# Patient Record
Sex: MALE | Race: BLACK OR AFRICAN AMERICAN | NOT HISPANIC OR LATINO | Employment: STUDENT | URBAN - METROPOLITAN AREA
[De-identification: names, ages, dates, MRNs, and addresses within clinical notes are randomized per-mention and may not be internally consistent; named-entity substitution may affect disease eponyms.]

---
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
## 2022-02-07 DIAGNOSIS — J30.9 ALLERGIC RHINITIS, UNSPECIFIED SEASONALITY, UNSPECIFIED TRIGGER: ICD-10-CM

## 2022-02-07 RX ORDER — MONTELUKAST SODIUM 10 MG/1
10 TABLET ORAL NIGHTLY
Qty: 30 TABLET | Refills: 11 | Status: CANCELLED | OUTPATIENT
Start: 2022-02-07 | End: 2023-02-07

## 2022-02-07 RX ORDER — MONTELUKAST SODIUM 10 MG/1
10 TABLET ORAL NIGHTLY
Qty: 30 TABLET | Refills: 11 | Status: SHIPPED | OUTPATIENT
Start: 2022-02-07 | End: 2023-02-07

## 2022-04-28 ENCOUNTER — TELEPHONE (OUTPATIENT)
Dept: PEDIATRICS | Facility: CLINIC | Age: 22
End: 2022-04-28
Payer: COMMERCIAL

## 2022-04-28 NOTE — TELEPHONE ENCOUNTER
Called and sw Sujata at [facility]. She stated pt will need a new prescription for a new wheelchair. It does require an in person visit if the pt hasn't been seen in the last 6months. Pt is eligible for a new wheelchair every 5 years, pt received last chair back in 2016. Sujata stated the prescription just needs to state whether it is a custom manual or power wheelchair and the additional bathroom equipment can be added to the same rx as the wheelchair.    [facility]  Joyce Jones  Fax # [phone]          ----- Message from Nery Matos sent at 4/28/2022  8:26 AM CDT -----  Contact: Patriece, Mother  Patriece called regarding getting wheelchair evaluation and a rolling shower chair order for Ronny to be faxed to [facility]

## 2022-05-02 ENCOUNTER — TELEPHONE (OUTPATIENT)
Dept: PEDIATRICS | Facility: CLINIC | Age: 22
End: 2022-05-02
Payer: COMMERCIAL

## 2022-05-02 NOTE — TELEPHONE ENCOUNTER
Returned call to mom to schedule pts appt. Appt scheduled for May 13th at 3pm with Dr. Block. Mom VU.  ----- Message from Anny Rayo sent at 5/2/2022  3:18 PM CDT -----  Pt's mother would like return call, needing to schedule wellness visit for pt .  Please call back at .[phone].  Md Octavio

## 2022-05-13 ENCOUNTER — OFFICE VISIT (OUTPATIENT)
Dept: PEDIATRICS | Facility: CLINIC | Age: 22
End: 2022-05-13
Payer: COMMERCIAL

## 2022-05-13 VITALS — TEMPERATURE: 96 F | WEIGHT: 115.19 LBS | BODY MASS INDEX: 19.76 KG/M2

## 2022-05-13 DIAGNOSIS — G80.8 CONGENITAL QUADRIPLEGIA: ICD-10-CM

## 2022-05-13 DIAGNOSIS — G40.909 SEIZURE DISORDER: ICD-10-CM

## 2022-05-13 DIAGNOSIS — Z23 NEED FOR HEPATITIS A VACCINATION: ICD-10-CM

## 2022-05-13 DIAGNOSIS — R15.9 BOWEL AND BLADDER INCONTINENCE: ICD-10-CM

## 2022-05-13 DIAGNOSIS — G80.0 SPASTIC QUADRIPLEGIC CEREBRAL PALSY: ICD-10-CM

## 2022-05-13 DIAGNOSIS — R32 BOWEL AND BLADDER INCONTINENCE: ICD-10-CM

## 2022-05-13 DIAGNOSIS — Z23 NEED FOR MENINGITIS VACCINATION: ICD-10-CM

## 2022-05-13 DIAGNOSIS — Z93.1 GASTROSTOMY TUBE DEPENDENT: ICD-10-CM

## 2022-05-13 DIAGNOSIS — M41.9 KYPHOSCOLIOSIS: ICD-10-CM

## 2022-05-13 DIAGNOSIS — I69.991 DYSPHAGIA FOLLOWING CEREBROVASCULAR DISEASE: ICD-10-CM

## 2022-05-13 DIAGNOSIS — Z00.00 VISIT FOR PREVENTIVE HEALTH EXAMINATION: Primary | ICD-10-CM

## 2022-05-13 PROBLEM — G00.9: Status: RESOLVED | Noted: 2020-10-17 | Resolved: 2022-05-13

## 2022-05-13 PROCEDURE — 99999 PR PBB SHADOW E&M-EST. PATIENT-LVL III: CPT | Mod: PBBFAC,,, | Performed by: PEDIATRICS

## 2022-05-13 PROCEDURE — 90633 HEPATITIS A VACCINE PEDIATRIC / ADOLESCENT 2 DOSE IM: ICD-10-PCS | Mod: S$GLB,,, | Performed by: PEDIATRICS

## 2022-05-13 PROCEDURE — 99395 PREV VISIT EST AGE 18-39: CPT | Mod: 25,S$GLB,, | Performed by: PEDIATRICS

## 2022-05-13 PROCEDURE — 1160F PR REVIEW ALL MEDS BY PRESCRIBER/CLIN PHARMACIST DOCUMENTED: ICD-10-PCS | Mod: CPTII,S$GLB,, | Performed by: PEDIATRICS

## 2022-05-13 PROCEDURE — 90471 IMMUNIZATION ADMIN: CPT | Mod: S$GLB,,, | Performed by: PEDIATRICS

## 2022-05-13 PROCEDURE — 90472 IMMUNIZATION ADMIN EACH ADD: CPT | Mod: S$GLB,,, | Performed by: PEDIATRICS

## 2022-05-13 PROCEDURE — 99395 PR PREVENTIVE VISIT,EST,18-39: ICD-10-PCS | Mod: 25,S$GLB,, | Performed by: PEDIATRICS

## 2022-05-13 PROCEDURE — 90633 HEPA VACC PED/ADOL 2 DOSE IM: CPT | Mod: S$GLB,,, | Performed by: PEDIATRICS

## 2022-05-13 PROCEDURE — 1159F PR MEDICATION LIST DOCUMENTED IN MEDICAL RECORD: ICD-10-PCS | Mod: CPTII,S$GLB,, | Performed by: PEDIATRICS

## 2022-05-13 PROCEDURE — 3008F BODY MASS INDEX DOCD: CPT | Mod: CPTII,S$GLB,, | Performed by: PEDIATRICS

## 2022-05-13 PROCEDURE — 99999 PR PBB SHADOW E&M-EST. PATIENT-LVL III: ICD-10-PCS | Mod: PBBFAC,,, | Performed by: PEDIATRICS

## 2022-05-13 PROCEDURE — 3008F PR BODY MASS INDEX (BMI) DOCUMENTED: ICD-10-PCS | Mod: CPTII,S$GLB,, | Performed by: PEDIATRICS

## 2022-05-13 PROCEDURE — 90620 MENB-4C VACCINE IM: CPT | Mod: S$GLB,,, | Performed by: PEDIATRICS

## 2022-05-13 PROCEDURE — 90472 MENINGOCOCCAL B, OMV VACCINE: ICD-10-PCS | Mod: S$GLB,,, | Performed by: PEDIATRICS

## 2022-05-13 PROCEDURE — 1160F RVW MEDS BY RX/DR IN RCRD: CPT | Mod: CPTII,S$GLB,, | Performed by: PEDIATRICS

## 2022-05-13 PROCEDURE — 90471 HEPATITIS A VACCINE PEDIATRIC / ADOLESCENT 2 DOSE IM: ICD-10-PCS | Mod: S$GLB,,, | Performed by: PEDIATRICS

## 2022-05-13 PROCEDURE — 1159F MED LIST DOCD IN RCRD: CPT | Mod: CPTII,S$GLB,, | Performed by: PEDIATRICS

## 2022-05-13 PROCEDURE — 90620 MENINGOCOCCAL B, OMV VACCINE: ICD-10-PCS | Mod: S$GLB,,, | Performed by: PEDIATRICS

## 2022-05-13 NOTE — PROGRESS NOTES
SUBJECTIVE:  Subjective  Ronny Ramey is a 21 y.o. male who is here with father for Well Child    HPI  Current concerns include wheel chair evaluation and rolling shower hair.    Nutrition:  Current diet:drinks milk/other calcium sources    Elimination:  Stool pattern: daily, normal consistency    Sleep:no problems    Dental:  Brushes teeth twice a day with fluoride? yes  Dental visit within past year?  yes    Social Screening:  School: attends school; going well; no concerns and no school  Physical Activity: minimal physical activity  Behavior: no concerns  Anxiety/Depression? no          Review of Systems  A comprehensive review of symptoms was completed and negative except as noted above.     OBJECTIVE:  Vital signs  Vitals:    05/13/22 1504   Temp: 96.1 °F (35.6 °C)   TempSrc: Tympanic   Weight: 52.3 kg (115 lb 3.1 oz)       Physical Exam     ASSESSMENT/PLAN:  There are no diagnoses linked to this encounter.     Preventive Health Issues Addressed:  1. Anticipatory guidance discussed and a handout covering well-child issues for age was provided.     2. Age appropriate physical activity and nutritional counseling were completed during today's visit.    3. Immunizations and screening tests today: per orders.      Follow Up:  No follow-ups on file.

## 2022-05-13 NOTE — PROGRESS NOTES
Subjective:    History was provided by the father.    Ronny Ramey is a 21 y.o. male who is brought in for this follow-up visit.    Current Issues:  Ex-23 weeker with CP, spastic quadriplegia, kyphoscoliosis, G-tube dependent, seizure disorder. Needs prescription for a new wheelchair and bathroom equipment through [email or web address] (Joyce Jones, Fax # [phone]).  Needs statement of whether it is a custom manual or power wheelchair.  Last hospitalization 3/13/22 for small bowel obstruction with secondary aspiration pneumonitis vs PNA.  Attends [facility] [employer] [facility] [employer] on [address]. Receives PT, OT, and Homebound  there. No seizures in the last few months. Has been following with Dr. 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 Weight is improving. Patient follows with Dr. Martin (Peds Surgery), Dr. Burnette (Ped Ortho) at [facility] on [facility].  He is legally blind.    Past Medical History:   Diagnosis Date    Allergy     Cerebral palsy     Dysphagia, oropharyngeal phase     Encounter for blood transfusion     General anesthetics causing adverse effect in therapeutic use     Pneumonia     Premature baby     23 1/2 weeks     Seizure disorder     Seizures     Vision abnormalities        Review of Nutrition:  Current diet: G-tube dependent:  Jevtiy 1.2 two cans + 240 ml water at 8a, 12p, 4p, 8p  Balanced diet? yes    Social Screening:  Sibling relations: brothers: 1 and sisters: 1  Discipline concerns? no  Concerns regarding behavior with peers? no  School performance: receives [facility]bound  at [facility] [employer]  Secondhand smoke exposure? no    Review of Systems   Constitutional: Negative for fever and unexpected weight change.   HENT: Negative for congestion and rhinorrhea.    Eyes: Negative for discharge and redness.   Respiratory: Negative for cough and wheezing.    Gastrointestinal: Negative for constipation,  diarrhea and vomiting.   Genitourinary: Negative for decreased urine volume and difficulty urinating.   Skin: Negative for rash and wound.   Neurological: Positive for seizures (last one a few months ago ). Negative for syncope and headaches.   Psychiatric/Behavioral: Negative for behavioral problems and sleep disturbance.         Objective:     Vitals:    05/13/22 1504   Temp: 96.1 °F (35.6 °C)   TempSrc: Tympanic   Weight: 52.3 kg (115 lb 3.1 oz)     Weight stable.    Physical Exam  Constitutional:       General: He is sleeping. He is not in acute distress.  HENT:      Head: Atraumatic. Microcephalic.      Right Ear: Tympanic membrane and external ear normal.      Left Ear: Tympanic membrane and external ear normal.      Nose: No rhinorrhea.      Mouth/Throat:      Mouth: No oral lesions.   Eyes:      General: Lids are normal.      Conjunctiva/sclera: Conjunctivae normal.      Comments: Roving eye movements.   Cardiovascular:      Rate and Rhythm: Normal rate and regular rhythm.      Heart sounds: S1 normal and S2 normal. No murmur heard.    No friction rub.   Pulmonary:      Effort: Pulmonary effort is normal. No respiratory distress.   Abdominal:      General: Bowel sounds are normal.      Palpations: Abdomen is soft. There is no mass.      Tenderness: There is no abdominal tenderness.       Musculoskeletal:         General: Deformity (contractures of lower extremities > upper extremities) present.      Cervical back: Full passive range of motion without pain and neck supple.   Lymphadenopathy:      Cervical: No cervical adenopathy.   Skin:     General: Skin is warm.      Findings: Rash (atrophic changes over joints bilateral knees and wrists) present.   Neurological:      Motor: Abnormal muscle tone (hypertonic extremities) present. No seizure activity.      Coordination: Coordination abnormal.         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  GT-dependent.  Diaper dependent.   Reliant on others for all self-care.    Plan:   1. Refill meds as appropriate.  2. Continue follow up with Ortho, Neurology, GI.   3. Continue PT, OT.  4. Complete Homebound  papers q 12 weeks during the school year PRN.  5. Immunizations per orders.  6. Signed [facility] Plan of Care 4/6/22.  7. Rx written for Numotion as requested.

## 2022-05-16 RX ORDER — BACLOFEN 20 MG/1
20 TABLET ORAL 2 TIMES DAILY
Qty: 60 TABLET | Refills: 11
Start: 2022-05-16 | End: 2022-09-09 | Stop reason: SDUPTHER

## 2022-07-26 ENCOUNTER — TELEPHONE (OUTPATIENT)
Dept: PEDIATRICS | Facility: CLINIC | Age: 22
End: 2022-07-26
Payer: COMMERCIAL

## 2022-07-26 NOTE — TELEPHONE ENCOUNTER
Told the office that I will fax over the filled out form that they sent us on the 19th.       ----- Message from Harish Arriaga sent at 7/26/2022  9:01 AM CDT -----  Contact: [phone]  [facility] is calling in, they are wanting to see if a fax was received for feeding supplies and formulat it was sent 7/19, they would like a call back, thanks

## 2022-08-02 ENCOUNTER — TELEPHONE (OUTPATIENT)
Dept: PEDIATRICS | Facility: CLINIC | Age: 22
End: 2022-08-02
Payer: COMMERCIAL

## 2022-08-02 NOTE — TELEPHONE ENCOUNTER
I talked to Shantelle and she asked for us to resend the fax again that we sent on 8/1. This time I made sure to check the fax number and the number on the fax cover sheet was not the right fax number. The right fax number is [phone]          ----- Message from Bianka Ferrara sent at 8/2/2022  9:59 AM CDT -----  Shantelle with [facility] called re: Formula, tube feeding equipment and request that was sent over on 07/19 please call them back @ [phone]     [facility]

## 2022-08-09 ENCOUNTER — TELEPHONE (OUTPATIENT)
Dept: PEDIATRICS | Facility: CLINIC | Age: 22
End: 2022-08-09
Payer: COMMERCIAL

## 2022-08-09 NOTE — TELEPHONE ENCOUNTER
Returned call to Shantelle. She stated they have not received the signed script. Confirmed fax number and let her know I will be sending it now. Shantelle HENNING.  ----- Message from Alice Carrillo sent at 8/9/2022  8:19 AM CDT -----  Please Shantelle/[facility] @ [phone]  or fax [phone] regarding pt, states they received clinical notes, but did not get signed script, formula and tube feeding equipment.

## 2022-08-24 ENCOUNTER — TELEPHONE (OUTPATIENT)
Dept: PEDIATRIC GASTROENTEROLOGY | Facility: CLINIC | Age: 22
End: 2022-08-24
Payer: COMMERCIAL

## 2022-08-24 ENCOUNTER — NURSE TRIAGE (OUTPATIENT)
Dept: ADMINISTRATIVE | Facility: CLINIC | Age: 22
End: 2022-08-24
Payer: COMMERCIAL

## 2022-08-24 NOTE — TELEPHONE ENCOUNTER
Guilherme Almazan RN calling from [facility]. Reports patient typically gets Jevity 1.2 formula bolus q4h. His last feed was at 0930 as they have no more of this formula. She would like to know an alternative to his formula. She would like follow up in this regard and can be reached at [phone].      Additional Information   Nursing judgment    Protocols used: NO PROTOCOL [id number]

## 2022-08-24 NOTE — TELEPHONE ENCOUNTER
Hello.  Patient last by Dr. Block 08/09/2022.  Patient last seen by [facility] 11/25/2020. I am not sure if pt transition to Adult GI. Are you able to assist?    Archana Landers      ----- Message from Miriam Jovel sent at 8/24/2022 12:46 PM CDT -----  Contact: [facility]  .Type:  Needs Medical Advice    Who Called:  Min Roy -Charge nurse       Would the patient rather a call back or a response via [facility]?  Callback   Best Call Back Number:   [phone]    Additional Information:  questions about formula/ pt is out formula at facility         Requesting substitute for pt- he is at the center now/ pt needed to eat

## 2022-08-24 NOTE — TELEPHONE ENCOUNTER
----- Message from Leeann Jaime sent at 8/24/2022  1:50 PM CDT -----  Contact: PAT Vanegas- [facility]  The nurse from [facility] is requesting a callback in regards to knowing what is the patient formula.     The patient will be leaving this center today and has not eaten since 9:30 and he should eat every 4 hours. The facility do not have any of the Jevity 1.2 eli.       [phone]

## 2022-08-24 NOTE — TELEPHONE ENCOUNTER
Reached out to GI's staff about this patient. Although he is 20yo, he is still being seen by Dr. Block. Asked Rina HENSON RN, if she could call the facility that pt is at to figure out what could be done due to pt not eating like usual. Rina stated she will work on it.

## 2022-08-24 NOTE — TELEPHONE ENCOUNTER
Per Guilherme, RN with [facility], was just requesting an alternative to Jevity 1.2 but patient is gone for the day. Notified nurse if patient continues to not eat to send patient to ED. Nurse expressed understanding.

## 2022-08-25 ENCOUNTER — TELEPHONE (OUTPATIENT)
Dept: PEDIATRICS | Facility: CLINIC | Age: 22
End: 2022-08-25
Payer: COMMERCIAL

## 2022-08-25 DIAGNOSIS — G80.0 SPASTIC QUADRIPLEGIC CEREBRAL PALSY: ICD-10-CM

## 2022-08-25 RX ORDER — DIAZEPAM ORAL 5 MG/5ML
SOLUTION ORAL
Qty: 180 ML | Refills: 5 | Status: SHIPPED | OUTPATIENT
Start: 2022-08-25 | End: 2023-09-01 | Stop reason: SDUPTHER

## 2022-08-25 NOTE — TELEPHONE ENCOUNTER
This message was addressed yesterday per Rina in GI. Please see telephone call from 8/24/22.  ----- Message from Hue Block MD sent at 8/24/2022  5:34 PM CDT -----  Contact: [facility]  Please send to my staff pool.    ----- Message -----  From: Archana Ashby MA  Sent: 8/24/2022   1:38 PM CDT  To: Hue Block MD    Hello Dr. Block,     This patient have not been seen by Dr. Valentine since 2020. Patient is no longer Peds GI. Please advise.  ----- Message -----  From: Miriam Jovel  Sent: 8/24/2022  12:48 PM CDT  To: Meme VAZQUEZ Staff    .Type:  Needs Medical Advice    Who Called:  Min Roy -Charge nurse       Would the patient rather a call back or a response via [facility]?  Callback   Best Call Back Number:   [phone]    Additional Information:  questions about formula/ pt is out formula at facility         Requesting substitute for pt- he is at the center now/ pt needed to eat

## 2022-08-25 NOTE — TELEPHONE ENCOUNTER
----- Message from Karin Blue sent at 8/25/2022 11:17 AM CDT -----  Contact: Gold Cantu, [phone]  Requesting an RX refill or new RX.  Is this a refill or new RX: Refill  RX name and strength (copy/paste from chart):  diazePAM (VALIUM) 5 mg/5 mL (1 mg/mL) oral solution  Is this a 30 day or 90 day RX: 30  Pharmacy name and phone # (copy/paste from chart):    [id number] - [place], [place] - [address] AT [facility] [address]  [address]  [place] [address]  Phone: [phone] Fax: [phone]  The doctors have asked that we provide their patients with the following 2 reminders -- prescription refills can take up to 72 hours, and a friendly reminder that in the future you can use your [facility] account to request refills: Yes

## 2022-09-09 ENCOUNTER — TELEPHONE (OUTPATIENT)
Dept: PEDIATRICS | Facility: CLINIC | Age: 22
End: 2022-09-09
Payer: COMMERCIAL

## 2022-09-09 DIAGNOSIS — G80.8 CONGENITAL QUADRIPLEGIA: ICD-10-CM

## 2022-09-09 RX ORDER — BACLOFEN 20 MG/1
20 TABLET ORAL 2 TIMES DAILY
Qty: 60 TABLET | Refills: 11 | Status: SHIPPED | OUTPATIENT
Start: 2022-09-09 | End: 2023-09-01 | Stop reason: SDUPTHER

## 2022-09-09 NOTE — TELEPHONE ENCOUNTER
Returned call, no answer LVM informing guardian that RX was sent to preferred pharmacy.  ----- Message from Argelianaya Karen sent at 9/9/2022 10:09 AM CDT -----  Contact: [phone]  Type:  RX Refill Request    Who Called: nancy   Refill or New Rx:refill  RX Name and Strength:baclofen (LIORESAL) 20 MG tablet  How is the patient currently taking it? (ex. 1XDay):Take 1 tablet (20 mg total) by mouth 2 (two) times daily  Is this a 30 day or 90 day RX:90  Preferred Pharmacy with phone number:  [id number] - [place], [place] - [address] AT [facility] [address]  [address]  [place] [address]  Phone: [phone] Fax: [phone]  Local or Mail Order:local   Ordering Provider:Dr mock   Would the patient rather a call back or a response [facility]? Call back   Best Call Back Number:[phone]  Additional Information: n/a      Thanks KB

## 2022-10-07 ENCOUNTER — TELEPHONE (OUTPATIENT)
Dept: PEDIATRICS | Facility: CLINIC | Age: 22
End: 2022-10-07
Payer: COMMERCIAL

## 2022-10-07 NOTE — TELEPHONE ENCOUNTER
Called mom back and she states raza has nasal congestion and needs clearance to come back to school. I advised for her to go to urgent care as we do not have any appointments available today. Mom verbalized understanding.

## 2022-10-07 NOTE — TELEPHONE ENCOUNTER
----- Message from Faisal Waggoner sent at 10/7/2022  1:08 PM CDT -----  Contact: Marina/mom  Marina would like a call back at [phone] in regards to getting an appointment scheduled with doctor due to patient having congestion.  Thanks

## 2022-12-21 ENCOUNTER — TELEPHONE (OUTPATIENT)
Dept: PEDIATRICS | Facility: CLINIC | Age: 22
End: 2022-12-21
Payer: COMMERCIAL

## 2022-12-21 NOTE — TELEPHONE ENCOUNTER
Returned call to pediatric [facility] and pavel Avila and informed her that I don't have anything signed by Dr. Block for pt. She stated they were having issues with fax machine, confirmed fax number and they had our fax number. She stated she is going to resend it right now.  ----- Message from Suzi Sanders RN sent at 12/21/2022 10:07 AM CST -----    ----- Message -----  From: Neeru Workman  Sent: 12/21/2022  10:00 AM CST  To: Mckayla GARSIA Staff    Katlyn with [facility] is calling regarding the fax sent over on 12/12 she stated it is urgent medicare is waiting for the fax to be sent back. Fax number is [phone]. Thx olga lidia

## 2023-01-31 ENCOUNTER — TELEPHONE (OUTPATIENT)
Dept: PEDIATRICS | Facility: CLINIC | Age: 23
End: 2023-01-31
Payer: COMMERCIAL

## 2023-01-31 ENCOUNTER — NURSE TRIAGE (OUTPATIENT)
Dept: ADMINISTRATIVE | Facility: CLINIC | Age: 23
End: 2023-01-31
Payer: COMMERCIAL

## 2023-01-31 ENCOUNTER — PATIENT MESSAGE (OUTPATIENT)
Dept: ADMINISTRATIVE | Facility: CLINIC | Age: 23
End: 2023-01-31
Payer: COMMERCIAL

## 2023-01-31 DIAGNOSIS — J20.9 ACUTE BRONCHITIS, UNSPECIFIED ORGANISM: ICD-10-CM

## 2023-01-31 DIAGNOSIS — G80.8 CONGENITAL QUADRIPLEGIA: ICD-10-CM

## 2023-01-31 DIAGNOSIS — G80.0 SPASTIC QUADRIPLEGIC CEREBRAL PALSY: ICD-10-CM

## 2023-01-31 DIAGNOSIS — R06.89 CHRONIC RESPIRATORY INSUFFICIENCY: ICD-10-CM

## 2023-01-31 DIAGNOSIS — U07.1 COVID-19: Primary | ICD-10-CM

## 2023-01-31 RX ORDER — ALBUTEROL SULFATE 90 UG/1
2 AEROSOL, METERED RESPIRATORY (INHALATION) EVERY 6 HOURS PRN
Qty: 18 G | Refills: 0 | Status: SHIPPED | OUTPATIENT
Start: 2023-01-31 | End: 2024-01-31

## 2023-01-31 NOTE — TELEPHONE ENCOUNTER
I can't prescribe Paxlovid for him because it is contraindicated in those taking Phenobarbital.  Need to monitor for increased respiratory effort/tachypnea.  They can have some secretions in the lungs.  I believe he has been prescribed a vest in the past, that might help, and I can send in an Albuterol inhaler if she wants to use that with a spacer/mask (usually we avoid nebulizers with COVID so we don't aerosolize it all over the house.    I will enter an order for the COVID home surveillance program and an order for a pulse ox that they should be able to  from the pharmacy here.

## 2023-01-31 NOTE — TELEPHONE ENCOUNTER
NOSTROMO ICT message sent.    1st attempt made to contact Pt for enrollment into cc program.    No VM found message received at this time. Unable to leave VM.    Encounter routed to pcp/staff.  Reason for Disposition   Unable to complete triage due to phone connection issues    Protocols used: No Contact or Duplicate Contact Call-A-AH

## 2023-01-31 NOTE — TELEPHONE ENCOUNTER
Returned call to mom and she stated she has a pulse ox at home she will start using to monitor pts o2 levels. Encouraged mom to monitor and anything less that 95% they should go to the ER. Mom would like the inhaler called into the pharmacy but she stated pt no longer has the vest.

## 2023-01-31 NOTE — TELEPHONE ENCOUNTER
"Pt was diagnosed with COVID on 1/29/2023, mom states he doesn't have any symptoms other than the breathing but she said he isn't having "difficulty breathing" but he is "breathing different". Mom said she has been repositioning him to help with the breathing and it is helping. Pt doesn't have fever.  Informed mom that for COVID we recommend symptomatic care but I will forward the message to Dr. Block for further advice.    ----- Message from Adelina Agarwal sent at 1/31/2023  9:17 AM CST -----  Contact: Wil De La Torre called in to state the pt was diagnosed with Covid on 1/29/23. Symptoms started 1/26/23. Please call her back at [phone].       Thanks  TS    "

## 2023-02-01 ENCOUNTER — NURSE TRIAGE (OUTPATIENT)
Dept: ADMINISTRATIVE | Facility: CLINIC | Age: 23
End: 2023-02-01
Payer: COMMERCIAL

## 2023-02-01 NOTE — TELEPHONE ENCOUNTER
2nd enrollment call attempted with contact made. Spoke with mother of pt. Who states she has trouble with [employer]. Opted for HSM program. Number to mychart support given to mother. Advised to call back with further needs  Reason for Disposition   Information only question and nurse able to answer    Protocols used: Information Only Call - No Triage-A-OH

## 2023-02-06 ENCOUNTER — PATIENT MESSAGE (OUTPATIENT)
Dept: ADMINISTRATIVE | Facility: HOSPITAL | Age: 23
End: 2023-02-06
Payer: COMMERCIAL

## 2023-02-07 DIAGNOSIS — G80.8 CONGENITAL QUADRIPLEGIA: ICD-10-CM

## 2023-02-07 NOTE — PROGRESS NOTES
Saw Dr. Rausch 2/2/23 with baclofen increased to 20 mg TID, Mobic 7.5 mg qHS, Keppra 20 mL BID and Tizanidine 4 mg BID.  Referred to an adult Neurologist with [facility] and physical medicine rehab specialist Dr. Leo Roldan at [facility].  Progress note sent to be scanned.

## 2023-03-09 ENCOUNTER — TELEPHONE (OUTPATIENT)
Dept: PEDIATRICS | Facility: CLINIC | Age: 23
End: 2023-03-09
Payer: COMMERCIAL

## 2023-03-09 NOTE — TELEPHONE ENCOUNTER
Returned call to Allan and she stated that the MD signature wasn't legible. Allan gave me an alternate fax number to resend it to. Resent fax to new fax number.  ----- Message from Hugo Shen sent at 3/9/2023 11:18 AM CST -----  [facility]ediatric [facility] is calling in regards to getting the office to refax over paperwork and medical records    They stated the fax that was sent  over was not legible     Needed      Phys orders for [facility]       Pls fax to [phone]  Office number [phone]    Thank you

## 2023-03-13 ENCOUNTER — PATIENT MESSAGE (OUTPATIENT)
Dept: PEDIATRICS | Facility: CLINIC | Age: 23
End: 2023-03-13
Payer: COMMERCIAL

## 2023-03-13 ENCOUNTER — TELEPHONE (OUTPATIENT)
Dept: PEDIATRICS | Facility: CLINIC | Age: 23
End: 2023-03-13
Payer: COMMERCIAL

## 2023-03-13 NOTE — TELEPHONE ENCOUNTER
----- Message from Sarika Rose sent at 3/13/2023  2:22 PM CDT -----  Contact: Mother  Patients mother is calling to speak with the nurse regarding scheduling pt for wellness visit. Please call [phone]

## 2023-03-15 ENCOUNTER — OFFICE VISIT (OUTPATIENT)
Dept: INTERNAL MEDICINE | Facility: CLINIC | Age: 23
End: 2023-03-15
Payer: COMMERCIAL

## 2023-03-15 VITALS
HEART RATE: 86 BPM | OXYGEN SATURATION: 99 % | SYSTOLIC BLOOD PRESSURE: 130 MMHG | BODY MASS INDEX: 18.91 KG/M2 | DIASTOLIC BLOOD PRESSURE: 72 MMHG | TEMPERATURE: 98 F | WEIGHT: 110.25 LBS | RESPIRATION RATE: 18 BRPM

## 2023-03-15 DIAGNOSIS — Z00.00 ENCOUNTER FOR PREVENTATIVE ADULT HEALTH CARE EXAMINATION: Primary | ICD-10-CM

## 2023-03-15 DIAGNOSIS — G80.0 SPASTIC QUADRIPLEGIC CEREBRAL PALSY: ICD-10-CM

## 2023-03-15 DIAGNOSIS — R62.7 FTT (FAILURE TO THRIVE) IN ADULT: ICD-10-CM

## 2023-03-15 DIAGNOSIS — R62.50 DEVELOPMENTAL DELAY: ICD-10-CM

## 2023-03-15 DIAGNOSIS — L89.90 PRESSURE INJURY OF SKIN, UNSPECIFIED INJURY STAGE, UNSPECIFIED LOCATION: ICD-10-CM

## 2023-03-15 DIAGNOSIS — T78.40XA ALLERGY, INITIAL ENCOUNTER: ICD-10-CM

## 2023-03-15 PROCEDURE — 99999 PR PBB SHADOW E&M-EST. PATIENT-LVL IV: CPT | Mod: PBBFAC,,, | Performed by: NURSE PRACTITIONER

## 2023-03-15 PROCEDURE — 1159F PR MEDICATION LIST DOCUMENTED IN MEDICAL RECORD: ICD-10-PCS | Mod: CPTII,S$GLB,, | Performed by: NURSE PRACTITIONER

## 2023-03-15 PROCEDURE — 99214 PR OFFICE/OUTPT VISIT, EST, LEVL IV, 30-39 MIN: ICD-10-PCS | Mod: S$GLB,,, | Performed by: NURSE PRACTITIONER

## 2023-03-15 PROCEDURE — 1160F RVW MEDS BY RX/DR IN RCRD: CPT | Mod: CPTII,S$GLB,, | Performed by: NURSE PRACTITIONER

## 2023-03-15 PROCEDURE — 99214 OFFICE O/P EST MOD 30 MIN: CPT | Mod: S$GLB,,, | Performed by: NURSE PRACTITIONER

## 2023-03-15 PROCEDURE — 3008F PR BODY MASS INDEX (BMI) DOCUMENTED: ICD-10-PCS | Mod: CPTII,S$GLB,, | Performed by: NURSE PRACTITIONER

## 2023-03-15 PROCEDURE — 3078F DIAST BP <80 MM HG: CPT | Mod: CPTII,S$GLB,, | Performed by: NURSE PRACTITIONER

## 2023-03-15 PROCEDURE — 99999 PR PBB SHADOW E&M-EST. PATIENT-LVL IV: ICD-10-PCS | Mod: PBBFAC,,, | Performed by: NURSE PRACTITIONER

## 2023-03-15 PROCEDURE — 3075F SYST BP GE 130 - 139MM HG: CPT | Mod: CPTII,S$GLB,, | Performed by: NURSE PRACTITIONER

## 2023-03-15 PROCEDURE — 3078F PR MOST RECENT DIASTOLIC BLOOD PRESSURE < 80 MM HG: ICD-10-PCS | Mod: CPTII,S$GLB,, | Performed by: NURSE PRACTITIONER

## 2023-03-15 PROCEDURE — 1160F PR REVIEW ALL MEDS BY PRESCRIBER/CLIN PHARMACIST DOCUMENTED: ICD-10-PCS | Mod: CPTII,S$GLB,, | Performed by: NURSE PRACTITIONER

## 2023-03-15 PROCEDURE — 1159F MED LIST DOCD IN RCRD: CPT | Mod: CPTII,S$GLB,, | Performed by: NURSE PRACTITIONER

## 2023-03-15 PROCEDURE — 3075F PR MOST RECENT SYSTOLIC BLOOD PRESS GE 130-139MM HG: ICD-10-PCS | Mod: CPTII,S$GLB,, | Performed by: NURSE PRACTITIONER

## 2023-03-15 PROCEDURE — 3008F BODY MASS INDEX DOCD: CPT | Mod: CPTII,S$GLB,, | Performed by: NURSE PRACTITIONER

## 2023-03-15 RX ORDER — MUPIROCIN 20 MG/G
OINTMENT TOPICAL 3 TIMES DAILY
Qty: 30 G | Refills: 0 | Status: SHIPPED | OUTPATIENT
Start: 2023-03-15 | End: 2023-10-25 | Stop reason: SDUPTHER

## 2023-03-15 RX ORDER — MONTELUKAST SODIUM 5 MG/1
5 TABLET, CHEWABLE ORAL NIGHTLY
Qty: 30 TABLET | Refills: 0 | Status: SHIPPED | OUTPATIENT
Start: 2023-03-15 | End: 2023-04-14

## 2023-03-15 NOTE — PROGRESS NOTES
HPI     Chief Complaint  Chief Complaint   Patient presents with    Annual Exam       HPI  Ronny Ramey is a 22 y.o. male with multiple medical diagnoses as listed in the medical history and problem list that presents for Preventative Health Care. This patient is new to me.     Preventative Health Care: Mom reports a pressure ulcer located at the thoracic-lumbar region. Approx 1.5 cm 1 cm with slight serous drainage. Mom reports area is cleansed with mild unscented soap and water and dressed. NO home health.     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    Vision abnormalities        PAST SURGICAL HISTORY:  Past Surgical History:   Procedure Laterality Date    ADENOIDECTOMY      BACLOFEN PUMP IMPLANTATION N/A 2020    Procedure: INSERTION, INTRATHECAL BACLOFEN PUMP;  Surgeon: Robbi Cain MD;  Location: [address];  Service: Neurosurgery;  Laterality: N/A;  toronto I, asa 1, lateral left down, regular bed reversed, christine, [employer] rep, co surgeon DR Miller    BACLOFEN PUMP IMPLANTATION  2020    CIRCUMCISION      EYE SURGERY      as a     GASTROSTOMY TUBE PLACEMENT      HERNIA REPAIR      HIP SURGERY      LUMBAR PUNCTURE WITH INJECTION OF BACLOFEN N/A 3/11/2020    Procedure: LUMBAR PUNCTURE, WITH BACLOFEN INJECTION;  Surgeon: Cristiane Sky MD;  Location: [address];  Service: Neurosurgery;  Laterality: N/A;  toronto I, asa 1, lateral left down, regular bed reversed , christine , [employer] co surgeon DR Miller    NISSEN FUNDOPLICATION      REMOVAL OF BACLOFEN PUMP Right 10/15/2020    Procedure: REMOVAL, BACLOFEN PUMP;  Surgeon: Marcy Macedo MD;  Location: [address]  [facility];  Service: Neurosurgery;  Laterality: Right;    TONSILLECTOMY      TYMPANOSTOMY TUBE PLACEMENT         SOCIAL HISTORY:  Social History     Socioeconomic History    Marital status: Single   Tobacco Use    Smoking status: Never    Smokeless tobacco: Never   Substance and Sexual Activity    Alcohol use: No    Drug use: No    Sexual activity: Never   Social History Narrative    Lives with parents and 2 siblings.  Receives Homebound tutoring at [employer] where he attends during the week.       FAMILY HISTORY:  Family History   Problem Relation Age of Onset    Cancer Maternal Grandmother         cervial and breast    Clotting disorder Neg Hx     Anesthesia problems Neg Hx        ALLERGIES AND MEDICATIONS: updated and reviewed.  Review of patient's allergies indicates:   Allergen Reactions    Strawberries [strawberry] Hives     STRAWBERRIES ALLERGIC REACTIONS   (SEIZURES AND HIVES )     Current Outpatient Medications   Medication Sig Dispense Refill    albuterol (PROVENTIL/VENTOLIN HFA) 90 mcg/actuation inhaler Inhale 2 puffs into the lungs every 6 (six) hours as needed for Wheezing. Rescue 18 g 0    baclofen (LIORESAL) 20 MG tablet 1 tablet (20 mg total) by Per G Tube route 2 (two) times daily. 60 tablet 11    cloNIDine (CATAPRES) 0.1 MG tablet TAKE 1 TABLET(0.1 MG) BY MOUTH EVERY EVENING 30 tablet 11    diazePAM (VALIUM) 5 mg/5 mL (1 mg/mL) oral solution TAKE 3 ML(3 MG) BY MOUTH TWICE DAILY 180 mL 5    levETIRAcetam (KEPPRA) 100 mg/mL Soln TAKE 15 ML(1500 MG) VIA GTUBE TWICE DAILY 900 mL 11    meloxicam (MOBIC) 7.5 MG tablet TAKE 1/2 TABLET(3.75 MG) BY MOUTH EVERY DAY 15 tablet 11    PHENobarbitaL 64.8 MG tablet TAKE 1 TABLET BY MOUTH TWICE DAILY 60 tablet 5    pulse oximeter (PULSE OXIMETER) device by Apply Externally route 2 (two) times a day. Use twice daily at 8 AM and 3 PM and [facility] as directed. 1 each 0    tiZANidine (ZANAFLEX) 4 MG tablet TAKE 1/2 TABLET(2 MG) BY MOUTH TWICE  DAILY 30 tablet 11    montelukast (SINGULAIR) 5 MG chewable tablet Take 1 tablet (5 mg total) by mouth every evening. 30 tablet 0    mupirocin (BACTROBAN) 2 % ointment Apply topically 3 (three) times daily. 30 g 0     No current facility-administered medications for this visit.       Exam     ROS  Review of Systems   Constitutional:  Negative for appetite change, chills, fatigue and fever.   HENT:  Negative for congestion, ear pain, postnasal drip, rhinorrhea, sneezing, sore throat and tinnitus.    Respiratory:  Negative for cough and shortness of breath.    Cardiovascular:  Negative for chest pain and palpitations.   Gastrointestinal:  Negative for abdominal pain, constipation, diarrhea, nausea and vomiting.   Genitourinary:  Negative for difficulty urinating and dysuria.   Musculoskeletal:  Negative for arthralgias.   Skin:  Positive for wound.   Neurological:  Negative for headaches.         Physical Exam  Vitals:    03/15/23 1438   BP: 130/72   BP Location: Left arm   Patient Position: Sitting   BP Method: Medium (Manual)   Pulse: 86   Resp: 18   Temp: 97.5 °F (36.4 °C)   TempSrc: Tympanic   SpO2: 99%   Weight: 50 kg (110 lb 3.7 oz)    Body mass index is 18.91 kg/m².  Weight: 50 kg (110 lb 3.7 oz)       Physical Exam  Constitutional:       General: He is not in acute distress.     Appearance: He is well-developed.   HENT:      Head: Normocephalic and atraumatic.      Right Ear: External ear normal.      Left Ear: External ear normal.      Nose: Nose normal.      Mouth/Throat:      Pharynx: No oropharyngeal exudate.   Eyes:      Pupils: Pupils are equal, round, and reactive to light.   Cardiovascular:      Rate and Rhythm: Normal rate and regular rhythm.      Heart sounds: No murmur heard.    No friction rub. No gallop.   Pulmonary:      Effort: Pulmonary effort is normal. No respiratory distress.      Breath sounds: Normal breath sounds. No wheezing.   Abdominal:      General: Bowel sounds are normal.       Palpations: Abdomen is soft.   Musculoskeletal:      Right upper arm: Deformity present.      Left upper arm: Deformity present.      Cervical back: Neck supple.      Right lower leg: Deformity present.      Left lower leg: Deformity present.   Lymphadenopathy:      Cervical: No cervical adenopathy.   Skin:     General: Skin is warm and dry.      Findings: Abrasion present.   Neurological:      Mental Status: He is alert and oriented to person, place, and time.      Motor: Abnormal muscle tone present.         Health Maintenance         Date Due Completion Date    Hepatitis C Screening Never done ---    Lipid Panel Never done ---    HPV Vaccines (1 - Male 2-dose series) Never done ---    COVID-19 Vaccine (4 - Booster for Pfizer series) 01/03/2022 11/8/2021    Influenza Vaccine (1) 09/01/2022 9/29/2020    TETANUS VACCINE 03/05/2025 3/5/2015            Assessment & Plan     Assessment & Plan  Problem List Items Addressed This Visit          Neuro    Cerebral palsy    Overview     Born at 23 weeks with 5 month NICU stay.  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 Seen by Dr. Rausch (Peds Neuro at [facility]) last on 1/12/2021 with follow-up planned in 1 year.            Other    FTT (failure to thrive) in adult    Developmental delay     Other Visit Diagnoses       Encounter for preventative adult health care examination    -  Primary  1.Refill meds as appropriate.  2. Continue follow up with Neurology    Pressure injury of skin, unspecified injury stage, unspecified location      Mom explained she can manage the pressure injury as the area of concern is a  skin tear at this stage  Continue cleaning wound twice a day with mild soap  Apply Bactroban twice daily   Mom advised to monitor for non-healing  We discussed [facility] consult for assessment of wound/wound care orders in the event of concern becomes  worse    Relevant Medications    mupirocin (BACTROBAN) 2 % ointment    Allergy, initial encounter        Relevant Medications    montelukast (SINGULAIR) 5 MG chewable tablet             Health Maintenance reviewed: Deferred     Follow-up: RTC as needed     30+ minutes of total time spent on the encounter, which includes face to face time and non-face to face time preparing to see the patient (eg, review of tests), Obtaining and/or reviewing separately obtained history, documenting clinical information in the electronic or other health record, independently interpreting results (not separately reported) and communicating results to the patient/family/caregiver, or Care coordination (not separately reported).

## 2023-03-16 ENCOUNTER — TELEPHONE (OUTPATIENT)
Dept: PEDIATRICS | Facility: CLINIC | Age: 23
End: 2023-03-16
Payer: COMMERCIAL

## 2023-03-16 NOTE — TELEPHONE ENCOUNTER
[facility] is calling in regards to getting pts last visit that was yesterday with Sheng Gregorio NP. Informed Margarita that at his last appointment with Dr. Block he would be transitioning to an internal medicine provider. Provided Margarita with the name and number to contact their staff in order to get that information and that pt will no longer but under Dr. Block's care. Informed her that I would route a message to his staff and advised Margarita to call back tomorrow afternoon if she doesn't get a response. Margarita HENNING.

## 2023-03-20 ENCOUNTER — TELEPHONE (OUTPATIENT)
Dept: PRIMARY CARE CLINIC | Facility: CLINIC | Age: 23
End: 2023-03-20
Payer: COMMERCIAL

## 2023-03-20 NOTE — TELEPHONE ENCOUNTER
----- Message from Rayne Singletary sent at 3/20/2023 10:08 AM CDT -----  Regarding: Info needed  Name of Who is Calling: [facility] / Margarita      What is the request in detail: Margarita is calling to get the pts most recent visit notes faxed over. Please fax to [phone]. They have been waiting since last week. Please advise.        Can the clinic reply by [facility]: no       What Number to Call Back if not in [facility]: [phone] Margarita

## 2023-05-19 DIAGNOSIS — T78.40XA ALLERGY, INITIAL ENCOUNTER: ICD-10-CM

## 2023-05-19 DIAGNOSIS — R06.89 CHRONIC RESPIRATORY INSUFFICIENCY: Primary | ICD-10-CM

## 2023-05-19 RX ORDER — MONTELUKAST SODIUM 5 MG/1
5 TABLET, CHEWABLE ORAL NIGHTLY
Qty: 90 TABLET | Refills: 0 | Status: SHIPPED | OUTPATIENT
Start: 2023-05-19 | End: 2023-09-01 | Stop reason: SDUPTHER

## 2023-05-19 RX ORDER — MONTELUKAST SODIUM 5 MG/1
5 TABLET, CHEWABLE ORAL NIGHTLY
Qty: 30 TABLET | Refills: 0 | Status: CANCELLED | OUTPATIENT
Start: 2023-05-19 | End: 2023-06-18

## 2023-06-02 ENCOUNTER — OFFICE VISIT (OUTPATIENT)
Dept: PRIMARY CARE CLINIC | Facility: CLINIC | Age: 23
End: 2023-06-02
Payer: COMMERCIAL

## 2023-06-02 VITALS
BODY MASS INDEX: 18.78 KG/M2 | WEIGHT: 110 LBS | TEMPERATURE: 98 F | DIASTOLIC BLOOD PRESSURE: 78 MMHG | OXYGEN SATURATION: 98 % | SYSTOLIC BLOOD PRESSURE: 134 MMHG | HEIGHT: 64 IN | HEART RATE: 62 BPM

## 2023-06-02 DIAGNOSIS — Z11.4 SCREENING FOR HIV (HUMAN IMMUNODEFICIENCY VIRUS): ICD-10-CM

## 2023-06-02 DIAGNOSIS — H54.3 BLIND IN BOTH EYES: ICD-10-CM

## 2023-06-02 DIAGNOSIS — Z13.220 ENCOUNTER FOR LIPID SCREENING FOR CARDIOVASCULAR DISEASE: ICD-10-CM

## 2023-06-02 DIAGNOSIS — Z11.59 NEED FOR HEPATITIS C SCREENING TEST: ICD-10-CM

## 2023-06-02 DIAGNOSIS — Z11.59 ENCOUNTER FOR HEPATITIS C SCREENING TEST FOR LOW RISK PATIENT: ICD-10-CM

## 2023-06-02 DIAGNOSIS — Z13.6 ENCOUNTER FOR LIPID SCREENING FOR CARDIOVASCULAR DISEASE: ICD-10-CM

## 2023-06-02 DIAGNOSIS — F81.89 NON-VERBAL LEARNING DISORDER: ICD-10-CM

## 2023-06-02 DIAGNOSIS — G80.0 SPASTIC QUADRIPLEGIC CEREBRAL PALSY: ICD-10-CM

## 2023-06-02 DIAGNOSIS — R56.9 SEIZURE: Primary | ICD-10-CM

## 2023-06-02 DIAGNOSIS — Z86.39 HISTORY OF METABOLIC AND NUTRITIONAL DISORDER: ICD-10-CM

## 2023-06-02 DIAGNOSIS — Z00.00 GENERAL MEDICAL EXAM: ICD-10-CM

## 2023-06-02 PROCEDURE — 3008F BODY MASS INDEX DOCD: CPT | Mod: CPTII,S$GLB,, | Performed by: NURSE PRACTITIONER

## 2023-06-02 PROCEDURE — 1159F MED LIST DOCD IN RCRD: CPT | Mod: CPTII,S$GLB,, | Performed by: NURSE PRACTITIONER

## 2023-06-02 PROCEDURE — 1159F PR MEDICATION LIST DOCUMENTED IN MEDICAL RECORD: ICD-10-PCS | Mod: CPTII,S$GLB,, | Performed by: NURSE PRACTITIONER

## 2023-06-02 PROCEDURE — 99395 PREV VISIT EST AGE 18-39: CPT | Mod: S$GLB,,, | Performed by: NURSE PRACTITIONER

## 2023-06-02 PROCEDURE — 3078F PR MOST RECENT DIASTOLIC BLOOD PRESSURE < 80 MM HG: ICD-10-PCS | Mod: CPTII,S$GLB,, | Performed by: NURSE PRACTITIONER

## 2023-06-02 PROCEDURE — 3075F SYST BP GE 130 - 139MM HG: CPT | Mod: CPTII,S$GLB,, | Performed by: NURSE PRACTITIONER

## 2023-06-02 PROCEDURE — 3078F DIAST BP <80 MM HG: CPT | Mod: CPTII,S$GLB,, | Performed by: NURSE PRACTITIONER

## 2023-06-02 PROCEDURE — 99395 PR PREVENTIVE VISIT,EST,18-39: ICD-10-PCS | Mod: S$GLB,,, | Performed by: NURSE PRACTITIONER

## 2023-06-02 PROCEDURE — 3008F PR BODY MASS INDEX (BMI) DOCUMENTED: ICD-10-PCS | Mod: CPTII,S$GLB,, | Performed by: NURSE PRACTITIONER

## 2023-06-02 PROCEDURE — 3075F PR MOST RECENT SYSTOLIC BLOOD PRESS GE 130-139MM HG: ICD-10-PCS | Mod: CPTII,S$GLB,, | Performed by: NURSE PRACTITIONER

## 2023-06-02 PROCEDURE — 99999 PR PBB SHADOW E&M-EST. PATIENT-LVL IV: ICD-10-PCS | Mod: PBBFAC,,, | Performed by: NURSE PRACTITIONER

## 2023-06-02 PROCEDURE — 99999 PR PBB SHADOW E&M-EST. PATIENT-LVL IV: CPT | Mod: PBBFAC,,, | Performed by: NURSE PRACTITIONER

## 2023-06-07 ENCOUNTER — TELEPHONE (OUTPATIENT)
Dept: PRIMARY CARE CLINIC | Facility: CLINIC | Age: 23
End: 2023-06-07
Payer: COMMERCIAL

## 2023-06-07 NOTE — PROGRESS NOTES
Subjective:       Patient ID: Ronny Ramey is a 22 y.o. male.    Chief Complaint: Annual Exam and Establish Care      History of Present Illness:   Ronny Ramey 22 y.o. male presents today to establish care, address care gaps and complete 90-L. Treatment options and alternatives were discussed with the patients caregiver. He is non-verbal and blind. Patient is wheelchair bound and requires total patient care with ADLs, Eating and Transfers. He was recently admitted to hospital back in May for aspiration pneumonia. Patient is fed through [facility] button with [facility]ev[facility]. Informed mom to make sure she keeps him at a 45 degree angle for 1 hour after feeding. 90-L completed.   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  Receives Homebound tutoring at [employer] where he attends during the week.     Outpatient Encounter Medications as of 6/2/2023   Medication Sig Dispense Refill    albuterol (PROVENTIL/VENTOLIN HFA) 90 mcg/actuation inhaler Inhale 2 puffs into the lungs every 6 (six) hours as needed for Wheezing. Rescue 18 g 0    baclofen (LIORESAL) 20 MG tablet 1 tablet (20 mg total) by Per G Tube route 2 (two) times daily. 60  "tablet 11    cloNIDine (CATAPRES) 0.1 MG tablet TAKE 1 TABLET(0.1 MG) BY MOUTH EVERY EVENING 30 tablet 11    diazePAM (VALIUM) 5 mg/5 mL (1 mg/mL) oral solution TAKE 3 ML(3 MG) BY MOUTH TWICE DAILY 180 mL 5    levETIRAcetam (KEPPRA) 100 mg/mL Soln TAKE 15 ML(1500 MG) VIA GTUBE TWICE DAILY 900 mL 11    meloxicam (MOBIC) 7.5 MG tablet TAKE 1/2 TABLET(3.75 MG) BY MOUTH EVERY DAY 15 tablet 11    montelukast (SINGULAIR) 5 MG chewable tablet Take 1 tablet (5 mg total) by mouth every evening. 90 tablet 0    mupirocin (BACTROBAN) 2 % ointment Apply topically 3 (three) times daily. 30 g 0    PHENobarbitaL 64.8 MG tablet TAKE 1 TABLET BY MOUTH TWICE DAILY 60 tablet 5    pulse oximeter (PULSE OXIMETER) device by Apply Externally route 2 (two) times a day. Use twice daily at 8 AM and 3 PM and [facility] the [facility] as directed. 1 each 0    tiZANidine (ZANAFLEX) 4 MG tablet TAKE 1/2 TABLET(2 MG) BY MOUTH TWICE DAILY 30 tablet 11     No facility-administered encounter medications on file as of 6/2/2023.       Review of Systems   Constitutional:  Negative for activity change and fever.   HENT:  Negative for congestion.      Objective:      /78 (BP Location: Left arm, Patient Position: Sitting, BP Method: Medium (Manual))   Pulse 62   Temp 98.2 °F (36.8 °C) (Oral)   Ht 5' 4" (1.626 m)   Wt 49.9 kg (110 lb)   SpO2 98%   BMI 18.88 kg/m²   Physical Exam  Constitutional:       Appearance: He is well-groomed and underweight.   Abdominal:          Comments: [facility] button   Neurological:      Mental Status: He is alert.       Results for orders placed or performed in visit on 08/19/21   POCT COVID-19 Rapid Screening   Result Value Ref Range    POC Rapid COVID Negative Negative     Acceptable Yes      Assessment:       1. Seizure    2. Spastic quadriplegic cerebral palsy    3. Blind in both eyes    4. Non-verbal learning disorder    5. Encounter for lipid screening for cardiovascular disease    6. General " medical exam    7. Need for hepatitis C screening test    8. History of metabolic and nutritional disorder    9. Screening for HIV (human immunodeficiency virus)    10. Encounter for hepatitis C screening test for low risk patient        Plan:   Ronny was seen today for annual exam and establish care.    Diagnoses and all orders for this visit:    Seizure    Spastic quadriplegic cerebral palsy    Blind in both eyes    Non-verbal learning disorder    Encounter for lipid screening for cardiovascular disease  -     Lipid Panel; Future  -     Lipid Panel; Future    General medical exam  -     CBC Auto Differential; Future  -     Comprehensive Metabolic Panel; Future  -     Hemoglobin A1C; Future  -     TSH; Future  -     T3, Free; Future  -     T4, Free; Future  -     CBC Auto Differential; Future  -     Comprehensive Metabolic Panel; Future    Need for hepatitis C screening test  -     Hepatitis C Antibody; Future    History of metabolic and nutritional disorder  -     Hemoglobin A1C; Future  -     TSH; Future  -     T3, Free; Future  -     T4, Free; Future    Screening for HIV (human immunodeficiency virus)  -     HIV 1/2 Ag/Ab (4th Gen); Future    Encounter for hepatitis C screening test for low risk patient  -     Hepatitis C Antibody; Future              [facility]   [address] [place] [address] [address]  [place] [address]  Office [phone]  Fax [phone]

## 2023-06-07 NOTE — TELEPHONE ENCOUNTER
I called to inform the patient that the paperwork has been completed and ready for  there was no answer and I was unable to leave a VM.

## 2023-06-08 DIAGNOSIS — G47.9 SLEEP DISTURBANCE: ICD-10-CM

## 2023-06-08 DIAGNOSIS — G80.0 SPASTIC QUADRIPLEGIC CEREBRAL PALSY: ICD-10-CM

## 2023-06-13 RX ORDER — CLONIDINE HYDROCHLORIDE 0.1 MG/1
0.1 TABLET ORAL NIGHTLY
Qty: 90 TABLET | Refills: 0 | Status: SHIPPED | OUTPATIENT
Start: 2023-06-13 | End: 2023-09-01 | Stop reason: SDUPTHER

## 2023-06-13 RX ORDER — TIZANIDINE 4 MG/1
TABLET ORAL
Qty: 30 TABLET | Refills: 1 | Status: SHIPPED | OUTPATIENT
Start: 2023-06-13 | End: 2023-09-01 | Stop reason: SDUPTHER

## 2023-07-24 ENCOUNTER — TELEPHONE (OUTPATIENT)
Dept: PRIMARY CARE CLINIC | Facility: CLINIC | Age: 23
End: 2023-07-24
Payer: COMMERCIAL

## 2023-07-24 NOTE — TELEPHONE ENCOUNTER
Spoke with Raul regarding patient. Office is needing clinical notes and orders from provider for pt to receive feeding tub. [facility] informed that provider will be informed, voiced understanding.  ----- Message from Mirian Bailey sent at 7/24/2023 11:51 AM CDT -----  Contact: [phone]  [facility] calling in regards to needing appt notes from visit : 06/06 or most recent notes as well as a new Rx for feeding tube and formulas      Please call and advise @ # [phone]

## 2023-07-26 DIAGNOSIS — G40.909 SEIZURE DISORDER: ICD-10-CM

## 2023-07-26 NOTE — TELEPHONE ENCOUNTER
No care due was identified.  [facility] Embedded Care Due Messages. Reference number: [id number].   7/26/2023 3:05:32 PM CDT

## 2023-07-26 NOTE — TELEPHONE ENCOUNTER
Returned call to patient mom to inform med refil has been sent to provider to advise upon return. Please allow time for response. No answer.

## 2023-07-26 NOTE — TELEPHONE ENCOUNTER
----- Message from Elvira Singh sent at 7/26/2023  2:37 PM CDT -----  Contact: Pt's mom [phone]  Requesting an RX refill or new RX.  Is this a refill or new RX: refill   RX name and strength (copy/paste from chart):  PHENobarbitaL 64.8 MG tablet  Is this a 30 day or 90 day RX:   Pharmacy name and phone # (copy/paste from chart):  [id number] - [place], [place] - [address] AT [facility] OF [address]   Phone:  [phone]  Fax:  [phone]        The doctors have asked that we provide their patients with the following 2 reminders -- prescription refills can take up to 72 hours, and a friendly reminder that in the future you can use your [facility] account to request refills: yes

## 2023-07-27 RX ORDER — PHENOBARBITAL 64.8 MG/1
64.8 TABLET ORAL 2 TIMES DAILY
Qty: 60 TABLET | Refills: 0 | Status: SHIPPED | OUTPATIENT
Start: 2023-07-27 | End: 2023-09-01 | Stop reason: SDUPTHER

## 2023-07-31 ENCOUNTER — TELEPHONE (OUTPATIENT)
Dept: PRIMARY CARE CLINIC | Facility: CLINIC | Age: 23
End: 2023-07-31
Payer: COMMERCIAL

## 2023-07-31 DIAGNOSIS — G40.909 SEIZURE DISORDER: ICD-10-CM

## 2023-07-31 RX ORDER — PHENOBARBITAL 64.8 MG/1
64.8 TABLET ORAL 2 TIMES DAILY
Qty: 60 TABLET | Refills: 0 | Status: CANCELLED | OUTPATIENT
Start: 2023-07-31

## 2023-07-31 NOTE — TELEPHONE ENCOUNTER
Patient mother requesting medication refills, information provider is not in clinic however information sent to the provider.      ----- Message from Valerie Kuo sent at 7/31/2023  2:12 PM CDT -----  Contact: mother/ [phone]/Wil  Requesting an RX refill or refill  RX name and strength :  PHENobarbitaL 64.8 MG tablet  Is this a 30 day or 90 day RX:   Pharmacy name and phone # :  [id number] - [place] - [address] AT [facility] OF [address]   Phone:  [phone]  Fax:  [phone]      Please resend Rx to above pharmacy and call mother Wil @ [phone] to confirm because patient is completely out of medication.

## 2023-07-31 NOTE — TELEPHONE ENCOUNTER
No care due was identified.  [facility] Embedded Care Due Messages. Reference number: [id number].   7/31/2023 2:58:41 PM CDT

## 2023-07-31 NOTE — TELEPHONE ENCOUNTER
Returned the call there was no answer, phone just rings          ----- Message from Brii Villafana sent at 7/31/2023 10:03 AM CDT -----  Contact: Janiya ([facility]) [phone]  Janiya from [facility] requesting a call for f/u info in regards to  Tube feeding formula and supplies and also note from 6/6/23 appt.    Please call and advise     
independent

## 2023-08-30 ENCOUNTER — TELEPHONE (OUTPATIENT)
Dept: PRIMARY CARE CLINIC | Facility: CLINIC | Age: 23
End: 2023-08-30
Payer: COMMERCIAL

## 2023-08-30 NOTE — TELEPHONE ENCOUNTER
I called the patient to inform the patient mother of the availabilities she states that she will contact another clinic for a sooner appointment.      ----- Message from Eileen Jackson sent at 8/30/2023  4:34 PM CDT -----  Contact: [phone]  1MEDICALADVICE     Patient is calling for Medical Advice regarding:pt has a bed sore     How long has patient had these symptoms: 2 weeks     Pharmacy name and phone#:  [id number] - [place], [place] - [address] [facility] [address]  [address]  [place] [address]  Phone: [phone] Fax: [phone]       Would like response via [employer]:  no     Comments:pts mother is calling for an appt for the pts bedsore please give return call

## 2023-09-01 DIAGNOSIS — G47.9 SLEEP DISTURBANCE: ICD-10-CM

## 2023-09-01 DIAGNOSIS — T78.40XA ALLERGY, INITIAL ENCOUNTER: ICD-10-CM

## 2023-09-01 DIAGNOSIS — G80.0 SPASTIC QUADRIPLEGIC CEREBRAL PALSY: ICD-10-CM

## 2023-09-01 DIAGNOSIS — G80.8 CONGENITAL QUADRIPLEGIA: ICD-10-CM

## 2023-09-01 DIAGNOSIS — G40.909 SEIZURE DISORDER: ICD-10-CM

## 2023-09-05 ENCOUNTER — OFFICE VISIT (OUTPATIENT)
Dept: INTERNAL MEDICINE | Facility: CLINIC | Age: 23
End: 2023-09-05
Payer: COMMERCIAL

## 2023-09-05 VITALS
BODY MASS INDEX: 18.88 KG/M2 | TEMPERATURE: 98 F | HEART RATE: 123 BPM | HEIGHT: 64 IN | DIASTOLIC BLOOD PRESSURE: 80 MMHG | SYSTOLIC BLOOD PRESSURE: 110 MMHG | OXYGEN SATURATION: 95 %

## 2023-09-05 DIAGNOSIS — L89.319 PRESSURE INJURY OF SKIN OF RIGHT BUTTOCK, UNSPECIFIED INJURY STAGE: Primary | ICD-10-CM

## 2023-09-05 DIAGNOSIS — R62.50 DEVELOPMENTAL DELAY: ICD-10-CM

## 2023-09-05 DIAGNOSIS — G80.8 CONGENITAL QUADRIPLEGIA: ICD-10-CM

## 2023-09-05 PROCEDURE — 1159F MED LIST DOCD IN RCRD: CPT | Mod: CPTII,S$GLB,, | Performed by: PHYSICIAN ASSISTANT

## 2023-09-05 PROCEDURE — 3079F PR MOST RECENT DIASTOLIC BLOOD PRESSURE 80-89 MM HG: ICD-10-PCS | Mod: CPTII,S$GLB,, | Performed by: PHYSICIAN ASSISTANT

## 2023-09-05 PROCEDURE — 99213 PR OFFICE/OUTPT VISIT, EST, LEVL III, 20-29 MIN: ICD-10-PCS | Mod: S$GLB,,, | Performed by: PHYSICIAN ASSISTANT

## 2023-09-05 PROCEDURE — 3074F PR MOST RECENT SYSTOLIC BLOOD PRESSURE < 130 MM HG: ICD-10-PCS | Mod: CPTII,S$GLB,, | Performed by: PHYSICIAN ASSISTANT

## 2023-09-05 PROCEDURE — 99213 OFFICE O/P EST LOW 20 MIN: CPT | Mod: S$GLB,,, | Performed by: PHYSICIAN ASSISTANT

## 2023-09-05 PROCEDURE — 3008F BODY MASS INDEX DOCD: CPT | Mod: CPTII,S$GLB,, | Performed by: PHYSICIAN ASSISTANT

## 2023-09-05 PROCEDURE — 3074F SYST BP LT 130 MM HG: CPT | Mod: CPTII,S$GLB,, | Performed by: PHYSICIAN ASSISTANT

## 2023-09-05 PROCEDURE — 3008F PR BODY MASS INDEX (BMI) DOCUMENTED: ICD-10-PCS | Mod: CPTII,S$GLB,, | Performed by: PHYSICIAN ASSISTANT

## 2023-09-05 PROCEDURE — 1160F RVW MEDS BY RX/DR IN RCRD: CPT | Mod: CPTII,S$GLB,, | Performed by: PHYSICIAN ASSISTANT

## 2023-09-05 PROCEDURE — 1159F PR MEDICATION LIST DOCUMENTED IN MEDICAL RECORD: ICD-10-PCS | Mod: CPTII,S$GLB,, | Performed by: PHYSICIAN ASSISTANT

## 2023-09-05 PROCEDURE — 99999 PR PBB SHADOW E&M-EST. PATIENT-LVL V: ICD-10-PCS | Mod: PBBFAC,,, | Performed by: PHYSICIAN ASSISTANT

## 2023-09-05 PROCEDURE — 3079F DIAST BP 80-89 MM HG: CPT | Mod: CPTII,S$GLB,, | Performed by: PHYSICIAN ASSISTANT

## 2023-09-05 PROCEDURE — 99999 PR PBB SHADOW E&M-EST. PATIENT-LVL V: CPT | Mod: PBBFAC,,, | Performed by: PHYSICIAN ASSISTANT

## 2023-09-05 PROCEDURE — 1160F PR REVIEW ALL MEDS BY PRESCRIBER/CLIN PHARMACIST DOCUMENTED: ICD-10-PCS | Mod: CPTII,S$GLB,, | Performed by: PHYSICIAN ASSISTANT

## 2023-09-05 RX ORDER — TIZANIDINE 4 MG/1
TABLET ORAL
Qty: 30 TABLET | Refills: 1 | Status: SHIPPED | OUTPATIENT
Start: 2023-09-05 | End: 2023-10-25

## 2023-09-05 RX ORDER — LEVETIRACETAM 100 MG/ML
1500 SOLUTION ORAL 2 TIMES DAILY
Qty: 900 ML | Refills: 1 | Status: SHIPPED | OUTPATIENT
Start: 2023-09-05 | End: 2023-10-25 | Stop reason: SDUPTHER

## 2023-09-05 RX ORDER — MELOXICAM 7.5 MG/1
TABLET ORAL
Qty: 15 TABLET | Refills: 3 | Status: SHIPPED | OUTPATIENT
Start: 2023-09-05 | End: 2023-10-25 | Stop reason: SDUPTHER

## 2023-09-05 RX ORDER — BACLOFEN 20 MG/1
20 TABLET ORAL 2 TIMES DAILY
Qty: 60 TABLET | Refills: 1 | Status: SHIPPED | OUTPATIENT
Start: 2023-09-05 | End: 2023-10-25 | Stop reason: SDUPTHER

## 2023-09-05 RX ORDER — PHENOBARBITAL 64.8 MG/1
64.8 TABLET ORAL 2 TIMES DAILY
Qty: 60 TABLET | Refills: 0 | Status: SHIPPED | OUTPATIENT
Start: 2023-09-05 | End: 2023-10-16

## 2023-09-05 RX ORDER — CLONIDINE HYDROCHLORIDE 0.1 MG/1
0.1 TABLET ORAL NIGHTLY
Qty: 90 TABLET | Refills: 1 | Status: SHIPPED | OUTPATIENT
Start: 2023-09-05 | End: 2023-10-25 | Stop reason: SDUPTHER

## 2023-09-05 RX ORDER — DIAZEPAM ORAL 5 MG/5ML
SOLUTION ORAL
Qty: 180 ML | Refills: 1 | Status: SHIPPED | OUTPATIENT
Start: 2023-09-05 | End: 2023-10-25 | Stop reason: SDUPTHER

## 2023-09-05 RX ORDER — MONTELUKAST SODIUM 5 MG/1
5 TABLET, CHEWABLE ORAL NIGHTLY
Qty: 90 TABLET | Refills: 1 | Status: SHIPPED | OUTPATIENT
Start: 2023-09-05 | End: 2023-10-25 | Stop reason: SDUPTHER

## 2023-09-05 NOTE — PROGRESS NOTES
"Subjective:      Patient ID: Ronny Ramey is a 22 y.o. male.    Chief Complaint: Bed Sore (Right Hip)    Patient is new to me, being seen today for R hip bed sore x2-3wks, worsening recently.   Have been trying to keep area clean, alleviate pressure on that area, bactroban ointment   Area is getting worse   Typically in wheel chair or bedbound     Pulse is up slightly today, mom reports he has been out of medication until today when PCP sent refills    No HH currently     Present with mom at today's visit, would like to see new PCP    Last visit March 2023 with Sheng Gregorio NP.       Review of Systems   Constitutional:  Negative for chills, diaphoresis and fever.   HENT:  Negative for congestion, rhinorrhea and sore throat.    Respiratory:  Negative for cough, shortness of breath and wheezing.    Gastrointestinal:  Negative for abdominal pain, constipation, diarrhea, nausea and vomiting.   Skin:  Negative for rash.   Neurological:  Negative for dizziness, light-headedness and headaches.       Objective:   /80   Pulse (!) 123   Temp 97.7 °F (36.5 °C)   Ht 5' 4" (1.626 m)   SpO2 95%   BMI 18.88 kg/m²   Physical Exam  Constitutional:       Comments: Patient in wheel chair    Cardiovascular:      Rate and Rhythm: Regular rhythm. Tachycardia present.      Heart sounds: Normal heart sounds.   Skin:         Psychiatric:         Cognition and Memory: Cognition is impaired.       Assessment:      1. Pressure injury of skin of right buttock, unspecified injury stage    2. Congenital quadriplegia    3. Developmental delay       Plan:   Pressure injury of skin of right buttock, unspecified injury stage  -     Ambulatory referral/consult to Wound Clinic; Future; Expected date: 09/12/2023    Congenital quadriplegia    Developmental delay      Monitor pulse at home, restart medication, close f/u to establish care w PCP at this location     Discussed worsening signs/symptoms and when to return to clinic or go " to ED.   Patient expresses understanding and agrees with treatment plan.

## 2023-09-11 ENCOUNTER — OFFICE VISIT (OUTPATIENT)
Dept: NEUROLOGY | Facility: CLINIC | Age: 23
End: 2023-09-11
Payer: COMMERCIAL

## 2023-09-11 VITALS — SYSTOLIC BLOOD PRESSURE: 150 MMHG | HEART RATE: 101 BPM | DIASTOLIC BLOOD PRESSURE: 103 MMHG

## 2023-09-11 DIAGNOSIS — F79 INTELLECTUAL DISABILITY WITH EPILEPSY: Primary | ICD-10-CM

## 2023-09-11 DIAGNOSIS — M41.9 KYPHOSCOLIOSIS: ICD-10-CM

## 2023-09-11 DIAGNOSIS — H54.8 LEGALLY BLIND: ICD-10-CM

## 2023-09-11 DIAGNOSIS — F72 SEVERE INTELLECTUAL DISABILITIES: ICD-10-CM

## 2023-09-11 DIAGNOSIS — R47.01 NONVERBAL: ICD-10-CM

## 2023-09-11 DIAGNOSIS — G80.0 SPASTIC QUADRIPLEGIC CEREBRAL PALSY: ICD-10-CM

## 2023-09-11 DIAGNOSIS — G40.909 INTELLECTUAL DISABILITY WITH EPILEPSY: Primary | ICD-10-CM

## 2023-09-11 DIAGNOSIS — G40.919 INTRACTABLE EPILEPSY WITHOUT STATUS EPILEPTICUS, UNSPECIFIED EPILEPSY TYPE: ICD-10-CM

## 2023-09-11 DIAGNOSIS — R63.4 WEIGHT LOSS: ICD-10-CM

## 2023-09-11 DIAGNOSIS — Z93.1 PEG (PERCUTANEOUS ENDOSCOPIC GASTROSTOMY) STATUS: ICD-10-CM

## 2023-09-11 PROBLEM — R62.7 FTT (FAILURE TO THRIVE) IN ADULT: Status: RESOLVED | Noted: 2020-10-13 | Resolved: 2023-09-11

## 2023-09-11 PROBLEM — G82.50 SPASTIC QUADRIPLEGIA: Status: ACTIVE | Noted: 2021-09-05

## 2023-09-11 PROBLEM — G80.8 CONGENITAL QUADRIPLEGIA: Status: RESOLVED | Noted: 2017-01-23 | Resolved: 2023-09-11

## 2023-09-11 PROBLEM — R06.89 CHRONIC RESPIRATORY INSUFFICIENCY: Status: RESOLVED | Noted: 2019-11-08 | Resolved: 2023-09-11

## 2023-09-11 PROBLEM — G82.50 SPASTIC QUADRIPLEGIA: Status: RESOLVED | Noted: 2021-09-05 | Resolved: 2023-09-11

## 2023-09-11 PROCEDURE — 99999 PR PBB SHADOW E&M-EST. PATIENT-LVL III: ICD-10-PCS | Mod: PBBFAC,,, | Performed by: PSYCHIATRY & NEUROLOGY

## 2023-09-11 PROCEDURE — 1159F MED LIST DOCD IN RCRD: CPT | Mod: CPTII,S$GLB,, | Performed by: PSYCHIATRY & NEUROLOGY

## 2023-09-11 PROCEDURE — 3080F PR MOST RECENT DIASTOLIC BLOOD PRESSURE >= 90 MM HG: ICD-10-PCS | Mod: CPTII,S$GLB,, | Performed by: PSYCHIATRY & NEUROLOGY

## 2023-09-11 PROCEDURE — 99417 PROLNG OP E/M EACH 15 MIN: CPT | Mod: S$GLB,,, | Performed by: PSYCHIATRY & NEUROLOGY

## 2023-09-11 PROCEDURE — 99205 PR OFFICE/OUTPT VISIT, NEW, LEVL V, 60-74 MIN: ICD-10-PCS | Mod: S$GLB,,, | Performed by: PSYCHIATRY & NEUROLOGY

## 2023-09-11 PROCEDURE — 1159F PR MEDICATION LIST DOCUMENTED IN MEDICAL RECORD: ICD-10-PCS | Mod: CPTII,S$GLB,, | Performed by: PSYCHIATRY & NEUROLOGY

## 2023-09-11 PROCEDURE — 99205 OFFICE O/P NEW HI 60 MIN: CPT | Mod: S$GLB,,, | Performed by: PSYCHIATRY & NEUROLOGY

## 2023-09-11 PROCEDURE — 99999 PR PBB SHADOW E&M-EST. PATIENT-LVL III: CPT | Mod: PBBFAC,,, | Performed by: PSYCHIATRY & NEUROLOGY

## 2023-09-11 PROCEDURE — 3077F SYST BP >= 140 MM HG: CPT | Mod: CPTII,S$GLB,, | Performed by: PSYCHIATRY & NEUROLOGY

## 2023-09-11 PROCEDURE — 3080F DIAST BP >= 90 MM HG: CPT | Mod: CPTII,S$GLB,, | Performed by: PSYCHIATRY & NEUROLOGY

## 2023-09-11 PROCEDURE — 99417 PR PROLONGED SVC, OUTPT, W/WO DIRECT PT CONTACT,  EA ADDTL 15 MIN: ICD-10-PCS | Mod: S$GLB,,, | Performed by: PSYCHIATRY & NEUROLOGY

## 2023-09-11 PROCEDURE — 3077F PR MOST RECENT SYSTOLIC BLOOD PRESSURE >= 140 MM HG: ICD-10-PCS | Mod: CPTII,S$GLB,, | Performed by: PSYCHIATRY & NEUROLOGY

## 2023-09-11 NOTE — PROGRESS NOTES
Subjective:       Patient ID: Ronny Ramey is a 22 y.o. male.    Chief Complaint: Seizures          HPI    The patient presented on 09- accompanied by his mother for evaluation of epilepsy. It was difficult to obtain meaningful history.     The patient is WC/Bed-bound, blind, non-verbal, dependent on PEG, incontinent with spastic quadriparesis. Started having seizures since birth. Mother described the seizures and generalized shaking. Stated that the only AEDs he has zach on are PB and LEV. On PB 64.8 mg BID and LEV 2000 mg BID (20 ml BID). He is also on Valium 3 mg BID (3 ml  BID). Used to have a seizure every 6 months and since  the seizures became monthly.             Review of Systems   Unable to perform ROS: Patient nonverbal   Eyes:  Positive for visual disturbance.   Neurological:  Positive for seizures.               Current Outpatient Medications:     albuterol (PROVENTIL/VENTOLIN HFA) 90 mcg/actuation inhaler, Inhale 2 puffs into the lungs every 6 (six) hours as needed for Wheezing. Rescue, Disp: 18 g, Rfl: 0    baclofen (LIORESAL) 20 MG tablet, 1 tablet (20 mg total) by Per G Tube route 2 (two) times daily., Disp: 60 tablet, Rfl: 1    cloNIDine (CATAPRES) 0.1 MG tablet, 1 tablet (0.1 mg total) by Per G Tube route nightly., Disp: 90 tablet, Rfl: 1    diazePAM (VALIUM) 5 mg/5 mL (1 mg/mL) oral solution, TAKE 3 ML(3 MG) BY MOUTH TWICE DAILY, Disp: 180 mL, Rfl: 1    levETIRAcetam (KEPPRA) 100 mg/mL Soln, 15 mLs (1,500 mg total) by Per G Tube route 2 (two) times daily. (Patient taking differently: 10 mg/kg by Per G Tube route 2 (two) times daily. Pt does 20 mls  BID), Disp: 900 mL, Rfl: 1    meloxicam (MOBIC) 7.5 MG tablet, TAKE 1/2 TABLET(3.75 MG) BY MOUTH EVERY DAY, Disp: 15 tablet, Rfl: 3    montelukast (SINGULAIR) 5 MG chewable tablet, Take 1 tablet (5 mg total) by mouth every evening., Disp: 90 tablet, Rfl: 1    mupirocin (BACTROBAN) 2 % ointment, Apply topically 3 (three) times  daily., Disp: 30 g, Rfl: 0    PHENobarbitaL 64.8 MG tablet, Take 1 tablet (64.8 mg total) by mouth 2 (two) times daily., Disp: 60 tablet, Rfl: 0    pulse oximeter (PULSE OXIMETER) device, by Apply Externally route 2 (two) times a day. 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    Vision abnormalities        Past Surgical History:   Procedure Laterality Date    ADENOIDECTOMY      BACLOFEN PUMP IMPLANTATION N/A 2020    Procedure: INSERTION, INTRATHECAL BACLOFEN PUMP;  Surgeon: Robbi Cain MD;  Location: [facility] OR [address] [place] [address];  Service: Neurosurgery;  Laterality: N/A;  toronto I, asa 1, lateral left down, regular bed reversed, christine, [employer] rep, co surgeon DR Miller    BACLOFEN PUMP IMPLANTATION  2020    CIRCUMCISION      EYE SURGERY      as a     GASTROSTOMY TUBE PLACEMENT      HERNIA REPAIR      HIP SURGERY      LUMBAR PUNCTURE WITH INJECTION OF BACLOFEN N/A 3/11/2020    Procedure: LUMBAR PUNCTURE, WITH BACLOFEN INJECTION;  Surgeon: Cristiane Sky MD;  Location: [facility] OR [address] [place] [address];  Service: Neurosurgery;  Laterality: N/A;  toronto I, asa 1, lateral left down, regular bed reversed , christine , [employer] co surgeon DR Miller    NISSEN FUNDOPLICATION      REMOVAL OF BACLOFEN PUMP Right 10/15/2020    Procedure: REMOVAL, BACLOFEN PUMP;  Surgeon: Marcy Macedo MD;  Location: [facility] OR [address] [place] [address];  Service: Neurosurgery;  Laterality: Right;    TONSILLECTOMY      TYMPANOSTOMY TUBE PLACEMENT         Social History     Socioeconomic History     Marital status: Single   Tobacco Use    Smoking status: Never    Smokeless tobacco: Never   Substance and Sexual Activity    Alcohol use: No    Drug use: No    Sexual activity: Never   Social History Narrative    Lives with parents and 2 siblings.  Receives Homebound tutoring at [employer] [facility] where he attends during the week.             Past/Current Medical/Surgical History, Past/Current Social History, Past/Current Family History and Past/Current Medications were reviewed in detail.        Objective:           VITAL SIGNS WERE REVIEWED      GENERAL APPEARANCE:     The patient is awake    Cachectic     No signs of respiratory distress.    Normal breathing pattern.    No eye contact.       GENERAL MEDICAL EXAM:    HEENT:  Head is atraumatic.    FUNDOSCOPIC (OPHTHALMOSCOPIC) EXAMINATION cannot be done.    NECK: No JVD. No visible lesions or goiters.     CHEST-CARDIOPULMONARY: No cyanosis. No tachypnea. Normal respiratory effort.    STYYUEI-[id number]-TDXFJEJQAE: No jaundice. PEG No visible hernias. No catheters.     SKIN, HAIR, NAILS:  Decubitus changes. No neurofibromatosis. No visible lesions.No stigmata of autoimmune disease. No clubbing.    LIMBS, MUSCULOSKELETAL: Contracted with severe atrophy and flexion deformities.              Neurologic Exam     Mental Status   Speech: mute   Level of consciousness: alert    Cranial Nerves     CN III, IV, VI   Right pupil: Reactivity: non-reactive.   Left pupil: Reactivity: non-reactive.     CN VII   Right facial weakness: central  Left facial weakness: central    Motor Exam   Muscle bulk: decreased  Overall muscle tone: increased  Right arm tone: spastic  Left arm tone: spastic  Right leg tone: spastic  Left leg tone: spastic  Severe Spastic Quadriparesis with Global Atrophy      Sensory Exam     Cannot be done      Gait, Coordination, and Reflexes     Gait  Gait: (WC/Bed-bound)    Tremor   Resting tremor: absent    Reflexes   Right brachioradialis:  4+  Left brachioradialis: 4+  Right biceps: 4+  Left biceps: 4+  Right triceps: 4+  Left triceps: 4+  Right patellar: 4+  Left patellar: 4+  Right achilles: 4+  Left achilles: 4+  Right plantar: upgoing  Left plantar: upgoing  Right Yeboah: absent  Left [place]: absent  Right ankle clonus: present  Left ankle clonus: present  Right pendular knee jerk: present  Left pendular knee jerk: present      Lab Results   Component Value Date    WBC 10.92 11/02/2020    HGB 14.4 11/02/2020    HCT 45.0 11/02/2020    MCV 83 11/02/2020     11/02/2020       Sodium   Date Value Ref Range Status   05/17/2023 137 136 - 145 mmol/L Final   11/02/2020 138 136 - 145 mmol/L Final     Potassium   Date Value Ref Range Status   05/17/2023 4.3 3.5 - 5.1 mmol/L Final   11/02/2020 4.4 3.5 - 5.1 mmol/L Final     Chloride   Date Value Ref Range Status   05/17/2023 98 (L) 100 - 109 mmol/L Final   11/02/2020 98 95 - 110 mmol/L Final     CO2   Date Value Ref Range Status   11/02/2020 32 (H) 23 - 29 mmol/L Final     Carbon Dioxide   Date Value Ref Range Status   05/17/2023 28 22 - 33 mmol/L Final     Glucose   Date Value Ref Range Status   11/02/2020 87 70 - 110 mg/dL Final     BUN   Date Value Ref Range Status   11/02/2020 17 6 - 20 mg/dL Final     Blood Urea Nitrogen   Date Value Ref Range Status   05/17/2023 12 5 - 25 mg/dL Final     Creatinine   Date Value Ref Range Status   05/17/2023 0.85 0.57 - 1.25 mg/dL Final   11/02/2020 0.5 0.5 - 1.4 mg/dL Final     Calcium   Date Value Ref Range Status   05/17/2023 9.6 8.8 - 10.6 mg/dL Final   11/02/2020 9.4 8.7 - 10.5 mg/dL Final     Total Protein   Date Value Ref Range Status   11/02/2020 7.3 6.0 - 8.4 g/dL Final     Albumin   Date Value Ref Range Status   11/02/2020 3.4 3.2 - 4.7 g/dL Final     Albumin Level   Date Value Ref Range Status   05/17/2023 3.3 (L) 3.5 - 5.0 g/dl Final     Total Bilirubin   Date Value Ref Range Status   11/02/2020 0.3 0.1 - 1.0 mg/dL Final     Comment:     For infants  and newborns, interpretation of results should be based  on gestational age, weight and in agreement with clinical  observations.  Premature Infant recommended reference ranges:  Up to 24 hours.............<8.0 mg/dL  Up to 48 hours............<12.0 mg/dL  3-5 days..................<15.0 mg/dL  6-29 days.................<15.0 mg/dL       Alkaline Phosphatase   Date Value Ref Range Status   11/02/2020 145 59 - 164 U/L Final     AST   Date Value Ref Range Status   11/02/2020 16 10 - 40 U/L Final     ALT   Date Value Ref Range Status   11/02/2020 34 10 - 44 U/L Final     Anion Gap   Date Value Ref Range Status   05/17/2023 11 8 - 16 mmol/L Final   11/02/2020 8 8 - 16 mmol/L Final     eGFR if    Date Value Ref Range Status   11/02/2020 >60.0 >60 mL/min/1.73 m^2 Final     eGFR    Date Value Ref Range Status   05/17/2023 128 mL/min/1.73mSq Final     Comment:     In accordance with NKF-ASN Task Force recommendation, calculation based on the Chronic Kidney Disease Epidemiology Collaboration (CKD-EPI) equation without adjustment for race. eGFR adjusted for gender and age and calculated in ml/min/1.73mSquared. eGFR cannot be calculated if patient is under 18 years of age.     Reference Range:   >= 60 ml/min/1.73mSquared.     eGFR if non    Date Value Ref Range Status   11/02/2020 >60.0 >60 mL/min/1.73 m^2 Final     Comment:     Calculation used to obtain the estimated glomerular filtration  rate (eGFR) is the CKD-EPI equation.          Lab Results   Component Value Date    TSH 1.346 03/13/2022           LABORATORY EVALUATION      RADIOLOGY EVALUATION     10-    CTH, Brain MRI Severe atrophy with ex-vacuo hydrocephalus       09-     CTH chronic changes (severe brain atrophy and ventriculomegaly          NEUROPHYSIOLOGY EVALUATION       09-    EEG CS (Theta-Delta)       PATHOLOGY EVALUATION        NEUROCOGNITIVE AND NEUROPSYCHOLOGY EVALUATION            Reviewed the neuroimaging independently       Assessment:           1. Intellectual disability with epilepsy    2. Legally blind    3. Nonverbal    4. Kyphoscoliosis    5. Spastic quadriplegic cerebral palsy    6. Weight loss    7. PEG (percutaneous endoscopic gastrostomy) status    8. Intractable epilepsy without status epilepticus, unspecified epilepsy type    9. Severe intellectual disabilities            EPILEPSY CLASSIFICATION    SEMIOLOGY: GTC    EPILEPTOGENIC ZONE (S): UNKNOWN     ETIOLOGY: CP    PRIOR AEDS: NONE     CURRENT AEDS: PB, LEV, DZP    LAST SEIZURE DATE:        COMPREHENSIVE LIST OF AEDs:     Acetazolamide (AZM-Diamox)   Benzos: clonazepam (CZP Klonopin), lorazepam (LZP-Ativan), diazepam (DZP-Valium), clorazepate (CLZ- Tranxene)  Brivaracetam (BRV-Briviact)  Cannabidiol (CBD- Epidiolex)  Carbamazepine (CBZ-Tegretol)  Cenobamate (CNB-Xcopri)  Clobazam (CLB-Onfi)  Eslicarbazepine (ESL-Aptiom)  Ethosuximide (ESX-Zarontin)  Felbamate (FBM-Felbatol)  Fenfluramine (FFA-Fintepla)  Gabapentin (GBP-Neurontin)  Lacosamide (LCM-Vimpat)  Lamotrigine (LTG-Lamitcal)  Levetiracetam (LEV- Keppra)  Oxcarbazepine (OXC-Trileptal)  Perampanel (PML-Fycompa)  Phenobarbital (PB)  Phenytoin (PHT-Dilantin)  Pregabalin (PGB-Lyrica)  Primidone (PRM)   Retigabine (RTG- Potiga) Discontinued in 2017  Rufinamide (RFN-Benzil)  Stiripentol (STP-Diacomit)  Tiagabine (TGB-Gabitril)  Topiramate (TPM-Topamax)  Valproate (VPA-Depakote)  Vigabatrin (VGB-Sabril)  Zonisamide (ZNS-Zonegran)    Plan:             REFRACTORY EPILEPSY WITH VERY SEVERE MOTOR-INTELLECTUAL DISABILITIES.          Evaluate CTH WO.    Evaluate EEG.     Continue PB 64.8 mg BID. Trough level.    Continue LEV 2000 mg BID (20 ml BID). Trough level.      He is also on Valium 3 mg BID (3 ml  BID)    Start IN midazolam (Nayzilam) 5 mg 1 Spray per nostril for seizure >3 minutes. May repeat in the other nostril if the seizure continues beyond 10 minutes.            AVOID any substance that could lower seizure threshold including but not limited to:            ALCOHOL AND WITHDRAWAL      TRAMADOL.     MEPERIDINE (DEMEROL)     ALL STIMULANTS-ALL ADHD MEDICATIONS.      CLOZAPINE.      BUPROPION (WELLBUTRIN)     CIPROFLOXACIN.    CYCLOSPORINE.     METOCLOPRAMIDE (REGLAN).     TETRAHYDROCANNABINOL (THC)    KRATOM                     MEDICAL/SURGICAL COMORBIDITIES     All relevant medical comorbidities noted and managed by primary care physician and medical care team.          HEALTHY LIFESTYLE AND PREVENTATIVE CARE    The patient to adhere to the age-appropriate health maintenance guidelines including screening tests and vaccinations. The patient to adhere to  healthy lifestyle, optimal weight, exercise, healthy diet, good sleep hygiene and avoiding drugs including smoking, alcohol and recreational drugs.            RTC annually         Ramona Lofton MD, FAAN    Attending Neurologist/Epileptologist         Diplomate, American Board of Psychiatry and Neurology    Diplomate, American Board of Clinical Neurophysiology     Fellow, American Academy of Neurology           I spent a total of 94 minutes on the day of the visit.  This includes face to face time and non-face to face time preparing to see the patient (eg, review of tests), obtaining and/or reviewing separately obtained history, documenting clinical information in the electronic or other health record, independently interpreting results and communicating results to the patient/family/caregiver, or care coordinator.

## 2023-09-12 ENCOUNTER — TELEPHONE (OUTPATIENT)
Dept: INTERNAL MEDICINE | Facility: CLINIC | Age: 23
End: 2023-09-12
Payer: COMMERCIAL

## 2023-09-12 NOTE — TELEPHONE ENCOUNTER
Called patient's mom back regarding wound referral. Faxed it over to [facility].

## 2023-09-18 ENCOUNTER — TELEPHONE (OUTPATIENT)
Dept: PRIMARY CARE CLINIC | Facility: CLINIC | Age: 23
End: 2023-09-18
Payer: COMMERCIAL

## 2023-09-18 NOTE — TELEPHONE ENCOUNTER
----- Message from Shaista Bernal sent at 9/18/2023 12:09 PM CDT -----  Margarita is requesting a call back in regards to paperwork status. [phone].Thanks

## 2023-09-20 ENCOUNTER — HOSPITAL ENCOUNTER (OUTPATIENT)
Dept: RADIOLOGY | Facility: HOSPITAL | Age: 23
Discharge: HOME | End: 2023-09-20
Attending: PSYCHIATRY & NEUROLOGY
Payer: COMMERCIAL

## 2023-09-20 ENCOUNTER — HOSPITAL ENCOUNTER (OUTPATIENT)
Dept: PULMONOLOGY | Facility: HOSPITAL | Age: 23
Discharge: HOME | End: 2023-09-20
Attending: PSYCHIATRY & NEUROLOGY
Payer: COMMERCIAL

## 2023-09-20 DIAGNOSIS — F79 INTELLECTUAL DISABILITY WITH EPILEPSY: ICD-10-CM

## 2023-09-20 DIAGNOSIS — G40.919 INTRACTABLE EPILEPSY WITHOUT STATUS EPILEPTICUS, UNSPECIFIED EPILEPSY TYPE: ICD-10-CM

## 2023-09-20 DIAGNOSIS — G40.909 INTELLECTUAL DISABILITY WITH EPILEPSY: ICD-10-CM

## 2023-09-20 PROCEDURE — 70450 CT HEAD/BRAIN W/O DYE: CPT | Mod: TC

## 2023-09-20 PROCEDURE — 70450 CT HEAD WITHOUT CONTRAST: ICD-10-PCS | Mod: 26,,, | Performed by: STUDENT IN AN ORGANIZED HEALTH CARE EDUCATION/TRAINING PROGRAM

## 2023-09-20 PROCEDURE — 95816 PR EEG,W/AWAKE & DROWSY RECORD: ICD-10-PCS | Mod: 26,,, | Performed by: PSYCHIATRY & NEUROLOGY

## 2023-09-20 PROCEDURE — 70450 CT HEAD/BRAIN W/O DYE: CPT | Mod: 26,,, | Performed by: STUDENT IN AN ORGANIZED HEALTH CARE EDUCATION/TRAINING PROGRAM

## 2023-09-20 PROCEDURE — 95816 EEG AWAKE AND DROWSY: CPT | Mod: 26,,, | Performed by: PSYCHIATRY & NEUROLOGY

## 2023-09-20 PROCEDURE — 95819 EEG AWAKE AND ASLEEP: CPT

## 2023-09-21 ENCOUNTER — TELEPHONE (OUTPATIENT)
Dept: PRIMARY CARE CLINIC | Facility: CLINIC | Age: 23
End: 2023-09-21
Payer: COMMERCIAL

## 2023-09-21 NOTE — TELEPHONE ENCOUNTER
Returned the call spoke with  Deirdre she needs a signature on POT to continue treatment. Information has been sent to the provider for STAT signature.        ----- Message from Maylin Saldivar sent at 9/21/2023 11:59 AM CDT -----  Contact: Margarita/ Pediatric [facility]  Margarita is needing a call back in regards to a fax that was sent on 09/19. Please give her a call back at [phone]

## 2023-09-25 ENCOUNTER — TELEPHONE (OUTPATIENT)
Dept: PRIMARY CARE CLINIC | Facility: CLINIC | Age: 23
End: 2023-09-25
Payer: COMMERCIAL

## 2023-09-25 NOTE — TELEPHONE ENCOUNTER
Returned the call informed that the provider has the information once completed it will be faxed and office will be contacted.      ----- Message from Valerie Saldivar sent at 9/25/2023  3:06 PM CDT -----  Contact: Suzi/Kaylin Archer is calling in regards to the status of the paperwork that was faxed over for the pt re certification.please call back at [phone] and fax [phone]          Thanks  JOHNNY

## 2023-09-26 ENCOUNTER — TELEPHONE (OUTPATIENT)
Dept: NEUROLOGY | Facility: CLINIC | Age: 23
End: 2023-09-26

## 2023-09-26 ENCOUNTER — PATIENT MESSAGE (OUTPATIENT)
Dept: NEUROLOGY | Facility: CLINIC | Age: 23
End: 2023-09-26
Payer: COMMERCIAL

## 2023-09-26 NOTE — TELEPHONE ENCOUNTER
----- Message from Ramona Lofton MD sent at 9/26/2023  8:12 AM CDT -----    09-    EEG consistent with diffuse brain dysfunction.

## 2023-09-26 NOTE — PROCEDURES
DATE EEG PERFORMED:  2023.      DATE EEG INTERPRETED: 2023.                     DURATION OF EE MINUTES         LEVEL OF CONSCIOUSENESS    Awake.        EEG BACKGROUND    There is no posterior dominant alpha basic rhythm.      EEG CLASSIFICATION      Continuous Slowing, Generalized, Theta-Delta Slowing.         IMPRESSION      The EEG is suggestive of diffuse encephalopathy.       There are no epileptiform discharges or lateralizing signs. No typical events were recorded. There is no electrographic evidence of seizure.There is no electrographic evidence of status epilepticus.         PLEASE NOTE THAT A NON-EPILEPTIFORM EEG DOES NOT RULE OUT EPILEPSY.        VILMA NORTON MD, FAAN    Diplomate, American Board of Psychiatry and Neurology    Diplomate, American Board of Clinical Neurophysiology

## 2023-09-27 ENCOUNTER — TELEPHONE (OUTPATIENT)
Dept: PRIMARY CARE CLINIC | Facility: CLINIC | Age: 23
End: 2023-09-27
Payer: COMMERCIAL

## 2023-09-27 NOTE — TELEPHONE ENCOUNTER
Returned the call to inform her that the information has been faxed over.         ----- Message from Reno Jones sent at 9/27/2023 11:18 AM CDT -----  Contact: [facility]  Margarita would like to consult with a nurse in regards to getting clinical notes for patient last visit. Medicaid is needing it for certification. Please call her back at [phone] and fax number [phone]. Thanks KB

## 2023-10-12 DIAGNOSIS — G40.909 SEIZURE DISORDER: ICD-10-CM

## 2023-10-16 RX ORDER — PHENOBARBITAL 64.8 MG/1
TABLET ORAL
Qty: 60 TABLET | Refills: 3 | Status: SHIPPED | OUTPATIENT
Start: 2023-10-16 | End: 2023-10-25 | Stop reason: SDUPTHER

## 2023-10-25 ENCOUNTER — OFFICE VISIT (OUTPATIENT)
Dept: INTERNAL MEDICINE | Facility: CLINIC | Age: 23
End: 2023-10-25
Payer: COMMERCIAL

## 2023-10-25 VITALS
DIASTOLIC BLOOD PRESSURE: 62 MMHG | HEIGHT: 64 IN | SYSTOLIC BLOOD PRESSURE: 110 MMHG | BODY MASS INDEX: 18.88 KG/M2 | TEMPERATURE: 97 F

## 2023-10-25 DIAGNOSIS — G80.8 CONGENITAL QUADRIPLEGIA: Primary | ICD-10-CM

## 2023-10-25 DIAGNOSIS — R62.50 DEVELOPMENTAL DELAY: ICD-10-CM

## 2023-10-25 DIAGNOSIS — G40.909 SEIZURE DISORDER: ICD-10-CM

## 2023-10-25 DIAGNOSIS — L89.90 PRESSURE INJURY OF SKIN, UNSPECIFIED INJURY STAGE, UNSPECIFIED LOCATION: ICD-10-CM

## 2023-10-25 DIAGNOSIS — G80.0 SPASTIC QUADRIPLEGIC CEREBRAL PALSY: ICD-10-CM

## 2023-10-25 DIAGNOSIS — G40.919 INTRACTABLE EPILEPSY WITHOUT STATUS EPILEPTICUS, UNSPECIFIED EPILEPSY TYPE: ICD-10-CM

## 2023-10-25 DIAGNOSIS — Z93.1 PEG (PERCUTANEOUS ENDOSCOPIC GASTROSTOMY) STATUS: ICD-10-CM

## 2023-10-25 DIAGNOSIS — T78.40XA ALLERGY, INITIAL ENCOUNTER: ICD-10-CM

## 2023-10-25 DIAGNOSIS — G47.9 SLEEP DISTURBANCE: ICD-10-CM

## 2023-10-25 PROCEDURE — 99214 OFFICE O/P EST MOD 30 MIN: CPT | Mod: S$GLB,,, | Performed by: FAMILY MEDICINE

## 2023-10-25 PROCEDURE — 99214 PR OFFICE/OUTPT VISIT, EST, LEVL IV, 30-39 MIN: ICD-10-PCS | Mod: S$GLB,,, | Performed by: FAMILY MEDICINE

## 2023-10-25 PROCEDURE — 3008F BODY MASS INDEX DOCD: CPT | Mod: CPTII,S$GLB,, | Performed by: FAMILY MEDICINE

## 2023-10-25 PROCEDURE — 3078F DIAST BP <80 MM HG: CPT | Mod: CPTII,S$GLB,, | Performed by: FAMILY MEDICINE

## 2023-10-25 PROCEDURE — 99999 PR PBB SHADOW E&M-EST. PATIENT-LVL III: ICD-10-PCS | Mod: PBBFAC,,, | Performed by: FAMILY MEDICINE

## 2023-10-25 PROCEDURE — 3078F PR MOST RECENT DIASTOLIC BLOOD PRESSURE < 80 MM HG: ICD-10-PCS | Mod: CPTII,S$GLB,, | Performed by: FAMILY MEDICINE

## 2023-10-25 PROCEDURE — 3074F SYST BP LT 130 MM HG: CPT | Mod: CPTII,S$GLB,, | Performed by: FAMILY MEDICINE

## 2023-10-25 PROCEDURE — 3008F PR BODY MASS INDEX (BMI) DOCUMENTED: ICD-10-PCS | Mod: CPTII,S$GLB,, | Performed by: FAMILY MEDICINE

## 2023-10-25 PROCEDURE — 99999 PR PBB SHADOW E&M-EST. PATIENT-LVL III: CPT | Mod: PBBFAC,,, | Performed by: FAMILY MEDICINE

## 2023-10-25 PROCEDURE — 3074F PR MOST RECENT SYSTOLIC BLOOD PRESSURE < 130 MM HG: ICD-10-PCS | Mod: CPTII,S$GLB,, | Performed by: FAMILY MEDICINE

## 2023-10-25 RX ORDER — PHENOBARBITAL 64.8 MG/1
64.8 TABLET ORAL 2 TIMES DAILY
Qty: 60 TABLET | Refills: 3 | Status: SHIPPED | OUTPATIENT
Start: 2023-10-25 | End: 2024-02-22

## 2023-10-25 RX ORDER — BACLOFEN 20 MG/1
20 TABLET ORAL 2 TIMES DAILY
Qty: 60 TABLET | Refills: 1 | Status: SHIPPED | OUTPATIENT
Start: 2023-10-25

## 2023-10-25 RX ORDER — LEVETIRACETAM 100 MG/ML
2000 SOLUTION ORAL 2 TIMES DAILY
Qty: 1800 ML | Refills: 2 | Status: SHIPPED | OUTPATIENT
Start: 2023-10-25 | End: 2024-03-19

## 2023-10-25 RX ORDER — TIZANIDINE 4 MG/1
TABLET ORAL
Qty: 30 TABLET | Refills: 1 | Status: CANCELLED | OUTPATIENT
Start: 2023-10-25

## 2023-10-25 RX ORDER — LEVETIRACETAM 100 MG/ML
1500 SOLUTION ORAL 2 TIMES DAILY
Qty: 900 ML | Refills: 1 | Status: CANCELLED | OUTPATIENT
Start: 2023-10-25 | End: 2023-11-24

## 2023-10-25 RX ORDER — MONTELUKAST SODIUM 5 MG/1
5 TABLET, CHEWABLE ORAL NIGHTLY
Qty: 90 TABLET | Refills: 3 | Status: SHIPPED | OUTPATIENT
Start: 2023-10-25

## 2023-10-25 RX ORDER — MELOXICAM 7.5 MG/1
TABLET ORAL
Qty: 15 TABLET | Refills: 3 | Status: SHIPPED | OUTPATIENT
Start: 2023-10-25

## 2023-10-25 RX ORDER — CLONIDINE HYDROCHLORIDE 0.1 MG/1
0.1 TABLET ORAL NIGHTLY
Qty: 90 TABLET | Refills: 2 | Status: SHIPPED | OUTPATIENT
Start: 2023-10-25 | End: 2024-07-21

## 2023-10-25 RX ORDER — DIAZEPAM ORAL 5 MG/5ML
SOLUTION ORAL
Qty: 180 ML | Refills: 2 | Status: SHIPPED | OUTPATIENT
Start: 2023-10-25

## 2023-10-25 RX ORDER — MUPIROCIN 20 MG/G
OINTMENT TOPICAL 3 TIMES DAILY
Qty: 30 G | Refills: 2 | Status: SHIPPED | OUTPATIENT
Start: 2023-10-25

## 2023-10-25 NOTE — PROGRESS NOTES
Ronny Ramey  10/25/2023  [id number]    Herman Nathan DNP  Patient Care Team:  Herman Nathan DNP as PCP - General (Family Medicine)          Visit Type: New patient    Chief Complaint:  Chief Complaint   Patient presents with    Establish Care       History of Present Illness:    22 year old  His sister brought him to clinic visit today  Cerebral palsy, Spastic Quadriparesis. 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    Last Hosp was in May for Aspriration Pneumonia.  Feeds thru [facility] with [facility].    Recently Saw Neuro  Started having seizures since birth. Mother described the seizures and generalized shaking. Stated that the only AEDs he has zach on are PB and LEV. On PB 64.8 mg BID and LEV 2000 mg BID (20 ml BID). He is also on Valium 3 mg BID (3 ml  BID). Used to have a seizure every 6 months and since  the seizures became monthly.   EEG abnl  CT head brain atropthy  Due to increased seizures Start IN midazolam (Nayzilam) 5 mg 1 Spray per nostril for seizure >3 minutes. May repeat in the other nostril if the seizure continues beyond 10 minutes    He was last seen in primary care for a right hip bed sore.  Referred to wound care.  No current home health  He is seeing [facility] wound care weekly.  Reports ordering a gel cushion for his chair.  Sister reports the wound is healing well.   I will request the records and notes from the wound care.              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    Vision abnormalities      Past Surgical History:   Procedure Laterality Date    ADENOIDECTOMY      BACLOFEN PUMP IMPLANTATION N/A  2020    Procedure: INSERTION, INTRATHECAL BACLOFEN PUMP;  Surgeon: Robbi Cain MD;  Location: [facility] OR 2ND FLR;  Service: Neurosurgery;  Laterality: N/A;  toronto I, asa 1, lateral left down, regular bed reversed, christine, [employer] rep, co surgeon DR Miller    BACLOFEN PUMP IMPLANTATION  2020    CIRCUMCISION      EYE SURGERY      as a     GASTROSTOMY TUBE PLACEMENT      HERNIA REPAIR      HIP SURGERY      LUMBAR PUNCTURE WITH INJECTION OF BACLOFEN N/A 3/11/2020    Procedure: LUMBAR PUNCTURE, WITH BACLOFEN INJECTION;  Surgeon: Cristiane Sky MD;  Location: [facility] OR 2ND FLR;  Service: Neurosurgery;  Laterality: N/A;  toronto I, asa 1, lateral left down, regular bed reversed , christine , [employer] co surgeon DR Miller    NISSEN FUNDOPLICATION      REMOVAL OF BACLOFEN PUMP Right 10/15/2020    Procedure: REMOVAL, BACLOFEN PUMP;  Surgeon: Marcy Macedo MD;  [facility];  Service: Neurosurgery;  Laterality: Right;    TONSILLECTOMY      TYMPANOSTOMY TUBE PLACEMENT       Family History   Problem Relation Age of Onset    Cancer Maternal Grandmother         cervial and breast    Clotting disorder Neg Hx     Anesthesia problems Neg Hx      Social History     Socioeconomic History    Marital status: Single   Tobacco Use    Smoking status: Never    Smokeless tobacco: Never   Substance and Sexual Activity    Alcohol use: No    Drug use: No    Sexual activity: Never   Social History Narrative    Lives with parents and 2 siblings.  Receives Homebound tutoring at [employer] where he attends during the week.     Patient Active Problem List   Diagnosis    Spastic quadriplegic cerebral palsy    Kyphoscoliosis    Weight loss    Intellectual disability with epilepsy    Legally blind    Nonverbal    PEG (percutaneous endoscopic gastrostomy) status    Intractable epilepsy without status epilepticus    Severe intellectual disabilities    Cerebral palsy    Developmental delay     Review of patient's  allergies indicates:   Allergen Reactions    Strawberries [strawberry] Hives     STRAWBERRIES ALLERGIC REACTIONS   (SEIZURES AND HIVES )       The following were reviewed at this visit: active problem list, medication list, allergies, family history, social history, and health maintenance.    Medications:  Current Outpatient Medications on File Prior to Visit   Medication Sig Dispense Refill    levETIRAcetam (KEPPRA) 100 mg/mL Soln 15 mLs (1,500 mg total) by Per G Tube route 2 (two) times daily. (Patient taking differently: 10 mg/kg by Per G Tube route 2 (two) times daily. Pt does 20 mls  BID) 900 mL 1    [DISCONTINUED] baclofen (LIORESAL) 20 MG tablet 1 tablet (20 mg total) by Per G Tube route 2 (two) times daily. 60 tablet 1    [DISCONTINUED] cloNIDine (CATAPRES) 0.1 MG tablet 1 tablet (0.1 mg total) by Per G Tube route nightly. 90 tablet 1    [DISCONTINUED] diazePAM (VALIUM) 5 mg/5 mL (1 mg/mL) oral solution TAKE 3 ML(3 MG) BY MOUTH TWICE DAILY 180 mL 1    [DISCONTINUED] meloxicam (MOBIC) 7.5 MG tablet TAKE 1/2 TABLET(3.75 MG) BY MOUTH EVERY DAY 15 tablet 3    [DISCONTINUED] montelukast (SINGULAIR) 5 MG chewable tablet Take 1 tablet (5 mg total) by mouth every evening. 90 tablet 1    [DISCONTINUED] mupirocin (BACTROBAN) 2 % ointment Apply topically 3 (three) times daily. 30 g 0    [DISCONTINUED] PHENobarbitaL 64.8 MG tablet TAKE 1 TABLET(64.8 MG) BY MOUTH TWICE DAILY 60 tablet 3    [DISCONTINUED] tiZANidine (ZANAFLEX) 4 MG tablet TAKE 1/2 TABLET(2 MG) BY MOUTH TWICE DAILY 30 tablet 1    albuterol (PROVENTIL/VENTOLIN HFA) 90 mcg/actuation inhaler Inhale 2 puffs into the lungs every 6 (six) hours as needed for Wheezing. Rescue (Patient not taking: Reported on 10/25/2023) 18 g 0    midazolam 5 mg/spray (0.1 mL) Spry 1 Spray per nostril for seizure >3 minutes. May repeat in the other nostril if the seizure continues beyond 10 minutes. (Patient not taking: Reported on 10/25/2023) 2 each 5    pulse oximeter (PULSE  OXIMETER) device by Apply Externally route 2 (two) times a day. Use twice daily at 8 AM and 3 PM and record the value in [facility] as directed. (Patient not taking: Reported on 10/25/2023) 1 each 0     No current facility-administered medications on file prior to visit.       Medications have been reviewed and reconciled with patient at this visit.  Barriers to medications reviewed with patient.    Adverse reactions to current medications reviewed with patient..    Over the counter medications reviewed and reconciled with patient.    Exam:  Wt Readings from Last 3 Encounters:   06/02/23 49.9 kg (110 lb)   03/15/23 50 kg (110 lb 3.7 oz)   05/13/22 52.3 kg (115 lb 3.1 oz) (2 %, Z= -2.11)*     * Growth percentiles are based on CDC (Boys, 2-20 Years) data.     Temp Readings from Last 3 Encounters:   10/25/23 97 °F (36.1 °C) (Tympanic)   09/05/23 97.7 °F (36.5 °C)   06/02/23 98.2 °F (36.8 °C) (Oral)     BP Readings from Last 3 Encounters:   09/11/23 (!) 150/103   09/05/23 110/80   06/02/23 134/78     Pulse Readings from Last 3 Encounters:   09/11/23 101   09/05/23 (!) 123   06/02/23 62     Body mass index is 18.88 kg/m².      Review of Systems   Constitutional:  Negative for fever and weight loss.     Physical Exam  Cardiovascular:      Rate and Rhythm: Normal rate and regular rhythm.   Pulmonary:      Effort: Pulmonary effort is normal.      Breath sounds: Normal breath sounds.   Abdominal:          Comments: Salomon skin is healthy  No sign of any issues with this.     Neurological:      Mental Status: Mental status is at baseline.      Comments: Non verbal    Severe contractures ext  Thin             Laboratory Reviewed ({Yes)  Lab Results   Component Value Date    WBC 10.92 11/02/2020    HGB 14.4 11/02/2020    HCT 45.0 11/02/2020     11/02/2020    ALT 34 11/02/2020    AST 16 11/02/2020     05/17/2023    K 4.3 05/17/2023    CL 98 (L) 05/17/2023    CREATININE 0.85 05/17/2023    BUN 12 05/17/2023    CO2 28  05/17/2023    TSH 1.346 03/13/2022       Ronny was seen today for establish care.    Diagnoses and all orders for this visit:    Congenital quadriplegia  -     baclofen (LIORESAL) 20 MG tablet; 1 tablet (20 mg total) by Per G Tube route 2 (two) times daily.    Developmental delay    Spastic quadriplegic cerebral palsy  -     diazePAM (VALIUM) 5 mg/5 mL (1 mg/mL) oral solution; TAKE 3 ML(3 MG) BY MOUTH TWICE DAILY  -     meloxicam (MOBIC) 7.5 MG tablet; TAKE 1/2 TABLET(3.75 MG) BY MOUTH EVERY DAY    PEG (percutaneous endoscopic gastrostomy) status    Intractable epilepsy without status epilepticus, unspecified epilepsy type    Sleep disturbance  -     cloNIDine (CATAPRES) 0.1 MG tablet; 1 tablet (0.1 mg total) by Per G Tube route nightly.    Seizure disorder  -     PHENobarbitaL 64.8 MG tablet; Take 1 tablet (64.8 mg total) by mouth 2 (two) times daily.    Allergy, initial encounter  -     montelukast (SINGULAIR) 5 MG chewable tablet; Take 1 tablet (5 mg total) by mouth every evening.    Pressure injury of skin, unspecified injury stage, unspecified location  -     mupirocin (BACTROBAN) 2 % ointment; Apply topically 3 (three) times daily.    Other orders  The following orders have not been finalized:  -     Cancel: levETIRAcetam (KEPPRA) 100 mg/mL Soln  -     Cancel: tiZANidine (ZANAFLEX) 4 MG tablet      He had labs done when inpatient   BMP  Lab Results   Component Value Date     05/17/2023    K 4.3 05/17/2023    CL 98 (L) 05/17/2023    CO2 28 05/17/2023    BUN 12 05/17/2023    CREATININE 0.85 05/17/2023    CALCIUM 9.6 05/17/2023    ANIONGAP 11 05/17/2023     Lab Results   Component Value Date    WBC 10.92 11/02/2020    HGB 14.4 11/02/2020    HCT 45.0 11/02/2020    MCV 83 11/02/2020     11/02/2020       Neuro ordered labs  Not completed.  I will send message to Neuro.  Need to clarify Keppa dose, is to 1500 or 2000?  Does he need both Zanaflex and Baclofen?    I will order [facility] at home    [facility] notes on wound care.  Make sure healing.    Recheck 4 months.    [facility] feeds.            Care Plan/Goals: Reviewed    Goals    None         Follow up: No follow-ups on file.    After visit summary was printed and given to patient upon discharge today.  Patient goals and care plan are included in After Visit Summary.

## 2023-10-25 NOTE — Clinical Note
Can you clarify the dose of the Keppra? Notes say 2000 mg. Family asking for refill on seizure meds, and mm relaxers. I am trying to verify dose.  He also did not do labs ordered, did you want him to complete? Please advise, and when do you want to see him in follow up for the seizures?  He is almost out of Keppra.

## 2023-11-03 ENCOUNTER — TELEPHONE (OUTPATIENT)
Dept: INTERNAL MEDICINE | Facility: CLINIC | Age: 23
End: 2023-11-03
Payer: COMMERCIAL

## 2023-11-03 NOTE — TELEPHONE ENCOUNTER
[facility] for [facility] wound care faxed:    Your fax has been successfully sent to [phone] at [phone].  ------------------------------------------------------------  From: [id number]  ------------------------------------------------------------  10/25/2023 4:16:37 PM Transmission Record          Sent to [phone] with remote ID "          Result: (4/3;0/0) Line broken (no loop current)          Page record: NONE SENT          Elapsed time: 00:03 on channel 1    10/25/2023 4:21:40 PM Transmission Record          Sent to [phone] with remote ID "          Result: (0/339;0/0) Success          Page record: 1 - 3          Elapsed time: 01:11 on channel 15

## 2024-03-14 DIAGNOSIS — G40.909 SEIZURE DISORDER: ICD-10-CM

## 2024-03-18 ENCOUNTER — TELEPHONE (OUTPATIENT)
Dept: INTERNAL MEDICINE | Facility: CLINIC | Age: 24
End: 2024-03-18
Payer: COMMERCIAL

## 2024-03-18 NOTE — TELEPHONE ENCOUNTER
Called and spoke with patient's mother. Says she is unable to continue to care for patient due to her job demands and she will need to place him into a nursing facility. Says she has some paperwork that was given to her that needs to be filled out and would like to drop it off for Dr. Miguel' review.   Instructed her to drop off paper work at the  and we will contact her if Dr. Miguel will need to see patient in office or if she needs any further information. Mom verbalized understanding.

## 2024-03-18 NOTE — TELEPHONE ENCOUNTER
----- Message from Juan Antonio Massey sent at 3/18/2024 10:47 AM CDT -----  Contact: Mother  Type: Needs Medical Advice  Who Called:  Mother/Patriece    Best Call Back Number: [phone]   Additional Information: Would like to drop off paperwork for nursing facility to office. Please call.

## 2024-03-19 ENCOUNTER — TELEPHONE (OUTPATIENT)
Dept: PEDIATRICS | Facility: CLINIC | Age: 24
End: 2024-03-19
Payer: COMMERCIAL

## 2024-03-19 RX ORDER — LEVETIRACETAM 100 MG/ML
SOLUTION ORAL
Qty: 5400 ML | Refills: 3 | Status: SHIPPED | OUTPATIENT
Start: 2024-03-19 | End: 2024-04-18

## 2024-03-19 NOTE — TELEPHONE ENCOUNTER
----- Message from Inna Khan sent at 3/19/2024  3:04 PM CDT -----  Type: Patient Call Back    Who called:jes STEWART with [employer] [phone]    What is the request in detail:will be faxing over physician brinda. Would like to know if patient was seen at emergency visit on dos 3/9/24. Also needs to discuss hosp f/u.    Can the clinic reply by [facility]?    Would the patient rather a call back or a response via [facility]? call    Best call back number:    Additional Information:

## 2024-03-21 ENCOUNTER — TELEPHONE (OUTPATIENT)
Dept: INTERNAL MEDICINE | Facility: CLINIC | Age: 24
End: 2024-03-21
Payer: COMMERCIAL

## 2024-03-21 NOTE — TELEPHONE ENCOUNTER
Called and informed mom that paper work is complete and will be at  for .    Also faxed to the number provided.

## 2024-04-01 ENCOUNTER — TELEPHONE (OUTPATIENT)
Dept: INTERNAL MEDICINE | Facility: CLINIC | Age: 24
End: 2024-04-01
Payer: COMMERCIAL

## 2024-04-01 NOTE — TELEPHONE ENCOUNTER
Paperwork from [facility] has been received. Will contact mother once completed and faxed back.

## 2024-04-03 ENCOUNTER — TELEPHONE (OUTPATIENT)
Dept: INTERNAL MEDICINE | Facility: CLINIC | Age: 24
End: 2024-04-03
Payer: COMMERCIAL

## 2024-04-03 NOTE — TELEPHONE ENCOUNTER
Called and spoke with patient's mom. Notified her that paperwork has been completed and faxed over to [facility]. Instructed mom to contact us if there are any issues. Voiced understanding.

## 2024-04-03 NOTE — TELEPHONE ENCOUNTER
----- Message from Kevin Albert MA sent at 4/3/2024 10:22 AM CDT -----  Mom calling to speak with staff to find out if the office received the paperwork for the facility the patient is going to be housed at. Please give her a call back at [phone].

## 2024-04-19 DIAGNOSIS — G80.0 SPASTIC QUADRIPLEGIC CEREBRAL PALSY: ICD-10-CM

## 2024-04-20 RX ORDER — TIZANIDINE 4 MG/1
TABLET ORAL
Qty: 30 TABLET | Refills: 1 | Status: SHIPPED | OUTPATIENT
Start: 2024-04-20

## 2024-10-16 ENCOUNTER — HOSPITAL ENCOUNTER (INPATIENT)
Facility: HOSPITAL | Age: 24
LOS: 20 days | Discharge: NURSING, REHAB OR LONG-TERM CARE | DRG: 003 | End: 2024-11-05
Attending: EMERGENCY MEDICINE | Admitting: INTERNAL MEDICINE
Payer: COMMERCIAL

## 2024-10-16 DIAGNOSIS — J96.01 ACUTE RESPIRATORY FAILURE WITH HYPOXIA: ICD-10-CM

## 2024-10-16 DIAGNOSIS — K56.600 PARTIAL INTESTINAL OBSTRUCTION, UNSPECIFIED CAUSE: ICD-10-CM

## 2024-10-16 DIAGNOSIS — J96.02 ACUTE HYPERCAPNIC RESPIRATORY FAILURE: ICD-10-CM

## 2024-10-16 DIAGNOSIS — T17.800A ASPIRATION INTO LOWER RESPIRATORY TRACT, INITIAL ENCOUNTER: Primary | ICD-10-CM

## 2024-10-16 DIAGNOSIS — K56.50 INTESTINAL ADHESIONS WITH OBSTRUCTION, UNSPECIFIED WHETHER PARTIAL OR COMPLETE: ICD-10-CM

## 2024-10-16 DIAGNOSIS — Z01.818 ENCOUNTER FOR INTUBATION: ICD-10-CM

## 2024-10-16 DIAGNOSIS — J96.01 ACUTE HYPOXEMIC RESPIRATORY FAILURE: ICD-10-CM

## 2024-10-16 DIAGNOSIS — K56.609 INTESTINAL OBSTRUCTION, UNSPECIFIED CAUSE, UNSPECIFIED WHETHER PARTIAL OR COMPLETE: ICD-10-CM

## 2024-10-16 DIAGNOSIS — R06.02 SOB (SHORTNESS OF BREATH): ICD-10-CM

## 2024-10-16 DIAGNOSIS — G80.9 CEREBRAL PALSY, UNSPECIFIED TYPE: ICD-10-CM

## 2024-10-16 PROBLEM — N17.9 AKI (ACUTE KIDNEY INJURY): Status: ACTIVE | Noted: 2024-10-16

## 2024-10-16 PROBLEM — J69.0 ASPIRATION PNEUMONIA: Status: ACTIVE | Noted: 2024-10-16

## 2024-10-16 LAB
ALBUMIN SERPL BCP-MCNC: 4.2 G/DL (ref 3.5–5.2)
ALLENS TEST: ABNORMAL
ALLENS TEST: ABNORMAL
ALP SERPL-CCNC: 193 U/L (ref 55–135)
ALT SERPL W/O P-5'-P-CCNC: 68 U/L (ref 10–44)
ANION GAP SERPL CALC-SCNC: 12 MMOL/L (ref 8–16)
ANION GAP SERPL CALC-SCNC: 15 MMOL/L (ref 8–16)
AST SERPL-CCNC: 30 U/L (ref 10–40)
BACTERIA #/AREA URNS AUTO: ABNORMAL /HPF
BACTERIA #/AREA URNS HPF: ABNORMAL /HPF
BASOPHILS # BLD AUTO: 0.06 K/UL (ref 0–0.2)
BASOPHILS NFR BLD: 0.3 % (ref 0–1.9)
BILIRUB SERPL-MCNC: 0.1 MG/DL (ref 0.1–1)
BILIRUB UR QL STRIP: NEGATIVE
BILIRUB UR QL STRIP: NEGATIVE
BUN SERPL-MCNC: 19 MG/DL (ref 6–20)
BUN SERPL-MCNC: 29 MG/DL (ref 6–20)
CALCIUM SERPL-MCNC: 10 MG/DL (ref 8.7–10.5)
CALCIUM SERPL-MCNC: 9.5 MG/DL (ref 8.7–10.5)
CHLORIDE SERPL-SCNC: 95 MMOL/L (ref 95–110)
CHLORIDE SERPL-SCNC: 97 MMOL/L (ref 95–110)
CLARITY UR REFRACT.AUTO: CLEAR
CLARITY UR: ABNORMAL
CO2 SERPL-SCNC: 25 MMOL/L (ref 23–29)
CO2 SERPL-SCNC: 25 MMOL/L (ref 23–29)
COLOR UR AUTO: YELLOW
COLOR UR: YELLOW
CREAT SERPL-MCNC: 0.8 MG/DL (ref 0.5–1.4)
CREAT SERPL-MCNC: 1.6 MG/DL (ref 0.5–1.4)
DELSYS: ABNORMAL
DELSYS: ABNORMAL
DIFFERENTIAL METHOD BLD: ABNORMAL
EOSINOPHIL # BLD AUTO: 0 K/UL (ref 0–0.5)
EOSINOPHIL NFR BLD: 0 % (ref 0–8)
ERYTHROCYTE [DISTWIDTH] IN BLOOD BY AUTOMATED COUNT: 13.4 % (ref 11.5–14.5)
ERYTHROCYTE [SEDIMENTATION RATE] IN BLOOD BY WESTERGREN METHOD: 16 MM/H
ERYTHROCYTE [SEDIMENTATION RATE] IN BLOOD BY WESTERGREN METHOD: 18 MM/H
EST. GFR  (NO RACE VARIABLE): >60 ML/MIN/1.73 M^2
EST. GFR  (NO RACE VARIABLE): >60 ML/MIN/1.73 M^2
FIO2: 100
FIO2: 40
GLUCOSE SERPL-MCNC: 133 MG/DL (ref 70–110)
GLUCOSE SERPL-MCNC: 224 MG/DL (ref 70–110)
GLUCOSE UR QL STRIP: NEGATIVE
GLUCOSE UR QL STRIP: NEGATIVE
HCO3 UR-SCNC: 30.4 MMOL/L (ref 24–28)
HCO3 UR-SCNC: 32.1 MMOL/L (ref 24–28)
HCT VFR BLD AUTO: 53.8 % (ref 40–54)
HGB BLD-MCNC: 17.4 G/DL (ref 14–18)
HGB UR QL STRIP: ABNORMAL
HGB UR QL STRIP: NEGATIVE
HYALINE CASTS #/AREA URNS LPF: 0 /LPF
HYALINE CASTS UR QL AUTO: 1 /LPF
IMM GRANULOCYTES # BLD AUTO: 0.08 K/UL (ref 0–0.04)
IMM GRANULOCYTES NFR BLD AUTO: 0.4 % (ref 0–0.5)
KETONES UR QL STRIP: NEGATIVE
KETONES UR QL STRIP: NEGATIVE
LACTATE SERPL-SCNC: 1.8 MMOL/L (ref 0.5–2.2)
LEUKOCYTE ESTERASE UR QL STRIP: NEGATIVE
LEUKOCYTE ESTERASE UR QL STRIP: NEGATIVE
LYMPHOCYTES # BLD AUTO: 1.5 K/UL (ref 1–4.8)
LYMPHOCYTES NFR BLD: 6.7 % (ref 18–48)
MCH RBC QN AUTO: 25.9 PG (ref 27–31)
MCHC RBC AUTO-ENTMCNC: 32.3 G/DL (ref 32–36)
MCV RBC AUTO: 80 FL (ref 82–98)
MICROSCOPIC COMMENT: ABNORMAL
MICROSCOPIC COMMENT: ABNORMAL
MIN VOL: 6.45
MODE: ABNORMAL
MODE: ABNORMAL
MONOCYTES # BLD AUTO: 1 K/UL (ref 0.3–1)
MONOCYTES NFR BLD: 4.3 % (ref 4–15)
NEUTROPHILS # BLD AUTO: 19.8 K/UL (ref 1.8–7.7)
NEUTROPHILS NFR BLD: 88.3 % (ref 38–73)
NITRITE UR QL STRIP: NEGATIVE
NITRITE UR QL STRIP: NEGATIVE
NON-SQ EPI CELLS #/AREA URNS AUTO: 1 /HPF
NRBC BLD-RTO: 0 /100 WBC
PCO2 BLDA: 48.4 MMHG (ref 35–45)
PCO2 BLDA: 59.6 MMHG (ref 35–45)
PEEP: 5
PEEP: 5
PH SMN: 7.34 [PH] (ref 7.35–7.45)
PH SMN: 7.41 [PH] (ref 7.35–7.45)
PH UR STRIP: 8 [PH] (ref 5–8)
PH UR STRIP: 8 [PH] (ref 5–8)
PIP: 30
PLATELET # BLD AUTO: 422 K/UL (ref 150–450)
PMV BLD AUTO: 11.6 FL (ref 9.2–12.9)
PO2 BLDA: 160 MMHG (ref 80–100)
PO2 BLDA: 450 MMHG (ref 80–100)
POC BE: 6 MMOL/L
POC BE: 6 MMOL/L
POC SATURATED O2: 100 % (ref 95–100)
POC SATURATED O2: 99 % (ref 95–100)
POCT GLUCOSE: 127 MG/DL (ref 70–110)
POCT GLUCOSE: 139 MG/DL (ref 70–110)
POCT GLUCOSE: 142 MG/DL (ref 70–110)
POTASSIUM SERPL-SCNC: 4 MMOL/L (ref 3.5–5.1)
POTASSIUM SERPL-SCNC: 4.4 MMOL/L (ref 3.5–5.1)
PROT SERPL-MCNC: 9.2 G/DL (ref 6–8.4)
PROT UR QL STRIP: ABNORMAL
PROT UR QL STRIP: ABNORMAL
RBC # BLD AUTO: 6.73 M/UL (ref 4.6–6.2)
RBC #/AREA URNS AUTO: 1 /HPF (ref 0–4)
RBC #/AREA URNS HPF: 15 /HPF (ref 0–4)
SAMPLE: ABNORMAL
SAMPLE: ABNORMAL
SITE: ABNORMAL
SITE: ABNORMAL
SODIUM SERPL-SCNC: 132 MMOL/L (ref 136–145)
SODIUM SERPL-SCNC: 137 MMOL/L (ref 136–145)
SP GR UR STRIP: 1.02 (ref 1–1.03)
SP GR UR STRIP: 1.02 (ref 1–1.03)
SP02: 100
TSH SERPL DL<=0.005 MIU/L-ACNC: 1.49 UIU/ML (ref 0.4–4)
URN SPEC COLLECT METH UR: ABNORMAL
URN SPEC COLLECT METH UR: ABNORMAL
UROBILINOGEN UR STRIP-ACNC: NEGATIVE EU/DL
UROBILINOGEN UR STRIP-ACNC: NEGATIVE EU/DL
VT: 255
VT: 350
WBC # BLD AUTO: 22.36 K/UL (ref 3.9–12.7)
WBC #/AREA URNS AUTO: 1 /HPF (ref 0–5)
WBC #/AREA URNS HPF: 0 /HPF (ref 0–5)

## 2024-10-16 PROCEDURE — 63600175 PHARM REV CODE 636 W HCPCS: Performed by: NURSE PRACTITIONER

## 2024-10-16 PROCEDURE — 94799 UNLISTED PULMONARY SVC/PX: CPT | Mod: ER

## 2024-10-16 PROCEDURE — 85025 COMPLETE CBC W/AUTO DIFF WBC: CPT | Mod: ER | Performed by: EMERGENCY MEDICINE

## 2024-10-16 PROCEDURE — 93005 ELECTROCARDIOGRAM TRACING: CPT | Mod: ER

## 2024-10-16 PROCEDURE — 63600175 PHARM REV CODE 636 W HCPCS: Mod: ER | Performed by: EMERGENCY MEDICINE

## 2024-10-16 PROCEDURE — 99223 1ST HOSP IP/OBS HIGH 75: CPT | Mod: ,,, | Performed by: SURGERY

## 2024-10-16 PROCEDURE — 5A1955Z RESPIRATORY VENTILATION, GREATER THAN 96 CONSECUTIVE HOURS: ICD-10-PCS | Performed by: INTERNAL MEDICINE

## 2024-10-16 PROCEDURE — 96361 HYDRATE IV INFUSION ADD-ON: CPT | Mod: ER

## 2024-10-16 PROCEDURE — 25000003 PHARM REV CODE 250: Performed by: NURSE PRACTITIONER

## 2024-10-16 PROCEDURE — 96360 HYDRATION IV INFUSION INIT: CPT | Mod: ER

## 2024-10-16 PROCEDURE — 99900035 HC TECH TIME PER 15 MIN (STAT): Mod: ER

## 2024-10-16 PROCEDURE — 25000003 PHARM REV CODE 250: Performed by: INTERNAL MEDICINE

## 2024-10-16 PROCEDURE — 94761 N-INVAS EAR/PLS OXIMETRY MLT: CPT | Mod: ER

## 2024-10-16 PROCEDURE — 0BH17EZ INSERTION OF ENDOTRACHEAL AIRWAY INTO TRACHEA, VIA NATURAL OR ARTIFICIAL OPENING: ICD-10-PCS | Performed by: EMERGENCY MEDICINE

## 2024-10-16 PROCEDURE — 36415 COLL VENOUS BLD VENIPUNCTURE: CPT | Performed by: NURSE PRACTITIONER

## 2024-10-16 PROCEDURE — 63600175 PHARM REV CODE 636 W HCPCS: Performed by: EMERGENCY MEDICINE

## 2024-10-16 PROCEDURE — 83605 ASSAY OF LACTIC ACID: CPT | Mod: ER | Performed by: EMERGENCY MEDICINE

## 2024-10-16 PROCEDURE — 99291 CRITICAL CARE FIRST HOUR: CPT | Mod: ,,, | Performed by: NURSE PRACTITIONER

## 2024-10-16 PROCEDURE — 87040 BLOOD CULTURE FOR BACTERIA: CPT | Performed by: EMERGENCY MEDICINE

## 2024-10-16 PROCEDURE — 81000 URINALYSIS NONAUTO W/SCOPE: CPT | Mod: ER | Performed by: EMERGENCY MEDICINE

## 2024-10-16 PROCEDURE — 87081 CULTURE SCREEN ONLY: CPT | Performed by: INTERNAL MEDICINE

## 2024-10-16 PROCEDURE — 80053 COMPREHEN METABOLIC PANEL: CPT | Mod: ER | Performed by: EMERGENCY MEDICINE

## 2024-10-16 PROCEDURE — 36600 WITHDRAWAL OF ARTERIAL BLOOD: CPT

## 2024-10-16 PROCEDURE — 20000000 HC ICU ROOM

## 2024-10-16 PROCEDURE — 99291 CRITICAL CARE FIRST HOUR: CPT | Mod: ER

## 2024-10-16 PROCEDURE — 84443 ASSAY THYROID STIM HORMONE: CPT | Performed by: NURSE PRACTITIONER

## 2024-10-16 PROCEDURE — 93010 ELECTROCARDIOGRAM REPORT: CPT | Mod: ,,, | Performed by: INTERNAL MEDICINE

## 2024-10-16 PROCEDURE — 99900035 HC TECH TIME PER 15 MIN (STAT)

## 2024-10-16 PROCEDURE — 94002 VENT MGMT INPAT INIT DAY: CPT | Mod: ER

## 2024-10-16 PROCEDURE — 63600175 PHARM REV CODE 636 W HCPCS: Performed by: INTERNAL MEDICINE

## 2024-10-16 PROCEDURE — 80048 BASIC METABOLIC PNL TOTAL CA: CPT | Mod: XB | Performed by: NURSE PRACTITIONER

## 2024-10-16 PROCEDURE — 31500 INSERT EMERGENCY AIRWAY: CPT | Mod: ER

## 2024-10-16 PROCEDURE — 27200966 HC CLOSED SUCTION SYSTEM: Mod: ER

## 2024-10-16 PROCEDURE — 25500020 PHARM REV CODE 255: Performed by: INTERNAL MEDICINE

## 2024-10-16 PROCEDURE — 81000 URINALYSIS NONAUTO W/SCOPE: CPT | Mod: 91 | Performed by: NURSE PRACTITIONER

## 2024-10-16 PROCEDURE — 25000003 PHARM REV CODE 250: Mod: ER | Performed by: EMERGENCY MEDICINE

## 2024-10-16 PROCEDURE — 27100171 HC OXYGEN HIGH FLOW UP TO 24 HOURS: Mod: ER

## 2024-10-16 RX ORDER — PROPOFOL 10 MG/ML
20 VIAL (ML) INTRAVENOUS
Status: COMPLETED | OUTPATIENT
Start: 2024-10-16 | End: 2024-10-16

## 2024-10-16 RX ORDER — CLONIDINE 0.1 MG/24H
1 PATCH, EXTENDED RELEASE TRANSDERMAL
Status: DISCONTINUED | OUTPATIENT
Start: 2024-10-16 | End: 2024-11-04

## 2024-10-16 RX ORDER — SODIUM,POTASSIUM PHOSPHATES 280-250MG
2 POWDER IN PACKET (EA) ORAL
Status: DISCONTINUED | OUTPATIENT
Start: 2024-10-16 | End: 2024-10-16

## 2024-10-16 RX ORDER — LEVETIRACETAM 500 MG/5ML
2000 INJECTION, SOLUTION, CONCENTRATE INTRAVENOUS EVERY 12 HOURS
Status: DISCONTINUED | OUTPATIENT
Start: 2024-10-16 | End: 2024-10-28

## 2024-10-16 RX ORDER — CALCIUM GLUCONATE 20 MG/ML
3 INJECTION, SOLUTION INTRAVENOUS
Status: DISCONTINUED | OUTPATIENT
Start: 2024-10-16 | End: 2024-10-16

## 2024-10-16 RX ORDER — MUPIROCIN 20 MG/G
OINTMENT TOPICAL 2 TIMES DAILY
Status: COMPLETED | OUTPATIENT
Start: 2024-10-16 | End: 2024-10-20

## 2024-10-16 RX ORDER — GENTAMICIN SULFATE 1 MG/G
OINTMENT TOPICAL
COMMUNITY
Start: 2024-08-15

## 2024-10-16 RX ORDER — LANOLIN ALCOHOL/MO/W.PET/CERES
800 CREAM (GRAM) TOPICAL
Status: DISCONTINUED | OUTPATIENT
Start: 2024-10-16 | End: 2024-10-16

## 2024-10-16 RX ORDER — TIZANIDINE 2 MG/1
TABLET ORAL
COMMUNITY
Start: 2024-09-19

## 2024-10-16 RX ORDER — GLUCAGON 1 MG
1 KIT INJECTION
Status: DISCONTINUED | OUTPATIENT
Start: 2024-10-16 | End: 2024-11-05 | Stop reason: HOSPADM

## 2024-10-16 RX ORDER — HEPARIN SODIUM 5000 [USP'U]/ML
5000 INJECTION, SOLUTION INTRAVENOUS; SUBCUTANEOUS EVERY 8 HOURS
Status: DISCONTINUED | OUTPATIENT
Start: 2024-10-16 | End: 2024-10-18

## 2024-10-16 RX ORDER — CHLORHEXIDINE GLUCONATE ORAL RINSE 1.2 MG/ML
15 SOLUTION DENTAL 2 TIMES DAILY
Status: DISCONTINUED | OUTPATIENT
Start: 2024-10-16 | End: 2024-11-05 | Stop reason: HOSPADM

## 2024-10-16 RX ORDER — POTASSIUM CHLORIDE 7.45 MG/ML
40 INJECTION INTRAVENOUS
Status: DISCONTINUED | OUTPATIENT
Start: 2024-10-16 | End: 2024-10-24

## 2024-10-16 RX ORDER — FAMOTIDINE 10 MG/ML
20 INJECTION INTRAVENOUS 2 TIMES DAILY
Status: DISCONTINUED | OUTPATIENT
Start: 2024-10-16 | End: 2024-10-17

## 2024-10-16 RX ORDER — CALCIUM GLUCONATE 20 MG/ML
2 INJECTION, SOLUTION INTRAVENOUS
Status: DISCONTINUED | OUTPATIENT
Start: 2024-10-16 | End: 2024-10-16

## 2024-10-16 RX ORDER — FAMOTIDINE 40 MG/5ML
20 POWDER, FOR SUSPENSION ORAL 2 TIMES DAILY
Status: DISCONTINUED | OUTPATIENT
Start: 2024-10-16 | End: 2024-10-16

## 2024-10-16 RX ORDER — PHENOBARBITAL SODIUM 65 MG/ML
65 INJECTION, SOLUTION INTRAMUSCULAR; INTRAVENOUS 2 TIMES DAILY
Status: DISCONTINUED | OUTPATIENT
Start: 2024-10-16 | End: 2024-10-28

## 2024-10-16 RX ORDER — INSULIN ASPART 100 [IU]/ML
0-10 INJECTION, SOLUTION INTRAVENOUS; SUBCUTANEOUS EVERY 4 HOURS PRN
Status: DISCONTINUED | OUTPATIENT
Start: 2024-10-16 | End: 2024-10-30

## 2024-10-16 RX ORDER — POTASSIUM CHLORIDE 7.45 MG/ML
80 INJECTION INTRAVENOUS
Status: DISCONTINUED | OUTPATIENT
Start: 2024-10-16 | End: 2024-10-24

## 2024-10-16 RX ORDER — DIAZEPAM 10 MG/2ML
5 INJECTION INTRAMUSCULAR
Status: COMPLETED | OUTPATIENT
Start: 2024-10-16 | End: 2024-10-16

## 2024-10-16 RX ORDER — ETOMIDATE 2 MG/ML
INJECTION INTRAVENOUS
Status: DISPENSED
Start: 2024-10-16 | End: 2024-10-16

## 2024-10-16 RX ORDER — MAGNESIUM SULFATE HEPTAHYDRATE 40 MG/ML
4 INJECTION, SOLUTION INTRAVENOUS
Status: DISCONTINUED | OUTPATIENT
Start: 2024-10-16 | End: 2024-10-25

## 2024-10-16 RX ORDER — MUPIROCIN 20 MG/G
OINTMENT TOPICAL 2 TIMES DAILY
Status: DISCONTINUED | OUTPATIENT
Start: 2024-10-16 | End: 2024-10-16

## 2024-10-16 RX ORDER — BACLOFEN 10 MG/1
10 TABLET ORAL 2 TIMES DAILY
Status: DISCONTINUED | OUTPATIENT
Start: 2024-10-16 | End: 2024-10-19

## 2024-10-16 RX ORDER — PROPOFOL 10 MG/ML
0-50 INJECTION, EMULSION INTRAVENOUS CONTINUOUS
Status: DISCONTINUED | OUTPATIENT
Start: 2024-10-16 | End: 2024-10-17

## 2024-10-16 RX ORDER — MAGNESIUM SULFATE HEPTAHYDRATE 40 MG/ML
2 INJECTION, SOLUTION INTRAVENOUS
Status: DISCONTINUED | OUTPATIENT
Start: 2024-10-16 | End: 2024-10-25

## 2024-10-16 RX ORDER — ETOMIDATE 2 MG/ML
INJECTION INTRAVENOUS CODE/TRAUMA/SEDATION MEDICATION
Status: COMPLETED | OUTPATIENT
Start: 2024-10-16 | End: 2024-10-16

## 2024-10-16 RX ORDER — BISACODYL 10 MG/1
10 SUPPOSITORY RECTAL DAILY
Status: DISCONTINUED | OUTPATIENT
Start: 2024-10-16 | End: 2024-10-17

## 2024-10-16 RX ORDER — IPRATROPIUM BROMIDE AND ALBUTEROL SULFATE 2.5; .5 MG/3ML; MG/3ML
3 SOLUTION RESPIRATORY (INHALATION) EVERY 6 HOURS PRN
Status: DISCONTINUED | OUTPATIENT
Start: 2024-10-16 | End: 2024-11-05 | Stop reason: HOSPADM

## 2024-10-16 RX ORDER — CALCIUM GLUCONATE 20 MG/ML
1 INJECTION, SOLUTION INTRAVENOUS
Status: DISCONTINUED | OUTPATIENT
Start: 2024-10-16 | End: 2024-10-16

## 2024-10-16 RX ORDER — POTASSIUM CHLORIDE 7.45 MG/ML
60 INJECTION INTRAVENOUS
Status: DISCONTINUED | OUTPATIENT
Start: 2024-10-16 | End: 2024-10-24

## 2024-10-16 RX ADMIN — PIPERACILLIN SODIUM AND TAZOBACTAM SODIUM 4.5 G: 4; .5 INJECTION, POWDER, FOR SOLUTION INTRAVENOUS at 09:10

## 2024-10-16 RX ADMIN — PHENOBARBITAL SODIUM 65 MG: 65 INJECTION INTRAMUSCULAR at 11:10

## 2024-10-16 RX ADMIN — MUPIROCIN: 20 OINTMENT TOPICAL at 10:10

## 2024-10-16 RX ADMIN — ETOMIDATE 12 MG: 2 INJECTION, SOLUTION INTRAVENOUS at 05:10

## 2024-10-16 RX ADMIN — SODIUM CHLORIDE, POTASSIUM CHLORIDE, SODIUM LACTATE AND CALCIUM CHLORIDE 1000 ML: 600; 310; 30; 20 INJECTION, SOLUTION INTRAVENOUS at 01:10

## 2024-10-16 RX ADMIN — PROPOFOL 20 MCG/KG/MIN: 10 INJECTION, EMULSION INTRAVENOUS at 09:10

## 2024-10-16 RX ADMIN — HEPARIN SODIUM 5000 UNITS: 5000 INJECTION INTRAVENOUS; SUBCUTANEOUS at 01:10

## 2024-10-16 RX ADMIN — BACLOFEN 10 MG: 10 TABLET ORAL at 10:10

## 2024-10-16 RX ADMIN — CHLORHEXIDINE GLUCONATE 0.12% ORAL RINSE 15 ML: 1.2 LIQUID ORAL at 08:10

## 2024-10-16 RX ADMIN — DIAZEPAM 5 MG: 10 INJECTION, SOLUTION INTRAMUSCULAR; INTRAVENOUS at 06:10

## 2024-10-16 RX ADMIN — PHENOBARBITAL SODIUM 65 MG: 65 INJECTION INTRAMUSCULAR at 09:10

## 2024-10-16 RX ADMIN — CEFEPIME 1 G: 1 INJECTION, POWDER, FOR SOLUTION INTRAMUSCULAR; INTRAVENOUS at 06:10

## 2024-10-16 RX ADMIN — VANCOMYCIN HYDROCHLORIDE 1000 MG: 1 INJECTION, POWDER, LYOPHILIZED, FOR SOLUTION INTRAVENOUS at 06:10

## 2024-10-16 RX ADMIN — BISACODYL 10 MG: 10 SUPPOSITORY RECTAL at 11:10

## 2024-10-16 RX ADMIN — FAMOTIDINE 20 MG: 10 INJECTION, SOLUTION INTRAVENOUS at 10:10

## 2024-10-16 RX ADMIN — CLONIDINE 1 PATCH: 0.1 PATCH TRANSDERMAL at 08:10

## 2024-10-16 RX ADMIN — HEPARIN SODIUM 5000 UNITS: 5000 INJECTION INTRAVENOUS; SUBCUTANEOUS at 09:10

## 2024-10-16 RX ADMIN — PROPOFOL 10 MCG/KG/MIN: 10 INJECTION, EMULSION INTRAVENOUS at 06:10

## 2024-10-16 RX ADMIN — SODIUM CHLORIDE 1000 ML: 9 INJECTION, SOLUTION INTRAVENOUS at 01:10

## 2024-10-16 RX ADMIN — CHLORHEXIDINE GLUCONATE 0.12% ORAL RINSE 15 ML: 1.2 LIQUID ORAL at 10:10

## 2024-10-16 RX ADMIN — PROPOFOL 50 MCG/KG/MIN: 10 INJECTION, EMULSION INTRAVENOUS at 09:10

## 2024-10-16 RX ADMIN — IOHEXOL 100 ML: 350 INJECTION, SOLUTION INTRAVENOUS at 01:10

## 2024-10-16 RX ADMIN — PROPOFOL 40 MCG/KG/MIN: 10 INJECTION, EMULSION INTRAVENOUS at 01:10

## 2024-10-16 RX ADMIN — LEVETIRACETAM 2000 MG: 500 INJECTION, SOLUTION INTRAVENOUS at 11:10

## 2024-10-16 RX ADMIN — MUPIROCIN: 20 OINTMENT TOPICAL at 08:10

## 2024-10-16 RX ADMIN — LEVETIRACETAM 2000 MG: 500 INJECTION, SOLUTION INTRAVENOUS at 09:10

## 2024-10-16 RX ADMIN — PROPOFOL 20 MG: 10 INJECTION, EMULSION INTRAVENOUS at 06:10

## 2024-10-16 RX ADMIN — FAMOTIDINE 20 MG: 10 INJECTION, SOLUTION INTRAVENOUS at 08:10

## 2024-10-16 RX ADMIN — PIPERACILLIN SODIUM AND TAZOBACTAM SODIUM 4.5 G: 4; .5 INJECTION, POWDER, FOR SOLUTION INTRAVENOUS at 01:10

## 2024-10-16 NOTE — ASSESSMENT & PLAN NOTE
Patient noted to have a percutaneous endoscopic gastrostomy tube in place. I have personally inspected the tube.Tube was placed on this admission, with date of procedure- 10/16  There are no signs of drainage or infection around the site. Routine care to be done by wound care and nursing staff.     Tube feedings on hold due to Bowel obstruction

## 2024-10-16 NOTE — CONSULTS
Pharmacokinetic Initial Assessment: IV Vancomycin    Assessment/Plan:  Initiate intravenous vancomycin with a loading dose of 1000 mg once with subsequent doses when random concentrations are less than 20 mcg/mL  Desired empiric serum trough concentration is 15 to 20 mcg/mL  Draw vancomycin random level on 10/17 at 0030 (18-hr level).  Pharmacy will continue to follow and monitor vancomycin.      Please contact pharmacy at extension [phone] with any questions regarding this assessment.     Thank you for the consult,   Katherine McArdle, Pharm.D. 10/16/2024 3:06 PM         Patient brief summary:  Ronny Ramey is a 23 y.o. male initiated on antimicrobial therapy with IV Vancomycin for treatment of suspected lower respiratory infection    Drug Allergies:   Review of patient's allergies indicates:   Allergen Reactions    Strawberries [strawberry] Hives     STRAWBERRIES ALLERGIC REACTIONS   (SEIZURES AND HIVES )       Actual Body Weight:   46.2 kg    Renal Function:   Estimated Creatinine Clearance: 47.7 mL/min (A) (based on SCr of 1.6 mg/dL (H)). -- up from 0.8 earlier this Am     Dialysis Method (if applicable):  N/A    CBC (last 72 hours):  Recent Labs   Lab Result Units 10/16/24  0120   WBC K/uL 22.36*   Hemoglobin g/dL 17.4   Hematocrit % 53.8   Platelets K/uL 422   Gran % % 88.3*   Lymph % % 6.7*   Mono % % 4.3   Eosinophil % % 0.0   Basophil % % 0.3   Differential Method  Automated       Metabolic Panel (last 72 hours):  Recent Labs   Lab Result Units 10/16/24  0120 10/16/24  0159 10/16/24  1018 10/16/24  1352   Sodium mmol/L 137  --   --  132*   Potassium mmol/L 4.0  --   --  4.4   Chloride mmol/L 97  --   --  95   CO2 mmol/L 25  --   --  25   Glucose mg/dL 224*  --   --  133*   Glucose, UA   --  Negative Negative  --    BUN mg/dL 19  --   --  29*   Creatinine mg/dL 0.8  --   --  1.6*   Albumin g/dL 4.2  --   --   --    Total Bilirubin mg/dL 0.1  --   --   --    Alkaline Phosphatase U/L 193*  --    --   --    AST U/L 30  --   --   --    ALT U/L 68*  --   --   --        Microbiologic Results:  Microbiology Results (last 7 days)       Procedure Component Value Units Date/Time    Culture, MRSA [[id number]] Collected: 10/16/24 1017    Order Status: Sent Specimen: MRSA source from Nares, Left Updated: 10/16/24 1042    Culture, Respiratory with Gram Stain [[id number]]     Order Status: No result Specimen: Respiratory from Endotracheal Aspirate     Blood culture x two cultures. Draw prior to antibiotics. [[id number]] Collected: 10/16/24 0138    Order Status: Sent Specimen: Blood from Peripheral, Lower Arm, Right     Blood culture x two cultures. Draw prior to antibiotics. [[id number]] Collected: 10/16/24 0120    Order Status: Sent Specimen: Blood from Peripheral, Forearm, Right

## 2024-10-16 NOTE — ASSESSMENT & PLAN NOTE
Further management critical care medicine    Consideration should be given to a discussion with the family about their long-term goal for his care

## 2024-10-16 NOTE — ASSESSMENT & PLAN NOTE
KUB shows small bowel obstruction  NGT to suction  Keep NPO  Dulcolax suppository daily  CT abd/pelvis pending

## 2024-10-16 NOTE — HOSPITAL COURSE
The patient was intubated and abdominal films was obtained that showed bowel distention.  A CT scan was obtained that showed findings concerning for a bowel obstruction.  Surgery was consulted.  He does have a nasogastric tube and [place] in place.  There is a low profile non accessed gastrostomy tube in place    The patient was currently intubated and sedated but according to reports he is limited in his interactions.

## 2024-10-16 NOTE — ED PROVIDER NOTES
ED Provider Note - 10/16/2024    History     Chief Complaint   Patient presents with    Respiratory Distress     From [facility], brought in by [facility], respiratory distress, possible pulmonary edema; hx of cerebral palsy      Patient currently presents via EMS from [facility] with concern regarding acute shortness of breath.  Patient has a history significant for cerebral palsy and is chronically fed through a gastrostomy tube.  Per EMS, patient requiring CPAP EN route to maintain oxygen saturation.  Mother reports that the patient has had prior difficulty with aspiration.  History acquired from EMS and the mother as the patient is unable to provide history given his chronic mental state.      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    Vision abnormalities      Past Surgical History:   Procedure Laterality Date    ADENOIDECTOMY      BACLOFEN PUMP IMPLANTATION N/A 2020    Procedure: INSERTION, INTRATHECAL BACLOFEN PUMP;  Surgeon: Robbi Cain MD;  Location: [facility] OR [address] [place] [address];  Service: Neurosurgery;  Laterality: N/A;  toronto I, asa 1, lateral left down, regular bed reversed, christine, [employer] rep, co surgeon DR Miller    BACLOFEN PUMP IMPLANTATION  2020    CIRCUMCISION      EYE SURGERY      as a     GASTROSTOMY TUBE PLACEMENT      HERNIA REPAIR      HIP SURGERY      LUMBAR PUNCTURE WITH INJECTION OF BACLOFEN N/A 3/11/2020    Procedure: LUMBAR PUNCTURE, WITH BACLOFEN INJECTION;  Surgeon: Cristiane Sky MD;  Location: [facility] OR [address] [place] [address];  Service: Neurosurgery;   Laterality: N/A;  toronto I, asa 1, lateral left down, regular bed reversed , christine , [employer] co surgeon DR Karlin NISSEN FUNDOPLICATION      REMOVAL OF BACLOFEN PUMP Right 10/15/2020    Procedure: REMOVAL, BACLOFEN PUMP;  Surgeon: Marcy Macedo MD;  Location: [facility] OR [address] [place] [address];  Service: Neurosurgery;  Laterality: Right;    TONSILLECTOMY      TYMPANOSTOMY TUBE PLACEMENT       Family History   Problem Relation Name Age of Onset    Cancer Maternal Grandmother          cervial and breast    Clotting disorder Neg Hx      Anesthesia problems Neg Hx       Social History     Tobacco Use    Smoking status: Never    Smokeless tobacco: Never   Substance Use Topics    Alcohol use: No    Drug use: No     Review of Systems   Unable to perform ROS: Patient nonverbal       Physical Exam     Initial Vitals   BP Pulse Resp Temp SpO2   10/16/24 0120 10/16/24 0120 10/16/24 0120 10/16/24 0132 10/16/24 0120   (!) 165/102 (!) 130 (!) 26 96.8 °F (36 °C) 98 %      MAP       --                Vitals:    10/16/24 0120 10/16/24 0132 10/16/24 0142 10/16/24 0219   BP: (!) 165/102   (!) 226/120   Pulse: (!) 130   (!) 134   Resp: (!) 26   (!) 32   Temp:  96.8 °F (36 °C)     TempSrc:  Rectal     SpO2: 98%   (!) 93%   Weight:   46.2 kg (101 lb 13.6 oz)     10/16/24 0233 10/16/24 0241 10/16/24 0252 10/16/24 0258   BP: (!) 217/112 (!) 226/107 (!) 148/75 (!) 148/75   Pulse: (!) 133 (!) 128 (!) 142 (!) 127   Resp: (!) 36 (!) 43 (!) 48 (!) 24   Temp:       TempSrc:       SpO2: (!) 91% 96%  (!) 91%   Weight:        10/16/24 0317 10/16/24 0328 10/16/24 0332 10/16/24 0353   BP: (!) 180/110  (!) 167/107    Pulse: (!) 142 (!) 133 (!) 136 (!) 126   Resp: (!) 32 (!) 40  (!) 32   Temp:       TempSrc:       SpO2: (!) 92% 97% 100% 97%   Weight:        10/16/24 0417   BP: (!) 167/105   Pulse: (!) 140   Resp: (!) 34   Temp:    TempSrc:    SpO2: (!) 88%   Weight:      Physical Exam    Constitutional: He appears lethargic. He is diaphoretic. He appears  distressed.   HENT:   Head: Atraumatic.   Nose: Nose normal. Mouth/Throat: Oropharynx is clear and moist.   Eyes: No scleral icterus.   Neck: Neck supple. No JVD present.   Pulmonary/Chest: Accessory muscle usage present. Tachypnea noted. He is in respiratory distress. He has rhonchi.   Abdominal: Abdomen is soft. He exhibits distension. There is no abdominal tenderness.   Gastrostomy tube in place.  No signs of surrounding edema, erythema, or induration   Genitourinary:    Genitourinary Comments: Rectal exam unremarkable for tenderness, masses, or impaction.  There is a fair amount of soft stool present within vault.  No blood noted.     Musculoskeletal:         General: No edema.      Cervical back: Neck supple.      Comments: Chronic flexor contracture noted at the upper and lower extremities.     Neurological: He appears lethargic.   Skin: Skin is warm.       ED Course   Critical Care    Date/Time: 10/16/2024 6:43 PM    Performed by: Raul Sparrow MD  Authorized by: Kiel Perez MD  Total critical care time (exclusive of procedural time) : 90 minutes  Critical care time was exclusive of separately billable procedures and treating other patients.  Critical care was necessary to treat or prevent imminent or life-threatening deterioration of the following conditions: respiratory failure.  Critical care was time spent personally by me on the following activities: ordering and review of radiographic studies, ordering and review of laboratory studies, ordering and performing treatments and interventions, evaluation of patient's response to treatment, examination of patient, re-evaluation of patient's condition, pulse oximetry, discussions with consultants, development of treatment plan with patient or surrogate and obtaining history from patient or surrogate.      Intubation    Date/Time: 10/16/2024 6:44 PM  Location procedure was performed: [facility] EMERGENCY DEPARTMENT    Performed by: Raul Sparrow,  MD  Authorized by: Kiel Perez MD  Consent Done: Emergent Situation  Indications: respiratory failure  Intubation method: oral  Patient status: unconscious  Preoxygenation: BVM  Paralytic: none  Laryngoscope size: Mac 3  Tube size: 7.5 mm  Tube type: cuffed  Number of attempts: 1  Cricoid pressure: no  Cords visualized: yes  Post-procedure assessment: CO2 detector  Breath sounds: equal and absent over the epigastrium  Cuff inflated: yes  ETT to teeth: 22 cm  Tube secured with: ETT banda  Chest x-ray interpreted by me.  Chest x-ray findings: endotracheal tube in appropriate position  Patient tolerance: Patient tolerated the procedure well with no immediate complications  Complications: No                         MDM  Differential Diagnoses   Based on available history, the working differential diagnoses considered during this evaluation include but are not limited to aspiration, pneumonia, congestive heart failure.      LABS     Labs Reviewed   CBC W/ AUTO DIFFERENTIAL - Abnormal       Result Value    WBC 22.36 (*)     RBC 6.73 (*)     Hemoglobin 17.4      Hematocrit 53.8      MCV 80 (*)     MCH 25.9 (*)     MCHC 32.3      RDW 13.4      Platelets 422      MPV 11.6      Immature Granulocytes 0.4      Gran # (ANC) 19.8 (*)     Immature Grans (Abs) 0.08 (*)     Lymph # 1.5      Mono # 1.0      Eos # 0.0      Baso # 0.06      nRBC 0      Gran % 88.3 (*)     Lymph % 6.7 (*)     Mono % 4.3      Eosinophil % 0.0      Basophil % 0.3      Differential Method Automated     COMPREHENSIVE METABOLIC PANEL - Abnormal    Sodium 137      Potassium 4.0      Chloride 97      CO2 25      Glucose 224 (*)     BUN 19      Creatinine 0.8      Calcium 10.0      Total Protein 9.2 (*)     Albumin 4.2      Total Bilirubin 0.1      Alkaline Phosphatase 193 (*)     AST 30      ALT 68 (*)     eGFR >60.0      Anion Gap 15     URINALYSIS, REFLEX TO URINE CULTURE - Abnormal    Specimen UA Urine, Catheterized      Color, UA Yellow       Appearance, UA Clear      pH, UA 8.0      Specific Gravity, UA 1.020      Protein, UA 3+ (*)     Glucose, UA Negative      Ketones, UA Negative      Bilirubin (UA) Negative      Occult Blood UA Negative      Nitrite, UA Negative      Urobilinogen, UA Negative      Leukocytes, UA Negative      Narrative:     Specimen Source->Urine   URINALYSIS MICROSCOPIC - Abnormal    RBC, UA 1      WBC, UA 1      Bacteria Rare      Non-Squam Epith 1 (*)     Hyaline Casts, UA 1      Microscopic Comment SEE COMMENT      Narrative:     Specimen Source->Urine   CULTURE, BLOOD   CULTURE, BLOOD   LACTIC ACID, PLASMA    Lactate (Lactic Acid) 1.8             All available results from the labs ordered were independently reviewed. with findings as follows:  CBC notable for marked leukocytosis with white blood cell count of 22.4.  Chemistry notable for elevated glucose of 224.  Urinalysis notable for 3+ protein but no suggestion of infection.  Lactate within normal limits at 1.8.     Imaging     Imaging Results              X-Ray Chest AP Portable (Final result)  Result time 10/16/24 08:42:37      Final result by Mina Geiger MD (Timothy) (10/16/24 08:42:37)                   Impression:      As above.      Electronically signed by: Mina Geiger MD  Date:    10/16/2024  Time:    08:42               Narrative:    EXAMINATION:  XR CHEST AP PORTABLE    CLINICAL HISTORY:  Status post intubation,    FINDINGS:  One view.  Endotracheal tube and enteric tube in good position.  Heart is normal in size.  Lungs appear clear.                                       X-Ray Abdomen Flat And Erect (Final result)  Result time 10/16/24 08:41:26      Final result by Mina Geiger MD (Timothy) (10/16/24 08:41:26)                   Impression:      Findings concerning for small bowel obstruction.  Follow-up recommended.      Electronically signed by: Mina Geiger MD  Date:    10/16/2024  Time:    08:41               Narrative:    EXAMINATION:  XR  ABDOMEN FLAT AND ERECT    CLINICAL HISTORY:  Abdominal distension;    COMPARISON:  None.    FINDINGS:  Two views of the abdomen. No free air.  Peg tube in place.  Multiple dilated loops of small bowel concerning for small bowel obstruction.    Scoliosis.  No lung infiltrates.                                       X-Ray Chest AP Portable (Final result)  Result time 10/16/24 08:36:10      Final result by Garett Stallworth MD (10/16/24 08:36:10)                   Impression:      No acute finding in the chest.      Electronically signed by: Garett Stallworth  Date:    10/16/2024  Time:    08:36               Narrative:    EXAMINATION:  XR CHEST AP PORTABLE    CLINICAL HISTORY:  Sepsis;    FINDINGS:  Comparison: None available.    Mediastinal silhouette is within normal limits.  The lungs are clear.  No pneumothorax or pleural effusion.  No acute osseous finding.                                       X-Rays:   Independently Interpreted Readings:   Chest X-Ray: Normal heart size.  No infiltrates.  No acute abnormalities. NG tube and ET tube in satisfactory position. Rotational artifact noted.   Abdomen: Abdominal films notable for markedly dilated loops of primarily small but also large bowel.  Findings are concerning for obstruction versus more likely ileus        EKG   EKG Readings: (Independently Interpreted)   Initial Reading: No STEMI. Rhythm: Sinus Tachycardia. Heart Rate: 133. Ectopy: No Ectopy. Conduction: Normal. Axis: Normal.       ED Management/Discussion     Medications   lactated ringers bolus 1,000 mL (0 mLs Intravenous Stopped 10/16/24 0328)                 The patient's list of active medical problems, social history, medications, and allergies as documented per RN staff has been reviewed.           All available findings were reviewed with the patient/family in detail along with the indications for hospitalization in order to receive cardiac monitoring, pulmonary toilet, IV fluids, mechanical ventilation and  nasogastric decompression as well as surgical consultation.  I suspect the patient's abdominal distention may be the precipitating factor for his aspiration in his certainly a complicating factor for his respiratory situation.  Abdominal distention may be related to obstruction but patient is also high-risk for ileus.  Surgical history notable for Nissen fundoplication and gastrostomy placement.  We have elected to provide the patient with an initial dose of empiric antibiotics though I suspect this is the result of aspiration and not pneumonia given are present imaging of the chest in the acuity of his dyspnea along with copious tracheal output that appears to grossly be consistent with tube feeding.    All remaining questions and concerns were addressed at this time and the patient/family communicates understanding and agrees to proceed accordingly.  Similarly, all pertinent details of the encounter were discussed with the critical care NP Dank  who agrees to receive the patient at [facility] on behalf of Dr. Perez for further care as outlined above.  The patient will be transferred by EMS secondary to a need for ongoing cardiac monitoring, IVF, IV ABX, and mechanical ventilation en route.      CLINICAL IMPRESSION    ICD-10-CM ICD-9-CM   1. Aspiration into lower respiratory tract, initial encounter  T17.800A 934.9   2. SOB (shortness of breath)  R06.02 786.05   3. Acute respiratory failure with hypoxia  J96.01 518.81   4. Cerebral palsy, unspecified type  G80.9 343.9   5. Encounter for intubation  Z01.818 V72.83   6. Acute hypoxemic respiratory failure  J96.01 518.81   7. Intestinal obstruction, unspecified cause, unspecified whether partial or complete  K56.609 560.9          ED Disposition Condition    Admit Stable                 Raul Sparrow MD  10/16/24 4572

## 2024-10-16 NOTE — ASSESSMENT & PLAN NOTE
Patient with Hypercapnic Respiratory failure which is Acute on chronic. Unclear if on O2 at the NH. Supplemental oxygen was provided and noted-     Vent Mode: A/C  Oxygen Concentration (%):  [] 80  Resp Rate Total:  [18 br/min-31 br/min] 18 br/min  Vt Set:  [250 mL-355 mL] 340 mL  PEEP/CPAP:  [5 cmH20] 5 cmH20  Pressure Support:  [0 cmH20] 0 cmH20  Mean Airway Pressure:  [8.8 eoY93-75 cmH20] 9.9 cmH20    .   Signs/symptoms of respiratory failure include- tachypnea, increased work of breathing, respiratory distress, and use of accessory muscles. Contributing diagnoses includes - Aspiration Labs and images were reviewed. Patient Has recent ABG, which has been reviewed. Will treat underlying causes and adjust management of respiratory failure as follows-     Wean vent settings as tolerated  Broad spectrum antibiotics  CXR/ABG pending  Duonebs prn  Panculture

## 2024-10-16 NOTE — SUBJECTIVE & OBJECTIVE
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    Vision abnormalities        Past Surgical History:   Procedure Laterality Date    ADENOIDECTOMY      BACLOFEN PUMP IMPLANTATION N/A 2020    Procedure: INSERTION, INTRATHECAL BACLOFEN PUMP;  Surgeon: Robbi Cain MD;  [facility];  Service: Neurosurgery;  Laterality: N/A;  toronto I, asa 1, lateral left down, regular bed reversed, christine, [employer] rep, co surgeon DR Miller    BACLOFEN PUMP IMPLANTATION  2020    CIRCUMCISION      EYE SURGERY      as a     GASTROSTOMY TUBE PLACEMENT      HERNIA REPAIR      HIP SURGERY      LUMBAR PUNCTURE WITH INJECTION OF BACLOFEN N/A 3/11/2020    Procedure: LUMBAR PUNCTURE, WITH BACLOFEN INJECTION;  Surgeon: Cristiane Sky MD;  [facility];  Service: Neurosurgery;  Laterality: N/A;  toronto I, asa 1, lateral left down, regular bed reversed , christine , [employer] co surgeon DR Miller    NISSEN FUNDOPLICATION      REMOVAL OF BACLOFEN PUMP Right 10/15/2020    Procedure: REMOVAL, BACLOFEN PUMP;  Surgeon: Marcy Macedo MD;  [facility];  Service: Neurosurgery;  Laterality: Right;    TONSILLECTOMY      TYMPANOSTOMY TUBE PLACEMENT         Review of patient's allergies indicates:   Allergen Reactions    Strawberries [strawberry] Hives     STRAWBERRIES ALLERGIC REACTIONS   (SEIZURES AND HIVES )       Family History       Problem Relation (Age of Onset)    Cancer Maternal Grandmother          Tobacco Use    Smoking status: Never    Smokeless tobacco: Never   Substance and Sexual Activity    Alcohol use: No    Drug use: No    Sexual activity: Never         Review of Systems   Unable to perform ROS: Intubated      Objective:     Vital Signs (Most Recent):  Temp: 98.8 °F (37.1 °C) (10/16/24 1000)  Pulse: (!) 115 (10/16/24 1000)  Resp: 18 (10/16/24 1000)  BP: (!) 124/92 (10/16/24 1000)  SpO2: 100 % (10/16/24 1000) Vital Signs (24h Range):  Temp:  [96.8 °F (36 °C)-98.8 °F (37.1 °C)] 98.8 °F (37.1 °C)  Pulse:  [110-143] 115  Resp:  [18-54] 18  SpO2:  [79 %-100 %] 100 %  BP: (124-226)/() 124/92     Weight: 46.2 kg (101 lb 13.6 oz)  Body mass index is 17.48 kg/m².      Intake/Output Summary (Last 24 hours) at 10/16/2024 1025  Last data filed at 10/16/2024 0649  Gross per 24 hour   Intake 1199.15 ml   Output --   Net 1199.15 ml        Physical Exam  Vitals and nursing note reviewed.   Constitutional:       Appearance: He is ill-appearing.   HENT:      Nose: Nose normal.      Mouth/Throat:      Mouth: Mucous membranes are dry.   Cardiovascular:      Rate and Rhythm: Regular rhythm. Tachycardia present.      Pulses: Normal pulses.      Heart sounds: Normal heart sounds.   Pulmonary:      Effort: Pulmonary effort is normal.   Abdominal:      General: There is no distension.      Palpations: Abdomen is soft.      Comments: G tube site clean, no signs of infection   Skin:     General: Skin is warm and dry.   Neurological:      Mental Status: Mental status is at baseline.      Comments: Baseline nonverbal, legally blind  Now intubated  BUE/BLE contractures   Psychiatric:         Behavior: Behavior is agitated.          Vents:  Vent Mode: A/C (10/16/24 0945)  Set Rate: 18 BPM (10/16/24 0945)  Vt Set: 340 mL (10/16/24 0945)  Pressure Support: 0 cmH20 (10/16/24 0725)  PEEP/CPAP: 5 cmH20 (10/16/24 0945)  Oxygen Concentration (%): 80 (10/16/24 1000)  Peak Airway Pressure: 31 cmH20 (10/16/24 0945)  Plateau Pressure: 0 cmH20 (10/16/24 0945)  Total Ve: 7.19 L/m (10/16/24 0945)  Negative Inspiratory Force (cm H2O): 0 (10/16/24 0945)  F/VT Ratio<105 (RSBI): (!) 96.49 (10/16/24 0945)    Lines/Drains/Airways       Drain  Duration                   Gastrostomy/Enterostomy 10/18/20 1130 1458 days         NG/OG Tube 10/16/24 0515 16 Fr. Right nostril <1 day         Urethral Catheter 10/16/24 0156 Double-lumen 16 Fr. <1 day              Airway  Duration                  Airway - Non-Surgical 10/16/24 0530 Endotracheal Tube <1 day              Peripheral Intravenous Line  Duration                  Peripheral IV - Single Lumen 10/16/24 0120 22 G Posterior;Right Forearm <1 day         Peripheral IV - Single Lumen 10/16/24 0636 22 G Left;Posterior Hand <1 day                    Significant Labs:    CBC/Anemia Profile:  Recent Labs   Lab 10/16/24  0120   WBC 22.36*   HGB 17.4   HCT 53.8      MCV 80*   RDW 13.4        Chemistries:  Recent Labs   Lab 10/16/24  0120      K 4.0   CL 97   CO2 25   BUN 19   CREATININE 0.8   CALCIUM 10.0   ALBUMIN 4.2   PROT 9.2*   BILITOT 0.1   ALKPHOS 193*   ALT 68*   AST 30       All pertinent labs within the past 24 hours have been reviewed.    Significant Imaging:   I have reviewed all pertinent imaging results/findings within the past 24 hours.

## 2024-10-16 NOTE — ASSESSMENT & PLAN NOTE
This needs to be taken to account can care provided the patient with provider verbally communicating as best as possible

## 2024-10-16 NOTE — HPI
Ronny Ramey is a 23 yr old male [facility] resident with CP, spastic quadriplegia, s/p G tube, epilepsy who presented to [facility] ED on 10/16 with shortness of breath/respiratory distress. Patient was placed on CPAP with little improvement. KUB shows small bowel obstruction. Patient was intubated and transferred to [facility]. Critical care consulted for admission. Patient with history of admissions for bowel obstruction and aspiration pneumonia.

## 2024-10-16 NOTE — HPI
23-year-old male with a history of several palsy presented to the emergency room with hypoxia which was felt to be secondary to aspiration.  According to the emergency room notes the patient required CPAP in route.  In the emergency room he was noted to have some abdominal distention.  The patient was intubated and subsequently admitted to the intensive care unit.      The patient does have a history of bowel obstructions but they may have been related constipation.

## 2024-10-16 NOTE — PLAN OF CARE
Patient resting quietly in bed on vent with no signs of distress. VSS. Starting make make some urine after 1 liter iv bolus. No family visiting today. Amanda SONG spoke with patient's mom by phone.

## 2024-10-16 NOTE — CONSULTS
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       Precautions:     Allergies as of 10/16/2024 - Reviewed 10/16/2024   Allergen Reaction Noted    Strawberries [strawberry] Hives 08/14/2013       [facility] Assessment Details/Treatment     Consulted on this 24 y/o M patient due to present on admission wounds. Patient admitted with respiratory failure and has PMH significant for spastic quadriplegia, Cerebral Palsy, limb contractures, G tube, epilepsy, and is a resident at [facility]. He is seen in ICU, intubated, sedated.   Skin assessed with primary nurse.  Bilateral heels intact, LLE extended, heel offloading boot in place. RLE contracted, padded with pillow and preventive foam dressing applied to his heel.  Left medial ankle eschar noted, unknown etiology. Measures 1x0.5x0.1cm, edges fixed. Recommend paint with cavilon daily and leave MAURICIO.  Patient turned to left side.  Sacrum intact with no redness or breakdown. Preventive sacral foam dressing maintained.  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 Edges rolled, epibole treated with silver nitrate stick at this visit. Wound bed moist red granulation tissue, bone remains palpable. Cleansed with vashe, then filled with aquacel ag advantage and covered with foam dressing. Recommend wound care every MWF and prn excess drainage, extra supplies left at bedside.  Patient positioned with pillow support on left side.  He is currently on ICU Isolibrium ART bed.   NGT to right nare, secured with silk tape, recommend transition to securement device with tube floated in nare.    Recommendations made to primary team for wound care per orders, pressure injury prevention interventions. Orders placed.      10/16/24 1115   WOCN Assessment   WOCN Total Time (mins) 30   Visit Date 10/16/24   Visit Time 1115   Consult Type New   WOCN Speciality Wound   Wound pressure;At risk for pressure Injury   Number of Wounds 2   Intervention assessed;applied;chart review;coordination of care;orders;team conference   Teaching   (unable due to patient condition)        Wound 10/16/24 0945 Pressure Injury Right Ischial tuberosity   Date First Assessed/Time First Assessed: 10/16/24 0945   Present on Original Admission: Yes  Primary Wound Type: Pressure Injury  Side: Right  Location: Ischial tuberosity  Is this injury device related?: No   Wound Image    Pressure Injury Stage 4   Dressing Appearance Intact;Moist drainage   Drainage Amount Small   Drainage Characteristics/Odor Serosanguineous   Appearance Bone;Red;Granulating;Moist   Tissue loss description Full thickness   Red (%), Wound Tissue Color 100 %   Periwound Area Intact;Dry;Scar tissue   Wound Edges Open;Rolled/closed   Wound Length (cm) 1.5 cm   Wound Width (cm) 2 cm   Wound Depth (cm) 1 cm   Wound Volume (cm^3) 3 cm^3   Wound Surface Area (cm^2) 3 cm^2   Undermining (depth (cm)/location) 4cm circumferentially (deepest at 3-5 o'clock   Care Cleansed with:;Antimicrobial  agent;Applied:;Skin Barrier   Dressing Hydrofiber;Silver;Foam   Packing packing removed;packed with;hydrofiber/alginate  (aquacel ag advantage)   Packing Inserted  1   Packing Removed 1   Dressing Change Due 10/18/24        Wound 10/16/24 Ulceration Left medial Ankle   Date First Assessed: 10/16/24   Present on Original Admission: Yes  Primary Wound Type: Ulceration  Side: Left  Orientation: medial  Location: Ankle   Wound Image    Dressing Appearance Open to air   Drainage Amount None   Appearance Tan;Black;Eschar   Tissue loss description Full thickness   Periwound Area Intact   Wound Edges Defined   Wound Length (cm) 1 cm   Wound Width (cm) 0.5 cm   Wound Depth (cm) 0.1 cm   Wound Volume (cm^3) 0.05 cm^3   Wound Surface Area (cm^2) 0.5 cm^2       10/16/2024

## 2024-10-16 NOTE — CONSULTS
[facility] - [facility] ([facility])  General Surgery  Consult Note    Patient Name: Ronny Ramey  MRN: [id number]  Code Status: Prior  Admission Date: 10/16/2024  Hospital Length of Stay: 0 days  Attending Physician: Kiel Perez MD  Primary Care Provider: Herman Nathan DNP    Patient information was obtained from past medical records and ER records.     Inpatient consult to General Surgery  Consult performed by: Reynaldo Hart MD  Consult ordered by: Amanda Francis NP  Reason for consult: Bowel obstruction  Assessment/Recommendations: Question whether this is a true mechanical bowel obstruction or small-bowel dilatation due to air being forced into the stomach and small bowel during the preop cessation CPAP and subsequent bagging prior to intubation.      Would recommend nasogastric tube to low continuous wall suction or intermittent suction per critical care staff   Repeat two-view abdominal bloating  If there continues to be dilated this is small bowel that a Gastrografin small-bowel follow-through possibly done portably would be recommended.      Surgical intervention if the patient was found to have a mechanical bowel obstruction it was not resolve at nonmedical management    Prior to any surgical intervention consideration should be given to a discussion with the patient's mother/family with regard to her long-term goals and it was repaired        Subjective:     Principal Problem: Acute hypercapnic respiratory failure    History of Present Illness: 23-year-old male with a history of several palsy presented to the emergency room with hypoxia which was felt to be secondary to aspiration.  According to the emergency room notes the patient required CPAP in route.  In the emergency room he was noted to have some abdominal distention.  The patient was intubated and subsequently admitted to the intensive care unit.      The patient does have a history of bowel obstructions  but they may have been related constipation.    No current facility-administered medications on file prior to encounter.     Current Outpatient Medications on File Prior to Encounter   Medication Sig    baclofen (LIORESAL) 20 MG tablet 1 tablet (20 mg total) by Per G Tube route 2 (two) times daily.    cloNIDine (CATAPRES) 0.1 MG tablet 1 tablet (0.1 mg total) by Per G Tube route nightly.    diazePAM (VALIUM) 5 mg/5 mL (1 mg/mL) oral solution TAKE 3 ML(3 MG) BY MOUTH TWICE DAILY    gentamicin (GARAMYCIN) 0.1 % ointment     levETIRAcetam (KEPPRA) 100 mg/mL Soln TAKE 20 ML(2000 MG) VIA GTUBE TWICE DAILY    meloxicam (MOBIC) 7.5 MG tablet TAKE 1/2 TABLET(3.75 MG) BY MOUTH EVERY DAY    montelukast (SINGULAIR) 5 MG chewable tablet Take 1 tablet (5 mg total) by mouth every evening.    mupirocin (BACTROBAN) 2 % ointment Apply topically 3 (three) times daily.    PHENobarbitaL 64.8 MG tablet Take 1 tablet (64.8 mg total) by mouth 2 (two) times daily.    tiZANidine (ZANAFLEX) 2 MG tablet     midazolam 5 mg/spray (0.1 mL) Spry 1 Spray per nostril for seizure >3 minutes. May repeat in the other nostril if the seizure continues beyond 10 minutes. (Patient not taking: Reported on 10/25/2023)    pulse oximeter (PULSE OXIMETER) device by Apply Externally route 2 (two) times a day. Use twice daily at 8 AM and 3 PM and [facility]. (Patient not taking: Reported on 10/25/2023)    [DISCONTINUED] albuterol (PROVENTIL/VENTOLIN HFA) 90 mcg/actuation inhaler Inhale 2 puffs into the lungs every 6 (six) hours as needed for Wheezing. Rescue (Patient not taking: Reported on 10/25/2023)    [DISCONTINUED] tiZANidine (ZANAFLEX) 4 MG tablet TAKE 1/2 TABLET(2 MG) BY MOUTH TWICE DAILY       Review of patient's allergies indicates:   Allergen Reactions    Strawberries [strawberry] Hives     STRAWBERRIES ALLERGIC REACTIONS   (SEIZURES AND HIVES )       Past Medical History:   Diagnosis Date    Allergy     Cerebral palsy      Dysphagia, oropharyngeal phase     Encounter for blood transfusion     General anesthetics causing adverse effect in therapeutic use     Pneumonia     Premature baby     23 1/2 weeks     Seizure disorder     Seizures     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 OR [address] [place] [address];  Service: Neurosurgery;  Laterality: N/A;  toronto I, asa 1, lateral left down, regular bed reversed, christine, [employer] rep, co surgeon DR Miller    BACLOFEN PUMP IMPLANTATION  2020    CIRCUMCISION      EYE SURGERY      as a     GASTROSTOMY TUBE PLACEMENT      HERNIA REPAIR      HIP SURGERY      LUMBAR PUNCTURE WITH INJECTION OF BACLOFEN N/A 3/11/2020    Procedure: LUMBAR PUNCTURE, WITH BACLOFEN INJECTION;  Surgeon: Cristiane kSy MD;  Location: [facility] OR [address] [place] [address];  Service: Neurosurgery;  Laterality: N/A;  toronto I, asa 1, lateral left down, regular bed reversed , christine , [employer] co surgeon DR Miller    NISSEN FUNDOPLICATION      REMOVAL OF BACLOFEN PUMP Right 10/15/2020    Procedure: REMOVAL, BACLOFEN PUMP;  Surgeon: Marcy Macedo MD;  Location: [facility] OR [address] [place] [address];  Service: Neurosurgery;  Laterality: Right;    TONSILLECTOMY      TYMPANOSTOMY TUBE PLACEMENT       Family History       Problem Relation (Age of Onset)    Cancer Maternal Grandmother          Tobacco Use    Smoking status: Never    Smokeless tobacco: Never   Substance and Sexual Activity    Alcohol use: No    Drug use: No    Sexual activity: Never     Review of Systems   Unable to perform ROS: Intubated     Objective:     Vital Signs (Most Recent):  Temp: 98.8 °F (37.1 °C) (10/16/24 1000)  Pulse: (!) 120 (10/16/24 1330)  Resp: 18 (10/16/24 1330)  BP: 114/74 (10/16/24 1330)  SpO2: 100 % (10/16/24 1330) Vital Signs (24h Range):  Temp:  [96.8 °F (36  "°C)-98.8 °F (37.1 °C)] 98.8 °F (37.1 °C)  Pulse:  [110-143] 120  Resp:  [18-54] 18  SpO2:  [79 %-100 %] 100 %  BP: (108-226)/() 114/74     Weight: 46.2 kg (101 lb 13.6 oz)  Body mass index is 17.48 kg/m².     Physical Exam  Vitals reviewed.   Constitutional:       Appearance: He is ill-appearing.      Comments: Intubated and sedated   HENT:      Head: Normocephalic and atraumatic.      Nose:      Comments: Nasogastric tube     Mouth/Throat:      Comments: Endotracheal tube  Cardiovascular:      Rate and Rhythm: Tachycardia present.   Pulmonary:      Comments: Ventilated.  Coarse breath sounds  Abdominal:      General: There is distension.      Comments: Low profile gastrostomy tube left upper quadrant   Genitourinary:     Comments: Lancaster catheter  Musculoskeletal:         General: Deformity: contractures.   Neurological:      Comments: Sedated   Psychiatric:      Comments: Sedated            I have reviewed all pertinent lab results within the past 24 hours.  CBC:   Recent Labs   Lab 10/16/24  0120   WBC 22.36*   RBC 6.73*   HGB 17.4   HCT 53.8      MCV 80*   MCH 25.9*   MCHC 32.3     BMP:   Recent Labs   Lab 10/16/24  0120   *      K 4.0   CL 97   CO2 25   BUN 19   CREATININE 0.8   CALCIUM 10.0     CMP:   Recent Labs   Lab 10/16/24  0120   *   CALCIUM 10.0   ALBUMIN 4.2   PROT 9.2*      K 4.0   CO2 25   CL 97   BUN 19   CREATININE 0.8   ALKPHOS 193*   ALT 68*   AST 30   BILITOT 0.1     LFTs:   Recent Labs   Lab 10/16/24  0120   ALT 68*   AST 30   ALKPHOS 193*   BILITOT 0.1   PROT 9.2*   ALBUMIN 4.2     Coagulation: No results for input(s): "LABPROT", "INR", "APTT" in the last 168 hours.  Microbiology Results (last 7 days)       Procedure Component Value Units Date/Time    Culture, MRSA [[id number]] Collected: 10/16/24 1017    Order Status: Sent Specimen: MRSA source from Nares, Left Updated: 10/16/24 1042    Culture, Respiratory with Gram Stain [[id number]]     Order Status: " No result Specimen: Respiratory from Endotracheal Aspirate     Blood culture x two cultures. Draw prior to antibiotics. [[id number]] Collected: 10/16/24 0138    Order Status: Sent Specimen: Blood from Peripheral, Lower Arm, Right     Blood culture x two cultures. Draw prior to antibiotics. [[id number]] Collected: 10/16/24 0120    Order Status: Sent Specimen: Blood from Peripheral, Forearm, Right           Lactic acid 1.8      Blood gas    POC PH 7.35 - 7.45 7.339 Low     POC PCO2 35 - 45 mmHg 59.6 High Panic     POC PO2 80 - 100 mmHg 450 High     POC HCO3 24 - 28 mmol/L 32.1 High     POC BE -2 to 2 mmol/L 6 High     POC SATURATED O2 95 - 100 % 100    Rate  16        Repeat blood gas           Component Ref Range & Units 10:49 06:35 7 mo ago   POC PH 7.35 - 7.45 7.406 7.339 Low     POC PCO2 35 - 45 mmHg 48.4 High  59.6 High Panic     POC PO2 80 - 100 mmHg 160 High  450 High     POC HCO3 24 - 28 mmol/L 30.4 High  32.1 High     POC BE -2 to 2 mmol/L 6 High  6 High     POC SATURATED O2 95 - 100 % 99 100    Rate  18 16 17 High  R   Sample  ARTERIAL ARTERIAL    Site  LB LB    Allens Test  Pass            Significant Diagnostics:  I have reviewed all pertinent imaging results/findings within the past 24 hours.  Imaging study      EXAMINATION:  XR CHEST AP PORTABLE     CLINICAL HISTORY:  Sepsis;     FINDINGS:  Comparison: None available.     Mediastinal silhouette is within normal limits.  The lungs are clear.  No pneumothorax or pleural effusion.  No acute osseous finding.     Impression:     No acute finding in the chest.    X-Ray Abdomen Flat And Erect  Order: [id number]  Status: Final result       Visible to patient: Yes (not seen)       Next appt: None    0 Result Notes  Details    Reading Physician Reading Date Result Priority   Mina Geiger MD (Timothy)  [phone] 10/16/2024 STAT     Narrative & Impression  EXAMINATION:  XR ABDOMEN FLAT AND ERECT     CLINICAL HISTORY:  Abdominal distension;      COMPARISON:  None.     FINDINGS:  Two views of the abdomen. No free air.  Peg tube in place.  Multiple dilated loops of small bowel concerning for small bowel obstruction.     Scoliosis.  No lung infiltrates.     Impression:     Findings concerning for small bowel obstruction.  Follow-up recommended.        Electronically signed by:Mina Geiger MD  Date:                                            10/16/2024  Time:                                           08:41    [facility] Images for [facility] [place] Viewer     Show images for X-Ray Chest 1 View  X-Ray Chest 1 View  Order: [id number]  Status: Final result       Visible to patient: Yes (not seen)       Next appt: None    0 Result Notes  Details    Reading Physician Reading Date Result Priority   Garett Stallworth MD  [phone] 10/16/2024 Routine     Narrative & Impression  EXAMINATION:  XR CHEST 1 VIEW     CLINICAL HISTORY:  ETT placement;     FINDINGS:  Patient is rotated.  Endotracheal tube tip is approximately 3.7 cm above the alma.  Enteric catheter is present with tip overlying the expected location of the stomach.  Side port overlies the expected location of the GE junction.     Impression:     ETT is in similar to position to previous exam.     Consider advancing the nasogastric tube 5 cm.        Electronically signed by:Garett Stallworth  Date:                                            10/16/2024  Time:                                           13:08     CT scan    CT Abdomen Pelvis With IV Contrast NO Oral Contrast  Order: [id number]  Status: Final result       Visible to patient: Yes (not seen)       Next appt: None    0 Result Notes  Details    Reading Physician Reading Date Result Priority   Mina GeigerVince), MD  [phone] 10/16/2024 ASAP     Narrative & Impression  EXAMINATION:  CT ABDOMEN PELVIS WITH IV CONTRAST     CLINICAL HISTORY:  Bowel obstruction suspected;     COMPARISON:  None     FINDINGS:  Lung bases are clear     The liver, the  spleen, and the pancreas appear normal.     The gallbladder is unremarkable.  There is no bile duct dilatation.     The kidneys are normal.     The aorta and inferior vena cava are unremarkable.     .  There is a gastric tube in the stomach.  Multiple markedly distended loops of small bowel are seen predominantly in the left abdomen.  Small bowel loops measure up to 5 cm in diameter.  There is collapse of the distal small bowel.  The colon for the most part is collapsed as well.  There is moderate stool in the rectum..     [place]ancaster catheter is present within the bladder..     No free air.     There is mild ascites in the peritoneal cavity.  There may be focal area of loculated ascites in the pelvis posterior to the bladder and anterior to the rectum measuring 5.3 cm.     Scoliosis.     Chronic deformities of the hips bilaterally.  Decubitus ulcer overlying the right femoral head and neck extending over the greater trochanter.     Impression:     Findings are concerning for high-grade small bowel obstruction.  Mild ascites is present.  No free air.     All CT scans at this facility use dose modulation, iterative reconstructions, and/or weight base dosing when appropriate to reduce radiation dose to as low as reasonably achievable        Electronically signed by:Mina Geiger MD  Date:                                            10/16/2024  Time:                                           13:19        Assessment/Plan:     * Acute hypercapnic respiratory failure  Management per critical care.    Patient was currently intubated, ventilated and sedated.     Bowel obstruction  Question if this is truly a mechanical small-bowel obstruction versus small-bowel dilatation due to your during the CPAP process pre intubation with a bagging process prior to intubation.      Abdominal she will review films in the morning.  If there continues to be dilated bowel that a Gastrografin small-bowel follow-through would be required.      If  the patient does have a mechanical obstruction the goals of surgery would need to be discussed with his mother/family    Aspiration pneumonia  Management per critical care    PEG (percutaneous endoscopic gastrostomy) status  Would recommend obtaining the accessed portion of the low profile gastrostomy tube as this may be needed in the future during this hospital stay to provide enteral access for feeding    Nonverbal  Monitor    Legally blind  This needs to be taken to account can care provided the patient with provider verbally communicating as best as possible    Intellectual disability with epilepsy  Per critical care    Spastic quadriplegic cerebral palsy  Further management critical care medicine    Consideration should be given to a discussion with the family about their long-term goal for his care      VTE Risk Mitigation (From admission, onward)           Ordered     heparin (porcine) injection 5,000 Units  Every 8 hours         10/16/24 1001                    Thank you for your consult. I will follow-up with patient. Please contact us if you have any additional questions.    Reynaldo Hart MD  General Surgery  [facility] - Intensive Care ([facility])

## 2024-10-16 NOTE — H&P
[place] - Intensive Care ([facility])  Critical Care Medicine  History & Physical    Patient Name: Ronny Ramey  MRN: [id number]  Admission Date: 10/16/2024  Hospital Length of Stay: 0 days  Code Status: Prior  Attending Physician: Kiel Perez MD   Primary Care Provider: Herman Nathan DNP   Principal Problem: Acute hypercapnic respiratory failure    Subjective:     HPI:  Ronny Ramey is a 23 yr old male [facility] resident with CP, spastic quadriplegia, s/p G tube, epilepsy who presented to [facility] ED on 10/16 with shortness of breath/respiratory distress. Patient was placed on CPAP with little improvement. KUB shows small bowel obstruction. Patient was intubated and transferred to [facility]. Critical care consulted for admission. Patient with history of admissions for bowel obstruction and aspiration pneumonia.     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    Vision abnormalities        Past Surgical History:   Procedure Laterality Date    ADENOIDECTOMY      BACLOFEN PUMP IMPLANTATION N/A 2020    Procedure: INSERTION, INTRATHECAL BACLOFEN PUMP;  Surgeon: Robbi Cain MD;  Location: [facility] OR [address] [place] [address];  Service: Neurosurgery;  Laterality: N/A;  toronto I, asa 1, lateral left down, regular bed reversed, christine, [employer] rep, co surgeon DR Miller    BACLOFEN PUMP IMPLANTATION  2020    CIRCUMCISION      EYE SURGERY      as a     GASTROSTOMY TUBE PLACEMENT      HERNIA REPAIR      HIP SURGERY      LUMBAR PUNCTURE WITH INJECTION OF BACLOFEN N/A 3/11/2020    Procedure: LUMBAR PUNCTURE, WITH BACLOFEN INJECTION;  Surgeon: Cristiane Sky MD;   [facility];  Service: Neurosurgery;  Laterality: N/A;  toronto I, asa 1, lateral left down, regular bed reversed , christine , [employer] co surgeon DR Karlin NISSEN FUNDOPLICATION      REMOVAL OF BACLOFEN PUMP Right 10/15/2020    Procedure: REMOVAL, BACLOFEN PUMP;  Surgeon: Marcy Macedo MD;  Location: [facility] OR 2ND FLR;  Service: Neurosurgery;  Laterality: Right;    TONSILLECTOMY      TYMPANOSTOMY TUBE PLACEMENT         Review of patient's allergies indicates:   Allergen Reactions    Strawberries [strawberry] Hives     STRAWBERRIES ALLERGIC REACTIONS   (SEIZURES AND HIVES )       Family History       Problem Relation (Age of Onset)    Cancer Maternal Grandmother          Tobacco Use    Smoking status: Never    Smokeless tobacco: Never   Substance and Sexual Activity    Alcohol use: No    Drug use: No    Sexual activity: Never         Review of Systems   Unable to perform ROS: Intubated     Objective:     Vital Signs (Most Recent):  Temp: 98.8 °F (37.1 °C) (10/16/24 1000)  Pulse: (!) 115 (10/16/24 1000)  Resp: 18 (10/16/24 1000)  BP: (!) 124/92 (10/16/24 1000)  SpO2: 100 % (10/16/24 1000) Vital Signs (24h Range):  Temp:  [96.8 °F (36 °C)-98.8 °F (37.1 °C)] 98.8 °F (37.1 °C)  Pulse:  [110-143] 115  Resp:  [18-54] 18  SpO2:  [79 %-100 %] 100 %  BP: (124-226)/() 124/92     Weight: 46.2 kg (101 lb 13.6 oz)  Body mass index is 17.48 kg/m².      Intake/Output Summary (Last 24 hours) at 10/16/2024 1025  Last data filed at 10/16/2024 0649  Gross per 24 hour   Intake 1199.15 ml   Output --   Net 1199.15 ml        Physical Exam  Vitals and nursing note reviewed.   Constitutional:       Appearance: He is ill-appearing.   HENT:      Nose: Nose normal.      Mouth/Throat:      Mouth: Mucous membranes are dry.   Cardiovascular:      Rate and Rhythm: Regular rhythm. Tachycardia present.      Pulses: Normal pulses.      Heart sounds: Normal heart sounds.   Pulmonary:      Effort: Pulmonary effort is normal.    Abdominal:      General: There is no distension.      Palpations: Abdomen is soft.      Comments: G tube site clean, no signs of infection   Skin:     General: Skin is warm and dry.   Neurological:      Mental Status: Mental status is at baseline.      Comments: Baseline nonverbal, legally blind  Now intubated  BUE/BLE contractures   Psychiatric:         Behavior: Behavior is agitated.          Vents:  Vent Mode: A/C (10/16/24 0945)  Set Rate: 18 BPM (10/16/24 0945)  Vt Set: 340 mL (10/16/24 0945)  Pressure Support: 0 cmH20 (10/16/24 0725)  PEEP/CPAP: 5 cmH20 (10/16/24 0945)  Oxygen Concentration (%): 80 (10/16/24 1000)  Peak Airway Pressure: 31 cmH20 (10/16/24 0945)  Plateau Pressure: 0 cmH20 (10/16/24 0945)  Total Ve: 7.19 L/m (10/16/24 0945)  Negative Inspiratory Force (cm H2O): 0 (10/16/24 0945)  F/VT Ratio<105 (RSBI): (!) 96.49 (10/16/24 0945)    Lines/Drains/Airways       Drain  Duration                  Gastrostomy/Enterostomy 10/18/20 1130 1458 days         NG/OG Tube 10/16/24 0515 16 Fr. Right nostril <1 day         Urethral Catheter 10/16/24 0156 Double-lumen 16 Fr. <1 day              Airway  Duration                  Airway - Non-Surgical 10/16/24 0530 Endotracheal Tube <1 day              Peripheral Intravenous Line  Duration                  Peripheral IV - Single Lumen 10/16/24 0120 22 G Posterior;Right Forearm <1 day         Peripheral IV - Single Lumen 10/16/24 0636 22 G Left;Posterior Hand <1 day                    Significant Labs:    CBC/Anemia Profile:  Recent Labs   Lab 10/16/24  0120   WBC 22.36*   HGB 17.4   HCT 53.8      MCV 80*   RDW 13.4        Chemistries:  Recent Labs   Lab 10/16/24  0120      K 4.0   CL 97   CO2 25   BUN 19   CREATININE 0.8   CALCIUM 10.0   ALBUMIN 4.2   PROT 9.2*   BILITOT 0.1   ALKPHOS 193*   ALT 68*   AST 30       All pertinent labs within the past 24 hours have been reviewed.    Significant Imaging:   I have reviewed all pertinent imaging  results/findings within the past 24 hours.  Assessment/Plan:     Neuro  Intellectual disability with epilepsy  Home AEDs restarted: Keppra and phenobarb    Spastic quadriplegic cerebral palsy  Home baclofen restarted  Turn q 2hrs  Aspiration precautions    Pulmonary  * Acute hypercapnic respiratory failure  Patient with Hypercapnic Respiratory failure which is Acute on chronic. Unclear if on O2 at the NH. Supplemental oxygen was provided and noted-     Vent Mode: A/C  Oxygen Concentration (%):  [] 80  Resp Rate Total:  [18 br/min-31 br/min] 18 br/min  Vt Set:  [250 mL-355 mL] 340 mL  PEEP/CPAP:  [5 cmH20] 5 cmH20  Pressure Support:  [0 cmH20] 0 cmH20  Mean Airway Pressure:  [8.8 tyO37-51 cmH20] 9.9 cmH20    .   Signs/symptoms of respiratory failure include- tachypnea, increased work of breathing, respiratory distress, and use of accessory muscles. Contributing diagnoses includes - Aspiration Labs and images were reviewed. Patient Has recent ABG, which has been reviewed. Will treat underlying causes and adjust management of respiratory failure as follows-     Wean vent settings as tolerated  Broad spectrum antibiotics  CXR/ABG pending  Duonebs prn  Panculture    Aspiration pneumonia  See acute resp failure    Endocrine  Body mass index (BMI) less than 19  Nutrition consult for Tube feeding recs  NPO currently due to bowel obstruction    GI  Bowel obstruction  KUB shows small bowel obstruction  NGT to suction  Keep NPO  Dulcolax suppository daily  CT abd/pelvis pending    PEG (percutaneous endoscopic gastrostomy) status  Patient noted to have a percutaneous endoscopic gastrostomy tube in place. I have personally inspected the tube.Tube was placed on this admission, with date of procedure- 10/16  There are no signs of drainage or infection around the site. Routine care to be done by wound care and nursing staff.     Tube feedings on hold due to Bowel obstruction            Critical Care Daily Checklist:    A:  Awake: RASS Goal/Actual Goal: RASS Goal: -2-->light sedation  Actual:     B: Spontaneous Breathing Trial Performed? Spon. Breathing Trial Initiated?: Not initiated (10/16/24 3554)   C: SAT & SBT Coordinated?  No                      D: Delirium: CAM-ICU     E: Early Mobility Performed? No   F: Feeding Goal:    Status:     Current Diet Order   Procedures    Diet NPO      AS: Analgesia/Sedation Propofol   T: Thromboembolic Prophylaxis SQH   H: HOB > 300 Yes   U: Stress Ulcer Prophylaxis (if needed) H2B   G: Glucose Control SSI   B: Bowel Function  Dulcolax supp   I: Indwelling Catheter (Lines & Lancaster) Necessity Lancaster, OGT   D: De-escalation of Antimicrobials/Pharmacotherapies No    Plan for the day/ETD CT abd/pelvis    Code Status:  Family/Goals of Care: Prior  Cont Treatment     Critical Care Time: 45 minutes  Critical secondary to Patient has a condition that poses threat to life and bodily function: Acute respiratory failure with hypercapnia requiring mechanical ventilation    Critical care was time spent personally by me on the following activities: development of treatment plan with patient or surrogate and bedside caregivers, discussions with consultants, evaluation of patient's response to treatment, examination of patient, ordering and performing treatments and interventions, ordering and review of laboratory studies, ordering and review of radiographic studies, pulse oximetry, re-evaluation of patient's condition. This critical care time did not overlap with that of any other provider or involve time for any procedures.     Amanda Francis NP  Critical Care Medicine  [facility] - Intensive Care ([facility])

## 2024-10-16 NOTE — NURSING
Took patient to CT scan via bed on cardiac monitor and portable vent. Pt tolerated trip well. Informed Gwendolyn SONG and Dr Perez of the large amount of thick brown gastric contents from NGT, 650ml. Also informed them both that patient has made almost no urine since admit. No family has visited or attempted to make contact since pt admitted.

## 2024-10-16 NOTE — RESPIRATORY THERAPY
Pt care taken over from Wally RT at 0600. Pt vented. Pt tolerating vent. Pt placed on Zoll to monitor CO2 levels. ABG done CO2 level critical. Rate increased on vent. Pt has moderate amounts of clear secretions. Need for monitoring of secretions. Suction frequently.

## 2024-10-16 NOTE — SUBJECTIVE & OBJECTIVE
No current facility-administered medications on file prior to encounter.     Current Outpatient Medications on File Prior to Encounter   Medication Sig    baclofen (LIORESAL) 20 MG tablet 1 tablet (20 mg total) by Per G Tube route 2 (two) times daily.    cloNIDine (CATAPRES) 0.1 MG tablet 1 tablet (0.1 mg total) by Per G Tube route nightly.    diazePAM (VALIUM) 5 mg/5 mL (1 mg/mL) oral solution TAKE 3 ML(3 MG) BY MOUTH TWICE DAILY    gentamicin (GARAMYCIN) 0.1 % ointment     levETIRAcetam (KEPPRA) 100 mg/mL Soln TAKE 20 ML(2000 MG) VIA GTUBE TWICE DAILY    meloxicam (MOBIC) 7.5 MG tablet TAKE 1/2 TABLET(3.75 MG) BY MOUTH EVERY DAY    montelukast (SINGULAIR) 5 MG chewable tablet Take 1 tablet (5 mg total) by mouth every evening.    mupirocin (BACTROBAN) 2 % ointment Apply topically 3 (three) times daily.    PHENobarbitaL 64.8 MG tablet Take 1 tablet (64.8 mg total) by mouth 2 (two) times daily.    tiZANidine (ZANAFLEX) 2 MG tablet     midazolam 5 mg/spray (0.1 mL) Spry 1 Spray per nostril for seizure >3 minutes. May repeat in the other nostril if the seizure continues beyond 10 minutes. (Patient not taking: Reported on 10/25/2023)    pulse oximeter (PULSE OXIMETER) device by Apply Externally route 2 (two) times a day. Use twice daily at 8 AM and 3 PM and [facility]. (Patient not taking: Reported on 10/25/2023)    [DISCONTINUED] albuterol (PROVENTIL/VENTOLIN HFA) 90 mcg/actuation inhaler Inhale 2 puffs into the lungs every 6 (six) hours as needed for Wheezing. Rescue (Patient not taking: Reported on 10/25/2023)    [DISCONTINUED] tiZANidine (ZANAFLEX) 4 MG tablet TAKE 1/2 TABLET(2 MG) BY MOUTH TWICE DAILY       Review of patient's allergies indicates:   Allergen Reactions    Strawberries [strawberry] Hives     STRAWBERRIES ALLERGIC REACTIONS   (SEIZURES AND HIVES )       Past Medical History:   Diagnosis Date    Allergy     Cerebral palsy     Dysphagia, oropharyngeal phase     Encounter for  blood transfusion     General anesthetics causing adverse effect in therapeutic use     Pneumonia     Premature baby     23 1/2 weeks     Seizure disorder     Seizures     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    Vision abnormalities      Past Surgical History:   Procedure Laterality Date    ADENOIDECTOMY      BACLOFEN PUMP IMPLANTATION N/A 2020    Procedure: INSERTION, INTRATHECAL BACLOFEN PUMP;  Surgeon: Robbi Cain MD;  Location: [facility] OR [address] [place] [address];  Service: Neurosurgery;  Laterality: N/A;  toronto I, asa 1, lateral left down, regular bed reversed, christine, [employer] rep, co surgeon DR Miller    BACLOFEN PUMP IMPLANTATION  2020    CIRCUMCISION      EYE SURGERY      as a     GASTROSTOMY TUBE PLACEMENT      HERNIA REPAIR      HIP SURGERY      LUMBAR PUNCTURE WITH INJECTION OF BACLOFEN N/A 3/11/2020    Procedure: LUMBAR PUNCTURE, WITH BACLOFEN INJECTION;  Surgeon: Cristiane Sky MD;  Location: [facility] OR [address] [place] [address];  Service: Neurosurgery;  Laterality: N/A;  toronto I, asa 1, lateral left down, regular bed reversed , christine , [employer] co surgeon DR Miller    NISSEN FUNDOPLICATION      REMOVAL OF BACLOFEN PUMP Right 10/15/2020    Procedure: REMOVAL, BACLOFEN PUMP;  Surgeon: Marcy Macedo MD;  Location: [facility] OR [address] [place] [address];  Service: Neurosurgery;  Laterality: Right;    TONSILLECTOMY      TYMPANOSTOMY TUBE PLACEMENT       Family History       Problem Relation (Age of Onset)    Cancer Maternal Grandmother          Tobacco Use    Smoking status: Never    Smokeless tobacco: Never   Substance and Sexual Activity    Alcohol use: No    Drug use: No    Sexual activity: Never     Review of Systems   Unable to perform ROS: Intubated     Objective:     Vital Signs (Most Recent):  Temp: 98.8 °F (37.1 °C) (10/16/24 1000)  Pulse: (!) 120 (10/16/24 1330)  Resp: 18 (10/16/24 1330)  BP: 114/74 (10/16/24 1330)  SpO2: 100 % (10/16/24 1330) Vital Signs (24h Range):  Temp:  [96.8 °F (36 °C)-98.8 °F (37.1 °C)] 98.8 °F (37.1 °C)  Pulse:   "[110-143] 120  Resp:  [18-54] 18  SpO2:  [79 %-100 %] 100 %  BP: (108-226)/() 114/74     Weight: 46.2 kg (101 lb 13.6 oz)  Body mass index is 17.48 kg/m².     Physical Exam  Vitals reviewed.   Constitutional:       Appearance: He is ill-appearing.      Comments: Intubated and sedated   HENT:      Head: Normocephalic and atraumatic.      Nose:      Comments: Nasogastric tube     Mouth/Throat:      Comments: Endotracheal tube  Cardiovascular:      Rate and Rhythm: Tachycardia present.   Pulmonary:      Comments: Ventilated.  Coarse breath sounds  Abdominal:      General: There is distension.      Comments: Low profile gastrostomy tube left upper quadrant   Genitourinary:     Comments: Lancaster catheter  Musculoskeletal:         General: Deformity: contractures.   Neurological:      Comments: Sedated   Psychiatric:      Comments: Sedated            I have reviewed all pertinent lab results within the past 24 hours.  CBC:   Recent Labs   Lab 10/16/24  0120   WBC 22.36*   RBC 6.73*   HGB 17.4   HCT 53.8      MCV 80*   MCH 25.9*   MCHC 32.3     BMP:   Recent Labs   Lab 10/16/24  0120   *      K 4.0   CL 97   CO2 25   BUN 19   CREATININE 0.8   CALCIUM 10.0     CMP:   Recent Labs   Lab 10/16/24  0120   *   CALCIUM 10.0   ALBUMIN 4.2   PROT 9.2*      K 4.0   CO2 25   CL 97   BUN 19   CREATININE 0.8   ALKPHOS 193*   ALT 68*   AST 30   BILITOT 0.1     LFTs:   Recent Labs   Lab 10/16/24  0120   ALT 68*   AST 30   ALKPHOS 193*   BILITOT 0.1   PROT 9.2*   ALBUMIN 4.2     Coagulation: No results for input(s): "LABPROT", "INR", "APTT" in the last 168 hours.  Microbiology Results (last 7 days)       Procedure Component Value Units Date/Time    Culture, MRSA [[id number]] Collected: 10/16/24 1017    Order Status: Sent Specimen: MRSA source from Nares, Left Updated: 10/16/24 1042    Culture, Respiratory with Gram Stain [[id number]]     Order Status: No result Specimen: Respiratory from Endotracheal " Aspirate     Blood culture x two cultures. Draw prior to antibiotics. [[id number]] Collected: 10/16/24 0138    Order Status: Sent Specimen: Blood from Peripheral, Lower Arm, Right     Blood culture x two cultures. Draw prior to antibiotics. [[id number]] Collected: 10/16/24 0120    Order Status: Sent Specimen: Blood from Peripheral, Forearm, Right           Lactic acid 1.8      Blood gas    POC PH 7.35 - 7.45 7.339 Low     POC PCO2 35 - 45 mmHg 59.6 High Panic     POC PO2 80 - 100 mmHg 450 High     POC HCO3 24 - 28 mmol/L 32.1 High     POC BE -2 to 2 mmol/L 6 High     POC SATURATED O2 95 - 100 % 100    Rate  16        Repeat blood gas           Component Ref Range & Units 10:49 06:35 7 mo ago   POC PH 7.35 - 7.45 7.406 7.339 Low     POC PCO2 35 - 45 mmHg 48.4 High  59.6 High Panic     POC PO2 80 - 100 mmHg 160 High  450 High     POC HCO3 24 - 28 mmol/L 30.4 High  32.1 High     POC BE -2 to 2 mmol/L 6 High  6 High     POC SATURATED O2 95 - 100 % 99 100    Rate  18 16 17 High  R   Sample  ARTERIAL ARTERIAL    Site  LB LB    Allens Test  Pass            Significant Diagnostics:  I have reviewed all pertinent imaging results/findings within the past 24 hours.  Imaging study      EXAMINATION:  XR CHEST AP PORTABLE     CLINICAL HISTORY:  Sepsis;     FINDINGS:  Comparison: None available.     Mediastinal silhouette is within normal limits.  The lungs are clear.  No pneumothorax or pleural effusion.  No acute osseous finding.     Impression:     No acute finding in the chest.    X-Ray Abdomen Flat And Erect  Order: [id number]  Status: Final result       Visible to patient: Yes (not seen)       Next appt: None    0 Result Notes  Details    Reading Physician Reading Date Result Priority   Mina Geiger MD (Timothy)  [phone] 10/16/2024 STAT     Narrative & Impression  EXAMINATION:  XR ABDOMEN FLAT AND ERECT     CLINICAL HISTORY:  Abdominal distension;     COMPARISON:  None.     FINDINGS:  Two views of the abdomen. No  free air.  Peg tube in place.  Multiple dilated loops of small bowel concerning for small bowel obstruction.     Scoliosis.  No lung infiltrates.     Impression:     Findings concerning for small bowel obstruction.  Follow-up recommended.        Electronically signed by:Mina Geiger MD  Date:                                            10/16/2024  Time:                                           08:41    [facility] Images for [facility] [place] Viewer     Show images for X-Ray Chest 1 View  X-Ray Chest 1 View  Order: [id number]  Status: Final result       Visible to patient: Yes (not seen)       Next appt: None    0 Result Notes  Details    Reading Physician Reading Date Result Priority   Garett Stallworth MD  [phone] 10/16/2024 Routine     Narrative & Impression  EXAMINATION:  XR CHEST 1 VIEW     CLINICAL HISTORY:  ETT placement;     FINDINGS:  Patient is rotated.  Endotracheal tube tip is approximately 3.7 cm above the alma.  Enteric catheter is present with tip overlying the expected location of the stomach.  Side port overlies the expected location of the GE junction.     Impression:     ETT is in similar to position to previous exam.     Consider advancing the nasogastric tube 5 cm.        Electronically signed by:Garett Stallworth  Date:                                            10/16/2024  Time:                                           13:08     CT scan    CT Abdomen Pelvis With IV Contrast NO Oral Contrast  Order: [id number]  Status: Final result       Visible to patient: Yes (not seen)       Next appt: None    0 Result Notes  Details    Reading Physician Reading Date Result Priority   Mina GeigerVince), MD  [phone] 10/16/2024 ASAP     Narrative & Impression  EXAMINATION:  CT ABDOMEN PELVIS WITH IV CONTRAST     CLINICAL HISTORY:  Bowel obstruction suspected;     COMPARISON:  None     FINDINGS:  Lung bases are clear     The liver, the spleen, and the pancreas appear normal.     The gallbladder is  unremarkable.  There is no bile duct dilatation.     The kidneys are normal.     The aorta and inferior vena cava are unremarkable.     .  There is a gastric tube in the stomach.  Multiple markedly distended loops of small bowel are seen predominantly in the left abdomen.  Small bowel loops measure up to 5 cm in diameter.  There is collapse of the distal small bowel.  The colon for the most part is collapsed as well.  There is moderate stool in the rectum..     Lancaster catheter is present within the bladder..     No free air.     There is mild ascites in the peritoneal cavity.  There may be focal area of loculated ascites in the pelvis posterior to the bladder and anterior to the rectum measuring 5.3 cm.     Scoliosis.     Chronic deformities of the hips bilaterally.  Decubitus ulcer overlying the right femoral head and neck extending over the greater trochanter.     Impression:     Findings are concerning for high-grade small bowel obstruction.  Mild ascites is present.  No free air.     All CT scans at this facility use dose modulation, iterative reconstructions, and/or weight base dosing when appropriate to reduce radiation dose to as low as reasonably achievable        Electronically signed by:Mina Geiger MD  Date:                                            10/16/2024  Time:                                           13:19

## 2024-10-16 NOTE — ASSESSMENT & PLAN NOTE
Question if this is truly a mechanical small-bowel obstruction versus small-bowel dilatation due to your during the CPAP process pre intubation with a bagging process prior to intubation.      Abdominal she will review films in the morning.  If there continues to be dilated bowel that a Gastrografin small-bowel follow-through would be required.      If the patient does have a mechanical obstruction the goals of surgery would need to be discussed with his mother/family

## 2024-10-16 NOTE — PLAN OF CARE
[facility] - [facility]ensive Care (Hospital)  Initial Discharge Assessment       Primary Care Provider: Herman Nathan DNP    Admission Diagnosis: SOB (shortness of breath) [R06.02]  Encounter for intubation [Z01.818]  Acute respiratory failure with hypoxia [J96.01]  Aspiration into lower respiratory tract, initial encounter [T17.800A]  Acute hypoxemic respiratory failure [J96.01]  Cerebral palsy, unspecified type [G80.9]    Admission Date: 10/16/2024  Expected Discharge Date:     Transition of Care Barriers: None    Payor: BLUE CROSS BLUE SHIELD / Plan: BCBS OF [place] [employer] LOCAL PLUS / Product Type: Commercial /     Extended Emergency Contact Information  Primary Emergency Contact: Marciano Skinner   [place]  Mobile Phone: [phone]  Relation: Other    Discharge Plan A: Return to nursing home         [id number] - [place], [place] - [address] AT [facility] [address]  [address]  [place] [address]  Phone: [phone] Fax: [phone]      Initial Assessment (most recent)       Adult Discharge Assessment - 10/16/24 1558          Discharge Assessment    Assessment Type Discharge Planning Assessment     Confirmed/corrected address, phone number and insurance Yes     Confirmed Demographics Correct on Facesheet     Source of Information health record     Communicated ABDIEL with patient/caregiver Date not available/Unable to determine     Reason For Admission Acute hypercapnic respiratory failure     People in Home facility resident     Facility Arrived From: [facility]     Do you expect to return to your current living situation? Yes     Do you have help at home or someone to help you manage your care at home? Yes     Who are your caregiver(s) and their phone number(s)? facility staff     Prior to hospitilization cognitive status: Unable to Assess     Current cognitive status: Unable to Assess     Walking or Climbing Stairs Difficulty yes     Walking or Climbing Stairs  ambulation difficulty, dependent;stair climbing difficulty, dependent;transferring difficulty, dependent     Mobility Management bed bound, total assist     Dressing/Bathing Difficulty yes     Dressing/Bathing dressing difficulty, dependent     Home Accessibility wheelchair accessible     Home Layout Able to live on 1st floor     Equipment Currently Used at Home other (see comments)   facility DME    Readmission within 30 days? No     Patient currently being followed by outpatient case management? No     Do you currently have service(s) that help you manage your care at home? No     Do you take prescription medications? Yes     Do you have prescription coverage? Yes     Coverage [employer], Medicaid     Do you have any problems affording any of your prescribed medications? No     Is the patient taking medications as prescribed? yes     Who is going to help you get home at discharge? facility transport     How do you get to doctors appointments? health plan transportation     Are you on dialysis? No     Do you take coumadin? No     Discharge Plan A Return to nursing home     DME Needed Upon Discharge  none     Transition of Care Barriers None                   Anticipated DC dispo: return to [facility]   Prior Level of Function: dependent with ADLs  People in home:  facility resident

## 2024-10-17 LAB
ALBUMIN SERPL BCP-MCNC: 2.5 G/DL (ref 3.5–5.2)
ALBUMIN SERPL BCP-MCNC: 3 G/DL (ref 3.5–5.2)
ALLENS TEST: ABNORMAL
ALP SERPL-CCNC: 149 U/L (ref 55–135)
ALT SERPL W/O P-5'-P-CCNC: 54 U/L (ref 10–44)
ANION GAP SERPL CALC-SCNC: 12 MMOL/L (ref 8–16)
ANION GAP SERPL CALC-SCNC: 13 MMOL/L (ref 8–16)
AST SERPL-CCNC: 26 U/L (ref 10–40)
BASOPHILS # BLD AUTO: 0.04 K/UL (ref 0–0.2)
BASOPHILS NFR BLD: 0.2 % (ref 0–1.9)
BILIRUB SERPL-MCNC: 0.5 MG/DL (ref 0.1–1)
BUN SERPL-MCNC: 32 MG/DL (ref 6–20)
BUN SERPL-MCNC: 33 MG/DL (ref 6–20)
CALCIUM SERPL-MCNC: 8.2 MG/DL (ref 8.7–10.5)
CALCIUM SERPL-MCNC: 9.6 MG/DL (ref 8.7–10.5)
CHLORIDE SERPL-SCNC: 103 MMOL/L (ref 95–110)
CHLORIDE SERPL-SCNC: 99 MMOL/L (ref 95–110)
CO2 SERPL-SCNC: 22 MMOL/L (ref 23–29)
CO2 SERPL-SCNC: 24 MMOL/L (ref 23–29)
CREAT SERPL-MCNC: 1.5 MG/DL (ref 0.5–1.4)
CREAT SERPL-MCNC: 1.8 MG/DL (ref 0.5–1.4)
DELSYS: ABNORMAL
DIFFERENTIAL METHOD BLD: ABNORMAL
EOSINOPHIL # BLD AUTO: 0 K/UL (ref 0–0.5)
EOSINOPHIL NFR BLD: 0 % (ref 0–8)
ERYTHROCYTE [DISTWIDTH] IN BLOOD BY AUTOMATED COUNT: 13.3 % (ref 11.5–14.5)
ERYTHROCYTE [SEDIMENTATION RATE] IN BLOOD BY WESTERGREN METHOD: 18 MM/H
EST. GFR  (NO RACE VARIABLE): 54 ML/MIN/1.73 M^2
EST. GFR  (NO RACE VARIABLE): >60 ML/MIN/1.73 M^2
FIO2: 30
GLUCOSE SERPL-MCNC: 110 MG/DL (ref 70–110)
GLUCOSE SERPL-MCNC: 169 MG/DL (ref 70–110)
HCO3 UR-SCNC: 28.2 MMOL/L (ref 24–28)
HCT VFR BLD AUTO: 50.2 % (ref 40–54)
HGB BLD-MCNC: 16.9 G/DL (ref 14–18)
IMM GRANULOCYTES # BLD AUTO: 0.06 K/UL (ref 0–0.04)
IMM GRANULOCYTES NFR BLD AUTO: 0.3 % (ref 0–0.5)
LACTATE SERPL-SCNC: 1.7 MMOL/L (ref 0.5–2.2)
LYMPHOCYTES # BLD AUTO: 1.8 K/UL (ref 1–4.8)
LYMPHOCYTES NFR BLD: 10.2 % (ref 18–48)
MAGNESIUM SERPL-MCNC: 2.1 MG/DL (ref 1.6–2.6)
MCH RBC QN AUTO: 26 PG (ref 27–31)
MCHC RBC AUTO-ENTMCNC: 33.7 G/DL (ref 32–36)
MCV RBC AUTO: 77 FL (ref 82–98)
MODE: ABNORMAL
MONOCYTES # BLD AUTO: 1.7 K/UL (ref 0.3–1)
MONOCYTES NFR BLD: 9.9 % (ref 4–15)
NEUTROPHILS # BLD AUTO: 13.9 K/UL (ref 1.8–7.7)
NEUTROPHILS NFR BLD: 79.4 % (ref 38–73)
NRBC BLD-RTO: 0 /100 WBC
OHS QRS DURATION: 80 MS
OHS QTC CALCULATION: 440 MS
PCO2 BLDA: 34 MMHG (ref 35–45)
PEEP: 5
PH SMN: 7.53 [PH] (ref 7.35–7.45)
PHOSPHATE SERPL-MCNC: 4.3 MG/DL (ref 2.7–4.5)
PHOSPHATE SERPL-MCNC: 5.5 MG/DL (ref 2.7–4.5)
PLATELET # BLD AUTO: 438 K/UL (ref 150–450)
PMV BLD AUTO: 11.6 FL (ref 9.2–12.9)
PO2 BLDA: 176 MMHG (ref 80–100)
POC BE: 5 MMOL/L
POC SATURATED O2: 100 % (ref 95–100)
POCT GLUCOSE: 113 MG/DL (ref 70–110)
POCT GLUCOSE: 117 MG/DL (ref 70–110)
POCT GLUCOSE: 127 MG/DL (ref 70–110)
POCT GLUCOSE: 129 MG/DL (ref 70–110)
POCT GLUCOSE: 130 MG/DL (ref 70–110)
POCT GLUCOSE: 150 MG/DL (ref 70–110)
POTASSIUM SERPL-SCNC: 4.4 MMOL/L (ref 3.5–5.1)
POTASSIUM SERPL-SCNC: 4.6 MMOL/L (ref 3.5–5.1)
PROT SERPL-MCNC: 7.3 G/DL (ref 6–8.4)
RBC # BLD AUTO: 6.51 M/UL (ref 4.6–6.2)
SAMPLE: ABNORMAL
SITE: ABNORMAL
SODIUM SERPL-SCNC: 134 MMOL/L (ref 136–145)
SODIUM SERPL-SCNC: 139 MMOL/L (ref 136–145)
SP02: 100
VANCOMYCIN SERPL-MCNC: 13.3 UG/ML
VANCOMYCIN SERPL-MCNC: 19.3 UG/ML
VT: 350
WBC # BLD AUTO: 17.55 K/UL (ref 3.9–12.7)

## 2024-10-17 PROCEDURE — 63600175 PHARM REV CODE 636 W HCPCS: Performed by: NURSE PRACTITIONER

## 2024-10-17 PROCEDURE — 99900035 HC TECH TIME PER 15 MIN (STAT)

## 2024-10-17 PROCEDURE — 85025 COMPLETE CBC W/AUTO DIFF WBC: CPT | Performed by: NURSE PRACTITIONER

## 2024-10-17 PROCEDURE — 25000003 PHARM REV CODE 250: Performed by: EMERGENCY MEDICINE

## 2024-10-17 PROCEDURE — 94003 VENT MGMT INPAT SUBQ DAY: CPT

## 2024-10-17 PROCEDURE — 84100 ASSAY OF PHOSPHORUS: CPT | Performed by: NURSE PRACTITIONER

## 2024-10-17 PROCEDURE — 25000003 PHARM REV CODE 250: Performed by: NURSE PRACTITIONER

## 2024-10-17 PROCEDURE — 25500020 PHARM REV CODE 255: Performed by: INTERNAL MEDICINE

## 2024-10-17 PROCEDURE — 99900026 HC AIRWAY MAINTENANCE (STAT)

## 2024-10-17 PROCEDURE — 20000000 HC ICU ROOM

## 2024-10-17 PROCEDURE — 80069 RENAL FUNCTION PANEL: CPT | Performed by: NURSE PRACTITIONER

## 2024-10-17 PROCEDURE — 63600175 PHARM REV CODE 636 W HCPCS: Performed by: INTERNAL MEDICINE

## 2024-10-17 PROCEDURE — 36415 COLL VENOUS BLD VENIPUNCTURE: CPT | Performed by: NURSE PRACTITIONER

## 2024-10-17 PROCEDURE — 80053 COMPREHEN METABOLIC PANEL: CPT | Performed by: NURSE PRACTITIONER

## 2024-10-17 PROCEDURE — 83605 ASSAY OF LACTIC ACID: CPT | Performed by: NURSE PRACTITIONER

## 2024-10-17 PROCEDURE — 83735 ASSAY OF MAGNESIUM: CPT | Performed by: NURSE PRACTITIONER

## 2024-10-17 PROCEDURE — 99232 SBSQ HOSP IP/OBS MODERATE 35: CPT | Mod: ,,, | Performed by: SURGERY

## 2024-10-17 PROCEDURE — 80202 ASSAY OF VANCOMYCIN: CPT | Mod: 91 | Performed by: INTERNAL MEDICINE

## 2024-10-17 PROCEDURE — 87070 CULTURE OTHR SPECIMN AEROBIC: CPT | Performed by: INTERNAL MEDICINE

## 2024-10-17 PROCEDURE — 63600175 PHARM REV CODE 636 W HCPCS: Performed by: EMERGENCY MEDICINE

## 2024-10-17 PROCEDURE — C9399 UNCLASSIFIED DRUGS OR BIOLOG: HCPCS | Performed by: NURSE PRACTITIONER

## 2024-10-17 PROCEDURE — 87205 SMEAR GRAM STAIN: CPT | Performed by: INTERNAL MEDICINE

## 2024-10-17 PROCEDURE — 94761 N-INVAS EAR/PLS OXIMETRY MLT: CPT

## 2024-10-17 PROCEDURE — 36415 COLL VENOUS BLD VENIPUNCTURE: CPT | Mod: XB | Performed by: INTERNAL MEDICINE

## 2024-10-17 PROCEDURE — 82803 BLOOD GASES ANY COMBINATION: CPT

## 2024-10-17 PROCEDURE — 80202 ASSAY OF VANCOMYCIN: CPT | Performed by: INTERNAL MEDICINE

## 2024-10-17 PROCEDURE — 87186 SC STD MICRODIL/AGAR DIL: CPT | Mod: 59 | Performed by: INTERNAL MEDICINE

## 2024-10-17 PROCEDURE — 25000003 PHARM REV CODE 250: Performed by: INTERNAL MEDICINE

## 2024-10-17 PROCEDURE — 36600 WITHDRAWAL OF ARTERIAL BLOOD: CPT

## 2024-10-17 PROCEDURE — 27100171 HC OXYGEN HIGH FLOW UP TO 24 HOURS

## 2024-10-17 PROCEDURE — 36415 COLL VENOUS BLD VENIPUNCTURE: CPT | Mod: XB | Performed by: NURSE PRACTITIONER

## 2024-10-17 RX ORDER — DEXMEDETOMIDINE HYDROCHLORIDE 4 UG/ML
0-1.4 INJECTION INTRAVENOUS CONTINUOUS
Status: DISCONTINUED | OUTPATIENT
Start: 2024-10-17 | End: 2024-11-04

## 2024-10-17 RX ORDER — DIATRIZOATE MEGLUMINE AND DIATRIZOATE SODIUM 660; 100 MG/ML; MG/ML
240 SOLUTION ORAL; RECTAL
Status: COMPLETED | OUTPATIENT
Start: 2024-10-17 | End: 2024-10-17

## 2024-10-17 RX ORDER — FAMOTIDINE 10 MG/ML
20 INJECTION INTRAVENOUS DAILY
Status: DISCONTINUED | OUTPATIENT
Start: 2024-10-17 | End: 2024-10-18

## 2024-10-17 RX ADMIN — LEVETIRACETAM 2000 MG: 500 INJECTION, SOLUTION INTRAVENOUS at 09:10

## 2024-10-17 RX ADMIN — PIPERACILLIN SODIUM AND TAZOBACTAM SODIUM 4.5 G: 4; .5 INJECTION, POWDER, FOR SOLUTION INTRAVENOUS at 02:10

## 2024-10-17 RX ADMIN — SODIUM CHLORIDE 1000 ML: 9 INJECTION, SOLUTION INTRAVENOUS at 08:10

## 2024-10-17 RX ADMIN — PHENOBARBITAL SODIUM 65 MG: 65 INJECTION INTRAMUSCULAR at 09:10

## 2024-10-17 RX ADMIN — VANCOMYCIN HYDROCHLORIDE 750 MG: 750 INJECTION, POWDER, LYOPHILIZED, FOR SOLUTION INTRAVENOUS at 02:10

## 2024-10-17 RX ADMIN — PIPERACILLIN SODIUM AND TAZOBACTAM SODIUM 4.5 G: 4; .5 INJECTION, POWDER, FOR SOLUTION INTRAVENOUS at 05:10

## 2024-10-17 RX ADMIN — DEXMEDETOMIDINE HYDROCHLORIDE 1.4 MCG/KG/HR: 4 INJECTION INTRAVENOUS at 10:10

## 2024-10-17 RX ADMIN — CHLORHEXIDINE GLUCONATE 0.12% ORAL RINSE 15 ML: 1.2 LIQUID ORAL at 09:10

## 2024-10-17 RX ADMIN — PROPOFOL 50 MCG/KG/MIN: 10 INJECTION, EMULSION INTRAVENOUS at 03:10

## 2024-10-17 RX ADMIN — DEXMEDETOMIDINE HYDROCHLORIDE 1 MCG/KG/HR: 4 INJECTION INTRAVENOUS at 03:10

## 2024-10-17 RX ADMIN — DIATRIZOATE MEGLUMINE AND DIATRIZOATE SODIUM 240 ML: 660; 100 LIQUID ORAL; RECTAL at 02:10

## 2024-10-17 RX ADMIN — CHLORHEXIDINE GLUCONATE 0.12% ORAL RINSE 15 ML: 1.2 LIQUID ORAL at 08:10

## 2024-10-17 RX ADMIN — SODIUM CHLORIDE 500 ML: 9 INJECTION, SOLUTION INTRAVENOUS at 12:10

## 2024-10-17 RX ADMIN — LEVETIRACETAM 2000 MG: 500 INJECTION, SOLUTION INTRAVENOUS at 08:10

## 2024-10-17 RX ADMIN — MUPIROCIN: 20 OINTMENT TOPICAL at 08:10

## 2024-10-17 RX ADMIN — PIPERACILLIN SODIUM AND TAZOBACTAM SODIUM 4.5 G: 4; .5 INJECTION, POWDER, FOR SOLUTION INTRAVENOUS at 10:10

## 2024-10-17 RX ADMIN — HEPARIN SODIUM 5000 UNITS: 5000 INJECTION INTRAVENOUS; SUBCUTANEOUS at 05:10

## 2024-10-17 RX ADMIN — DEXMEDETOMIDINE HYDROCHLORIDE 1.4 MCG/KG/HR: 4 INJECTION INTRAVENOUS at 08:10

## 2024-10-17 RX ADMIN — BISACODYL 10 MG: 10 SUPPOSITORY RECTAL at 08:10

## 2024-10-17 RX ADMIN — HEPARIN SODIUM 5000 UNITS: 5000 INJECTION INTRAVENOUS; SUBCUTANEOUS at 09:10

## 2024-10-17 RX ADMIN — MUPIROCIN: 20 OINTMENT TOPICAL at 09:10

## 2024-10-17 RX ADMIN — SODIUM CHLORIDE 1000 ML: 9 INJECTION, SOLUTION INTRAVENOUS at 10:10

## 2024-10-17 RX ADMIN — HEPARIN SODIUM 5000 UNITS: 5000 INJECTION INTRAVENOUS; SUBCUTANEOUS at 02:10

## 2024-10-17 RX ADMIN — FAMOTIDINE 20 MG: 10 INJECTION, SOLUTION INTRAVENOUS at 08:10

## 2024-10-17 RX ADMIN — PHENOBARBITAL SODIUM 65 MG: 65 INJECTION INTRAMUSCULAR at 08:10

## 2024-10-17 RX ADMIN — BACLOFEN 10 MG: 10 TABLET ORAL at 08:10

## 2024-10-17 NOTE — NURSING
Attempted to do disimpaction on patient per verbal order Amanda NP. Stool felt in the rectal vault but too soft to extract. No hard stool noted. Gave a mineral oil enema to patient. Also advanced NGT 7cm per verbal order Amanda NP.

## 2024-10-17 NOTE — PROGRESS NOTES
[facility] - [facility]ensive Care ([facility])  Critical Care Medicine  Progress Note    Patient Name: Ronny Ramey  MRN: [id number]  Admission Date: 10/16/2024  Hospital Length of Stay: 1 days  Code Status: Prior  Attending Provider: Kiel Perez MD  Primary Care Provider: Herman Nathan DNP   Principal Problem: Acute hypercapnic respiratory failure    Subjective:     HPI:  Ronny Ramey is a 23 yr old male [facility] resident with CP, spastic quadriplegia, s/p G tube, epilepsy who presented to [facility] ED on 10/16 with shortness of breath/respiratory distress. Patient was placed on CPAP with little improvement. KUB shows small bowel obstruction. Patient was intubated and transferred to [facility]. Critical care consulted for admission. Patient with history of admissions for bowel obstruction and aspiration pneumonia.     Hospital/ICU Course:  10/17/2024: Patient with decreasing UOP overnight. Renal function worsening. Small bolus given with little response. Additional boluses today. HTN overnight, clonidine patch started. Continue bowel rest. NGT advanced per Abd XR, continued bowel obstruction on XR as well as fecal impaction. Will attempt to disimpact. Gen Surg following. Propofol switched to precedex for vent weaning.    Interval History: Patient remains intubated, sedated.      Objective:     Vital Signs (Most Recent):  Temp: 98.9 °F (37.2 °C) (10/17/24 0700)  Pulse: 98 (10/17/24 0924)  Resp: 16 (10/17/24 0924)  BP: (!) 148/108 (10/17/24 0800)  SpO2: 100 % (10/17/24 0924) Vital Signs (24h Range):  Temp:  [98 °F (36.7 °C)-99.1 °F (37.3 °C)] 98.9 °F (37.2 °C)  Pulse:  [] 98  Resp:  [16-28] 16  SpO2:  [100 %] 100 %  BP: (108-158)/() 148/108     Weight: 45.1 kg (99 lb 6.8 oz)  Body mass index is 17.07 kg/m².      Intake/Output Summary (Last 24 hours) at 10/17/2024 1023  Last data filed at 10/17/2024 0800  Gross per 24 hour   Intake 2313.45 ml   Output 1622 ml   Net 691.45  ml        Physical Exam  Constitutional:       Appearance: He is ill-appearing.   HENT:      Head: Atraumatic.      Nose: Nose normal.      Comments: NGT in place     Mouth/Throat:      Mouth: Mucous membranes are moist.   Cardiovascular:      Rate and Rhythm: Regular rhythm. Tachycardia present.      Pulses: Normal pulses.      Heart sounds: Normal heart sounds.   Pulmonary:      Effort: Pulmonary effort is normal. No respiratory distress.      Breath sounds: Normal breath sounds.   Abdominal:      General: Bowel sounds are decreased. There is distension.      Comments: G tube site clean, no signs of infection   Musculoskeletal:      Comments: BUE contractures  BLE contractures   Skin:     General: Skin is warm and dry.   Neurological:      Mental Status: Mental status is at baseline.      Comments: Baseline nonverbal, legally blind           Review of Systems   Unable to perform ROS: Intubated       Vents:  Vent Mode: A/C (10/17/24 0924)  Ventilator Initiated: Yes (10/17/24 0102)  Set Rate: 16 BPM (10/17/24 0924)  Vt Set: 350 mL (10/17/24 0924)  Pressure Support: 0 cmH20 (10/16/24 0725)  PEEP/CPAP: 5 cmH20 (10/17/24 0924)  Oxygen Concentration (%): 30 (10/17/24 0924)  Peak Airway Pressure: 27 cmH20 (10/17/24 0924)  Plateau Pressure: 16 cmH20 (10/17/24 0924)  Total Ve: 5.76 L/m (10/17/24 0924)  Negative Inspiratory Force (cm H2O): 0 (10/17/24 0924)  F/VT Ratio<105 (RSBI): (!) 44.32 (10/17/24 0924)    Lines/Drains/Airways       Drain  Duration                  Gastrostomy/Enterostomy 10/18/20 1130 1459 days         NG/OG Tube 10/16/24 0515 16 Fr. Right nostril 1 day         Urethral Catheter 10/16/24 0156 Double-lumen 16 Fr. 1 day              Airway  Duration                  Airway - Non-Surgical 10/16/24 0530 Endotracheal Tube 1 day              Peripheral Intravenous Line  Duration                  Peripheral IV - Single Lumen 10/16/24 0120 22 G Posterior;Right Forearm 1 day         Peripheral IV - Single Lumen  10/16/24 0636 22 G Left;Posterior Hand 1 day         Peripheral IV - Single Lumen 10/16/24 1245 20 G Left;Medial Forearm <1 day                    Significant Labs:    CBC/Anemia Profile:  Recent Labs   Lab 10/16/24  0120 10/17/24  0327   WBC 22.36* 17.55*   HGB 17.4 16.9   HCT 53.8 50.2    438   MCV 80* 77*   RDW 13.4 13.3        Chemistries:  Recent Labs   Lab 10/16/24  0120 10/16/24  1352 10/17/24  0327    132* 134*   K 4.0 4.4 4.6   CL 97 95 99   CO2 25 25 22*   BUN 19 29* 33*   CREATININE 0.8 1.6* 1.8*   CALCIUM 10.0 9.5 9.6   ALBUMIN 4.2  --  3.0*   PROT 9.2*  --  7.3   BILITOT 0.1  --  0.5   ALKPHOS 193*  --  149*   ALT 68*  --  54*   AST 30  --  26   MG  --   --  2.1   PHOS  --   --  4.3       All pertinent labs within the past 24 hours have been reviewed.    Significant Imaging:  I have reviewed all pertinent imaging results/findings within the past 24 hours.    ABG  Recent Labs   Lab 10/17/24  0538   PH 7.527*   PO2 176*   PCO2 34.0*   HCO3 28.2*   BE 5*     Assessment/Plan:     Neuro  Intellectual disability with epilepsy  Home AEDs restarted: Keppra and phenobarb  Monitor Keppra dosing with renal function    Spastic quadriplegic cerebral palsy  Home baclofen restarted  Turn q 2hrs  Aspiration precautions    Pulmonary  * Acute hypercapnic respiratory failure  Patient with Hypercapnic Respiratory failure which is Acute on chronic. Unclear if on O2 at the NH. Supplemental oxygen was provided and noted-     Vent Mode: A/C  Oxygen Concentration (%):  [30-40] 30  Resp Rate Total:  [16 br/min-22 br/min] 16 br/min  Vt Set:  [350 mL] 350 mL  PEEP/CPAP:  [5 cmH20] 5 cmH20  Mean Airway Pressure:  [7.5 cmH20-10 cmH20] 8.5 cmH20    .   Signs/symptoms of respiratory failure include- tachypnea, increased work of breathing, respiratory distress, and use of accessory muscles. Contributing diagnoses includes - Aspiration Labs and images were reviewed. Patient Has recent ABG, which has been reviewed. Will  treat underlying causes and adjust management of respiratory failure as follows-     Wean vent settings as tolerated  Broad spectrum antibiotics  CXR/ABG pending  Duonebs prn  Pancultures pending  Transition from Propofol to Precedex- coordinate SAT/SBT    Aspiration pneumonia  See acute resp failure    Renal/  MAYELIN (acute kidney injury)  MAYELIN is likely due to pre-renal azotemia due to intravascular volume depletion. Baseline creatinine is  0.8 . Most recent creatinine and eGFR are listed below.  Recent Labs     10/16/24  0120 10/16/24  1352 10/17/24  0327   CREATININE 0.8 1.6* 1.8*   EGFRNORACEVR >60.0 >60 54*      Plan  - MAYELIN is worsening. Will give additional fluid boluses, check RFP at 12:00  - Avoid nephrotoxins and renally dose meds for GFR listed above  - Monitor urine output, serial BMP, and adjust therapy as needed  - Maintain [place] catheter    Endocrine  Body mass index (BMI) less than 19  Nutrition consult for Tube feeding recs  NPO currently due to bowel obstruction    GI  Bowel obstruction  KUB shows small bowel obstruction  NGT to suction  Keep NPO  Dulcolax suppository daily  CT abd/pelvis shows high grade bowel obstruction  Gen Surg consulted  Fecal impaction on Abd XR- disimpact    PEG (percutaneous endoscopic gastrostomy) status  Patient noted to have a percutaneous endoscopic gastrostomy tube in place. I have personally inspected the tube.Tube was placed on this admission, with date of procedure- 10/16  There are no signs of drainage or infection around the site. Routine care to be done by wound care and nursing staff.     Tube feedings on hold due to Bowel obstruction        DVT PPX: SQH  PUP: H2B    Critical Care Time: 45 minutes  Critical secondary to Patient has a condition that poses threat to life and bodily function: Acute respiratory failure requiring mechanical ventilation      Critical care was time spent personally by me on the following activities: development of treatment plan with  patient or surrogate and bedside caregivers, discussions with consultants, evaluation of patient's response to treatment, examination of patient, ordering and performing treatments and interventions, ordering and review of laboratory studies, ordering and review of radiographic studies, pulse oximetry, re-evaluation of patient's condition. This critical care time did not overlap with that of any other provider or involve time for any procedures.     Amanda Francis NP  Critical Care Medicine  '[facility] - [facility]ensive [facility])

## 2024-10-17 NOTE — PROGRESS NOTES
Pharmacokinetic Assessment Follow Up: IV Vancomycin    Vancomycin serum concentration assessment(s):    The random level was drawn correctly and can be used to guide therapy at this time. The measurement is within the desired definitive target range of 15 to 20 mcg/mL.    Vancomycin Regimen Plan:    Administer vancomycin 750 mg IV once and obtain a random vancomycin level at 2030 on 10/17/24.      Drug levels (last 3 results):  Recent Labs   Lab Result Units 10/17/24  0041   Vancomycin, Random ug/mL 19.3       Pharmacy will continue to follow and monitor vancomycin.    Please contact pharmacy at extension [phone] for questions regarding this assessment.    Thank you for the consult,   Calvin Diaz       Patient brief summary:  Ronny Ramey is a 23 y.o. male initiated on antimicrobial therapy with IV Vancomycin for treatment of lower respiratory infection    The patient's current regimen is pulse dosing.    Drug Allergies:   Review of patient's allergies indicates:   Allergen Reactions    Strawberries [strawberry] Hives     STRAWBERRIES ALLERGIC REACTIONS   (SEIZURES AND HIVES )       Actual Body Weight:   46.2 kg    Renal Function:   Estimated Creatinine Clearance: 42.4 mL/min (A) (based on SCr of 1.8 mg/dL (H)).,     Dialysis Method (if applicable):  N/A    CBC (last 72 hours):  Recent Labs   Lab Result Units 10/16/24  0120 10/17/24  0327   WBC K/uL 22.36* 17.55*   Hemoglobin g/dL 17.4 16.9   Hematocrit % 53.8 50.2   Platelets K/uL 422 438   Gran % % 88.3* 79.4*   Lymph % % 6.7* 10.2*   Mono % % 4.3 9.9   Eosinophil % % 0.0 0.0   Basophil % % 0.3 0.2   Differential Method  Automated Automated       Metabolic Panel (last 72 hours):  Recent Labs   Lab Result Units 10/16/24  0120 10/16/24  0159 10/16/24  1018 10/16/24  1352 10/17/24  0327   Sodium mmol/L 137  --   --  132* 134*   Potassium mmol/L 4.0  --   --  4.4 4.6   Chloride mmol/L 97  --   --  95 99   CO2 mmol/L 25  --   --  25 22*   Glucose mg/dL  224*  --   --  133* 169*   Glucose, UA   --  Negative Negative  --   --    BUN mg/dL 19  --   --  29* 33*   Creatinine mg/dL 0.8  --   --  1.6* 1.8*   Albumin g/dL 4.2  --   --   --  3.0*   Total Bilirubin mg/dL 0.1  --   --   --  0.5   Alkaline Phosphatase U/L 193*  --   --   --  149*   AST U/L 30  --   --   --  26   ALT U/L 68*  --   --   --  54*   Magnesium mg/dL  --   --   --   --  2.1   Phosphorus mg/dL  --   --   --   --  4.3       Vancomycin Administrations:  vancomycin given in the last 96 hours                     vancomycin 750 mg in D5W 250 mL IVPB (admixture device) (mg) 750 mg New Bag 10/17/24 0229    vancomycin (VANCOCIN) 1,000 mg in D5W 250 mL IVPB (admixture device) (mg) 1,000 mg New Bag 10/16/24 0648                    Microbiologic Results:  Microbiology Results (last 7 days)       Procedure Component Value Units Date/Time    Blood culture x two cultures. Draw prior to antibiotics. [[id number]] Collected: 10/16/24 0120    Order Status: Completed Specimen: Blood from Peripheral, Forearm, Right Updated: 10/17/24 0515     Blood Culture, Routine No Growth to date    Narrative:      Aerobic and anaerobic    Blood culture x two cultures. Draw prior to antibiotics. [[id number]] Collected: 10/16/24 0138    Order Status: Completed Specimen: Blood from Peripheral, Lower Arm, Right Updated: 10/17/24 0515     Blood Culture, Routine No Growth to date    Narrative:      Aerobic and anaerobic    Culture, MRSA [[id number]] Collected: 10/16/24 1017    Order Status: Sent Specimen: MRSA source from Nares, Left Updated: 10/16/24 1700    Culture, Respiratory with Gram Stain [[id number]]     Order Status: No result Specimen: Respiratory from Endotracheal Aspirate

## 2024-10-17 NOTE — PROGRESS NOTES
[facility] - Intensive Care [facility])  General Surgery  Progress Note    Subjective:     History of Present Illness:  23-year-old male with a history of several palsy presented to the emergency room with hypoxia which was felt to be secondary to aspiration.  According to the emergency room notes the patient required CPAP in route.  In the emergency room he was noted to have some abdominal distention.  The patient was intubated and subsequently admitted to the intensive care unit.      The patient does have a history of bowel obstructions but they may have been related constipation.    Post-Op Info:  * No surgery found *         Interval History:  Patient currently intubated on vent and sedated.  Abdominal x-ray showing persistently dilated loops of small bowel as well as large volume of rectal stool, NG tube output bilious    Medications:  Continuous Infusions:   dexmedeTOMIDine (Precedex) infusion (titrating)  0-1.4 mcg/kg/hr Intravenous Continuous 15.79 mL/hr at 10/17/24 1100 1.4 mcg/kg/hr at 10/17/24 1100     Scheduled Meds:   baclofen  10 mg Per G Tube BID    chlorhexidine  15 mL Mouth/Throat BID    cloNIDine 0.1 mg/24 hr td ptwk  1 patch Transdermal Q7 Days    famotidine (PF)  20 mg Intravenous Daily    heparin (porcine)  5,000 Units Subcutaneous Q8H    levETIRAcetam (Keppra) IV (PEDS and ADULTS)  2,000 mg Intravenous Q12H    mupirocin   Nasal BID    PHENObarbital  65 mg Intravenous BID    piperacillin-tazobactam (Zosyn) IV (PEDS and ADULTS) (extended infusion is not appropriate)  4.5 g Intravenous Q8H    sodium chloride 0.9%  1,000 mL Intravenous Once     PRN Meds:  Current Facility-Administered Medications:     albuterol-ipratropium, 3 mL, Nebulization, Q6H PRN    dextrose 10%, 12.5 g, Intravenous, PRN    dextrose 10%, 25 g, Intravenous, PRN    glucagon (human recombinant), 1 mg, Intramuscular, PRN    insulin aspart U-100, 0-10 Units, Subcutaneous, Q4H PRN    magnesium sulfate IVPB, 2 g, Intravenous, PRN     magnesium sulfate IVPB, 4 g, Intravenous, PRN    potassium chloride, 40 mEq, Intravenous, PRN **AND** potassium chloride, 60 mEq, Intravenous, PRN **AND** potassium chloride, 80 mEq, Intravenous, PRN    sodium phosphate 15 mmol in D5W 250 mL IVPB, 15 mmol, Intravenous, PRN    sodium phosphate 20.01 mmol in D5W 250 mL IVPB, 20.01 mmol, Intravenous, PRN    sodium phosphate 30 mmol in D5W 250 mL IVPB, 30 mmol, Intravenous, PRN    Pharmacy to dose Vancomycin consult, , , Once **AND** vancomycin - pharmacy to dose, , Intravenous, pharmacy to manage frequency     Review of patient's allergies indicates:   Allergen Reactions    Strawberries [strawberry] Hives     STRAWBERRIES ALLERGIC REACTIONS   (SEIZURES AND HIVES )     Objective:     Vital Signs (Most Recent):  Temp: 97.7 °F (36.5 °C) (10/17/24 1100)  Pulse: 80 (10/17/24 1100)  Resp: 16 (10/17/24 1100)  BP: (!) 101/55 (10/17/24 1100)  SpO2: 100 % (10/17/24 1100) Vital Signs (24h Range):  Temp:  [97.7 °F (36.5 °C)-99.1 °F (37.3 °C)] 97.7 °F (36.5 °C)  Pulse:  [] 80  Resp:  [16-28] 16  SpO2:  [100 %] 100 %  BP: (101-161)/() 101/55     Weight: 45.1 kg (99 lb 6.8 oz)  Body mass index is 17.07 kg/m².    Intake/Output - Last 3 Shifts         10/15 0700  10/16 0659 10/16 0700  10/17 0659 10/17 0700  10/18 0659    I.V. (mL/kg) 0.4 (0) 251.6 (5.6) 34.6 (0.8)    IV Piggyback 1198.7 2061.8 100    Total Intake(mL/kg) 1199.2 (26) 2313.5 (51.3) 134.6 (3)    Urine (mL/kg/hr)  432 (0.4) 110 (0.5)    Drains  1200     Total Output  1632 110    Net +1199.2 +681.5 +24.6                    Physical Exam  Vitals reviewed.   Constitutional:       Appearance: He is well-developed.   HENT:      Head: Normocephalic and atraumatic.   Cardiovascular:      Rate and Rhythm: Normal rate.   Pulmonary:      Effort: Pulmonary effort is normal.   Abdominal:      General: There is distension.      Tenderness: There is abdominal tenderness (withdrawal to palpation).   Musculoskeletal:       Cervical back: Normal range of motion and neck supple.      Comments: Severely contracted   Skin:     General: Skin is warm and dry.   Neurological:      Mental Status: He is alert.          Significant Labs:  I have reviewed all pertinent lab results within the past 24 hours.  CBC:   Recent Labs   Lab 10/17/24  0327   WBC 17.55*   RBC 6.51*   HGB 16.9   HCT 50.2      MCV 77*   MCH 26.0*   MCHC 33.7     CMP:   Recent Labs   Lab 10/17/24  0327   *   CALCIUM 9.6   ALBUMIN 3.0*   PROT 7.3   *   K 4.6   CO2 22*   CL 99   BUN 33*   CREATININE 1.8*   ALKPHOS 149*   ALT 54*   AST 26   BILITOT 0.5       Significant Diagnostics:  Abdominal x-ray:  FINDINGS:  Side port of the NG tube projects over the distal 3rd of the esophagus.  Consider advancing the tube 8 cm.     Persistent severe gaseous distension of a loop of small bowel in the upper central abdomen up to 7 cm diameter concerning for small bowel obstruction.  No free air.  7 cm transverse diameter developing rectal fecal impaction.  Recommend continued radiographic follow-up.  Consider Gastrografin small bowel follow-through exam.  Assessment/Plan:     * Acute hypercapnic respiratory failure  Management per critical care.    Patient was currently intubated, ventilated and sedated.     Bowel obstruction  Question if this is truly a mechanical small-bowel obstruction versus small-bowel dilatation due to your during the CPAP process pre intubation with a bagging process prior to intubation.      If the patient does have a mechanical obstruction the goals of surgery would need to be discussed with his mother/family      Persistently dilated loops of small bowel on abdominal x-ray  Small-bowel follow-through with Gastrografin    Aspiration pneumonia  Management per critical care    PEG (percutaneous endoscopic gastrostomy) status  Would recommend obtaining the accessed portion of the low profile gastrostomy tube as this may be needed in the future  during this hospital stay to provide enteral access for feeding    Nonverbal  Monitor    Legally blind  This needs to be taken to account can care provided the patient with provider verbally communicating as best as possible    Intellectual disability with epilepsy  Per critical care    Spastic quadriplegic cerebral palsy  Further management critical care medicine    Consideration should be given to a discussion with the family about their long-term goal for his care        Oz Jessica MD  General Surgery  [facility] - Intensive Care ([facility])

## 2024-10-17 NOTE — PLAN OF CARE
Problem: Adult Inpatient Plan of Care  Goal: Plan of Care Review  Outcome: Not Progressing  Pt remains intubated and sedated to RASS -1. Afebrile. Sinus tach on monitor. BP stable. PIV infusing Propofol. NGT to LIWS with 300ml output this shift. Pt turned q2hrs, green heel boots in place. Bath given and linens changed this AM.  POC reviewed with mother via phone call, verbalized understanding. Will continue with current POC and update as needed.

## 2024-10-17 NOTE — ASSESSMENT & PLAN NOTE
Question if this is truly a mechanical small-bowel obstruction versus small-bowel dilatation due to your during the CPAP process pre intubation with a bagging process prior to intubation.      If the patient does have a mechanical obstruction the goals of surgery would need to be discussed with his mother/family      Persistently dilated loops of small bowel on abdominal x-ray  Small-bowel follow-through with Gastrografin

## 2024-10-17 NOTE — ASSESSMENT & PLAN NOTE
Patient with Hypercapnic Respiratory failure which is Acute on chronic. Unclear if on O2 at the NH. Supplemental oxygen was provided and noted-     Vent Mode: A/C  Oxygen Concentration (%):  [30-40] 30  Resp Rate Total:  [16 br/min-22 br/min] 16 br/min  Vt Set:  [350 mL] 350 mL  PEEP/CPAP:  [5 cmH20] 5 cmH20  Mean Airway Pressure:  [7.5 cmH20-10 cmH20] 8.5 cmH20    .   Signs/symptoms of respiratory failure include- tachypnea, increased work of breathing, respiratory distress, and use of accessory muscles. Contributing diagnoses includes - Aspiration Labs and images were reviewed. Patient Has recent ABG, which has been reviewed. Will treat underlying causes and adjust management of respiratory failure as follows-     Wean vent settings as tolerated  Broad spectrum antibiotics  CXR/ABG pending  Duonebs prn  Pancultures pending  Transition from Propofol to Precedex- coordinate SAT/SBT

## 2024-10-17 NOTE — SUBJECTIVE & OBJECTIVE
Interval History: Patient remains intubated, sedated.      Objective:     Vital Signs (Most Recent):  Temp: 98.9 °F (37.2 °C) (10/17/24 0700)  Pulse: 98 (10/17/24 0924)  Resp: 16 (10/17/24 0924)  BP: (!) 148/108 (10/17/24 0800)  SpO2: 100 % (10/17/24 0924) Vital Signs (24h Range):  Temp:  [98 °F (36.7 °C)-99.1 °F (37.3 °C)] 98.9 °F (37.2 °C)  Pulse:  [] 98  Resp:  [16-28] 16  SpO2:  [100 %] 100 %  BP: (108-158)/() 148/108     Weight: 45.1 kg (99 lb 6.8 oz)  Body mass index is 17.07 kg/m².      Intake/Output Summary (Last 24 hours) at 10/17/2024 1023  Last data filed at 10/17/2024 0800  Gross per 24 hour   Intake 2313.45 ml   Output 1622 ml   Net 691.45 ml        Physical Exam  Constitutional:       Appearance: He is ill-appearing.   HENT:      Head: Atraumatic.      Nose: Nose normal.      Comments: NGT in place     Mouth/Throat:      Mouth: Mucous membranes are moist.   Cardiovascular:      Rate and Rhythm: Regular rhythm. Tachycardia present.      Pulses: Normal pulses.      Heart sounds: Normal heart sounds.   Pulmonary:      Effort: Pulmonary effort is normal. No respiratory distress.      Breath sounds: Normal breath sounds.   Abdominal:      General: Bowel sounds are decreased. There is distension.      Comments: G tube site clean, no signs of infection   Musculoskeletal:      Comments: BUE contractures  BLE contractures   Skin:     General: Skin is warm and dry.   Neurological:      Mental Status: Mental status is at baseline.      Comments: Baseline nonverbal, legally blind           Review of Systems   Unable to perform ROS: Intubated       Vents:  Vent Mode: A/C (10/17/24 0924)  Ventilator Initiated: Yes (10/17/24 0102)  Set Rate: 16 BPM (10/17/24 0924)  Vt Set: 350 mL (10/17/24 0924)  Pressure Support: 0 cmH20 (10/16/24 0725)  PEEP/CPAP: 5 cmH20 (10/17/24 0924)  Oxygen Concentration (%): 30 (10/17/24 0924)  Peak Airway Pressure: 27 cmH20 (10/17/24 0924)  Plateau Pressure: 16 cmH20 (10/17/24  0924)  Total Ve: 5.76 L/m (10/17/24 0924)  Negative Inspiratory Force (cm H2O): 0 (10/17/24 0924)  F/VT Ratio<105 (RSBI): (!) 44.32 (10/17/24 0924)    Lines/Drains/Airways       Drain  Duration                  Gastrostomy/Enterostomy 10/18/20 1130 1459 days         NG/OG Tube 10/16/24 0515 16 Fr. Right nostril 1 day         Urethral Catheter 10/16/24 0156 Double-lumen 16 Fr. 1 day              Airway  Duration                  Airway - Non-Surgical 10/16/24 0530 Endotracheal Tube 1 day              Peripheral Intravenous Line  Duration                  Peripheral IV - Single Lumen 10/16/24 0120 22 G Posterior;Right Forearm 1 day         Peripheral IV - Single Lumen 10/16/24 0636 22 G Left;Posterior Hand 1 day         Peripheral IV - Single Lumen 10/16/24 1245 20 G Left;Medial Forearm <1 day                    Significant Labs:    CBC/Anemia Profile:  Recent Labs   Lab 10/16/24  0120 10/17/24  0327   WBC 22.36* 17.55*   HGB 17.4 16.9   HCT 53.8 50.2    438   MCV 80* 77*   RDW 13.4 13.3        Chemistries:  Recent Labs   Lab 10/16/24  0120 10/16/24  1352 10/17/24  0327    132* 134*   K 4.0 4.4 4.6   CL 97 95 99   CO2 25 25 22*   BUN 19 29* 33*   CREATININE 0.8 1.6* 1.8*   CALCIUM 10.0 9.5 9.6   ALBUMIN 4.2  --  3.0*   PROT 9.2*  --  7.3   BILITOT 0.1  --  0.5   ALKPHOS 193*  --  149*   ALT 68*  --  54*   AST 30  --  26   MG  --   --  2.1   PHOS  --   --  4.3       All pertinent labs within the past 24 hours have been reviewed.    Significant Imaging:  I have reviewed all pertinent imaging results/findings within the past 24 hours.

## 2024-10-17 NOTE — PLAN OF CARE
Patient resting in bed on vent. VSS, wakes some past sedation, unable to follow commands. Small bowel follow through complete. No family visiting today. Patient had small soft brown bowel movement.

## 2024-10-17 NOTE — PROGRESS NOTES
Pharmacist Renal Dose Adjustment Note    Ronny Ramey is a 23 y.o. male being treated with the medication famotidine.     Patient Data:    Vital Signs (Most Recent):  Temp: 98.9 °F (37.2 °C) (10/17/24 0700)  Pulse: (!) 115 (10/17/24 0800)  Resp: 16 (10/17/24 0800)  BP: (!) 148/108 (10/17/24 0800)  SpO2: 100 % (10/17/24 0800) Vital Signs (72h Range):  Temp:  [96.8 °F (36 °C)-99.1 °F (37.3 °C)]   Pulse:  [110-143]   Resp:  [16-54]   BP: (108-226)/()   SpO2:  [79 %-100 %]      Recent Labs   Lab 10/16/24  0120 10/16/24  1352 10/17/24  0327   CREATININE 0.8 1.6* 1.8*     Serum creatinine: 1.8 mg/dL (H) 10/17/24 0327  Estimated creatinine clearance: 41.4 mL/min (A)    Per protocol for CrCl < 50 ml/min, dose will be reduced from 20 mg IV twice daily to 20 mg IV once daily.     Pharmacist's Name: Katherine E Mcardle  Pharmacist's Extension: [phone]

## 2024-10-17 NOTE — ASSESSMENT & PLAN NOTE
KUB shows small bowel obstruction  NGT to suction  Keep NPO  Dulcolax suppository daily  CT abd/pelvis shows high grade bowel obstruction  Gen Surg consulted  Fecal impaction on Abd XR- disimpact

## 2024-10-17 NOTE — SUBJECTIVE & OBJECTIVE
Interval History:  Patient currently intubated on vent and sedated.  Abdominal x-ray showing persistently dilated loops of small bowel as well as large volume of rectal stool, NG tube output bilious    Medications:  Continuous Infusions:   dexmedeTOMIDine (Precedex) infusion (titrating)  0-1.4 mcg/kg/hr Intravenous Continuous 15.79 mL/hr at 10/17/24 1100 1.4 mcg/kg/hr at 10/17/24 1100     Scheduled Meds:   baclofen  10 mg Per G Tube BID    chlorhexidine  15 mL Mouth/Throat BID    cloNIDine 0.1 mg/24 hr td ptwk  1 patch Transdermal Q7 Days    famotidine (PF)  20 mg Intravenous Daily    heparin (porcine)  5,000 Units Subcutaneous Q8H    levETIRAcetam (Keppra) IV (PEDS and ADULTS)  2,000 mg Intravenous Q12H    mupirocin   Nasal BID    PHENObarbital  65 mg Intravenous BID    piperacillin-tazobactam (Zosyn) IV (PEDS and ADULTS) (extended infusion is not appropriate)  4.5 g Intravenous Q8H    sodium chloride 0.9%  1,000 mL Intravenous Once     PRN Meds:  Current Facility-Administered Medications:     albuterol-ipratropium, 3 mL, Nebulization, Q6H PRN    dextrose 10%, 12.5 g, Intravenous, PRN    dextrose 10%, 25 g, Intravenous, PRN    glucagon (human recombinant), 1 mg, Intramuscular, PRN    insulin aspart U-100, 0-10 Units, Subcutaneous, Q4H PRN    magnesium sulfate IVPB, 2 g, Intravenous, PRN    magnesium sulfate IVPB, 4 g, Intravenous, PRN    potassium chloride, 40 mEq, Intravenous, PRN **AND** potassium chloride, 60 mEq, Intravenous, PRN **AND** potassium chloride, 80 mEq, Intravenous, PRN    sodium phosphate 15 mmol in D5W 250 mL IVPB, 15 mmol, Intravenous, PRN    sodium phosphate 20.01 mmol in D5W 250 mL IVPB, 20.01 mmol, Intravenous, PRN    sodium phosphate 30 mmol in D5W 250 mL IVPB, 30 mmol, Intravenous, PRN    Pharmacy to dose Vancomycin consult, , , Once **AND** vancomycin - pharmacy to dose, , Intravenous, pharmacy to manage frequency     Review of patient's allergies indicates:   Allergen Reactions     Strawberries [strawberry] Hives     STRAWBERRIES ALLERGIC REACTIONS   (SEIZURES AND HIVES )     Objective:     Vital Signs (Most Recent):  Temp: 97.7 °F (36.5 °C) (10/17/24 1100)  Pulse: 80 (10/17/24 1100)  Resp: 16 (10/17/24 1100)  BP: (!) 101/55 (10/17/24 1100)  SpO2: 100 % (10/17/24 1100) Vital Signs (24h Range):  Temp:  [97.7 °F (36.5 °C)-99.1 °F (37.3 °C)] 97.7 °F (36.5 °C)  Pulse:  [] 80  Resp:  [16-28] 16  SpO2:  [100 %] 100 %  BP: (101-161)/() 101/55     Weight: 45.1 kg (99 lb 6.8 oz)  Body mass index is 17.07 kg/m².    Intake/Output - Last 3 Shifts         10/15 0700  10/16 0659 10/16 0700  10/17 0659 10/17 0700  10/18 0659    I.V. (mL/kg) 0.4 (0) 251.6 (5.6) 34.6 (0.8)    IV Piggyback 1198.7 2061.8 100    Total Intake(mL/kg) 1199.2 (26) 2313.5 (51.3) 134.6 (3)    Urine (mL/kg/hr)  432 (0.4) 110 (0.5)    Drains  1200     Total Output  1632 110    Net +1199.2 +681.5 +24.6                    Physical Exam  Vitals reviewed.   Constitutional:       Appearance: He is well-developed.   HENT:      Head: Normocephalic and atraumatic.   Cardiovascular:      Rate and Rhythm: Normal rate.   Pulmonary:      Effort: Pulmonary effort is normal.   Abdominal:      General: There is distension.      Tenderness: There is abdominal tenderness (withdrawal to palpation).   Musculoskeletal:      Cervical back: Normal range of motion and neck supple.      Comments: Severely contracted   Skin:     General: Skin is warm and dry.   Neurological:      Mental Status: He is alert.          Significant Labs:  I have reviewed all pertinent lab results within the past 24 hours.  CBC:   Recent Labs   Lab 10/17/24  0327   WBC 17.55*   RBC 6.51*   HGB 16.9   HCT 50.2      MCV 77*   MCH 26.0*   MCHC 33.7     CMP:   Recent Labs   Lab 10/17/24  0327   *   CALCIUM 9.6   ALBUMIN 3.0*   PROT 7.3   *   K 4.6   CO2 22*   CL 99   BUN 33*   CREATININE 1.8*   ALKPHOS 149*   ALT 54*   AST 26   BILITOT 0.5        Significant Diagnostics:  Abdominal x-ray:  FINDINGS:  Side port of the NG tube projects over the distal 3rd of the esophagus.  Consider advancing the tube 8 cm.     Persistent severe gaseous distension of a loop of small bowel in the upper central abdomen up to 7 cm diameter concerning for small bowel obstruction.  No free air.  7 cm transverse diameter developing rectal fecal impaction.  Recommend continued radiographic follow-up.  Consider Gastrografin small bowel follow-through exam.

## 2024-10-17 NOTE — ASSESSMENT & PLAN NOTE
MAYELIN is likely due to pre-renal azotemia due to intravascular volume depletion. Baseline creatinine is  0.8 . Most recent creatinine and eGFR are listed below.  Recent Labs     10/16/24  0120 10/16/24  1352 10/17/24  0327   CREATININE 0.8 1.6* 1.8*   EGFRNORACEVR >60.0 >60 54*      Plan  - MAYELIN is worsening. Will give additional fluid boluses, check RFP at 12:00  - Avoid nephrotoxins and renally dose meds for GFR listed above  - Monitor urine output, serial BMP, and adjust therapy as needed  - Maintain [place] catheter

## 2024-10-17 NOTE — HOSPITAL COURSE
10/17/2024: Patient with decreasing UOP overnight. Renal function worsening. Small bolus given with little response. Additional boluses today. HTN overnight, clonidine patch started. Continue bowel rest. NGT advanced per Abd XR, continued bowel obstruction on XR as well as fecal impaction. Will attempt to disimpact. Gen Surg following. Propofol switched to precedex for vent weaning.    10/18/2024: XR small bowel FT shows high-grade small bowel obstruction. Gen surg planning for ex lap today. Will keep intubated for procedure. Cultures NGTD, antibiotics stopped.    10/19/2024: Patient s/p ex lap with extensive lysis of adhesions, small bowel resection, takedown of gastrostomy tube with partial gastrectomy, abdomen left open in discontinuity with abthera in place. Gen Surg plans to take for exploratory laparotomy Monday 10/21 for small-bowel anastomosis and gastrostomy tube placement. Keeping intubated for surgery.     10/20/2024: Remains intubated. Pending surgery tomorrow. Drop in H&H overnight, but no signs of bleeding, vital signs stable.     10/21/2024: To the OR this morning with small bowel anastamosis and G-tube placement.  Remains on the vent.  Hemodynamics stable.    10/22/2024: NAEON.  Vent and hemodynamics stable.  Extubated, but quickly failed due to anxiety and secretions with severe tachypnea and desats    10/23/2024: GALO following reintubation.  Vent weaned back down.  Hemodynamics stable.      10/24/2024: Vent settings minimal.  1 unit of blood today.  Hemodynamics stable.    10/25/2024: NAEON.  21% FiO2 on the vent.  Failed SBT with sister at bedside.  Hemodynamics stable.    10/26/2024: Failed SBT with daughter and then mother at bedside yesterday due to rapid/shallow breathing.  Vent and hemodynamics stable.      10/27/2024: NAEON.  Vent and hemodynamics stable.    10/28/2024: Failed SBT. On full support. No hemodynamic issues    10/29/2024: Stable overnight. On vent. Extensive discussion with  mother yesterday. All questions answered to her satisfaction. She consented to the trach    10/30/2024: stable. On vent. S/p trach yesterday    10/31/2024: Stable overnight. On full vent support. Requiring precedex and fentanyl. Periods of agitation.     11/01/2024: no significant events last 24 hours. Remains ventilator dependent. Fentanyl patch placed, weaning fentanyl infusion     11/02/2024: No events overnight. Abd less distended.    11/03/2024: Tube feedings restarted yesterday. Bowel regimen started. Seroquel and Fentanyl patch increased. Weaning fentanyl drip. Having bowel movements. Abdomen still firm. Will reassess after holding Tube feedings and obtain KUB.     11/04/2024: Multiple bowel movements today. Fentanyl/precedex have been turned off. Abdomen feels soft.     11/05/2024:HR/BP improved this am, patient did not have clonidine patch on. Replaced overnight. Having bowel movements, abdomen soft. Patient is stable for d/c. Will transfer to LTAC facility ([facility]) for continued care. Continue bowel regimen. Will continue 3 more days IV abx to complete course.

## 2024-10-18 ENCOUNTER — ANESTHESIA (OUTPATIENT)
Dept: SURGERY | Facility: HOSPITAL | Age: 24
End: 2024-10-18
Payer: COMMERCIAL

## 2024-10-18 ENCOUNTER — ANESTHESIA EVENT (OUTPATIENT)
Dept: SURGERY | Facility: HOSPITAL | Age: 24
End: 2024-10-18
Payer: COMMERCIAL

## 2024-10-18 LAB
ALBUMIN SERPL BCP-MCNC: 2 G/DL (ref 3.5–5.2)
ALBUMIN SERPL BCP-MCNC: 2.6 G/DL (ref 3.5–5.2)
ALLENS TEST: ABNORMAL
ALP SERPL-CCNC: 106 U/L (ref 40–150)
ALP SERPL-CCNC: 76 U/L (ref 40–150)
ALT SERPL W/O P-5'-P-CCNC: 26 U/L (ref 10–44)
ALT SERPL W/O P-5'-P-CCNC: 35 U/L (ref 10–44)
ANION GAP SERPL CALC-SCNC: 11 MMOL/L (ref 8–16)
ANION GAP SERPL CALC-SCNC: 13 MMOL/L (ref 8–16)
AST SERPL-CCNC: 17 U/L (ref 10–40)
AST SERPL-CCNC: 21 U/L (ref 10–40)
BASOPHILS # BLD AUTO: 0.03 K/UL (ref 0–0.2)
BASOPHILS # BLD AUTO: 0.04 K/UL (ref 0–0.2)
BASOPHILS NFR BLD: 0.2 % (ref 0–1.9)
BASOPHILS NFR BLD: 0.3 % (ref 0–1.9)
BILIRUB SERPL-MCNC: 0.4 MG/DL (ref 0.1–1)
BILIRUB SERPL-MCNC: 1.3 MG/DL (ref 0.1–1)
BUN SERPL-MCNC: 27 MG/DL (ref 6–20)
BUN SERPL-MCNC: 28 MG/DL (ref 6–20)
CALCIUM SERPL-MCNC: 7.6 MG/DL (ref 8.7–10.5)
CALCIUM SERPL-MCNC: 9 MG/DL (ref 8.7–10.5)
CHLORIDE SERPL-SCNC: 102 MMOL/L (ref 95–110)
CHLORIDE SERPL-SCNC: 105 MMOL/L (ref 95–110)
CO2 SERPL-SCNC: 22 MMOL/L (ref 23–29)
CO2 SERPL-SCNC: 24 MMOL/L (ref 23–29)
CREAT SERPL-MCNC: 1.1 MG/DL (ref 0.5–1.4)
CREAT SERPL-MCNC: 1.1 MG/DL (ref 0.5–1.4)
DELSYS: ABNORMAL
DIFFERENTIAL METHOD BLD: ABNORMAL
DIFFERENTIAL METHOD BLD: ABNORMAL
EOSINOPHIL # BLD AUTO: 0 K/UL (ref 0–0.5)
EOSINOPHIL # BLD AUTO: 0.2 K/UL (ref 0–0.5)
EOSINOPHIL NFR BLD: 0.1 % (ref 0–8)
EOSINOPHIL NFR BLD: 1 % (ref 0–8)
ERYTHROCYTE [DISTWIDTH] IN BLOOD BY AUTOMATED COUNT: 13.6 % (ref 11.5–14.5)
ERYTHROCYTE [DISTWIDTH] IN BLOOD BY AUTOMATED COUNT: 13.7 % (ref 11.5–14.5)
ERYTHROCYTE [SEDIMENTATION RATE] IN BLOOD BY WESTERGREN METHOD: 16 MM/H
EST. GFR  (NO RACE VARIABLE): >60 ML/MIN/1.73 M^2
EST. GFR  (NO RACE VARIABLE): >60 ML/MIN/1.73 M^2
FIO2: 30
GLUCOSE SERPL-MCNC: 167 MG/DL (ref 70–110)
GLUCOSE SERPL-MCNC: 168 MG/DL (ref 70–110)
HCO3 UR-SCNC: 25.3 MMOL/L (ref 24–28)
HCT VFR BLD AUTO: 42.3 % (ref 40–54)
HCT VFR BLD AUTO: 45.2 % (ref 40–54)
HGB BLD-MCNC: 13.7 G/DL (ref 14–18)
HGB BLD-MCNC: 14.5 G/DL (ref 14–18)
IMM GRANULOCYTES # BLD AUTO: 0.05 K/UL (ref 0–0.04)
IMM GRANULOCYTES # BLD AUTO: 0.08 K/UL (ref 0–0.04)
IMM GRANULOCYTES NFR BLD AUTO: 0.4 % (ref 0–0.5)
IMM GRANULOCYTES NFR BLD AUTO: 0.5 % (ref 0–0.5)
LYMPHOCYTES # BLD AUTO: 2.1 K/UL (ref 1–4.8)
LYMPHOCYTES # BLD AUTO: 2.2 K/UL (ref 1–4.8)
LYMPHOCYTES NFR BLD: 12.1 % (ref 18–48)
LYMPHOCYTES NFR BLD: 17.3 % (ref 18–48)
MAGNESIUM SERPL-MCNC: 2.1 MG/DL (ref 1.6–2.6)
MCH RBC QN AUTO: 25.6 PG (ref 27–31)
MCH RBC QN AUTO: 26 PG (ref 27–31)
MCHC RBC AUTO-ENTMCNC: 32.1 G/DL (ref 32–36)
MCHC RBC AUTO-ENTMCNC: 32.4 G/DL (ref 32–36)
MCV RBC AUTO: 80 FL (ref 82–98)
MCV RBC AUTO: 80 FL (ref 82–98)
MIN VOL: 5.67
MODE: ABNORMAL
MONOCYTES # BLD AUTO: 1.6 K/UL (ref 0.3–1)
MONOCYTES # BLD AUTO: 1.8 K/UL (ref 0.3–1)
MONOCYTES NFR BLD: 10.2 % (ref 4–15)
MONOCYTES NFR BLD: 12.6 % (ref 4–15)
MRSA SPEC QL CULT: NORMAL
NEUTROPHILS # BLD AUTO: 13.3 K/UL (ref 1.8–7.7)
NEUTROPHILS # BLD AUTO: 8.6 K/UL (ref 1.8–7.7)
NEUTROPHILS NFR BLD: 69.3 % (ref 38–73)
NEUTROPHILS NFR BLD: 76 % (ref 38–73)
NRBC BLD-RTO: 0 /100 WBC
NRBC BLD-RTO: 0 /100 WBC
PCO2 BLDA: 40.6 MMHG (ref 35–45)
PEEP: 5
PH SMN: 7.4 [PH] (ref 7.35–7.45)
PHOSPHATE SERPL-MCNC: 3.8 MG/DL (ref 2.7–4.5)
PLATELET # BLD AUTO: 334 K/UL (ref 150–450)
PLATELET # BLD AUTO: 356 K/UL (ref 150–450)
PMV BLD AUTO: 11.9 FL (ref 9.2–12.9)
PMV BLD AUTO: 12.7 FL (ref 9.2–12.9)
PO2 BLDA: 125 MMHG (ref 80–100)
POC BE: 1 MMOL/L
POC SATURATED O2: 99 % (ref 95–100)
POCT GLUCOSE: 103 MG/DL (ref 70–110)
POCT GLUCOSE: 112 MG/DL (ref 70–110)
POCT GLUCOSE: 95 MG/DL (ref 70–110)
POTASSIUM SERPL-SCNC: 3.8 MMOL/L (ref 3.5–5.1)
POTASSIUM SERPL-SCNC: 4.3 MMOL/L (ref 3.5–5.1)
PROT SERPL-MCNC: 4.6 G/DL (ref 6–8.4)
PROT SERPL-MCNC: 6.6 G/DL (ref 6–8.4)
RBC # BLD AUTO: 5.27 M/UL (ref 4.6–6.2)
RBC # BLD AUTO: 5.67 M/UL (ref 4.6–6.2)
SAMPLE: ABNORMAL
SITE: ABNORMAL
SODIUM SERPL-SCNC: 137 MMOL/L (ref 136–145)
SODIUM SERPL-SCNC: 140 MMOL/L (ref 136–145)
SP02: 100
VT: 350
WBC # BLD AUTO: 12.43 K/UL (ref 3.9–12.7)
WBC # BLD AUTO: 17.57 K/UL (ref 3.9–12.7)

## 2024-10-18 PROCEDURE — 88307 TISSUE EXAM BY PATHOLOGIST: CPT | Mod: 59 | Performed by: PATHOLOGY

## 2024-10-18 PROCEDURE — 44120 REMOVAL OF SMALL INTESTINE: CPT | Mod: 52,AS,,

## 2024-10-18 PROCEDURE — 99233 SBSQ HOSP IP/OBS HIGH 50: CPT | Mod: 57,,, | Performed by: STUDENT IN AN ORGANIZED HEALTH CARE EDUCATION/TRAINING PROGRAM

## 2024-10-18 PROCEDURE — 20000000 HC ICU ROOM

## 2024-10-18 PROCEDURE — 88342 IMHCHEM/IMCYTCHM 1ST ANTB: CPT | Performed by: PATHOLOGY

## 2024-10-18 PROCEDURE — 88307 TISSUE EXAM BY PATHOLOGIST: CPT | Mod: 26,,, | Performed by: PATHOLOGY

## 2024-10-18 PROCEDURE — 80053 COMPREHEN METABOLIC PANEL: CPT | Performed by: NURSE PRACTITIONER

## 2024-10-18 PROCEDURE — 36000709 HC OR TIME LEV III EA ADD 15 MIN: Performed by: STUDENT IN AN ORGANIZED HEALTH CARE EDUCATION/TRAINING PROGRAM

## 2024-10-18 PROCEDURE — 83735 ASSAY OF MAGNESIUM: CPT | Performed by: NURSE PRACTITIONER

## 2024-10-18 PROCEDURE — 94003 VENT MGMT INPAT SUBQ DAY: CPT

## 2024-10-18 PROCEDURE — 94761 N-INVAS EAR/PLS OXIMETRY MLT: CPT

## 2024-10-18 PROCEDURE — 25000003 PHARM REV CODE 250: Performed by: EMERGENCY MEDICINE

## 2024-10-18 PROCEDURE — 36000708 HC OR TIME LEV III 1ST 15 MIN: Performed by: STUDENT IN AN ORGANIZED HEALTH CARE EDUCATION/TRAINING PROGRAM

## 2024-10-18 PROCEDURE — 63600175 PHARM REV CODE 636 W HCPCS: Performed by: NURSE ANESTHETIST, CERTIFIED REGISTERED

## 2024-10-18 PROCEDURE — 43870 CLOSURE GASTROSTOMY SURGICAL: CPT | Mod: 22,AS,,

## 2024-10-18 PROCEDURE — 84100 ASSAY OF PHOSPHORUS: CPT | Performed by: NURSE PRACTITIONER

## 2024-10-18 PROCEDURE — 37000009 HC ANESTHESIA EA ADD 15 MINS: Performed by: STUDENT IN AN ORGANIZED HEALTH CARE EDUCATION/TRAINING PROGRAM

## 2024-10-18 PROCEDURE — 63600175 PHARM REV CODE 636 W HCPCS: Performed by: EMERGENCY MEDICINE

## 2024-10-18 PROCEDURE — 37000008 HC ANESTHESIA 1ST 15 MINUTES: Performed by: STUDENT IN AN ORGANIZED HEALTH CARE EDUCATION/TRAINING PROGRAM

## 2024-10-18 PROCEDURE — C9290 INJ, BUPIVACAINE LIPOSOME: HCPCS | Performed by: STUDENT IN AN ORGANIZED HEALTH CARE EDUCATION/TRAINING PROGRAM

## 2024-10-18 PROCEDURE — 63600175 PHARM REV CODE 636 W HCPCS: Performed by: STUDENT IN AN ORGANIZED HEALTH CARE EDUCATION/TRAINING PROGRAM

## 2024-10-18 PROCEDURE — 99233 SBSQ HOSP IP/OBS HIGH 50: CPT | Mod: 57,,,

## 2024-10-18 PROCEDURE — 97605 NEG PRS WND THER DME<=50SQCM: CPT | Mod: ,,, | Performed by: STUDENT IN AN ORGANIZED HEALTH CARE EDUCATION/TRAINING PROGRAM

## 2024-10-18 PROCEDURE — 27100171 HC OXYGEN HIGH FLOW UP TO 24 HOURS

## 2024-10-18 PROCEDURE — 99900026 HC AIRWAY MAINTENANCE (STAT)

## 2024-10-18 PROCEDURE — 63600175 PHARM REV CODE 636 W HCPCS: Performed by: NURSE PRACTITIONER

## 2024-10-18 PROCEDURE — C9399 UNCLASSIFIED DRUGS OR BIOLOG: HCPCS | Performed by: NURSE PRACTITIONER

## 2024-10-18 PROCEDURE — 99900035 HC TECH TIME PER 15 MIN (STAT)

## 2024-10-18 PROCEDURE — 25000003 PHARM REV CODE 250: Performed by: NURSE ANESTHETIST, CERTIFIED REGISTERED

## 2024-10-18 PROCEDURE — 43999 UNLISTED PROCEDURE STOMACH: CPT | Mod: ,,, | Performed by: STUDENT IN AN ORGANIZED HEALTH CARE EDUCATION/TRAINING PROGRAM

## 2024-10-18 PROCEDURE — 25000003 PHARM REV CODE 250

## 2024-10-18 PROCEDURE — 44120 REMOVAL OF SMALL INTESTINE: CPT | Mod: 52,,, | Performed by: STUDENT IN AN ORGANIZED HEALTH CARE EDUCATION/TRAINING PROGRAM

## 2024-10-18 PROCEDURE — 0DB60ZZ EXCISION OF STOMACH, OPEN APPROACH: ICD-10-PCS | Performed by: STUDENT IN AN ORGANIZED HEALTH CARE EDUCATION/TRAINING PROGRAM

## 2024-10-18 PROCEDURE — 0DBA0ZZ EXCISION OF JEJUNUM, OPEN APPROACH: ICD-10-PCS | Performed by: STUDENT IN AN ORGANIZED HEALTH CARE EDUCATION/TRAINING PROGRAM

## 2024-10-18 PROCEDURE — 25000003 PHARM REV CODE 250: Performed by: INTERNAL MEDICINE

## 2024-10-18 PROCEDURE — 25000003 PHARM REV CODE 250: Performed by: NURSE PRACTITIONER

## 2024-10-18 PROCEDURE — 36415 COLL VENOUS BLD VENIPUNCTURE: CPT | Performed by: NURSE PRACTITIONER

## 2024-10-18 PROCEDURE — 0DN80ZZ RELEASE SMALL INTESTINE, OPEN APPROACH: ICD-10-PCS | Performed by: STUDENT IN AN ORGANIZED HEALTH CARE EDUCATION/TRAINING PROGRAM

## 2024-10-18 PROCEDURE — 88342 IMHCHEM/IMCYTCHM 1ST ANTB: CPT | Mod: 26,,, | Performed by: PATHOLOGY

## 2024-10-18 PROCEDURE — 63600175 PHARM REV CODE 636 W HCPCS: Performed by: INTERNAL MEDICINE

## 2024-10-18 PROCEDURE — 85025 COMPLETE CBC W/AUTO DIFF WBC: CPT | Performed by: NURSE PRACTITIONER

## 2024-10-18 PROCEDURE — 43870 CLOSURE GASTROSTOMY SURGICAL: CPT | Mod: 22,,, | Performed by: STUDENT IN AN ORGANIZED HEALTH CARE EDUCATION/TRAINING PROGRAM

## 2024-10-18 PROCEDURE — 27201423 OPTIME MED/SURG SUP & DEVICES STERILE SUPPLY: Performed by: STUDENT IN AN ORGANIZED HEALTH CARE EDUCATION/TRAINING PROGRAM

## 2024-10-18 RX ORDER — BUPIVACAINE HYDROCHLORIDE 2.5 MG/ML
INJECTION, SOLUTION EPIDURAL; INFILTRATION; INTRACAUDAL
Status: DISCONTINUED | OUTPATIENT
Start: 2024-10-18 | End: 2024-10-18 | Stop reason: HOSPADM

## 2024-10-18 RX ORDER — FENTANYL CITRATE-0.9 % NACL/PF 10 MCG/ML
0-250 PLASTIC BAG, INJECTION (ML) INTRAVENOUS CONTINUOUS
Status: DISCONTINUED | OUTPATIENT
Start: 2024-10-18 | End: 2024-11-04

## 2024-10-18 RX ORDER — FAMOTIDINE 10 MG/ML
20 INJECTION INTRAVENOUS 2 TIMES DAILY
Status: DISCONTINUED | OUTPATIENT
Start: 2024-10-18 | End: 2024-10-24

## 2024-10-18 RX ORDER — NOREPINEPHRINE BITARTRATE/D5W 4MG/250ML
PLASTIC BAG, INJECTION (ML) INTRAVENOUS
Status: COMPLETED
Start: 2024-10-18 | End: 2024-10-18

## 2024-10-18 RX ORDER — HYDROMORPHONE HYDROCHLORIDE 2 MG/ML
INJECTION, SOLUTION INTRAMUSCULAR; INTRAVENOUS; SUBCUTANEOUS
Status: DISCONTINUED | OUTPATIENT
Start: 2024-10-18 | End: 2024-10-18

## 2024-10-18 RX ORDER — CEFAZOLIN SODIUM 1 G/3ML
INJECTION, POWDER, FOR SOLUTION INTRAMUSCULAR; INTRAVENOUS
Status: DISCONTINUED | OUTPATIENT
Start: 2024-10-18 | End: 2024-10-18

## 2024-10-18 RX ORDER — BUPIVACAINE 13.3 MG/ML
INJECTION, SUSPENSION, LIPOSOMAL INFILTRATION
Status: DISCONTINUED | OUTPATIENT
Start: 2024-10-18 | End: 2024-10-18 | Stop reason: HOSPADM

## 2024-10-18 RX ORDER — FENTANYL CITRATE 50 UG/ML
INJECTION, SOLUTION INTRAMUSCULAR; INTRAVENOUS
Status: DISCONTINUED | OUTPATIENT
Start: 2024-10-18 | End: 2024-10-18

## 2024-10-18 RX ORDER — NOREPINEPHRINE BITARTRATE/D5W 4MG/250ML
0-3 PLASTIC BAG, INJECTION (ML) INTRAVENOUS CONTINUOUS
Status: DISCONTINUED | OUTPATIENT
Start: 2024-10-18 | End: 2024-10-19

## 2024-10-18 RX ORDER — ROCURONIUM BROMIDE 10 MG/ML
INJECTION, SOLUTION INTRAVENOUS
Status: DISCONTINUED | OUTPATIENT
Start: 2024-10-18 | End: 2024-10-18

## 2024-10-18 RX ORDER — PHENYLEPHRINE HYDROCHLORIDE 10 MG/ML
INJECTION INTRAVENOUS
Status: DISCONTINUED | OUTPATIENT
Start: 2024-10-18 | End: 2024-10-18

## 2024-10-18 RX ADMIN — SODIUM CHLORIDE, POTASSIUM CHLORIDE, SODIUM LACTATE AND CALCIUM CHLORIDE 1000 ML: 600; 310; 30; 20 INJECTION, SOLUTION INTRAVENOUS at 03:10

## 2024-10-18 RX ADMIN — VANCOMYCIN HYDROCHLORIDE 750 MG: 750 INJECTION, POWDER, LYOPHILIZED, FOR SOLUTION INTRAVENOUS at 02:10

## 2024-10-18 RX ADMIN — FAMOTIDINE 20 MG: 10 INJECTION, SOLUTION INTRAVENOUS at 08:10

## 2024-10-18 RX ADMIN — PHENOBARBITAL SODIUM 65 MG: 65 INJECTION INTRAMUSCULAR at 08:10

## 2024-10-18 RX ADMIN — HEPARIN SODIUM 5000 UNITS: 5000 INJECTION INTRAVENOUS; SUBCUTANEOUS at 06:10

## 2024-10-18 RX ADMIN — DEXMEDETOMIDINE HYDROCHLORIDE 1.4 MCG/KG/HR: 4 INJECTION INTRAVENOUS at 03:10

## 2024-10-18 RX ADMIN — DEXMEDETOMIDINE HYDROCHLORIDE 1.4 MCG/KG/HR: 4 INJECTION INTRAVENOUS at 10:10

## 2024-10-18 RX ADMIN — CHLORHEXIDINE GLUCONATE 0.12% ORAL RINSE 15 ML: 1.2 LIQUID ORAL at 08:10

## 2024-10-18 RX ADMIN — HYDROMORPHONE HYDROCHLORIDE 0.4 MG: 2 INJECTION INTRAMUSCULAR; INTRAVENOUS; SUBCUTANEOUS at 11:10

## 2024-10-18 RX ADMIN — MUPIROCIN: 20 OINTMENT TOPICAL at 08:10

## 2024-10-18 RX ADMIN — FENTANYL CITRATE 50 MCG: 50 INJECTION, SOLUTION INTRAMUSCULAR; INTRAVENOUS at 11:10

## 2024-10-18 RX ADMIN — LEVETIRACETAM 2000 MG: 500 INJECTION, SOLUTION INTRAVENOUS at 09:10

## 2024-10-18 RX ADMIN — CHLORHEXIDINE GLUCONATE 0.12% ORAL RINSE 15 ML: 1.2 LIQUID ORAL at 09:10

## 2024-10-18 RX ADMIN — SODIUM CHLORIDE, POTASSIUM CHLORIDE, SODIUM LACTATE AND CALCIUM CHLORIDE 500 ML: 600; 310; 30; 20 INJECTION, SOLUTION INTRAVENOUS at 11:10

## 2024-10-18 RX ADMIN — ROCURONIUM BROMIDE 30 MG: 10 SOLUTION INTRAVENOUS at 01:10

## 2024-10-18 RX ADMIN — FENTANYL CITRATE 25 MCG/HR: 50 INJECTION INTRAVENOUS at 02:10

## 2024-10-18 RX ADMIN — PHENYLEPHRINE HYDROCHLORIDE 100 MCG: 10 INJECTION INTRAVENOUS at 11:10

## 2024-10-18 RX ADMIN — MUPIROCIN: 20 OINTMENT TOPICAL at 09:10

## 2024-10-18 RX ADMIN — ROCURONIUM BROMIDE 30 MG: 10 SOLUTION INTRAVENOUS at 11:10

## 2024-10-18 RX ADMIN — PHENYLEPHRINE HYDROCHLORIDE 100 MCG: 10 INJECTION INTRAVENOUS at 12:10

## 2024-10-18 RX ADMIN — POTASSIUM CHLORIDE 40 MEQ: 7.46 INJECTION, SOLUTION INTRAVENOUS at 08:10

## 2024-10-18 RX ADMIN — PHENYLEPHRINE HYDROCHLORIDE 200 MCG: 10 INJECTION INTRAVENOUS at 01:10

## 2024-10-18 RX ADMIN — Medication 0.04 MCG/KG/MIN: at 02:10

## 2024-10-18 RX ADMIN — ROCURONIUM BROMIDE 20 MG: 10 SOLUTION INTRAVENOUS at 12:10

## 2024-10-18 RX ADMIN — ROCURONIUM BROMIDE 50 MG: 10 SOLUTION INTRAVENOUS at 11:10

## 2024-10-18 RX ADMIN — NOREPINEPHRINE BITARTRATE 0.04 MCG/KG/MIN: 4 INJECTION, SOLUTION INTRAVENOUS at 02:10

## 2024-10-18 RX ADMIN — SODIUM CHLORIDE, SODIUM LACTATE, POTASSIUM CHLORIDE, AND CALCIUM CHLORIDE: .6; .31; .03; .02 INJECTION, SOLUTION INTRAVENOUS at 11:10

## 2024-10-18 RX ADMIN — PIPERACILLIN SODIUM AND TAZOBACTAM SODIUM 4.5 G: 4; .5 INJECTION, POWDER, FOR SOLUTION INTRAVENOUS at 06:10

## 2024-10-18 RX ADMIN — CEFAZOLIN 2 G: 330 INJECTION, POWDER, FOR SOLUTION INTRAMUSCULAR; INTRAVENOUS at 11:10

## 2024-10-18 RX ADMIN — ROCURONIUM BROMIDE 20 MG: 10 SOLUTION INTRAVENOUS at 01:10

## 2024-10-18 RX ADMIN — LEVETIRACETAM 2000 MG: 500 INJECTION, SOLUTION INTRAVENOUS at 08:10

## 2024-10-18 NOTE — SUBJECTIVE & OBJECTIVE
Interval History: Patient remains intubated and with significant abdominal distention. SBFT yesterday consistent with high grade SBO. Morning films essentially unchanged. Will need exploratory laparotomy planned for today.     Medications:  Continuous Infusions:   dexmedeTOMIDine (Precedex) infusion (titrating)  0-1.4 mcg/kg/hr Intravenous Continuous 15.79 mL/hr at 10/18/24 0600 1.4 mcg/kg/hr at 10/18/24 0600     Scheduled Meds:   baclofen  10 mg Per G Tube BID    chlorhexidine  15 mL Mouth/Throat BID    cloNIDine 0.1 mg/24 hr td ptwk  1 patch Transdermal Q7 Days    famotidine (PF)  20 mg Intravenous Daily    levETIRAcetam (Keppra) IV (PEDS and ADULTS)  2,000 mg Intravenous Q12H    mupirocin   Nasal BID    PHENObarbital  65 mg Intravenous BID    piperacillin-tazobactam (Zosyn) IV (PEDS and ADULTS) (extended infusion is not appropriate)  4.5 g Intravenous Q8H     PRN Meds:  Current Facility-Administered Medications:     albuterol-ipratropium, 3 mL, Nebulization, Q6H PRN    dextrose 10%, 12.5 g, Intravenous, PRN    dextrose 10%, 25 g, Intravenous, PRN    glucagon (human recombinant), 1 mg, Intramuscular, PRN    insulin aspart U-100, 0-10 Units, Subcutaneous, Q4H PRN    magnesium sulfate IVPB, 2 g, Intravenous, PRN    magnesium sulfate IVPB, 4 g, Intravenous, PRN    potassium chloride, 40 mEq, Intravenous, PRN **AND** potassium chloride, 60 mEq, Intravenous, PRN **AND** potassium chloride, 80 mEq, Intravenous, PRN    sodium phosphate 15 mmol in D5W 250 mL IVPB, 15 mmol, Intravenous, PRN    sodium phosphate 20.01 mmol in D5W 250 mL IVPB, 20.01 mmol, Intravenous, PRN    sodium phosphate 30 mmol in D5W 250 mL IVPB, 30 mmol, Intravenous, PRN    Pharmacy to dose Vancomycin consult, , , Once **AND** vancomycin - pharmacy to dose, , Intravenous, pharmacy to manage frequency     Review of patient's allergies indicates:   Allergen Reactions    Strawberries [strawberry] Hives     STRAWBERRIES ALLERGIC REACTIONS   (SEIZURES AND  HIVES )     Objective:     Vital Signs (Most Recent):  Temp: 98.4 °F (36.9 °C) (10/18/24 0330)  Pulse: 95 (10/18/24 0906)  Resp: 16 (10/18/24 0906)  BP: 99/70 (10/18/24 0630)  SpO2: 100 % (10/18/24 0906) Vital Signs (24h Range):  Temp:  [97.7 °F (36.5 °C)-98.4 °F (36.9 °C)] 98.4 °F (36.9 °C)  Pulse:  [65-98] 95  Resp:  [16-19] 16  SpO2:  [100 %] 100 %  BP: ()/() 99/70     Weight: 46.5 kg (102 lb 8.2 oz)  Body mass index is 17.6 kg/m².    Intake/Output - Last 3 Shifts         10/16 0700  10/17 0659 10/17 0700  10/18 0659 10/18 0700  10/19 0659    I.V. (mL/kg) 251.6 (5.6) 345.8 (7.4)     IV Piggyback 2061.8 2522.6     Total Intake(mL/kg) 2313.5 (51.3) 2868.4 (61.7)     Urine (mL/kg/hr) 432 (0.4) 740 (0.7)     Drains 1200 1700     Stool  0     Total Output 1632 2440     Net +681.5 +428.4            Stool Occurrence  1 x              Physical Exam  Vitals and nursing note reviewed.   Constitutional:       Appearance: He is well-developed. He is ill-appearing.   HENT:      Right Ear: External ear normal.      Left Ear: External ear normal.      Nose:      Comments: NG tube in place, bilious output  Cardiovascular:      Rate and Rhythm: Normal rate.   Pulmonary:      Comments: Intubated   Abdominal:      Comments: Abdomen significantly distended and tympanic. Gastrostomy in LUQ. Some arousal with abdominal palpation   Musculoskeletal:      Comments: Significantly contracted in upper and lower extremities    Skin:     General: Skin is warm and dry.          Significant Labs:  I have reviewed all pertinent lab results within the past 24 hours.  CBC:   Recent Labs   Lab 10/18/24  0346   WBC 12.43   RBC 5.67   HGB 14.5   HCT 45.2      MCV 80*   MCH 25.6*   MCHC 32.1     CMP:   Recent Labs   Lab 10/18/24  0346   *   CALCIUM 9.0   ALBUMIN 2.6*   PROT 6.6      K 3.8   CO2 24      BUN 28*   CREATININE 1.1   ALKPHOS 106   ALT 35   AST 17   BILITOT 0.4       Significant Diagnostics:  I have  reviewed all pertinent imaging results/findings within the past 24 hours.  SBFT and morning films consistent with high grade SBO

## 2024-10-18 NOTE — ASSESSMENT & PLAN NOTE
KUB shows small bowel obstruction  NGT to suction  Keep NPO  Dulcolax suppository daily  CT abd/pelvis shows high grade bowel obstruction  Gen Surg consulted  Fecal impaction on Abd XR- disimpact  SBFT XR shows high-grade small bowel obstruction  Gen Surg planning for Ex Lap

## 2024-10-18 NOTE — SUBJECTIVE & OBJECTIVE
Interval History: Patient with movement when sedation off, does not follow commands.      Objective:     Vital Signs (Most Recent):  Temp: 98.4 °F (36.9 °C) (10/18/24 0330)  Pulse: 95 (10/18/24 0906)  Resp: 16 (10/18/24 0906)  BP: 99/70 (10/18/24 0630)  SpO2: 100 % (10/18/24 0906) Vital Signs (24h Range):  Temp:  [97.8 °F (36.6 °C)-98.4 °F (36.9 °C)] 98.4 °F (36.9 °C)  Pulse:  [65-97] 95  Resp:  [16-19] 16  SpO2:  [100 %] 100 %  BP: ()/() 99/70     Weight: 46.5 kg (102 lb 8.2 oz)  Body mass index is 17.6 kg/m².      Intake/Output Summary (Last 24 hours) at 10/18/2024 1343  Last data filed at 10/18/2024 1000  Gross per 24 hour   Intake 1957.64 ml   Output 2065 ml   Net -107.36 ml        Physical Exam  Constitutional:       Appearance: He is ill-appearing.   HENT:      Head: Atraumatic.      Nose: Nose normal.      Comments: NGT in place     Mouth/Throat:      Mouth: Mucous membranes are moist.   Cardiovascular:      Rate and Rhythm: Regular rhythm. Tachycardia present.      Pulses: Normal pulses.      Heart sounds: Normal heart sounds.   Pulmonary:      Effort: Pulmonary effort is normal. No respiratory distress.      Breath sounds: Normal breath sounds.   Abdominal:      General: Bowel sounds are decreased. There is distension.      Comments: G tube site clean, no signs of infection   Musculoskeletal:      Comments: BUE contractures  BLE contractures   Skin:     General: Skin is warm and dry.   Neurological:      Mental Status: Mental status is at baseline.      Comments: Baseline nonverbal, legally blind           Review of Systems   Unable to perform ROS: Intubated       Vents:  Vent Mode: A/C (10/18/24 0906)  Ventilator Initiated: Yes (10/17/24 0102)  Set Rate: 16 BPM (10/18/24 0906)  Vt Set: 350 mL (10/18/24 0906)  Pressure Support: 0 cmH20 (10/18/24 0753)  PEEP/CPAP: 5 cmH20 (10/18/24 0906)  Oxygen Concentration (%): 30 (10/18/24 0906)  Peak Airway Pressure: 27 cmH20 (10/18/24 0906)  Plateau  Pressure: 17 cmH20 (10/18/24 0906)  Total Ve: 6.27 L/m (10/18/24 0906)  Negative Inspiratory Force (cm H2O): 0 (10/18/24 0906)  F/VT Ratio<105 (RSBI): (!) 43.84 (10/18/24 0906)    Lines/Drains/Airways       Drain  Duration                  Gastrostomy/Enterostomy 10/18/20 1130 LUQ 1461 days         NG/OG Tube 10/16/24 0515 16 Fr. Right nostril 2 days         Urethral Catheter 10/16/24 0156 Double-lumen 16 Fr. 2 days              Airway  Duration                  Airway - Non-Surgical 10/16/24 0530 Endotracheal Tube 2 days              Peripheral Intravenous Line  Duration                  Peripheral IV - Single Lumen 10/16/24 0120 22 G Posterior;Right Forearm 2 days         Peripheral IV - Single Lumen 10/16/24 0636 22 G Left;Posterior Hand 2 days         Peripheral IV - Single Lumen 10/16/24 1245 20 G Left;Medial Forearm 2 days                    Significant Labs:    CBC/Anemia Profile:  Recent Labs   Lab 10/17/24  0327 10/18/24  0346   WBC 17.55* 12.43   HGB 16.9 14.5   HCT 50.2 45.2    356   MCV 77* 80*   RDW 13.3 13.6        Chemistries:  Recent Labs   Lab 10/17/24  0327 10/17/24  1200 10/18/24  0346   * 139 140   K 4.6 4.4 3.8   CL 99 103 105   CO2 22* 24 24   BUN 33* 32* 28*   CREATININE 1.8* 1.5* 1.1   CALCIUM 9.6 8.2* 9.0   ALBUMIN 3.0* 2.5* 2.6*   PROT 7.3  --  6.6   BILITOT 0.5  --  0.4   ALKPHOS 149*  --  106   ALT 54*  --  35   AST 26  --  17   MG 2.1  --  2.1   PHOS 4.3 5.5* 3.8       All pertinent labs within the past 24 hours have been reviewed.    Significant Imaging:  I have reviewed all pertinent imaging results/findings within the past 24 hours.

## 2024-10-18 NOTE — CONSULTS
[facility] - Intensive Care ([facility])  Adult Nutrition  Consult Note    SUMMARY     Recommendations    Recommendation/Intervention:   1. When medically appropriate, initiate pt onto continuous Enteral nutrition, recommend Peptamen 1.5 w/ Prebio via PEG tube, goal rate 40 mL/hr, starting at 10 mL/hr then progress to goal rate within 24 hrs if pt is tolerating or per MD/NP   -Formula at goal rate provides: 1440 kcals/day (97% EEN), 65 g protein/day (100% EPN), 184 g CHO/day (99% CHO needs), 741 mL free formula water/day (50% fluid needs)   -125 mL q4h free water flushes (750 mL/day) = total from formula + FWF = 1491 mL water/day (101% fluid needs), per MD/NP   -Check Mg, K+, Na, Phos and Glu before and during initiation, correct as indicated   2. Recommend Edwin BID via PEG tube for wound healing   3. Weigh twice weekly    Goals:   1. Pt will be initiated onto continuous EN within 24-48 hrs   2. Pt will tolerate and intake >75% EEN and EPN prior to RD follow up   3. Pt will tolerate and intake Edwin BID prior to RD follow up  Nutrition Goal Status: new  Communication of RD Recs: other (comment) (POC, sticky note, secure chat)    Assessment and Plan    Nutrition Problem  Inadequate protein-energy intake intake   Increased protein needs   Altered GI function     Related to (etiology):   Decreased ability to consume sufficient protein/energy   Increased demand for nutrition   Alteration in GI tract structure and/or function     Signs and Symptoms (as evidenced by):   Intubated and sedated, NPO, PEG tube, Oropharyngeal dysphagia, Bowel rest   Decubitus ulcer, Loss of skin integrity, Wound healing needs  High-grade SBO, Fecal impaction    Interventions/Recommendations (treatment strategy):  1. Management of enteral nutrition   2. Amino acids medical food supplement therapy   3. Collaboration by nutrition professional with other providers     Nutrition Diagnosis Status:   New       Malnutrition Assessment     Skin  (Micronutrient): wounds unhealed (Neri score = 12 (high risk)       Muscle Mass (Malnutrition): mild depletion       Clavicle and Acromion Bone Region (Muscle Loss): mild depletion (Acromion process may slightly protrude)                 Reason for Assessment    Reason For Assessment: consult, new tube feeding, pressure injury/ wound (TF recs, pt has PEG, large wound)  Diagnosis:  (Acute hypercapnic respiratory failure)  General Information Comments:   10/18/24: 23 y.o. Male admitted for Acute hypercapnic respiratory failure. PMH: Spastic quadriplegic cerebral palsy, Intellectual disability w/ epilepsy, Legally blind, Non verbal, PEG, Aspiration pneumonia, BMI < 19, Bowel obstruction, MAYELIN; Hx: Oropharyngeal dysphagia. Pt currently intubated and sedated on Fentanyl and Precedex, NPO x 2 days, in the ICU. RD consulted for TF and wounds recommendations. H&P noted that the pt presented to [facility] ED on 10/16 w/ SOB/respiratory distress, pt was placed on CPAP w/ little improvemen, KUB showed SBO, pt was intubated and transferred to [facility]. EMR noted that the pt on bowel rest, Abd XR 10/17/24 revealed continued SBO and a fecal impaction, 10/18/24 SBFT sowed  high-grade SBO,  ex lap planned today, NG tube to suction, Propofol was switched to Precedex for vent weaining. RD following pt during rounds. Visual NFPE performed, mild malnutrition noted, full NFPE to be performed at follow up. RD informed RN via secure chat of TF and wound recs in RD consult and POC note when warranted. Reviewed chart: propofol: 0; Total VE: 5.74; LBM 10/17; Skin: incision abd WDL; Altered skin: satge 4 PI R ischial tuberosity/ L ankle ulceration both full thickness tissue losses, details per Wound care note 10/16/24; Neri score: 12 (high risk); Edema: none. Labs, meds, weight reviewed. Note labs 10/18/24 Cr and eGFR WNL. Note lacated ringers bolus 1000 mL. Weight charted 6/2/23 110 lbs, 10/18/24 102 lbs (BMI 17.60, protein-energy  "malnutrition grade I), -8 lb wt loss (7% wt change) x 1+ yr, insignificant wt loss, note Adj IBW for quadreplegia is 110.5-117 lbs. RD will continue to follow and monitor pt's nutritional status during admit.    Nutrition Discharge Planning: Enteral nutrition via PEG tube + Edwin BID    Nutrition Risk Screen    Nutrition Risk Screen: tube feeding or parenteral nutrition, large or nonhealing wound, burn or pressure injury    Nutrition Related Social Determinants of Health: SDOH: Unable to assess at this time.     Nutrition/Diet History    Spiritual, Cultural Beliefs, Zoroastrianism Practices, Values that Affect Care: no  Food Allergies: other (see comments) (Strawberries)  Factors Affecting Nutritional Intake: altered gastrointestinal function, impaired cognitive status/motor control, difficulty/impaired swallowing, NPO, on mechanical ventilation    Anthropometrics    Temp: 98.7 °F (37.1 °C)  Height: 5' 4" (162.6 cm)  Height (inches): 64 in  Weight Method: Bed Scale  Weight: 46.5 kg (102 lb 8.2 oz)  Weight (lb): 102.51 lb  Ideal Body Weight (IBW), Male: 130 lb  % Ideal Body Weight, Male (lb): 78.85 %  % Ideal Body Weight Malnutrition: 70-79%: moderate deficit  BMI (Calculated): 17.6  BMI Grade: 17 - 18.4 protein-energy malnutrition grade I  Additional Documentation: Tetraplegia (Quadriplegia) Ideal Body Weight (IBW) Adjustment  Tetraplegia (Quadriplegia) Ideal Body Weight (IBW) Adjustment: 117 lb 1 oz (-10% IBW)    Wt Readings from Last 15 Encounters:   10/18/24 46.5 kg (102 lb 8.2 oz)   06/02/23 49.9 kg (110 lb)   03/15/23 50 kg (110 lb 3.7 oz)   05/13/22 52.3 kg (115 lb 3.1 oz) (2%, Z= -2.11)*   09/20/21 50.5 kg (111 lb 5.3 oz) (1%, Z= -2.29)*   06/16/21 47.3 kg (104 lb 4.4 oz) (<1%, Z= -2.81)*   11/25/20 47.9 kg (105 lb 9.6 oz) (<1%, Z= -2.54)*   11/02/20 46 kg (101 lb 6.6 oz) (<1%, Z= -2.88)*   10/27/20 45.2 kg (99 lb 10.4 oz) (<1%, Z= -3.04)*   10/18/20 44.8 kg (98 lb 14 oz) (<1%, Z= -3.11)*   10/13/20 43.2 kg (95 lb " "3.8 oz) (<1%, Z= -3.46)*   09/29/20 44.5 kg (98 lb) (<1%, Z= -3.17)*   09/29/20 43.3 kg (95 lb 7.4 oz) (<1%, Z= -3.42)*   07/09/20 51.3 kg (113 lb) (3%, Z= -1.83)*   03/11/20 52.7 kg (116 lb 2.9 oz) (7%, Z= -1.50)*     * Growth percentiles are based on [facility] (Boys, 2-20 Years) data.       Lab/Procedures/Meds    Pertinent Labs Reviewed: reviewed  Pertinent Labs Comments: Albumin (L), BUN (H)  Pertinent Medications Reviewed: reviewed  Pertinent Medications Comments: lactated ringer bolus 1000 mL, phenoborbital, levetiracetam, clonidine patch, chlorhexidine, baclofen    BMP  Lab Results   Component Value Date     10/18/2024    K 3.8 10/18/2024     10/18/2024    CO2 24 10/18/2024    BUN 28 (H) 10/18/2024    CREATININE 1.1 10/18/2024    CALCIUM 9.0 10/18/2024    ANIONGAP 11 10/18/2024    EGFRNORACEVR >60 10/18/2024     Lab Results   Component Value Date    CALCIUM 9.0 10/18/2024    PHOS 3.8 10/18/2024     Lab Results   Component Value Date    ALBUMIN 2.6 (L) 10/18/2024     Lab Results   Component Value Date    ALT 35 10/18/2024    AST 17 10/18/2024     (H) 10/27/2020    ALKPHOS 106 10/18/2024    BILITOT 0.4 10/18/2024     Recent Labs   Lab 10/18/24  0850   POCTGLUCOSE 95     No results found for: "LABA1C", "HGBA1C"    Lab Results   Component Value Date    WBC 12.43 10/18/2024    HGB 14.5 10/18/2024    HCT 45.2 10/18/2024    MCV 80 (L) 10/18/2024     10/18/2024       Scheduled Meds:   baclofen  10 mg Per G Tube BID    chlorhexidine  15 mL Mouth/Throat BID    cloNIDine 0.1 mg/24 hr td ptwk  1 patch Transdermal Q7 Days    famotidine (PF)  20 mg Intravenous BID    lactated ringers  1,000 mL Intravenous Once    levETIRAcetam (Keppra) IV (PEDS and ADULTS)  2,000 mg Intravenous Q12H    mupirocin   Nasal BID    PHENObarbital  65 mg Intravenous BID     Continuous Infusions:   dexmedeTOMIDine (Precedex) infusion (titrating)  0-1.4 mcg/kg/hr Intravenous Continuous 15.79 mL/hr at 10/18/24 1500 1.4 mcg/kg/hr " at 10/18/24 1500    fentanyl  0-250 mcg/hr Intravenous Continuous 2.5 mL/hr at 10/18/24 1500 25 mcg/hr at 10/18/24 1500    NORepinephrine bitartrate-D5W  0-3 mcg/kg/min Intravenous Continuous 6.8 mL/hr at 10/18/24 1500 0.04 mcg/kg/min at 10/18/24 1500     PRN Meds:.  Current Facility-Administered Medications:     albuterol-ipratropium, 3 mL, Nebulization, Q6H PRN    dextrose 10%, 12.5 g, Intravenous, PRN    dextrose 10%, 25 g, Intravenous, PRN    glucagon (human recombinant), 1 mg, Intramuscular, PRN    insulin aspart U-100, 0-10 Units, Subcutaneous, Q4H PRN    magnesium sulfate IVPB, 2 g, Intravenous, PRN    magnesium sulfate IVPB, 4 g, Intravenous, PRN    potassium chloride, 40 mEq, Intravenous, PRN **AND** potassium chloride, 60 mEq, Intravenous, PRN **AND** potassium chloride, 80 mEq, Intravenous, PRN    sodium phosphate 15 mmol in D5W 250 mL IVPB, 15 mmol, Intravenous, PRN    sodium phosphate 20.01 mmol in D5W 250 mL IVPB, 20.01 mmol, Intravenous, PRN    sodium phosphate 30 mmol in D5W 250 mL IVPB, 30 mmol, Intravenous, PRN      Physical Findings/Assessment         Estimated/Assessed Needs    Weight Used For Calorie Calculations: 46.5 kg (102 lb 8.2 oz)  Energy Calorie Requirements (kcal): 1480 kcals ([facility] (Critical care-vent, Total VE: 5.74 vs Underweight vs GI Disorder)  Energy Need Method: [facility]  Protein Requirements: 55-93 g (1.2-2.0 g/kg ABW (Critical care-vent, BMI < 30 vs Pressure injury vs Underweight vs GI Disorder, adult)  Weight Used For Protein Calculations: 46.5 kg (102 lb 8.2 oz)  Fluid Requirements (mL): 1480 mL (1 mL/kcal)  Estimated Fluid Requirement Method: RDA Method  RDA Method (mL): 1480  CHO Requirement: 185 g (1480 kcals/8)      Nutrition Prescription Ordered    Current Diet Order: NPO    Evaluation of Received Nutrient/Fluid Intake  I/O: (Net since admit):  10/18/24: +3468.7 mL    Energy Calories Required: not meeting needs  Protein Required: not meeting needs  Fluid Required:  exceeds needs  Total Fluid Intake (mL): 2900.7  Tolerance: other (see comments) (Currently no intake)  % Intake of Estimated Energy Needs: 0 - 25 %  % Meal Intake: 0 - 25 %    Nutrition Risk    Level of Risk/Frequency of Follow-up: high (F/u x 2 weekly)       Monitor and Evaluation    Food and Nutrient Intake: energy intake, enteral nutrition intake  Food and Nutrient Adminstration: enteral and parenteral nutrition administration  Knowledge/Beliefs/Attitudes: food and nutrition knowledge/skill, beliefs and attitudes  Physical Activity and Function: nutrition-related ADLs and IADLs, factors affecting access to physical activity  Anthropometric Measurements: weight, weight change, body mass index  Biochemical Data, Medical Tests and Procedures: electrolyte and renal panel, gastrointestinal profile  Nutrition-Focused Physical Findings: overall appearance       Nutrition Follow-Up    RD Follow-up?: Yes  Deanna Bowman, BS, RDN, LDN

## 2024-10-18 NOTE — PROGRESS NOTES
Pharmacokinetic Assessment Follow Up: IV Vancomycin    Vancomycin serum concentration assessment(s):    The random level was drawn correctly and can be used to guide therapy at this time. The measurement is within the desired definitive target range of 10 to 20 mcg/mL.    Vancomycin Regimen Plan:    Administer vancomycin 750 mg IV once and obtain a random vancomycin level at 2030 on 10/18/24.    Drug levels (last 3 results):  Recent Labs   Lab Result Units 10/17/24  0041 10/17/24  2122   Vancomycin, Random ug/mL 19.3 13.3       Pharmacy will continue to follow and monitor vancomycin.    Please contact pharmacy at extension [phone] for questions regarding this assessment.    Thank you for the consult,   Calvin Diaz       Patient brief summary:  Ronny Ramey is a 23 y.o. male initiated on antimicrobial therapy with IV Vancomycin for treatment of lower respiratory infection    The patient's current regimen is pulse dosing.    Drug Allergies:   Review of patient's allergies indicates:   Allergen Reactions    Strawberries [strawberry] Hives     STRAWBERRIES ALLERGIC REACTIONS   (SEIZURES AND HIVES )       Actual Body Weight:   45.1 kg    Renal Function:   Estimated Creatinine Clearance: 49.7 mL/min (A) (based on SCr of 1.5 mg/dL (H)).,     Dialysis Method (if applicable):  N/A    CBC (last 72 hours):  Recent Labs   Lab Result Units 10/16/24  0120 10/17/24  0327   WBC K/uL 22.36* 17.55*   Hemoglobin g/dL 17.4 16.9   Hematocrit % 53.8 50.2   Platelets K/uL 422 438   Gran % % 88.3* 79.4*   Lymph % % 6.7* 10.2*   Mono % % 4.3 9.9   Eosinophil % % 0.0 0.0   Basophil % % 0.3 0.2   Differential Method  Automated Automated       Metabolic Panel (last 72 hours):  Recent Labs   Lab Result Units 10/16/24  0120 10/16/24  0159 10/16/24  1018 10/16/24  1352 10/17/24  0327 10/17/24  1200   Sodium mmol/L 137  --   --  132* 134* 139   Potassium mmol/L 4.0  --   --  4.4 4.6 4.4   Chloride mmol/L 97  --   --  95 99 103   CO2  mmol/L 25  --   --  25 22* 24   Glucose mg/dL 224*  --   --  133* 169* 110   Glucose, UA   --  Negative Negative  --   --   --    BUN mg/dL 19  --   --  29* 33* 32*   Creatinine mg/dL 0.8  --   --  1.6* 1.8* 1.5*   Albumin g/dL 4.2  --   --   --  3.0* 2.5*   Total Bilirubin mg/dL 0.1  --   --   --  0.5  --    Alkaline Phosphatase U/L 193*  --   --   --  149*  --    AST U/L 30  --   --   --  26  --    ALT U/L 68*  --   --   --  54*  --    Magnesium mg/dL  --   --   --   --  2.1  --    Phosphorus mg/dL  --   --   --   --  4.3 5.5*       Vancomycin Administrations:  vancomycin given in the last 96 hours                     vancomycin 750 mg in D5W 250 mL IVPB (admixture device) (mg) 750 mg New Bag 10/18/24 0218    vancomycin 750 mg in D5W 250 mL IVPB (admixture device) (mg) 750 mg New Bag 10/17/24 0229    vancomycin (VANCOCIN) 1,000 mg in D5W 250 mL IVPB (admixture device) (mg) 1,000 mg New Bag 10/16/24 0648                    Microbiologic Results:  Microbiology Results (last 7 days)       Procedure Component Value Units Date/Time    Blood culture x two cultures. Draw prior to antibiotics. [[id number]] Collected: 10/16/24 0138    Order Status: Completed Specimen: Blood from Peripheral, Lower Arm, Right Updated: 10/17/24 2312     Blood Culture, Routine No Growth to date      No Growth to date    Narrative:      Aerobic and anaerobic    Blood culture x two cultures. Draw prior to antibiotics. [[id number]] Collected: 10/16/24 0120    Order Status: Completed Specimen: Blood from Peripheral, Forearm, Right Updated: 10/17/24 2312     Blood Culture, Routine No Growth to date      No Growth to date    Narrative:      Aerobic and anaerobic    Culture, Respiratory with Gram Stain [[id number]] Collected: 10/17/24 1527    Order Status: Sent Specimen: Respiratory from Endotracheal Aspirate Updated: 10/17/24 2241    Culture, MRSA [[id number]] Collected: 10/16/24 1017    Order Status: Sent Specimen: MRSA source from Nares, Left  Updated: 10/16/24 1700    Culture, Respiratory with Gram Stain [[id number]]     Order Status: Canceled Specimen: Respiratory from Endotracheal Aspirate

## 2024-10-18 NOTE — OP NOTE
[facility] - Intensive Care ([facility])  Colon and Rectal Surgery  Operative Note    SUMMARY     Date of Procedure: 10/18/2024     Procedure: Procedure(s) (LRB):  LAPAROTOMY, EXPLORATORY (N/A)  BLOCK, TRANSVERSUS ABDOMINIS PLANE (N/A)  GASTRECTOMY, PARTIAL (N/A)  EXCISION, SMALL INTESTINE (N/A)  APPLICATION, WOUND VAC (N/A)     Surgeons and Role:     * Anna Marie Mo MD - Primary    Assistant: Gabriel Carney PA-C    Pre-Operative Diagnosis: Partial intestinal obstruction, unspecified cause [K56.600]    Post-Operative Diagnosis: Post-Op Diagnosis Codes:     * Partial intestinal obstruction, unspecified cause [K56.600]    Anesthesia: General    Operative Findings (including complications, if any):  Closed loop bowel obstruction secondary to internal hernia.  Resection of nonviable small intestine.  Takedown gastrostomy tube with partial gastrectomy.  Repair of sigmoid serosal injury    Description of Technical Procedures:  Patient was taken from the ICU and brought to the operating room and placed on the operating table in the supine position.  General anesthesia was administered.  He was then prepped and draped in the usual fashion.  A time-out was performed indicating the correct patient procedure and operative site and all the room were in agreement.  Preoperative antibiotics were given.    A midline laparotomy incision was made and dissected through the skin and subcutaneous tissue.  The midline fascia was incised.  It was grasped on either side within [facility] clamp and the peritoneum was sharply opened using Metzenbaum scissors.  And the abdomen was entered there was copious simple fluid that was evacuated.  The fascia was opened for the length of the incision.  There was noted to be very dilated edematous and ischemic small bowel.  The bowel was clearly volvulized and involve the stomach around the gastrostomy tube, however due to the dense adhesions I was not able to readily the volvulized the small bowel.   There were extensive adhesions in the left upper quadrant as well as near the mesentery in the mid abdomen.  To facilitate the small bowel lysis of adhesions I were quite was required to take down the gastrostomy tube.  The patient's Salomon button was removed and the gastric cutaneous fistula was sharply taken down using Metzenbaum scissors.  The gastrotomy was then oversewn using a 2-0 Vicryl runner to prevent spillage.  And extensive lysis of adhesions, modifier 22, lasting greater than 1 hour was performed.  Using sharp dissection and combination of electrocautery dissection the adhesions to the mesentery in the left upper quadrant were taken down and the bowel was able to be the volvulized.  There were some serosal injuries to the small bowel.  This volvulus encompassed the proximal small bowel approximately 10 cm from the ligament of Treitz to the mid jejunum.  There was a segment of jejunum the appeared ischemic and required resection.  Using 75 mm blue load ANNITA stapler the small bowel was transected and the mesentery was ligated with the LigaSure.  There was good hemostasis.  A continued lysis of adhesions of the small bowel and ran the small bowel from the ligament of Treitz to the terminal ileum.  There were some serosal injuries of the proximal small bowel specifically towards the ligament of Treitz that were repaired using 3-0 Vicryl and a Lembert fashion.      I then turned my attention to the stomach.  The previous gastrostomy tube was located on the greater curvature of the stomach.  The stomach was grasped with Babcocks and I confirmed with the anesthesia that there was nothing in the mouth.  Using a white load 60 mm stapler the stomach was transected at the gastrostomy.  This was sent off the field for pathology.  I then turned my attention to the pelvis.  Previously in the operation to facilitate exposure I had to dissect the sigmoid colon off the anterior abdominal wall as it was adhesed densely to  the anterior abdominal wall.  There was a serosal injury to the proximal rectum/distal sigmoid colon.  The serosal injury was a proximally 5 cm in length.  This was oversewn using 3-0 Vicryl and a Lembert fashion as well.      Then performed bilateral transversus abdominis plane blocks.  The fascia was retracted anteriorly with [facility] clamps x2.  The transversus abdominis plane was directly accessed with a large-bore needle and infiltrated with a combination of Marcaine and Exparel.  The abdomen was copiously irrigated and suctioned until irrigation returned clear.  Given the patient's densely dilated bowel I felt as if decompression prior to anastomosis and abdominal closure was prudent.  I was also unable to approximate the fascia given the extensive small bowel dilation.  At this time I elected to place a temporary abdominal closure device with an ABThera.  The inner lining was trimmed and placed within the abdomen.  The device was sealed.  It was placed to negative suction.  Patient was taken to the ICU in critical but stable condition    Significant Surgical Tasks Conducted by the Assistant(s), if Applicable: Assisted with all portions of the operation as it was required to help with the positioning, exposure and closure to complete the operation       Estimated Blood Loss (EBL): * No values recorded between 10/18/2024 11:44 AM and 10/18/2024  2:00 PM *           Implants: * No implants in log *    Specimens:   Specimen (24h ago, onward)       Start     Ordered    10/18/24 1331  Specimen to Pathology, Surgery General Surgery  Once        Comments: Pre-op Diagnosis: Partial intestinal obstruction, unspecified cause [K56.600]Procedure(s):LAPAROTOMY, EXPLORATORY Number of specimens: 2Name of specimens: 1. Small Bowel - PERM2. Stomach - PERM     References:    Click here for ordering Quick Tip   Question Answer Comment   Procedure Type: General Surgery    Specimen Class: Routine/Screening    Release to patient  Immediate        10/18/24 1331                     Condition: Stable    Malignancy: Procedure performed for malignancy no    Disposition: ICU - intubated and hemodynamically stable.    Attestation: I performed the procedure.

## 2024-10-18 NOTE — PROGRESS NOTES
Pharmacist Renal Dose Adjustment Note    Ronny Ramey is a 23 y.o. male being treated with the medication famotidine.     Patient Data:    Vital Signs (Most Recent):  Temp: 98.4 °F (36.9 °C) (10/18/24 0330)  Pulse: 95 (10/18/24 0906)  Resp: 16 (10/18/24 0906)  BP: 99/70 (10/18/24 0630)  SpO2: 100 % (10/18/24 0906) Vital Signs (72h Range):  Temp:  [96.8 °F (36 °C)-99.1 °F (37.3 °C)]   Pulse:  []   Resp:  [16-54]   BP: ()/()   SpO2:  [79 %-100 %]      Recent Labs   Lab 10/17/24  0327 10/17/24  1200 10/18/24  0346   CREATININE 1.8* 1.5* 1.1     Serum creatinine: 1.1 mg/dL 10/18/24 0346  Estimated creatinine clearance: 69.9 mL/min    Per protocol for CrCl > 50 ml/min, dose will be increased from 20 mg IV once daily to 20 mg IV twice daily.     Pharmacist's Name: Katherine E Mcardle  Pharmacist's Extension: [phone]

## 2024-10-18 NOTE — ASSESSMENT & PLAN NOTE
Would recommend obtaining the accessed portion of the low profile gastrostomy tube as this may be needed in the future during this hospital stay to provide enteral access for feeding

## 2024-10-18 NOTE — PHYSICIAN QUERY
Please provide the integumentary diagnosis related to the documentation of Right Ischium:   Pressure Injury/Decubitus Ulcer, Stage 4

## 2024-10-18 NOTE — ASSESSMENT & PLAN NOTE
MAYELIN is likely due to pre-renal azotemia due to intravascular volume depletion. Baseline creatinine is  0.8 . Most recent creatinine and eGFR are listed below.  Recent Labs     10/17/24  0327 10/17/24  1200 10/18/24  0346   CREATININE 1.8* 1.5* 1.1   EGFRNORACEVR 54* >60 >60        Plan  - Avoid nephrotoxins and renally dose meds for GFR listed above  - Monitor urine output, serial BMP, and adjust therapy as needed  - Maintain [place] catheter- IVF boluses prn

## 2024-10-18 NOTE — SIGNIFICANT EVENT
Patient returned from the OR, hypotensive with MAP in the 50s. 1L LR bolus running, levophed started, additional 1L LR fluid bolus ordered.

## 2024-10-18 NOTE — ASSESSMENT & PLAN NOTE
SBFT consistent with high grade SBO.     - Plan for exploratory laparotomy today. Spoke with mother over the phone at [phone] who is amenable to surgical intervention. She states that she will come to the hospital for further discussion with the surgical team.   - Continue NGT to suction.

## 2024-10-18 NOTE — ANESTHESIA PREPROCEDURE EVALUATION
10/18/2024  Ronny Ramey is a 23 y.o., male    Patient Active Problem List   Diagnosis    Spastic quadriplegic cerebral palsy    Kyphoscoliosis    Weight loss    Intellectual disability with epilepsy    Legally blind    Nonverbal    PEG (percutaneous endoscopic gastrostomy) status    Intractable epilepsy without status epilepticus    Severe intellectual disabilities    Cerebral palsy    Developmental delay    Acute hypercapnic respiratory failure    Aspiration pneumonia    Body mass index (BMI) less than 19    Bowel obstruction    MAYELIN (acute kidney injury)     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    Vision abnormalities      Past Surgical History:   Procedure Laterality Date    ADENOIDECTOMY      BACLOFEN PUMP IMPLANTATION N/A 2020    Procedure: INSERTION, INTRATHECAL BACLOFEN PUMP;  Surgeon: Robbi Cain MD;  Location: [facility] OR [address] [place] [address];  Service: Neurosurgery;  Laterality: N/A;  toronto I, asa 1, lateral left down, regular bed reversed, christine, [employer] rep, co surgeon DR Miller    BACLOFEN PUMP IMPLANTATION  2020    CIRCUMCISION      EYE SURGERY      as a     GASTROSTOMY TUBE PLACEMENT      HERNIA REPAIR      HIP SURGERY      LUMBAR PUNCTURE WITH INJECTION OF BACLOFEN N/A 3/11/2020    Procedure: LUMBAR PUNCTURE, WITH BACLOFEN INJECTION;  Surgeon: Cristiane Sky MD;  Location: [facility] OR [address] [place] [address];  Service: Neurosurgery;  Laterality: N/A;  toronto I, asa 1, lateral left down, regular bed reversed , christine , [employer] co surgeon DR Miller    NISSEN FUNDOPLICATION      REMOVAL OF BACLOFEN PUMP Right  10/15/2020    Procedure: REMOVAL, BACLOFEN PUMP;  Surgeon: Marcy Macedo MD;  Location: [facility] OR [address] [place] [address];  Service: Neurosurgery;  Laterality: Right;    TONSILLECTOMY      TYMPANOSTOMY TUBE PLACEMENT           Pre-op Assessment    I have reviewed the Patient Summary Reports.    I have reviewed the NPO Status.   I have reviewed the Medications.     Review of Systems  Anesthesia Hx:  No problems with previous Anesthesia   History of prior surgery of interest to airway management or planning:  Previous anesthesia: General          Denies Personal Hx of Anesthesia complications.                    Social:  Non-Smoker       Hematology/Oncology:  Hematology Normal   Oncology Normal                                   Cardiovascular:  Cardiovascular Normal                  ECG has been reviewed.                            Pulmonary:  Pneumonia       Pt intubated - Acute hypercapnic respiratory failure    Hx of chronic aspiration                Renal/:  Chronic Renal Disease   MAYELIN             Hepatic/GI:        S/p G-Tube placement     SBFT consistent with high grade SBO.              Musculoskeletal:  Musculoskeletal Normal                Neurological:       Seizures    Epilepsy (intractable) - takes Keppra, phenobarb PO, ativan regularly     Hx of Cerebral palsy     quadriplegic                             Endocrine:  Endocrine Normal    BMI 18            Physical Exam  General: Cachexia and Alert  On precedex drip for sedation; responsive   Airway:  Mallampati: unable to assess   TM Distance: Normal  Pre-Existing Airway: Oral Endotracheal tube    Dental:  Unable to assess (intubated)   Musculoskeletal:  B/L UE, LE chronic conractures       Anesthesia Plan  Type of Anesthesia, risks & benefits discussed:    Anesthesia Type: Gen ETT  Intra-op Monitoring Plan: Standard ASA Monitors  Post Op Pain Control Plan: multimodal analgesia and IV/PO Opioids PRN  Induction:  IV  Informed Consent: Informed consent signed with the  Patient representative and all parties understand the risks and agree with anesthesia plan.  All questions answered.   ASA Score: 3 Emergent  Day of Surgery Review of History & Physical: H&P Update referred to the surgeon/provider.  Anesthesia Plan Notes: *Paper (phone consent) signed via Mother after anesthesia discussion      Ready For Surgery From Anesthesia Perspective.     .      Chemistry        Component Value Date/Time     10/18/2024 0346    K 3.8 10/18/2024 0346     10/18/2024 0346    CO2 24 10/18/2024 0346    BUN 28 (H) 10/18/2024 0346    CREATININE 1.1 10/18/2024 0346     (H) 10/18/2024 0346        Component Value Date/Time    CALCIUM 9.0 10/18/2024 0346    ALKPHOS 106 10/18/2024 0346    AST 17 10/18/2024 0346    ALT 35 10/18/2024 0346    BILITOT 0.4 10/18/2024 0346    ESTGFRAFRICA 128 05/17/2023 0400    ESTGFRAFRICA >60.0 11/02/2020 1045    EGFRNONAA >60.0 11/02/2020 1045        Lab Results   Component Value Date    WBC 12.43 10/18/2024    HGB 14.5 10/18/2024    HCT 45.2 10/18/2024    MCV 80 (L) 10/18/2024     10/18/2024

## 2024-10-18 NOTE — PROGRESS NOTES
Therapy with vancomycin complete and/or consult discontinued by provider.  Pharmacy will sign off, please re-consult as needed.    Thank you for allowing us to participate in this patient's care.   Katherine McArdle, Pharm.D. 10/18/2024 10:23 AM

## 2024-10-18 NOTE — PROGRESS NOTES
[facility] ([facility])  Critical Care Medicine  Progress Note    Patient Name: Ronny Ramey  MRN: [id number]  Admission Date: 10/16/2024  Hospital Length of Stay: 2 days  Code Status: Full Code  Attending Provider: Kiel Perez MD  Primary Care Provider: Herman Nathan DNP   Principal Problem: Acute hypercapnic respiratory failure    Subjective:     HPI:  Ronny Ramey is a 23 yr old male [facility] resident with CP, spastic quadriplegia, s/p G tube, epilepsy who presented to [facility] ED on 10/16 with shortness of breath/respiratory distress. Patient was placed on CPAP with little improvement. KUB shows small bowel obstruction. Patient was intubated and transferred to [facility]. Critical care consulted for admission. Patient with history of admissions for bowel obstruction and aspiration pneumonia.     Hospital/ICU Course:  10/17/2024: Patient with decreasing UOP overnight. Renal function worsening. Small bolus given with little response. Additional boluses today. HTN overnight, clonidine patch started. Continue bowel rest. NGT advanced per Abd XR, continued bowel obstruction on XR as well as fecal impaction. Will attempt to disimpact. Gen Surg following. Propofol switched to precedex for vent weaning.  10/18/2024: XR small bowel FT shows high-grade small bowel obstruction. Gen surg planning for ex lap today. Will keep intubated for procedure. Cultures NGTD, antibiotics stopped.    Interval History: Patient with movement when sedation off, does not follow commands.      Objective:     Vital Signs (Most Recent):  Temp: 98.4 °F (36.9 °C) (10/18/24 0330)  Pulse: 95 (10/18/24 0906)  Resp: 16 (10/18/24 0906)  BP: 99/70 (10/18/24 0630)  SpO2: 100 % (10/18/24 0906) Vital Signs (24h Range):  Temp:  [97.8 °F (36.6 °C)-98.4 °F (36.9 °C)] 98.4 °F (36.9 °C)  Pulse:  [65-97] 95  Resp:  [16-19] 16  SpO2:  [100 %] 100 %  BP: ()/() 99/70     Weight: 46.5 kg (102 lb  8.2 oz)  Body mass index is 17.6 kg/m².      Intake/Output Summary (Last 24 hours) at 10/18/2024 1343  Last data filed at 10/18/2024 1000  Gross per 24 hour   Intake 1957.64 ml   Output 2065 ml   Net -107.36 ml        Physical Exam  Constitutional:       Appearance: He is ill-appearing.   HENT:      Head: Atraumatic.      Nose: Nose normal.      Comments: NGT in place     Mouth/Throat:      Mouth: Mucous membranes are moist.   Cardiovascular:      Rate and Rhythm: Regular rhythm. Tachycardia present.      Pulses: Normal pulses.      Heart sounds: Normal heart sounds.   Pulmonary:      Effort: Pulmonary effort is normal. No respiratory distress.      Breath sounds: Normal breath sounds.   Abdominal:      General: Bowel sounds are decreased. There is distension.      Comments: G tube site clean, no signs of infection   Musculoskeletal:      Comments: BUE contractures  BLE contractures   Skin:     General: Skin is warm and dry.   Neurological:      Mental Status: Mental status is at baseline.      Comments: Baseline nonverbal, legally blind           Review of Systems   Unable to perform ROS: Intubated       Vents:  Vent Mode: A/C (10/18/24 0906)  Ventilator Initiated: Yes (10/17/24 0102)  Set Rate: 16 BPM (10/18/24 0906)  Vt Set: 350 mL (10/18/24 0906)  Pressure Support: 0 cmH20 (10/18/24 0753)  PEEP/CPAP: 5 cmH20 (10/18/24 0906)  Oxygen Concentration (%): 30 (10/18/24 0906)  Peak Airway Pressure: 27 cmH20 (10/18/24 0906)  Plateau Pressure: 17 cmH20 (10/18/24 0906)  Total Ve: 6.27 L/m (10/18/24 0906)  Negative Inspiratory Force (cm H2O): 0 (10/18/24 0906)  F/VT Ratio<105 (RSBI): (!) 43.84 (10/18/24 0906)    Lines/Drains/Airways       Drain  Duration                  Gastrostomy/Enterostomy 10/18/20 1130 LUQ 1461 days         NG/OG Tube 10/16/24 0515 16 Fr. Right nostril 2 days         Urethral Catheter 10/16/24 0156 Double-lumen 16 Fr. 2 days              Airway  Duration                  Airway - Non-Surgical  10/16/24 0530 Endotracheal Tube 2 days              Peripheral Intravenous Line  Duration                  Peripheral IV - Single Lumen 10/16/24 0120 22 G Posterior;Right Forearm 2 days         Peripheral IV - Single Lumen 10/16/24 0636 22 G Left;Posterior Hand 2 days         Peripheral IV - Single Lumen 10/16/24 1245 20 G Left;Medial Forearm 2 days                    Significant Labs:    CBC/Anemia Profile:  Recent Labs   Lab 10/17/24  0327 10/18/24  0346   WBC 17.55* 12.43   HGB 16.9 14.5   HCT 50.2 45.2    356   MCV 77* 80*   RDW 13.3 13.6        Chemistries:  Recent Labs   Lab 10/17/24  0327 10/17/24  1200 10/18/24  0346   * 139 140   K 4.6 4.4 3.8   CL 99 103 105   CO2 22* 24 24   BUN 33* 32* 28*   CREATININE 1.8* 1.5* 1.1   CALCIUM 9.6 8.2* 9.0   ALBUMIN 3.0* 2.5* 2.6*   PROT 7.3  --  6.6   BILITOT 0.5  --  0.4   ALKPHOS 149*  --  106   ALT 54*  --  35   AST 26  --  17   MG 2.1  --  2.1   PHOS 4.3 5.5* 3.8       All pertinent labs within the past 24 hours have been reviewed.    Significant Imaging:  I have reviewed all pertinent imaging results/findings within the past 24 hours.    ABG  Recent Labs   Lab 10/17/24  0538   PH 7.527*   PO2 176*   PCO2 34.0*   HCO3 28.2*   BE 5*     Assessment/Plan:     Neuro  Intellectual disability with epilepsy  Home AEDs restarted: Keppra and phenobarb  Monitor Keppra dosing with renal function    Spastic quadriplegic cerebral palsy  Home baclofen restarted  Turn q 2hrs  Aspiration precautions    Pulmonary  * Acute hypercapnic respiratory failure  Patient with Hypercapnic Respiratory failure which is Acute on chronic. Unclear if on O2 at the NH. Supplemental oxygen was provided and noted-     Vent Mode: A/C  Oxygen Concentration (%):  [30] 30  Resp Rate Total:  [16 br/min-18 br/min] 16 br/min  Vt Set:  [350 mL] 350 mL  PEEP/CPAP:  [5 cmH20] 5 cmH20  Pressure Support:  [0 cmH20] 0 cmH20  Mean Airway Pressure:  [8.1 cmH20-8.5 cmH20] 8.3 cmH20    .   Signs/symptoms  of respiratory failure include- tachypnea, increased work of breathing, respiratory distress, and use of accessory muscles. Contributing diagnoses includes - Aspiration Labs and images were reviewed. Patient Has recent ABG, which has been reviewed. Will treat underlying causes and adjust management of respiratory failure as follows-     Wean vent settings as tolerated  Broad spectrum antibiotics- stopped, cultures NGTD  Duonebs prn  Pancultures pending  Transition from Propofol to Precedex- coordinate SAT/SBT  Will keep intubated for surgery today    Aspiration pneumonia  See acute resp failure    Renal/  MAYELIN (acute kidney injury)  MAYELIN is likely due to pre-renal azotemia due to intravascular volume depletion. Baseline creatinine is  0.8 . Most recent creatinine and eGFR are listed below.  Recent Labs     10/17/24  0327 10/17/24  1200 10/18/24  0346   CREATININE 1.8* 1.5* 1.1   EGFRNORACEVR 54* >60 >60        Plan  - Avoid nephrotoxins and renally dose meds for GFR listed above  - Monitor urine output, serial BMP, and adjust therapy as needed  - Maintain Lancaster catheter- IVF boluses prn    Endocrine  Body mass index (BMI) less than 19  Nutrition consult for Tube feeding recs  NPO currently due to bowel obstruction    GI  Bowel obstruction  KUB shows small bowel obstruction  NGT to suction  Keep NPO  Dulcolax suppository daily  CT abd/pelvis shows high grade bowel obstruction  Gen Surg consulted  Fecal impaction on Abd XR- disimpact  SBFT XR shows high-grade small bowel obstruction  Gen Surg planning for Ex Lap     PEG (percutaneous endoscopic gastrostomy) status  Patient noted to have a percutaneous endoscopic gastrostomy tube in place. I have personally inspected the tube.Tube was placed on this admission, with date of procedure- 10/16  There are no signs of drainage or infection around the site. Routine care to be done by wound care and nursing staff.     Tube feedings on hold due to Bowel  obstruction        Critical Care Time: 35 minutes  Critical secondary to Patient has a condition that poses threat to life and bodily function: Acute respiratory failure requiring mechanical ventilation, small bowel obstruction      Critical care was time spent personally by me on the following activities: development of treatment plan with patient or surrogate and bedside caregivers, discussions with consultants, evaluation of patient's response to treatment, examination of patient, ordering and performing treatments and interventions, ordering and review of laboratory studies, ordering and review of radiographic studies, pulse oximetry, re-evaluation of patient's condition. This critical care time did not overlap with that of any other provider or involve time for any procedures.     Amanda Francis NP  Critical Care Medicine  [facility] ([facility])

## 2024-10-18 NOTE — PROGRESS NOTES
[facility])  General Surgery  Progress Note    Subjective:     History of Present Illness:  23-year-old male with a history of several palsy presented to the emergency room with hypoxia which was felt to be secondary to aspiration.  According to the emergency room notes the patient required CPAP in route.  In the emergency room he was noted to have some abdominal distention.  The patient was intubated and subsequently admitted to the intensive care unit.      The patient does have a history of bowel obstructions but they may have been related constipation.    Post-Op Info:  Procedure(s) (LRB):  LAPAROTOMY, EXPLORATORY (N/A)         Interval History: Patient remains intubated and with significant abdominal distention. SBFT yesterday consistent with high grade SBO. Morning films essentially unchanged. Will need exploratory laparotomy planned for today.     Medications:  Continuous Infusions:   dexmedeTOMIDine (Precedex) infusion (titrating)  0-1.4 mcg/kg/hr Intravenous Continuous 15.79 mL/hr at 10/18/24 0600 1.4 mcg/kg/hr at 10/18/24 0600     Scheduled Meds:   baclofen  10 mg Per G Tube BID    chlorhexidine  15 mL Mouth/Throat BID    cloNIDine 0.1 mg/24 hr td ptwk  1 patch Transdermal Q7 Days    famotidine (PF)  20 mg Intravenous Daily    levETIRAcetam (Keppra) IV (PEDS and ADULTS)  2,000 mg Intravenous Q12H    mupirocin   Nasal BID    PHENObarbital  65 mg Intravenous BID    piperacillin-tazobactam (Zosyn) IV (PEDS and ADULTS) (extended infusion is not appropriate)  4.5 g Intravenous Q8H     PRN Meds:  Current Facility-Administered Medications:     albuterol-ipratropium, 3 mL, Nebulization, Q6H PRN    dextrose 10%, 12.5 g, Intravenous, PRN    dextrose 10%, 25 g, Intravenous, PRN    glucagon (human recombinant), 1 mg, Intramuscular, PRN    insulin aspart U-100, 0-10 Units, Subcutaneous, Q4H PRN    magnesium sulfate IVPB, 2 g, Intravenous, PRN    magnesium sulfate IVPB, 4 g, Intravenous, PRN     potassium chloride, 40 mEq, Intravenous, PRN **AND** potassium chloride, 60 mEq, Intravenous, PRN **AND** potassium chloride, 80 mEq, Intravenous, PRN    sodium phosphate 15 mmol in D5W 250 mL IVPB, 15 mmol, Intravenous, PRN    sodium phosphate 20.01 mmol in D5W 250 mL IVPB, 20.01 mmol, Intravenous, PRN    sodium phosphate 30 mmol in D5W 250 mL IVPB, 30 mmol, Intravenous, PRN    Pharmacy to dose Vancomycin consult, , , Once **AND** vancomycin - pharmacy to dose, , Intravenous, pharmacy to manage frequency     Review of patient's allergies indicates:   Allergen Reactions    Strawberries [strawberry] Hives     STRAWBERRIES ALLERGIC REACTIONS   (SEIZURES AND HIVES )     Objective:     Vital Signs (Most Recent):  Temp: 98.4 °F (36.9 °C) (10/18/24 0330)  Pulse: 95 (10/18/24 0906)  Resp: 16 (10/18/24 0906)  BP: 99/70 (10/18/24 0630)  SpO2: 100 % (10/18/24 0906) Vital Signs (24h Range):  Temp:  [97.7 °F (36.5 °C)-98.4 °F (36.9 °C)] 98.4 °F (36.9 °C)  Pulse:  [65-98] 95  Resp:  [16-19] 16  SpO2:  [100 %] 100 %  BP: ()/() 99/70     Weight: 46.5 kg (102 lb 8.2 oz)  Body mass index is 17.6 kg/m².    Intake/Output - Last 3 Shifts         10/16 0700  10/17 0659 10/17 0700  10/18 0659 10/18 0700  10/19 0659    I.V. (mL/kg) 251.6 (5.6) 345.8 (7.4)     IV Piggyback 2061.8 2522.6     Total Intake(mL/kg) 2313.5 (51.3) 2868.4 (61.7)     Urine (mL/kg/hr) 432 (0.4) 740 (0.7)     Drains 1200 1700     Stool  0     Total Output 1632 2440     Net +681.5 +428.4            Stool Occurrence  1 x              Physical Exam  Vitals and nursing note reviewed.   Constitutional:       Appearance: He is well-developed. He is ill-appearing.   HENT:      Right Ear: External ear normal.      Left Ear: External ear normal.      Nose:      Comments: NG tube in place, bilious output  Cardiovascular:      Rate and Rhythm: Normal rate.   Pulmonary:      Comments: Intubated   Abdominal:      Comments: Abdomen significantly distended and tympanic.  Gastrostomy in LUQ. Some arousal with abdominal palpation   Musculoskeletal:      Comments: Significantly contracted in upper and lower extremities    Skin:     General: Skin is warm and dry.          Significant Labs:  I have reviewed all pertinent lab results within the past 24 hours.  CBC:   Recent Labs   Lab 10/18/24  0346   WBC 12.43   RBC 5.67   HGB 14.5   HCT 45.2      MCV 80*   MCH 25.6*   MCHC 32.1     CMP:   Recent Labs   Lab 10/18/24  0346   *   CALCIUM 9.0   ALBUMIN 2.6*   PROT 6.6      K 3.8   CO2 24      BUN 28*   CREATININE 1.1   ALKPHOS 106   ALT 35   AST 17   BILITOT 0.4       Significant Diagnostics:  I have reviewed all pertinent imaging results/findings within the past 24 hours.  SBFT and morning films consistent with high grade SBO  Assessment/Plan:     * Acute hypercapnic respiratory failure  Management per critical care.    Patient is currently intubated     Bowel obstruction  SBFT consistent with high grade SBO.     - Plan for exploratory laparotomy today. Spoke with mother over the phone at [phone] who is amenable to surgical intervention. She states that she will come to the hospital for further discussion with the surgical team.   - Continue NGT to suction.     Aspiration pneumonia  Management per critical care    PEG (percutaneous endoscopic gastrostomy) status  Would recommend obtaining the accessed portion of the low profile gastrostomy tube as this may be needed in the future during this hospital stay to provide enteral access for feeding    Nonverbal  Monitor    Legally blind  Per critical care    Intellectual disability with epilepsy  Per critical care    Spastic quadriplegic cerebral palsy  Further management critical care medicine        April Mancilla PA-C  General Surgery  [facility] - Intensive Care ([facility])

## 2024-10-18 NOTE — PLAN OF CARE
Titration of critical gtts per order, see flow sheet. VSS. KUB follow through complete. POC reviewed with patient. Safety and fall precautions maintained.

## 2024-10-18 NOTE — PLAN OF CARE
Pt to OR today- see provider notes. Post-op, pt requiring levophed gtt, titrated to MAP goal. Precedex gtt stopped and fentanyl gtt initiated. Lancaster catheter remains in place. Wound vac now in place. Dressing change to R hip wound. No changes to ventilator settings. Electrolytes replaced. Mother updated by surgery team. Safety precautions in place.

## 2024-10-18 NOTE — PLAN OF CARE
Nutrition Recommendations/Interventions 10/18/24:   1. When medically appropriate, initiate pt onto continuous Enteral nutrition, recommend Peptamen 1.5 w/ Prebio via PEG tube, goal rate 40 mL/hr, starting at 10 mL/hr then progress to goal rate within 24 hrs if pt is tolerating or per MD/NP   -125 mL q4h free water flushes (750 mL/day), per MD/NP   -Check Mg, K+, Na, Phos and Glu before and during initiation, correct as indicated   2. Recommend Edwin BID via PEG tube for wound healing   3. Weigh twice weekly  4. Collaboration by nutrition professional with other providers     Goals:   1. Pt will be initiated onto continuous EN within 24-48 hrs   2. Pt will tolerate and intake >75% EEN and EPN prior to RD follow up   3. Pt will tolerate and intake Edwin BID prior to RD follow up    YAN Kitchen, RDN, LDN

## 2024-10-18 NOTE — TRANSFER OF CARE
"Anesthesia Transfer of Care Note    Patient: Ronny Ramey    Procedure(s) Performed: Procedure(s) (LRB):  LAPAROTOMY, EXPLORATORY (N/A)  BLOCK, TRANSVERSUS ABDOMINIS PLANE (N/A)  GASTRECTOMY, PARTIAL (N/A)  EXCISION, SMALL INTESTINE (N/A)  APPLICATION, WOUND VAC (N/A)    Patient location: ICU    Anesthesia Type: general    Transport from OR: Transported from OR intubated on 100% O2  with adequate ventilation controlled by transport ventilator. Continuous ECG monitoring in transport. Continuous SpO2 monitoring in transport. Upon arrival to PACU/ICU, patient attached to ventilator and auscultated to confirm bilateral breath sounds and adequate TV    Post pain: adequate analgesia    Post assessment: no apparent anesthetic complications and tolerated procedure well    Post vital signs: stable    Level of consciousness: sedated    Nausea/Vomiting: no nausea/vomiting    Complications: none    Transfer of care protocol was followedComments: Report given to ICU RN at bedside. Hand off tool used. RN given opportunity to ask questions or clarify concerns. No Concerns verbalized. RN was asked if ready to assume care of patient. RN verbally confirmed. Pt. left in stable condition. Vital Signs WNL when compared to patient baseline. No s/s of distress noted.         Last vitals: Visit Vitals  BP (!) 102/59   Pulse (!) 111   Temp 36.9 °C (98.4 °F) (Oral)   Resp 15   Ht 5' 4" (1.626 m)   Wt 46.5 kg (102 lb 8.2 oz)   SpO2 100%   BMI 17.60 kg/m²     "

## 2024-10-18 NOTE — ASSESSMENT & PLAN NOTE
Patient with Hypercapnic Respiratory failure which is Acute on chronic. Unclear if on O2 at the NH. Supplemental oxygen was provided and noted-     Vent Mode: A/C  Oxygen Concentration (%):  [30] 30  Resp Rate Total:  [16 br/min-18 br/min] 16 br/min  Vt Set:  [350 mL] 350 mL  PEEP/CPAP:  [5 cmH20] 5 cmH20  Pressure Support:  [0 cmH20] 0 cmH20  Mean Airway Pressure:  [8.1 cmH20-8.5 cmH20] 8.3 cmH20    .   Signs/symptoms of respiratory failure include- tachypnea, increased work of breathing, respiratory distress, and use of accessory muscles. Contributing diagnoses includes - Aspiration Labs and images were reviewed. Patient Has recent ABG, which has been reviewed. Will treat underlying causes and adjust management of respiratory failure as follows-     Wean vent settings as tolerated  Broad spectrum antibiotics- stopped, cultures NGTD  Duonebs prn  Pancultures pending  Transition from Propofol to Precedex- coordinate SAT/SBT  Will keep intubated for surgery today

## 2024-10-19 LAB
ALBUMIN SERPL BCP-MCNC: 2.1 G/DL (ref 3.5–5.2)
ALBUMIN SERPL BCP-MCNC: 2.2 G/DL (ref 3.5–5.2)
ALP SERPL-CCNC: 74 U/L (ref 40–150)
ALT SERPL W/O P-5'-P-CCNC: 23 U/L (ref 10–44)
ANION GAP SERPL CALC-SCNC: 10 MMOL/L (ref 8–16)
ANION GAP SERPL CALC-SCNC: 15 MMOL/L (ref 8–16)
ANION GAP SERPL CALC-SCNC: 7 MMOL/L (ref 8–16)
AST SERPL-CCNC: 29 U/L (ref 10–40)
BASOPHILS # BLD AUTO: 0.02 K/UL (ref 0–0.2)
BASOPHILS NFR BLD: 0.1 % (ref 0–1.9)
BILIRUB SERPL-MCNC: 1.3 MG/DL (ref 0.1–1)
BUN SERPL-MCNC: 27 MG/DL (ref 6–20)
BUN SERPL-MCNC: 32 MG/DL (ref 6–20)
BUN SERPL-MCNC: 33 MG/DL (ref 6–20)
CALCIUM SERPL-MCNC: 8.3 MG/DL (ref 8.7–10.5)
CALCIUM SERPL-MCNC: 8.4 MG/DL (ref 8.7–10.5)
CALCIUM SERPL-MCNC: 8.5 MG/DL (ref 8.7–10.5)
CHLORIDE SERPL-SCNC: 106 MMOL/L (ref 95–110)
CHLORIDE SERPL-SCNC: 107 MMOL/L (ref 95–110)
CHLORIDE SERPL-SCNC: 108 MMOL/L (ref 95–110)
CO2 SERPL-SCNC: 19 MMOL/L (ref 23–29)
CO2 SERPL-SCNC: 23 MMOL/L (ref 23–29)
CO2 SERPL-SCNC: 23 MMOL/L (ref 23–29)
CREAT SERPL-MCNC: 0.8 MG/DL (ref 0.5–1.4)
CREAT SERPL-MCNC: 1 MG/DL (ref 0.5–1.4)
CREAT SERPL-MCNC: 1 MG/DL (ref 0.5–1.4)
DIFFERENTIAL METHOD BLD: ABNORMAL
EOSINOPHIL # BLD AUTO: 0 K/UL (ref 0–0.5)
EOSINOPHIL NFR BLD: 0 % (ref 0–8)
ERYTHROCYTE [DISTWIDTH] IN BLOOD BY AUTOMATED COUNT: 13.3 % (ref 11.5–14.5)
EST. GFR  (NO RACE VARIABLE): >60 ML/MIN/1.73 M^2
GLUCOSE SERPL-MCNC: 110 MG/DL (ref 70–110)
GLUCOSE SERPL-MCNC: 124 MG/DL (ref 70–110)
GLUCOSE SERPL-MCNC: 96 MG/DL (ref 70–110)
HCT VFR BLD AUTO: 37.9 % (ref 40–54)
HGB BLD-MCNC: 12.5 G/DL (ref 14–18)
IMM GRANULOCYTES # BLD AUTO: 0.08 K/UL (ref 0–0.04)
IMM GRANULOCYTES NFR BLD AUTO: 0.5 % (ref 0–0.5)
LYMPHOCYTES # BLD AUTO: 2.4 K/UL (ref 1–4.8)
LYMPHOCYTES NFR BLD: 14.9 % (ref 18–48)
MAGNESIUM SERPL-MCNC: 2 MG/DL (ref 1.6–2.6)
MCH RBC QN AUTO: 26.4 PG (ref 27–31)
MCHC RBC AUTO-ENTMCNC: 33 G/DL (ref 32–36)
MCV RBC AUTO: 80 FL (ref 82–98)
MONOCYTES # BLD AUTO: 1.9 K/UL (ref 0.3–1)
MONOCYTES NFR BLD: 11.7 % (ref 4–15)
NEUTROPHILS # BLD AUTO: 11.9 K/UL (ref 1.8–7.7)
NEUTROPHILS NFR BLD: 72.8 % (ref 38–73)
NRBC BLD-RTO: 0 /100 WBC
PHOSPHATE SERPL-MCNC: 3.8 MG/DL (ref 2.7–4.5)
PHOSPHATE SERPL-MCNC: 3.8 MG/DL (ref 2.7–4.5)
PLATELET # BLD AUTO: 419 K/UL (ref 150–450)
PMV BLD AUTO: 12 FL (ref 9.2–12.9)
POCT GLUCOSE: 105 MG/DL (ref 70–110)
POCT GLUCOSE: 112 MG/DL (ref 70–110)
POCT GLUCOSE: 116 MG/DL (ref 70–110)
POCT GLUCOSE: 126 MG/DL (ref 70–110)
POCT GLUCOSE: 88 MG/DL (ref 70–110)
POCT GLUCOSE: 90 MG/DL (ref 70–110)
POCT GLUCOSE: 93 MG/DL (ref 70–110)
POTASSIUM SERPL-SCNC: 4.5 MMOL/L (ref 3.5–5.1)
POTASSIUM SERPL-SCNC: 5.2 MMOL/L (ref 3.5–5.1)
POTASSIUM SERPL-SCNC: 5.2 MMOL/L (ref 3.5–5.1)
POTASSIUM SERPL-SCNC: 5.3 MMOL/L (ref 3.5–5.1)
PROT SERPL-MCNC: 5.5 G/DL (ref 6–8.4)
RBC # BLD AUTO: 4.73 M/UL (ref 4.6–6.2)
SODIUM SERPL-SCNC: 138 MMOL/L (ref 136–145)
SODIUM SERPL-SCNC: 140 MMOL/L (ref 136–145)
SODIUM SERPL-SCNC: 140 MMOL/L (ref 136–145)
WBC # BLD AUTO: 16.4 K/UL (ref 3.9–12.7)

## 2024-10-19 PROCEDURE — 85025 COMPLETE CBC W/AUTO DIFF WBC: CPT | Performed by: NURSE PRACTITIONER

## 2024-10-19 PROCEDURE — 94761 N-INVAS EAR/PLS OXIMETRY MLT: CPT

## 2024-10-19 PROCEDURE — 99900035 HC TECH TIME PER 15 MIN (STAT)

## 2024-10-19 PROCEDURE — 84100 ASSAY OF PHOSPHORUS: CPT | Performed by: NURSE PRACTITIONER

## 2024-10-19 PROCEDURE — 36415 COLL VENOUS BLD VENIPUNCTURE: CPT | Mod: XB | Performed by: NURSE PRACTITIONER

## 2024-10-19 PROCEDURE — 99900026 HC AIRWAY MAINTENANCE (STAT)

## 2024-10-19 PROCEDURE — 25000003 PHARM REV CODE 250: Performed by: NURSE PRACTITIONER

## 2024-10-19 PROCEDURE — 63600175 PHARM REV CODE 636 W HCPCS: Performed by: NURSE PRACTITIONER

## 2024-10-19 PROCEDURE — 25000003 PHARM REV CODE 250: Performed by: INTERNAL MEDICINE

## 2024-10-19 PROCEDURE — 84132 ASSAY OF SERUM POTASSIUM: CPT | Performed by: NURSE PRACTITIONER

## 2024-10-19 PROCEDURE — 36415 COLL VENOUS BLD VENIPUNCTURE: CPT | Performed by: NURSE PRACTITIONER

## 2024-10-19 PROCEDURE — 80053 COMPREHEN METABOLIC PANEL: CPT | Performed by: NURSE PRACTITIONER

## 2024-10-19 PROCEDURE — C9399 UNCLASSIFIED DRUGS OR BIOLOG: HCPCS | Performed by: NURSE PRACTITIONER

## 2024-10-19 PROCEDURE — 20000000 HC ICU ROOM

## 2024-10-19 PROCEDURE — 80048 BASIC METABOLIC PNL TOTAL CA: CPT | Mod: XB | Performed by: NURSE PRACTITIONER

## 2024-10-19 PROCEDURE — 80069 RENAL FUNCTION PANEL: CPT | Performed by: NURSE PRACTITIONER

## 2024-10-19 PROCEDURE — 27100171 HC OXYGEN HIGH FLOW UP TO 24 HOURS

## 2024-10-19 PROCEDURE — 83735 ASSAY OF MAGNESIUM: CPT | Performed by: NURSE PRACTITIONER

## 2024-10-19 PROCEDURE — 94003 VENT MGMT INPAT SUBQ DAY: CPT

## 2024-10-19 RX ORDER — ENOXAPARIN SODIUM 100 MG/ML
30 INJECTION SUBCUTANEOUS EVERY 24 HOURS
Status: DISCONTINUED | OUTPATIENT
Start: 2024-10-19 | End: 2024-11-05 | Stop reason: HOSPADM

## 2024-10-19 RX ADMIN — FAMOTIDINE 20 MG: 10 INJECTION, SOLUTION INTRAVENOUS at 09:10

## 2024-10-19 RX ADMIN — CHLORHEXIDINE GLUCONATE 0.12% ORAL RINSE 15 ML: 1.2 LIQUID ORAL at 09:10

## 2024-10-19 RX ADMIN — DEXMEDETOMIDINE HYDROCHLORIDE 0.6 MCG/KG/HR: 4 INJECTION INTRAVENOUS at 04:10

## 2024-10-19 RX ADMIN — MUPIROCIN: 20 OINTMENT TOPICAL at 08:10

## 2024-10-19 RX ADMIN — FAMOTIDINE 20 MG: 10 INJECTION, SOLUTION INTRAVENOUS at 08:10

## 2024-10-19 RX ADMIN — FENTANYL CITRATE 150 MCG/HR: 50 INJECTION INTRAVENOUS at 12:10

## 2024-10-19 RX ADMIN — CHLORHEXIDINE GLUCONATE 0.12% ORAL RINSE 15 ML: 1.2 LIQUID ORAL at 08:10

## 2024-10-19 RX ADMIN — LEVETIRACETAM 2000 MG: 500 INJECTION, SOLUTION INTRAVENOUS at 08:10

## 2024-10-19 RX ADMIN — PHENOBARBITAL SODIUM 65 MG: 65 INJECTION INTRAMUSCULAR at 08:10

## 2024-10-19 RX ADMIN — LEVETIRACETAM 2000 MG: 500 INJECTION, SOLUTION INTRAVENOUS at 09:10

## 2024-10-19 RX ADMIN — MUPIROCIN: 20 OINTMENT TOPICAL at 09:10

## 2024-10-19 RX ADMIN — PHENOBARBITAL SODIUM 65 MG: 65 INJECTION INTRAMUSCULAR at 09:10

## 2024-10-19 RX ADMIN — ENOXAPARIN SODIUM 30 MG: 30 INJECTION SUBCUTANEOUS at 04:10

## 2024-10-19 NOTE — PLAN OF CARE
VSS this shift. Fentanyl and precedex gtts continued and titrated to RASS goal. Lancaster catheter in place. Wound vac in tact. NGT to LIS. POC reviewed with mother per provider. Safety precautions in place.

## 2024-10-19 NOTE — PROGRESS NOTES
[facility])  General Surgery  Progress Note    Subjective:     History of Present Illness:  23-year-old male with a history of several palsy presented to the emergency room with hypoxia which was felt to be secondary to aspiration.  According to the emergency room notes the patient required CPAP in route.  In the emergency room he was noted to have some abdominal distention.  The patient was intubated and subsequently admitted to the intensive care unit.      The patient does have a history of bowel obstructions but they may have been related constipation.    Post-Op Info:  Procedure(s) (LRB):  LAPAROTOMY, EXPLORATORY (N/A)  BLOCK, TRANSVERSUS ABDOMINIS PLANE (N/A)  GASTRECTOMY, PARTIAL (N/A)  EXCISION, SMALL INTESTINE (N/A)  APPLICATION, WOUND VAC (N/A)   1 Day Post-Op     Interval History:  Off Levophed overnight.  Persistently tachycardic.  500 cc serosanguineous output from ABThera.  NG tube with 525 bilious output    Medications:  Continuous Infusions:   dexmedeTOMIDine (Precedex) infusion (titrating)  0-1.4 mcg/kg/hr Intravenous Continuous 2.26 mL/hr at 10/19/24 0600 0.2 mcg/kg/hr at 10/19/24 0600    fentanyl  0-250 mcg/hr Intravenous Continuous 12.5 mL/hr at 10/19/24 0600 125 mcg/hr at 10/19/24 0600    NORepinephrine bitartrate-D5W  0-3 mcg/kg/min Intravenous Continuous   Stopped at 10/18/24 1933     Scheduled Meds:   chlorhexidine  15 mL Mouth/Throat BID    cloNIDine 0.1 mg/24 hr td ptwk  1 patch Transdermal Q7 Days    famotidine (PF)  20 mg Intravenous BID    levETIRAcetam (Keppra) IV (PEDS and ADULTS)  2,000 mg Intravenous Q12H    mupirocin   Nasal BID    PHENObarbital  65 mg Intravenous BID     PRN Meds:  Current Facility-Administered Medications:     albuterol-ipratropium, 3 mL, Nebulization, Q6H PRN    dextrose 10%, 12.5 g, Intravenous, PRN    dextrose 10%, 25 g, Intravenous, PRN    glucagon (human recombinant), 1 mg, Intramuscular, PRN    insulin aspart U-100, 0-10 Units,  Subcutaneous, Q4H PRN    magnesium sulfate IVPB, 2 g, Intravenous, PRN    magnesium sulfate IVPB, 4 g, Intravenous, PRN    potassium chloride, 40 mEq, Intravenous, PRN **AND** potassium chloride, 60 mEq, Intravenous, PRN **AND** potassium chloride, 80 mEq, Intravenous, PRN    sodium phosphate 15 mmol in D5W 250 mL IVPB, 15 mmol, Intravenous, PRN    sodium phosphate 20.01 mmol in D5W 250 mL IVPB, 20.01 mmol, Intravenous, PRN    sodium phosphate 30 mmol in D5W 250 mL IVPB, 30 mmol, Intravenous, PRN     Review of patient's allergies indicates:   Allergen Reactions    Strawberries [strawberry] Hives     STRAWBERRIES ALLERGIC REACTIONS   (SEIZURES AND HIVES )     Objective:     Vital Signs (Most Recent):  Temp: 98.2 °F (36.8 °C) (10/19/24 0301)  Pulse: 110 (10/19/24 0802)  Resp: 17 (10/19/24 0802)  BP: 121/71 (10/19/24 0630)  SpO2: 100 % (10/19/24 0802) Vital Signs (24h Range):  Temp:  [97.7 °F (36.5 °C)-98.4 °F (36.9 °C)] 98.2 °F (36.8 °C)  Pulse:  [] 110  Resp:  [15-31] 17  SpO2:  [97 %-100 %] 100 %  BP: ()/(49-98) 121/71     Weight: 46.6 kg (102 lb 11.8 oz)  Body mass index is 17.63 kg/m².    Intake/Output - Last 3 Shifts         10/17 0700  10/18 0659 10/18 0700  10/19 0659 10/19 0700  10/20 0659    I.V. (mL/kg) 345.8 (7.4) 312.1 (6.7)     IV Piggyback 2522.6 2750.6     Total Intake(mL/kg) 2868.4 (61.7) 3062.7 (65.7)     Urine (mL/kg/hr) 740 (0.7) 466 (0.4)     Drains 1700 525     Other  775     Stool 0      Total Output 2440 1766     Net +428.4 +1296.7            Stool Occurrence 1 x               Physical Exam  Constitutional:       Appearance: He is ill-appearing.   Eyes:      Pupils: Pupils are equal, round, and reactive to light.   Cardiovascular:      Rate and Rhythm: Tachycardia present.   Pulmonary:      Comments: Intubated, mechanically ventilated.  No distress  Abdominal:      Comments: ABThera in place.  Serosanguineous output in canister.  Abdomen tender to palpation.  No peritonitis.  Good  seal on device.   Musculoskeletal:      Comments: Upper and lower extremity contractures          Significant Labs:  I have reviewed all pertinent lab results within the past 24 hours.  CBC:   Recent Labs   Lab 10/19/24  0428   WBC 16.40*   RBC 4.73   HGB 12.5*   HCT 37.9*      MCV 80*   MCH 26.4*   MCHC 33.0     CMP:   Recent Labs   Lab 10/19/24  0428   *   CALCIUM 8.4*   ALBUMIN 2.1*   PROT 5.5*      K 5.2*   CO2 23      BUN 33*   CREATININE 1.0   ALKPHOS 74   ALT 23   AST 29   BILITOT 1.3*       Significant Diagnostics:  I have reviewed all pertinent imaging results/findings within the past 24 hours.  Assessment/Plan:     * Acute hypercapnic respiratory failure  Management per critical care.    Patient is currently intubated     MAYELIN (acute kidney injury)  Per critical Care    Bowel obstruction  POD#1 s/p ex lap, extensive lysis of adhesions, small bowel resection, takedown of gastrostomy tube with partial gastrectomy, abdomen left open in discontinuity with abthera in place    - strict NPO.  No meds per NG tube  - will plan for exploratory laparotomy Monday 10/21.  Tentative plan is to proceed with small-bowel anastomosis and gastrostomy tube placement  - have discussed with mom descending colostomy giving rectosigmoid injury during surgery.  She is considering.    Aspiration pneumonia  Management per critical care    PEG (percutaneous endoscopic gastrostomy) status  Removed intraoperatively.  We will plan for redo gastrostomy tube at next surgery    Nonverbal  Monitor    Legally blind  Per critical care    Intellectual disability with epilepsy  Per critical care    Spastic quadriplegic cerebral palsy  Further management critical care medicine        Anna Marie Mo MD  General Surgery  [facility] ([facility])

## 2024-10-19 NOTE — SUBJECTIVE & OBJECTIVE
Interval History:  Off Levophed overnight.  Persistently tachycardic.  500 cc serosanguineous output from ABThera.  NG tube with 525 bilious output    Medications:  Continuous Infusions:   dexmedeTOMIDine (Precedex) infusion (titrating)  0-1.4 mcg/kg/hr Intravenous Continuous 2.26 mL/hr at 10/19/24 0600 0.2 mcg/kg/hr at 10/19/24 0600    fentanyl  0-250 mcg/hr Intravenous Continuous 12.5 mL/hr at 10/19/24 0600 125 mcg/hr at 10/19/24 0600    NORepinephrine bitartrate-D5W  0-3 mcg/kg/min Intravenous Continuous   Stopped at 10/18/24 1933     Scheduled Meds:   chlorhexidine  15 mL Mouth/Throat BID    cloNIDine 0.1 mg/24 hr td ptwk  1 patch Transdermal Q7 Days    famotidine (PF)  20 mg Intravenous BID    levETIRAcetam (Keppra) IV (PEDS and ADULTS)  2,000 mg Intravenous Q12H    mupirocin   Nasal BID    PHENObarbital  65 mg Intravenous BID     PRN Meds:  Current Facility-Administered Medications:     albuterol-ipratropium, 3 mL, Nebulization, Q6H PRN    dextrose 10%, 12.5 g, Intravenous, PRN    dextrose 10%, 25 g, Intravenous, PRN    glucagon (human recombinant), 1 mg, Intramuscular, PRN    insulin aspart U-100, 0-10 Units, Subcutaneous, Q4H PRN    magnesium sulfate IVPB, 2 g, Intravenous, PRN    magnesium sulfate IVPB, 4 g, Intravenous, PRN    potassium chloride, 40 mEq, Intravenous, PRN **AND** potassium chloride, 60 mEq, Intravenous, PRN **AND** potassium chloride, 80 mEq, Intravenous, PRN    sodium phosphate 15 mmol in D5W 250 mL IVPB, 15 mmol, Intravenous, PRN    sodium phosphate 20.01 mmol in D5W 250 mL IVPB, 20.01 mmol, Intravenous, PRN    sodium phosphate 30 mmol in D5W 250 mL IVPB, 30 mmol, Intravenous, PRN     Review of patient's allergies indicates:   Allergen Reactions    Strawberries [strawberry] Hives     STRAWBERRIES ALLERGIC REACTIONS   (SEIZURES AND HIVES )     Objective:     Vital Signs (Most Recent):  Temp: 98.2 °F (36.8 °C) (10/19/24 0301)  Pulse: 110 (10/19/24 0802)  Resp: 17 (10/19/24 0802)  BP: 121/71  (10/19/24 0630)  SpO2: 100 % (10/19/24 0802) Vital Signs (24h Range):  Temp:  [97.7 °F (36.5 °C)-98.4 °F (36.9 °C)] 98.2 °F (36.8 °C)  Pulse:  [] 110  Resp:  [15-31] 17  SpO2:  [97 %-100 %] 100 %  BP: ()/(49-98) 121/71     Weight: 46.6 kg (102 lb 11.8 oz)  Body mass index is 17.63 kg/m².    Intake/Output - Last 3 Shifts         10/17 0700  10/18 0659 10/18 0700  10/19 0659 10/19 0700  10/20 0659    I.V. (mL/kg) 345.8 (7.4) 312.1 (6.7)     IV Piggyback 2522.6 2750.6     Total Intake(mL/kg) 2868.4 (61.7) 3062.7 (65.7)     Urine (mL/kg/hr) 740 (0.7) 466 (0.4)     Drains 1700 525     Other  775     Stool 0      Total Output 2440 1766     Net +428.4 +1296.7            Stool Occurrence 1 x               Physical Exam  Constitutional:       Appearance: He is ill-appearing.   Eyes:      Pupils: Pupils are equal, round, and reactive to light.   Cardiovascular:      Rate and Rhythm: Tachycardia present.   Pulmonary:      Comments: Intubated, mechanically ventilated.  No distress  Abdominal:      Comments: ABThera in place.  Serosanguineous output in canister.  Abdomen tender to palpation.  No peritonitis.  Good seal on device.   Musculoskeletal:      Comments: Upper and lower extremity contractures          Significant Labs:  I have reviewed all pertinent lab results within the past 24 hours.  CBC:   Recent Labs   Lab 10/19/24  0428   WBC 16.40*   RBC 4.73   HGB 12.5*   HCT 37.9*      MCV 80*   MCH 26.4*   MCHC 33.0     CMP:   Recent Labs   Lab 10/19/24  0428   *   CALCIUM 8.4*   ALBUMIN 2.1*   PROT 5.5*      K 5.2*   CO2 23      BUN 33*   CREATININE 1.0   ALKPHOS 74   ALT 23   AST 29   BILITOT 1.3*       Significant Diagnostics:  I have reviewed all pertinent imaging results/findings within the past 24 hours.

## 2024-10-19 NOTE — ASSESSMENT & PLAN NOTE
POD#1 s/p ex lap, extensive lysis of adhesions, small bowel resection, takedown of gastrostomy tube with partial gastrectomy, abdomen left open in discontinuity with abthera in place    - strict NPO.  No meds per NG tube  - will plan for exploratory laparotomy Monday 10/21.  Tentative plan is to proceed with small-bowel anastomosis and gastrostomy tube placement  - have discussed with mom descending colostomy giving rectosigmoid injury during surgery.  She is considering.

## 2024-10-19 NOTE — PROGRESS NOTES
[facility] ([facility])  Critical Care Medicine  Progress Note    Patient Name: Ronny Ramey  MRN: [id number]  Admission Date: 10/16/2024  Hospital Length of Stay: 3 days  Code Status: Full Code  Attending Provider: Sandee Hardin MD  Primary Care Provider: Herman Nathan DNP   Principal Problem: Acute hypercapnic respiratory failure    Subjective:     HPI:  Ronny Ramey is a 23 yr old male [facility] resident with CP, spastic quadriplegia, s/p G tube, epilepsy who presented to [facility] ED on 10/16 with shortness of breath/respiratory distress. Patient was placed on CPAP with little improvement. KUB shows small bowel obstruction. Patient was intubated and transferred to [facility]. Critical care consulted for admission. Patient with history of admissions for bowel obstruction and aspiration pneumonia.     Hospital/ICU Course:  10/17/2024: Patient with decreasing UOP overnight. Renal function worsening. Small bolus given with little response. Additional boluses today. HTN overnight, clonidine patch started. Continue bowel rest. NGT advanced per Abd XR, continued bowel obstruction on XR as well as fecal impaction. Will attempt to disimpact. Gen Surg following. Propofol switched to precedex for vent weaning.  10/18/2024: XR small bowel FT shows high-grade small bowel obstruction. Gen surg planning for ex lap today. Will keep intubated for procedure. Cultures NGTD, antibiotics stopped.  10/19/2024: Patient s/p ex lap with extensive lysis of adhesions, small bowel resection, takedown of gastrostomy tube with partial gastrectomy, abdomen left open in discontinuity with abthera in place. Gen Surg plans to take for exploratory laparotomy Monday 10/21 for small-bowel anastomosis and gastrostomy tube placement. Keeping intubated for surgery.     Interval History: Patient sedated, intubated.      Objective:     Vital Signs (Most Recent):  Temp: 98.2 °F (36.8 °C) (10/19/24  0301)  Pulse: 104 (10/19/24 1023)  Resp: 17 (10/19/24 1023)  BP: 124/81 (10/19/24 0934)  SpO2: 100 % (10/19/24 1023) Vital Signs (24h Range):  Temp:  [97.7 °F (36.5 °C)-98.4 °F (36.9 °C)] 98.2 °F (36.8 °C)  Pulse:  [] 104  Resp:  [15-33] 17  SpO2:  [97 %-100 %] 100 %  BP: ()/(49-98) 124/81     Weight: 46.6 kg (102 lb 11.8 oz)  Body mass index is 17.63 kg/m².      Intake/Output Summary (Last 24 hours) at 10/19/2024 1051  Last data filed at 10/19/2024 0900  Gross per 24 hour   Intake 2887.61 ml   Output 1736 ml   Net 1151.61 ml        Physical Exam  Constitutional:       Appearance: He is ill-appearing.   HENT:      Head: Atraumatic.      Nose: Nose normal.      Comments: NGT in place     Mouth/Throat:      Mouth: Mucous membranes are moist.   Cardiovascular:      Rate and Rhythm: Regular rhythm. Tachycardia present.      Pulses: Normal pulses.      Heart sounds: Normal heart sounds.   Pulmonary:      Effort: Pulmonary effort is normal. No respiratory distress.      Breath sounds: Normal breath sounds.   Abdominal:      General: Bowel sounds are decreased. There is distension.      Comments: Abdominal wound vac in place, CDI   Musculoskeletal:      Comments: BUE contractures  BLE contractures   Skin:     General: Skin is warm and dry.   Neurological:      Mental Status: Mental status is at baseline.      Comments: Baseline nonverbal, legally blind           Review of Systems   Unable to perform ROS: Intubated       Vents:  Vent Mode: A/C (10/19/24 1023)  Ventilator Initiated: Yes (10/17/24 0102)  Set Rate: 16 BPM (10/19/24 1023)  Vt Set: 350 mL (10/19/24 1023)  Pressure Support: 0 cmH20 (10/18/24 0753)  PEEP/CPAP: 5 cmH20 (10/19/24 1023)  Oxygen Concentration (%): 30 (10/19/24 1023)  Peak Airway Pressure: 26 cmH20 (10/19/24 1023)  Plateau Pressure: 15 cmH20 (10/19/24 1023)  Total Ve: 5.68 L/m (10/19/24 1023)  Negative Inspiratory Force (cm H2O): 0 (10/19/24 1023)  F/VT Ratio<105 (RSBI): (!) 47.89 (10/19/24  1023)    Lines/Drains/Airways       Drain  Duration                  Gastrostomy/Enterostomy 10/18/20 1130 LUQ 1461 days         NG/OG Tube 10/16/24 0515 16 Fr. Right nostril 3 days         Urethral Catheter 10/16/24 0156 Double-lumen 16 Fr. 3 days              Airway  Duration                  Airway - Non-Surgical 10/16/24 0530 Endotracheal Tube 3 days              Peripheral Intravenous Line  Duration                  Peripheral IV - Single Lumen 10/16/24 0120 22 G Posterior;Right Forearm 3 days         Peripheral IV - Single Lumen 10/16/24 0636 22 G Left;Posterior Hand 3 days                    Significant Labs:    CBC/Anemia Profile:  Recent Labs   Lab 10/18/24  0346 10/18/24  1722 10/19/24  0428   WBC 12.43 17.57* 16.40*   HGB 14.5 13.7* 12.5*   HCT 45.2 42.3 37.9*    334 419   MCV 80* 80* 80*   RDW 13.6 13.7 13.3        Chemistries:  Recent Labs   Lab 10/17/24  1200 10/18/24  0346 10/18/24  1722 10/19/24  0428 10/19/24  0820    140 137 138  --    K 4.4 3.8 4.3 5.2* 5.2*    105 102 108  --    CO2 24 24 22* 23  --    BUN 32* 28* 27* 33*  --    CREATININE 1.5* 1.1 1.1 1.0  --    CALCIUM 8.2* 9.0 7.6* 8.4*  --    ALBUMIN 2.5* 2.6* 2.0* 2.1*  --    PROT  --  6.6 4.6* 5.5*  --    BILITOT  --  0.4 1.3* 1.3*  --    ALKPHOS  --  106 76 74  --    ALT  --  35 26 23  --    AST  --  17 21 29  --    MG  --  2.1  --  2.0  --    PHOS 5.5* 3.8  --  3.8  --        All pertinent labs within the past 24 hours have been reviewed.    Significant Imaging:  I have reviewed all pertinent imaging results/findings within the past 24 hours.    ABG  Recent Labs   Lab 10/18/24  1638   PH 7.403   PO2 125*   PCO2 40.6   HCO3 25.3   BE 1     Assessment/Plan:     Neuro  Intellectual disability with epilepsy  Home AEDs restarted: Keppra and phenobarb  Monitor Keppra dosing with renal function    Spastic quadriplegic cerebral palsy  Turn q 2hrs  Aspiration precautions  Baclofen on hold due to bowel rest    Pulmonary  * Acute  hypercapnic respiratory failure  Patient with Hypercapnic Respiratory failure which is Acute on chronic. Unclear if on O2 at the NH. Supplemental oxygen was provided and noted-     Vent Mode: A/C  Oxygen Concentration (%):  [30] 30  Resp Rate Total:  [16 br/min-28 br/min] 16 br/min  Vt Set:  [350 mL] 350 mL  PEEP/CPAP:  [5 cmH20] 5 cmH20  Mean Airway Pressure:  [8.2 cmH20-8.9 cmH20] 8.4 cmH20    .   Signs/symptoms of respiratory failure include- tachypnea, increased work of breathing, respiratory distress, and use of accessory muscles. Contributing diagnoses includes - Aspiration Labs and images were reviewed. Patient Has recent ABG, which has been reviewed. Will treat underlying causes and adjust management of respiratory failure as follows-     Wean vent settings as tolerated  Broad spectrum antibiotics- stopped, cultures NGTD  Duonebs prn  Sedation: Precedex, Fentanyl  Will keep intubated for surgeries planned  Bowel rest-NPO    Aspiration pneumonia  Ruled out    Renal/  MAYELIN (acute kidney injury)  MAYELIN is likely due to pre-renal azotemia due to intravascular volume depletion. Baseline creatinine is  0.8 . Most recent creatinine and eGFR are listed below.  Recent Labs     10/18/24  0346 10/18/24  1722 10/19/24  0428   CREATININE 1.1 1.1 1.0   EGFRNORACEVR >60 >60 >60        Plan  - Avoid nephrotoxins and renally dose meds for GFR listed above  - Monitor urine output, serial BMP, and adjust therapy as needed  - Maintain [place] catheter- IVF boluses prn  - Resolved after fluid resuscitation  - Goal UOP 0.5ml/kg/hr    Endocrine  Body mass index (BMI) less than 19  Nutrition consult for Tube feeding recs  NPO currently due to bowel obstruction    GI  Bowel obstruction  KUB shows small bowel obstruction  NGT to suction  Keep NPO  CT abd/pelvis shows high grade bowel obstruction  Gen Surg consulted  Fecal impaction on Abd XR- disimpacted  SBFT XR shows high-grade small bowel obstruction    Under went Ex lap 10/18 with  extensive lysis of adhesions, small bowel resection, takedown of gastrostomy tube with partial gastrectomy, abdomen left open in discontinuity with abthera in place  Gen Surg planning for ex lap on 10/21 for small-bowel anastomosis and G tube placement    PEG (percutaneous endoscopic gastrostomy) status  Patient noted to have a percutaneous endoscopic gastrostomy tube in place. I have personally inspected the tube.Tube was placed on this admission, with date of procedure- 10/16  There are no signs of drainage or infection around the site. Routine care to be done by wound care and nursing staff.     Tube feedings on hold due to Bowel obstruction  Does not currently have a G tube post ex lap  Ex lap planned for Monday 10/21 with possible G tube        DVT PPX: LVX  PUP: H2B    Critical Care Time: 35 minutes  Critical secondary to Patient has a condition that poses threat to life and bodily function: Small bowel obstruction, respiratory failure on mechanical ventilation      Critical care was time spent personally by me on the following activities: development of treatment plan with patient or surrogate and bedside caregivers, discussions with consultants, evaluation of patient's response to treatment, examination of patient, ordering and performing treatments and interventions, ordering and review of laboratory studies, ordering and review of radiographic studies, pulse oximetry, re-evaluation of patient's condition. This critical care time did not overlap with that of any other provider or involve time for any procedures.     Amanda Francis NP  Critical Care Medicine  [facility] ([facility])

## 2024-10-19 NOTE — PLAN OF CARE
Problem: Infection  Goal: Absence of Infection Signs and Symptoms  Outcome: Progressing     Problem: Skin Injury Risk Increased  Goal: Skin Health and Integrity  Outcome: Progressing     Problem: Adult Inpatient Plan of Care  Goal: Plan of Care Review  Outcome: Progressing  Goal: Patient-Specific Goal (Individualized)  Outcome: Progressing  Goal: Absence of Hospital-Acquired Illness or Injury  Outcome: Progressing  Goal: Optimal Comfort and Wellbeing  Outcome: Progressing  Goal: Readiness for Transition of Care  Outcome: Progressing     Problem: Fall Injury Risk  Goal: Absence of Fall and Fall-Related Injury  Outcome: Progressing     Problem: Wound  Goal: Optimal Coping  Outcome: Progressing  Goal: Optimal Functional Ability  Outcome: Progressing  Goal: Absence of Infection Signs and Symptoms  Outcome: Progressing  Goal: Improved Oral Intake  Outcome: Progressing  Goal: Optimal Pain Control and Function  Outcome: Progressing  Goal: Skin Health and Integrity  Outcome: Progressing  Goal: Optimal Wound Healing  Outcome: Progressing     Problem: Acute Kidney Injury/Impairment  Goal: Fluid and Electrolyte Balance  Outcome: Progressing  Goal: Improved Oral Intake  Outcome: Progressing  Goal: Effective Renal Function  Outcome: Progressing     Problem: Mechanical Ventilation Invasive  Goal: Effective Communication  Outcome: Progressing  Goal: Optimal Device Function  Outcome: Progressing  Goal: Mechanical Ventilation Liberation  Outcome: Progressing  Goal: Optimal Nutrition Delivery  Outcome: Progressing  Goal: Absence of Device-Related Skin and Tissue Injury  Outcome: Progressing  Goal: Absence of Ventilator-Induced Lung Injury  Outcome: Progressing     Problem: Artificial Airway  Goal: Effective Communication  Outcome: Progressing  Goal: Optimal Device Function  Outcome: Progressing  Goal: Absence of Device-Related Skin or Tissue Injury  Outcome: Progressing

## 2024-10-19 NOTE — ASSESSMENT & PLAN NOTE
Patient with Hypercapnic Respiratory failure which is Acute on chronic. Unclear if on O2 at the NH. Supplemental oxygen was provided and noted-     Vent Mode: A/C  Oxygen Concentration (%):  [30] 30  Resp Rate Total:  [16 br/min-28 br/min] 16 br/min  Vt Set:  [350 mL] 350 mL  PEEP/CPAP:  [5 cmH20] 5 cmH20  Mean Airway Pressure:  [8.2 cmH20-8.9 cmH20] 8.4 cmH20    .   Signs/symptoms of respiratory failure include- tachypnea, increased work of breathing, respiratory distress, and use of accessory muscles. Contributing diagnoses includes - Aspiration Labs and images were reviewed. Patient Has recent ABG, which has been reviewed. Will treat underlying causes and adjust management of respiratory failure as follows-     Wean vent settings as tolerated  Broad spectrum antibiotics- stopped, cultures NGTD  Duonebs prn  Sedation: Precedex, Fentanyl  Will keep intubated for surgeries planned  Bowel rest-NPO

## 2024-10-19 NOTE — ASSESSMENT & PLAN NOTE
MAYELIN is likely due to pre-renal azotemia due to intravascular volume depletion. Baseline creatinine is  0.8 . Most recent creatinine and eGFR are listed below.  Recent Labs     10/18/24  0346 10/18/24  1722 10/19/24  0428   CREATININE 1.1 1.1 1.0   EGFRNORACEVR >60 >60 >60        Plan  - Avoid nephrotoxins and renally dose meds for GFR listed above  - Monitor urine output, serial BMP, and adjust therapy as needed  - Maintain [place] catheter- IVF boluses prn  - Resolved after fluid resuscitation  - Goal UOP 0.5ml/kg/hr

## 2024-10-19 NOTE — ASSESSMENT & PLAN NOTE
KUB shows small bowel obstruction  NGT to suction  Keep NPO  CT abd/pelvis shows high grade bowel obstruction  Gen Surg consulted  Fecal impaction on Abd XR- disimpacted  SBFT XR shows high-grade small bowel obstruction    Under went Ex lap 10/18 with extensive lysis of adhesions, small bowel resection, takedown of gastrostomy tube with partial gastrectomy, abdomen left open in discontinuity with abthera in place  Gen Surg planning for ex lap on 10/21 for small-bowel anastomosis and G tube placement

## 2024-10-19 NOTE — SUBJECTIVE & OBJECTIVE
Interval History: Patient sedated, intubated.      Objective:     Vital Signs (Most Recent):  Temp: 98.2 °F (36.8 °C) (10/19/24 0301)  Pulse: 104 (10/19/24 1023)  Resp: 17 (10/19/24 1023)  BP: 124/81 (10/19/24 0934)  SpO2: 100 % (10/19/24 1023) Vital Signs (24h Range):  Temp:  [97.7 °F (36.5 °C)-98.4 °F (36.9 °C)] 98.2 °F (36.8 °C)  Pulse:  [] 104  Resp:  [15-33] 17  SpO2:  [97 %-100 %] 100 %  BP: ()/(49-98) 124/81     Weight: 46.6 kg (102 lb 11.8 oz)  Body mass index is 17.63 kg/m².      Intake/Output Summary (Last 24 hours) at 10/19/2024 1051  Last data filed at 10/19/2024 0900  Gross per 24 hour   Intake 2887.61 ml   Output 1736 ml   Net 1151.61 ml        Physical Exam  Constitutional:       Appearance: He is ill-appearing.   HENT:      Head: Atraumatic.      Nose: Nose normal.      Comments: NGT in place     Mouth/Throat:      Mouth: Mucous membranes are moist.   Cardiovascular:      Rate and Rhythm: Regular rhythm. Tachycardia present.      Pulses: Normal pulses.      Heart sounds: Normal heart sounds.   Pulmonary:      Effort: Pulmonary effort is normal. No respiratory distress.      Breath sounds: Normal breath sounds.   Abdominal:      General: Bowel sounds are decreased. There is distension.      Comments: Abdominal wound vac in place, CDI   Musculoskeletal:      Comments: BUE contractures  BLE contractures   Skin:     General: Skin is warm and dry.   Neurological:      Mental Status: Mental status is at baseline.      Comments: Baseline nonverbal, legally blind           Review of Systems   Unable to perform ROS: Intubated       Vents:  Vent Mode: A/C (10/19/24 1023)  Ventilator Initiated: Yes (10/17/24 0102)  Set Rate: 16 BPM (10/19/24 1023)  Vt Set: 350 mL (10/19/24 1023)  Pressure Support: 0 cmH20 (10/18/24 0753)  PEEP/CPAP: 5 cmH20 (10/19/24 1023)  Oxygen Concentration (%): 30 (10/19/24 1023)  Peak Airway Pressure: 26 cmH20 (10/19/24 1023)  Plateau Pressure: 15 cmH20 (10/19/24 1023)  Total  Ve: 5.68 L/m (10/19/24 1023)  Negative Inspiratory Force (cm H2O): 0 (10/19/24 1023)  F/VT Ratio<105 (RSBI): (!) 47.89 (10/19/24 1023)    Lines/Drains/Airways       Drain  Duration                  Gastrostomy/Enterostomy 10/18/20 1130 LUQ 1461 days         NG/OG Tube 10/16/24 0515 16 Fr. Right nostril 3 days         Urethral Catheter 10/16/24 0156 Double-lumen 16 Fr. 3 days              Airway  Duration                  Airway - Non-Surgical 10/16/24 0530 Endotracheal Tube 3 days              Peripheral Intravenous Line  Duration                  Peripheral IV - Single Lumen 10/16/24 0120 22 G Posterior;Right Forearm 3 days         Peripheral IV - Single Lumen 10/16/24 0636 22 G Left;Posterior Hand 3 days                    Significant Labs:    CBC/Anemia Profile:  Recent Labs   Lab 10/18/24  0346 10/18/24  1722 10/19/24  0428   WBC 12.43 17.57* 16.40*   HGB 14.5 13.7* 12.5*   HCT 45.2 42.3 37.9*    334 419   MCV 80* 80* 80*   RDW 13.6 13.7 13.3        Chemistries:  Recent Labs   Lab 10/17/24  1200 10/18/24  0346 10/18/24  1722 10/19/24  0428 10/19/24  0820    140 137 138  --    K 4.4 3.8 4.3 5.2* 5.2*    105 102 108  --    CO2 24 24 22* 23  --    BUN 32* 28* 27* 33*  --    CREATININE 1.5* 1.1 1.1 1.0  --    CALCIUM 8.2* 9.0 7.6* 8.4*  --    ALBUMIN 2.5* 2.6* 2.0* 2.1*  --    PROT  --  6.6 4.6* 5.5*  --    BILITOT  --  0.4 1.3* 1.3*  --    ALKPHOS  --  106 76 74  --    ALT  --  35 26 23  --    AST  --  17 21 29  --    MG  --  2.1  --  2.0  --    PHOS 5.5* 3.8  --  3.8  --        All pertinent labs within the past 24 hours have been reviewed.    Significant Imaging:  I have reviewed all pertinent imaging results/findings within the past 24 hours.

## 2024-10-19 NOTE — ASSESSMENT & PLAN NOTE
Assessment:     78year old male who presented with status epilepticus, resolved. Hx of epilepsy, schizophrenia and PD. cEEG with diffuse slowing and poorly organized, negative for epileptiform activity or seizures. No seizure activity overnight per nursing. Rest tremor in BUE secondary to PD. Extubated yesterday. Code S yesterday for slurred speech and right sided facial droop. Evaluated by teleneuro. CT of head negative for acute intracranial abnormality. 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 CTP negative. Depacon and fosphenytoin were held. Rest tremor improved. No seizure overnight. Continue Keppra. Will resume Fosphenytoin. Discontinue Depacon. Patient is currently NPO, will start  mg rectally daily. Continue high intensity statin therapy. Recommend medical management of LICA stenosis. MRI of brain and labs pending. Plan:     NPO. Start  mg rectally. Continue high intensity statin. Continue Keppra to 1500 mg IV every 12 hours     Resume Fosphenytoin 100 mg IV every 8 hours    Discontinue Depacon. Total and free phenytoin level     MRI of brain pending. Recommend medical management of LICA stenosis. Subjective: Interval history:     Alert and oriented x3, able to follow commands and moves all extremities  spontaneously. Rest tremor in RUE. No seizure activity. Code S yesterday for slurred speech and right sided facial droop. Evaluated by teleneuro. CT of head negative for acute intracranial abnormality. CTA head/neck showed 45-31% stenosis of LICA, moderate stenosis of right PCA. CTP negative. No seizure overnight. Depacon and fosphenytoin were held Continue Keppra. Will resume Fosphenytoin. Currently, NPO. Start  mg rectally daily. MRI of brain and labs pending. History:    Dwain Garcia is a 78 y.o. male who is being seen for status epilepticus.     Review of systems negative with exception of pertinent Patient noted to have a percutaneous endoscopic gastrostomy tube in place. I have personally inspected the tube.Tube was placed on this admission, with date of procedure- 10/16  There are no signs of drainage or infection around the site. Routine care to be done by wound care and nursing staff.     Tube feedings on hold due to Bowel obstruction  Does not currently have a G tube post ex lap  Ex lap planned for Monday 10/21 with possible G tube       positives and negatives noted above.        Objective:     Vitals:    07/12/20 0100 07/12/20 0147 07/12/20 0614 07/12/20 0800   BP: 157/70 132/70 126/75 116/65   Pulse: 76 65 75 71   Resp: 24 18 17 18   Temp:  97.6 °F (36.4 °C) 98.1 °F (36.7 °C) 97.5 °F (36.4 °C)   SpO2: 99% 98% 98% 98%   Weight:              Current Facility-Administered Medications:     ampicillin-sulbactam (UNASYN) 3 g in 0.9% sodium chloride (MBP/ADV) 100 mL, 3 g, IntraVENous, Q6H, LydiaBryson MD, Last Rate: 200 mL/hr at 07/12/20 0914, 3 g at 07/12/20 0914    lactated Ringers infusion, 75 mL/hr, IntraVENous, CONTINUOUS, Shameka Richardson MD, Last Rate: 75 mL/hr at 07/12/20 0836, 75 mL/hr at 07/12/20 0836    insulin lispro (HUMALOG) injection 0-10 Units, 0-10 Units, SubCUTAneous, AC&HS, Shameka Richardson MD    dextrose 40% (GLUTOSE) oral gel 1 Tube, 15 g, Oral, PRN, Shameka Richardson MD    glucagon [facility]) injection 1 mg, 1 mg, IntraMUSCular, PRN, Shameka Richardson MD    dextrose (D50W) injection syrg 12.5-25 g, 25-50 mL, IntraVENous, PRN, Shameka Richardson MD    amantadine HCL (SYMMETREL) capsule 100 mg, 100 mg, Per NG tube, DAILY, Petra Najjar, MD, Stopped at 07/12/20 0900    atorvastatin (LIPITOR) tablet 40 mg, 40 mg, Per NG tube, QHS, Charity Crespo MD, Stopped at 07/11/20 2200    lisinopriL (PRINIVIL, ZESTRIL) tablet 10 mg, 10 mg, Per NG tube, DAILY, Petra Najjar, MD, Stopped at 07/12/20 0900    polyethylene glycol (MIRALAX) packet 17 g, 17 g, Per NG tube, DAILY, Charity Crespo MD, Stopped at 07/12/20 0900    propranoloL (INDERAL) tablet 10 mg, 10 mg, Per NG tube, DAILY, Petra Najjar, MD, Stopped at 07/12/20 0900    tamsulosin (FLOMAX) capsule 0.4 mg, 0.4 mg, Oral, DAILY, Bryson Freeman MD, Stopped at 07/10/20 0900    sodium chloride (NS) flush 5-40 mL, 5-40 mL, IntraVENous, Q8H, Bryson Freeman MD, 5 mL at 07/12/20 0531    sodium chloride (NS) flush 5-40 mL, 5-40 mL, IntraVENous, PRN, Bryson Freeman, MD    heparin (porcine) injection 5,000 Units, 5,000 Units, SubCUTAneous, Q8H, Sherrie Freeman MD, 5,000 Units at 07/12/20 0531    hydrALAZINE (APRESOLINE) 20 mg/mL injection 10 mg, 10 mg, IntraVENous, Q6H PRN, Sherrie Freeman MD, 10 mg at 07/10/20 0320    NUTRITIONAL SUPPORT ELECTROLYTE PRN ORDERS, , Does Not Apply, PRN, Sherrie Freeman MD    levETIRAcetam (KEPPRA) 1,500 mg in 0.9% sodium chloride 100 mL IVPB, 1,500 mg, IntraVENous, Q12H, McBurney, Polo Sea, MD, 1,500 mg at 07/12/20 0839    fosphenytoin (CEREBYX) 100 mg PE in 0.9% sodium chloride 100 mL IVPB, 100 mg PE, IntraVENous, Q8H, McBurney, Polo Sea, MD, Last Rate: 408 mL/hr at 07/11/20 1432, 100 mg PE at 07/11/20 1432    [Held by provider] valproate (DEPACON) 500 mg in 0.9% sodium chloride 50 mL IVPB, 500 mg, IntraVENous, Q8H, McBurney, Polo Sea, MD, Last Rate: 50 mL/hr at 07/11/20 1432, 500 mg at 07/11/20 1432    Recent Results (from the past 12 hour(s))   METABOLIC PANEL, BASIC    Collection Time: 07/11/20 11:46 PM   Result Value Ref Range    Sodium 136 136 - 145 mmol/L    Potassium 3.7 3.5 - 5.1 mmol/L    Chloride 104 98 - 107 mmol/L    CO2 23 21 - 32 mmol/L    Anion gap 9 7 - 16 mmol/L    Glucose 339 (H) 65 - 100 mg/dL    BUN 12 8 - 23 MG/DL    Creatinine 1.11 0.8 - 1.5 MG/DL    GFR est AA >60 >60 ml/min/1.73m2    GFR est non-AA >60 >60 ml/min/1.73m2    Calcium 7.4 (L) 8.3 - 10.4 MG/DL   CK    Collection Time: 07/11/20 11:46 PM   Result Value Ref Range     21 - 215 U/L   CK WITH MB    Collection Time: 07/11/20 11:46 PM   Result Value Ref Range    CK - MB 2.9 0.5 - 3.6 ng/ml    CK-MB Index 1.4 <2.5 %     21 - 215 U/L   TROPONIN-HIGH SENSITIVITY    Collection Time: 07/11/20 11:46 PM   Result Value Ref Range    Troponin-High Sensitivity 55.6 (HH) 0 - 14 pg/mL   PROCALCITONIN    Collection Time: 07/11/20 11:46 PM   Result Value Ref Range    Procalcitonin 0.19 ng/mL   GLUCOSE, POC    Collection Time: 07/12/20 12:40 AM   Result Value Ref Range Glucose (POC) 108 (H) 65 - 100 mg/dL   GLUCOSE, POC    Collection Time: 07/12/20  5:29 AM   Result Value Ref Range    Glucose (POC) 116 (H) 65 - 100 mg/dL   MAGNESIUM    Collection Time: 07/12/20  6:47 AM   Result Value Ref Range    Magnesium 2.0 1.8 - 2.4 mg/dL   PHOSPHORUS    Collection Time: 07/12/20  6:47 AM   Result Value Ref Range    Phosphorus 2.7 2.3 - 3.7 MG/DL   METABOLIC PANEL, COMPREHENSIVE    Collection Time: 07/12/20  6:47 AM   Result Value Ref Range    Sodium 137 136 - 145 mmol/L    Potassium 3.5 3.5 - 5.1 mmol/L    Chloride 106 98 - 107 mmol/L    CO2 23 21 - 32 mmol/L    Anion gap 8 7 - 16 mmol/L    Glucose 345 (H) 65 - 100 mg/dL    BUN 11 8 - 23 MG/DL    Creatinine 1.10 0.8 - 1.5 MG/DL    GFR est AA >60 >60 ml/min/1.73m2    GFR est non-AA >60 >60 ml/min/1.73m2    Calcium 7.6 (L) 8.3 - 10.4 MG/DL    Bilirubin, total 0.3 0.2 - 1.1 MG/DL    ALT (SGPT) 18 12 - 65 U/L    AST (SGOT) 24 15 - 37 U/L    Alk.  phosphatase 46 (L) 50 - 136 U/L    Protein, total 5.3 (L) 6.3 - 8.2 g/dL    Albumin 2.1 (L) 3.2 - 4.6 g/dL    Globulin 3.2 2.3 - 3.5 g/dL    A-G Ratio 0.7 (L) 1.2 - 3.5     CBC W/O DIFF    Collection Time: 07/12/20  6:47 AM   Result Value Ref Range    WBC 13.6 (H) 4.3 - 11.1 K/uL    RBC 3.01 (L) 4.23 - 5.6 M/uL    HGB 9.8 (L) 13.6 - 17.2 g/dL    HCT 29.4 (L) 41.1 - 50.3 %    MCV 97.7 79.6 - 97.8 FL    MCH 32.6 26.1 - 32.9 PG    MCHC 33.3 31.4 - 35.0 g/dL    RDW 13.6 11.9 - 14.6 %    PLATELET 830 398 - 252 K/uL    MPV 10.4 9.4 - 12.3 FL    ABSOLUTE NRBC 0.00 0.0 - 0.2 K/uL     CT Results (most recent):  Results from Hospital Encounter encounter on 07/10/20   CT PERF W CBF    Narrative EXAMINATION: CT PERFUSION    DATE: 7/11/2020 3:42 PM    INDICATION:  The patient is a 78years year old Male presenting with symptoms of  right sided facial droop    COMPARISON: Head CT 7/11/2020    TECHNIQUE: CT perfusion of the brain was obtained after the administration of  intravenous contrast. Perfusion maps and perfusion analysis output were  generated using the RAPID perfusion processing software algorithm. Radiation  dose reduction techniques were used for this study: All CT scans performed at  this facility use one or all of the following: Automated exposure control,  adjustment of the mA and/or kVp according to patient's size, iterative  reconstruction. Total radiation dose: 6774 mGy-cm    FINDINGS: Study is technically adequate. Adequate vascular enhancement is  demonstrated. RAPID Output Values:     CBF < 30% volume (best correlation with core infarct volume without overcalls):  0 ml (core infarction volume greater than 50 cc associated with poor outcomes)    Tmax > 6 seconds: 0 ml    Tmax/CBF Mismatch Volume: 0 ml    Tmax/CBF Mismatch Ratio: None    Hypoperfusion Intensity Ratio (Tmax > 10 seconds / Tmax > 6 seconds): 0 (values  greater than 0.5 associated with poor outcome)    Tmax > 10 seconds Volume: 0 ml (volume greater than 100 mL is associated with  poor outcome)      Impression IMPRESSION:    No evidence of CT cerebral perfusion defect. Please note that the determination of patient treatment is not based solely upon  imaging factors or calculation values. Management of ischemia is at the  discretion of the primary physician and is based upon a combination of clinical  and imaging data, along other factors. Physical Exam:  General - Well developed, well nourished, in no apparent distress. HEENT - Normocephalic, atraumatic. edentulous. Conjunctiva are clear. Neck - Supple without masses  Cardiovascular - Regular rate and rhythm. Normal S1, S2 without murmurs, rubs, or gallops. Lungs - Clear to auscultation. Abdomen - Soft, nontender with normal bowel sounds. Extremities - Peripheral pulses intact. No edema and no rashes. Neurological examination    Neurological examination - Comprehension, attention, memory and reasoning are intact. Language and speech are mild dysarthria.    On cranial nerve examination, (II, III, IV, VI) 3 mm pupils are equal, round, and reactive to light. Visual acuity is adequate. Visual fields are full to finger confrontation. Extraocular motility is normal. (V, VII) Face is symmetric and sensation is intact to light touch. (VIII) Hearing is intact. (IX, X) Palate and uvula elevate symmetrically. Voice is normal. (XI) Shoulder shrug is strong and equal bilaterally. (XII)Tongue is midline. Motor examination - There is normal muscle tone and bulk. Power is full throughout. Resting tremor BUE. Muscle stretch reflexes are 2+ BUE, 2+ patellar, 1+ ankle jerks. Plantar response is flexor. Sensation is intact to light touch, pinprick, vibration and proprioception in all extremities. Cerebellar examination is normal. Gait and stance deferred.       Signed By: Cheryle Grego, NP     July 12, 2020

## 2024-10-20 ENCOUNTER — ANESTHESIA EVENT (OUTPATIENT)
Dept: SURGERY | Facility: HOSPITAL | Age: 24
End: 2024-10-20
Payer: COMMERCIAL

## 2024-10-20 PROBLEM — J69.0 ASPIRATION PNEUMONIA: Status: RESOLVED | Noted: 2024-10-16 | Resolved: 2024-10-20

## 2024-10-20 LAB
ALBUMIN SERPL BCP-MCNC: 1.9 G/DL (ref 3.5–5.2)
ALP SERPL-CCNC: 67 U/L (ref 40–150)
ALT SERPL W/O P-5'-P-CCNC: 17 U/L (ref 10–44)
ANION GAP SERPL CALC-SCNC: 9 MMOL/L (ref 8–16)
AST SERPL-CCNC: 19 U/L (ref 10–40)
BASOPHILS # BLD AUTO: 0.02 K/UL (ref 0–0.2)
BASOPHILS NFR BLD: 0.2 % (ref 0–1.9)
BILIRUB SERPL-MCNC: 1.1 MG/DL (ref 0.1–1)
BUN SERPL-MCNC: 25 MG/DL (ref 6–20)
CALCIUM SERPL-MCNC: 8.1 MG/DL (ref 8.7–10.5)
CHLORIDE SERPL-SCNC: 108 MMOL/L (ref 95–110)
CO2 SERPL-SCNC: 25 MMOL/L (ref 23–29)
CREAT SERPL-MCNC: 0.8 MG/DL (ref 0.5–1.4)
DIFFERENTIAL METHOD BLD: ABNORMAL
EOSINOPHIL # BLD AUTO: 0.2 K/UL (ref 0–0.5)
EOSINOPHIL NFR BLD: 2.1 % (ref 0–8)
ERYTHROCYTE [DISTWIDTH] IN BLOOD BY AUTOMATED COUNT: 13.2 % (ref 11.5–14.5)
EST. GFR  (NO RACE VARIABLE): >60 ML/MIN/1.73 M^2
GLUCOSE SERPL-MCNC: 82 MG/DL (ref 70–110)
HCT VFR BLD AUTO: 27.1 % (ref 40–54)
HGB BLD-MCNC: 9 G/DL (ref 14–18)
IMM GRANULOCYTES # BLD AUTO: 0.09 K/UL (ref 0–0.04)
IMM GRANULOCYTES NFR BLD AUTO: 0.8 % (ref 0–0.5)
LYMPHOCYTES # BLD AUTO: 2.1 K/UL (ref 1–4.8)
LYMPHOCYTES NFR BLD: 17.6 % (ref 18–48)
MAGNESIUM SERPL-MCNC: 2.3 MG/DL (ref 1.6–2.6)
MCH RBC QN AUTO: 26.2 PG (ref 27–31)
MCHC RBC AUTO-ENTMCNC: 33.2 G/DL (ref 32–36)
MCV RBC AUTO: 79 FL (ref 82–98)
MONOCYTES # BLD AUTO: 1.6 K/UL (ref 0.3–1)
MONOCYTES NFR BLD: 13.3 % (ref 4–15)
NEUTROPHILS # BLD AUTO: 7.7 K/UL (ref 1.8–7.7)
NEUTROPHILS NFR BLD: 66 % (ref 38–73)
NRBC BLD-RTO: 0 /100 WBC
PHOSPHATE SERPL-MCNC: 2 MG/DL (ref 2.7–4.5)
PLATELET # BLD AUTO: 283 K/UL (ref 150–450)
PMV BLD AUTO: 12.2 FL (ref 9.2–12.9)
POCT GLUCOSE: 73 MG/DL (ref 70–110)
POCT GLUCOSE: 83 MG/DL (ref 70–110)
POCT GLUCOSE: 99 MG/DL (ref 70–110)
POTASSIUM SERPL-SCNC: 4.1 MMOL/L (ref 3.5–5.1)
PROT SERPL-MCNC: 5.4 G/DL (ref 6–8.4)
RBC # BLD AUTO: 3.44 M/UL (ref 4.6–6.2)
SODIUM SERPL-SCNC: 142 MMOL/L (ref 136–145)
WBC # BLD AUTO: 11.7 K/UL (ref 3.9–12.7)

## 2024-10-20 PROCEDURE — 99900035 HC TECH TIME PER 15 MIN (STAT)

## 2024-10-20 PROCEDURE — 27100171 HC OXYGEN HIGH FLOW UP TO 24 HOURS

## 2024-10-20 PROCEDURE — 80053 COMPREHEN METABOLIC PANEL: CPT | Performed by: NURSE PRACTITIONER

## 2024-10-20 PROCEDURE — 25000003 PHARM REV CODE 250: Performed by: NURSE PRACTITIONER

## 2024-10-20 PROCEDURE — 94003 VENT MGMT INPAT SUBQ DAY: CPT

## 2024-10-20 PROCEDURE — 20000000 HC ICU ROOM

## 2024-10-20 PROCEDURE — 36415 COLL VENOUS BLD VENIPUNCTURE: CPT | Performed by: NURSE PRACTITIONER

## 2024-10-20 PROCEDURE — 63600175 PHARM REV CODE 636 W HCPCS: Performed by: NURSE PRACTITIONER

## 2024-10-20 PROCEDURE — 83735 ASSAY OF MAGNESIUM: CPT | Performed by: NURSE PRACTITIONER

## 2024-10-20 PROCEDURE — C9399 UNCLASSIFIED DRUGS OR BIOLOG: HCPCS | Performed by: NURSE PRACTITIONER

## 2024-10-20 PROCEDURE — 85025 COMPLETE CBC W/AUTO DIFF WBC: CPT | Performed by: SPECIALIST

## 2024-10-20 PROCEDURE — 36415 COLL VENOUS BLD VENIPUNCTURE: CPT | Performed by: SPECIALIST

## 2024-10-20 PROCEDURE — 99900026 HC AIRWAY MAINTENANCE (STAT)

## 2024-10-20 PROCEDURE — 25000003 PHARM REV CODE 250: Performed by: INTERNAL MEDICINE

## 2024-10-20 PROCEDURE — 84100 ASSAY OF PHOSPHORUS: CPT | Performed by: NURSE PRACTITIONER

## 2024-10-20 PROCEDURE — 94761 N-INVAS EAR/PLS OXIMETRY MLT: CPT

## 2024-10-20 RX ADMIN — FAMOTIDINE 20 MG: 10 INJECTION, SOLUTION INTRAVENOUS at 09:10

## 2024-10-20 RX ADMIN — FENTANYL CITRATE 200 MCG/HR: 50 INJECTION INTRAVENOUS at 01:10

## 2024-10-20 RX ADMIN — LEVETIRACETAM 2000 MG: 500 INJECTION, SOLUTION INTRAVENOUS at 10:10

## 2024-10-20 RX ADMIN — PHENOBARBITAL SODIUM 65 MG: 65 INJECTION INTRAMUSCULAR at 09:10

## 2024-10-20 RX ADMIN — PHENOBARBITAL SODIUM 65 MG: 65 INJECTION INTRAMUSCULAR at 08:10

## 2024-10-20 RX ADMIN — CHLORHEXIDINE GLUCONATE 0.12% ORAL RINSE 15 ML: 1.2 LIQUID ORAL at 08:10

## 2024-10-20 RX ADMIN — SODIUM PHOSPHATE, MONOBASIC, MONOHYDRATE AND SODIUM PHOSPHATE, DIBASIC, ANHYDROUS 20.01 MMOL: 142; 276 INJECTION, SOLUTION INTRAVENOUS at 06:10

## 2024-10-20 RX ADMIN — DEXMEDETOMIDINE HYDROCHLORIDE 0.6 MCG/KG/HR: 4 INJECTION INTRAVENOUS at 07:10

## 2024-10-20 RX ADMIN — FAMOTIDINE 20 MG: 10 INJECTION, SOLUTION INTRAVENOUS at 08:10

## 2024-10-20 RX ADMIN — FENTANYL CITRATE 200 MCG/HR: 50 INJECTION INTRAVENOUS at 02:10

## 2024-10-20 RX ADMIN — ENOXAPARIN SODIUM 30 MG: 30 INJECTION SUBCUTANEOUS at 06:10

## 2024-10-20 RX ADMIN — LEVETIRACETAM 2000 MG: 500 INJECTION, SOLUTION INTRAVENOUS at 08:10

## 2024-10-20 RX ADMIN — CHLORHEXIDINE GLUCONATE 0.12% ORAL RINSE 15 ML: 1.2 LIQUID ORAL at 09:10

## 2024-10-20 RX ADMIN — MUPIROCIN: 20 OINTMENT TOPICAL at 08:10

## 2024-10-20 RX ADMIN — MUPIROCIN: 20 OINTMENT TOPICAL at 10:10

## 2024-10-20 NOTE — ANESTHESIA PREPROCEDURE EVALUATION
10/20/2024  Ronny Ramey is a 23 y.o., male    10/19/2024: Patient s/p ex lap with extensive lysis of adhesions, small bowel resection, takedown of gastrostomy tube with partial gastrectomy, abdomen left open in discontinuity with abthera in place. Remains intubated.    Patient Active Problem List   Diagnosis    Spastic quadriplegic cerebral palsy    Kyphoscoliosis    Weight loss    Intellectual disability with epilepsy    Legally blind    Nonverbal    PEG (percutaneous endoscopic gastrostomy) status    Intractable epilepsy without status epilepticus    Severe intellectual disabilities    Cerebral palsy    Developmental delay    Acute hypercapnic respiratory failure    Body mass index (BMI) less than 19    Bowel obstruction    MAYELIN (acute kidney injury)     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    Vision abnormalities      Past Surgical History:   Procedure Laterality Date    ADENOIDECTOMY      BACLOFEN PUMP IMPLANTATION N/A 2020    Procedure: INSERTION, INTRATHECAL BACLOFEN PUMP;  Surgeon: Robbi Cain MD;  Location: [facility] OR [address] [place] [address];  Service: Neurosurgery;  Laterality: N/A;  toronto I, asa 1, lateral left down, regular bed reversed, christine, [employer] rep, co surgeon DR Miller    BACLOFEN PUMP IMPLANTATION  2020    CIRCUMCISION      EYE SURGERY      as a     GASTROSTOMY TUBE PLACEMENT      HERNIA REPAIR      HIP SURGERY      LUMBAR PUNCTURE WITH INJECTION OF BACLOFEN N/A 3/11/2020    Procedure: LUMBAR PUNCTURE, WITH BACLOFEN INJECTION;  Surgeon: Cristiane Sky MD;  Location: [facility] OR [address] [place] [address];  Service:  Neurosurgery;  Laterality: N/A;  toronto I, asa 1, lateral left down, regular bed reversed , christine , [employer] co surgeon DR Miller    NISSEN FUNDOPLICATION      REMOVAL OF BACLOFEN PUMP Right 10/15/2020    Procedure: REMOVAL, BACLOFEN PUMP;  Surgeon: Marcy Macedo MD;  Location: [facility] OR [address] [place] [address];  Service: Neurosurgery;  Laterality: Right;    TONSILLECTOMY      TYMPANOSTOMY TUBE PLACEMENT           Pre-op Assessment    I have reviewed the Patient Summary Reports.    I have reviewed the NPO Status.   I have reviewed the Medications.     Review of Systems  Anesthesia Hx:  No problems with previous Anesthesia  Required Dilaudid and Esmolol during prior ex-lap  History of prior surgery of interest to airway management or planning:  Previous anesthesia: General          Denies Personal Hx of Anesthesia complications.                    Social:  Non-Smoker       Hematology/Oncology:  Hematology Normal   Oncology Normal                                   Cardiovascular:  Cardiovascular Normal                  ECG has been reviewed.                            Pulmonary:  Pneumonia       Pt intubated - Acute hypercapnic respiratory failure    Hx of chronic aspiration                Renal/:  Chronic Renal Disease   MAYELIN             Hepatic/GI:        S/p G-Tube placement     SBFT consistent with high grade SBO.              Musculoskeletal:  Musculoskeletal Normal                Neurological:       Seizures    Epilepsy (intractable) - takes Keppra, phenobarb PO, ativan regularly     Hx of Cerebral palsy     quadriplegic                             Endocrine:  Endocrine Normal    BMI 18            Physical Exam  General: Cachexia and Alert  On precedex drip for sedation; responsive   Airway:  Mallampati: unable to assess   TM Distance: Normal  Pre-Existing Airway: Oral Endotracheal tube    Dental:  Unable to assess (intubated)   Musculoskeletal:  B/L UE, LE chronic conractures       Anesthesia Plan  Type of Anesthesia,  risks & benefits discussed:    Anesthesia Type: Gen ETT  Intra-op Monitoring Plan: Standard ASA Monitors  Post Op Pain Control Plan: multimodal analgesia and IV/PO Opioids PRN  Induction:  IV  Informed Consent: Informed consent signed with the Patient representative and all parties understand the risks and agree with anesthesia plan.  All questions answered.   ASA Score: 3 Emergent  Day of Surgery Review of History & Physical: H&P Update referred to the surgeon/provider.  Anesthesia Plan Notes: *Paper (phone consent) signed via Mother after anesthesia discussion      Ready For Surgery From Anesthesia Perspective.     .      Chemistry        Component Value Date/Time     10/20/2024 0256    K 4.1 10/20/2024 0256     10/20/2024 0256    CO2 25 10/20/2024 0256    BUN 25 (H) 10/20/2024 0256    CREATININE 0.8 10/20/2024 0256    GLU 82 10/20/2024 0256        Component Value Date/Time    CALCIUM 8.1 (L) 10/20/2024 0256    ALKPHOS 67 10/20/2024 0256    AST 19 10/20/2024 0256    ALT 17 10/20/2024 0256    BILITOT 1.1 (H) 10/20/2024 0256    ESTGFRAFRICA 128 05/17/2023 0400    ESTGFRAFRICA >60.0 11/02/2020 1045    EGFRNONAA >60.0 11/02/2020 1045        Lab Results   Component Value Date    WBC 11.70 10/20/2024    HGB 9.0 (L) 10/20/2024    HCT 27.1 (L) 10/20/2024    MCV 79 (L) 10/20/2024     10/20/2024

## 2024-10-20 NOTE — ASSESSMENT & PLAN NOTE
POD#2 s/p ex lap, extensive lysis of adhesions, small bowel resection, takedown of gastrostomy tube with partial gastrectomy, abdomen left open in discontinuity with abthera in place    - strict NPO.  No meds per NG tube  - will plan for exploratory laparotomy Monday 10/21.  Tentative plan is to proceed with small-bowel anastomosis and gastrostomy tube placement  - have discussed with mom descending colostomy giving rectosigmoid injury during surgery.  She is considering.  - we will obtain consent today from mom

## 2024-10-20 NOTE — PROGRESS NOTES
[facility] [place])  General Surgery  Progress Note    Subjective:     History of Present Illness:  23-year-old male with a history of several palsy presented to the emergency room with hypoxia which was felt to be secondary to aspiration.  According to the emergency room notes the patient required CPAP in route.  In the emergency room he was noted to have some abdominal distention.  The patient was intubated and subsequently admitted to the intensive care unit.      The patient does have a history of bowel obstructions but they may have been related constipation.    Post-Op Info:  Procedure(s) (LRB):  LAPAROTOMY, EXPLORATORY (N/A)  BLOCK, TRANSVERSUS ABDOMINIS PLANE (N/A)  GASTRECTOMY, PARTIAL (N/A)  EXCISION, SMALL INTESTINE (N/A)  APPLICATION, WOUND VAC (N/A)   2 Days Post-Op     Interval History:  Tachycardia slightly improved.  Remains off pressors.  No adverse events overnight    Medications:  Continuous Infusions:   dexmedeTOMIDine (Precedex) infusion (titrating)  0-1.4 mcg/kg/hr Intravenous Continuous 6.77 mL/hr at 10/20/24 0801 0.6 mcg/kg/hr at 10/20/24 0801    fentanyl  0-250 mcg/hr Intravenous Continuous 20 mL/hr at 10/20/24 0801 200 mcg/hr at 10/20/24 0801     Scheduled Meds:   chlorhexidine  15 mL Mouth/Throat BID    cloNIDine 0.1 mg/24 hr td ptwk  1 patch Transdermal Q7 Days    enoxparin  30 mg Subcutaneous Q24H (prophylaxis, 1700)    famotidine (PF)  20 mg Intravenous BID    levETIRAcetam (Keppra) IV (PEDS and ADULTS)  2,000 mg Intravenous Q12H    mupirocin   Nasal BID    PHENObarbital  65 mg Intravenous BID     PRN Meds:  Current Facility-Administered Medications:     albuterol-ipratropium, 3 mL, Nebulization, Q6H PRN    dextrose 10%, 12.5 g, Intravenous, PRN    dextrose 10%, 25 g, Intravenous, PRN    glucagon (human recombinant), 1 mg, Intramuscular, PRN    insulin aspart U-100, 0-10 Units, Subcutaneous, Q4H PRN    magnesium sulfate IVPB, 2 g, Intravenous, PRN    magnesium sulfate  IVPB, 4 g, Intravenous, PRN    potassium chloride, 40 mEq, Intravenous, PRN **AND** potassium chloride, 60 mEq, Intravenous, PRN **AND** potassium chloride, 80 mEq, Intravenous, PRN    sodium phosphate 15 mmol in D5W 250 mL IVPB, 15 mmol, Intravenous, PRN    sodium phosphate 20.01 mmol in D5W 250 mL IVPB, 20.01 mmol, Intravenous, PRN    sodium phosphate 30 mmol in D5W 250 mL IVPB, 30 mmol, Intravenous, PRN     Review of patient's allergies indicates:   Allergen Reactions    Strawberries [strawberry] Hives     STRAWBERRIES ALLERGIC REACTIONS   (SEIZURES AND HIVES )     Objective:     Vital Signs (Most Recent):  Temp: 98.2 °F (36.8 °C) (10/20/24 0700)  Pulse: 76 (10/20/24 0759)  Resp: 16 (10/20/24 0759)  BP: (!) 107/55 (10/20/24 0759)  SpO2: 98 % (10/20/24 0759) Vital Signs (24h Range):  Temp:  [98.2 °F (36.8 °C)-99 °F (37.2 °C)] 98.2 °F (36.8 °C)  Pulse:  [] 76  Resp:  [10-39] 16  SpO2:  [96 %-100 %] 98 %  BP: ()/(50-81) 107/55     Weight: 46.2 kg (101 lb 13.6 oz)  Body mass index is 17.48 kg/m².    Intake/Output - Last 3 Shifts         10/18 0700  10/19 0659 10/19 0700  10/20 0659 10/20 0700  10/21 0659    I.V. (mL/kg) 312.1 (6.7) 555.5 (12) 53.9 (1.2)    IV Piggyback 2750.6  83    Total Intake(mL/kg) 3062.7 (65.7) 555.5 (12) 136.9 (3)    Urine (mL/kg/hr) 466 (0.4) 858 (0.8)     Drains 525 75     Other 775 250     Stool  0     Total Output 1766 1183     Net +1296.7 -627.6 +136.9           Stool Occurrence  0 x              Physical Exam  Constitutional:       Appearance: He is ill-appearing.   Cardiovascular:      Rate and Rhythm: Regular rhythm. Tachycardia present.   Pulmonary:      Comments: Intubated and mechanically ventilated, no distress  Abdominal:      Comments: ABThera in place with good seal.  Minimal serosanguineous output.  Abdomen soft and nontender   Musculoskeletal:      Comments: Contractures to bilateral upper and lower extremities          Significant Labs:  I have reviewed all  pertinent lab results within the past 24 hours.  CBC:   Recent Labs   Lab 10/20/24  0424   WBC 11.70   RBC 3.44*   HGB 9.0*   HCT 27.1*      MCV 79*   MCH 26.2*   MCHC 33.2     CMP:   Recent Labs   Lab 10/20/24  0256   GLU 82   CALCIUM 8.1*   ALBUMIN 1.9*   PROT 5.4*      K 4.1   CO2 25      BUN 25*   CREATININE 0.8   ALKPHOS 67   ALT 17   AST 19   BILITOT 1.1*       Significant Diagnostics:  I have reviewed all pertinent imaging results/findings within the past 24 hours.  Assessment/Plan:     * Acute hypercapnic respiratory failure  Management per critical care.    Patient is currently intubated     MAYELIN (acute kidney injury)  Per critical Care    Bowel obstruction  POD#2 s/p ex lap, extensive lysis of adhesions, small bowel resection, takedown of gastrostomy tube with partial gastrectomy, abdomen left open in discontinuity with abthera in place    - strict NPO.  No meds per NG tube  - will plan for exploratory laparotomy Monday 10/21.  Tentative plan is to proceed with small-bowel anastomosis and gastrostomy tube placement  - have discussed with mom descending colostomy giving rectosigmoid injury during surgery.  She is considering.  - we will obtain consent today from mom    Aspiration pneumonia  Management per critical care    PEG (percutaneous endoscopic gastrostomy) status  Removed intraoperatively.  We will plan for redo gastrostomy tube at next surgery    Nonverbal  Monitor    Legally blind  Per critical care    Intellectual disability with epilepsy  Per critical care    Spastic quadriplegic cerebral palsy  Further management critical care medicine        Anna Marie Mo MD  General Surgery  [facility] ([facility])

## 2024-10-20 NOTE — ASSESSMENT & PLAN NOTE
Patient noted to have a percutaneous endoscopic gastrostomy tube in place. I have personally inspected the tube.Tube was placed on this admission, with date of procedure- 10/16  There are no signs of drainage or infection around the site. Routine care to be done by wound care and nursing staff.     Tube feedings on hold due to Bowel obstruction  Does not currently have a G tube post ex lap  Ex lap planned for Monday 10/21 with possible G tube

## 2024-10-20 NOTE — ASSESSMENT & PLAN NOTE
Patient with Hypercapnic Respiratory failure which is Acute on chronic. Unclear if on O2 at the NH. Supplemental oxygen was provided and noted-     Vent Mode: A/C  Oxygen Concentration (%):  [30] 30  Resp Rate Total:  [16 br/min-23 br/min] 16 br/min  Vt Set:  [350 mL] 350 mL  PEEP/CPAP:  [5 cmH20] 5 cmH20  Mean Airway Pressure:  [7.7 cmH20-8.3 cmH20] 8.1 cmH20    .   Signs/symptoms of respiratory failure include- tachypnea, increased work of breathing, respiratory distress, and use of accessory muscles. Contributing diagnoses includes - Aspiration Labs and images were reviewed. Patient Has recent ABG, which has been reviewed. Will treat underlying causes and adjust management of respiratory failure as follows-     Wean vent settings as tolerated  Broad spectrum antibiotics- stopped, cultures NGTD  Duonebs prn  Sedation: Precedex, Fentanyl  Will keep intubated for surgeries planned  Bowel rest-NPO

## 2024-10-20 NOTE — SUBJECTIVE & OBJECTIVE
Interval History:  Tachycardia slightly improved.  Remains off pressors.  No adverse events overnight    Medications:  Continuous Infusions:   dexmedeTOMIDine (Precedex) infusion (titrating)  0-1.4 mcg/kg/hr Intravenous Continuous 6.77 mL/hr at 10/20/24 0801 0.6 mcg/kg/hr at 10/20/24 0801    fentanyl  0-250 mcg/hr Intravenous Continuous 20 mL/hr at 10/20/24 0801 200 mcg/hr at 10/20/24 0801     Scheduled Meds:   chlorhexidine  15 mL Mouth/Throat BID    cloNIDine 0.1 mg/24 hr td ptwk  1 patch Transdermal Q7 Days    enoxparin  30 mg Subcutaneous Q24H (prophylaxis, 1700)    famotidine (PF)  20 mg Intravenous BID    levETIRAcetam (Keppra) IV (PEDS and ADULTS)  2,000 mg Intravenous Q12H    mupirocin   Nasal BID    PHENObarbital  65 mg Intravenous BID     PRN Meds:  Current Facility-Administered Medications:     albuterol-ipratropium, 3 mL, Nebulization, Q6H PRN    dextrose 10%, 12.5 g, Intravenous, PRN    dextrose 10%, 25 g, Intravenous, PRN    glucagon (human recombinant), 1 mg, Intramuscular, PRN    insulin aspart U-100, 0-10 Units, Subcutaneous, Q4H PRN    magnesium sulfate IVPB, 2 g, Intravenous, PRN    magnesium sulfate IVPB, 4 g, Intravenous, PRN    potassium chloride, 40 mEq, Intravenous, PRN **AND** potassium chloride, 60 mEq, Intravenous, PRN **AND** potassium chloride, 80 mEq, Intravenous, PRN    sodium phosphate 15 mmol in D5W 250 mL IVPB, 15 mmol, Intravenous, PRN    sodium phosphate 20.01 mmol in D5W 250 mL IVPB, 20.01 mmol, Intravenous, PRN    sodium phosphate 30 mmol in D5W 250 mL IVPB, 30 mmol, Intravenous, PRN     Review of patient's allergies indicates:   Allergen Reactions    Strawberries [strawberry] Hives     STRAWBERRIES ALLERGIC REACTIONS   (SEIZURES AND HIVES )     Objective:     Vital Signs (Most Recent):  Temp: 98.2 °F (36.8 °C) (10/20/24 0700)  Pulse: 76 (10/20/24 0759)  Resp: 16 (10/20/24 0759)  BP: (!) 107/55 (10/20/24 0759)  SpO2: 98 % (10/20/24 0759) Vital Signs (24h Range):  Temp:  [98.2 °F  (36.8 °C)-99 °F (37.2 °C)] 98.2 °F (36.8 °C)  Pulse:  [] 76  Resp:  [10-39] 16  SpO2:  [96 %-100 %] 98 %  BP: ()/(50-81) 107/55     Weight: 46.2 kg (101 lb 13.6 oz)  Body mass index is 17.48 kg/m².    Intake/Output - Last 3 Shifts         10/18 0700  10/19 0659 10/19 0700  10/20 0659 10/20 0700  10/21 0659    I.V. (mL/kg) 312.1 (6.7) 555.5 (12) 53.9 (1.2)    IV Piggyback 2750.6  83    Total Intake(mL/kg) 3062.7 (65.7) 555.5 (12) 136.9 (3)    Urine (mL/kg/hr) 466 (0.4) 858 (0.8)     Drains 525 75     Other 775 250     Stool  0     Total Output 1766 1183     Net +1296.7 -627.6 +136.9           Stool Occurrence  0 x              Physical Exam  Constitutional:       Appearance: He is ill-appearing.   Cardiovascular:      Rate and Rhythm: Regular rhythm. Tachycardia present.   Pulmonary:      Comments: Intubated and mechanically ventilated, no distress  Abdominal:      Comments: ABThera in place with good seal.  Minimal serosanguineous output.  Abdomen soft and nontender   Musculoskeletal:      Comments: Contractures to bilateral upper and lower extremities          Significant Labs:  I have reviewed all pertinent lab results within the past 24 hours.  CBC:   Recent Labs   Lab 10/20/24  0424   WBC 11.70   RBC 3.44*   HGB 9.0*   HCT 27.1*      MCV 79*   MCH 26.2*   MCHC 33.2     CMP:   Recent Labs   Lab 10/20/24  0256   GLU 82   CALCIUM 8.1*   ALBUMIN 1.9*   PROT 5.4*      K 4.1   CO2 25      BUN 25*   CREATININE 0.8   ALKPHOS 67   ALT 17   AST 19   BILITOT 1.1*       Significant Diagnostics:  I have reviewed all pertinent imaging results/findings within the past 24 hours.

## 2024-10-20 NOTE — ASSESSMENT & PLAN NOTE
MAYELIN is likely due to pre-renal azotemia due to intravascular volume depletion. Baseline creatinine is  0.8 . Most recent creatinine and eGFR are listed below.  Recent Labs     10/19/24  0820 10/19/24  1925 10/20/24  0256   CREATININE 1.0 0.8 0.8   EGFRNORACEVR >60 >60 >60        Plan  - Avoid nephrotoxins and renally dose meds for GFR listed above  - Monitor urine output, serial BMP, and adjust therapy as needed  - Maintain [place] catheter- IVF boluses prn  - Resolved after fluid resuscitation  - Goal UOP 0.5ml/kg/hr

## 2024-10-20 NOTE — PROGRESS NOTES
[facility] ([facility])  Critical Care Medicine  Progress Note    Patient Name: Ronny Ramey  MRN: [id number]  Admission Date: 10/16/2024  Hospital Length of Stay: 4 days  Code Status: Full Code  Attending Provider: Sandee Hardin MD  Primary Care Provider: Herman Nathan DNP   Principal Problem: Acute hypercapnic respiratory failure    Subjective:     HPI:  Ronny Ramey is a 23 [facility] with CP, spastic quadriplegia, s/p G tube, epilepsy who presented to [facility] ED on 10/16 with shortness of breath/respiratory distress. Patient was placed on CPAP with little improvement. KUB shows small bowel obstruction. Patient was intubated and transferred to [facility]. Critical care consulted for admission. Patient with history of admissions for bowel obstruction and aspiration pneumonia.     Hospital/ICU Course:  10/17/2024: Patient with decreasing UOP overnight. Renal function worsening. Small bolus given with little response. Additional boluses today. HTN overnight, clonidine patch started. Continue bowel rest. NGT advanced per Abd XR, continued bowel obstruction on XR as well as fecal impaction. Will attempt to disimpact. Gen Surg following. Propofol switched to precedex for vent weaning.  10/18/2024: XR small bowel FT shows high-grade small bowel obstruction. Gen surg planning for ex lap today. Will keep intubated for procedure. Cultures NGTD, antibiotics stopped.  10/19/2024: Patient s/p ex lap with extensive lysis of adhesions, small bowel resection, takedown of gastrostomy tube with partial gastrectomy, abdomen left open in discontinuity with abthera in place. Gen Surg plans to take for exploratory laparotomy Monday 10/21 for small-bowel anastomosis and gastrostomy tube placement. Keeping intubated for surgery.   10/20/2024: Remains intubated. Pending surgery tomorrow. Drop in H&H overnight, but no signs of bleeding, vital signs stable.      Interval History: Patient sedated, intubated.      Objective:     Vital Signs (Most Recent):  Temp: 98.2 °F (36.8 °C) (10/20/24 0700)  Pulse: 65 (10/20/24 0945)  Resp: 16 (10/20/24 0945)  BP: (!) 96/53 (10/20/24 0945)  SpO2: 99 % (10/20/24 0945) Vital Signs (24h Range):  Temp:  [98.2 °F (36.8 °C)-99 °F (37.2 °C)] 98.2 °F (36.8 °C)  Pulse:  [] 65  Resp:  [10-39] 16  SpO2:  [96 %-100 %] 99 %  BP: ()/(50-74) 96/53     Weight: 46.2 kg (101 lb 13.6 oz)  Body mass index is 17.48 kg/m².      Intake/Output Summary (Last 24 hours) at 10/20/2024 1056  Last data filed at 10/20/2024 1001  Gross per 24 hour   Intake 807.08 ml   Output 1053 ml   Net -245.92 ml        Physical Exam  Constitutional:       Appearance: He is ill-appearing.   HENT:      Head: Atraumatic.      Nose: Nose normal.      Comments: NGT in place     Mouth/Throat:      Mouth: Mucous membranes are moist.   Cardiovascular:      Rate and Rhythm: Regular rhythm. Tachycardia present.      Pulses: Normal pulses.      Heart sounds: Normal heart sounds.   Pulmonary:      Effort: Pulmonary effort is normal. No respiratory distress.      Breath sounds: Normal breath sounds.   Abdominal:      General: Bowel sounds are decreased. There is distension.      Comments: Abdominal wound vac in place, CDI   Musculoskeletal:      Comments: BUE contractures  BLE contractures   Skin:     General: Skin is warm and dry.   Neurological:      Mental Status: Mental status is at baseline.      Comments: Baseline nonverbal, legally blind           Review of Systems   Unable to perform ROS: Intubated       Vents:  Vent Mode: A/C (10/20/24 0945)  Ventilator Initiated: Yes (10/17/24 0102)  Set Rate: 16 BPM (10/20/24 0945)  Vt Set: 350 mL (10/20/24 0945)  Pressure Support: 0 cmH20 (10/18/24 0753)  PEEP/CPAP: 5 cmH20 (10/20/24 0945)  Oxygen Concentration (%): 30 (10/20/24 0945)  Peak Airway Pressure: 24 cmH20 (10/20/24 0945)  Plateau Pressure: 16 cmH20 (10/20/24 0945)  Total  Ve: 5.72 L/m (10/20/24 0945)  Negative Inspiratory Force (cm H2O): 0 (10/20/24 0945)  F/VT Ratio<105 (RSBI): (!) 44.94 (10/20/24 0945)    Lines/Drains/Airways       Drain  Duration                  NG/OG Tube 10/16/24 0515 16 Fr. Right nostril 4 days         Urethral Catheter 10/16/24 0156 Double-lumen 16 Fr. 4 days              Airway  Duration                  Airway - Non-Surgical 10/16/24 0530 Endotracheal Tube 4 days              Peripheral Intravenous Line  Duration                  Peripheral IV - Single Lumen 10/16/24 0120 22 G Posterior;Right Forearm 4 days         Peripheral IV - Single Lumen 10/16/24 0636 22 G Left;Posterior Hand 4 days         Peripheral IV - Single Lumen 10/16/24 1245 20 G Left;Medial Forearm 3 days                    Significant Labs:    CBC/Anemia Profile:  Recent Labs   Lab 10/18/24  1722 10/19/24  0428 10/20/24  0424   WBC 17.57* 16.40* 11.70   HGB 13.7* 12.5* 9.0*   HCT 42.3 37.9* 27.1*    419 283   MCV 80* 80* 79*   RDW 13.7 13.3 13.2        Chemistries:  Recent Labs   Lab 10/18/24  1722 10/19/24  0428 10/19/24  0820 10/19/24  1925 10/20/24  0256    138 140 140 142   K 4.3 5.2* 5.3*  5.2* 4.5 4.1    108 106 107 108   CO2 22* 23 19* 23 25   BUN 27* 33* 32* 27* 25*   CREATININE 1.1 1.0 1.0 0.8 0.8   CALCIUM 7.6* 8.4* 8.5* 8.3* 8.1*   ALBUMIN 2.0* 2.1* 2.2*  --  1.9*   PROT 4.6* 5.5*  --   --  5.4*   BILITOT 1.3* 1.3*  --   --  1.1*   ALKPHOS 76 74  --   --  67   ALT 26 23  --   --  17   AST 21 29  --   --  19   MG  --  2.0  --   --  2.3   PHOS  --  3.8 3.8  --  2.0*       All pertinent labs within the past 24 hours have been reviewed.    Significant Imaging:  I have reviewed all pertinent imaging results/findings within the past 24 hours.    ABG  Recent Labs   Lab 10/18/24  1638   PH 7.403   PO2 125*   PCO2 40.6   HCO3 25.3   BE 1     Assessment/Plan:     Neuro  Intellectual disability with epilepsy  Home AEDs restarted: Keppra and phenobarb    Spastic  quadriplegic cerebral palsy  Turn q 2hrs  Aspiration precautions  Baclofen on hold due to bowel rest    Pulmonary  * Acute hypercapnic respiratory failure  Patient with Hypercapnic Respiratory failure which is Acute on chronic. Unclear if on O2 at the NH. Supplemental oxygen was provided and noted-     Vent Mode: A/C  Oxygen Concentration (%):  [30] 30  Resp Rate Total:  [16 br/min-23 br/min] 16 br/min  Vt Set:  [350 mL] 350 mL  PEEP/CPAP:  [5 cmH20] 5 cmH20  Mean Airway Pressure:  [7.7 cmH20-8.3 cmH20] 8.1 cmH20    .   Signs/symptoms of respiratory failure include- tachypnea, increased work of breathing, respiratory distress, and use of accessory muscles. Contributing diagnoses includes - Aspiration Labs and images were reviewed. Patient Has recent ABG, which has been reviewed. Will treat underlying causes and adjust management of respiratory failure as follows-     Wean vent settings as tolerated  Broad spectrum antibiotics- stopped, cultures NGTD  Duonebs prn  Sedation: Precedex, Fentanyl  Will keep intubated for surgeries planned  Bowel rest-NPO    Renal/  MAYELIN (acute kidney injury)  MAYELIN is likely due to pre-renal azotemia due to intravascular volume depletion. Baseline creatinine is  0.8 . Most recent creatinine and eGFR are listed below.  Recent Labs     10/19/24  0820 10/19/24  1925 10/20/24  0256   CREATININE 1.0 0.8 0.8   EGFRNORACEVR >60 >60 >60        Plan  - Avoid nephrotoxins and renally dose meds for GFR listed above  - Monitor urine output, serial BMP, and adjust therapy as needed  - Maintain [place] catheter- IVF boluses prn  - Resolved after fluid resuscitation  - Goal UOP 0.5ml/kg/hr    Endocrine  Body mass index (BMI) less than 19  Nutrition consult for Tube feeding recs  NPO currently due to bowel obstruction    GI  Bowel obstruction  KUB shows small bowel obstruction  NGT to suction  Keep NPO  CT abd/pelvis shows high grade bowel obstruction  Gen Surg consulted  Fecal impaction on Abd XR-  disimpacted  SBFT XR shows high-grade small bowel obstruction    Under went Ex lap 10/18 with extensive lysis of adhesions, small bowel resection, takedown of gastrostomy tube with partial gastrectomy, abdomen left open in discontinuity with abthera in place  Gen Surg planning for ex lap on 10/21 for small-bowel anastomosis and G tube placement    PEG (percutaneous endoscopic gastrostomy) status  Patient noted to have a percutaneous endoscopic gastrostomy tube in place. I have personally inspected the tube.Tube was placed on this admission, with date of procedure- 10/16  There are no signs of drainage or infection around the site. Routine care to be done by wound care and nursing staff.     Tube feedings on hold due to Bowel obstruction  Does not currently have a G tube post ex lap  Ex lap planned for Monday 10/21 with possible G tube        Level III     Critical care was time spent personally by me on the following activities: development of treatment plan with patient or surrogate and bedside caregivers, discussions with consultants, evaluation of patient's response to treatment, examination of patient, ordering and performing treatments and interventions, ordering and review of laboratory studies, ordering and review of radiographic studies, pulse oximetry, re-evaluation of patient's condition. This critical care time did not overlap with that of any other provider or involve time for any procedures.     Amanda Francis NP  Critical Care Medicine  [facility] ([facility])

## 2024-10-21 ENCOUNTER — ANESTHESIA (OUTPATIENT)
Dept: SURGERY | Facility: HOSPITAL | Age: 24
End: 2024-10-21
Payer: COMMERCIAL

## 2024-10-21 LAB
ABO + RH BLD: NORMAL
ALBUMIN SERPL BCP-MCNC: 2 G/DL (ref 3.5–5.2)
ALLENS TEST: ABNORMAL
ALP SERPL-CCNC: 80 U/L (ref 40–150)
ALT SERPL W/O P-5'-P-CCNC: 13 U/L (ref 10–44)
ANION GAP SERPL CALC-SCNC: 13 MMOL/L (ref 8–16)
AST SERPL-CCNC: 21 U/L (ref 10–40)
BACTERIA BLD CULT: NORMAL
BACTERIA BLD CULT: NORMAL
BACTERIA SPEC AEROBE CULT: ABNORMAL
BACTERIA SPEC AEROBE CULT: ABNORMAL
BASOPHILS # BLD AUTO: 0.03 K/UL (ref 0–0.2)
BASOPHILS NFR BLD: 0.3 % (ref 0–1.9)
BILIRUB SERPL-MCNC: 1.2 MG/DL (ref 0.1–1)
BLD GP AB SCN CELLS X3 SERPL QL: NORMAL
BUN SERPL-MCNC: 17 MG/DL (ref 6–20)
CALCIUM SERPL-MCNC: 8.6 MG/DL (ref 8.7–10.5)
CHLORIDE SERPL-SCNC: 106 MMOL/L (ref 95–110)
CO2 SERPL-SCNC: 25 MMOL/L (ref 23–29)
CREAT SERPL-MCNC: 0.6 MG/DL (ref 0.5–1.4)
DELSYS: ABNORMAL
DIFFERENTIAL METHOD BLD: ABNORMAL
EOSINOPHIL # BLD AUTO: 0.5 K/UL (ref 0–0.5)
EOSINOPHIL NFR BLD: 4.7 % (ref 0–8)
ERYTHROCYTE [DISTWIDTH] IN BLOOD BY AUTOMATED COUNT: 13.3 % (ref 11.5–14.5)
ERYTHROCYTE [SEDIMENTATION RATE] IN BLOOD BY WESTERGREN METHOD: 16 MM/H
EST. GFR  (NO RACE VARIABLE): >60 ML/MIN/1.73 M^2
FIO2: 30
GLUCOSE SERPL-MCNC: 71 MG/DL (ref 70–110)
GRAM STN SPEC: ABNORMAL
HCO3 UR-SCNC: 27.2 MMOL/L (ref 24–28)
HCT VFR BLD AUTO: 25.9 % (ref 40–54)
HGB BLD-MCNC: 8.3 G/DL (ref 14–18)
IMM GRANULOCYTES # BLD AUTO: 0.06 K/UL (ref 0–0.04)
IMM GRANULOCYTES NFR BLD AUTO: 0.6 % (ref 0–0.5)
LYMPHOCYTES # BLD AUTO: 2.3 K/UL (ref 1–4.8)
LYMPHOCYTES NFR BLD: 21.1 % (ref 18–48)
MAGNESIUM SERPL-MCNC: 2.2 MG/DL (ref 1.6–2.6)
MCH RBC QN AUTO: 25.9 PG (ref 27–31)
MCHC RBC AUTO-ENTMCNC: 32 G/DL (ref 32–36)
MCV RBC AUTO: 81 FL (ref 82–98)
MODE: ABNORMAL
MONOCYTES # BLD AUTO: 1.1 K/UL (ref 0.3–1)
MONOCYTES NFR BLD: 9.9 % (ref 4–15)
NEUTROPHILS # BLD AUTO: 6.8 K/UL (ref 1.8–7.7)
NEUTROPHILS NFR BLD: 63.4 % (ref 38–73)
NRBC BLD-RTO: 0 /100 WBC
PCO2 BLDA: 38.6 MMHG (ref 35–45)
PEEP: 5
PH SMN: 7.46 [PH] (ref 7.35–7.45)
PHOSPHATE SERPL-MCNC: 2.7 MG/DL (ref 2.7–4.5)
PLATELET # BLD AUTO: 261 K/UL (ref 150–450)
PMV BLD AUTO: 11.2 FL (ref 9.2–12.9)
PO2 BLDA: 64 MMHG (ref 80–100)
POC BE: 3 MMOL/L
POC SATURATED O2: 93 % (ref 95–100)
POCT GLUCOSE: 139 MG/DL (ref 70–110)
POCT GLUCOSE: 69 MG/DL (ref 70–110)
POCT GLUCOSE: 70 MG/DL (ref 70–110)
POCT GLUCOSE: 74 MG/DL (ref 70–110)
POCT GLUCOSE: 94 MG/DL (ref 70–110)
POCT GLUCOSE: 95 MG/DL (ref 70–110)
POCT GLUCOSE: 98 MG/DL (ref 70–110)
POTASSIUM SERPL-SCNC: 3.5 MMOL/L (ref 3.5–5.1)
PROT SERPL-MCNC: 5.9 G/DL (ref 6–8.4)
RBC # BLD AUTO: 3.2 M/UL (ref 4.6–6.2)
SAMPLE: ABNORMAL
SITE: ABNORMAL
SODIUM SERPL-SCNC: 144 MMOL/L (ref 136–145)
SPECIMEN OUTDATE: NORMAL
VT: 350
WBC # BLD AUTO: 10.64 K/UL (ref 3.9–12.7)

## 2024-10-21 PROCEDURE — 85025 COMPLETE CBC W/AUTO DIFF WBC: CPT | Performed by: NURSE PRACTITIONER

## 2024-10-21 PROCEDURE — 25000003 PHARM REV CODE 250: Performed by: NURSE PRACTITIONER

## 2024-10-21 PROCEDURE — 80053 COMPREHEN METABOLIC PANEL: CPT | Performed by: NURSE PRACTITIONER

## 2024-10-21 PROCEDURE — 82800 BLOOD PH: CPT

## 2024-10-21 PROCEDURE — 36000707: Performed by: STUDENT IN AN ORGANIZED HEALTH CARE EDUCATION/TRAINING PROGRAM

## 2024-10-21 PROCEDURE — 43830 GSTRST OPEN WO CONSTJ TUBE: CPT | Mod: 58,51,, | Performed by: STUDENT IN AN ORGANIZED HEALTH CARE EDUCATION/TRAINING PROGRAM

## 2024-10-21 PROCEDURE — 25000003 PHARM REV CODE 250: Performed by: NURSE ANESTHETIST, CERTIFIED REGISTERED

## 2024-10-21 PROCEDURE — 37000008 HC ANESTHESIA 1ST 15 MINUTES: Performed by: STUDENT IN AN ORGANIZED HEALTH CARE EDUCATION/TRAINING PROGRAM

## 2024-10-21 PROCEDURE — 25000003 PHARM REV CODE 250: Performed by: INTERNAL MEDICINE

## 2024-10-21 PROCEDURE — 94761 N-INVAS EAR/PLS OXIMETRY MLT: CPT

## 2024-10-21 PROCEDURE — 86900 BLOOD TYPING SEROLOGIC ABO: CPT | Performed by: NURSE PRACTITIONER

## 2024-10-21 PROCEDURE — C9399 UNCLASSIFIED DRUGS OR BIOLOG: HCPCS | Performed by: NURSE PRACTITIONER

## 2024-10-21 PROCEDURE — 82803 BLOOD GASES ANY COMBINATION: CPT

## 2024-10-21 PROCEDURE — 99900035 HC TECH TIME PER 15 MIN (STAT)

## 2024-10-21 PROCEDURE — 63600175 PHARM REV CODE 636 W HCPCS: Performed by: NURSE ANESTHETIST, CERTIFIED REGISTERED

## 2024-10-21 PROCEDURE — 36600 WITHDRAWAL OF ARTERIAL BLOOD: CPT

## 2024-10-21 PROCEDURE — 20000000 HC ICU ROOM

## 2024-10-21 PROCEDURE — 0DH60UZ INSERTION OF FEEDING DEVICE INTO STOMACH, OPEN APPROACH: ICD-10-PCS | Performed by: STUDENT IN AN ORGANIZED HEALTH CARE EDUCATION/TRAINING PROGRAM

## 2024-10-21 PROCEDURE — 94003 VENT MGMT INPAT SUBQ DAY: CPT

## 2024-10-21 PROCEDURE — 99900026 HC AIRWAY MAINTENANCE (STAT)

## 2024-10-21 PROCEDURE — 27201423 OPTIME MED/SURG SUP & DEVICES STERILE SUPPLY: Performed by: STUDENT IN AN ORGANIZED HEALTH CARE EDUCATION/TRAINING PROGRAM

## 2024-10-21 PROCEDURE — 63600175 PHARM REV CODE 636 W HCPCS

## 2024-10-21 PROCEDURE — 36000706: Performed by: STUDENT IN AN ORGANIZED HEALTH CARE EDUCATION/TRAINING PROGRAM

## 2024-10-21 PROCEDURE — 36415 COLL VENOUS BLD VENIPUNCTURE: CPT | Performed by: NURSE PRACTITIONER

## 2024-10-21 PROCEDURE — 84100 ASSAY OF PHOSPHORUS: CPT | Performed by: NURSE PRACTITIONER

## 2024-10-21 PROCEDURE — 63600175 PHARM REV CODE 636 W HCPCS: Performed by: NURSE PRACTITIONER

## 2024-10-21 PROCEDURE — 25000003 PHARM REV CODE 250

## 2024-10-21 PROCEDURE — 37000009 HC ANESTHESIA EA ADD 15 MINS: Performed by: STUDENT IN AN ORGANIZED HEALTH CARE EDUCATION/TRAINING PROGRAM

## 2024-10-21 PROCEDURE — 27100171 HC OXYGEN HIGH FLOW UP TO 24 HOURS

## 2024-10-21 PROCEDURE — 44130 BOWEL TO BOWEL FUSION: CPT | Mod: 58,,, | Performed by: STUDENT IN AN ORGANIZED HEALTH CARE EDUCATION/TRAINING PROGRAM

## 2024-10-21 PROCEDURE — 83735 ASSAY OF MAGNESIUM: CPT | Performed by: NURSE PRACTITIONER

## 2024-10-21 RX ORDER — PROPOFOL 10 MG/ML
VIAL (ML) INTRAVENOUS
Status: DISCONTINUED | OUTPATIENT
Start: 2024-10-21 | End: 2024-10-21

## 2024-10-21 RX ORDER — CEFAZOLIN SODIUM 1 G/3ML
INJECTION, POWDER, FOR SOLUTION INTRAMUSCULAR; INTRAVENOUS
Status: DISCONTINUED | OUTPATIENT
Start: 2024-10-21 | End: 2024-10-21

## 2024-10-21 RX ORDER — INDOCYANINE GREEN AND WATER 25 MG
KIT INJECTION
Status: DISCONTINUED | OUTPATIENT
Start: 2024-10-21 | End: 2024-10-21

## 2024-10-21 RX ORDER — LABETALOL HYDROCHLORIDE 5 MG/ML
20 INJECTION, SOLUTION INTRAVENOUS ONCE
Status: DISCONTINUED | OUTPATIENT
Start: 2024-10-21 | End: 2024-10-21

## 2024-10-21 RX ORDER — CEFEPIME HYDROCHLORIDE 2 G/1
2 INJECTION, POWDER, FOR SOLUTION INTRAVENOUS
Status: COMPLETED | OUTPATIENT
Start: 2024-10-21 | End: 2024-10-28

## 2024-10-21 RX ORDER — ROCURONIUM BROMIDE 10 MG/ML
INJECTION, SOLUTION INTRAVENOUS
Status: DISCONTINUED | OUTPATIENT
Start: 2024-10-21 | End: 2024-10-21

## 2024-10-21 RX ADMIN — INDOCYANINE GREEN AND WATER 2.5 MG: KIT at 08:10

## 2024-10-21 RX ADMIN — DEXTROSE MONOHYDRATE 125 ML: 100 INJECTION, SOLUTION INTRAVENOUS at 07:10

## 2024-10-21 RX ADMIN — DEXMEDETOMIDINE HYDROCHLORIDE 0.8 MCG/KG/HR: 4 INJECTION INTRAVENOUS at 10:10

## 2024-10-21 RX ADMIN — DEXMEDETOMIDINE HYDROCHLORIDE 0.6 MCG/KG/HR: 4 INJECTION INTRAVENOUS at 01:10

## 2024-10-21 RX ADMIN — ROCURONIUM BROMIDE 30 MG: 10 INJECTION, SOLUTION INTRAVENOUS at 07:10

## 2024-10-21 RX ADMIN — LEVETIRACETAM 2000 MG: 500 INJECTION, SOLUTION INTRAVENOUS at 09:10

## 2024-10-21 RX ADMIN — CHLORHEXIDINE GLUCONATE 0.12% ORAL RINSE 15 ML: 1.2 LIQUID ORAL at 08:10

## 2024-10-21 RX ADMIN — DEXMEDETOMIDINE HYDROCHLORIDE 0.8 MCG/KG/HR: 4 INJECTION INTRAVENOUS at 09:10

## 2024-10-21 RX ADMIN — POTASSIUM CHLORIDE 40 MEQ: 7.46 INJECTION, SOLUTION INTRAVENOUS at 06:10

## 2024-10-21 RX ADMIN — FAMOTIDINE 20 MG: 10 INJECTION, SOLUTION INTRAVENOUS at 09:10

## 2024-10-21 RX ADMIN — LEVETIRACETAM 2000 MG: 500 INJECTION, SOLUTION INTRAVENOUS at 08:10

## 2024-10-21 RX ADMIN — ENOXAPARIN SODIUM 30 MG: 30 INJECTION SUBCUTANEOUS at 05:10

## 2024-10-21 RX ADMIN — FENTANYL CITRATE 250 MCG/HR: 50 INJECTION INTRAVENOUS at 10:10

## 2024-10-21 RX ADMIN — PHENOBARBITAL SODIUM 65 MG: 65 INJECTION INTRAMUSCULAR at 08:10

## 2024-10-21 RX ADMIN — ROCURONIUM BROMIDE 10 MG: 10 INJECTION, SOLUTION INTRAVENOUS at 08:10

## 2024-10-21 RX ADMIN — CEFEPIME 2 G: 2 INJECTION, POWDER, FOR SOLUTION INTRAVENOUS at 06:10

## 2024-10-21 RX ADMIN — PROPOFOL 100 MG: 10 INJECTION, EMULSION INTRAVENOUS at 07:10

## 2024-10-21 RX ADMIN — FAMOTIDINE 20 MG: 10 INJECTION, SOLUTION INTRAVENOUS at 08:10

## 2024-10-21 RX ADMIN — CHLORHEXIDINE GLUCONATE 0.12% ORAL RINSE 15 ML: 1.2 LIQUID ORAL at 10:10

## 2024-10-21 RX ADMIN — FENTANYL CITRATE 250 MCG/HR: 50 INJECTION INTRAVENOUS at 01:10

## 2024-10-21 RX ADMIN — FENTANYL CITRATE 200 MCG/HR: 50 INJECTION INTRAVENOUS at 03:10

## 2024-10-21 RX ADMIN — PIPERACILLIN SODIUM AND TAZOBACTAM SODIUM 4.5 G: 4; .5 INJECTION, POWDER, FOR SOLUTION INTRAVENOUS at 10:10

## 2024-10-21 RX ADMIN — DEXTROSE MONOHYDRATE 125 ML: 100 INJECTION, SOLUTION INTRAVENOUS at 12:10

## 2024-10-21 RX ADMIN — PHENOBARBITAL SODIUM 65 MG: 65 INJECTION INTRAMUSCULAR at 09:10

## 2024-10-21 RX ADMIN — CEFAZOLIN 2 G: 330 INJECTION, POWDER, FOR SOLUTION INTRAMUSCULAR; INTRAVENOUS at 08:10

## 2024-10-21 NOTE — ASSESSMENT & PLAN NOTE
Patient noted to have a percutaneous endoscopic gastrostomy tube in place. I have personally inspected the tube.Tube was placed on this admission, with date of procedure- 10/16  There are no signs of drainage or infection around the site. Routine care to be done by wound care and nursing staff.     G tube removed during ex-lap on 10/18  Tube feedings on hold due to Bowel obstruction  Ex lap 10/21 with G tube replacement

## 2024-10-21 NOTE — TRANSFER OF CARE
"Anesthesia Transfer of Care Note    Patient: Ronny Ramey    Procedure(s) Performed: Procedure(s) (LRB):  LAPAROTOMY, EXPLORATORY, OPEN GASTROSTOMY TUBE (N/A)    Patient location: ICU    Anesthesia Type: general    Transport from OR: Transported from OR intubated on 100% O2  with adequate ventilation controlled by transport ventilator. Upon arrival to PACU/ICU, patient attached to ventilator and auscultated to confirm bilateral breath sounds and adequate TV. Continuous ECG monitoring in transport. Continuous SpO2 monitoring in transport    Post pain: adequate analgesia    Post assessment: no apparent anesthetic complications and tolerated procedure well    Post vital signs: stable    Level of consciousness: sedated    Nausea/Vomiting: no nausea/vomiting    Complications: none    Transfer of care protocol was followed      Last vitals: Visit Vitals  BP (!) 136/91   Pulse 64   Temp 36.5 °C (97.7 °F) (Temporal)   Resp 16   Ht 5' 4" (1.626 m)   Wt 46 kg (101 lb 6.6 oz)   SpO2 100%   BMI 17.41 kg/m²     "

## 2024-10-21 NOTE — ASSESSMENT & PLAN NOTE
KUB shows small bowel obstruction  NGT to suction  Keep NPO  CT abd/pelvis shows high grade bowel obstruction  Gen Surg consulted  Fecal impaction on Abd XR- disimpacted  SBFT XR shows high-grade small bowel obstruction    Under went Ex lap 10/18 with extensive lysis of adhesions, small bowel resection, takedown of gastrostomy tube with partial gastrectomy, abdomen left open in discontinuity with abthera in place  Ex lap on 10/21 with small-bowel anastomosis and G tube placement

## 2024-10-21 NOTE — PLAN OF CARE
10/21/24 1611   Rounds   Attendance Provider;Nurse ;Charge nurse;Nurse;Pharmacist   Discharge Plan A Return to nursing home   Why the patient remains in the hospital Requires continued medical care   Transition of Care Barriers None     PEG tube placed. Plan to return to NH.

## 2024-10-21 NOTE — ASSESSMENT & PLAN NOTE
POD#3 s/p ex lap, extensive lysis of adhesions, small bowel resection, takedown of gastrostomy tube with partial gastrectomy, abdomen left open in discontinuity with abthera in place    - strict NPO.  No meds per NG tube  - to OR today for exploratory laparotomy Tentative plan is to proceed with small-bowel anastomosis and gastrostomy tube placement, possible sigmoid colostomy  - consent obtained, r/b/a discussed including bleeding, infection, damage to surrounding structures, anastomotic leak, prolonged hospitalization, delayed return of bowel function, hernia formation. She agreed to proceed

## 2024-10-21 NOTE — PROGRESS NOTES
[facility] - Intensive Care ([facility])  Critical Care Medicine  Progress Note    Patient Name: Ronny Ramey  MRN: [id number]  Admission Date: 10/16/2024  Hospital Length of Stay: 5 days  Code Status: Full Code  Attending Provider: Sandee Hardin MD  Primary Care Provider: Herman Nathan DNP   Principal Problem: Acute hypercapnic respiratory failure    Subjective:     HPI:  Ronny Ramey is a 23 yr old male [facility] resident with CP, spastic quadriplegia, s/p G tube, epilepsy who presented to [facility] ED on 10/16 with shortness of breath/respiratory distress. Patient was placed on CPAP with little improvement. KUB shows small bowel obstruction. Patient was intubated and transferred to [facility]. Critical care consulted for admission. Patient with history of admissions for bowel obstruction and aspiration pneumonia.     Hospital/ICU Course:  10/17/2024: Patient with decreasing UOP overnight. Renal function worsening. Small bolus given with little response. Additional boluses today. HTN overnight, clonidine patch started. Continue bowel rest. NGT advanced per Abd XR, continued bowel obstruction on XR as well as fecal impaction. Will attempt to disimpact. Gen Surg following. Propofol switched to precedex for vent weaning.  10/18/2024: XR small bowel FT shows high-grade small bowel obstruction. Gen surg planning for ex lap today. Will keep intubated for procedure. Cultures NGTD, antibiotics stopped.  10/19/2024: Patient s/p ex lap with extensive lysis of adhesions, small bowel resection, takedown of gastrostomy tube with partial gastrectomy, abdomen left open in discontinuity with abthera in place. Gen Surg plans to take for exploratory laparotomy Monday 10/21 for small-bowel anastomosis and gastrostomy tube placement. Keeping intubated for surgery.   10/20/2024: Remains intubated. Pending surgery tomorrow. Drop in H&H overnight, but no signs of bleeding, vital signs stable.    10/21 - To the OR this morning with small bowel anastamosis and G-tube placement.  Remains on the vent.  Hemodynamics stable.    Interval History: No acute events overnight.  To the OR this morning for anastamosis and G tube placement.  Examined post-op.  Stable hemodynamics and vent stable.        Objective:     Vital Signs (Most Recent):  Temp: 97.7 °F (36.5 °C) (10/21/24 0705)  Pulse: 63 (10/21/24 0950)  Resp: 16 (10/21/24 0950)  BP: 99/63 (10/21/24 0950)  SpO2: 100 % (10/21/24 0950) Vital Signs (24h Range):  Temp:  [97.4 °F (36.3 °C)-97.9 °F (36.6 °C)] 97.7 °F (36.5 °C)  Pulse:  [53-90] 63  Resp:  [15-43] 16  SpO2:  [98 %-100 %] 100 %  BP: ()/(53-97) 99/63     Weight: 46 kg (101 lb 6.6 oz)  Body mass index is 17.41 kg/m².      Intake/Output Summary (Last 24 hours) at 10/21/2024 0956  Last data filed at 10/21/2024 0700  Gross per 24 hour   Intake 887.3 ml   Output 1135 ml   Net -247.7 ml        Physical Exam  Vitals and nursing note reviewed.   Constitutional:       General: He is not in acute distress.     Comments: Intubated and sedated   Cardiovascular:      Rate and Rhythm: Normal rate and regular rhythm.      Pulses: Normal pulses.      Heart sounds: No murmur heard.  Pulmonary:      Effort: Pulmonary effort is normal. No respiratory distress.      Breath sounds: No wheezing, rhonchi or rales.   Abdominal:      General: Abdomen is flat. There is no distension.      Tenderness: There is no abdominal tenderness.      Comments: Firm, G tube in place, surgical dressing in place   Musculoskeletal:      Right lower leg: No edema.      Left lower leg: No edema.   Neurological:      Comments: Sedated, no response to voice           Review of Systems    Vents:  Vent Mode: A/C (10/21/24 0950)  Ventilator Initiated: Yes (10/17/24 0102)  Set Rate: 16 BPM (10/21/24 0950)  Vt Set: 350 mL (10/21/24 0950)  Pressure Support: 0 cmH20 (10/18/24 0753)  PEEP/CPAP: 5 cmH20 (10/21/24 0950)  Oxygen Concentration (%): 30  (10/21/24 0950)  Peak Airway Pressure: 25 cmH20 (10/21/24 0950)  Plateau Pressure: 0 cmH20 (10/21/24 0950)  Total Ve: 5.84 L/m (10/21/24 0950)  Negative Inspiratory Force (cm H2O): 0 (10/21/24 0950)  F/VT Ratio<105 (RSBI): (!) 44.08 (10/21/24 0950)    Lines/Drains/Airways       Central Venous Catheter Line  Duration             Percutaneous Central Line - Triple Lumen  10/21/24 0814 Internal Jugular Left <1 day              Drain  Duration                  NG/OG Tube 10/16/24 0515 16 Fr. Right nostril 5 days         Urethral Catheter 10/16/24 0156 Double-lumen 16 Fr. 5 days         Gastrostomy/Enterostomy 10/21/24 0844 Gastrostomy tube w/ balloon LUQ feeding <1 day              Airway  Duration                  Airway - Non-Surgical 10/16/24 0530 Endotracheal Tube 5 days              Peripheral Intravenous Line  Duration                  Peripheral IV - Single Lumen 10/16/24 0120 22 G Posterior;Right Forearm 5 days         Peripheral IV - Single Lumen 10/16/24 0636 22 G Left;Posterior Hand 5 days         Peripheral IV - Single Lumen 10/16/24 1245 20 G Left;Medial Forearm 4 days                    Significant Labs:    CBC/Anemia Profile:  Recent Labs   Lab 10/20/24  0424 10/21/24  0539   WBC 11.70 10.64   HGB 9.0* 8.3*   HCT 27.1* 25.9*    261   MCV 79* 81*   RDW 13.2 13.3        Chemistries:  Recent Labs   Lab 10/19/24  1925 10/20/24  0256 10/21/24  0539    142 144   K 4.5 4.1 3.5    108 106   CO2 23 25 25   BUN 27* 25* 17   CREATININE 0.8 0.8 0.6   CALCIUM 8.3* 8.1* 8.6*   ALBUMIN  --  1.9* 2.0*   PROT  --  5.4* 5.9*   BILITOT  --  1.1* 1.2*   ALKPHOS  --  67 80   ALT  --  17 13   AST  --  19 21   MG  --  2.3 2.2   PHOS  --  2.0* 2.7       All pertinent labs within the past 24 hours have been reviewed.    Significant Imaging:  I have reviewed all pertinent imaging results/findings within the past 24 hours.    ABG  Recent Labs   Lab 10/21/24  0419   PH 7.455*   PO2 64*   PCO2 38.6   HCO3 27.2   BE  3*     Assessment/Plan:     Neuro  Intellectual disability with epilepsy  Home AEDs restarted: Keppra and phenobarb    Spastic quadriplegic cerebral palsy  Turn q 2hrs  Aspiration precautions  Baclofen on hold due to bowel rest    Ophtho  Legally blind  Supportive Care    Pulmonary  * Acute hypercapnic respiratory failure  Patient with Hypercapnic Respiratory failure which is Acute on chronic. Unclear if on O2 at the NH. Supplemental oxygen was provided and noted-     Vent Mode: A/C  Oxygen Concentration (%):  [30] 30  Resp Rate Total:  [16 br/min-23 br/min] 16 br/min  Vt Set:  [350 mL] 350 mL  PEEP/CPAP:  [5 cmH20] 5 cmH20  Mean Airway Pressure:  [7.9 cmH20-10 cmH20] 8.2 cmH20    .   Signs/symptoms of respiratory failure include- tachypnea, increased work of breathing, respiratory distress, and use of accessory muscles. Contributing diagnoses includes - Aspiration Labs and images were reviewed. Patient Has recent ABG, which has been reviewed. Will treat underlying causes and adjust management of respiratory failure as follows-     Wean vent settings as tolerated  Broad spectrum antibiotics- stopped, cultures NGTD  Duonebs prn  Sedation: Precedex, Fentanyl  Will keep intubated for surgeries planned  Bowel rest-NPO    10/21 - SAT/SBT starting this afternoon.  Nonverbal and blind baseline status will likely complicate extubation course.    Renal/  MAYELIN (acute kidney injury)  MAYELIN is likely due to pre-renal azotemia due to intravascular volume depletion. Baseline creatinine is  0.8 . Most recent creatinine and eGFR are listed below.  Recent Labs     10/19/24  1925 10/20/24  0256 10/21/24  0539   CREATININE 0.8 0.8 0.6   EGFRNORACEVR >60 >60 >60        Plan  - Avoid nephrotoxins and renally dose meds for GFR listed above  - Monitor urine output, serial BMP, and adjust therapy as needed  - Maintain Lancaster catheter- IVF boluses prn  - Resolved after fluid resuscitation      Endocrine  Body mass index (BMI) less than  19  Nutrition consult for Tube feeding recs when able to resume tube feeds  NPO currently due to bowel obstruction    GI  Bowel obstruction  KUB shows small bowel obstruction  NGT to suction  Keep NPO  CT abd/pelvis shows high grade bowel obstruction  Gen Surg consulted  Fecal impaction on Abd XR- disimpacted  SBFT XR shows high-grade small bowel obstruction    Under went Ex lap 10/18 with extensive lysis of adhesions, small bowel resection, takedown of gastrostomy tube with partial gastrectomy, abdomen left open in discontinuity with abthera in place  Ex lap on 10/21 with small-bowel anastomosis and G tube placement    PEG (percutaneous endoscopic gastrostomy) status  Patient noted to have a percutaneous endoscopic gastrostomy tube in place. I have personally inspected the tube.Tube was placed on this admission, with date of procedure- 10/16  There are no signs of drainage or infection around the site. Routine care to be done by wound care and nursing staff.     G tube removed during ex-lap on 10/18  Tube feedings on hold due to Bowel obstruction  Ex lap 10/21 with G tube replacement          Critical Care Time: 35 minutes  Critical secondary to respiratory failure, infusion of high risk medications      Critical care was time spent personally by me on the following activities: development of treatment plan with patient or surrogate and bedside caregivers, discussions with consultants, evaluation of patient's response to treatment, examination of patient, ordering and performing treatments and interventions, ordering and review of laboratory studies, ordering and review of radiographic studies, pulse oximetry, re-evaluation of patient's condition. This critical care time did not overlap with that of any other provider or involve time for any procedures.     Kiel Perez MD  Critical Care Medicine  '[place] - Intensive Care ([facility])

## 2024-10-21 NOTE — SUBJECTIVE & OBJECTIVE
Interval History: No acute events overnight.  To the OR this morning for anastamosis and G tube placement.  Examined post-op.  Stable hemodynamics and vent stable.        Objective:     Vital Signs (Most Recent):  Temp: 97.7 °F (36.5 °C) (10/21/24 0705)  Pulse: 63 (10/21/24 0950)  Resp: 16 (10/21/24 0950)  BP: 99/63 (10/21/24 0950)  SpO2: 100 % (10/21/24 0950) Vital Signs (24h Range):  Temp:  [97.4 °F (36.3 °C)-97.9 °F (36.6 °C)] 97.7 °F (36.5 °C)  Pulse:  [53-90] 63  Resp:  [15-43] 16  SpO2:  [98 %-100 %] 100 %  BP: ()/(53-97) 99/63     Weight: 46 kg (101 lb 6.6 oz)  Body mass index is 17.41 kg/m².      Intake/Output Summary (Last 24 hours) at 10/21/2024 0956  Last data filed at 10/21/2024 0700  Gross per 24 hour   Intake 887.3 ml   Output 1135 ml   Net -247.7 ml        Physical Exam  Vitals and nursing note reviewed.   Constitutional:       General: He is not in acute distress.     Comments: Intubated and sedated   Cardiovascular:      Rate and Rhythm: Normal rate and regular rhythm.      Pulses: Normal pulses.      Heart sounds: No murmur heard.  Pulmonary:      Effort: Pulmonary effort is normal. No respiratory distress.      Breath sounds: No wheezing, rhonchi or rales.   Abdominal:      General: Abdomen is flat. There is no distension.      Tenderness: There is no abdominal tenderness.      Comments: Firm, G tube in place, surgical dressing in place   Musculoskeletal:      Right lower leg: No edema.      Left lower leg: No edema.   Neurological:      Comments: Sedated, no response to voice           Review of Systems    Vents:  Vent Mode: A/C (10/21/24 0950)  Ventilator Initiated: Yes (10/17/24 0102)  Set Rate: 16 BPM (10/21/24 0950)  Vt Set: 350 mL (10/21/24 0950)  Pressure Support: 0 cmH20 (10/18/24 0753)  PEEP/CPAP: 5 cmH20 (10/21/24 0950)  Oxygen Concentration (%): 30 (10/21/24 0950)  Peak Airway Pressure: 25 cmH20 (10/21/24 0950)  Plateau Pressure: 0 cmH20 (10/21/24 0950)  Total Ve: 5.84 L/m  (10/21/24 0950)  Negative Inspiratory Force (cm H2O): 0 (10/21/24 0950)  F/VT Ratio<105 (RSBI): (!) 44.08 (10/21/24 0950)    Lines/Drains/Airways       Central Venous Catheter Line  Duration             Percutaneous Central Line - Triple Lumen  10/21/24 0814 Internal Jugular Left <1 day              Drain  Duration                  NG/OG Tube 10/16/24 0515 16 Fr. Right nostril 5 days         Urethral Catheter 10/16/24 0156 Double-lumen 16 Fr. 5 days         Gastrostomy/Enterostomy 10/21/24 0844 Gastrostomy tube w/ balloon LUQ feeding <1 day              Airway  Duration                  Airway - Non-Surgical 10/16/24 0530 Endotracheal Tube 5 days              Peripheral Intravenous Line  Duration                  Peripheral IV - Single Lumen 10/16/24 0120 22 G Posterior;Right Forearm 5 days         Peripheral IV - Single Lumen 10/16/24 0636 22 G Left;Posterior Hand 5 days         Peripheral IV - Single Lumen 10/16/24 1245 20 G Left;Medial Forearm 4 days                    Significant Labs:    CBC/Anemia Profile:  Recent Labs   Lab 10/20/24  0424 10/21/24  0539   WBC 11.70 10.64   HGB 9.0* 8.3*   HCT 27.1* 25.9*    261   MCV 79* 81*   RDW 13.2 13.3        Chemistries:  Recent Labs   Lab 10/19/24  1925 10/20/24  0256 10/21/24  0539    142 144   K 4.5 4.1 3.5    108 106   CO2 23 25 25   BUN 27* 25* 17   CREATININE 0.8 0.8 0.6   CALCIUM 8.3* 8.1* 8.6*   ALBUMIN  --  1.9* 2.0*   PROT  --  5.4* 5.9*   BILITOT  --  1.1* 1.2*   ALKPHOS  --  67 80   ALT  --  17 13   AST  --  19 21   MG  --  2.3 2.2   PHOS  --  2.0* 2.7       All pertinent labs within the past 24 hours have been reviewed.    Significant Imaging:  I have reviewed all pertinent imaging results/findings within the past 24 hours.

## 2024-10-21 NOTE — OP NOTE
[facility] - Intensive Care ([facility])  Colon and Rectal Surgery  Operative Note    SUMMARY     Date of Procedure: 10/21/2024     Procedure: Procedure(s) (LRB):  LAPAROTOMY, EXPLORATORY, OPEN GASTROSTOMY TUBE (N/A)     Surgeons and Role:     * Anna Marie Mo MD - Primary    Assisting Surgeon: None    Pre-Operative Diagnosis: Intestinal obstruction, unspecified cause, unspecified whether partial or complete [K56.609]    Post-Operative Diagnosis: Post-Op Diagnosis Codes:     * Intestinal obstruction, unspecified cause, unspecified whether partial or complete [K56.609]    Anesthesia: General    Operative Findings (including complications, if any):  Well-perfused small-bowel amenable to primary anastomosis.  Open gastrostomy with 20 Russian G-tube    Description of Technical Procedures:  The patient was identified in the ICU.  After informed consent was obtained from his power of  he was taken to the operating room placed in the operating table in the supine position.  General anesthesia was administered.  The outer adherent dressing of the ABThera was removed.  He was prepped and draped in the usual fashion.  A time-out was performed indicating the correct patient procedure and operative site and all the room were in agreement.  Preoperative antibiotics had been given.    The inner sheath of the ABThera dressing was removed.  The bowel appeared to be persistently dilated but improved from prior.  The small bowel was extracorporealized .  The ligament of Treitz was identified.  There was an area of serosal injury near the ligament of Treitz that was repaired with 3-0 Vicryl and a Lembert fashion.  The remainder of the small bowel was run to the terminal ileum.  The 2 discontinuous segments were identified.  There was an additional serosal injury on the proximal small bowel that was oversewn with 3-0 Vicryl in a Lembert fashion.  Indocyanine green was given and staple ends of the small intestine appeared to be  well-perfused.  I elected to perform a primary anastomosis.  An enterotomy was performed at the corner of the staple line at each end of the bowel.  A 75 mm blue load ANNITA stapler was advanced into the bowel in an isoperistaltic anti mesenteric stapled anastomosis was performed.  Then staple line was inspected and found to be widely patent and hemostatic.  The common channel was then closed with an additional firing of the staple load.  The staple line was oversewn with 3-0 Vicryl and a Lembert fashion.      I then turned my attention to the pelvis.  The serosal injury on the sigmoid colon and anterior rectum was inspected and was found to be well healed without any evidence of a leak.  At this time I elected to not perform a sigmoid colostomy which was his mother's request.    We then turned our attention to perform the gastrostomy placement.  The greater curvature of the body of the stomach was identified and it was grasped with [place] and was found to easily reach the anterior abdominal wall.  A double pursestring was fashioned around the proposed gastrostomy site.  Using 2-0 silk suture.  A gastrotomy was then made and gently dilated with a hemostat.  An incision was made in the anterior abdominal wall at the site of the G-tube.  A 20 Syriac gastrostomy tube was advanced into the abdomen.  The balloon was tested with 7 cc of sterile water.  The balloon was found to be competent.  The gastrostomy was gently dilated and the G-tube was placed within the stomach.  The balloon was filled with 7 cc of sterile water.  There was bilious contents that returned confirming placement within the stomach.  The posterior wall of the stomach was inspected and there was no evidence of back wall injury.  2 pursestring sutures were secured to themselves.  Then using a 2-0 silk for anchoring sutures to the anterior abdominal wall in the body of the stomach were performed at 4 points.  The stomach easily reach the anterior  abdominal wall.  The G-tube was secured at 2 cm at the skin.    The abdomen was then copiously irrigated and confirmed for hemostasis.  The fascia was closed using a 0 looped PDS in a running fashion.  The skin was closed with staples.  A sterile dressing was applied.  The G-tube was placed to gravity drainage.  The patient tolerated the procedure well.    Significant Surgical Tasks Conducted by the Assistant(s), if Applicable:  None    Estimated Blood Loss (EBL): * No values recorded between 10/21/2024  8:09 AM and 10/21/2024  9:34 AM *           Implants: * No implants in log *    Specimens:   Specimen (24h ago, onward)      None             Condition: Stable    Malignancy: Procedure performed for malignancy no    Disposition: ICU - intubated and hemodynamically stable.    Attestation: I performed the procedure.

## 2024-10-21 NOTE — ASSESSMENT & PLAN NOTE
MAYELIN is likely due to pre-renal azotemia due to intravascular volume depletion. Baseline creatinine is  0.8 . Most recent creatinine and eGFR are listed below.  Recent Labs     10/19/24  1925 10/20/24  0256 10/21/24  0539   CREATININE 0.8 0.8 0.6   EGFRNORACEVR >60 >60 >60        Plan  - Avoid nephrotoxins and renally dose meds for GFR listed above  - Monitor urine output, serial BMP, and adjust therapy as needed  - Maintain Lancaster catheter- IVF boluses prn  - Resolved after fluid resuscitation

## 2024-10-21 NOTE — PLAN OF CARE
Pt to OR today, returned with closed abdomen, dressing in place with dried drainage, and G-tube to gravity with no output. NGT to LIS, minimal output (provider notified). Lancaster catheter in place. Fentanyl and precedex gtts continued. SBT done, pt failed d/t apnea. POC reviewed with mother at bedside by provider. Safety precautions in place.

## 2024-10-21 NOTE — PROGRESS NOTES
[facility])  General Surgery  Progress Note    Subjective:     History of Present Illness:  23-year-old male with a history of several palsy presented to the emergency room with hypoxia which was felt to be secondary to aspiration.  According to the emergency room notes the patient required CPAP in route.  In the emergency room he was noted to have some abdominal distention.  The patient was intubated and subsequently admitted to the intensive care unit.      The patient does have a history of bowel obstructions but they may have been related constipation.    Post-Op Info:  Procedure(s) (LRB):  LAPAROTOMY, EXPLORATORY, OPEN GASTROSTOMY TUBE (N/A)   Day of Surgery     Interval History: NAEO, mother consented for surgery today    Medications:  Continuous Infusions:   dexmedeTOMIDine (Precedex) infusion (titrating)  0-1.4 mcg/kg/hr Intravenous Continuous 9.02 mL/hr at 10/21/24 0600 0.8 mcg/kg/hr at 10/21/24 0600    fentanyl  0-250 mcg/hr Intravenous Continuous 25 mL/hr at 10/21/24 0600 250 mcg/hr at 10/21/24 0600     Scheduled Meds:   chlorhexidine  15 mL Mouth/Throat BID    cloNIDine 0.1 mg/24 hr td ptwk  1 patch Transdermal Q7 Days    enoxparin  30 mg Subcutaneous Q24H (prophylaxis, 1700)    famotidine (PF)  20 mg Intravenous BID    levETIRAcetam (Keppra) IV (PEDS and ADULTS)  2,000 mg Intravenous Q12H    PHENObarbital  65 mg Intravenous BID     PRN Meds:  Current Facility-Administered Medications:     albuterol-ipratropium, 3 mL, Nebulization, Q6H PRN    dextrose 10%, 12.5 g, Intravenous, PRN    dextrose 10%, 25 g, Intravenous, PRN    glucagon (human recombinant), 1 mg, Intramuscular, PRN    insulin aspart U-100, 0-10 Units, Subcutaneous, Q4H PRN    magnesium sulfate IVPB, 2 g, Intravenous, PRN    magnesium sulfate IVPB, 4 g, Intravenous, PRN    potassium chloride, 40 mEq, Intravenous, PRN **AND** potassium chloride, 60 mEq, Intravenous, PRN **AND** potassium chloride, 80 mEq, Intravenous,  PRN    sodium phosphate 15 mmol in D5W 250 mL IVPB, 15 mmol, Intravenous, PRN    sodium phosphate 20.01 mmol in D5W 250 mL IVPB, 20.01 mmol, Intravenous, PRN    sodium phosphate 30 mmol in D5W 250 mL IVPB, 30 mmol, Intravenous, PRN     Review of patient's allergies indicates:   Allergen Reactions    Strawberries [strawberry] Hives     STRAWBERRIES ALLERGIC REACTIONS   (SEIZURES AND HIVES )     Objective:     Vital Signs (Most Recent):  Temp: 97.9 °F (36.6 °C) (10/21/24 0501)  Pulse: 62 (10/21/24 0630)  Resp: 16 (10/21/24 0630)  BP: (!) 146/87 (10/21/24 0630)  SpO2: 100 % (10/21/24 0630) Vital Signs (24h Range):  Temp:  [97.4 °F (36.3 °C)-97.9 °F (36.6 °C)] 97.9 °F (36.6 °C)  Pulse:  [53-90] 62  Resp:  [15-43] 16  SpO2:  [98 %-100 %] 100 %  BP: ()/(53-97) 146/87     Weight: 46 kg (101 lb 6.6 oz)  Body mass index is 17.41 kg/m².    Intake/Output - Last 3 Shifts         10/19 0700  10/20 0659 10/20 0700  10/21 0659 10/21 0700  10/22 0659    P.O.  0     I.V. (mL/kg) 555.5 (12) 715.4 (15.6)     IV Piggyback  358.1     Total Intake(mL/kg) 555.5 (12) 1073.5 (23.3)     Urine (mL/kg/hr) 858 (0.8) 500 (0.5)     Emesis/NG output  0     Drains 75 400     Other 250 350     Stool 0 0     Total Output 1183 1250     Net -627.6 -176.5            Urine Occurrence  0 x     Stool Occurrence 0 x 0 x     Emesis Occurrence  0 x              Physical Exam  Constitutional:       Appearance: He is ill-appearing.   Cardiovascular:      Rate and Rhythm: Normal rate and regular rhythm.   Pulmonary:      Comments: Intubated, mechanically ventilated  Abdominal:      Comments: Abthera in place, minimal serosanguinous output   Musculoskeletal:      Comments: Contractures BLE and BUE          Significant Labs:  I have reviewed all pertinent lab results within the past 24 hours.  CBC:   Recent Labs   Lab 10/21/24  0539   WBC 10.64   RBC 3.20*   HGB 8.3*   HCT 25.9*      MCV 81*   MCH 25.9*   MCHC 32.0     CMP:   Recent Labs   Lab  10/20/24  0256 10/21/24  0539   GLU 82 71   CALCIUM 8.1* 8.6*   ALBUMIN 1.9* 2.0*   PROT 5.4* 5.9*    144   K 4.1 3.5   CO2 25 25    106   BUN 25* 17   CREATININE 0.8 0.6   ALKPHOS 67 80   ALT 17 13   AST 19 21   BILITOT 1.1*  --        Significant Diagnostics:  I have reviewed all pertinent imaging results/findings within the past 24 hours.  Assessment/Plan:     * Acute hypercapnic respiratory failure  Management per critical care.    Patient is currently intubated     MAYELIN (acute kidney injury)  Per critical Care    Bowel obstruction  POD#3 s/p ex lap, extensive lysis of adhesions, small bowel resection, takedown of gastrostomy tube with partial gastrectomy, abdomen left open in discontinuity with abthera in place    - strict NPO.  No meds per NG tube  - to OR today for exploratory laparotomy Tentative plan is to proceed with small-bowel anastomosis and gastrostomy tube placement, possible sigmoid colostomy  - consent obtained, r/b/a discussed including bleeding, infection, damage to surrounding structures, anastomotic leak, prolonged hospitalization, delayed return of bowel function, hernia formation. She agreed to proceed    PEG (percutaneous endoscopic gastrostomy) status  Removed intraoperatively.  We will plan for redo gastrostomy tube at next surgery    Nonverbal  Monitor    Legally blind  Per critical care    Intellectual disability with epilepsy  Per critical care    Spastic quadriplegic cerebral palsy  Further management critical care medicine        Anna Marie Mo MD  General Surgery  [facility] [facility] ([facility])

## 2024-10-21 NOTE — ANESTHESIA PROCEDURE NOTES
Central Line    Diagnosis: inadequate PIV access  Patient location during procedure: done in OR  Procedure Urgency: Routine  Procedure start time: 10/21/2024 7:38 AM  Timeout: 10/21/2024 7:37 AM  Procedure end time: 10/21/2024 7:50 AM      Staffing  Authorizing Provider: Pedro Cummings MD  Performing Provider: Pedro Cummings MD    Staffing  Performed: anesthesiologist   Anesthesiologist: Pedro Cummings MD  Performed by: Pedro Cummings MD  Authorized by: Pedro Cummings MD    Anesthesiologist was present at the time of the procedure.  Preanesthetic Checklist  Completed: patient identified, IV checked, site marked, risks and benefits discussed, surgical consent, monitors and equipment checked, pre-op evaluation, timeout performed and anesthesia consent given  Indication   Indication: hemodynamic monitoring, vascular access, med administration     Anesthesia   general anesthesia    Central Line   Skin Prep: skin prepped with ChloraPrep, skin prep agent completely dried prior to procedure  Sterile Barriers Followed: Yes    All five maximal barriers used- gloves, gown, cap, mask, and large sterile sheet    hand hygiene performed prior to central venous catheter insertion  Location: left internal jugular.   Catheter type: triple lumen  Catheter Size: 7 Fr  Inserted Catheter Length: 14 cm  Ultrasound: vascular probe with ultrasound   Vessel Caliber: small, patent  Vascular Doppler:  not done, compressibility poor  Needle advanced into vessel with real time Ultrasound guidance.  Guidewire confirmed in vessel.  sterile gel and probe cover used in ultrasound-guided central venous catheter insertion  Manometry: none  Insertion Attempts: 2   Securement:line sutured, chlorhexidine patch, sterile dressing applied and blood return through all ports    Post-Procedure    Adverse Events:none      Guidewire Guidewire removed intact.   Additional Notes  *difficult placement 2/2 small IJ and close  proximity to ICA; steep Trendelenburg, vas-cath utilized to facilitate venous cannulation  *decision to place Left IJ CVC versus right made secondary to patient's chronic neck contracture and inability to tolerate positioning for Rt IJ CVC placement

## 2024-10-21 NOTE — SUBJECTIVE & OBJECTIVE
Interval History: NAEO, mother consented for surgery today    Medications:  Continuous Infusions:   dexmedeTOMIDine (Precedex) infusion (titrating)  0-1.4 mcg/kg/hr Intravenous Continuous 9.02 mL/hr at 10/21/24 0600 0.8 mcg/kg/hr at 10/21/24 0600    fentanyl  0-250 mcg/hr Intravenous Continuous 25 mL/hr at 10/21/24 0600 250 mcg/hr at 10/21/24 0600     Scheduled Meds:   chlorhexidine  15 mL Mouth/Throat BID    cloNIDine 0.1 mg/24 hr td ptwk  1 patch Transdermal Q7 Days    enoxparin  30 mg Subcutaneous Q24H (prophylaxis, 1700)    famotidine (PF)  20 mg Intravenous BID    levETIRAcetam (Keppra) IV (PEDS and ADULTS)  2,000 mg Intravenous Q12H    PHENObarbital  65 mg Intravenous BID     PRN Meds:  Current Facility-Administered Medications:     albuterol-ipratropium, 3 mL, Nebulization, Q6H PRN    dextrose 10%, 12.5 g, Intravenous, PRN    dextrose 10%, 25 g, Intravenous, PRN    glucagon (human recombinant), 1 mg, Intramuscular, PRN    insulin aspart U-100, 0-10 Units, Subcutaneous, Q4H PRN    magnesium sulfate IVPB, 2 g, Intravenous, PRN    magnesium sulfate IVPB, 4 g, Intravenous, PRN    potassium chloride, 40 mEq, Intravenous, PRN **AND** potassium chloride, 60 mEq, Intravenous, PRN **AND** potassium chloride, 80 mEq, Intravenous, PRN    sodium phosphate 15 mmol in D5W 250 mL IVPB, 15 mmol, Intravenous, PRN    sodium phosphate 20.01 mmol in D5W 250 mL IVPB, 20.01 mmol, Intravenous, PRN    sodium phosphate 30 mmol in D5W 250 mL IVPB, 30 mmol, Intravenous, PRN     Review of patient's allergies indicates:   Allergen Reactions    Strawberries [strawberry] Hives     STRAWBERRIES ALLERGIC REACTIONS   (SEIZURES AND HIVES )     Objective:     Vital Signs (Most Recent):  Temp: 97.9 °F (36.6 °C) (10/21/24 0501)  Pulse: 62 (10/21/24 0630)  Resp: 16 (10/21/24 0630)  BP: (!) 146/87 (10/21/24 0630)  SpO2: 100 % (10/21/24 0630) Vital Signs (24h Range):  Temp:  [97.4 °F (36.3 °C)-97.9 °F (36.6 °C)] 97.9 °F (36.6 °C)  Pulse:  [53-90]  62  Resp:  [15-43] 16  SpO2:  [98 %-100 %] 100 %  BP: ()/(53-97) 146/87     Weight: 46 kg (101 lb 6.6 oz)  Body mass index is 17.41 kg/m².    Intake/Output - Last 3 Shifts         10/19 0700  10/20 0659 10/20 0700  10/21 0659 10/21 0700  10/22 0659    P.O.  0     I.V. (mL/kg) 555.5 (12) 715.4 (15.6)     IV Piggyback  358.1     Total Intake(mL/kg) 555.5 (12) 1073.5 (23.3)     Urine (mL/kg/hr) 858 (0.8) 500 (0.5)     Emesis/NG output  0     Drains 75 400     Other 250 350     Stool 0 0     Total Output 1183 1250     Net -627.6 -176.5            Urine Occurrence  0 x     Stool Occurrence 0 x 0 x     Emesis Occurrence  0 x              Physical Exam  Constitutional:       Appearance: He is ill-appearing.   Cardiovascular:      Rate and Rhythm: Normal rate and regular rhythm.   Pulmonary:      Comments: Intubated, mechanically ventilated  Abdominal:      Comments: Abthera in place, minimal serosanguinous output   Musculoskeletal:      Comments: Contractures BLE and BUE          Significant Labs:  I have reviewed all pertinent lab results within the past 24 hours.  CBC:   Recent Labs   Lab 10/21/24  0539   WBC 10.64   RBC 3.20*   HGB 8.3*   HCT 25.9*      MCV 81*   MCH 25.9*   MCHC 32.0     CMP:   Recent Labs   Lab 10/20/24  0256 10/21/24  0539   GLU 82 71   CALCIUM 8.1* 8.6*   ALBUMIN 1.9* 2.0*   PROT 5.4* 5.9*    144   K 4.1 3.5   CO2 25 25    106   BUN 25* 17   CREATININE 0.8 0.6   ALKPHOS 67 80   ALT 17 13   AST 19 21   BILITOT 1.1*  --        Significant Diagnostics:  I have reviewed all pertinent imaging results/findings within the past 24 hours.

## 2024-10-21 NOTE — ASSESSMENT & PLAN NOTE
Patient with Hypercapnic Respiratory failure which is Acute on chronic. Unclear if on O2 at the NH. Supplemental oxygen was provided and noted-     Vent Mode: A/C  Oxygen Concentration (%):  [30] 30  Resp Rate Total:  [16 br/min-23 br/min] 16 br/min  Vt Set:  [350 mL] 350 mL  PEEP/CPAP:  [5 cmH20] 5 cmH20  Mean Airway Pressure:  [7.9 cmH20-10 cmH20] 8.2 cmH20    .   Signs/symptoms of respiratory failure include- tachypnea, increased work of breathing, respiratory distress, and use of accessory muscles. Contributing diagnoses includes - Aspiration Labs and images were reviewed. Patient Has recent ABG, which has been reviewed. Will treat underlying causes and adjust management of respiratory failure as follows-     Wean vent settings as tolerated  Broad spectrum antibiotics- stopped, cultures NGTD  Duonebs prn  Sedation: Precedex, Fentanyl  Will keep intubated for surgeries planned  Bowel rest-NPO    10/21 - SAT/SBT starting this afternoon.  Nonverbal and blind baseline status will likely complicate extubation course.

## 2024-10-21 NOTE — ASSESSMENT & PLAN NOTE
Nutrition consult for Tube feeding recs when able to resume tube feeds  NPO currently due to bowel obstruction

## 2024-10-22 LAB
ALBUMIN SERPL BCP-MCNC: 1.8 G/DL (ref 3.5–5.2)
ALLENS TEST: ABNORMAL
ALP SERPL-CCNC: 75 U/L (ref 40–150)
ALT SERPL W/O P-5'-P-CCNC: 14 U/L (ref 10–44)
ANION GAP SERPL CALC-SCNC: 10 MMOL/L (ref 8–16)
AST SERPL-CCNC: 27 U/L (ref 10–40)
BASOPHILS # BLD AUTO: 0.04 K/UL (ref 0–0.2)
BASOPHILS NFR BLD: 0.2 % (ref 0–1.9)
BILIRUB SERPL-MCNC: 1.9 MG/DL (ref 0.1–1)
BUN SERPL-MCNC: 13 MG/DL (ref 6–20)
CALCIUM SERPL-MCNC: 8.3 MG/DL (ref 8.7–10.5)
CHLORIDE SERPL-SCNC: 107 MMOL/L (ref 95–110)
CO2 SERPL-SCNC: 25 MMOL/L (ref 23–29)
CREAT SERPL-MCNC: 0.7 MG/DL (ref 0.5–1.4)
DELSYS: ABNORMAL
DIFFERENTIAL METHOD BLD: ABNORMAL
EOSINOPHIL # BLD AUTO: 0.1 K/UL (ref 0–0.5)
EOSINOPHIL NFR BLD: 0.3 % (ref 0–8)
ERYTHROCYTE [DISTWIDTH] IN BLOOD BY AUTOMATED COUNT: 13.4 % (ref 11.5–14.5)
ERYTHROCYTE [SEDIMENTATION RATE] IN BLOOD BY WESTERGREN METHOD: 16 MM/H
EST. GFR  (NO RACE VARIABLE): >60 ML/MIN/1.73 M^2
FIO2: 30
GLUCOSE SERPL-MCNC: 99 MG/DL (ref 70–110)
HCO3 UR-SCNC: 25.2 MMOL/L (ref 24–28)
HCT VFR BLD AUTO: 29.6 % (ref 40–54)
HGB BLD-MCNC: 9.7 G/DL (ref 14–18)
IMM GRANULOCYTES # BLD AUTO: 0.21 K/UL (ref 0–0.04)
IMM GRANULOCYTES NFR BLD AUTO: 0.9 % (ref 0–0.5)
LYMPHOCYTES # BLD AUTO: 1.5 K/UL (ref 1–4.8)
LYMPHOCYTES NFR BLD: 6.5 % (ref 18–48)
MAGNESIUM SERPL-MCNC: 1.9 MG/DL (ref 1.6–2.6)
MCH RBC QN AUTO: 26 PG (ref 27–31)
MCHC RBC AUTO-ENTMCNC: 32.8 G/DL (ref 32–36)
MCV RBC AUTO: 79 FL (ref 82–98)
MODE: ABNORMAL
MONOCYTES # BLD AUTO: 2 K/UL (ref 0.3–1)
MONOCYTES NFR BLD: 8.9 % (ref 4–15)
NEUTROPHILS # BLD AUTO: 19 K/UL (ref 1.8–7.7)
NEUTROPHILS NFR BLD: 83.2 % (ref 38–73)
NRBC BLD-RTO: 0 /100 WBC
PCO2 BLDA: 40.6 MMHG (ref 35–45)
PEEP: 5
PH SMN: 7.4 [PH] (ref 7.35–7.45)
PHOSPHATE SERPL-MCNC: 3.9 MG/DL (ref 2.7–4.5)
PLATELET # BLD AUTO: 392 K/UL (ref 150–450)
PMV BLD AUTO: 11.2 FL (ref 9.2–12.9)
PO2 BLDA: 131 MMHG (ref 80–100)
POC BE: 0 MMOL/L
POC SATURATED O2: 99 % (ref 95–100)
POCT GLUCOSE: 100 MG/DL (ref 70–110)
POCT GLUCOSE: 103 MG/DL (ref 70–110)
POCT GLUCOSE: 92 MG/DL (ref 70–110)
POTASSIUM SERPL-SCNC: 4.3 MMOL/L (ref 3.5–5.1)
PROT SERPL-MCNC: 5.6 G/DL (ref 6–8.4)
RBC # BLD AUTO: 3.73 M/UL (ref 4.6–6.2)
SAMPLE: ABNORMAL
SITE: ABNORMAL
SODIUM SERPL-SCNC: 142 MMOL/L (ref 136–145)
VT: 350
WBC # BLD AUTO: 22.87 K/UL (ref 3.9–12.7)

## 2024-10-22 PROCEDURE — 25000003 PHARM REV CODE 250: Performed by: NURSE PRACTITIONER

## 2024-10-22 PROCEDURE — 63600175 PHARM REV CODE 636 W HCPCS: Performed by: NURSE PRACTITIONER

## 2024-10-22 PROCEDURE — 84100 ASSAY OF PHOSPHORUS: CPT | Performed by: NURSE PRACTITIONER

## 2024-10-22 PROCEDURE — 63600175 PHARM REV CODE 636 W HCPCS

## 2024-10-22 PROCEDURE — 20000000 HC ICU ROOM

## 2024-10-22 PROCEDURE — 63600175 PHARM REV CODE 636 W HCPCS: Performed by: INTERNAL MEDICINE

## 2024-10-22 PROCEDURE — 80053 COMPREHEN METABOLIC PANEL: CPT | Performed by: NURSE PRACTITIONER

## 2024-10-22 PROCEDURE — 31720 CLEARANCE OF AIRWAYS: CPT

## 2024-10-22 PROCEDURE — 0BH17EZ INSERTION OF ENDOTRACHEAL AIRWAY INTO TRACHEA, VIA NATURAL OR ARTIFICIAL OPENING: ICD-10-PCS | Performed by: INTERNAL MEDICINE

## 2024-10-22 PROCEDURE — 94003 VENT MGMT INPAT SUBQ DAY: CPT

## 2024-10-22 PROCEDURE — 25000003 PHARM REV CODE 250: Performed by: INTERNAL MEDICINE

## 2024-10-22 PROCEDURE — C9399 UNCLASSIFIED DRUGS OR BIOLOG: HCPCS | Performed by: INTERNAL MEDICINE

## 2024-10-22 PROCEDURE — 94761 N-INVAS EAR/PLS OXIMETRY MLT: CPT

## 2024-10-22 PROCEDURE — 99900026 HC AIRWAY MAINTENANCE (STAT)

## 2024-10-22 PROCEDURE — 25000003 PHARM REV CODE 250

## 2024-10-22 PROCEDURE — 27100171 HC OXYGEN HIGH FLOW UP TO 24 HOURS

## 2024-10-22 PROCEDURE — 85025 COMPLETE CBC W/AUTO DIFF WBC: CPT | Performed by: NURSE PRACTITIONER

## 2024-10-22 PROCEDURE — C9399 UNCLASSIFIED DRUGS OR BIOLOG: HCPCS | Performed by: NURSE PRACTITIONER

## 2024-10-22 PROCEDURE — 99900035 HC TECH TIME PER 15 MIN (STAT)

## 2024-10-22 PROCEDURE — 83735 ASSAY OF MAGNESIUM: CPT | Performed by: NURSE PRACTITIONER

## 2024-10-22 RX ORDER — ROCURONIUM BROMIDE 10 MG/ML
50 INJECTION, SOLUTION INTRAVENOUS ONCE
Status: COMPLETED | OUTPATIENT
Start: 2024-10-22 | End: 2024-10-22

## 2024-10-22 RX ORDER — LORAZEPAM 2 MG/ML
INJECTION INTRAMUSCULAR
Status: COMPLETED
Start: 2024-10-22 | End: 2024-10-22

## 2024-10-22 RX ORDER — ROCURONIUM BROMIDE 10 MG/ML
INJECTION, SOLUTION INTRAVENOUS
Status: COMPLETED
Start: 2024-10-22 | End: 2024-10-22

## 2024-10-22 RX ORDER — ETOMIDATE 2 MG/ML
0.3 INJECTION INTRAVENOUS ONCE
Status: COMPLETED | OUTPATIENT
Start: 2024-10-22 | End: 2024-10-22

## 2024-10-22 RX ORDER — LORAZEPAM 2 MG/ML
2 INJECTION INTRAMUSCULAR ONCE
Status: COMPLETED | OUTPATIENT
Start: 2024-10-22 | End: 2024-10-22

## 2024-10-22 RX ORDER — MORPHINE SULFATE 2 MG/ML
2 INJECTION, SOLUTION INTRAMUSCULAR; INTRAVENOUS ONCE
Status: COMPLETED | OUTPATIENT
Start: 2024-10-22 | End: 2024-10-22

## 2024-10-22 RX ORDER — ETOMIDATE 2 MG/ML
INJECTION INTRAVENOUS
Status: COMPLETED
Start: 2024-10-22 | End: 2024-10-22

## 2024-10-22 RX ADMIN — FAMOTIDINE 20 MG: 10 INJECTION, SOLUTION INTRAVENOUS at 08:10

## 2024-10-22 RX ADMIN — ETOMIDATE 13.8 MG: 2 INJECTION INTRAVENOUS at 12:10

## 2024-10-22 RX ADMIN — LEVETIRACETAM 2000 MG: 500 INJECTION, SOLUTION INTRAVENOUS at 09:10

## 2024-10-22 RX ADMIN — DEXMEDETOMIDINE HYDROCHLORIDE 0.8 MCG/KG/HR: 4 INJECTION INTRAVENOUS at 07:10

## 2024-10-22 RX ADMIN — CHLORHEXIDINE GLUCONATE 0.12% ORAL RINSE 15 ML: 1.2 LIQUID ORAL at 07:10

## 2024-10-22 RX ADMIN — LORAZEPAM 2 MG: 2 INJECTION INTRAMUSCULAR; INTRAVENOUS at 12:10

## 2024-10-22 RX ADMIN — ROCURONIUM BROMIDE 50 MG: 10 INJECTION, SOLUTION INTRAVENOUS at 12:10

## 2024-10-22 RX ADMIN — PHENOBARBITAL SODIUM 65 MG: 65 INJECTION INTRAMUSCULAR at 09:10

## 2024-10-22 RX ADMIN — DEXMEDETOMIDINE HYDROCHLORIDE 1.4 MCG/KG/HR: 4 INJECTION INTRAVENOUS at 04:10

## 2024-10-22 RX ADMIN — CEFEPIME 2 G: 2 INJECTION, POWDER, FOR SOLUTION INTRAVENOUS at 02:10

## 2024-10-22 RX ADMIN — LEVETIRACETAM 2000 MG: 500 INJECTION, SOLUTION INTRAVENOUS at 08:10

## 2024-10-22 RX ADMIN — CHLORHEXIDINE GLUCONATE 0.12% ORAL RINSE 15 ML: 1.2 LIQUID ORAL at 09:10

## 2024-10-22 RX ADMIN — FENTANYL CITRATE 200 MCG/HR: 50 INJECTION INTRAVENOUS at 12:10

## 2024-10-22 RX ADMIN — LORAZEPAM 2 MG: 2 INJECTION INTRAMUSCULAR at 12:10

## 2024-10-22 RX ADMIN — DEXMEDETOMIDINE HYDROCHLORIDE 1.4 MCG/KG/HR: 4 INJECTION INTRAVENOUS at 11:10

## 2024-10-22 RX ADMIN — ENOXAPARIN SODIUM 30 MG: 30 INJECTION SUBCUTANEOUS at 04:10

## 2024-10-22 RX ADMIN — FAMOTIDINE 20 MG: 10 INJECTION, SOLUTION INTRAVENOUS at 09:10

## 2024-10-22 RX ADMIN — MORPHINE SULFATE 2 MG: 2 INJECTION, SOLUTION INTRAMUSCULAR; INTRAVENOUS at 11:10

## 2024-10-22 RX ADMIN — CEFEPIME 2 G: 2 INJECTION, POWDER, FOR SOLUTION INTRAVENOUS at 07:10

## 2024-10-22 RX ADMIN — PHENOBARBITAL SODIUM 65 MG: 65 INJECTION INTRAMUSCULAR at 08:10

## 2024-10-22 RX ADMIN — CEFEPIME 2 G: 2 INJECTION, POWDER, FOR SOLUTION INTRAVENOUS at 11:10

## 2024-10-22 NOTE — ASSESSMENT & PLAN NOTE
Patient with Hypercapnic Respiratory failure which is Acute on chronic. Unclear if on O2 at the NH. Supplemental oxygen was provided and noted-     Vent Mode: A/C  Oxygen Concentration (%):  [30] 30  Resp Rate Total:  [16 br/min-26 br/min] 16 br/min  Vt Set:  [350 mL] 350 mL  PEEP/CPAP:  [5 cmH20] 5 cmH20  Pressure Support:  [10 cmH20] 10 cmH20  Mean Airway Pressure:  [8 cmH20-9.3 cmH20] 8.5 cmH20    .   Signs/symptoms of respiratory failure include- tachypnea, increased work of breathing, respiratory distress, and use of accessory muscles. Contributing diagnoses includes - Aspiration Labs and images were reviewed. Patient Has recent ABG, which has been reviewed. Will treat underlying causes and adjust management of respiratory failure as follows-     Wean vent settings as tolerated  Broad spectrum antibiotics- stopped, cultures NGTD  Duonebs prn  Sedation: Precedex, Fentanyl  Will keep intubated for surgeries planned  Bowel rest-NPO    10/21 - SAT/SBT starting this afternoon.  Nonverbal and blind baseline status will likely complicate extubation course.  10/22- failed SBT yesterday due to apnea.   Sedation off this morning and plan SBT again today.

## 2024-10-22 NOTE — SUBJECTIVE & OBJECTIVE
Interval History:  Extubated overnight.  Agitated on exam.  No reported flatus.  No output from gastrostomy.  Bolster 2 cm at the skin, 4 cm at top of bolster    Medications:  Continuous Infusions:   dexmedeTOMIDine (Precedex) infusion (titrating)  0-1.4 mcg/kg/hr Intravenous Continuous 15.79 mL/hr at 10/22/24 1300 1.4 mcg/kg/hr at 10/22/24 1300    fentanyl  0-250 mcg/hr Intravenous Continuous 20 mL/hr at 10/22/24 1300 200 mcg/hr at 10/22/24 1300     Scheduled Meds:   ceFEPime IV (PEDS and ADULTS)  2 g Intravenous Q8H    chlorhexidine  15 mL Mouth/Throat BID    cloNIDine 0.1 mg/24 hr td ptwk  1 patch Transdermal Q7 Days    enoxparin  30 mg Subcutaneous Q24H (prophylaxis, 1700)    famotidine (PF)  20 mg Intravenous BID    levETIRAcetam (Keppra) IV (PEDS and ADULTS)  2,000 mg Intravenous Q12H    PHENObarbital  65 mg Intravenous BID     PRN Meds:  Current Facility-Administered Medications:     albuterol-ipratropium, 3 mL, Nebulization, Q6H PRN    dextrose 10%, 12.5 g, Intravenous, PRN    dextrose 10%, 25 g, Intravenous, PRN    glucagon (human recombinant), 1 mg, Intramuscular, PRN    insulin aspart U-100, 0-10 Units, Subcutaneous, Q4H PRN    magnesium sulfate IVPB, 2 g, Intravenous, PRN    magnesium sulfate IVPB, 4 g, Intravenous, PRN    potassium chloride, 40 mEq, Intravenous, PRN **AND** potassium chloride, 60 mEq, Intravenous, PRN **AND** potassium chloride, 80 mEq, Intravenous, PRN    sodium phosphate 15 mmol in D5W 250 mL IVPB, 15 mmol, Intravenous, PRN    sodium phosphate 20.01 mmol in D5W 250 mL IVPB, 20.01 mmol, Intravenous, PRN    sodium phosphate 30 mmol in D5W 250 mL IVPB, 30 mmol, Intravenous, PRN     Review of patient's allergies indicates:   Allergen Reactions    Strawberries [strawberry] Hives     STRAWBERRIES ALLERGIC REACTIONS   (SEIZURES AND HIVES )     Objective:     Vital Signs (Most Recent):  Temp: 98.9 °F (37.2 °C) (10/22/24 1100)  Pulse: (!) 122 (10/22/24 1330)  Resp: 18 (10/22/24 1330)  BP:  116/63 (10/22/24 1330)  SpO2: (!) 94 % (10/22/24 1330) Vital Signs (24h Range):  Temp:  [97.5 °F (36.4 °C)-98.9 °F (37.2 °C)] 98.9 °F (37.2 °C)  Pulse:  [] 122  Resp:  [12-54] 18  SpO2:  [81 %-100 %] 94 %  BP: (106-154)/() 116/63     Weight: 46 kg (101 lb 6.6 oz)  Body mass index is 17.41 kg/m².    Intake/Output - Last 3 Shifts         10/20 0700  10/21 0659 10/21 0700  10/22 0659 10/22 0700  10/23 0659    P.O. 0      I.V. (mL/kg) 715.4 (15.6) 813.2 (17.7)     IV Piggyback 358.1 415.1     Total Intake(mL/kg) 1073.5 (23.3) 1228.3 (26.7)     Urine (mL/kg/hr) 500 (0.5) 625 (0.6) 180 (0.5)    Emesis/NG output 0      Drains 400 350     Other 350      Stool 0      Total Output 1250 975 180    Net -176.5 +253.3 -180           Urine Occurrence 0 x      Stool Occurrence 0 x      Emesis Occurrence 0 x               Physical Exam  Constitutional:       Appearance: He is ill-appearing.      Comments: Agitated   Cardiovascular:      Rate and Rhythm: Tachycardia present.   Pulmonary:      Comments: Extubated today.  On supplemental O2.  Tachypneic  Abdominal:      Comments: Gastrostomy in place 2 cm of the skin 4 cm of the top of bolster.  Difficult to obtain a nominal exam as patient agitated.  No obvious rebound or guarding.  Midline incision dressing with some old blood staining          Significant Labs:  I have reviewed all pertinent lab results within the past 24 hours.  CBC:   Recent Labs   Lab 10/22/24  0408   WBC 22.87*   RBC 3.73*   HGB 9.7*   HCT 29.6*      MCV 79*   MCH 26.0*   MCHC 32.8     CMP:   Recent Labs   Lab 10/22/24  0408   GLU 99   CALCIUM 8.3*   ALBUMIN 1.8*   PROT 5.6*      K 4.3   CO2 25      BUN 13   CREATININE 0.7   ALKPHOS 75   ALT 14   AST 27   BILITOT 1.9*       Significant Diagnostics:  I have reviewed all pertinent imaging results/findings within the past 24 hours.

## 2024-10-22 NOTE — PROCEDURES
"Ronny Raemy is a 23 y.o. male patient.    Temp: 98.9 °F (37.2 °C) (10/22/24 1100)  Pulse: (!) 144 (10/22/24 1214)  Resp: (!) 46 (10/22/24 1214)  BP: (!) 147/75 (10/22/24 1133)  SpO2: (!) 92 % (10/22/24 1214)  Weight: 46 kg (101 lb 6.6 oz) (10/22/24 0627)  Height: 5' 4" (162.6 cm) (10/18/24 1553)     Intubation    Date/Time: 10/22/2024 12:54 PM  Location procedure was performed: [facility]    Performed by: Eyad Vaughn PA-C  Authorized by: Eyad Vaughn PA-C  Assisting provider: Kiel Perez MD  Pre-operative diagnosis: Acute respiratory failure  Post-operative diagnosis: Acute respiratory failure  Consent Done: Emergent Situation  Indications: respiratory distress  Intubation method: video-assisted  Sedatives: etomidate  Paralytic: rocuronium  Laryngoscope size: Mac 3  Tube size: 7.0 mm  Number of attempts: 1  Cords visualized: yes  Post-procedure assessment: ETCO2 monitor and chest rise  Breath sounds: equal  ETT to teeth: 23 cm  Tube secured with: ETT banda  Chest x-ray findings: endotracheal tube in appropriate position  Complications: No      10/22/2024    "

## 2024-10-22 NOTE — ASSESSMENT & PLAN NOTE
Nutrition consult for Tube feeding recs when able to resume tube feeds  NPO currently due to bowel surgery

## 2024-10-22 NOTE — PROGRESS NOTES
[facility]ensive [facility])  General Surgery  Progress Note    Subjective:     History of Present Illness:  23-year-old male with a history of several palsy presented to the emergency room with hypoxia which was felt to be secondary to aspiration.  According to the emergency room notes the patient required CPAP in route.  In the emergency room he was noted to have some abdominal distention.  The patient was intubated and subsequently admitted to the intensive care unit.      The patient does have a history of bowel obstructions but they may have been related constipation.    Post-Op Info:  Procedure(s) (LRB):  LAPAROTOMY, EXPLORATORY, OPEN GASTROSTOMY TUBE (N/A)   1 Day Post-Op     Interval History:  Extubated overnight.  Agitated on exam.  No reported flatus.  No output from gastrostomy.  Bolster 2 cm at the skin, 4 cm at top of bolster    Medications:  Continuous Infusions:   dexmedeTOMIDine (Precedex) infusion (titrating)  0-1.4 mcg/kg/hr Intravenous Continuous 15.79 mL/hr at 10/22/24 1300 1.4 mcg/kg/hr at 10/22/24 1300    fentanyl  0-250 mcg/hr Intravenous Continuous 20 mL/hr at 10/22/24 1300 200 mcg/hr at 10/22/24 1300     Scheduled Meds:   ceFEPime IV (PEDS and ADULTS)  2 g Intravenous Q8H    chlorhexidine  15 mL Mouth/Throat BID    cloNIDine 0.1 mg/24 hr td ptwk  1 patch Transdermal Q7 Days    enoxparin  30 mg Subcutaneous Q24H (prophylaxis, 1700)    famotidine (PF)  20 mg Intravenous BID    levETIRAcetam (Keppra) IV (PEDS and ADULTS)  2,000 mg Intravenous Q12H    PHENObarbital  65 mg Intravenous BID     PRN Meds:  Current Facility-Administered Medications:     albuterol-ipratropium, 3 mL, Nebulization, Q6H PRN    dextrose 10%, 12.5 g, Intravenous, PRN    dextrose 10%, 25 g, Intravenous, PRN    glucagon (human recombinant), 1 mg, Intramuscular, PRN    insulin aspart U-100, 0-10 Units, Subcutaneous, Q4H PRN    magnesium sulfate IVPB, 2 g, Intravenous, PRN    magnesium sulfate IVPB, 4 g, Intravenous,  PRN    potassium chloride, 40 mEq, Intravenous, PRN **AND** potassium chloride, 60 mEq, Intravenous, PRN **AND** potassium chloride, 80 mEq, Intravenous, PRN    sodium phosphate 15 mmol in D5W 250 mL IVPB, 15 mmol, Intravenous, PRN    sodium phosphate 20.01 mmol in D5W 250 mL IVPB, 20.01 mmol, Intravenous, PRN    sodium phosphate 30 mmol in D5W 250 mL IVPB, 30 mmol, Intravenous, PRN     Review of patient's allergies indicates:   Allergen Reactions    Strawberries [strawberry] Hives     STRAWBERRIES ALLERGIC REACTIONS   (SEIZURES AND HIVES )     Objective:     Vital Signs (Most Recent):  Temp: 98.9 °F (37.2 °C) (10/22/24 1100)  Pulse: (!) 122 (10/22/24 1330)  Resp: 18 (10/22/24 1330)  BP: 116/63 (10/22/24 1330)  SpO2: (!) 94 % (10/22/24 1330) Vital Signs (24h Range):  Temp:  [97.5 °F (36.4 °C)-98.9 °F (37.2 °C)] 98.9 °F (37.2 °C)  Pulse:  [] 122  Resp:  [12-54] 18  SpO2:  [81 %-100 %] 94 %  BP: (106-154)/() 116/63     Weight: 46 kg (101 lb 6.6 oz)  Body mass index is 17.41 kg/m².    Intake/Output - Last 3 Shifts         10/20 0700  10/21 0659 10/21 0700  10/22 0659 10/22 0700  10/23 0659    P.O. 0      I.V. (mL/kg) 715.4 (15.6) 813.2 (17.7)     IV Piggyback 358.1 415.1     Total Intake(mL/kg) 1073.5 (23.3) 1228.3 (26.7)     Urine (mL/kg/hr) 500 (0.5) 625 (0.6) 180 (0.5)    Emesis/NG output 0      Drains 400 350     Other 350      Stool 0      Total Output 1250 975 180    Net -176.5 +253.3 -180           Urine Occurrence 0 x      Stool Occurrence 0 x      Emesis Occurrence 0 x               Physical Exam  Constitutional:       Appearance: He is ill-appearing.      Comments: Agitated   Cardiovascular:      Rate and Rhythm: Tachycardia present.   Pulmonary:      Comments: Extubated today.  On supplemental O2.  Tachypneic  Abdominal:      Comments: Gastrostomy in place 2 cm of the skin 4 cm of the top of bolster.  Difficult to obtain a nominal exam as patient agitated.  No obvious rebound or guarding.   Midline incision dressing with some old blood staining          Significant Labs:  I have reviewed all pertinent lab results within the past 24 hours.  CBC:   Recent Labs   Lab 10/22/24  0408   WBC 22.87*   RBC 3.73*   HGB 9.7*   HCT 29.6*      MCV 79*   MCH 26.0*   MCHC 32.8     CMP:   Recent Labs   Lab 10/22/24  0408   GLU 99   CALCIUM 8.3*   ALBUMIN 1.8*   PROT 5.6*      K 4.3   CO2 25      BUN 13   CREATININE 0.7   ALKPHOS 75   ALT 14   AST 27   BILITOT 1.9*       Significant Diagnostics:  I have reviewed all pertinent imaging results/findings within the past 24 hours.  Assessment/Plan:     * Acute hypercapnic respiratory failure  Management per critical care.    Patient is currently intubated     MAYELIN (acute kidney injury)  Per critical Care    Bowel obstruction  POD#4/1 s/p ex lap, extensive lysis of adhesions, small bowel resection, takedown of gastrostomy tube with partial gastrectomy, abdomen left open in discontinuity with abthera in place.  Now status post small-bowel anastomosis, open gastrostomy placement and abdominal closure    - would continue NPO, NG tube to low intermittent suction as well as gastrostomy to gravity drainage  - when patient has return of bowel function can DC NG tube and advance diet as tolerated  - recommend pain control with IV meds  - remainder of care per primary team    PEG (percutaneous endoscopic gastrostomy) status  Removed intraoperatively.  We will plan for redo gastrostomy tube at next surgery    Nonverbal  Monitor    Legally blind  Per critical care    Intellectual disability with epilepsy  Per critical care    Spastic quadriplegic cerebral palsy  Further management critical care medicine        Anna Marie Mo MD  General Surgery  [facility] ([facility])

## 2024-10-22 NOTE — PLAN OF CARE
Patient resting quietly in bed on vent with no signs of distress since reintubation. Pt is oliguric. Dr Mo saw patient earlier and given update. Dr Perez spoke with patient's mom at bedside and gave status update and POC.

## 2024-10-22 NOTE — ASSESSMENT & PLAN NOTE
POD#4/1 s/p ex lap, extensive lysis of adhesions, small bowel resection, takedown of gastrostomy tube with partial gastrectomy, abdomen left open in discontinuity with abthera in place.  Now status post small-bowel anastomosis, open gastrostomy placement and abdominal closure    - would continue NPO, NG tube to low intermittent suction as well as gastrostomy to gravity drainage  - when patient has return of bowel function can DC NG tube and advance diet as tolerated  - recommend pain control with IV meds  - remainder of care per primary team

## 2024-10-22 NOTE — SUBJECTIVE & OBJECTIVE
Interval History: No acute events overnight.  Failed SBT yesterday afternoon due apnea.  Vent and hemodynamics are stable.        Objective:     Vital Signs (Most Recent):  Temp: 98.6 °F (37 °C) (10/22/24 0700)  Pulse: 106 (10/22/24 0731)  Resp: 16 (10/22/24 0731)  BP: 114/70 (10/22/24 0730)  SpO2: 100 % (10/22/24 0731) Vital Signs (24h Range):  Temp:  [97.5 °F (36.4 °C)-98.6 °F (37 °C)] 98.6 °F (37 °C)  Pulse:  [] 106  Resp:  [16-32] 16  SpO2:  [98 %-100 %] 100 %  BP: ()/(52-92) 114/70     Weight: 46 kg (101 lb 6.6 oz)  Body mass index is 17.41 kg/m².      Intake/Output Summary (Last 24 hours) at 10/22/2024 0824  Last data filed at 10/22/2024 0700  Gross per 24 hour   Intake 1168.3 ml   Output 970 ml   Net 198.3 ml        Physical Exam  Vitals and nursing note reviewed.   Constitutional:       General: He is not in acute distress.     Appearance: He is ill-appearing (chronically).      Comments: Arousable to voice on sedation, not following commands   Cardiovascular:      Rate and Rhythm: Regular rhythm. Tachycardia present.      Pulses: Normal pulses.      Heart sounds: No murmur heard.  Pulmonary:      Effort: Pulmonary effort is normal. No respiratory distress.      Breath sounds: No wheezing, rhonchi or rales.   Abdominal:      General: There is no distension.      Comments: Firm, surgical dressing in place, grimace with palpation   Musculoskeletal:      Right lower leg: No edema.      Left lower leg: No edema.   Neurological:      Comments: Arousable to voice and roving eye movements.  Not following commands           Review of Systems    Vents:  Vent Mode: A/C (10/22/24 0731)  Ventilator Initiated: Yes (10/17/24 0102)  Set Rate: 16 BPM (10/22/24 0731)  Vt Set: 350 mL (10/22/24 0731)  Pressure Support: 10 cmH20 (10/21/24 1522)  PEEP/CPAP: 5 cmH20 (10/22/24 0731)  Oxygen Concentration (%): 30 (10/22/24 0731)  Peak Airway Pressure: 27 cmH20 (10/22/24 0731)  Plateau Pressure: 0 cmH20 (10/22/24  0731)  Total Ve: 5.75 L/m (10/22/24 0731)  Negative Inspiratory Force (cm H2O): 0 (10/22/24 0731)  F/VT Ratio<105 (RSBI): (!) 45.58 (10/22/24 0731)    Lines/Drains/Airways       Central Venous Catheter Line  Duration             Percutaneous Central Line - Triple Lumen  10/21/24 0814 Internal Jugular Left 1 day              Drain  Duration                  NG/OG Tube 10/16/24 0515 16 Fr. Right nostril 6 days         Urethral Catheter 10/16/24 0156 Double-lumen 16 Fr. 6 days         Gastrostomy/Enterostomy 10/21/24 0844 Gastrostomy tube w/ balloon LUQ feeding <1 day              Airway  Duration                  Airway - Non-Surgical 10/16/24 0530 Endotracheal Tube 6 days                    Significant Labs:    CBC/Anemia Profile:  Recent Labs   Lab 10/21/24  0539 10/22/24  0408   WBC 10.64 22.87*   HGB 8.3* 9.7*   HCT 25.9* 29.6*    392   MCV 81* 79*   RDW 13.3 13.4        Chemistries:  Recent Labs   Lab 10/21/24  0539 10/22/24  0408    142   K 3.5 4.3    107   CO2 25 25   BUN 17 13   CREATININE 0.6 0.7   CALCIUM 8.6* 8.3*   ALBUMIN 2.0* 1.8*   PROT 5.9* 5.6*   BILITOT 1.2* 1.9*   ALKPHOS 80 75   ALT 13 14   AST 21 27   MG 2.2 1.9   PHOS 2.7 3.9       All pertinent labs within the past 24 hours have been reviewed.    Significant Imaging:  I have reviewed all pertinent imaging results/findings within the past 24 hours.

## 2024-10-22 NOTE — NURSING
Patient extubated at around 11AM and placed on nasal O2. Patient's O2 sats remained in 80's most of time since extubation. Patient extremely restless,agitated, tachypnic. Resp Rate 40-50/min, patient became diaphoretic as time elapsed. After increasing O2 a couple times and giving patient morphine IVP x1 with no results followed by ativan 2mg ivp and again with no results and with resp rate elevated and heart rate as high as 160 Dr Perez decided to re intubate patient. Patient placed back on vent and fentanyl gtt restarted. After about 30 minutes patient's heart rate and resp rate came within acceptable limits. See VS documentation.

## 2024-10-22 NOTE — PROGRESS NOTES
[facility] ([facility])  Critical Care Medicine  Progress Note    Patient Name: Ronny Ramey  MRN: [id number]  Admission Date: 10/16/2024  Hospital Length of Stay: 6 days  Code Status: Full Code  Attending Provider: Kiel Perez MD  Primary Care Provider: Herman Nathan DNP   Principal Problem: Acute hypercapnic respiratory failure    Subjective:     HPI:  Ronny Ramey is a 23 yr old male [facility] resident with CP, spastic quadriplegia, s/p G tube, epilepsy who presented to [facility] ED on 10/16 with shortness of breath/respiratory distress. Patient was placed on CPAP with little improvement. KUB shows small bowel obstruction. Patient was intubated and transferred to [facility]. Critical care consulted for admission. Patient with history of admissions for bowel obstruction and aspiration pneumonia.     Hospital/ICU Course:  10/17/2024: Patient with decreasing UOP overnight. Renal function worsening. Small bolus given with little response. Additional boluses today. HTN overnight, clonidine patch started. Continue bowel rest. NGT advanced per Abd XR, continued bowel obstruction on XR as well as fecal impaction. Will attempt to disimpact. Gen Surg following. Propofol switched to precedex for vent weaning.  10/18/2024: XR small bowel FT shows high-grade small bowel obstruction. Gen surg planning for ex lap today. Will keep intubated for procedure. Cultures NGTD, antibiotics stopped.  10/19/2024: Patient s/p ex lap with extensive lysis of adhesions, small bowel resection, takedown of gastrostomy tube with partial gastrectomy, abdomen left open in discontinuity with abthera in place. Gen Surg plans to take for exploratory laparotomy Monday 10/21 for small-bowel anastomosis and gastrostomy tube placement. Keeping intubated for surgery.   10/20/2024: Remains intubated. Pending surgery tomorrow. Drop in H&H overnight, but no signs of bleeding, vital signs stable.   10/21  - To the OR this morning with small bowel anastamosis and G-tube placement.  Remains on the vent.  Hemodynamics stable.  10/22 - NAEON.  Vent and hemodynamics stable.  SAT/SBT today.    Interval History: No acute events overnight.  Failed SBT yesterday afternoon due apnea.  Vent and hemodynamics are stable.        Objective:     Vital Signs (Most Recent):  Temp: 98.6 °F (37 °C) (10/22/24 0700)  Pulse: 106 (10/22/24 0731)  Resp: 16 (10/22/24 0731)  BP: 114/70 (10/22/24 0730)  SpO2: 100 % (10/22/24 0731) Vital Signs (24h Range):  Temp:  [97.5 °F (36.4 °C)-98.6 °F (37 °C)] 98.6 °F (37 °C)  Pulse:  [] 106  Resp:  [16-32] 16  SpO2:  [98 %-100 %] 100 %  BP: ()/(52-92) 114/70     Weight: 46 kg (101 lb 6.6 oz)  Body mass index is 17.41 kg/m².      Intake/Output Summary (Last 24 hours) at 10/22/2024 0824  Last data filed at 10/22/2024 0700  Gross per 24 hour   Intake 1168.3 ml   Output 970 ml   Net 198.3 ml        Physical Exam  Vitals and nursing note reviewed.   Constitutional:       General: He is not in acute distress.     Appearance: He is ill-appearing (chronically).      Comments: Arousable to voice on sedation, not following commands   Cardiovascular:      Rate and Rhythm: Regular rhythm. Tachycardia present.      Pulses: Normal pulses.      Heart sounds: No murmur heard.  Pulmonary:      Effort: Pulmonary effort is normal. No respiratory distress.      Breath sounds: No wheezing, rhonchi or rales.   Abdominal:      General: There is no distension.      Comments: Firm, surgical dressing in place, grimace with palpation   Musculoskeletal:      Right lower leg: No edema.      Left lower leg: No edema.   Neurological:      Comments: Arousable to voice and roving eye movements.  Not following commands           Review of Systems    Vents:  Vent Mode: A/C (10/22/24 0731)  Ventilator Initiated: Yes (10/17/24 0102)  Set Rate: 16 BPM (10/22/24 0731)  Vt Set: 350 mL (10/22/24 0731)  Pressure Support: 10 cmH20  (10/21/24 1522)  PEEP/CPAP: 5 cmH20 (10/22/24 0731)  Oxygen Concentration (%): 30 (10/22/24 0731)  Peak Airway Pressure: 27 cmH20 (10/22/24 0731)  Plateau Pressure: 0 cmH20 (10/22/24 0731)  Total Ve: 5.75 L/m (10/22/24 0731)  Negative Inspiratory Force (cm H2O): 0 (10/22/24 0731)  F/VT Ratio<105 (RSBI): (!) 45.58 (10/22/24 0731)    Lines/Drains/Airways       Central Venous Catheter Line  Duration             Percutaneous Central Line - Triple Lumen  10/21/24 0814 Internal Jugular Left 1 day              Drain  Duration                  NG/OG Tube 10/16/24 0515 16 Fr. Right nostril 6 days         Urethral Catheter 10/16/24 0156 Double-lumen 16 Fr. 6 days         Gastrostomy/Enterostomy 10/21/24 0844 Gastrostomy tube w/ balloon LUQ feeding <1 day              Airway  Duration                  Airway - Non-Surgical 10/16/24 0530 Endotracheal Tube 6 days                    Significant Labs:    CBC/Anemia Profile:  Recent Labs   Lab 10/21/24  0539 10/22/24  0408   WBC 10.64 22.87*   HGB 8.3* 9.7*   HCT 25.9* 29.6*    392   MCV 81* 79*   RDW 13.3 13.4        Chemistries:  Recent Labs   Lab 10/21/24  0539 10/22/24  0408    142   K 3.5 4.3    107   CO2 25 25   BUN 17 13   CREATININE 0.6 0.7   CALCIUM 8.6* 8.3*   ALBUMIN 2.0* 1.8*   PROT 5.9* 5.6*   BILITOT 1.2* 1.9*   ALKPHOS 80 75   ALT 13 14   AST 21 27   MG 2.2 1.9   PHOS 2.7 3.9       All pertinent labs within the past 24 hours have been reviewed.    Significant Imaging:  I have reviewed all pertinent imaging results/findings within the past 24 hours.    ABG  Recent Labs   Lab 10/22/24  0455   PH 7.400   PO2 131*   PCO2 40.6   HCO3 25.2   BE 0     Assessment/Plan:     Neuro  Intellectual disability with epilepsy  Home AEDs restarted: Keppra and phenobarb    Spastic quadriplegic cerebral palsy  Turn q 2hrs  Aspiration precautions  Baclofen on hold due to bowel rest    Ophtho  Legally blind  Supportive Care    Pulmonary  * Acute hypercapnic respiratory  failure  Patient with Hypercapnic Respiratory failure which is Acute on chronic. Unclear if on O2 at the NH. Supplemental oxygen was provided and noted-     Vent Mode: A/C  Oxygen Concentration (%):  [30] 30  Resp Rate Total:  [16 br/min-26 br/min] 16 br/min  Vt Set:  [350 mL] 350 mL  PEEP/CPAP:  [5 cmH20] 5 cmH20  Pressure Support:  [10 cmH20] 10 cmH20  Mean Airway Pressure:  [8 cmH20-9.3 cmH20] 8.5 cmH20    .   Signs/symptoms of respiratory failure include- tachypnea, increased work of breathing, respiratory distress, and use of accessory muscles. Contributing diagnoses includes - Aspiration Labs and images were reviewed. Patient Has recent ABG, which has been reviewed. Will treat underlying causes and adjust management of respiratory failure as follows-     Wean vent settings as tolerated  Broad spectrum antibiotics- stopped, cultures NGTD  Duonebs prn  Sedation: Precedex, Fentanyl  Will keep intubated for surgeries planned  Bowel rest-NPO    10/21 - SAT/SBT starting this afternoon.  Nonverbal and blind baseline status will likely complicate extubation course.  10/22- failed SBT yesterday due to apnea.   Sedation off this morning and plan SBT again today.    Renal/  MAYELIN (acute kidney injury)  MAYELIN is likely due to pre-renal azotemia due to intravascular volume depletion. Baseline creatinine is  0.8 . Most recent creatinine and eGFR are listed below.  Recent Labs     10/20/24  0256 10/21/24  0539 10/22/24  0408   CREATININE 0.8 0.6 0.7   EGFRNORACEVR >60 >60 >60        Plan  - Avoid nephrotoxins and renally dose meds for GFR listed above  - Monitor urine output, serial BMP, and adjust therapy as needed  - Maintain [place] catheter- IVF boluses prn  - Resolved after fluid resuscitation      Endocrine  Body mass index (BMI) less than 19  Nutrition consult for Tube feeding recs when able to resume tube feeds  NPO currently due to bowel surgery    GI  Bowel obstruction  KUB shows small bowel obstruction  NGT to  suction  Keep NPO  CT abd/pelvis shows high grade bowel obstruction  Gen Surg consulted  Fecal impaction on Abd XR- disimpacted  SBFT XR shows high-grade small bowel obstruction    Under went Ex lap 10/18 with extensive lysis of adhesions, small bowel resection, takedown of gastrostomy tube with partial gastrectomy, abdomen left open in discontinuity with abthera in place  Ex lap on 10/21 with small-bowel anastomosis and G tube placement    PEG (percutaneous endoscopic gastrostomy) status  Patient noted to have a percutaneous endoscopic gastrostomy tube in place. I have personally inspected the tube.Tube was placed on this admission, with date of procedure- 10/16  There are no signs of drainage or infection around the site. Routine care to be done by wound care and nursing staff.     G tube removed during ex-lap on 10/18  Tube feedings on hold due to Bowel obstruction  Ex lap 10/21 with G tube replacement          Critical Care Time: 33 minutes  Critical secondary to respiratory failure, infusion of high risk medications      Critical care was time spent personally by me on the following activities: development of treatment plan with patient or surrogate and bedside caregivers, discussions with consultants, evaluation of patient's response to treatment, examination of patient, ordering and performing treatments and interventions, ordering and review of laboratory studies, ordering and review of radiographic studies, pulse oximetry, re-evaluation of patient's condition. This critical care time did not overlap with that of any other provider or involve time for any procedures.     Kiel Perez MD  Critical Care Medicine  [facility] - Intensive Care ([facility])

## 2024-10-22 NOTE — NURSING
Patient extubated per Dr Perez verbal order at bedside. Patient tachycardic prior to extubation and tachypnic.

## 2024-10-22 NOTE — ASSESSMENT & PLAN NOTE
MAYELIN is likely due to pre-renal azotemia due to intravascular volume depletion. Baseline creatinine is  0.8 . Most recent creatinine and eGFR are listed below.  Recent Labs     10/20/24  0256 10/21/24  0539 10/22/24  0408   CREATININE 0.8 0.6 0.7   EGFRNORACEVR >60 >60 >60        Plan  - Avoid nephrotoxins and renally dose meds for GFR listed above  - Monitor urine output, serial BMP, and adjust therapy as needed  - Maintain [place] catheter- IVF boluses prn  - Resolved after fluid resuscitation

## 2024-10-23 LAB
ALBUMIN SERPL BCP-MCNC: 1.7 G/DL (ref 3.5–5.2)
ALLENS TEST: ABNORMAL
ALP SERPL-CCNC: 86 U/L (ref 40–150)
ALT SERPL W/O P-5'-P-CCNC: 13 U/L (ref 10–44)
ANION GAP SERPL CALC-SCNC: 5 MMOL/L (ref 8–16)
AST SERPL-CCNC: 25 U/L (ref 10–40)
BASOPHILS # BLD AUTO: 0.03 K/UL (ref 0–0.2)
BASOPHILS NFR BLD: 0.2 % (ref 0–1.9)
BILIRUB SERPL-MCNC: 2.6 MG/DL (ref 0.1–1)
BUN SERPL-MCNC: 17 MG/DL (ref 6–20)
CALCIUM SERPL-MCNC: 7.8 MG/DL (ref 8.7–10.5)
CHLORIDE SERPL-SCNC: 104 MMOL/L (ref 95–110)
CO2 SERPL-SCNC: 27 MMOL/L (ref 23–29)
CREAT SERPL-MCNC: 0.7 MG/DL (ref 0.5–1.4)
DELSYS: ABNORMAL
DIFFERENTIAL METHOD BLD: ABNORMAL
EOSINOPHIL # BLD AUTO: 0.1 K/UL (ref 0–0.5)
EOSINOPHIL NFR BLD: 0.5 % (ref 0–8)
ERYTHROCYTE [DISTWIDTH] IN BLOOD BY AUTOMATED COUNT: 13.2 % (ref 11.5–14.5)
ERYTHROCYTE [SEDIMENTATION RATE] IN BLOOD BY WESTERGREN METHOD: 18 MM/H
EST. GFR  (NO RACE VARIABLE): >60 ML/MIN/1.73 M^2
FINAL PATHOLOGIC DIAGNOSIS: NORMAL
FIO2: 60
GLUCOSE SERPL-MCNC: 96 MG/DL (ref 70–110)
GROSS: NORMAL
HCO3 UR-SCNC: 26.8 MMOL/L (ref 24–28)
HCT VFR BLD AUTO: 25.3 % (ref 40–54)
HGB BLD-MCNC: 8.3 G/DL (ref 14–18)
IMM GRANULOCYTES # BLD AUTO: 0.14 K/UL (ref 0–0.04)
IMM GRANULOCYTES NFR BLD AUTO: 0.8 % (ref 0–0.5)
LYMPHOCYTES # BLD AUTO: 2.4 K/UL (ref 1–4.8)
LYMPHOCYTES NFR BLD: 14.1 % (ref 18–48)
Lab: NORMAL
MAGNESIUM SERPL-MCNC: 2.4 MG/DL (ref 1.6–2.6)
MCH RBC QN AUTO: 26 PG (ref 27–31)
MCHC RBC AUTO-ENTMCNC: 32.8 G/DL (ref 32–36)
MCV RBC AUTO: 79 FL (ref 82–98)
MODE: ABNORMAL
MONOCYTES # BLD AUTO: 1.6 K/UL (ref 0.3–1)
MONOCYTES NFR BLD: 9.5 % (ref 4–15)
NEUTROPHILS # BLD AUTO: 12.9 K/UL (ref 1.8–7.7)
NEUTROPHILS NFR BLD: 74.9 % (ref 38–73)
NRBC BLD-RTO: 0 /100 WBC
PCO2 BLDA: 39.5 MMHG (ref 35–45)
PEEP: 10
PH SMN: 7.44 [PH] (ref 7.35–7.45)
PHOSPHATE SERPL-MCNC: 2.6 MG/DL (ref 2.7–4.5)
PLATELET # BLD AUTO: 361 K/UL (ref 150–450)
PMV BLD AUTO: 11.1 FL (ref 9.2–12.9)
PO2 BLDA: 208 MMHG (ref 80–100)
POC BE: 3 MMOL/L
POC SATURATED O2: 100 % (ref 95–100)
POCT GLUCOSE: 100 MG/DL (ref 70–110)
POCT GLUCOSE: 103 MG/DL (ref 70–110)
POTASSIUM SERPL-SCNC: 4 MMOL/L (ref 3.5–5.1)
PROT SERPL-MCNC: 4.9 G/DL (ref 6–8.4)
RBC # BLD AUTO: 3.19 M/UL (ref 4.6–6.2)
SAMPLE: ABNORMAL
SITE: ABNORMAL
SODIUM SERPL-SCNC: 136 MMOL/L (ref 136–145)
VT: 350
WBC # BLD AUTO: 17.21 K/UL (ref 3.9–12.7)

## 2024-10-23 PROCEDURE — 85025 COMPLETE CBC W/AUTO DIFF WBC: CPT | Performed by: NURSE PRACTITIONER

## 2024-10-23 PROCEDURE — 99900035 HC TECH TIME PER 15 MIN (STAT)

## 2024-10-23 PROCEDURE — 25000003 PHARM REV CODE 250: Performed by: NURSE PRACTITIONER

## 2024-10-23 PROCEDURE — 36600 WITHDRAWAL OF ARTERIAL BLOOD: CPT

## 2024-10-23 PROCEDURE — 94003 VENT MGMT INPAT SUBQ DAY: CPT

## 2024-10-23 PROCEDURE — 25000003 PHARM REV CODE 250: Performed by: INTERNAL MEDICINE

## 2024-10-23 PROCEDURE — 63600175 PHARM REV CODE 636 W HCPCS: Performed by: INTERNAL MEDICINE

## 2024-10-23 PROCEDURE — 99024 POSTOP FOLLOW-UP VISIT: CPT | Mod: ,,,

## 2024-10-23 PROCEDURE — 83735 ASSAY OF MAGNESIUM: CPT | Performed by: NURSE PRACTITIONER

## 2024-10-23 PROCEDURE — 20000000 HC ICU ROOM

## 2024-10-23 PROCEDURE — 82803 BLOOD GASES ANY COMBINATION: CPT

## 2024-10-23 PROCEDURE — 80053 COMPREHEN METABOLIC PANEL: CPT | Performed by: NURSE PRACTITIONER

## 2024-10-23 PROCEDURE — 94761 N-INVAS EAR/PLS OXIMETRY MLT: CPT | Mod: XB

## 2024-10-23 PROCEDURE — 84100 ASSAY OF PHOSPHORUS: CPT | Performed by: NURSE PRACTITIONER

## 2024-10-23 PROCEDURE — 27100171 HC OXYGEN HIGH FLOW UP TO 24 HOURS

## 2024-10-23 PROCEDURE — 99900026 HC AIRWAY MAINTENANCE (STAT)

## 2024-10-23 PROCEDURE — 63600175 PHARM REV CODE 636 W HCPCS: Performed by: NURSE PRACTITIONER

## 2024-10-23 PROCEDURE — C9399 UNCLASSIFIED DRUGS OR BIOLOG: HCPCS | Performed by: INTERNAL MEDICINE

## 2024-10-23 RX ORDER — DIAZEPAM 10 MG/2ML
2.5 INJECTION INTRAMUSCULAR EVERY 12 HOURS
Status: DISCONTINUED | OUTPATIENT
Start: 2024-10-23 | End: 2024-10-24

## 2024-10-23 RX ORDER — BACLOFEN 10 MG/1
10 TABLET ORAL 2 TIMES DAILY
Status: DISCONTINUED | OUTPATIENT
Start: 2024-10-23 | End: 2024-10-28

## 2024-10-23 RX ADMIN — DEXMEDETOMIDINE HYDROCHLORIDE 1.4 MCG/KG/HR: 4 INJECTION INTRAVENOUS at 11:10

## 2024-10-23 RX ADMIN — CHLORHEXIDINE GLUCONATE 0.12% ORAL RINSE 15 ML: 1.2 LIQUID ORAL at 08:10

## 2024-10-23 RX ADMIN — FENTANYL CITRATE 200 MCG/HR: 50 INJECTION INTRAVENOUS at 12:10

## 2024-10-23 RX ADMIN — SODIUM CHLORIDE, POTASSIUM CHLORIDE, SODIUM LACTATE AND CALCIUM CHLORIDE 500 ML: 600; 310; 30; 20 INJECTION, SOLUTION INTRAVENOUS at 09:10

## 2024-10-23 RX ADMIN — FAMOTIDINE 20 MG: 10 INJECTION, SOLUTION INTRAVENOUS at 09:10

## 2024-10-23 RX ADMIN — LEVETIRACETAM 2000 MG: 500 INJECTION, SOLUTION INTRAVENOUS at 08:10

## 2024-10-23 RX ADMIN — DEXMEDETOMIDINE HYDROCHLORIDE 1.4 MCG/KG/HR: 4 INJECTION INTRAVENOUS at 04:10

## 2024-10-23 RX ADMIN — ENOXAPARIN SODIUM 30 MG: 30 INJECTION SUBCUTANEOUS at 04:10

## 2024-10-23 RX ADMIN — FAMOTIDINE 20 MG: 10 INJECTION, SOLUTION INTRAVENOUS at 08:10

## 2024-10-23 RX ADMIN — PHENOBARBITAL SODIUM 65 MG: 65 INJECTION INTRAMUSCULAR at 09:10

## 2024-10-23 RX ADMIN — CEFEPIME 2 G: 2 INJECTION, POWDER, FOR SOLUTION INTRAVENOUS at 10:10

## 2024-10-23 RX ADMIN — CHLORHEXIDINE GLUCONATE 0.12% ORAL RINSE 15 ML: 1.2 LIQUID ORAL at 09:10

## 2024-10-23 RX ADMIN — CLONIDINE 1 PATCH: 0.1 PATCH TRANSDERMAL at 09:10

## 2024-10-23 RX ADMIN — SODIUM CHLORIDE 1000 ML: 9 INJECTION, SOLUTION INTRAVENOUS at 10:10

## 2024-10-23 RX ADMIN — CEFEPIME 2 G: 2 INJECTION, POWDER, FOR SOLUTION INTRAVENOUS at 06:10

## 2024-10-23 RX ADMIN — PHENOBARBITAL SODIUM 65 MG: 65 INJECTION INTRAMUSCULAR at 08:10

## 2024-10-23 RX ADMIN — LEVETIRACETAM 2000 MG: 500 INJECTION, SOLUTION INTRAVENOUS at 09:10

## 2024-10-23 RX ADMIN — FENTANYL CITRATE 200 MCG/HR: 50 INJECTION INTRAVENOUS at 01:10

## 2024-10-23 RX ADMIN — DIAZEPAM 2.5 MG: 10 INJECTION, SOLUTION INTRAMUSCULAR; INTRAVENOUS at 09:10

## 2024-10-23 RX ADMIN — DEXMEDETOMIDINE HYDROCHLORIDE 1.4 MCG/KG/HR: 4 INJECTION INTRAVENOUS at 05:10

## 2024-10-23 RX ADMIN — CEFEPIME 2 G: 2 INJECTION, POWDER, FOR SOLUTION INTRAVENOUS at 03:10

## 2024-10-23 RX ADMIN — BACLOFEN 10 MG: 10 TABLET ORAL at 10:10

## 2024-10-23 RX ADMIN — BACLOFEN 10 MG: 10 TABLET ORAL at 08:10

## 2024-10-23 RX ADMIN — SODIUM PHOSPHATE, MONOBASIC, MONOHYDRATE AND SODIUM PHOSPHATE, DIBASIC, ANHYDROUS 15 MMOL: 142; 276 INJECTION, SOLUTION INTRAVENOUS at 05:10

## 2024-10-23 NOTE — ANESTHESIA POSTPROCEDURE EVALUATION
Anesthesia Post Evaluation    Patient: Ronny Ramey    Procedure(s) Performed: Procedure(s) (LRB):  LAPAROTOMY, EXPLORATORY, OPEN GASTROSTOMY TUBE (N/A)    Final Anesthesia Type: general      Patient location during evaluation: PACU  Patient participation: Yes- Able to Participate  Level of consciousness: awake and alert and oriented  Post-procedure vital signs: reviewed and stable  Pain management: adequate  Airway patency: patent  HETAL mitigation strategies: Verification of full reversal of neuromuscular block  PONV status at discharge: No PONV  Anesthetic complications: no      Cardiovascular status: blood pressure returned to baseline and hemodynamically stable  Respiratory status: unassisted  Hydration status: euvolemic  Follow-up not needed.              Vitals Value Taken Time   /60 10/21/24 0948   Temp 36.9 °C (98.4 °F) 10/21/24 0948   Pulse 112 10/21/24 0948   Resp 18 10/21/24 0948   SpO2 100 % 10/21/24 0948   Vitals shown include unfiled device data.      No case tracking events are documented in the log.      Pain/Chary Score: Pain Rating Prior to Med Admin: 0 (10/22/2024 12:53 PM)  Pain Rating Post Med Admin: 1 (10/22/2024  1:25 PM)

## 2024-10-23 NOTE — ASSESSMENT & PLAN NOTE
Patient with Hypercapnic Respiratory failure which is Acute on chronic. Unclear if on O2 at the NH. Supplemental oxygen was provided and noted-     Vent Mode: A/C  Oxygen Concentration (%):  [] 21  Resp Rate Total:  [18 br/min-25 br/min] 18 br/min  Vt Set:  [350 mL] 350 mL  PEEP/CPAP:  [5 cmH20-10 cmH20] 5 cmH20  Mean Airway Pressure:  [9 cmH20-15 cmH20] 9.4 cmH20    .   Signs/symptoms of respiratory failure include- tachypnea, increased work of breathing, respiratory distress, and use of accessory muscles. Contributing diagnoses includes - Aspiration Labs and images were reviewed. Patient Has recent ABG, which has been reviewed. Will treat underlying causes and adjust management of respiratory failure as follows-     Wean vent settings as tolerated  Broad spectrum antibiotics- stopped, cultures NGTD  Duonebs prn  Sedation: Precedex, Fentanyl  Will keep intubated for surgeries planned  Bowel rest-NPO    10/21 - SAT/SBT starting this afternoon.  Nonverbal and blind baseline status will likely complicate extubation course.  10/22- failed SBT yesterday due to apnea.   Sedation off this morning and plan SBT again today.  10/23 - extubated yesterday, but quickly failed due to anxiety and difficulty with secretions.  Dropped his sat and got extremely tachypneic.  Reintubated and initially had high requirements but now weaned back to 40% and 5 of PEEP.  Some concern for aspiration of secretions.  Will coordinate with mom for her to be present next time we attempt.

## 2024-10-23 NOTE — SUBJECTIVE & OBJECTIVE
Interval History: No acute events following reintubation yesterday.  Vent weaned back down.  Hemodynamics stable.      Objective:     Vital Signs (Most Recent):  Temp: 98 °F (36.7 °C) (10/23/24 1118)  Pulse: 95 (10/23/24 1539)  Resp: 18 (10/23/24 1539)  BP: 130/69 (10/23/24 1501)  SpO2: 100 % (10/23/24 1539) Vital Signs (24h Range):  Temp:  [98 °F (36.7 °C)-98.7 °F (37.1 °C)] 98 °F (36.7 °C)  Pulse:  [] 95  Resp:  [18-25] 18  SpO2:  [93 %-100 %] 100 %  BP: (100-133)/(55-77) 130/69     Weight: 46 kg (101 lb 6.6 oz)  Body mass index is 17.41 kg/m².      Intake/Output Summary (Last 24 hours) at 10/23/2024 1545  Last data filed at 10/23/2024 1500  Gross per 24 hour   Intake 2866.01 ml   Output 885 ml   Net 1981.01 ml        Physical Exam  Vitals and nursing note reviewed.   Constitutional:       Comments: Intubated and sedated, moving spotaneously   Cardiovascular:      Rate and Rhythm: Normal rate and regular rhythm.      Pulses: Normal pulses.      Heart sounds: No murmur heard.  Pulmonary:      Effort: Pulmonary effort is normal. No respiratory distress.      Breath sounds: Rhonchi (R>L) present. No wheezing or rales.   Abdominal:      General: There is distension.      Tenderness: There is no abdominal tenderness.      Comments: Hypoactive bowel sounds   Musculoskeletal:      Right lower leg: No edema.      Left lower leg: No edema.   Neurological:      Comments: Chronic contractures, moves spontaneously but not following commands.  Roving eye movements with occ nystagmus           Review of Systems    Vents:  Vent Mode: A/C (10/23/24 1539)  Ventilator Initiated: Yes (10/17/24 0102)  Set Rate: 18 BPM (10/23/24 1539)  Vt Set: 350 mL (10/23/24 1539)  Pressure Support: 7 cmH20 (10/22/24 0944)  PEEP/CPAP: 5 cmH20 (10/23/24 1539)  Oxygen Concentration (%): 21 (10/23/24 1539)  Peak Airway Pressure: 29 cmH20 (10/23/24 1539)  Plateau Pressure: 30 cmH20 (10/23/24 1539)  Total Ve: 6.46 L/m (10/23/24 1539)  Negative  Inspiratory Force (cm H2O): 0 (10/23/24 1539)  F/VT Ratio<105 (RSBI): (!) 50.14 (10/23/24 1539)    Lines/Drains/Airways       Central Venous Catheter Line  Duration             Percutaneous Central Line - Triple Lumen  10/21/24 0814 Internal Jugular Left 2 days              Drain  Duration                  NG/OG Tube 10/16/24 0515 16 Fr. Right nostril 7 days         Urethral Catheter 10/16/24 0156 Double-lumen 16 Fr. 7 days         Gastrostomy/Enterostomy 10/21/24 0844 Gastrostomy tube w/ balloon LUQ feeding 2 days              Airway  Duration                  Airway - Non-Surgical 10/22/24 1305 Endotracheal Tube 1 day                    Significant Labs:    CBC/Anemia Profile:  Recent Labs   Lab 10/22/24  0408 10/23/24  0403   WBC 22.87* 17.21*   HGB 9.7* 8.3*   HCT 29.6* 25.3*    361   MCV 79* 79*   RDW 13.4 13.2        Chemistries:  Recent Labs   Lab 10/22/24  0408 10/23/24  0403    136   K 4.3 4.0    104   CO2 25 27   BUN 13 17   CREATININE 0.7 0.7   CALCIUM 8.3* 7.8*   ALBUMIN 1.8* 1.7*   PROT 5.6* 4.9*   BILITOT 1.9* 2.6*   ALKPHOS 75 86   ALT 14 13   AST 27 25   MG 1.9 2.4   PHOS 3.9 2.6*       All pertinent labs within the past 24 hours have been reviewed.    Significant Imaging:  I have reviewed all pertinent imaging results/findings within the past 24 hours.

## 2024-10-23 NOTE — PROGRESS NOTES
[facility] ([facility])  Critical Care Medicine  Progress Note    Patient Name: Ronny Ramey  MRN: [id number]  Admission Date: 10/16/2024  Hospital Length of Stay: 7 days  Code Status: Full Code  Attending Provider: Kiel Perez MD  Primary Care Provider: Herman Nathan DNP   Principal Problem: Acute hypercapnic respiratory failure    Subjective:     HPI:  Ronny Ramey is a 23 yr old male [facility] resident with CP, spastic quadriplegia, s/p G tube, epilepsy who presented to [facility] ED on 10/16 with shortness of breath/respiratory distress. Patient was placed on CPAP with little improvement. KUB shows small bowel obstruction. Patient was intubated and transferred to [facility]. Critical care consulted for admission. Patient with history of admissions for bowel obstruction and aspiration pneumonia.     Hospital/ICU Course:  10/17/2024: Patient with decreasing UOP overnight. Renal function worsening. Small bolus given with little response. Additional boluses today. HTN overnight, clonidine patch started. Continue bowel rest. NGT advanced per Abd XR, continued bowel obstruction on XR as well as fecal impaction. Will attempt to disimpact. Gen Surg following. Propofol switched to precedex for vent weaning.  10/18/2024: XR small bowel FT shows high-grade small bowel obstruction. Gen surg planning for ex lap today. Will keep intubated for procedure. Cultures NGTD, antibiotics stopped.  10/19/2024: Patient s/p ex lap with extensive lysis of adhesions, small bowel resection, takedown of gastrostomy tube with partial gastrectomy, abdomen left open in discontinuity with abthera in place. Gen Surg plans to take for exploratory laparotomy Monday 10/21 for small-bowel anastomosis and gastrostomy tube placement. Keeping intubated for surgery.   10/20/2024: Remains intubated. Pending surgery tomorrow. Drop in H&H overnight, but no signs of bleeding, vital signs stable.   10/21  - To the OR this morning with small bowel anastamosis and G-tube placement.  Remains on the vent.  Hemodynamics stable.  10/22 - NAEON.  Vent and hemodynamics stable.  Extubated, but quickly failed due to anxiety and secretions with severe tachypnea and desats  10/23 - GALO following reintubation.  Vent weaned back down.  Hemodynamics stable.        Interval History: No acute events following reintubation yesterday.  Vent weaned back down.  Hemodynamics stable.      Objective:     Vital Signs (Most Recent):  Temp: 98 °F (36.7 °C) (10/23/24 1118)  Pulse: 95 (10/23/24 1539)  Resp: 18 (10/23/24 1539)  BP: 130/69 (10/23/24 1501)  SpO2: 100 % (10/23/24 1539) Vital Signs (24h Range):  Temp:  [98 °F (36.7 °C)-98.7 °F (37.1 °C)] 98 °F (36.7 °C)  Pulse:  [] 95  Resp:  [18-25] 18  SpO2:  [93 %-100 %] 100 %  BP: (100-133)/(55-77) 130/69     Weight: 46 kg (101 lb 6.6 oz)  Body mass index is 17.41 kg/m².      Intake/Output Summary (Last 24 hours) at 10/23/2024 1545  Last data filed at 10/23/2024 1500  Gross per 24 hour   Intake 2866.01 ml   Output 885 ml   Net 1981.01 ml        Physical Exam  Vitals and nursing note reviewed.   Constitutional:       Comments: Intubated and sedated, moving spotaneously   Cardiovascular:      Rate and Rhythm: Normal rate and regular rhythm.      Pulses: Normal pulses.      Heart sounds: No murmur heard.  Pulmonary:      Effort: Pulmonary effort is normal. No respiratory distress.      Breath sounds: Rhonchi (R>L) present. No wheezing or rales.   Abdominal:      General: There is distension.      Tenderness: There is no abdominal tenderness.      Comments: Hypoactive bowel sounds   Musculoskeletal:      Right lower leg: No edema.      Left lower leg: No edema.   Neurological:      Comments: Chronic contractures, moves spontaneously but not following commands.  Roving eye movements with occ nystagmus           Review of Systems    Vents:  Vent Mode: A/C (10/23/24 1539)  Ventilator Initiated:  Yes (10/17/24 0102)  Set Rate: 18 BPM (10/23/24 1539)  Vt Set: 350 mL (10/23/24 1539)  Pressure Support: 7 cmH20 (10/22/24 0944)  PEEP/CPAP: 5 cmH20 (10/23/24 1539)  Oxygen Concentration (%): 21 (10/23/24 1539)  Peak Airway Pressure: 29 cmH20 (10/23/24 1539)  Plateau Pressure: 30 cmH20 (10/23/24 1539)  Total Ve: 6.46 L/m (10/23/24 1539)  Negative Inspiratory Force (cm H2O): 0 (10/23/24 1539)  F/VT Ratio<105 (RSBI): (!) 50.14 (10/23/24 1539)    Lines/Drains/Airways       Central Venous Catheter Line  Duration             Percutaneous Central Line - Triple Lumen  10/21/24 0814 Internal Jugular Left 2 days              Drain  Duration                  NG/OG Tube 10/16/24 0515 16 Fr. Right nostril 7 days         Urethral Catheter 10/16/24 0156 Double-lumen 16 Fr. 7 days         Gastrostomy/Enterostomy 10/21/24 0844 Gastrostomy tube w/ balloon LUQ feeding 2 days              Airway  Duration                  Airway - Non-Surgical 10/22/24 1305 Endotracheal Tube 1 day                    Significant Labs:    CBC/Anemia Profile:  Recent Labs   Lab 10/22/24  0408 10/23/24  0403   WBC 22.87* 17.21*   HGB 9.7* 8.3*   HCT 29.6* 25.3*    361   MCV 79* 79*   RDW 13.4 13.2        Chemistries:  Recent Labs   Lab 10/22/24  0408 10/23/24  0403    136   K 4.3 4.0    104   CO2 25 27   BUN 13 17   CREATININE 0.7 0.7   CALCIUM 8.3* 7.8*   ALBUMIN 1.8* 1.7*   PROT 5.6* 4.9*   BILITOT 1.9* 2.6*   ALKPHOS 75 86   ALT 14 13   AST 27 25   MG 1.9 2.4   PHOS 3.9 2.6*       All pertinent labs within the past 24 hours have been reviewed.    Significant Imaging:  I have reviewed all pertinent imaging results/findings within the past 24 hours.    ABG  Recent Labs   Lab 10/23/24  0402   PH 7.439   PO2 208*   PCO2 39.5   HCO3 26.8   BE 3*     Assessment/Plan:     Neuro  Intellectual disability with epilepsy  Home AEDs restarted: Keppra and phenobarb    Spastic quadriplegic cerebral palsy  Turn q 2hrs  Aspiration  precautions  Baclofen restarted at half home dose    Ophtho  Legally blind  Supportive Care    Pulmonary  * Acute hypercapnic respiratory failure  Patient with Hypercapnic Respiratory failure which is Acute on chronic. Unclear if on O2 at the NH. Supplemental oxygen was provided and noted-     Vent Mode: A/C  Oxygen Concentration (%):  [] 21  Resp Rate Total:  [18 br/min-25 br/min] 18 br/min  Vt Set:  [350 mL] 350 mL  PEEP/CPAP:  [5 cmH20-10 cmH20] 5 cmH20  Mean Airway Pressure:  [9 cmH20-15 cmH20] 9.4 cmH20    .   Signs/symptoms of respiratory failure include- tachypnea, increased work of breathing, respiratory distress, and use of accessory muscles. Contributing diagnoses includes - Aspiration Labs and images were reviewed. Patient Has recent ABG, which has been reviewed. Will treat underlying causes and adjust management of respiratory failure as follows-     Wean vent settings as tolerated  Broad spectrum antibiotics- stopped, cultures NGTD  Duonebs prn  Sedation: Precedex, Fentanyl  Will keep intubated for surgeries planned  Bowel rest-NPO    10/21 - SAT/SBT starting this afternoon.  Nonverbal and blind baseline status will likely complicate extubation course.  10/22- failed SBT yesterday due to apnea.   Sedation off this morning and plan SBT again today.  10/23 - extubated yesterday, but quickly failed due to anxiety and difficulty with secretions.  Dropped his sat and got extremely tachypneic.  Reintubated and initially had high requirements but now weaned back to 40% and 5 of PEEP.  Some concern for aspiration of secretions.  Will coordinate with mom for her to be present next time we attempt.    Renal/  MAYELIN (acute kidney injury)  MAYELIN is likely due to pre-renal azotemia due to intravascular volume depletion. Baseline creatinine is  0.8 . Most recent creatinine and eGFR are listed below.  Recent Labs     10/21/24  0539 10/22/24  0408 10/23/24  0403   CREATININE 0.6 0.7 0.7   EGFRNORACEVR >60 >60 >60         Plan  - Avoid nephrotoxins and renally dose meds for GFR listed above  - Monitor urine output, serial BMP, and adjust therapy as needed  - Maintain Lancaster catheter- IVF boluses prn  - Resolved after fluid resuscitation      Endocrine  Body mass index (BMI) less than 19  Nutrition consult for Tube feeding recs when able to resume tube feeds  NPO currently due to bowel surgery    GI  Bowel obstruction  KUB shows small bowel obstruction  NGT to suction  Keep NPO  CT abd/pelvis shows high grade bowel obstruction  Gen Surg consulted  Fecal impaction on Abd XR- disimpacted  SBFT XR shows high-grade small bowel obstruction    Under went Ex lap 10/18 with extensive lysis of adhesions, small bowel resection, takedown of gastrostomy tube with partial gastrectomy, abdomen left open in discontinuity with abthera in place  Ex lap on 10/21 with small-bowel anastomosis and G tube placement    PEG (percutaneous endoscopic gastrostomy) status  Patient noted to have a percutaneous endoscopic gastrostomy tube in place. I have personally inspected the tube.Tube was placed on this admission, with date of procedure- 10/16  There are no signs of drainage or infection around the site. Routine care to be done by wound care and nursing staff.     G tube removed during ex-lap on 10/18  Tube feedings on hold due to Bowel obstruction  Ex lap 10/21 with G tube replacement  Starting to use for meds 10/23          Critical Care Time: 39 minutes  Critical secondary to respiratory failure, infusion of high risk medications      Critical care was time spent personally by me on the following activities: development of treatment plan with patient or surrogate and bedside caregivers, discussions with consultants, evaluation of patient's response to treatment, examination of patient, ordering and performing treatments and interventions, ordering and review of laboratory studies, ordering and review of radiographic studies, pulse oximetry,  re-evaluation of patient's condition. This critical care time did not overlap with that of any other provider or involve time for any procedures.     Kiel Perez MD  Critical Care Medicine  [facility] [place])

## 2024-10-23 NOTE — ANESTHESIA POSTPROCEDURE EVALUATION
Anesthesia Post Evaluation    Patient: Ronny Ramey    Procedure(s) Performed: Procedure(s) (LRB):  LAPAROTOMY, EXPLORATORY (N/A)  BLOCK, TRANSVERSUS ABDOMINIS PLANE (N/A)  GASTRECTOMY, PARTIAL (N/A)  EXCISION, SMALL INTESTINE (N/A)  APPLICATION, WOUND VAC (N/A)    Final Anesthesia Type: general      Patient location during evaluation: ICU  Patient participation: No - Unable to Participate, Intubation  Level of consciousness: sedated and responds to stimulation  Post-procedure vital signs: reviewed and stable  Pain management: adequate  Airway patency: patent  HETAL mitigation strategies: Multimodal analgesia  PONV status at discharge: No PONV  Anesthetic complications: no      Cardiovascular status: blood pressure returned to baseline and hemodynamically stable  Respiratory status: ETT, intubated and ventilator  Hydration status: euvolemic  Follow-up not needed.              Vitals Value Taken Time   /60 10/18/24 1400   Temp 36.9 °C (98.4 °F) 10/18/24 1400   Pulse 114 10/18/24 1400   Resp 18 10/18/24 1400   SpO2 100 % 10/18/24 1400   Vitals shown include unfiled device data.      No case tracking events are documented in the log.      Pain/Chary Score: Pain Rating Prior to Med Admin: 0 (10/22/2024 12:53 PM)  Pain Rating Post Med Admin: 1 (10/22/2024  1:25 PM)

## 2024-10-23 NOTE — PLAN OF CARE
Remains sedated on fent gtt and precedex gtt. Remains intubated with FiO2 21%, PEEP 5. Ok to give medications through G tube per MD. NG to intermittent suction with 100ml green/brown output. Lancaster cath remains in place with 375ml UOP. Wound care completed. Mother updated via phone call.

## 2024-10-23 NOTE — ASSESSMENT & PLAN NOTE
Patient noted to have a percutaneous endoscopic gastrostomy tube in place. I have personally inspected the tube.Tube was placed on this admission, with date of procedure- 10/16  There are no signs of drainage or infection around the site. Routine care to be done by wound care and nursing staff.     G tube removed during ex-lap on 10/18  Tube feedings on hold due to Bowel obstruction  Ex lap 10/21 with G tube replacement  Starting to use for meds 10/23

## 2024-10-23 NOTE — ASSESSMENT & PLAN NOTE
MAYELIN is likely due to pre-renal azotemia due to intravascular volume depletion. Baseline creatinine is  0.8 . Most recent creatinine and eGFR are listed below.  Recent Labs     10/21/24  0539 10/22/24  0408 10/23/24  0403   CREATININE 0.6 0.7 0.7   EGFRNORACEVR >60 >60 >60        Plan  - Avoid nephrotoxins and renally dose meds for GFR listed above  - Monitor urine output, serial BMP, and adjust therapy as needed  - Maintain Lancaster catheter- IVF boluses prn  - Resolved after fluid resuscitation

## 2024-10-23 NOTE — PLAN OF CARE
Pt remains intubated/sedated on Precedex/fent gtts for RASS 0--1. Tolerating vent settings rate 18, tidal volume 350, FiO2 50%, and PEEP 10. ST on the monitor, BP stable. NG tube in L nare to LIWS with 100ml output. G tube to gravity with no output. Accuchecks BID; no coverage needed this shift. Lancaster intact with kamryn urine ~20-30ml/hr. LIJ intact. Midline island border dressing WNL; scant old drainage noted. Bed locked and in lowest position, bed alarm in use, heels elevated, turned q2. POC reviewed with pt.

## 2024-10-23 NOTE — PROGRESS NOTES
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  Receives Homebound tutoring at [employer] where he attends during the week.     Social Drivers of Health     Financial Resource Strain: Patient Unable To Answer (10/17/2024)    Overall Financial Resource Strain (CARDIA)     Difficulty of Paying Living Expenses: Patient unable to answer   Food Insecurity: Patient Unable To Answer (10/17/2024)    Hunger Vital Sign     Worried About Running Out of Food in the Last Year: Patient unable to answer     Ran Out of Food in the Last Year: Patient unable to answer   Transportation Needs: Patient Unable To Answer (10/17/2024)    TRANSPORTATION NEEDS     Transportation : Patient unable to answer   Stress: Patient Unable To Answer (10/17/2024)    [employer] [place] [employer] - Occupational Stress Questionnaire     Feeling of Stress : Patient unable to answer   Housing Stability: Patient Unable To Answer (10/17/2024)    Housing Stability Vital Sign     Unable to Pay for Housing in the Last Year: Patient unable to answer     Homeless in the Last Year: Patient unable to answer       Precautions:     Allergies as of 10/16/2024 -  Reviewed 10/16/2024   Allergen Reaction Noted    Strawberries [strawberry] Hives 08/14/2013       [facility] Assessment Details/Treatment     F/U visit with patient for ongoing wound assessment. Patient is now POD 2 exploratory laparotomy and open gastrostomy tube.  He remains in ICU, intubated and sedated. He remains on ICU ART Isolibrium bed. Skin assessed.  NGT to left nare, skin surrounding appears intact, secured to nose with silk tape. Recommend transition to securement device and keep tube floated in nare.  Lips intact around ETT.  Surgical dressing in place to midline abdominal incision and left undisturbed.   LUQ g-tube site intact.  Bilateral heels remain intact, LLE extended, heel offloading boot in place. RLE contracted, padded with pillow and preventive foam dressing applied to his heel.  Left medial ankle eschar again noted, unknown etiology. Measures 1x0.5x0.1cm, edges fixed. Recommend paint with cavilon daily and leave MAURICIO.  Patient turned to left side.  Sacrum intact with no redness or breakdown. Preventive sacral foam dressing maintained.  Right ischium chronic healing stage 4 pressure injury again noted that measures 2x1.8x1cm with undermining noted circumferentially extending up to 3cm. Wound bed moist red granulation tissue, bone remains palpable. Cleansed with vashe, then filled with aquacel ag advantage and covered with foam dressing. Recommend wound care every MWF and prn excess drainage, extra supplies left at bedside.  Patient positioned with pillow support on left side.       10/23/24 0950   WOCN Assessment   WOCN Total Time (mins) 45   Visit Date 10/23/24   Visit Time 0950   Consult Type Follow Up   WOCN Speciality Wound   Wound pressure;At risk for pressure Injury   Intervention assessed;changed;chart review;coordination of care;team conference;orders   Teaching   (unable due to patient condition)        Wound 10/16/24 0945 Pressure Injury Right Ischial tuberosity   Date First Assessed/Time  First Assessed: 10/16/24 0945   Present on Original Admission: Yes  Primary Wound Type: Pressure Injury  Side: Right  Location: Ischial tuberosity  Is this injury device related?: No   Wound Image    Pressure Injury Stage 4   Dressing Appearance Intact;Moist drainage   Drainage Amount Small   Drainage Characteristics/Odor Serosanguineous   Appearance Red;Granulating;Moist;Bone   Tissue loss description Full thickness   Red (%), Wound Tissue Color 100 %   Periwound Area Intact   Wound Edges Open;Defined   Wound Length (cm) 2 cm   Wound Width (cm) 1.8 cm   Wound Depth (cm) 1 cm   Wound Volume (cm^3) 3.6 cm^3   Wound Surface Area (cm^2) 3.6 cm^2   Undermining (depth (cm)/location) 3cm circumferentially   Care Cleansed with:;Antimicrobial agent;Applied:;Skin Barrier   Dressing Changed;Hydrofiber;Silver;Foam   Packing packing removed;packed with;hydrofiber/alginate   Packing Inserted  1   Packing Removed 1   Dressing Change Due 10/25/24        Wound 10/16/24 Ulceration Left medial Ankle   Date First Assessed: 10/16/24   Present on Original Admission: Yes  Primary Wound Type: Ulceration  Side: Left  Orientation: medial  Location: Ankle   Wound Image    Dressing Appearance Open to air   Drainage Amount None   Appearance Black   Tissue loss description Not applicable   Periwound Area Intact   Wound Edges Defined   Wound Length (cm) 1 cm   Wound Width (cm) 0.5 cm   Wound Depth (cm) 0.1 cm   Wound Volume (cm^3) 0.05 cm^3   Wound Surface Area (cm^2) 0.5 cm^2        Wound 10/18/24 1144 Incision Abdomen midline   Date First Assessed/Time First Assessed: 10/18/24 1144   Present on Original Admission: No  Primary Wound Type: Incision  Location: Abdomen  Incision Type: midline  Closure Method: Staples   Appearance Dressing in place, unable to visualize        Gastrostomy/Enterostomy 10/21/24 0844 Gastrostomy tube w/ balloon LUQ feeding   Placement Date/Time: 10/21/24 0844   Present Prior to Hospital Arrival?: Yes  Inserted by: MD   Type: Gastrostomy tube w/ balloon  Tube Size (Fr.): 20 Fr.  Location: LUQ  Indication: feeding   Dressing dry and intact   Insertion Site no redness;no warmth;no tenderness;no swelling       Recommendations made to primary team for ongoing wound care and pressure injury prevention interventions per orders.     10/23/2024

## 2024-10-23 NOTE — PROGRESS NOTES
[facility] ([facility])  Adult Nutrition  Progress Note    SUMMARY       Recommendations    Recommendation/Intervention:   1. When medically appropriate, initiate pt onto continuous Enteral nutrition, recommend Peptamen 1.5 w/ Prebio via PEG tube, goal rate 40 mL/hr, starting at 10 mL/hr then progress to goal rate within 24 hrs if pt is tolerating or per MD/NP   -Formula at goal rate provides: 1440 kcals/day (96% EEN), 65 g protein/day (100% EPN), 184 g CHO/day (98% CHO needs), 741 mL free formula water/day (49% fluid needs)   -125 mL q4h free water flushes (750 mL/day) = total from formula + FWF = 1491 mL water/day (100% fluid needs), per MD/NP   -Check Mg, K+, Na, Phos and Glu before and during initiation, correct as indicated   *Pt is at risk for refeeding syndrome  2. Recommend Edwin BID via PEG tube for wound healing   3. Weigh twice weekly    Goals:   1. Pt will be initiated onto continuous EN within 24-48 hrs (Not meeting)  2. Pt will tolerate and intake >75% EEN and EPN prior to RD follow up (Not meeting)  3. Pt will tolerate and intake Edwin BID prior to RD follow up (Not meeting)  Nutrition Goal Status: not meeting, continues   Communication of RD Recs: other (comment) (POC, sticky note)    Assessment and Plan    Nutrition Problem  Inadequate protein-energy intake intake   Increased protein needs   Altered GI function      Related to (etiology):   Decreased ability to consume sufficient protein/energy   Increased demand for nutrition   Alteration in GI tract structure and/or function      Signs and Symptoms (as evidenced by):   Intubated and sedated, NPO, PEG tube, Oropharyngeal dysphagia, Bowel rest   Decubitus ulcer, Loss of skin integrity, Wound healing needs  High-grade SBO, Fecal impaction     Interventions/Recommendations (treatment strategy):  1. Enteral nutrition management   2. Amino acids medical food supplement therapy   3. Collaboration by nutrition professional with other providers       Nutrition Diagnosis Status:   Continues     Malnutrition Assessment (10/18/24):     Skin (Micronutrient): pallor, wounds unhealed (Neri score = 11 (high risk)       Muscle Mass (Malnutrition): mild depletion       Clavicle and Acromion Bone Region (Muscle Loss): mild depletion (Acromion process may slightly protrude)                 Reason for Assessment    Reason For Assessment: consult, new tube feeding, pressure injury/ wound (TF recs, pt has PEG, large wound)  Diagnosis:  (Acute hypercapnic respiratory failure)  General Information Comments:   10/18/24: 23 y.o. Male admitted for Acute hypercapnic respiratory failure. PMH: Spastic quadriplegic cerebral palsy, Intellectual disability w/ epilepsy, Legally blind, Non verbal, PEG, Aspiration pneumonia, BMI < 19, Bowel obstruction, MAYELIN; Hx: Oropharyngeal dysphagia. Pt currently intubated and sedated on Fentanyl and Precedex, NPO x 2 days, in the ICU. RD consulted for TF and wounds recommendations. H&P noted that the pt presented to [facility] ED on 10/16 w/ SOB/respiratory distress, pt was placed on CPAP w/ little improvemen, KUB showed SBO, pt was intubated and transferred to [facility]. EMR noted that the pt on bowel rest, Abd XR 10/17/24 revealed continued SBO and a fecal impaction, 10/18/24 SBFT sowed  high-grade SBO,  ex lap planned today, NG tube to suction, Propofol was switched to Precedex for vent weaining. RD following pt during rounds. Visual NFPE performed, mild malnutrition noted. RD informed RN via secure chat of TF and wound recs in RD consult and POC note when warranted. Reviewed chart: propofol: 0; Total VE: 5.74; LBM 10/17; Skin: incision abd WDL; Altered skin: satge 4 PI R ischial tuberosity/ L ankle ulceration both full thickness tissue losses, details per Wound care note 10/16/24; Neri score: 12 (high risk); Edema: none. Labs, meds, weight reviewed. Note labs 10/18/24 Cr and eGFR WNL. Note lacated ringers bolus 1000 mL. Weight charted  6/2/23 110 lbs, 10/18/24 102 lbs (BMI 17.60, protein-energy malnutrition grade I), -8 lb wt loss (7% wt change) x 1+ yr, insignificant wt loss, note Adj IBW for quadreplegia is 110.5-117 lbs. RD will continue to follow and monitor pt's nutritional status during admit.    Follow up:   10/23/24: RD follow up. Pt currently intubated and sedated on Precedex and Fentanyl, NPO x 1 week, in the ICU. EMR noted 10/18/24 G tube removed during ex lap, 10/21/24 G tube replaced, TF on hold d/t bowel obstruction, note pt has a hx of bowel obstructions, pt extubated and the re intubated yesterday (10/22/24)d/t elevated respiratory and heart rate, no signs of distress since re intubation, NG tube to LWIS w/ 100 mL output today and G tube to gravity w/ no output (10/23). RD following pt during rounds, discussed pt during rounds this AM, confirmed no bowel function and TF's continue on hold, RN noted pt's Phos low and being replaced. Reviewed chart: Total VE: 6.45; LBM 10/17 (x 6 days no BM); Skin: pale, incision abd WDL; Altered skin: stage 4 PI R ischial tuberosity full thickness tissue loss, ulceration L ankle black/intact, details per Wound care note 10/23/24; Neri score decreased to 11 (high risk); Edema: none. Labs, meds, weight reviewed. Weight charted 10/18/24 102 lbs, 10/22/24 101 lbs (BMI 17.41, protein-energy malnutrition grade I), -1 lb wt loss x 4 days. RD will continue to follow and monitor pt's nutritional status during admit.     Nutrition Discharge Planning: Enteral nutrition via PEG tube + Edwin BID    Nutrition Risk Screen    Nutrition Risk Screen: large or nonhealing wound, burn or pressure injury    Nutrition Related Social Determinants of Health: SDOH: Unable to assess at this time.     Nutrition/Diet History    Spiritual, Cultural Beliefs, Taoist Practices, Values that Affect Care: no  Food Allergies: other (see comments) (Strawberries)  Factors Affecting Nutritional Intake: altered gastrointestinal  "function, impaired cognitive status/motor control, difficulty/impaired swallowing, NPO, on mechanical ventilation    Anthropometrics    Temp: 98 °F (36.7 °C)  Height: 5' 4" (162.6 cm)  Height (inches): 64 in  Weight Method: Bed Scale  Weight: 46 kg (101 lb 6.6 oz)  Weight (lb): 101.41 lb  Ideal Body Weight (IBW), Male: 130 lb  % Ideal Body Weight, Male (lb): 78.85 %  % Ideal Body Weight Malnutrition: 70-79%: moderate deficit  BMI (Calculated): 17.4  BMI Grade: 17 - 18.4 protein-energy malnutrition grade I  Additional Documentation: Tetraplegia (Quadriplegia) Ideal Body Weight (IBW) Adjustment  Tetraplegia (Quadriplegia) Ideal Body Weight (IBW) Adjustment: 117 lb 1 oz (-10% IBW)    Wt Readings from Last 15 Encounters:   10/22/24 46 kg (101 lb 6.6 oz)   06/02/23 49.9 kg (110 lb)   03/15/23 50 kg (110 lb 3.7 oz)   05/13/22 52.3 kg (115 lb 3.1 oz) (2%, Z= -2.11)*   09/20/21 50.5 kg (111 lb 5.3 oz) (1%, Z= -2.29)*   06/16/21 47.3 kg (104 lb 4.4 oz) (<1%, Z= -2.81)*   11/25/20 47.9 kg (105 lb 9.6 oz) (<1%, Z= -2.54)*   11/02/20 46 kg (101 lb 6.6 oz) (<1%, Z= -2.88)*   10/27/20 45.2 kg (99 lb 10.4 oz) (<1%, Z= -3.04)*   10/18/20 44.8 kg (98 lb 14 oz) (<1%, Z= -3.11)*   10/13/20 43.2 kg (95 lb 3.8 oz) (<1%, Z= -3.46)*   09/29/20 44.5 kg (98 lb) (<1%, Z= -3.17)*   09/29/20 43.3 kg (95 lb 7.4 oz) (<1%, Z= -3.42)*   07/09/20 51.3 kg (113 lb) (3%, Z= -1.83)*   03/11/20 52.7 kg (116 lb 2.9 oz) (7%, Z= -1.50)*     * Growth percentiles are based on [facility] (Boys, 2-20 Years) data.     Lab/Procedures/Meds    Pertinent Labs Reviewed: reviewed  Pertinent Labs Comments: Albumin (L), BUN (H)  Pertinent Medications Reviewed: reviewed  Pertinent Medications Comments: lactated ringer bolus 1000 mL, phenoborbital, levetiracetam, clonidine patch, chlorhexidine, baclofen  BMP  Lab Results   Component Value Date     10/23/2024    K 4.0 10/23/2024     10/23/2024    CO2 27 10/23/2024    BUN 17 10/23/2024    CREATININE 0.7 10/23/2024    " "CALCIUM 7.8 (L) 10/23/2024    ANIONGAP 5 (L) 10/23/2024    EGFRNORACEVR >60 10/23/2024     Lab Results   Component Value Date    CALCIUM 7.8 (L) 10/23/2024    PHOS 2.6 (L) 10/23/2024     Lab Results   Component Value Date    ALBUMIN 1.7 (L) 10/23/2024     Lab Results   Component Value Date    ALT 13 10/23/2024    AST 25 10/23/2024     (H) 10/27/2020    ALKPHOS 86 10/23/2024    BILITOT 2.6 (H) 10/23/2024     Recent Labs   Lab 10/23/24  0037   POCTGLUCOSE 103     No results found for: "LABA1C", "HGBA1C"    Lab Results   Component Value Date    WBC 17.21 (H) 10/23/2024    HGB 8.3 (L) 10/23/2024    HCT 25.3 (L) 10/23/2024    MCV 79 (L) 10/23/2024     10/23/2024       Scheduled Meds:   baclofen  10 mg Per G Tube BID    ceFEPime IV (PEDS and ADULTS)  2 g Intravenous Q8H    chlorhexidine  15 mL Mouth/Throat BID    cloNIDine 0.1 mg/24 hr td ptwk  1 patch Transdermal Q7 Days    diazePAM  2.5 mg Intravenous Q12H    enoxparin  30 mg Subcutaneous Q24H (prophylaxis, 1700)    famotidine (PF)  20 mg Intravenous BID    levETIRAcetam (Keppra) IV (PEDS and ADULTS)  2,000 mg Intravenous Q12H    PHENObarbital  65 mg Intravenous BID    sodium chloride 0.9%  1,000 mL Intravenous Once     Continuous Infusions:   dexmedeTOMIDine (Precedex) infusion (titrating)  0-1.4 mcg/kg/hr Intravenous Continuous 15.79 mL/hr at 10/23/24 1115 1.4 mcg/kg/hr at 10/23/24 1115    fentanyl  0-250 mcg/hr Intravenous Continuous 20 mL/hr at 10/23/24 1100 200 mcg/hr at 10/23/24 1100     PRN Meds:.  Current Facility-Administered Medications:     albuterol-ipratropium, 3 mL, Nebulization, Q6H PRN    dextrose 10%, 12.5 g, Intravenous, PRN    dextrose 10%, 25 g, Intravenous, PRN    glucagon (human recombinant), 1 mg, Intramuscular, PRN    insulin aspart U-100, 0-10 Units, Subcutaneous, Q4H PRN    magnesium sulfate IVPB, 2 g, Intravenous, PRN    magnesium sulfate IVPB, 4 g, Intravenous, PRN    potassium chloride, 40 mEq, Intravenous, PRN **AND** " potassium chloride, 60 mEq, Intravenous, PRN **AND** potassium chloride, 80 mEq, Intravenous, PRN    sodium phosphate 15 mmol in D5W 250 mL IVPB, 15 mmol, Intravenous, PRN    sodium phosphate 20.01 mmol in D5W 250 mL IVPB, 20.01 mmol, Intravenous, PRN    sodium phosphate 30 mmol in D5W 250 mL IVPB, 30 mmol, Intravenous, PRN      Physical Findings/Assessment         Estimated/Assessed Needs    Weight Used For Calorie Calculations: 46 kg (101 lb 6.6 oz)  Energy Calorie Requirements (kcal): 1497 kcals (Stitzer state (Critical care-vent, Total VE: 6.45 vs Underweight vs GI disorder)  Energy Need Method: [facility]  Protein Requirements: 55-92 g (1.2-2.0 g/kg ABW (Critical care-vent, BMI <30 vs Underweight vs Pressure injury)  Weight Used For Protein Calculations: 46 kg (101 lb 6.6 oz)  Fluid Requirements (mL): 1497 mL (1 mL/kcal)  Estimated Fluid Requirement Method: RDA Method  RDA Method (mL): 1497  CHO Requirement: 187 g (1497 kcals/8)      Nutrition Prescription Ordered    Current Diet Order: NPO    Evaluation of Received Nutrient/Fluid Intake  I/O: (Net since admit):  10/18/24: +3468.7 mL  10/23/24: +3963.7 mL    Energy Calories Required: not meeting needs  Protein Required: not meeting needs  Fluid Required: not meeting needs  Total Fluid Intake (mL): 866.6  Tolerance: other (see comments) (Currently no intake)  % Intake of Estimated Energy Needs: 0 - 25 %  % Meal Intake: NPO    Nutrition Risk    Level of Risk/Frequency of Follow-up: high (F/u x 2 weekly)     Monitor and Evaluation    Food and Nutrient Intake: energy intake, enteral nutrition intake  Food and Nutrient Adminstration: enteral and parenteral nutrition administration  Knowledge/Beliefs/Attitudes: food and nutrition knowledge/skill, beliefs and attitudes  Physical Activity and Function: nutrition-related ADLs and IADLs, factors affecting access to physical activity  Anthropometric Measurements: weight, weight change, body mass index  Biochemical Data,  Medical Tests and Procedures: electrolyte and renal panel, gastrointestinal profile  Nutrition-Focused Physical Findings: overall appearance     Nutrition Follow-Up    RD Follow-up?: Yes  YAN Kitchen, RDN, LDN

## 2024-10-23 NOTE — SUBJECTIVE & OBJECTIVE
Interval History: Patient required reintubation about 2 hours after extubation from agitation, hypoxia, tachypnea. No gastrostomy output. No bowel function.    Medications:  Continuous Infusions:   dexmedeTOMIDine (Precedex) infusion (titrating)  0-1.4 mcg/kg/hr Intravenous Continuous 15.79 mL/hr at 10/23/24 1115 1.4 mcg/kg/hr at 10/23/24 1115    fentanyl  0-250 mcg/hr Intravenous Continuous 20 mL/hr at 10/23/24 1100 200 mcg/hr at 10/23/24 1100     Scheduled Meds:   baclofen  10 mg Per G Tube BID    ceFEPime IV (PEDS and ADULTS)  2 g Intravenous Q8H    chlorhexidine  15 mL Mouth/Throat BID    cloNIDine 0.1 mg/24 hr td ptwk  1 patch Transdermal Q7 Days    diazePAM  2.5 mg Intravenous Q12H    enoxparin  30 mg Subcutaneous Q24H (prophylaxis, 1700)    famotidine (PF)  20 mg Intravenous BID    levETIRAcetam (Keppra) IV (PEDS and ADULTS)  2,000 mg Intravenous Q12H    PHENObarbital  65 mg Intravenous BID    sodium chloride 0.9%  1,000 mL Intravenous Once     PRN Meds:  Current Facility-Administered Medications:     albuterol-ipratropium, 3 mL, Nebulization, Q6H PRN    dextrose 10%, 12.5 g, Intravenous, PRN    dextrose 10%, 25 g, Intravenous, PRN    glucagon (human recombinant), 1 mg, Intramuscular, PRN    insulin aspart U-100, 0-10 Units, Subcutaneous, Q4H PRN    magnesium sulfate IVPB, 2 g, Intravenous, PRN    magnesium sulfate IVPB, 4 g, Intravenous, PRN    potassium chloride, 40 mEq, Intravenous, PRN **AND** potassium chloride, 60 mEq, Intravenous, PRN **AND** potassium chloride, 80 mEq, Intravenous, PRN    sodium phosphate 15 mmol in D5W 250 mL IVPB, 15 mmol, Intravenous, PRN    sodium phosphate 20.01 mmol in D5W 250 mL IVPB, 20.01 mmol, Intravenous, PRN    sodium phosphate 30 mmol in D5W 250 mL IVPB, 30 mmol, Intravenous, PRN     Review of patient's allergies indicates:   Allergen Reactions    Strawberries [strawberry] Hives     STRAWBERRIES ALLERGIC REACTIONS   (SEIZURES AND HIVES )     Objective:     Vital Signs  (Most Recent):  Temp: 98 °F (36.7 °C) (10/23/24 1118)  Pulse: 96 (10/23/24 1130)  Resp: 18 (10/23/24 1130)  BP: 112/64 (10/23/24 1101)  SpO2: 100 % (10/23/24 1130) Vital Signs (24h Range):  Temp:  [98 °F (36.7 °C)-98.7 °F (37.1 °C)] 98 °F (36.7 °C)  Pulse:  [] 96  Resp:  [16-25] 18  SpO2:  [84 %-100 %] 100 %  BP: (100-131)/(55-77) 112/64     Weight: 46 kg (101 lb 6.6 oz)  Body mass index is 17.41 kg/m².    Intake/Output - Last 3 Shifts         10/21 0700  10/22 0659 10/22 0700  10/23 0659 10/23 0700  10/24 0659    P.O.       I.V. (mL/kg) 813.2 (17.7) 734 (16) 179.2 (3.9)    NG/GT   60    IV Piggyback 415.1 13.5 822.2    Total Intake(mL/kg) 1228.3 (26.7) 747.4 (16.2) 1061.4 (23.1)    Urine (mL/kg/hr) 625 (0.6) 465 (0.4) 110 (0.4)    Emesis/NG output       Drains 350 325     Other       Stool   0    Total Output 975 790 110    Net +253.3 -42.6 +951.4           Stool Occurrence   0 x             Physical Exam  Constitutional:       Appearance: He is ill-appearing.      Comments: Intubated and sedated   Cardiovascular:      Rate and Rhythm: Tachycardia present.   Pulmonary:      Comments: Intubated  Abdominal:      Comments: Gastrostomy in place 2 cm of the skin 4 cm of the top of bolster.  No rebound or guarding.  Midline incision dressing with some old blood staining          Significant Labs:  I have reviewed all pertinent lab results within the past 24 hours.  CBC:   Recent Labs   Lab 10/23/24  0403   WBC 17.21*   RBC 3.19*   HGB 8.3*   HCT 25.3*      MCV 79*   MCH 26.0*   MCHC 32.8     BMP:   Recent Labs   Lab 10/23/24  0403   GLU 96      K 4.0      CO2 27   BUN 17   CREATININE 0.7   CALCIUM 7.8*   MG 2.4       Significant Diagnostics:  I have reviewed all pertinent imaging results/findings within the past 24 hours.

## 2024-10-23 NOTE — ASSESSMENT & PLAN NOTE
POD#5/2 s/p ex lap, extensive lysis of adhesions, small bowel resection, takedown of gastrostomy tube with partial gastrectomy, abdomen left open in discontinuity with abthera in place.  Now status post small-bowel anastomosis, open gastrostomy placement and abdominal closure    - ok for po medication, would hold off tube feeds until further bowel function  - would continue NPO, NG tube to low intermittent suction as well as gastrostomy to gravity drainage  - when patient has return of bowel function can DC NG tube and advance diet as tolerated  - recommend pain control with IV meds  - remainder of care per primary team

## 2024-10-23 NOTE — PLAN OF CARE
Nutrition Recommendations/Interventions 10/23/24:   1. When medically appropriate, initiate pt onto continuous Enteral nutrition, recommend Peptamen 1.5 w/ Prebio via PEG tube, goal rate 40 mL/hr, starting at 10 mL/hr then progress to goal rate within 24 hrs if pt is tolerating or per MD/NP   -125 mL q4h free water flushes (750 mL/day), per MD/NP   -Check Mg, K+, Na, Phos and Glu before and during initiation, correct as indicated   *Pt at risk for refeeding syndrome  2. Recommend Edwin BID via PEG tube for wound healing   3. Weigh twice weekly  4. Collaboration by nutrition professional with other providers     Goals:   1. Pt will be initiated onto continuous EN within 24-48 hrs (Not meeting)  2. Pt will tolerate and intake >75% EEN and EPN prior to RD follow up (Not meeting)  3. Pt will tolerate and intake Edwin BID prior to RD follow up (Not meeting)    YAN Kitchen, RDN, LDN

## 2024-10-23 NOTE — PROGRESS NOTES
[facility] ([facility])  General Surgery  Progress Note    Subjective:     Interval History: Patient required reintubation about 2 hours after extubation from agitation, hypoxia, tachypnea. No gastrostomy output. No bowel function.    Medications:  Continuous Infusions:   dexmedeTOMIDine (Precedex) infusion (titrating)  0-1.4 mcg/kg/hr Intravenous Continuous 15.79 mL/hr at 10/23/24 1115 1.4 mcg/kg/hr at 10/23/24 1115    fentanyl  0-250 mcg/hr Intravenous Continuous 20 mL/hr at 10/23/24 1100 200 mcg/hr at 10/23/24 1100     Scheduled Meds:   baclofen  10 mg Per G Tube BID    ceFEPime IV (PEDS and ADULTS)  2 g Intravenous Q8H    chlorhexidine  15 mL Mouth/Throat BID    cloNIDine 0.1 mg/24 hr td ptwk  1 patch Transdermal Q7 Days    diazePAM  2.5 mg Intravenous Q12H    enoxparin  30 mg Subcutaneous Q24H (prophylaxis, 1700)    famotidine (PF)  20 mg Intravenous BID    levETIRAcetam (Keppra) IV (PEDS and ADULTS)  2,000 mg Intravenous Q12H    PHENObarbital  65 mg Intravenous BID    sodium chloride 0.9%  1,000 mL Intravenous Once     PRN Meds:  Current Facility-Administered Medications:     albuterol-ipratropium, 3 mL, Nebulization, Q6H PRN    dextrose 10%, 12.5 g, Intravenous, PRN    dextrose 10%, 25 g, Intravenous, PRN    glucagon (human recombinant), 1 mg, Intramuscular, PRN    insulin aspart U-100, 0-10 Units, Subcutaneous, Q4H PRN    magnesium sulfate IVPB, 2 g, Intravenous, PRN    magnesium sulfate IVPB, 4 g, Intravenous, PRN    potassium chloride, 40 mEq, Intravenous, PRN **AND** potassium chloride, 60 mEq, Intravenous, PRN **AND** potassium chloride, 80 mEq, Intravenous, PRN    sodium phosphate 15 mmol in D5W 250 mL IVPB, 15 mmol, Intravenous, PRN    sodium phosphate 20.01 mmol in D5W 250 mL IVPB, 20.01 mmol, Intravenous, PRN    sodium phosphate 30 mmol in D5W 250 mL IVPB, 30 mmol, Intravenous, PRN     Review of patient's allergies indicates:   Allergen Reactions    Strawberries [strawberry] Hives      STRAWBERRIES ALLERGIC REACTIONS   (SEIZURES AND HIVES )     Objective:     Vital Signs (Most Recent):  Temp: 98 °F (36.7 °C) (10/23/24 1118)  Pulse: 96 (10/23/24 1130)  Resp: 18 (10/23/24 1130)  BP: 112/64 (10/23/24 1101)  SpO2: 100 % (10/23/24 1130) Vital Signs (24h Range):  Temp:  [98 °F (36.7 °C)-98.7 °F (37.1 °C)] 98 °F (36.7 °C)  Pulse:  [] 96  Resp:  [16-25] 18  SpO2:  [84 %-100 %] 100 %  BP: (100-131)/(55-77) 112/64     Weight: 46 kg (101 lb 6.6 oz)  Body mass index is 17.41 kg/m².    Intake/Output - Last 3 Shifts         10/21 0700  10/22 0659 10/22 0700  10/23 0659 10/23 0700  10/24 0659    P.O.       I.V. (mL/kg) 813.2 (17.7) 734 (16) 179.2 (3.9)    NG/GT   60    IV Piggyback 415.1 13.5 822.2    Total Intake(mL/kg) 1228.3 (26.7) 747.4 (16.2) 1061.4 (23.1)    Urine (mL/kg/hr) 625 (0.6) 465 (0.4) 110 (0.4)    Emesis/NG output       Drains 350 325     Other       Stool   0    Total Output 975 790 110    Net +253.3 -42.6 +951.4           Stool Occurrence   0 x             Physical Exam  Constitutional:       Appearance: He is ill-appearing.      Comments: Intubated and sedated   Cardiovascular:      Rate and Rhythm: Tachycardia present.   Pulmonary:      Comments: Intubated  Abdominal:      Comments: Gastrostomy in place 2 cm of the skin 4 cm of the top of bolster.  No rebound or guarding.  Midline incision dressing with some old blood staining          Significant Labs:  I have reviewed all pertinent lab results within the past 24 hours.  CBC:   Recent Labs   Lab 10/23/24  0403   WBC 17.21*   RBC 3.19*   HGB 8.3*   HCT 25.3*      MCV 79*   MCH 26.0*   MCHC 32.8     BMP:   Recent Labs   Lab 10/23/24  0403   GLU 96      K 4.0      CO2 27   BUN 17   CREATININE 0.7   CALCIUM 7.8*   MG 2.4       Significant Diagnostics:  I have reviewed all pertinent imaging results/findings within the past 24 hours.  Assessment/Plan:     * Acute hypercapnic respiratory failure  Management per critical  care.    Patient is currently intubated     MAYELIN (acute kidney injury)  Per critical Care    Bowel obstruction  POD#5/2 s/p ex lap, extensive lysis of adhesions, small bowel resection, takedown of gastrostomy tube with partial gastrectomy, abdomen left open in discontinuity with abthera in place.  Now status post small-bowel anastomosis, open gastrostomy placement and abdominal closure    - ok for po medication, would hold off tube feeds until further bowel function  - would continue NPO, NG tube to low intermittent suction as well as gastrostomy to gravity drainage  - when patient has return of bowel function can DC NG tube and advance diet as tolerated  - recommend pain control with IV meds  - remainder of care per primary team    PEG (percutaneous endoscopic gastrostomy) status  Redo Gastrostomy tube in place    Nonverbal  Monitor    Legally blind  Per critical care    Intellectual disability with epilepsy  Per critical care    Spastic quadriplegic cerebral palsy  Further management critical care medicine        Gabriel Carney PA-C  General Surgery  [facility] ([facility])

## 2024-10-24 LAB
ALBUMIN SERPL BCP-MCNC: 1.5 G/DL (ref 3.5–5.2)
ALLENS TEST: ABNORMAL
ALP SERPL-CCNC: 92 U/L (ref 40–150)
ALT SERPL W/O P-5'-P-CCNC: 12 U/L (ref 10–44)
ANION GAP SERPL CALC-SCNC: 10 MMOL/L (ref 8–16)
AST SERPL-CCNC: 21 U/L (ref 10–40)
BASOPHILS # BLD AUTO: 0.03 K/UL (ref 0–0.2)
BASOPHILS NFR BLD: 0.2 % (ref 0–1.9)
BILIRUB SERPL-MCNC: 2 MG/DL (ref 0.1–1)
BLD PROD TYP BPU: NORMAL
BLOOD UNIT EXPIRATION DATE: NORMAL
BLOOD UNIT TYPE CODE: 5100
BLOOD UNIT TYPE: NORMAL
BUN SERPL-MCNC: 13 MG/DL (ref 6–20)
CALCIUM SERPL-MCNC: 7.5 MG/DL (ref 8.7–10.5)
CHLORIDE SERPL-SCNC: 111 MMOL/L (ref 95–110)
CO2 SERPL-SCNC: 24 MMOL/L (ref 23–29)
CODING SYSTEM: NORMAL
CREAT SERPL-MCNC: 0.6 MG/DL (ref 0.5–1.4)
CROSSMATCH INTERPRETATION: NORMAL
DELSYS: ABNORMAL
DIFFERENTIAL METHOD BLD: ABNORMAL
DISPENSE STATUS: NORMAL
EOSINOPHIL # BLD AUTO: 0.6 K/UL (ref 0–0.5)
EOSINOPHIL NFR BLD: 4.1 % (ref 0–8)
ERYTHROCYTE [DISTWIDTH] IN BLOOD BY AUTOMATED COUNT: 13.3 % (ref 11.5–14.5)
ERYTHROCYTE [SEDIMENTATION RATE] IN BLOOD BY WESTERGREN METHOD: 18 MM/H
EST. GFR  (NO RACE VARIABLE): >60 ML/MIN/1.73 M^2
FIO2: 21
GLUCOSE SERPL-MCNC: 75 MG/DL (ref 70–110)
HCO3 UR-SCNC: 22.7 MMOL/L (ref 24–28)
HCT VFR BLD AUTO: 20.5 % (ref 40–54)
HGB BLD-MCNC: 6.9 G/DL (ref 14–18)
IMM GRANULOCYTES # BLD AUTO: 0.23 K/UL (ref 0–0.04)
IMM GRANULOCYTES NFR BLD AUTO: 1.6 % (ref 0–0.5)
LYMPHOCYTES # BLD AUTO: 2.2 K/UL (ref 1–4.8)
LYMPHOCYTES NFR BLD: 15 % (ref 18–48)
MAGNESIUM SERPL-MCNC: 1.9 MG/DL (ref 1.6–2.6)
MCH RBC QN AUTO: 26.1 PG (ref 27–31)
MCHC RBC AUTO-ENTMCNC: 33.7 G/DL (ref 32–36)
MCV RBC AUTO: 78 FL (ref 82–98)
MODE: ABNORMAL
MONOCYTES # BLD AUTO: 1.5 K/UL (ref 0.3–1)
MONOCYTES NFR BLD: 10.2 % (ref 4–15)
NEUTROPHILS # BLD AUTO: 10.1 K/UL (ref 1.8–7.7)
NEUTROPHILS NFR BLD: 68.9 % (ref 38–73)
NRBC BLD-RTO: 0 /100 WBC
NUM UNITS TRANS PACKED RBC: NORMAL
PCO2 BLDA: 32.5 MMHG (ref 35–45)
PEEP: 5
PH SMN: 7.45 [PH] (ref 7.35–7.45)
PHOSPHATE SERPL-MCNC: 2.3 MG/DL (ref 2.7–4.5)
PLATELET # BLD AUTO: 337 K/UL (ref 150–450)
PMV BLD AUTO: 10.8 FL (ref 9.2–12.9)
PO2 BLDA: 74 MMHG (ref 80–100)
POC BE: -1 MMOL/L
POC SATURATED O2: 95 % (ref 95–100)
POCT GLUCOSE: 80 MG/DL (ref 70–110)
POCT GLUCOSE: 83 MG/DL (ref 70–110)
POCT GLUCOSE: 88 MG/DL (ref 70–110)
POTASSIUM SERPL-SCNC: 2.9 MMOL/L (ref 3.5–5.1)
PROT SERPL-MCNC: 4.7 G/DL (ref 6–8.4)
RBC # BLD AUTO: 2.64 M/UL (ref 4.6–6.2)
SAMPLE: ABNORMAL
SITE: ABNORMAL
SODIUM SERPL-SCNC: 145 MMOL/L (ref 136–145)
VT: 350
WBC # BLD AUTO: 14.59 K/UL (ref 3.9–12.7)

## 2024-10-24 PROCEDURE — 86920 COMPATIBILITY TEST SPIN: CPT

## 2024-10-24 PROCEDURE — 63600175 PHARM REV CODE 636 W HCPCS

## 2024-10-24 PROCEDURE — 63600175 PHARM REV CODE 636 W HCPCS: Performed by: INTERNAL MEDICINE

## 2024-10-24 PROCEDURE — 20000000 HC ICU ROOM

## 2024-10-24 PROCEDURE — 36430 TRANSFUSION BLD/BLD COMPNT: CPT

## 2024-10-24 PROCEDURE — 25000003 PHARM REV CODE 250: Performed by: INTERNAL MEDICINE

## 2024-10-24 PROCEDURE — 99900035 HC TECH TIME PER 15 MIN (STAT)

## 2024-10-24 PROCEDURE — 36600 WITHDRAWAL OF ARTERIAL BLOOD: CPT

## 2024-10-24 PROCEDURE — 83735 ASSAY OF MAGNESIUM: CPT | Performed by: NURSE PRACTITIONER

## 2024-10-24 PROCEDURE — 99900026 HC AIRWAY MAINTENANCE (STAT)

## 2024-10-24 PROCEDURE — 99024 POSTOP FOLLOW-UP VISIT: CPT | Mod: ,,,

## 2024-10-24 PROCEDURE — 84100 ASSAY OF PHOSPHORUS: CPT | Performed by: NURSE PRACTITIONER

## 2024-10-24 PROCEDURE — C9399 UNCLASSIFIED DRUGS OR BIOLOG: HCPCS | Performed by: INTERNAL MEDICINE

## 2024-10-24 PROCEDURE — P9016 RBC LEUKOCYTES REDUCED: HCPCS

## 2024-10-24 PROCEDURE — 63600175 PHARM REV CODE 636 W HCPCS: Performed by: NURSE PRACTITIONER

## 2024-10-24 PROCEDURE — 94761 N-INVAS EAR/PLS OXIMETRY MLT: CPT | Mod: XB

## 2024-10-24 PROCEDURE — 25000003 PHARM REV CODE 250: Performed by: NURSE PRACTITIONER

## 2024-10-24 PROCEDURE — 27100171 HC OXYGEN HIGH FLOW UP TO 24 HOURS

## 2024-10-24 PROCEDURE — 85025 COMPLETE CBC W/AUTO DIFF WBC: CPT | Performed by: NURSE PRACTITIONER

## 2024-10-24 PROCEDURE — 82803 BLOOD GASES ANY COMBINATION: CPT

## 2024-10-24 PROCEDURE — 30243N1 TRANSFUSION OF NONAUTOLOGOUS RED BLOOD CELLS INTO CENTRAL VEIN, PERCUTANEOUS APPROACH: ICD-10-PCS

## 2024-10-24 PROCEDURE — 94003 VENT MGMT INPAT SUBQ DAY: CPT

## 2024-10-24 PROCEDURE — 80053 COMPREHEN METABOLIC PANEL: CPT | Performed by: NURSE PRACTITIONER

## 2024-10-24 RX ORDER — HYDROCODONE BITARTRATE AND ACETAMINOPHEN 500; 5 MG/1; MG/1
TABLET ORAL
Status: DISCONTINUED | OUTPATIENT
Start: 2024-10-24 | End: 2024-10-29 | Stop reason: ALTCHOICE

## 2024-10-24 RX ORDER — POTASSIUM CHLORIDE 29.8 MG/ML
80 INJECTION INTRAVENOUS
Status: DISCONTINUED | OUTPATIENT
Start: 2024-10-24 | End: 2024-10-25

## 2024-10-24 RX ORDER — FAMOTIDINE 40 MG/5ML
20 POWDER, FOR SUSPENSION ORAL 2 TIMES DAILY
Status: DISCONTINUED | OUTPATIENT
Start: 2024-10-24 | End: 2024-11-05 | Stop reason: HOSPADM

## 2024-10-24 RX ORDER — POTASSIUM CHLORIDE 29.8 MG/ML
40 INJECTION INTRAVENOUS
Status: DISCONTINUED | OUTPATIENT
Start: 2024-10-24 | End: 2024-10-25

## 2024-10-24 RX ORDER — DIAZEPAM 2 MG/1
2 TABLET ORAL 2 TIMES DAILY
Status: DISCONTINUED | OUTPATIENT
Start: 2024-10-24 | End: 2024-11-05 | Stop reason: HOSPADM

## 2024-10-24 RX ORDER — POTASSIUM CHLORIDE 14.9 MG/ML
60 INJECTION INTRAVENOUS
Status: DISCONTINUED | OUTPATIENT
Start: 2024-10-24 | End: 2024-10-25

## 2024-10-24 RX ADMIN — CHLORHEXIDINE GLUCONATE 0.12% ORAL RINSE 15 ML: 1.2 LIQUID ORAL at 08:10

## 2024-10-24 RX ADMIN — DEXMEDETOMIDINE HYDROCHLORIDE 1.4 MCG/KG/HR: 4 INJECTION INTRAVENOUS at 05:10

## 2024-10-24 RX ADMIN — LEVETIRACETAM 2000 MG: 500 INJECTION, SOLUTION INTRAVENOUS at 08:10

## 2024-10-24 RX ADMIN — FENTANYL CITRATE 100 MCG/HR: 50 INJECTION INTRAVENOUS at 05:10

## 2024-10-24 RX ADMIN — PHENOBARBITAL SODIUM 65 MG: 65 INJECTION INTRAMUSCULAR at 08:10

## 2024-10-24 RX ADMIN — CEFEPIME 2 G: 2 INJECTION, POWDER, FOR SOLUTION INTRAVENOUS at 05:10

## 2024-10-24 RX ADMIN — CEFEPIME 2 G: 2 INJECTION, POWDER, FOR SOLUTION INTRAVENOUS at 11:10

## 2024-10-24 RX ADMIN — CEFEPIME 2 G: 2 INJECTION, POWDER, FOR SOLUTION INTRAVENOUS at 02:10

## 2024-10-24 RX ADMIN — BACLOFEN 10 MG: 10 TABLET ORAL at 08:10

## 2024-10-24 RX ADMIN — FENTANYL CITRATE 200 MCG/HR: 50 INJECTION INTRAVENOUS at 01:10

## 2024-10-24 RX ADMIN — DEXMEDETOMIDINE HYDROCHLORIDE 1.4 MCG/KG/HR: 4 INJECTION INTRAVENOUS at 11:10

## 2024-10-24 RX ADMIN — SODIUM PHOSPHATE, MONOBASIC, MONOHYDRATE AND SODIUM PHOSPHATE, DIBASIC, ANHYDROUS 15 MMOL: 142; 276 INJECTION, SOLUTION INTRAVENOUS at 09:10

## 2024-10-24 RX ADMIN — ENOXAPARIN SODIUM 30 MG: 30 INJECTION SUBCUTANEOUS at 05:10

## 2024-10-24 RX ADMIN — DIAZEPAM 2.5 MG: 10 INJECTION, SOLUTION INTRAMUSCULAR; INTRAVENOUS at 08:10

## 2024-10-24 RX ADMIN — DEXMEDETOMIDINE HYDROCHLORIDE 1.4 MCG/KG/HR: 4 INJECTION INTRAVENOUS at 06:10

## 2024-10-24 RX ADMIN — FAMOTIDINE 20 MG: 10 INJECTION, SOLUTION INTRAVENOUS at 08:10

## 2024-10-24 RX ADMIN — POTASSIUM CHLORIDE 80 MEQ: 29.8 INJECTION, SOLUTION INTRAVENOUS at 06:10

## 2024-10-24 RX ADMIN — FAMOTIDINE 20 MG: 40 POWDER, FOR SUSPENSION ORAL at 08:10

## 2024-10-24 RX ADMIN — DIAZEPAM 2 MG: 2 TABLET ORAL at 08:10

## 2024-10-24 NOTE — SUBJECTIVE & OBJECTIVE
Interval History: No acute events.  FiO2 down to 21% today.  Getting 1 unit of blood today.  No evidence of overt bleeding.  Bowel sounds improving.      Objective:     Vital Signs (Most Recent):  Temp: 98 °F (36.7 °C) (10/24/24 0930)  Pulse: 82 (10/24/24 1000)  Resp: 18 (10/24/24 1000)  BP: 106/63 (10/24/24 1000)  SpO2: 100 % (10/24/24 1000) Vital Signs (24h Range):  Temp:  [97.7 °F (36.5 °C)-99 °F (37.2 °C)] 98 °F (36.7 °C)  Pulse:  [] 82  Resp:  [17-28] 18  SpO2:  [94 %-100 %] 100 %  BP: ()/(52-84) 106/63     Weight: 46 kg (101 lb 6.6 oz)  Body mass index is 17.41 kg/m².      Intake/Output Summary (Last 24 hours) at 10/24/2024 1100  Last data filed at 10/24/2024 1000  Gross per 24 hour   Intake 2212.4 ml   Output 1290 ml   Net 922.4 ml        Physical Exam  Vitals and nursing note reviewed.   Constitutional:       Comments: Intubated and sedated    Cardiovascular:      Rate and Rhythm: Normal rate and regular rhythm.      Pulses: Normal pulses.      Heart sounds: No murmur heard.  Pulmonary:      Effort: Pulmonary effort is normal. No respiratory distress.      Breath sounds: Rhonchi (Right) present. No wheezing or rales.   Abdominal:      General: There is distension.      Tenderness: There is abdominal tenderness.      Comments: Bowel sounds improving   Musculoskeletal:      Right lower leg: No edema.      Left lower leg: No edema.   Neurological:      Comments: Moving spontaneously, but not following commands           Review of Systems    Vents:  Vent Mode: A/C (10/24/24 0501)  Ventilator Initiated: Yes (10/17/24 0102)  Set Rate: 18 BPM (10/24/24 0501)  Vt Set: 350 mL (10/24/24 0501)  Pressure Support: 7 cmH20 (10/22/24 0944)  PEEP/CPAP: 5 cmH20 (10/24/24 0501)  Oxygen Concentration (%): 21 (10/24/24 1000)  Peak Airway Pressure: 26 cmH20 (10/24/24 0501)  Plateau Pressure: 18 cmH20 (10/24/24 0501)  Total Ve: 6.4 L/m (10/24/24 0501)  Negative Inspiratory Force (cm H2O): 0 (10/24/24 0501)  F/VT  Ratio<105 (RSBI): (!) 50.7 (10/24/24 0501)    Lines/Drains/Airways       Central Venous Catheter Line  Duration             Percutaneous Central Line - Triple Lumen  10/21/24 0814 Internal Jugular Left 3 days              Drain  Duration                  NG/OG Tube 10/16/24 0515 16 Fr. Right nostril 8 days         Urethral Catheter 10/16/24 0156 Double-lumen 16 Fr. 8 days         Gastrostomy/Enterostomy 10/21/24 0844 Gastrostomy tube w/ balloon LUQ feeding 3 days              Airway  Duration                  Airway - Non-Surgical 10/22/24 1305 Endotracheal Tube 1 day                    Significant Labs:    CBC/Anemia Profile:  Recent Labs   Lab 10/23/24  0403 10/24/24  0425   WBC 17.21* 14.59*   HGB 8.3* 6.9*   HCT 25.3* 20.5*    337   MCV 79* 78*   RDW 13.2 13.3        Chemistries:  Recent Labs   Lab 10/23/24  0403 10/24/24  0425    145   K 4.0 2.9*    111*   CO2 27 24   BUN 17 13   CREATININE 0.7 0.6   CALCIUM 7.8* 7.5*   ALBUMIN 1.7* 1.5*   PROT 4.9* 4.7*   BILITOT 2.6* 2.0*   ALKPHOS 86 92   ALT 13 12   AST 25 21   MG 2.4 1.9   PHOS 2.6* 2.3*       All pertinent labs within the past 24 hours have been reviewed.    Significant Imaging:  I have reviewed all pertinent imaging results/findings within the past 24 hours.

## 2024-10-24 NOTE — PLAN OF CARE
SBT aborted after 5 minutes due to respirations in the 50's, -200, & tachycardic. Remains intubated with FiO2 21% PEEP5. Large amount of tan, thick secretions. Sedated with precedex gtt and fent gtt. Lancaster cath in place with 450ml UOP. NG to low int suction with 450ml green bile output (MD notified). Hypoactive bowel sounds with no gas or BM yet. Utilizing G tube for medications only. Mother and sister visited at bedside, POC reviewed.

## 2024-10-24 NOTE — PROGRESS NOTES
[facility] - [facility]ensive [facility] ([facility])  Critical Care Medicine  Progress Note    Patient Name: Ronny Ramey  MRN: [id number]  Admission Date: 10/16/2024  Hospital Length of Stay: 8 days  Code Status: Full Code  Attending Provider: Kiel Perez MD  Primary Care Provider: Herman Nathan DNP   Principal Problem: Acute hypercapnic respiratory failure    Subjective:     HPI:  Ronny Ramey is a 23 yr old male [facility] resident with CP, spastic quadriplegia, s/p G tube, epilepsy who presented to [facility] ED on 10/16 with shortness of breath/respiratory distress. Patient was placed on CPAP with little improvement. KUB shows small bowel obstruction. Patient was intubated and transferred to [facility]. Critical care consulted for admission. Patient with history of admissions for bowel obstruction and aspiration pneumonia.     Hospital/ICU Course:  10/17/2024: Patient with decreasing UOP overnight. Renal function worsening. Small bolus given with little response. Additional boluses today. HTN overnight, clonidine patch started. Continue bowel rest. NGT advanced per Abd XR, continued bowel obstruction on XR as well as fecal impaction. Will attempt to disimpact. Gen Surg following. Propofol switched to precedex for vent weaning.  10/18/2024: XR small bowel FT shows high-grade small bowel obstruction. Gen surg planning for ex lap today. Will keep intubated for procedure. Cultures NGTD, antibiotics stopped.  10/19/2024: Patient s/p ex lap with extensive lysis of adhesions, small bowel resection, takedown of gastrostomy tube with partial gastrectomy, abdomen left open in discontinuity with abthera in place. Gen Surg plans to take for exploratory laparotomy Monday 10/21 for small-bowel anastomosis and gastrostomy tube placement. Keeping intubated for surgery.   10/20/2024: Remains intubated. Pending surgery tomorrow. Drop in H&H overnight, but no signs of bleeding, vital signs stable.   10/21  - To the OR this morning with small bowel anastamosis and G-tube placement.  Remains on the vent.  Hemodynamics stable.  10/22 - NAEON.  Vent and hemodynamics stable.  Extubated, but quickly failed due to anxiety and secretions with severe tachypnea and desats  10/23 - GALO following reintubation.  Vent weaned back down.  Hemodynamics stable.    10/24 - vent settings minimal.  1 unit of blood today.  Hemodynamics stable.    Interval History: No acute events.  FiO2 down to 21% today.  Getting 1 unit of blood today.  No evidence of overt bleeding.  Bowel sounds improving.      Objective:     Vital Signs (Most Recent):  Temp: 98 °F (36.7 °C) (10/24/24 0930)  Pulse: 82 (10/24/24 1000)  Resp: 18 (10/24/24 1000)  BP: 106/63 (10/24/24 1000)  SpO2: 100 % (10/24/24 1000) Vital Signs (24h Range):  Temp:  [97.7 °F (36.5 °C)-99 °F (37.2 °C)] 98 °F (36.7 °C)  Pulse:  [] 82  Resp:  [17-28] 18  SpO2:  [94 %-100 %] 100 %  BP: ()/(52-84) 106/63     Weight: 46 kg (101 lb 6.6 oz)  Body mass index is 17.41 kg/m².      Intake/Output Summary (Last 24 hours) at 10/24/2024 1100  Last data filed at 10/24/2024 1000  Gross per 24 hour   Intake 2212.4 ml   Output 1290 ml   Net 922.4 ml        Physical Exam  Vitals and nursing note reviewed.   Constitutional:       Comments: Intubated and sedated    Cardiovascular:      Rate and Rhythm: Normal rate and regular rhythm.      Pulses: Normal pulses.      Heart sounds: No murmur heard.  Pulmonary:      Effort: Pulmonary effort is normal. No respiratory distress.      Breath sounds: Rhonchi (Right) present. No wheezing or rales.   Abdominal:      General: There is distension.      Tenderness: There is abdominal tenderness.      Comments: Bowel sounds improving   Musculoskeletal:      Right lower leg: No edema.      Left lower leg: No edema.   Neurological:      Comments: Moving spontaneously, but not following commands           Review of Systems    Vents:  Vent Mode: A/C (10/24/24  0501)  Ventilator Initiated: Yes (10/17/24 0102)  Set Rate: 18 BPM (10/24/24 0501)  Vt Set: 350 mL (10/24/24 0501)  Pressure Support: 7 cmH20 (10/22/24 0944)  PEEP/CPAP: 5 cmH20 (10/24/24 0501)  Oxygen Concentration (%): 21 (10/24/24 1000)  Peak Airway Pressure: 26 cmH20 (10/24/24 0501)  Plateau Pressure: 18 cmH20 (10/24/24 0501)  Total Ve: 6.4 L/m (10/24/24 0501)  Negative Inspiratory Force (cm H2O): 0 (10/24/24 0501)  F/VT Ratio<105 (RSBI): (!) 50.7 (10/24/24 0501)    Lines/Drains/Airways       Central Venous Catheter Line  Duration             Percutaneous Central Line - Triple Lumen  10/21/24 0814 Internal Jugular Left 3 days              Drain  Duration                  NG/OG Tube 10/16/24 0515 16 Fr. Right nostril 8 days         Urethral Catheter 10/16/24 0156 Double-lumen 16 Fr. 8 days         Gastrostomy/Enterostomy 10/21/24 0844 Gastrostomy tube w/ balloon LUQ feeding 3 days              Airway  Duration                  Airway - Non-Surgical 10/22/24 1305 Endotracheal Tube 1 day                    Significant Labs:    CBC/Anemia Profile:  Recent Labs   Lab 10/23/24  0403 10/24/24  0425   WBC 17.21* 14.59*   HGB 8.3* 6.9*   HCT 25.3* 20.5*    337   MCV 79* 78*   RDW 13.2 13.3        Chemistries:  Recent Labs   Lab 10/23/24  0403 10/24/24  0425    145   K 4.0 2.9*    111*   CO2 27 24   BUN 17 13   CREATININE 0.7 0.6   CALCIUM 7.8* 7.5*   ALBUMIN 1.7* 1.5*   PROT 4.9* 4.7*   BILITOT 2.6* 2.0*   ALKPHOS 86 92   ALT 13 12   AST 25 21   MG 2.4 1.9   PHOS 2.6* 2.3*       All pertinent labs within the past 24 hours have been reviewed.    Significant Imaging:  I have reviewed all pertinent imaging results/findings within the past 24 hours.    ABG  Recent Labs   Lab 10/24/24  0406   PH 7.451*   PO2 74*   PCO2 32.5*   HCO3 22.7*   BE -1     Assessment/Plan:     Neuro  Intellectual disability with epilepsy  Home AEDs restarted: Keppra and phenobarb    Spastic quadriplegic cerebral palsy  Turn q  2hrs  Aspiration precautions  Baclofen restarted at half home dose 10/23    Ophtho  Legally blind  Supportive Care    Pulmonary  * Acute hypercapnic respiratory failure  Patient with Hypercapnic Respiratory failure which is Acute on chronic. Unclear if on O2 at the NH. Supplemental oxygen was provided and noted-     Vent Mode: A/C  Oxygen Concentration (%):  [21-35] 21  Resp Rate Total:  [18 br/min-21 br/min] 19 br/min  Vt Set:  [350 mL] 350 mL  PEEP/CPAP:  [5 cmH20] 5 cmH20  Mean Airway Pressure:  [8.9 tkA81-27 cmH20] 8.9 cmH20    .   Signs/symptoms of respiratory failure include- tachypnea, increased work of breathing, respiratory distress, and use of accessory muscles. Contributing diagnoses includes - Aspiration Labs and images were reviewed. Patient Has recent ABG, which has been reviewed. Will treat underlying causes and adjust management of respiratory failure as follows-     Wean vent settings as tolerated  Broad spectrum antibiotics- stopped, cultures NGTD  Duonebs prn  Sedation: Precedex, Fentanyl  Will keep intubated for surgeries planned  Bowel rest-NPO    10/21 - SAT/SBT starting this afternoon.  Nonverbal and blind baseline status will likely complicate extubation course.  10/22- failed SBT yesterday due to apnea.   Sedation off this morning and plan SBT again today.  10/23 - extubated yesterday, but quickly failed due to anxiety and difficulty with secretions.  Dropped his sat and got extremely tachypneic.  Reintubated and initially had high requirements but now weaned back to 40% and 5 of PEEP.  Some concern for aspiration of secretions.  Will coordinate with mom for her to be present next time we attempt.  10/24 - vent weaned to 21%.  Starting moving back to SAT/SBT today.    Renal/  MAYELIN (acute kidney injury)  MAYELIN is likely due to pre-renal azotemia due to intravascular volume depletion. Baseline creatinine is  0.8 . Most recent creatinine and eGFR are listed below.  Recent Labs     10/22/24  8164  10/23/24  0403 10/24/24  0425   CREATININE 0.7 0.7 0.6   EGFRNORACEVR >60 >60 >60        Plan  - Avoid nephrotoxins and renally dose meds for GFR listed above  - Monitor urine output, serial BMP, and adjust therapy as needed  - Maintain Lancaster catheter - IVF boluses prn  - Resolved after fluid resuscitation      Endocrine  Body mass index (BMI) less than 19  Nutrition consult for Tube feeding recs when able to resume tube feeds  NPO currently due to bowel surgery    GI  Bowel obstruction  KUB shows small bowel obstruction  NGT to suction  Keep NPO  CT abd/pelvis shows high grade bowel obstruction  Gen Surg consulted  Fecal impaction on Abd XR- disimpacted  SBFT XR shows high-grade small bowel obstruction    Under went Ex lap 10/18 with extensive lysis of adhesions, small bowel resection, takedown of gastrostomy tube with partial gastrectomy, abdomen left open in discontinuity with abthera in place  Ex lap on 10/21 with small-bowel anastomosis and G tube placement    10/24 - Bowel sounds improving, but no BM yet    PEG (percutaneous endoscopic gastrostomy) status  Patient noted to have a percutaneous endoscopic gastrostomy tube in place. I have personally inspected the tube.Tube was placed on this admission, with date of procedure- 10/16  There are no signs of drainage or infection around the site. Routine care to be done by wound care and nursing staff.     G tube removed during ex-lap on 10/18  Tube feedings on hold due to Bowel obstruction  Ex lap 10/21 with G tube replacement  Starting to use for meds 10/23          Critical Care Time: 33 minutes  Critical secondary to respiratory failure, infusion of high risk medications      Critical care was time spent personally by me on the following activities: development of treatment plan with patient or surrogate and bedside caregivers, discussions with consultants, evaluation of patient's response to treatment, examination of patient, ordering and performing treatments  and interventions, ordering and review of laboratory studies, ordering and review of radiographic studies, pulse oximetry, re-evaluation of patient's condition. This critical care time did not overlap with that of any other provider or involve time for any procedures.     Kiel Perez MD  Critical Care Medicine  [facility] - Intensive [facility])

## 2024-10-24 NOTE — ASSESSMENT & PLAN NOTE
Patient with Hypercapnic Respiratory failure which is Acute on chronic. Unclear if on O2 at the NH. Supplemental oxygen was provided and noted-     Vent Mode: A/C  Oxygen Concentration (%):  [21-35] 21  Resp Rate Total:  [18 br/min-21 br/min] 19 br/min  Vt Set:  [350 mL] 350 mL  PEEP/CPAP:  [5 cmH20] 5 cmH20  Mean Airway Pressure:  [8.9 yrL54-73 cmH20] 8.9 cmH20    .   Signs/symptoms of respiratory failure include- tachypnea, increased work of breathing, respiratory distress, and use of accessory muscles. Contributing diagnoses includes - Aspiration Labs and images were reviewed. Patient Has recent ABG, which has been reviewed. Will treat underlying causes and adjust management of respiratory failure as follows-     Wean vent settings as tolerated  Broad spectrum antibiotics- stopped, cultures NGTD  Duonebs prn  Sedation: Precedex, Fentanyl  Will keep intubated for surgeries planned  Bowel rest-NPO    10/21 - SAT/SBT starting this afternoon.  Nonverbal and blind baseline status will likely complicate extubation course.  10/22- failed SBT yesterday due to apnea.   Sedation off this morning and plan SBT again today.  10/23 - extubated yesterday, but quickly failed due to anxiety and difficulty with secretions.  Dropped his sat and got extremely tachypneic.  Reintubated and initially had high requirements but now weaned back to 40% and 5 of PEEP.  Some concern for aspiration of secretions.  Will coordinate with mom for her to be present next time we attempt.  10/24 - vent weaned to 21%.  Starting moving back to SAT/SBT today.

## 2024-10-24 NOTE — ASSESSMENT & PLAN NOTE
KUB shows small bowel obstruction  NGT to suction  Keep NPO  CT abd/pelvis shows high grade bowel obstruction  Gen Surg consulted  Fecal impaction on Abd XR- disimpacted  SBFT XR shows high-grade small bowel obstruction    Under went Ex lap 10/18 with extensive lysis of adhesions, small bowel resection, takedown of gastrostomy tube with partial gastrectomy, abdomen left open in discontinuity with abthera in place  Ex lap on 10/21 with small-bowel anastomosis and G tube placement    10/24 - Bowel sounds improving, but no BM yet

## 2024-10-24 NOTE — PLAN OF CARE
Pt remains intubated/sedated on Precedex/fent gtts for RASS 0--1. Tolerating vent settings rate 18, tidal volume 350, FiO2 21%, and PEEP 5. NSR/ST on the monitor, BP stable. NG tube in L nare to LIWS with 200ml output. PEG WNL. Accuchecks BID; no coverage needed this shift. Lancaster intact with 400ml UOP. LIJ intact. Midline island border dressing WNL; scant old drainage noted. I unit PRBC infusing. K+ replacing. Bed locked and in lowest position, bed alarm in use, heels elevated, turned q2. POC reviewed with pt. Plan for possible extubation this AM.

## 2024-10-24 NOTE — ASSESSMENT & PLAN NOTE
POD#6/3 s/p ex lap, extensive lysis of adhesions, small bowel resection, takedown of gastrostomy tube with partial gastrectomy, abdomen left open in discontinuity with abthera in place.  Now status post small-bowel anastomosis, open gastrostomy placement and abdominal closure    - ok for po medication, would hold off tube feeds until further bowel function  - would continue NPO, NG tube to low intermittent suction as well as gastrostomy to gravity drainage  - when patient has return of bowel function can DC NG tube and start feeds as tolerated  - recommend pain control with IV meds  - remainder of care per primary team

## 2024-10-24 NOTE — SUBJECTIVE & OBJECTIVE
Interval History: received 1u PRBC today. Nurse reports small amount of bowel sounds. No bowel function yet. Possible extubation later today    Medications:  Continuous Infusions:   dexmedeTOMIDine (Precedex) infusion (titrating)  0-1.4 mcg/kg/hr Intravenous Continuous 15.79 mL/hr at 10/24/24 1000 1.4 mcg/kg/hr at 10/24/24 1000    fentanyl  0-250 mcg/hr Intravenous Continuous 5 mL/hr at 10/24/24 1000 50 mcg/hr at 10/24/24 1000     Scheduled Meds:   baclofen  10 mg Per G Tube BID    ceFEPime IV (PEDS and ADULTS)  2 g Intravenous Q8H    chlorhexidine  15 mL Mouth/Throat BID    cloNIDine 0.1 mg/24 hr td ptwk  1 patch Transdermal Q7 Days    diazePAM  2.5 mg Intravenous Q12H    enoxparin  30 mg Subcutaneous Q24H (prophylaxis, 1700)    famotidine (PF)  20 mg Intravenous BID    levETIRAcetam (Keppra) IV (PEDS and ADULTS)  2,000 mg Intravenous Q12H    PHENObarbital  65 mg Intravenous BID     PRN Meds:  Current Facility-Administered Medications:     0.9%  NaCl infusion (for blood administration), , Intravenous, Q24H PRN    albuterol-ipratropium, 3 mL, Nebulization, Q6H PRN    dextrose 10%, 12.5 g, Intravenous, PRN    dextrose 10%, 25 g, Intravenous, PRN    glucagon (human recombinant), 1 mg, Intramuscular, PRN    insulin aspart U-100, 0-10 Units, Subcutaneous, Q4H PRN    magnesium sulfate IVPB, 2 g, Intravenous, PRN    magnesium sulfate IVPB, 4 g, Intravenous, PRN    potassium chloride in water, 40 mEq, Intravenous, PRN **AND** potassium chloride in water, 60 mEq, Intravenous, PRN **AND** potassium chloride in water, 80 mEq, Intravenous, PRN    sodium phosphate 15 mmol in D5W 250 mL IVPB, 15 mmol, Intravenous, PRN    sodium phosphate 20.01 mmol in D5W 250 mL IVPB, 20.01 mmol, Intravenous, PRN    sodium phosphate 30 mmol in D5W 250 mL IVPB, 30 mmol, Intravenous, PRN     Review of patient's allergies indicates:   Allergen Reactions    Strawberries [strawberry] Hives     STRAWBERRIES ALLERGIC REACTIONS   (SEIZURES AND HIVES )      Objective:     Vital Signs (Most Recent):  Temp: 98 °F (36.7 °C) (10/24/24 0930)  Pulse: 82 (10/24/24 1000)  Resp: 18 (10/24/24 1000)  BP: 106/63 (10/24/24 1000)  SpO2: 100 % (10/24/24 1000) Vital Signs (24h Range):  Temp:  [97.7 °F (36.5 °C)-99 °F (37.2 °C)] 98 °F (36.7 °C)  Pulse:  [] 82  Resp:  [17-28] 18  SpO2:  [94 %-100 %] 100 %  BP: ()/(52-84) 106/63     Weight: 46 kg (101 lb 6.6 oz)  Body mass index is 17.41 kg/m².    Intake/Output - Last 3 Shifts         10/22 0700  10/23 0659 10/23 0700  10/24 0659 10/24 0700  10/25 0659    I.V. (mL/kg) 734 (16) 858.1 (18.7) 135.7 (3)    Blood   262.5    NG/GT  90 60    IV Piggyback 13.5 1736.2 131.2    Total Intake(mL/kg) 747.4 (16.2) 2684.3 (58.4) 589.4 (12.8)    Urine (mL/kg/hr) 465 (0.4) 870 (0.8) 130 (0.8)    Drains 325 400     Stool  0 0    Total Output 790 1270 130    Net -42.6 +1414.3 +459.4           Stool Occurrence  0 x 0 x             Physical Exam  Constitutional:       Appearance: He is ill-appearing.      Comments: Intubated and sedated   Cardiovascular:      Rate and Rhythm: Normal rate.   Pulmonary:      Comments: Intubated  Abdominal:      Comments: Gastrostomy in place 2 cm of the skin 4 cm of the top of bolster.  No rebound or guarding.  Midline incision dressing with some old blood staining          Significant Labs:  I have reviewed all pertinent lab results within the past 24 hours.  CBC:   Recent Labs   Lab 10/24/24  0425   WBC 14.59*   RBC 2.64*   HGB 6.9*   HCT 20.5*      MCV 78*   MCH 26.1*   MCHC 33.7     CMP:   Recent Labs   Lab 10/24/24  0425   GLU 75   CALCIUM 7.5*   ALBUMIN 1.5*   PROT 4.7*      K 2.9*   CO2 24   *   BUN 13   CREATININE 0.6   ALKPHOS 92   ALT 12   AST 21   BILITOT 2.0*       Significant Diagnostics:  I have reviewed all pertinent imaging results/findings within the past 24 hours.

## 2024-10-24 NOTE — ASSESSMENT & PLAN NOTE
MAYELIN is likely due to pre-renal azotemia due to intravascular volume depletion. Baseline creatinine is  0.8 . Most recent creatinine and eGFR are listed below.  Recent Labs     10/22/24  0408 10/23/24  0403 10/24/24  0425   CREATININE 0.7 0.7 0.6   EGFRNORACEVR >60 >60 >60        Plan  - Avoid nephrotoxins and renally dose meds for GFR listed above  - Monitor urine output, serial BMP, and adjust therapy as needed  - Maintain Lancaster catheter - IVF boluses prn  - Resolved after fluid resuscitation

## 2024-10-24 NOTE — PROGRESS NOTES
[facility] ([facility])  General Surgery  Progress Note    Subjective:     Interval History: received 1u PRBC today. Nurse reports small amount of bowel sounds. No bowel function yet. Possible extubation later today    Medications:  Continuous Infusions:   dexmedeTOMIDine (Precedex) infusion (titrating)  0-1.4 mcg/kg/hr Intravenous Continuous 15.79 mL/hr at 10/24/24 1000 1.4 mcg/kg/hr at 10/24/24 1000    fentanyl  0-250 mcg/hr Intravenous Continuous 5 mL/hr at 10/24/24 1000 50 mcg/hr at 10/24/24 1000     Scheduled Meds:   baclofen  10 mg Per G Tube BID    ceFEPime IV (PEDS and ADULTS)  2 g Intravenous Q8H    chlorhexidine  15 mL Mouth/Throat BID    cloNIDine 0.1 mg/24 hr td ptwk  1 patch Transdermal Q7 Days    diazePAM  2.5 mg Intravenous Q12H    enoxparin  30 mg Subcutaneous Q24H (prophylaxis, 1700)    famotidine (PF)  20 mg Intravenous BID    levETIRAcetam (Keppra) IV (PEDS and ADULTS)  2,000 mg Intravenous Q12H    PHENObarbital  65 mg Intravenous BID     PRN Meds:  Current Facility-Administered Medications:     0.9%  NaCl infusion (for blood administration), , Intravenous, Q24H PRN    albuterol-ipratropium, 3 mL, Nebulization, Q6H PRN    dextrose 10%, 12.5 g, Intravenous, PRN    dextrose 10%, 25 g, Intravenous, PRN    glucagon (human recombinant), 1 mg, Intramuscular, PRN    insulin aspart U-100, 0-10 Units, Subcutaneous, Q4H PRN    magnesium sulfate IVPB, 2 g, Intravenous, PRN    magnesium sulfate IVPB, 4 g, Intravenous, PRN    potassium chloride in water, 40 mEq, Intravenous, PRN **AND** potassium chloride in water, 60 mEq, Intravenous, PRN **AND** potassium chloride in water, 80 mEq, Intravenous, PRN    sodium phosphate 15 mmol in D5W 250 mL IVPB, 15 mmol, Intravenous, PRN    sodium phosphate 20.01 mmol in D5W 250 mL IVPB, 20.01 mmol, Intravenous, PRN    sodium phosphate 30 mmol in D5W 250 mL IVPB, 30 mmol, Intravenous, PRN     Review of patient's allergies indicates:   Allergen Reactions     Strawberries [strawberry] Hives     STRAWBERRIES ALLERGIC REACTIONS   (SEIZURES AND HIVES )     Objective:     Vital Signs (Most Recent):  Temp: 98 °F (36.7 °C) (10/24/24 0930)  Pulse: 82 (10/24/24 1000)  Resp: 18 (10/24/24 1000)  BP: 106/63 (10/24/24 1000)  SpO2: 100 % (10/24/24 1000) Vital Signs (24h Range):  Temp:  [97.7 °F (36.5 °C)-99 °F (37.2 °C)] 98 °F (36.7 °C)  Pulse:  [] 82  Resp:  [17-28] 18  SpO2:  [94 %-100 %] 100 %  BP: ()/(52-84) 106/63     Weight: 46 kg (101 lb 6.6 oz)  Body mass index is 17.41 kg/m².    Intake/Output - Last 3 Shifts         10/22 0700  10/23 0659 10/23 0700  10/24 0659 10/24 0700  10/25 0659    I.V. (mL/kg) 734 (16) 858.1 (18.7) 135.7 (3)    Blood   262.5    NG/GT  90 60    IV Piggyback 13.5 1736.2 131.2    Total Intake(mL/kg) 747.4 (16.2) 2684.3 (58.4) 589.4 (12.8)    Urine (mL/kg/hr) 465 (0.4) 870 (0.8) 130 (0.8)    Drains 325 400     Stool  0 0    Total Output 790 1270 130    Net -42.6 +1414.3 +459.4           Stool Occurrence  0 x 0 x             Physical Exam  Constitutional:       Appearance: He is ill-appearing.      Comments: Intubated and sedated   Cardiovascular:      Rate and Rhythm: Normal rate.   Pulmonary:      Comments: Intubated  Abdominal:      Comments: Gastrostomy in place 2 cm of the skin 4 cm of the top of bolster.  No rebound or guarding.  Midline incision dressing with some old blood staining          Significant Labs:  I have reviewed all pertinent lab results within the past 24 hours.  CBC:   Recent Labs   Lab 10/24/24  0425   WBC 14.59*   RBC 2.64*   HGB 6.9*   HCT 20.5*      MCV 78*   MCH 26.1*   MCHC 33.7     CMP:   Recent Labs   Lab 10/24/24  0425   GLU 75   CALCIUM 7.5*   ALBUMIN 1.5*   PROT 4.7*      K 2.9*   CO2 24   *   BUN 13   CREATININE 0.6   ALKPHOS 92   ALT 12   AST 21   BILITOT 2.0*       Significant Diagnostics:  I have reviewed all pertinent imaging results/findings within the past 24 hours.  Assessment/Plan:      * Acute hypercapnic respiratory failure  Management per critical care.    Patient is currently intubated     MAYELIN (acute kidney injury)  Per critical Care    Bowel obstruction  POD#6/3 s/p ex lap, extensive lysis of adhesions, small bowel resection, takedown of gastrostomy tube with partial gastrectomy, abdomen left open in discontinuity with abthera in place.  Now status post small-bowel anastomosis, open gastrostomy placement and abdominal closure    - ok for po medication, would hold off tube feeds until further bowel function  - would continue NPO, NG tube to low intermittent suction as well as gastrostomy to gravity drainage  - when patient has return of bowel function can DC NG tube and start feeds as tolerated  - recommend pain control with IV meds  - remainder of care per primary team    PEG (percutaneous endoscopic gastrostomy) status  Redo Gastrostomy tube in place    Nonverbal  Monitor    Legally blind  Per critical care    Intellectual disability with epilepsy  Per critical care    Spastic quadriplegic cerebral palsy  Further management critical care medicine        Gabriel Carney PA-C  General Surgery  [facility] - [facility] ([facility])

## 2024-10-25 PROBLEM — E44.0 MODERATE PROTEIN-CALORIE MALNUTRITION: Status: ACTIVE | Noted: 2024-10-25

## 2024-10-25 LAB
ALBUMIN SERPL BCP-MCNC: 1.6 G/DL (ref 3.5–5.2)
ALP SERPL-CCNC: 121 U/L (ref 40–150)
ALT SERPL W/O P-5'-P-CCNC: 13 U/L (ref 10–44)
ANION GAP SERPL CALC-SCNC: 11 MMOL/L (ref 8–16)
AST SERPL-CCNC: 19 U/L (ref 10–40)
BASOPHILS # BLD AUTO: 0.05 K/UL (ref 0–0.2)
BASOPHILS NFR BLD: 0.3 % (ref 0–1.9)
BILIRUB SERPL-MCNC: 2.2 MG/DL (ref 0.1–1)
BUN SERPL-MCNC: 11 MG/DL (ref 6–20)
CALCIUM SERPL-MCNC: 8.1 MG/DL (ref 8.7–10.5)
CHLORIDE SERPL-SCNC: 111 MMOL/L (ref 95–110)
CO2 SERPL-SCNC: 23 MMOL/L (ref 23–29)
CREAT SERPL-MCNC: 0.6 MG/DL (ref 0.5–1.4)
DIFFERENTIAL METHOD BLD: ABNORMAL
EOSINOPHIL # BLD AUTO: 0.6 K/UL (ref 0–0.5)
EOSINOPHIL NFR BLD: 3.5 % (ref 0–8)
ERYTHROCYTE [DISTWIDTH] IN BLOOD BY AUTOMATED COUNT: 13.7 % (ref 11.5–14.5)
EST. GFR  (NO RACE VARIABLE): >60 ML/MIN/1.73 M^2
GLUCOSE SERPL-MCNC: 84 MG/DL (ref 70–110)
HCT VFR BLD AUTO: 26.2 % (ref 40–54)
HGB BLD-MCNC: 9.1 G/DL (ref 14–18)
IMM GRANULOCYTES # BLD AUTO: 0.28 K/UL (ref 0–0.04)
IMM GRANULOCYTES NFR BLD AUTO: 1.6 % (ref 0–0.5)
LYMPHOCYTES # BLD AUTO: 2.2 K/UL (ref 1–4.8)
LYMPHOCYTES NFR BLD: 12.9 % (ref 18–48)
MAGNESIUM SERPL-MCNC: 1.8 MG/DL (ref 1.6–2.6)
MCH RBC QN AUTO: 26.8 PG (ref 27–31)
MCHC RBC AUTO-ENTMCNC: 34.7 G/DL (ref 32–36)
MCV RBC AUTO: 77 FL (ref 82–98)
MONOCYTES # BLD AUTO: 1.6 K/UL (ref 0.3–1)
MONOCYTES NFR BLD: 9.2 % (ref 4–15)
NEUTROPHILS # BLD AUTO: 12.3 K/UL (ref 1.8–7.7)
NEUTROPHILS NFR BLD: 72.5 % (ref 38–73)
NRBC BLD-RTO: 0 /100 WBC
PHOSPHATE SERPL-MCNC: 2.4 MG/DL (ref 2.7–4.5)
PLATELET # BLD AUTO: 425 K/UL (ref 150–450)
PMV BLD AUTO: 10.5 FL (ref 9.2–12.9)
POCT GLUCOSE: 113 MG/DL (ref 70–110)
POCT GLUCOSE: 91 MG/DL (ref 70–110)
POTASSIUM SERPL-SCNC: 3.2 MMOL/L (ref 3.5–5.1)
PROT SERPL-MCNC: 5.9 G/DL (ref 6–8.4)
RBC # BLD AUTO: 3.39 M/UL (ref 4.6–6.2)
SODIUM SERPL-SCNC: 145 MMOL/L (ref 136–145)
WBC # BLD AUTO: 17.03 K/UL (ref 3.9–12.7)

## 2024-10-25 PROCEDURE — C9399 UNCLASSIFIED DRUGS OR BIOLOG: HCPCS | Performed by: INTERNAL MEDICINE

## 2024-10-25 PROCEDURE — 63600175 PHARM REV CODE 636 W HCPCS: Performed by: INTERNAL MEDICINE

## 2024-10-25 PROCEDURE — A4217 STERILE WATER/SALINE, 500 ML: HCPCS | Performed by: INTERNAL MEDICINE

## 2024-10-25 PROCEDURE — 94761 N-INVAS EAR/PLS OXIMETRY MLT: CPT

## 2024-10-25 PROCEDURE — 94003 VENT MGMT INPAT SUBQ DAY: CPT

## 2024-10-25 PROCEDURE — 25000003 PHARM REV CODE 250: Performed by: NURSE PRACTITIONER

## 2024-10-25 PROCEDURE — 25000003 PHARM REV CODE 250: Performed by: INTERNAL MEDICINE

## 2024-10-25 PROCEDURE — 80053 COMPREHEN METABOLIC PANEL: CPT | Performed by: NURSE PRACTITIONER

## 2024-10-25 PROCEDURE — 85025 COMPLETE CBC W/AUTO DIFF WBC: CPT | Performed by: NURSE PRACTITIONER

## 2024-10-25 PROCEDURE — 84100 ASSAY OF PHOSPHORUS: CPT | Performed by: NURSE PRACTITIONER

## 2024-10-25 PROCEDURE — 99024 POSTOP FOLLOW-UP VISIT: CPT | Mod: ,,,

## 2024-10-25 PROCEDURE — 63600175 PHARM REV CODE 636 W HCPCS

## 2024-10-25 PROCEDURE — 20000000 HC ICU ROOM

## 2024-10-25 PROCEDURE — 99900035 HC TECH TIME PER 15 MIN (STAT)

## 2024-10-25 PROCEDURE — 63600175 PHARM REV CODE 636 W HCPCS: Performed by: NURSE PRACTITIONER

## 2024-10-25 PROCEDURE — 27100171 HC OXYGEN HIGH FLOW UP TO 24 HOURS

## 2024-10-25 PROCEDURE — 99900026 HC AIRWAY MAINTENANCE (STAT)

## 2024-10-25 PROCEDURE — 83735 ASSAY OF MAGNESIUM: CPT | Performed by: NURSE PRACTITIONER

## 2024-10-25 RX ORDER — DEXTROSE MONOHYDRATE 100 MG/ML
INJECTION, SOLUTION INTRAVENOUS CONTINUOUS PRN
Status: DISCONTINUED | OUTPATIENT
Start: 2024-10-25 | End: 2024-10-28

## 2024-10-25 RX ADMIN — PHENOBARBITAL SODIUM 65 MG: 65 INJECTION INTRAMUSCULAR at 09:10

## 2024-10-25 RX ADMIN — CHLORHEXIDINE GLUCONATE 0.12% ORAL RINSE 15 ML: 1.2 LIQUID ORAL at 08:10

## 2024-10-25 RX ADMIN — CHLORHEXIDINE GLUCONATE 0.12% ORAL RINSE 15 ML: 1.2 LIQUID ORAL at 09:10

## 2024-10-25 RX ADMIN — DIAZEPAM 2 MG: 2 TABLET ORAL at 08:10

## 2024-10-25 RX ADMIN — BACLOFEN 10 MG: 10 TABLET ORAL at 08:10

## 2024-10-25 RX ADMIN — MAGNESIUM SULFATE HEPTAHYDRATE: 500 INJECTION, SOLUTION INTRAMUSCULAR; INTRAVENOUS at 10:10

## 2024-10-25 RX ADMIN — CEFEPIME 2 G: 2 INJECTION, POWDER, FOR SOLUTION INTRAVENOUS at 10:10

## 2024-10-25 RX ADMIN — PHENOBARBITAL SODIUM 65 MG: 65 INJECTION INTRAMUSCULAR at 08:10

## 2024-10-25 RX ADMIN — DEXMEDETOMIDINE HYDROCHLORIDE 1.4 MCG/KG/HR: 4 INJECTION INTRAVENOUS at 10:10

## 2024-10-25 RX ADMIN — FENTANYL CITRATE 100 MCG/HR: 50 INJECTION INTRAVENOUS at 10:10

## 2024-10-25 RX ADMIN — DEXMEDETOMIDINE HYDROCHLORIDE 1.4 MCG/KG/HR: 4 INJECTION INTRAVENOUS at 01:10

## 2024-10-25 RX ADMIN — DEXMEDETOMIDINE HYDROCHLORIDE 1.4 MCG/KG/HR: 4 INJECTION INTRAVENOUS at 08:10

## 2024-10-25 RX ADMIN — POTASSIUM CHLORIDE 60 MEQ: 200 INJECTION, SOLUTION INTRAVENOUS at 07:10

## 2024-10-25 RX ADMIN — CEFEPIME 2 G: 2 INJECTION, POWDER, FOR SOLUTION INTRAVENOUS at 08:10

## 2024-10-25 RX ADMIN — FAMOTIDINE 20 MG: 40 POWDER, FOR SUSPENSION ORAL at 08:10

## 2024-10-25 RX ADMIN — MAGNESIUM SULFATE HEPTAHYDRATE 2 G: 40 INJECTION, SOLUTION INTRAVENOUS at 05:10

## 2024-10-25 RX ADMIN — DEXMEDETOMIDINE HYDROCHLORIDE 1.4 MCG/KG/HR: 4 INJECTION INTRAVENOUS at 05:10

## 2024-10-25 RX ADMIN — SODIUM PHOSPHATE, MONOBASIC, MONOHYDRATE AND SODIUM PHOSPHATE, DIBASIC, ANHYDROUS 15 MMOL: 142; 276 INJECTION, SOLUTION INTRAVENOUS at 05:10

## 2024-10-25 RX ADMIN — LEVETIRACETAM 2000 MG: 500 INJECTION, SOLUTION INTRAVENOUS at 09:10

## 2024-10-25 RX ADMIN — LEVETIRACETAM 2000 MG: 500 INJECTION, SOLUTION INTRAVENOUS at 08:10

## 2024-10-25 RX ADMIN — BACLOFEN 10 MG: 10 TABLET ORAL at 09:10

## 2024-10-25 RX ADMIN — CEFEPIME 2 G: 2 INJECTION, POWDER, FOR SOLUTION INTRAVENOUS at 03:10

## 2024-10-25 RX ADMIN — ENOXAPARIN SODIUM 30 MG: 30 INJECTION SUBCUTANEOUS at 05:10

## 2024-10-25 RX ADMIN — DIAZEPAM 2 MG: 2 TABLET ORAL at 09:10

## 2024-10-25 RX ADMIN — FAMOTIDINE 20 MG: 40 POWDER, FOR SUSPENSION ORAL at 09:10

## 2024-10-25 NOTE — ASSESSMENT & PLAN NOTE
Nutrition consult for Tube feeding recs when able to resume tube feeds  NPO currently due to bowel surgery  Planning to start TPN

## 2024-10-25 NOTE — ASSESSMENT & PLAN NOTE
MAYELIN is likely due to pre-renal azotemia due to intravascular volume depletion. Baseline creatinine is  0.8 . Most recent creatinine and eGFR are listed below.  Recent Labs     10/23/24  0403 10/24/24  0425 10/25/24  0408   CREATININE 0.7 0.6 0.6   EGFRNORACEVR >60 >60 >60        Plan  - Avoid nephrotoxins and renally dose meds for GFR listed above  - Monitor urine output, serial BMP, and adjust therapy as needed  - Maintain [place] catheter - IVF boluses prn  - Resolved after fluid resuscitation

## 2024-10-25 NOTE — ASSESSMENT & PLAN NOTE
Malnutrition Type:  Context: acute on chronic illness  Level: severe    Related to (etiology):   Physiological causes increasing nutrient needs d/t illness  Alteration in GI tract structure/function (Improved)    Signs and Symptoms (as evidenced by):   BMI < 18.9  Underweight with loss of fat and muscle  Inc localized fluid accumulation  Unable to eat sufficient energy/protein to maintain a healthy weight  Wounds    Malnutrition Characteristic Summary:  Energy Intake (Malnutrition): less than 75% for greater than 7 days  Subcutaneous Fat (Malnutrition): mild depletion  Muscle Mass (Malnutrition): severe depletion  Fluid Accumulation (Malnutrition):  (1+ trace)    Interventions/Recommendations (treatment strategy):  1. Enteral nutrition management  2. Amino acids medical food supplement therapy  3. Collaboration by nutrition professional with other providers    Nutrition Diagnosis Status:   Continues

## 2024-10-25 NOTE — ASSESSMENT & PLAN NOTE
KUB shows small bowel obstruction  NGT to suction  Keep NPO  CT abd/pelvis shows high grade bowel obstruction  Gen Surg consulted  Fecal impaction on Abd XR- disimpacted  SBFT XR shows high-grade small bowel obstruction    Under went Ex lap 10/18 with extensive lysis of adhesions, small bowel resection, takedown of gastrostomy tube with partial gastrectomy, abdomen left open in discontinuity with abthera in place  Ex lap on 10/21 with small-bowel anastomosis and G tube placement    10/24 - Bowel sounds improving, but no BM yet  10/25 - Bowel sounds improving, but no BM yet.  Planning to start TPN.

## 2024-10-25 NOTE — SUBJECTIVE & OBJECTIVE
Interval History: No acute events overnight.  Fentanyl off this morning and tried SBT with sister at bedside to help calm him down.  We let him go about 35 mins on PSV, but RR consistently in the 40's -50's with tidal volume below 200 @ 7cm H2O.  Placed back on AC/VC.      Objective:     Vital Signs (Most Recent):  Temp: 98.2 °F (36.8 °C) (10/25/24 0745)  Pulse: (!) 117 (10/25/24 1034)  Resp: (!) 52 (10/25/24 1034)  BP: 130/76 (10/25/24 1000)  SpO2: 98 % (10/25/24 1034) Vital Signs (24h Range):  Temp:  [97.7 °F (36.5 °C)-99.1 °F (37.3 °C)] 98.2 °F (36.8 °C)  Pulse:  [] 117  Resp:  [16-60] 52  SpO2:  [96 %-100 %] 98 %  BP: (103-130)/(57-83) 130/76     Weight: 46 kg (101 lb 6.6 oz)  Body mass index is 17.41 kg/m².      Intake/Output Summary (Last 24 hours) at 10/25/2024 1043  Last data filed at 10/25/2024 1000  Gross per 24 hour   Intake 1385.06 ml   Output 1730 ml   Net -344.94 ml        Physical Exam  Vitals and nursing note reviewed.   Constitutional:       General: He is not in acute distress.     Appearance: He is ill-appearing (chronically).   Cardiovascular:      Rate and Rhythm: Regular rhythm. Tachycardia present.      Pulses: Normal pulses.      Heart sounds: No murmur heard.  Pulmonary:      Effort: Pulmonary effort is normal. No respiratory distress.      Breath sounds: No wheezing, rhonchi or rales.   Abdominal:      Palpations: Abdomen is soft.      Tenderness: There is abdominal tenderness. There is no guarding.      Comments: Bowel sounds increasing   Musculoskeletal:      Right lower leg: No edema.      Left lower leg: No edema.   Neurological:      Mental Status: He is alert.      Comments: Moving spontaneously, eyes open with roving eye movements and nystagmus, not following commands           Review of Systems    Vents:  Vent Mode: (S) A/C (10/25/24 1031)  Ventilator Initiated: Yes (10/17/24 0102)  Set Rate: 18 BPM (10/25/24 1031)  Vt Set: 350 mL (10/25/24 1031)  Pressure Support: 7 cmH20  (10/25/24 0956)  PEEP/CPAP: 5 cmH20 (10/25/24 1031)  Oxygen Concentration (%): 21 (10/25/24 1034)  Peak Airway Pressure: 27 cmH20 (10/25/24 1031)  Plateau Pressure: 18 cmH20 (10/25/24 1031)  Total Ve: 7.71 L/m (10/25/24 1031)  Negative Inspiratory Force (cm H2O): 0 (10/25/24 0939)  F/VT Ratio<105 (RSBI): (!) 93.57 (10/25/24 0956)    Lines/Drains/Airways       Central Venous Catheter Line  Duration             Percutaneous Central Line - Triple Lumen  10/21/24 0814 Internal Jugular Left 4 days              Drain  Duration                  NG/OG Tube 10/16/24 0515 16 Fr. Right nostril 9 days         Urethral Catheter 10/16/24 0156 Double-lumen 16 Fr. 9 days         Gastrostomy/Enterostomy 10/21/24 0844 Gastrostomy tube w/ balloon LUQ feeding 4 days              Airway  Duration                  Airway - Non-Surgical 10/22/24 1305 Endotracheal Tube 2 days                    Significant Labs:    CBC/Anemia Profile:  Recent Labs   Lab 10/24/24  0425 10/25/24  0408   WBC 14.59* 17.03*   HGB 6.9* 9.1*   HCT 20.5* 26.2*    425   MCV 78* 77*   RDW 13.3 13.7        Chemistries:  Recent Labs   Lab 10/24/24  0425 10/25/24  0408    145   K 2.9* 3.2*   * 111*   CO2 24 23   BUN 13 11   CREATININE 0.6 0.6   CALCIUM 7.5* 8.1*   ALBUMIN 1.5* 1.6*   PROT 4.7* 5.9*   BILITOT 2.0* 2.2*   ALKPHOS 92 121   ALT 12 13   AST 21 19   MG 1.9 1.8   PHOS 2.3* 2.4*       All pertinent labs within the past 24 hours have been reviewed.    Significant Imaging:  I have reviewed all pertinent imaging results/findings within the past 24 hours.

## 2024-10-25 NOTE — ASSESSMENT & PLAN NOTE
POD#7/4 s/p ex lap, extensive lysis of adhesions, small bowel resection, takedown of gastrostomy tube with partial gastrectomy, abdomen left open in discontinuity with abthera in place.  Now status post small-bowel anastomosis, open gastrostomy placement and abdominal closure    - KUB with less distention, abdomen softer. Ok to start trickle tube feeds through g tube or NG tube   - ok for po medication  - would continue NG tube to low intermittent suction as well as gastrostomy to gravity drainage  - recommend pain control with IV meds  - remainder of care per primary team

## 2024-10-25 NOTE — ASSESSMENT & PLAN NOTE
Patient with Hypercapnic Respiratory failure which is Acute on chronic. Unclear if on O2 at the NH. Supplemental oxygen was provided and noted-     Vent Mode: A/C  Oxygen Concentration (%):  [21] 21  Resp Rate Total:  [16 br/min-115 br/min] 52 br/min  Vt Set:  [350 mL] 350 mL  PEEP/CPAP:  [5 cmH20] 5 cmH20  Pressure Support:  [7 cmH20-10 cmH20] 7 cmH20  Mean Airway Pressure:  [6.9 mlR30-12 cmH20] 10 cmH20    .   Signs/symptoms of respiratory failure include- tachypnea, increased work of breathing, respiratory distress, and use of accessory muscles. Contributing diagnoses includes - Aspiration Labs and images were reviewed. Patient Has recent ABG, which has been reviewed. Will treat underlying causes and adjust management of respiratory failure as follows-     Wean vent settings as tolerated  Broad spectrum antibiotics- stopped, cultures NGTD  Duonebs prn  Sedation: Precedex, Fentanyl  Will keep intubated for surgeries planned  Bowel rest-NPO    10/21 - SAT/SBT starting this afternoon.  Nonverbal and blind baseline status will likely complicate extubation course.  10/22- failed SBT yesterday due to apnea.   Sedation off this morning and plan SBT again today.  10/23 - extubated yesterday, but quickly failed due to anxiety and difficulty with secretions.  Dropped his sat and got extremely tachypneic.  Reintubated and initially had high requirements but now weaned back to 40% and 5 of PEEP.  Some concern for aspiration of secretions.  Will coordinate with mom for her to be present next time we attempt.  10/24 - vent weaned to 21%.  Starting moving back to SAT/SBT today.  10/25 - Fentanyl off this morning and tried SBT with sister at bedside to help calm him down. We let him go about 35 mins on PSV, but RR consistently in the 40's -50's with tidal volume below 200 @ 7cm H2O. Placed back on AC/VC.  Will try again this afternoon when his mom arrives.

## 2024-10-25 NOTE — SUBJECTIVE & OBJECTIVE
Interval History: remains intubated. No bowel function but abdomen softer, less distended.     Medications:  Continuous Infusions:   dexmedeTOMIDine (Precedex) infusion (titrating)  0-1.4 mcg/kg/hr Intravenous Continuous 15.79 mL/hr at 10/25/24 1000 1.4 mcg/kg/hr at 10/25/24 1000    D10W   Intravenous Continuous PRN        fentanyl  0-250 mcg/hr Intravenous Continuous   Stopped at 10/25/24 0922    TPN ADULT CENTRAL LINE CUSTOM   Intravenous Continuous         Scheduled Meds:   baclofen  10 mg Per G Tube BID    ceFEPime IV (PEDS and ADULTS)  2 g Intravenous Q8H    chlorhexidine  15 mL Mouth/Throat BID    cloNIDine 0.1 mg/24 hr td ptwk  1 patch Transdermal Q7 Days    diazePAM  2 mg Per G Tube BID    enoxparin  30 mg Subcutaneous Q24H (prophylaxis, 1700)    famotidine  20 mg Per G Tube BID    levETIRAcetam (Keppra) IV (PEDS and ADULTS)  2,000 mg Intravenous Q12H    PHENObarbital  65 mg Intravenous BID     PRN Meds:  Current Facility-Administered Medications:     0.9%  NaCl infusion (for blood administration), , Intravenous, Q24H PRN    albuterol-ipratropium, 3 mL, Nebulization, Q6H PRN    D10W, , Intravenous, Continuous PRN    dextrose 10%, 12.5 g, Intravenous, PRN    dextrose 10%, 25 g, Intravenous, PRN    glucagon (human recombinant), 1 mg, Intramuscular, PRN    insulin aspart U-100, 0-10 Units, Subcutaneous, Q4H PRN     Review of patient's allergies indicates:   Allergen Reactions    Strawberries [strawberry] Hives     STRAWBERRIES ALLERGIC REACTIONS   (SEIZURES AND HIVES )     Objective:     Vital Signs (Most Recent):  Temp: 98.2 °F (36.8 °C) (10/25/24 0745)  Pulse: (!) 117 (10/25/24 1034)  Resp: (!) 52 (10/25/24 1034)  BP: 130/76 (10/25/24 1000)  SpO2: 98 % (10/25/24 1034) Vital Signs (24h Range):  Temp:  [97.7 °F (36.5 °C)-99.1 °F (37.3 °C)] 98.2 °F (36.8 °C)  Pulse:  [] 117  Resp:  [16-60] 52  SpO2:  [96 %-100 %] 98 %  BP: (103-130)/(57-83) 130/76     Weight: 46 kg (101 lb 6.6 oz)  Body mass index is 17.41  kg/m².    Intake/Output - Last 3 Shifts         10/23 0700  10/24 0659 10/24 0700  10/25 0659 10/25 0700  10/26 0659    I.V. (mL/kg) 858.1 (18.7) 634.5 (13.8) 128.1 (2.8)    Blood  262.5     NG/GT 90 90 60    IV Piggyback 1736.2 431.2 368.2    Total Intake(mL/kg) 2684.3 (58.4) 1418.3 (30.8) 556.2 (12.1)    Urine (mL/kg/hr) 870 (0.8) 810 (0.7) 100 (0.4)    Drains 400 950     Stool 0 0     Total Output 1270 1760 100    Net +1414.3 -341.8 +456.2           Stool Occurrence 0 x 0 x              Physical Exam  Constitutional:       Appearance: He is ill-appearing.      Comments: Intubated    Cardiovascular:      Rate and Rhythm: Normal rate.   Pulmonary:      Comments: Intubated  Abdominal:      Comments: Gastrostomy in place.  No rebound or guarding.  Midline incision c/d/I with staples in place. Abdomen soft and non distended          Significant Labs:  I have reviewed all pertinent lab results within the past 24 hours.  CBC:   Recent Labs   Lab 10/25/24  0408   WBC 17.03*   RBC 3.39*   HGB 9.1*   HCT 26.2*      MCV 77*   MCH 26.8*   MCHC 34.7     BMP:   Recent Labs   Lab 10/25/24  0408   GLU 84      K 3.2*   *   CO2 23   BUN 11   CREATININE 0.6   CALCIUM 8.1*   MG 1.8       Significant Diagnostics:  I have reviewed all pertinent imaging results/findings within the past 24 hours.    XR ABDOMEN FLAT AND ERECT     CLINICAL HISTORY:  Eval ileus; Intestinal adhesions (bands), unspecified as to partial versus complete obstruction     TECHNIQUE:  Single frontal view of the chest was performed.     COMPARISON:  10/18/2024     FINDINGS:  Endotracheal and nasogastric tubes appear appropriate.  Midline skin staples are noted.  Suspect enteric tube within the left upper quadrant.  Nonobstructive bowel gas pattern.  Overall bowel gas pattern is improved from prior.  In comparison to the prior study, there is no adverse interval changes     Impression:     Bowel gas pattern appears improved from prior.

## 2024-10-25 NOTE — PLAN OF CARE
Pt remains intubated/sedated on vent Fent and precedex gtt titrated for RASS 0  Sinus tach on monitor, normotensive  Failed SBT x2  Dr. Perez at bedside POC discussed with mother  [place] in place with minimal urine output  Small BM x1  TF started @ 10 mL/hr  Turned q2 with pillows, heels elevated

## 2024-10-25 NOTE — PROGRESS NOTES
[facility] - Intensive Care ([facility])  General Surgery  Progress Note    Subjective:     Interval History: remains intubated. No bowel function but abdomen softer, less distended.     Medications:  Continuous Infusions:   dexmedeTOMIDine (Precedex) infusion (titrating)  0-1.4 mcg/kg/hr Intravenous Continuous 15.79 mL/hr at 10/25/24 1000 1.4 mcg/kg/hr at 10/25/24 1000    D10W   Intravenous Continuous PRN        fentanyl  0-250 mcg/hr Intravenous Continuous   Stopped at 10/25/24 0922    TPN ADULT CENTRAL LINE CUSTOM   Intravenous Continuous         Scheduled Meds:   baclofen  10 mg Per G Tube BID    ceFEPime IV (PEDS and ADULTS)  2 g Intravenous Q8H    chlorhexidine  15 mL Mouth/Throat BID    cloNIDine 0.1 mg/24 hr td ptwk  1 patch Transdermal Q7 Days    diazePAM  2 mg Per G Tube BID    enoxparin  30 mg Subcutaneous Q24H (prophylaxis, 1700)    famotidine  20 mg Per G Tube BID    levETIRAcetam (Keppra) IV (PEDS and ADULTS)  2,000 mg Intravenous Q12H    PHENObarbital  65 mg Intravenous BID     PRN Meds:  Current Facility-Administered Medications:     0.9%  NaCl infusion (for blood administration), , Intravenous, Q24H PRN    albuterol-ipratropium, 3 mL, Nebulization, Q6H PRN    D10W, , Intravenous, Continuous PRN    dextrose 10%, 12.5 g, Intravenous, PRN    dextrose 10%, 25 g, Intravenous, PRN    glucagon (human recombinant), 1 mg, Intramuscular, PRN    insulin aspart U-100, 0-10 Units, Subcutaneous, Q4H PRN     Review of patient's allergies indicates:   Allergen Reactions    Strawberries [strawberry] Hives     STRAWBERRIES ALLERGIC REACTIONS   (SEIZURES AND HIVES )     Objective:     Vital Signs (Most Recent):  Temp: 98.2 °F (36.8 °C) (10/25/24 0745)  Pulse: (!) 117 (10/25/24 1034)  Resp: (!) 52 (10/25/24 1034)  BP: 130/76 (10/25/24 1000)  SpO2: 98 % (10/25/24 1034) Vital Signs (24h Range):  Temp:  [97.7 °F (36.5 °C)-99.1 °F (37.3 °C)] 98.2 °F (36.8 °C)  Pulse:  [] 117  Resp:  [16-60] 52  SpO2:  [96 %-100 %] 98  %  BP: (103-130)/(57-83) 130/76     Weight: 46 kg (101 lb 6.6 oz)  Body mass index is 17.41 kg/m².    Intake/Output - Last 3 Shifts         10/23 0700  10/24 0659 10/24 0700  10/25 0659 10/25 0700  10/26 0659    I.V. (mL/kg) 858.1 (18.7) 634.5 (13.8) 128.1 (2.8)    Blood  262.5     NG/GT 90 90 60    IV Piggyback 1736.2 431.2 368.2    Total Intake(mL/kg) 2684.3 (58.4) 1418.3 (30.8) 556.2 (12.1)    Urine (mL/kg/hr) 870 (0.8) 810 (0.7) 100 (0.4)    Drains 400 950     Stool 0 0     Total Output 1270 1760 100    Net +1414.3 -341.8 +456.2           Stool Occurrence 0 x 0 x              Physical Exam  Constitutional:       Appearance: He is ill-appearing.      Comments: Intubated    Cardiovascular:      Rate and Rhythm: Normal rate.   Pulmonary:      Comments: Intubated  Abdominal:      Comments: Gastrostomy in place.  No rebound or guarding.  Midline incision c/d/I with staples in place. Abdomen soft and non distended          Significant Labs:  I have reviewed all pertinent lab results within the past 24 hours.  CBC:   Recent Labs   Lab 10/25/24  0408   WBC 17.03*   RBC 3.39*   HGB 9.1*   HCT 26.2*      MCV 77*   MCH 26.8*   MCHC 34.7     BMP:   Recent Labs   Lab 10/25/24  0408   GLU 84      K 3.2*   *   CO2 23   BUN 11   CREATININE 0.6   CALCIUM 8.1*   MG 1.8       Significant Diagnostics:  I have reviewed all pertinent imaging results/findings within the past 24 hours.    XR ABDOMEN FLAT AND ERECT     CLINICAL HISTORY:  Eval ileus; Intestinal adhesions (bands), unspecified as to partial versus complete obstruction     TECHNIQUE:  Single frontal view of the chest was performed.     COMPARISON:  10/18/2024     FINDINGS:  Endotracheal and nasogastric tubes appear appropriate.  Midline skin staples are noted.  Suspect enteric tube within the left upper quadrant.  Nonobstructive bowel gas pattern.  Overall bowel gas pattern is improved from prior.  In comparison to the prior study, there is no adverse  interval changes     Impression:     Bowel gas pattern appears improved from prior.     Assessment/Plan:     * Acute hypercapnic respiratory failure  Management per critical care.    Patient is currently intubated     MAYELIN (acute kidney injury)  Per critical Care    Bowel obstruction  POD#7/4 s/p ex lap, extensive lysis of adhesions, small bowel resection, takedown of gastrostomy tube with partial gastrectomy, abdomen left open in discontinuity with abthera in place.  Now status post small-bowel anastomosis, open gastrostomy placement and abdominal closure    - KUB with less distention, abdomen softer. Ok to start trickle tube feeds through g tube or NG tube   - ok for po medication  - would continue NG tube to low intermittent suction as well as gastrostomy to gravity drainage  - recommend pain control with IV meds  - remainder of care per primary team    PEG (percutaneous endoscopic gastrostomy) status  Redo Gastrostomy tube in place    Nonverbal  Monitor    Legally blind  Per critical care    Intellectual disability with epilepsy  Per critical care    Spastic quadriplegic cerebral palsy  Further management critical care medicine        JARRED IbrahimC  General Surgery  [facility] - Intensive Care ([facility])

## 2024-10-25 NOTE — PLAN OF CARE
10/25/24 1115   Rounds   Attendance Provider;Nurse ;Nurse;Charge nurse;Pharmacist   Discharge Plan A Return to nursing home   Why the patient remains in the hospital Requires continued medical care   Transition of Care Barriers None     PEG ok to use for meds, starting TPN, intubated, attempting SBT with family present.

## 2024-10-25 NOTE — PLAN OF CARE
Nutrition Recommendations/Interventions for malnutrition 10/25/24:   1. Initiate pt onto continuous Parenteral nutrition, recommend Custom TPN:  -Custom macronutrient goals: AA: 69 g Dex: 203 g (GIR 3.06)  -Fluid needs: 1623 mL/day   -Check triglycerides within 24-48 hrs, if within normal range add standard lipids as warranted   -Electrolytes per Pharmacy   -Monitor Mg, K+, Na, Phos and Glu before and during initiation, correct as indicated   *Pt is at high risk for refeeding syndrome   2. When medically appropriate, recommend initiate pt onto continuous Enteral nutrition, recommend Peptamen 1.5 w/ Prebio via PEG tube, goal rate 40 mL/hr, starting at 10 mL/hr then progress to goal rate within 24 hrs if pt is tolerating or per MD/NP     -150 mL q4h free water flushes (900 mL/day), per MD/NP    -Check Mg, K+, Na, Phos and Glu before and during initiation, correct as indicated     *Pt is at risk for refeeding syndrome    3. Recommend Edwin BID via PEG tube for wound healing     4. Weigh twice weekly  5. Collaboration by nutrition professional with other providers    Goals:   1. Pt will be initiated onto TPN within 24 hrs   2. Pt will tolerate and intake >75% EEN and EPN prior to RD follow up   3. Pt will tolerate and intake Edwin BID prior to RD follow up    YAN Kitchen, RDN, LDN

## 2024-10-25 NOTE — PROGRESS NOTES
[facility] Intensive [facility] ([facility])  Critical Care Medicine  Progress Note    Patient Name: Ronny Ramey  MRN: [id number]  Admission Date: 10/16/2024  Hospital Length of Stay: 9 days  Code Status: Full Code  Attending Provider: Kiel Perez MD  Primary Care Provider: Herman Nathan DNP   Principal Problem: Acute hypercapnic respiratory failure    Subjective:     HPI:  Ronny Ramey is a 23 yr old male [facility] resident with CP, spastic quadriplegia, s/p G tube, epilepsy who presented to [facility] ED on 10/16 with shortness of breath/respiratory distress. Patient was placed on CPAP with little improvement. KUB shows small bowel obstruction. Patient was intubated and transferred to [facility]. Critical care consulted for admission. Patient with history of admissions for bowel obstruction and aspiration pneumonia.     Hospital/ICU Course:  10/17/2024: Patient with decreasing UOP overnight. Renal function worsening. Small bolus given with little response. Additional boluses today. HTN overnight, clonidine patch started. Continue bowel rest. NGT advanced per Abd XR, continued bowel obstruction on XR as well as fecal impaction. Will attempt to disimpact. Gen Surg following. Propofol switched to precedex for vent weaning.  10/18/2024: XR small bowel FT shows high-grade small bowel obstruction. Gen surg planning for ex lap today. Will keep intubated for procedure. Cultures NGTD, antibiotics stopped.  10/19/2024: Patient s/p ex lap with extensive lysis of adhesions, small bowel resection, takedown of gastrostomy tube with partial gastrectomy, abdomen left open in discontinuity with abthera in place. Gen Surg plans to take for exploratory laparotomy Monday 10/21 for small-bowel anastomosis and gastrostomy tube placement. Keeping intubated for surgery.   10/20/2024: Remains intubated. Pending surgery tomorrow. Drop in H&H overnight, but no signs of bleeding, vital signs stable.   10/21  - To the OR this morning with small bowel anastamosis and G-tube placement.  Remains on the vent.  Hemodynamics stable.  10/22 - NAEON.  Vent and hemodynamics stable.  Extubated, but quickly failed due to anxiety and secretions with severe tachypnea and desats  10/23 - GALO following reintubation.  Vent weaned back down.  Hemodynamics stable.    10/24 - vent settings minimal.  1 unit of blood today.  Hemodynamics stable.  10/25 - NAEON.  21% FiO2 on the vent.  Failed SBT with sister at bedside.  Hemodynamics stable.    Interval History: No acute events overnight.  Fentanyl off this morning and tried SBT with sister at bedside to help calm him down.  We let him go about 35 mins on PSV, but RR consistently in the 40's -50's with tidal volume below 200 @ 7cm H2O.  Placed back on AC/VC.      Objective:     Vital Signs (Most Recent):  Temp: 98.2 °F (36.8 °C) (10/25/24 0745)  Pulse: (!) 117 (10/25/24 1034)  Resp: (!) 52 (10/25/24 1034)  BP: 130/76 (10/25/24 1000)  SpO2: 98 % (10/25/24 1034) Vital Signs (24h Range):  Temp:  [97.7 °F (36.5 °C)-99.1 °F (37.3 °C)] 98.2 °F (36.8 °C)  Pulse:  [] 117  Resp:  [16-60] 52  SpO2:  [96 %-100 %] 98 %  BP: (103-130)/(57-83) 130/76     Weight: 46 kg (101 lb 6.6 oz)  Body mass index is 17.41 kg/m².      Intake/Output Summary (Last 24 hours) at 10/25/2024 1043  Last data filed at 10/25/2024 1000  Gross per 24 hour   Intake 1385.06 ml   Output 1730 ml   Net -344.94 ml        Physical Exam  Vitals and nursing note reviewed.   Constitutional:       General: He is not in acute distress.     Appearance: He is ill-appearing (chronically).   Cardiovascular:      Rate and Rhythm: Regular rhythm. Tachycardia present.      Pulses: Normal pulses.      Heart sounds: No murmur heard.  Pulmonary:      Effort: Pulmonary effort is normal. No respiratory distress.      Breath sounds: No wheezing, rhonchi or rales.   Abdominal:      Palpations: Abdomen is soft.      Tenderness: There is abdominal  tenderness. There is no guarding.      Comments: Bowel sounds increasing   Musculoskeletal:      Right lower leg: No edema.      Left lower leg: No edema.   Neurological:      Mental Status: He is alert.      Comments: Moving spontaneously, eyes open with roving eye movements and nystagmus, not following commands           Review of Systems    Vents:  Vent Mode: (S) A/C (10/25/24 1031)  Ventilator Initiated: Yes (10/17/24 0102)  Set Rate: 18 BPM (10/25/24 1031)  Vt Set: 350 mL (10/25/24 1031)  Pressure Support: 7 cmH20 (10/25/24 0956)  PEEP/CPAP: 5 cmH20 (10/25/24 1031)  Oxygen Concentration (%): 21 (10/25/24 1034)  Peak Airway Pressure: 27 cmH20 (10/25/24 1031)  Plateau Pressure: 18 cmH20 (10/25/24 1031)  Total Ve: 7.71 L/m (10/25/24 1031)  Negative Inspiratory Force (cm H2O): 0 (10/25/24 0939)  F/VT Ratio<105 (RSBI): (!) 93.57 (10/25/24 0956)    Lines/Drains/Airways       Central Venous Catheter Line  Duration             Percutaneous Central Line - Triple Lumen  10/21/24 0814 Internal Jugular Left 4 days              Drain  Duration                  NG/OG Tube 10/16/24 0515 16 Fr. Right nostril 9 days         Urethral Catheter 10/16/24 0156 Double-lumen 16 Fr. 9 days         Gastrostomy/Enterostomy 10/21/24 0844 Gastrostomy tube w/ balloon LUQ feeding 4 days              Airway  Duration                  Airway - Non-Surgical 10/22/24 1305 Endotracheal Tube 2 days                    Significant Labs:    CBC/Anemia Profile:  Recent Labs   Lab 10/24/24  0425 10/25/24  0408   WBC 14.59* 17.03*   HGB 6.9* 9.1*   HCT 20.5* 26.2*    425   MCV 78* 77*   RDW 13.3 13.7        Chemistries:  Recent Labs   Lab 10/24/24  0425 10/25/24  0408    145   K 2.9* 3.2*   * 111*   CO2 24 23   BUN 13 11   CREATININE 0.6 0.6   CALCIUM 7.5* 8.1*   ALBUMIN 1.5* 1.6*   PROT 4.7* 5.9*   BILITOT 2.0* 2.2*   ALKPHOS 92 121   ALT 12 13   AST 21 19   MG 1.9 1.8   PHOS 2.3* 2.4*       All pertinent labs within the past 24  hours have been reviewed.    Significant Imaging:  I have reviewed all pertinent imaging results/findings within the past 24 hours.    ABG  Recent Labs   Lab 10/24/24  0406   PH 7.451*   PO2 74*   PCO2 32.5*   HCO3 22.7*   BE -1     Assessment/Plan:     Neuro  Intellectual disability with epilepsy  Home AEDs restarted: Keppra and phenobarb    Spastic quadriplegic cerebral palsy  Turn q 2hrs  Aspiration precautions  Baclofen restarted at half home dose 10/23    Ophtho  Legally blind  Supportive Care    Pulmonary  * Acute hypercapnic respiratory failure  Patient with Hypercapnic Respiratory failure which is Acute on chronic. Unclear if on O2 at the NH. Supplemental oxygen was provided and noted-     Vent Mode: A/C  Oxygen Concentration (%):  [21] 21  Resp Rate Total:  [16 br/min-115 br/min] 52 br/min  Vt Set:  [350 mL] 350 mL  PEEP/CPAP:  [5 cmH20] 5 cmH20  Pressure Support:  [7 cmH20-10 cmH20] 7 cmH20  Mean Airway Pressure:  [6.9 ddJ68-36 cmH20] 10 cmH20    .   Signs/symptoms of respiratory failure include- tachypnea, increased work of breathing, respiratory distress, and use of accessory muscles. Contributing diagnoses includes - Aspiration Labs and images were reviewed. Patient Has recent ABG, which has been reviewed. Will treat underlying causes and adjust management of respiratory failure as follows-     Wean vent settings as tolerated  Broad spectrum antibiotics- stopped, cultures NGTD  Duonebs prn  Sedation: Precedex, Fentanyl  Will keep intubated for surgeries planned  Bowel rest-NPO    10/21 - SAT/SBT starting this afternoon.  Nonverbal and blind baseline status will likely complicate extubation course.  10/22- failed SBT yesterday due to apnea.   Sedation off this morning and plan SBT again today.  10/23 - extubated yesterday, but quickly failed due to anxiety and difficulty with secretions.  Dropped his sat and got extremely tachypneic.  Reintubated and initially had high requirements but now weaned back to  40% and 5 of PEEP.  Some concern for aspiration of secretions.  Will coordinate with mom for her to be present next time we attempt.  10/24 - vent weaned to 21%.  Starting moving back to SAT/SBT today.  10/25 - Fentanyl off this morning and tried SBT with sister at bedside to help calm him down. We let him go about 35 mins on PSV, but RR consistently in the 40's -50's with tidal volume below 200 @ 7cm H2O. Placed back on AC/VC.  Will try again this afternoon when his mom arrives.    Renal/  MAYELIN (acute kidney injury)  MAYELIN is likely due to pre-renal azotemia due to intravascular volume depletion. Baseline creatinine is  0.8 . Most recent creatinine and eGFR are listed below.  Recent Labs     10/23/24  0403 10/24/24  0425 10/25/24  0408   CREATININE 0.7 0.6 0.6   EGFRNORACEVR >60 >60 >60        Plan  - Avoid nephrotoxins and renally dose meds for GFR listed above  - Monitor urine output, serial BMP, and adjust therapy as needed  - Maintain [place] catheter - IVF boluses prn  - Resolved after fluid resuscitation      Endocrine  Body mass index (BMI) less than 19  Nutrition consult for Tube feeding recs when able to resume tube feeds  NPO currently due to bowel surgery  Planning to start TPN    GI  Bowel obstruction  KUB shows small bowel obstruction  NGT to suction  Keep NPO  CT abd/pelvis shows high grade bowel obstruction  Gen Surg consulted  Fecal impaction on Abd XR- disimpacted  SBFT XR shows high-grade small bowel obstruction    Under went Ex lap 10/18 with extensive lysis of adhesions, small bowel resection, takedown of gastrostomy tube with partial gastrectomy, abdomen left open in discontinuity with abthera in place  Ex lap on 10/21 with small-bowel anastomosis and G tube placement    10/24 - Bowel sounds improving, but no BM yet  10/25 - Bowel sounds improving, but no BM yet.  Planning to start TPN.    PEG (percutaneous endoscopic gastrostomy) status  Patient noted to have a percutaneous endoscopic gastrostomy  tube in place. I have personally inspected the tube.Tube was placed on this admission, with date of procedure- 10/16  There are no signs of drainage or infection around the site. Routine care to be done by wound care and nursing staff.     G tube removed during ex-lap on 10/18  Tube feedings on hold due to Bowel obstruction  Ex lap 10/21 with G tube replacement  Starting to use for meds 10/23          Critical Care Time: 40 minutes  Critical secondary to respiratory failure, infusion of high risk medications      Critical care was time spent personally by me on the following activities: development of treatment plan with patient or surrogate and bedside caregivers, discussions with consultants, evaluation of patient's response to treatment, examination of patient, ordering and performing treatments and interventions, ordering and review of laboratory studies, ordering and review of radiographic studies, pulse oximetry, re-evaluation of patient's condition. This critical care time did not overlap with that of any other provider or involve time for any procedures.     Kiel Perez MD  Critical Care Medicine  [facility] - Intensive Care hospitals)

## 2024-10-25 NOTE — NURSING
Pt. Remains intubated at this time with plans for repeat SBT today.Pt. increasingly more tachycardic with attempted decrease of sedation. BP and other VSS. NG tube to Low Int. Suction, output 500 ml output. H/H this morning improved, please see results. Midline incision dressing intact. Electrolytes replaced this AM (K,Phos, Mg), see MAR. Linen changed, oral care provided, No formed bowel movement but clear mucus from rectum on incontinence pad.

## 2024-10-25 NOTE — CONSULTS
[facility] - Intensive Care ([facility])  Adult Nutrition  Consult Note    SUMMARY     Recommendations    Recommendation/Intervention:   1. Initiate pt onto continuous Parenteral nutrition, recommend Custom TPN:  -Custom macronutrient goals: AA: 69 g Dex: 203 g (GIR 3.06) = total 966.2 kcals/day (60% EEN, 100% EPN)   -Fluid needs: 1623 mL/day   -Check triglycerides within 24-48 hrs, if within normal range add standard lipids as warranted = total 1466.2 kcals/day (90% EEN)   -Electrolytes per Pharmacy   -Monitor Mg, K+, Na, Phos and Glu before and during initiation, correct as indicated   *Pt is at high risk for refeeding syndrome   2. When medically appropriate, recommend initiate pt onto continuous Enteral nutrition, recommend Peptamen 1.5 w/ Prebio via PEG tube, goal rate 40 mL/hr, starting at 10 mL/hr then progress to goal rate within 24 hrs if pt is tolerating or per MD/NP     -Formula at goal rate provides: 1440 kcals/day (89% EEN), 65 g protein/day (100% EPN), 184 g CHO/day (91% CHO needs), 741 mL free formula water/day (46% fluid needs)     -150 mL q4h free water flushes (900 mL/day) = total from formula + FWF = 1641 mL water/day (101% fluid needs), per MD/NP    -Check Mg, K+, Na, Phos and Glu before and during initiation, correct as indicated     *Pt is at risk for refeeding syndrome    3. Recommend Edwin BID via PEG tube for wound healing     4. Weigh twice weekly    Goals:   1. Pt will be initiated onto TPN within 24 hrs   2. Pt will tolerate and intake >75% EEN and EPN prior to RD follow up   3. Pt will tolerate and intake Edwin BID prior to RD follow up  Nutrition Goal Status: new  Communication of RD Recs: other (comment) (POC, sticky note, secure chat)    Assessment and Plan    Endocrine  Moderate protein-calorie malnutrition  Malnutrition Type:  Context: acute on chronic illness  Level: moderate    Related to (etiology):   Physiological causes increasing nutrient needs d/t illness  Alteration in GI tract  structure/function     Signs and Symptoms (as evidenced by):   BMI < 18.9  Underweight with loss of muscle  Unable to eat sufficient energy/protein to maintain a healthy weight  Wounds    Malnutrition Characteristic Summary:  Energy Intake (Malnutrition): less than 75% for greater than 7 days  Muscle Mass (Malnutrition): mild depletion    Interventions/Recommendations (treatment strategy):  1. Parenteral nutrition management  2. Enteral nutrition management  3. Amino acids medical food supplement therapy  4. Collaboration by nutrition professional with other providers    Nutrition Diagnosis Status:   New         Malnutrition Assessment (10/25/24):  Malnutrition Context: acute illness or injury, chronic illness  Malnutrition Level: moderate  Skin (Micronutrient): pallor, wounds unhealed (Neri score = 9 (very high risk)       Energy Intake (Malnutrition): less than 75% for greater than 7 days  Muscle Mass (Malnutrition): mild depletion       Clavicle and Acromion Bone Region (Muscle Loss): well nourished (Acromion process may slightly protrude)                 Reason for Assessment    Reason For Assessment: consult, new TPN (TPN - custom)  Diagnosis:  (Acute hypercapnic respiratory failure)  General Information Comments:   10/25/24: 23 y.o. Male admitted for Acute hypercapnic respiratory failure. PMH: Spastic quadriplegic cerbal palsy, Intellectual disability w/ epilepsy, Legally blind, Non verbal, PEG, BMI < 19, Bowel obstruction, MAYELIN, Oropharyngeal dysphagia. Pt currently intubated and sedated, NPO x 9 days, in the ICU. RD  consulted for custom TPN recommendations. RD currently following pt. EMR noted MAYELIN resolved, POD 7  s/p ex lap, extensive lysis of adhesions, small bowel resection, takedown of gastrostomy tube with partial gastrectomy, abdomen left open in discontinuity with abthera in place, POD 4 s/p small-bowel anastomosis, open gastrostomy placement and abdominal closure; 10/24/24 no bowel function yet,  "small amount of bowel sounds; 10/25/24 no formed BM but clear mucus from rectum on incontinence pad, plans for repeat SBT today, pt sedated on Fentanyl and Precedex, increasingly more tachycardic d/t attempt to decrease sedation, NG tube to LWIS w/ 500 ml output, replacing electrolytes K,Phos, Mg. Discussed pt during rounds, confirmed no BM and plan for SBT w/ attempt to extubate once family is present, pt has a PICC line, pt to start TPN today. RD informed Pharmacy of TPN recs via secure chat. Reviewed chart: LBM 10/17 (x 8 days no BM); Skin: pale, incision abd WDL; Altered skin: PI R ischial tuberosity full thickness tissue loss, ulceration L ankle black, details per Wound care note 10/23/24; Neri score: 9 (very high risk); Edema: none. Labs, meds, weight reviewed. Note labs 10/25/24: K+ (L), Phos (L). Last weight charted 10/22/24 101 lbs (BMI 17.41, protein-energy malnutrition grade I), updated weight for accuracy warranted. Mild mlanutrition noted at previous enconter, will perform full NFPE when appropriate. RD will continue to follow and monitor pt's nutritional status during admit.    Nutrition Discharge Planning: Enteral nutrition via PEG tube + Edwin BID    Nutrition Risk Screen    Nutrition Risk Screen: tube feeding or parenteral nutrition, large or nonhealing wound, burn or pressure injury    Nutrition Related Social Determinants of Health: SDOH: Unable to assess at this time.     Nutrition/Diet History    Spiritual, Cultural Beliefs, Denominational Practices, Values that Affect Care: no  Food Allergies: other (see comments) (Strawberries)  Factors Affecting Nutritional Intake: impaired cognitive status/motor control, NPO, on mechanical ventilation, altered gastrointestinal function, difficulty/impaired swallowing    Anthropometrics    Temp: 98.2 °F (36.8 °C)  Height: 5' 4" (162.6 cm)  Height (inches): 64 in  Weight Method: Bed Scale  Weight: 46 kg (101 lb 6.6 oz)  Weight (lb): 101.41 lb  Ideal Body Weight " (IBW), Male: 130 lb  % Ideal Body Weight, Male (lb): 78.01 %  % Ideal Body Weight Malnutrition: 70-79%: moderate deficit  BMI (Calculated): 17.4  BMI Grade: 17 - 18.4 protein-energy malnutrition grade I  Additional Documentation: Tetraplegia (Quadriplegia) Ideal Body Weight (IBW) Adjustment  Tetraplegia (Quadriplegia) Ideal Body Weight (IBW) Adjustment: 117 lb 3.9 oz (IBW - 10%)    Wt Readings from Last 15 Encounters:   10/25/24 46 kg (101 lb 6.6 oz)   06/02/23 49.9 kg (110 lb)   03/15/23 50 kg (110 lb 3.7 oz)   05/13/22 52.3 kg (115 lb 3.1 oz) (2%, Z= -2.11)*   09/20/21 50.5 kg (111 lb 5.3 oz) (1%, Z= -2.29)*   06/16/21 47.3 kg (104 lb 4.4 oz) (<1%, Z= -2.81)*   11/25/20 47.9 kg (105 lb 9.6 oz) (<1%, Z= -2.54)*   11/02/20 46 kg (101 lb 6.6 oz) (<1%, Z= -2.88)*   10/27/20 45.2 kg (99 lb 10.4 oz) (<1%, Z= -3.04)*   10/18/20 44.8 kg (98 lb 14 oz) (<1%, Z= -3.11)*   10/13/20 43.2 kg (95 lb 3.8 oz) (<1%, Z= -3.46)*   09/29/20 44.5 kg (98 lb) (<1%, Z= -3.17)*   09/29/20 43.3 kg (95 lb 7.4 oz) (<1%, Z= -3.42)*   07/09/20 51.3 kg (113 lb) (3%, Z= -1.83)*   03/11/20 52.7 kg (116 lb 2.9 oz) (7%, Z= -1.50)*     * Growth percentiles are based on CDC (Boys, 2-20 Years) data.       Lab/Procedures/Meds    Pertinent Labs Reviewed: reviewed  Pertinent Labs Comments: K+ (L), Calcium (L), Total protein (L), Albumin (L), Phos (L), Cl (H), Total bilrubin (H)  Pertinent Medications Reviewed: reviewed  Pertinent Medications Comments: phenobarbital, levetiracetam, famotidine, enoxaparin, diazepam, clonidine patch, Abx, baclofen    BMP  Lab Results   Component Value Date     10/25/2024    K 3.2 (L) 10/25/2024     (H) 10/25/2024    CO2 23 10/25/2024    BUN 11 10/25/2024    CREATININE 0.6 10/25/2024    CALCIUM 8.1 (L) 10/25/2024    ANIONGAP 11 10/25/2024    EGFRNORACEVR >60 10/25/2024     Lab Results   Component Value Date    CALCIUM 8.1 (L) 10/25/2024    PHOS 2.4 (L) 10/25/2024     Lab Results   Component Value Date    ALBUMIN  "1.6 (L) 10/25/2024     Lab Results   Component Value Date    ALT 13 10/25/2024    AST 19 10/25/2024     (H) 10/27/2020    ALKPHOS 121 10/25/2024    BILITOT 2.2 (H) 10/25/2024     No results found for: "TRIG"    Recent Labs   Lab 10/24/24  2325   POCTGLUCOSE 88     No results found for: "LABA1C", "HGBA1C"    Lab Results   Component Value Date    WBC 17.03 (H) 10/25/2024    HGB 9.1 (L) 10/25/2024    HCT 26.2 (L) 10/25/2024    MCV 77 (L) 10/25/2024     10/25/2024       Scheduled Meds:   baclofen  10 mg Per G Tube BID    ceFEPime IV (PEDS and ADULTS)  2 g Intravenous Q8H    chlorhexidine  15 mL Mouth/Throat BID    cloNIDine 0.1 mg/24 hr td ptwk  1 patch Transdermal Q7 Days    diazePAM  2 mg Per G Tube BID    enoxparin  30 mg Subcutaneous Q24H (prophylaxis, 1700)    famotidine  20 mg Per G Tube BID    levETIRAcetam (Keppra) IV (PEDS and ADULTS)  2,000 mg Intravenous Q12H    PHENObarbital  65 mg Intravenous BID     Continuous Infusions:   dexmedeTOMIDine (Precedex) infusion (titrating)  0-1.4 mcg/kg/hr Intravenous Continuous 15.79 mL/hr at 10/25/24 1000 1.4 mcg/kg/hr at 10/25/24 1000    D10W   Intravenous Continuous PRN        fentanyl  0-250 mcg/hr Intravenous Continuous   Stopped at 10/25/24 0922    TPN ADULT CENTRAL LINE CUSTOM   Intravenous Continuous         PRN Meds:.  Current Facility-Administered Medications:     0.9%  NaCl infusion (for blood administration), , Intravenous, Q24H PRN    albuterol-ipratropium, 3 mL, Nebulization, Q6H PRN    D10W, , Intravenous, Continuous PRN    dextrose 10%, 12.5 g, Intravenous, PRN    dextrose 10%, 25 g, Intravenous, PRN    glucagon (human recombinant), 1 mg, Intramuscular, PRN    insulin aspart U-100, 0-10 Units, Subcutaneous, Q4H PRN      Physical Findings/Assessment         Estimated/Assessed Needs    Weight Used For Calorie Calculations: 46 kg (101 lb 6.6 oz)  Energy Calorie Requirements (kcal): 1623 kcals ([facility] (Critical care-vent, Total VE: 9.31 vs GI " disorder vs Underweight)  Energy Need Method: [facility]  Protein Requirements: 55-92 g (1.2-2.0 g/kg ABW (Critical care-vent, BMI < 30 vs Pressure injury vs Underweight vs GI disorder, adult)  Weight Used For Protein Calculations: 46 kg (101 lb 6.6 oz)  Fluid Requirements (mL): 1623 mL (1 mL/kcal)  Estimated Fluid Requirement Method: RDA Method  RDA Method (mL): 1623  CHO Requirement: 203 g (1623 kcals/8)      Nutrition Prescription Ordered    Current Diet Order: NPO    Evaluation of Received Nutrient/Fluid Intake  I/O: (Net since admit):   10/25/24: +4084.9 mL    % Kcal Needs: 0%  % Protein Needs: 0%    Energy Calories Required: not meeting needs  Protein Required: not meeting needs  Fluid Required: meeting needs  Total Fluid Intake (mL): 1363.5  Tolerance: other (see comments) (Currently no intake)  % Intake of Estimated Energy Needs: 0 - 25 %  % Meal Intake: NPO    Nutrition Risk    Level of Risk/Frequency of Follow-up: high (F/u x 2 weekly)       Monitor and Evaluation    Food and Nutrient Intake: energy intake, enteral nutrition intake, parenteral nutrition intake  Food and Nutrient Adminstration: enteral and parenteral nutrition administration  Knowledge/Beliefs/Attitudes: food and nutrition knowledge/skill, beliefs and attitudes  Physical Activity and Function: nutrition-related ADLs and IADLs, factors affecting access to physical activity  Anthropometric Measurements: weight, weight change, body mass index  Biochemical Data, Medical Tests and Procedures: electrolyte and renal panel, gastrointestinal profile, glucose/endocrine profile, lipid profile, inflammatory profile  Nutrition-Focused Physical Findings: overall appearance       Nutrition Follow-Up    RD Follow-up?: Yes  Deanna Bowman, BS, RDN, LDN

## 2024-10-26 LAB
ALBUMIN SERPL BCP-MCNC: 1.6 G/DL (ref 3.5–5.2)
ALP SERPL-CCNC: 136 U/L (ref 40–150)
ALT SERPL W/O P-5'-P-CCNC: 14 U/L (ref 10–44)
ANION GAP SERPL CALC-SCNC: 7 MMOL/L (ref 8–16)
AST SERPL-CCNC: 17 U/L (ref 10–40)
BASOPHILS # BLD AUTO: 0.05 K/UL (ref 0–0.2)
BASOPHILS NFR BLD: 0.3 % (ref 0–1.9)
BILIRUB SERPL-MCNC: 1.3 MG/DL (ref 0.1–1)
BUN SERPL-MCNC: 11 MG/DL (ref 6–20)
CALCIUM SERPL-MCNC: 8.2 MG/DL (ref 8.7–10.5)
CHLORIDE SERPL-SCNC: 108 MMOL/L (ref 95–110)
CO2 SERPL-SCNC: 28 MMOL/L (ref 23–29)
CREAT SERPL-MCNC: 0.6 MG/DL (ref 0.5–1.4)
DIFFERENTIAL METHOD BLD: ABNORMAL
EOSINOPHIL # BLD AUTO: 0.6 K/UL (ref 0–0.5)
EOSINOPHIL NFR BLD: 3.3 % (ref 0–8)
ERYTHROCYTE [DISTWIDTH] IN BLOOD BY AUTOMATED COUNT: 14.1 % (ref 11.5–14.5)
EST. GFR  (NO RACE VARIABLE): >60 ML/MIN/1.73 M^2
GLUCOSE SERPL-MCNC: 138 MG/DL (ref 70–110)
HCT VFR BLD AUTO: 27 % (ref 40–54)
HGB BLD-MCNC: 9 G/DL (ref 14–18)
IMM GRANULOCYTES # BLD AUTO: 0.26 K/UL (ref 0–0.04)
IMM GRANULOCYTES NFR BLD AUTO: 1.6 % (ref 0–0.5)
LYMPHOCYTES # BLD AUTO: 2.6 K/UL (ref 1–4.8)
LYMPHOCYTES NFR BLD: 15.6 % (ref 18–48)
MAGNESIUM SERPL-MCNC: 2.1 MG/DL (ref 1.6–2.6)
MCH RBC QN AUTO: 26.3 PG (ref 27–31)
MCHC RBC AUTO-ENTMCNC: 33.3 G/DL (ref 32–36)
MCV RBC AUTO: 79 FL (ref 82–98)
MONOCYTES # BLD AUTO: 1.5 K/UL (ref 0.3–1)
MONOCYTES NFR BLD: 8.8 % (ref 4–15)
NEUTROPHILS # BLD AUTO: 11.7 K/UL (ref 1.8–7.7)
NEUTROPHILS NFR BLD: 70.4 % (ref 38–73)
NRBC BLD-RTO: 0 /100 WBC
PHOSPHATE SERPL-MCNC: 3 MG/DL (ref 2.7–4.5)
PLATELET # BLD AUTO: 457 K/UL (ref 150–450)
PMV BLD AUTO: 10.7 FL (ref 9.2–12.9)
POCT GLUCOSE: 128 MG/DL (ref 70–110)
POCT GLUCOSE: 150 MG/DL (ref 70–110)
POCT GLUCOSE: 151 MG/DL (ref 70–110)
POTASSIUM SERPL-SCNC: 3.6 MMOL/L (ref 3.5–5.1)
PROT SERPL-MCNC: 6.2 G/DL (ref 6–8.4)
RBC # BLD AUTO: 3.42 M/UL (ref 4.6–6.2)
SODIUM SERPL-SCNC: 143 MMOL/L (ref 136–145)
TRIGL SERPL-MCNC: 194 MG/DL (ref 30–150)
WBC # BLD AUTO: 16.55 K/UL (ref 3.9–12.7)

## 2024-10-26 PROCEDURE — 63600175 PHARM REV CODE 636 W HCPCS: Performed by: INTERNAL MEDICINE

## 2024-10-26 PROCEDURE — 85025 COMPLETE CBC W/AUTO DIFF WBC: CPT | Performed by: NURSE PRACTITIONER

## 2024-10-26 PROCEDURE — 25000003 PHARM REV CODE 250: Performed by: INTERNAL MEDICINE

## 2024-10-26 PROCEDURE — 20000000 HC ICU ROOM

## 2024-10-26 PROCEDURE — A4217 STERILE WATER/SALINE, 500 ML: HCPCS | Performed by: INTERNAL MEDICINE

## 2024-10-26 PROCEDURE — 83735 ASSAY OF MAGNESIUM: CPT | Performed by: NURSE PRACTITIONER

## 2024-10-26 PROCEDURE — 80053 COMPREHEN METABOLIC PANEL: CPT | Performed by: NURSE PRACTITIONER

## 2024-10-26 PROCEDURE — 27200966 HC CLOSED SUCTION SYSTEM

## 2024-10-26 PROCEDURE — 25000003 PHARM REV CODE 250: Performed by: NURSE PRACTITIONER

## 2024-10-26 PROCEDURE — 84100 ASSAY OF PHOSPHORUS: CPT | Performed by: NURSE PRACTITIONER

## 2024-10-26 PROCEDURE — 99900026 HC AIRWAY MAINTENANCE (STAT)

## 2024-10-26 PROCEDURE — 94003 VENT MGMT INPAT SUBQ DAY: CPT

## 2024-10-26 PROCEDURE — 27100171 HC OXYGEN HIGH FLOW UP TO 24 HOURS

## 2024-10-26 PROCEDURE — 84478 ASSAY OF TRIGLYCERIDES: CPT | Performed by: INTERNAL MEDICINE

## 2024-10-26 PROCEDURE — 99900035 HC TECH TIME PER 15 MIN (STAT)

## 2024-10-26 PROCEDURE — 94761 N-INVAS EAR/PLS OXIMETRY MLT: CPT

## 2024-10-26 PROCEDURE — 25000003 PHARM REV CODE 250

## 2024-10-26 PROCEDURE — 99024 POSTOP FOLLOW-UP VISIT: CPT | Mod: ,,, | Performed by: COLON & RECTAL SURGERY

## 2024-10-26 PROCEDURE — C9399 UNCLASSIFIED DRUGS OR BIOLOG: HCPCS | Performed by: INTERNAL MEDICINE

## 2024-10-26 PROCEDURE — 63600175 PHARM REV CODE 636 W HCPCS: Performed by: NURSE PRACTITIONER

## 2024-10-26 RX ORDER — ACETAMINOPHEN 325 MG/1
650 TABLET ORAL EVERY 6 HOURS PRN
Status: DISCONTINUED | OUTPATIENT
Start: 2024-10-26 | End: 2024-10-28

## 2024-10-26 RX ADMIN — BACLOFEN 10 MG: 10 TABLET ORAL at 08:10

## 2024-10-26 RX ADMIN — FAMOTIDINE 20 MG: 40 POWDER, FOR SUSPENSION ORAL at 08:10

## 2024-10-26 RX ADMIN — PHENOBARBITAL SODIUM 65 MG: 65 INJECTION INTRAMUSCULAR at 08:10

## 2024-10-26 RX ADMIN — LEVETIRACETAM 2000 MG: 500 INJECTION, SOLUTION INTRAVENOUS at 08:10

## 2024-10-26 RX ADMIN — CEFEPIME 2 G: 2 INJECTION, POWDER, FOR SOLUTION INTRAVENOUS at 07:10

## 2024-10-26 RX ADMIN — CHLORHEXIDINE GLUCONATE 0.12% ORAL RINSE 15 ML: 1.2 LIQUID ORAL at 08:10

## 2024-10-26 RX ADMIN — DIAZEPAM 2 MG: 2 TABLET ORAL at 08:10

## 2024-10-26 RX ADMIN — MAGNESIUM SULFATE HEPTAHYDRATE: 500 INJECTION, SOLUTION INTRAMUSCULAR; INTRAVENOUS at 10:10

## 2024-10-26 RX ADMIN — CEFEPIME 2 G: 2 INJECTION, POWDER, FOR SOLUTION INTRAVENOUS at 10:10

## 2024-10-26 RX ADMIN — INSULIN ASPART 2 UNITS: 100 INJECTION, SOLUTION INTRAVENOUS; SUBCUTANEOUS at 05:10

## 2024-10-26 RX ADMIN — POTASSIUM BICARBONATE 40 MEQ: 391 TABLET, EFFERVESCENT ORAL at 07:10

## 2024-10-26 RX ADMIN — ACETAMINOPHEN 650 MG: 325 TABLET ORAL at 04:10

## 2024-10-26 RX ADMIN — CEFEPIME 2 G: 2 INJECTION, POWDER, FOR SOLUTION INTRAVENOUS at 03:10

## 2024-10-26 RX ADMIN — DEXMEDETOMIDINE HYDROCHLORIDE 1.4 MCG/KG/HR: 4 INJECTION INTRAVENOUS at 04:10

## 2024-10-26 RX ADMIN — ENOXAPARIN SODIUM 30 MG: 30 INJECTION SUBCUTANEOUS at 04:10

## 2024-10-26 RX ADMIN — DEXMEDETOMIDINE HYDROCHLORIDE 1.4 MCG/KG/HR: 4 INJECTION INTRAVENOUS at 10:10

## 2024-10-26 RX ADMIN — DEXMEDETOMIDINE HYDROCHLORIDE 1.4 MCG/KG/HR: 4 INJECTION INTRAVENOUS at 05:10

## 2024-10-26 NOTE — ASSESSMENT & PLAN NOTE
KUB shows small bowel obstruction  NGT to suction  Keep NPO  CT abd/pelvis shows high grade bowel obstruction  Gen Surg consulted  Fecal impaction on Abd XR- disimpacted  SBFT XR shows high-grade small bowel obstruction    Under went Ex lap 10/18 with extensive lysis of adhesions, small bowel resection, takedown of gastrostomy tube with partial gastrectomy, abdomen left open in discontinuity with abthera in place  Ex lap on 10/21 with small-bowel anastomosis and G tube placement    10/24 - Bowel sounds improving, but no BM yet  10/25 - Bowel sounds improving, but no BM yet.  Started TPN  10/26 - tolerating trickle feeds and had good BM this afternoon.

## 2024-10-26 NOTE — PROGRESS NOTES
[facility] - Int[facility] Care ([facility])  Critical Care Medicine  Progress Note    Patient Name: Ronny Ramey  MRN: [id number]  Admission Date: 10/16/2024  Hospital Length of Stay: 10 days  Code Status: Full Code  Attending Provider: Kiel Perez MD  Primary Care Provider: Herman Nathan DNP   Principal Problem: Acute hypercapnic respiratory failure    Subjective:     HPI:  Ronny Ramey is a 23 yr old male [facility] resident with CP, spastic quadriplegia, s/p G tube, epilepsy who presented to [facility] ED on 10/16 with shortness of breath/respiratory distress. Patient was placed on CPAP with little improvement. KUB shows small bowel obstruction. Patient was intubated and transferred to [facility]. Critical care consulted for admission. Patient with history of admissions for bowel obstruction and aspiration pneumonia.     Hospital/ICU Course:  10/17/2024: Patient with decreasing UOP overnight. Renal function worsening. Small bolus given with little response. Additional boluses today. HTN overnight, clonidine patch started. Continue bowel rest. NGT advanced per Abd XR, continued bowel obstruction on XR as well as fecal impaction. Will attempt to disimpact. Gen Surg following. Propofol switched to precedex for vent weaning.  10/18/2024: XR small bowel FT shows high-grade small bowel obstruction. Gen surg planning for ex lap today. Will keep intubated for procedure. Cultures NGTD, antibiotics stopped.  10/19/2024: Patient s/p ex lap with extensive lysis of adhesions, small bowel resection, takedown of gastrostomy tube with partial gastrectomy, abdomen left open in discontinuity with abthera in place. Gen Surg plans to take for exploratory laparotomy Monday 10/21 for small-bowel anastomosis and gastrostomy tube placement. Keeping intubated for surgery.   10/20/2024: Remains intubated. Pending surgery tomorrow. Drop in H&H overnight, but no signs of bleeding, vital signs stable.    10/21 - To the OR this morning with small bowel anastamosis and G-tube placement.  Remains on the vent.  Hemodynamics stable.  10/22 - NAEON.  Vent and hemodynamics stable.  Extubated, but quickly failed due to anxiety and secretions with severe tachypnea and desats  10/23 - GALO following reintubation.  Vent weaned back down.  Hemodynamics stable.    10/24 - vent settings minimal.  1 unit of blood today.  Hemodynamics stable.  10/25 - NAEON.  21% FiO2 on the vent.  Failed SBT with sister at bedside.  Hemodynamics stable.  10/26 - failed SBT with daughter and then mother at bedside yesterday due to rapid/shallow breathing.  Vent and hemodynamics stable.        Interval History: NAEON.  Failed SBT x 2 yesterday (once with sister and once with mom) due to rapid/shallow breathing.  Continued discussions with mom re: the potential of tracheostomy next week.  Vent and hemodynamics are stable.  Small BM yesterday.  Tolerating trickle feeds.  Larger BM this afternoon.      Objective:     Vital Signs (Most Recent):  Temp: 98.8 °F (37.1 °C) (10/26/24 0745)  Pulse: (!) 113 (10/26/24 0745)  Resp: 17 (10/26/24 0745)  BP: (!) 142/86 (10/26/24 0700)  SpO2: 100 % (10/26/24 0745) Vital Signs (24h Range):  Temp:  [96.8 °F (36 °C)-100.6 °F (38.1 °C)] 98.8 °F (37.1 °C)  Pulse:  [] 113  Resp:  [11-60] 17  SpO2:  [95 %-100 %] 100 %  BP: (108-157)/(66-96) 142/86     Weight: 46 kg (101 lb 6.6 oz)  Body mass index is 17.41 kg/m².      Intake/Output Summary (Last 24 hours) at 10/26/2024 0903  Last data filed at 10/26/2024 0748  Gross per 24 hour   Intake 1339.88 ml   Output 555 ml   Net 784.88 ml        Physical Exam  Vitals and nursing note reviewed.   Constitutional:       General: He is not in acute distress.     Appearance: He is ill-appearing (chronically).      Comments: Alert on the vent, but not following commands   Cardiovascular:      Rate and Rhythm: Normal rate and regular rhythm.      Pulses: Normal pulses.      Heart  sounds: No murmur heard.  Pulmonary:      Effort: Pulmonary effort is normal. No respiratory distress.      Breath sounds: No wheezing, rhonchi or rales.   Abdominal:      General: Abdomen is flat. There is no distension.      Palpations: Abdomen is soft.      Tenderness: There is no abdominal tenderness.      Comments: Surgical incisions clean and dry, hypoactive sounds   Neurological:      Comments: Awake and moving spontaneously, but not following commands           Review of Systems    Vents:  Vent Mode: A/C (10/26/24 0717)  Ventilator Initiated: Yes (10/17/24 0102)  Set Rate: 12 BPM (10/26/24 0717)  Vt Set: 350 mL (10/26/24 0717)  Pressure Support: 7 cmH20 (10/25/24 1517)  PEEP/CPAP: 5 cmH20 (10/26/24 0717)  Oxygen Concentration (%): 21 (10/26/24 0745)  Peak Airway Pressure: 26 cmH20 (10/26/24 0717)  Plateau Pressure: 18 cmH20 (10/26/24 0717)  Total Ve: 6.01 L/m (10/26/24 0717)  Negative Inspiratory Force (cm H2O): 0 (10/26/24 0717)  F/VT Ratio<105 (RSBI): (!) 40.71 (10/26/24 0717)    Lines/Drains/Airways       Central Venous Catheter Line  Duration             Percutaneous Central Line - Triple Lumen  10/21/24 0814 Internal Jugular Left 5 days              Drain  Duration                  NG/OG Tube 10/16/24 0515 16 Fr. Right nostril 10 days         Urethral Catheter 10/16/24 0156 Double-lumen 16 Fr. 10 days         Gastrostomy/Enterostomy 10/21/24 0844 Gastrostomy tube w/ balloon LUQ feeding 5 days              Airway  Duration                  Airway - Non-Surgical 10/22/24 1305 Endotracheal Tube 3 days                    Significant Labs:    CBC/Anemia Profile:  Recent Labs   Lab 10/25/24  0408 10/26/24  0431   WBC 17.03* 16.55*   HGB 9.1* 9.0*   HCT 26.2* 27.0*    457*   MCV 77* 79*   RDW 13.7 14.1        Chemistries:  Recent Labs   Lab 10/25/24  0408 10/26/24  0431    143   K 3.2* 3.6   * 108   CO2 23 28   BUN 11 11   CREATININE 0.6 0.6   CALCIUM 8.1* 8.2*   ALBUMIN 1.6* 1.6*   PROT  5.9* 6.2   BILITOT 2.2* 1.3*   ALKPHOS 121 136   ALT 13 14   AST 19 17   MG 1.8 2.1   PHOS 2.4* 3.0       All pertinent labs within the past 24 hours have been reviewed.    Significant Imaging:  I have reviewed all pertinent imaging results/findings within the past 24 hours.    ABG  Recent Labs   Lab 10/24/24  0406   PH 7.451*   PO2 74*   PCO2 32.5*   HCO3 22.7*   BE -1     Assessment/Plan:     Neuro  Intellectual disability with epilepsy  Home AEDs restarted: Keppra and phenobarb    Spastic quadriplegic cerebral palsy  Turn q 2hrs  Aspiration precautions  Baclofen restarted at half home dose 10/23    Ophtho  Legally blind  Supportive Care    Pulmonary  * Acute hypercapnic respiratory failure  Patient with Hypercapnic Respiratory failure which is Acute on chronic. Unclear if on O2 at the NH. Supplemental oxygen was provided and noted-     Vent Mode: A/C  Oxygen Concentration (%):  [21] 21  Resp Rate Total:  [12 br/min-56 br/min] 34 br/min  Vt Set:  [350 mL] 350 mL  PEEP/CPAP:  [5 cmH20] 5 cmH20  Pressure Support:  [12 cmH20-15 cmH20] 15 cmH20  Mean Airway Pressure:  [7.5 viK00-92 cmH20] 14 cmH20    .   Signs/symptoms of respiratory failure include- tachypnea, increased work of breathing, respiratory distress, and use of accessory muscles. Contributing diagnoses includes - Aspiration Labs and images were reviewed. Patient Has recent ABG, which has been reviewed. Will treat underlying causes and adjust management of respiratory failure as follows-     Wean vent settings as tolerated  Broad spectrum antibiotics- stopped, cultures NGTD  Duonebs prn  Sedation: Precedex, Fentanyl  Will keep intubated for surgeries planned  Bowel rest-NPO    10/21 - SAT/SBT starting this afternoon.  Nonverbal and blind baseline status will likely complicate extubation course.  10/22- failed SBT yesterday due to apnea.   Sedation off this morning and plan SBT again today.  10/23 - extubated yesterday, but quickly failed due to anxiety and  difficulty with secretions.  Dropped his sat and got extremely tachypneic.  Reintubated and initially had high requirements but now weaned back to 40% and 5 of PEEP.  Some concern for aspiration of secretions.  Will coordinate with mom for her to be present next time we attempt.  10/24 - vent weaned to 21%.  Starting moving back to SAT/SBT today.  10/25 - Fentanyl off this morning and tried SBT with sister at bedside to help calm him down. We let him go about 35 mins on PSV, but RR consistently in the 40's -50's with tidal volume below 200 @ 7cm H2O. Placed back on AC/VC.  Will try again this afternoon when his mom arrives.  10/26 - continues to fail fairly quickly, even with PSV up to 12 cmH2O.  Ongoing discussions with mom regarding trach.    Renal/  MAYELIN (acute kidney injury)  MAYELIN is likely due to pre-renal azotemia due to intravascular volume depletion. Baseline creatinine is  0.8 . Most recent creatinine and eGFR are listed below.  Recent Labs     10/24/24  0425 10/25/24  0408 10/26/24  0431   CREATININE 0.6 0.6 0.6   EGFRNORACEVR >60 >60 >60        Plan  - Avoid nephrotoxins and renally dose meds for GFR listed above  - Monitor urine output, serial BMP, and adjust therapy as needed  - Maintain [place] catheter - IVF boluses prn  - Resolved after fluid resuscitation      Endocrine  Body mass index (BMI) less than 19  Nutrition consult for Tube feeding recs when able to resume tube feeds  NPO currently due to bowel surgery  Start TPN 10/25    GI  Bowel obstruction  KUB shows small bowel obstruction  NGT to suction  Keep NPO  CT abd/pelvis shows high grade bowel obstruction  Gen Surg consulted  Fecal impaction on Abd XR- disimpacted  SBFT XR shows high-grade small bowel obstruction    Under went Ex lap 10/18 with extensive lysis of adhesions, small bowel resection, takedown of gastrostomy tube with partial gastrectomy, abdomen left open in discontinuity with abthera in place  Ex lap on 10/21 with small-bowel  anastomosis and G tube placement    10/24 - Bowel sounds improving, but no BM yet  10/25 - Bowel sounds improving, but no BM yet.  Started TPN  10/26 - tolerating trickle feeds and had good BM this afternoon.    PEG (percutaneous endoscopic gastrostomy) status  Patient noted to have a percutaneous endoscopic gastrostomy tube in place. I have personally inspected the tube.Tube was placed on this admission, with date of procedure- 10/16  There are no signs of drainage or infection around the site. Routine care to be done by wound care and nursing staff.     G tube removed during ex-lap on 10/18  Tube feedings on hold due to Bowel obstruction  Ex lap 10/21 with G tube replacement  Starting to use for meds 10/23  Trickled feeds started 10/25          Critical Care Time: 33 minutes  Critical secondary to respiratory failure, infusion of high risk medications      Critical care was time spent personally by me on the following activities: development of treatment plan with patient or surrogate and bedside caregivers, discussions with consultants, evaluation of patient's response to treatment, examination of patient, ordering and performing treatments and interventions, ordering and review of laboratory studies, ordering and review of radiographic studies, pulse oximetry, re-evaluation of patient's condition. This critical care time did not overlap with that of any other provider or involve time for any procedures.     Kiel Perez MD  Critical Care Medicine  Critical access hospital - Intensive Care ([facility])

## 2024-10-26 NOTE — ASSESSMENT & PLAN NOTE
Patient noted to have a percutaneous endoscopic gastrostomy tube in place. I have personally inspected the tube.Tube was placed on this admission, with date of procedure- 10/16  There are no signs of drainage or infection around the site. Routine care to be done by wound care and nursing staff.     G tube removed during ex-lap on 10/18  Tube feedings on hold due to Bowel obstruction  Ex lap 10/21 with G tube replacement  Starting to use for meds 10/23  Trickled feeds started 10/25

## 2024-10-26 NOTE — ASSESSMENT & PLAN NOTE
Patient with Hypercapnic Respiratory failure which is Acute on chronic. Unclear if on O2 at the NH. Supplemental oxygen was provided and noted-     Vent Mode: A/C  Oxygen Concentration (%):  [21] 21  Resp Rate Total:  [12 br/min-56 br/min] 34 br/min  Vt Set:  [350 mL] 350 mL  PEEP/CPAP:  [5 cmH20] 5 cmH20  Pressure Support:  [12 cmH20-15 cmH20] 15 cmH20  Mean Airway Pressure:  [7.5 ooL89-06 cmH20] 14 cmH20    .   Signs/symptoms of respiratory failure include- tachypnea, increased work of breathing, respiratory distress, and use of accessory muscles. Contributing diagnoses includes - Aspiration Labs and images were reviewed. Patient Has recent ABG, which has been reviewed. Will treat underlying causes and adjust management of respiratory failure as follows-     Wean vent settings as tolerated  Broad spectrum antibiotics- stopped, cultures NGTD  Duonebs prn  Sedation: Precedex, Fentanyl  Will keep intubated for surgeries planned  Bowel rest-NPO    10/21 - SAT/SBT starting this afternoon.  Nonverbal and blind baseline status will likely complicate extubation course.  10/22- failed SBT yesterday due to apnea.   Sedation off this morning and plan SBT again today.  10/23 - extubated yesterday, but quickly failed due to anxiety and difficulty with secretions.  Dropped his sat and got extremely tachypneic.  Reintubated and initially had high requirements but now weaned back to 40% and 5 of PEEP.  Some concern for aspiration of secretions.  Will coordinate with mom for her to be present next time we attempt.  10/24 - vent weaned to 21%.  Starting moving back to SAT/SBT today.  10/25 - Fentanyl off this morning and tried SBT with sister at bedside to help calm him down. We let him go about 35 mins on PSV, but RR consistently in the 40's -50's with tidal volume below 200 @ 7cm H2O. Placed back on AC/VC.  Will try again this afternoon when his mom arrives.  10/26 - continues to fail fairly quickly, even with PSV up to 12 cmH2O.   Ongoing discussions with mom regarding trach.

## 2024-10-26 NOTE — ASSESSMENT & PLAN NOTE
Nutrition consult for Tube feeding recs when able to resume tube feeds  NPO currently due to bowel surgery  Start TPN 10/25

## 2024-10-26 NOTE — SUBJECTIVE & OBJECTIVE
Interval History: Small smearing of feces over last 24hrs. Trophic Tfs continue. TPN ongoing.    Medications:  Continuous Infusions:   dexmedeTOMIDine (Precedex) infusion (titrating)  0-1.4 mcg/kg/hr Intravenous Continuous 15.79 mL/hr at 10/26/24 1100 1.4 mcg/kg/hr at 10/26/24 1100    D10W   Intravenous Continuous PRN        fentanyl  0-250 mcg/hr Intravenous Continuous 2.5 mL/hr at 10/26/24 1100 25 mcg/hr at 10/26/24 1100    TPN ADULT CENTRAL LINE CUSTOM   Intravenous Continuous 50 mL/hr at 10/26/24 1000 Rate Verify at 10/26/24 1000    TPN ADULT CENTRAL LINE CUSTOM   Intravenous Continuous         Scheduled Meds:   baclofen  10 mg Per G Tube BID    ceFEPime IV (PEDS and ADULTS)  2 g Intravenous Q8H    chlorhexidine  15 mL Mouth/Throat BID    cloNIDine 0.1 mg/24 hr td ptwk  1 patch Transdermal Q7 Days    diazePAM  2 mg Per G Tube BID    enoxparin  30 mg Subcutaneous Q24H (prophylaxis, 1700)    famotidine  20 mg Per G Tube BID    levETIRAcetam (Keppra) IV (PEDS and ADULTS)  2,000 mg Intravenous Q12H    PHENObarbital  65 mg Intravenous BID     PRN Meds:  Current Facility-Administered Medications:     0.9%  NaCl infusion (for blood administration), , Intravenous, Q24H PRN    albuterol-ipratropium, 3 mL, Nebulization, Q6H PRN    D10W, , Intravenous, Continuous PRN    dextrose 10%, 12.5 g, Intravenous, PRN    dextrose 10%, 25 g, Intravenous, PRN    glucagon (human recombinant), 1 mg, Intramuscular, PRN    insulin aspart U-100, 0-10 Units, Subcutaneous, Q4H PRN     Review of patient's allergies indicates:   Allergen Reactions    Strawberries [strawberry] Hives     STRAWBERRIES ALLERGIC REACTIONS   (SEIZURES AND HIVES )     Objective:     Vital Signs (Most Recent):  Temp: 100.2 °F (37.9 °C) (10/26/24 1100)  Pulse: 105 (10/26/24 1100)  Resp: (!) 0 (10/26/24 1100)  BP: 136/78 (10/26/24 1100)  SpO2: 100 % (10/26/24 1100) Vital Signs (24h Range):  Temp:  [96.8 °F (36 °C)-100.6 °F (38.1 °C)] 100.2 °F (37.9 °C)  Pulse:  []  105  Resp:  [0-33] 0  SpO2:  [95 %-100 %] 100 %  BP: (108-157)/(66-96) 136/78     Weight: 46 kg (101 lb 6.6 oz)  Body mass index is 17.41 kg/m².    Intake/Output - Last 3 Shifts         10/24 0700  10/25 0659 10/25 0700  10/26 0659 10/26 0700  10/27 0659    I.V. (mL/kg) 634.5 (13.8) 629.8 (13.7)     Blood 262.5      NG/GT 90 230 195    IV Piggyback 431.2 528.5     TPN  378.7     Total Intake(mL/kg) 1418.3 (30.8) 1767 (38.4) 195 (4.2)    Urine (mL/kg/hr) 810 (0.7) 585 (0.5) 155 (0.7)    Drains 950      Stool 0 0     Total Output 1760 585 155    Net -341.8 +1182 +40           Stool Occurrence 0 x 2 x              Physical Exam  Constitutional:       Appearance: He is ill-appearing.      Comments: Intubated    HENT:      Nose:      Comments: NG tube in place with tube feeds refluxed into tubing  Cardiovascular:      Rate and Rhythm: Tachycardia present.   Pulmonary:      Comments: Intubated  Abdominal:      Comments: Gastrostomy in place.  No rebound or guarding.  Midline incision c/d/I with staples in place. Abdomen soft and non distended          Significant Labs:  I have reviewed all pertinent lab results within the past 24 hours.  CBC:   Recent Labs   Lab 10/26/24  0431   WBC 16.55*   RBC 3.42*   HGB 9.0*   HCT 27.0*   *   MCV 79*   MCH 26.3*   MCHC 33.3     BMP:   Recent Labs   Lab 10/26/24  0431   *      K 3.6      CO2 28   BUN 11   CREATININE 0.6   CALCIUM 8.2*   MG 2.1     CMP:   Recent Labs   Lab 10/26/24  0431   *   CALCIUM 8.2*   ALBUMIN 1.6*   PROT 6.2      K 3.6   CO2 28      BUN 11   CREATININE 0.6   ALKPHOS 136   ALT 14   AST 17   BILITOT 1.3*     LFTs:   Recent Labs   Lab 10/26/24  0431   ALT 14   AST 17   ALKPHOS 136   BILITOT 1.3*   PROT 6.2   ALBUMIN 1.6*       Significant Diagnostics:  I have reviewed all pertinent imaging results/findings within the past 24 hours.

## 2024-10-26 NOTE — SUBJECTIVE & OBJECTIVE
Interval History: NAEON.  Failed SBT x 2 yesterday (once with sister and once with mom) due to rapid/shallow breathing.  Continued discussions with mom re: the potential of tracheostomy next week.  Vent and hemodynamics are stable.  Small BM yesterday.  Tolerating trickle feeds.  Larger BM this afternoon.      Objective:     Vital Signs (Most Recent):  Temp: 98.8 °F (37.1 °C) (10/26/24 0745)  Pulse: (!) 113 (10/26/24 0745)  Resp: 17 (10/26/24 0745)  BP: (!) 142/86 (10/26/24 0700)  SpO2: 100 % (10/26/24 0745) Vital Signs (24h Range):  Temp:  [96.8 °F (36 °C)-100.6 °F (38.1 °C)] 98.8 °F (37.1 °C)  Pulse:  [] 113  Resp:  [11-60] 17  SpO2:  [95 %-100 %] 100 %  BP: (108-157)/(66-96) 142/86     Weight: 46 kg (101 lb 6.6 oz)  Body mass index is 17.41 kg/m².      Intake/Output Summary (Last 24 hours) at 10/26/2024 0903  Last data filed at 10/26/2024 0748  Gross per 24 hour   Intake 1339.88 ml   Output 555 ml   Net 784.88 ml        Physical Exam  Vitals and nursing note reviewed.   Constitutional:       General: He is not in acute distress.     Appearance: He is ill-appearing (chronically).      Comments: Alert on the vent, but not following commands   Cardiovascular:      Rate and Rhythm: Normal rate and regular rhythm.      Pulses: Normal pulses.      Heart sounds: No murmur heard.  Pulmonary:      Effort: Pulmonary effort is normal. No respiratory distress.      Breath sounds: No wheezing, rhonchi or rales.   Abdominal:      General: Abdomen is flat. There is no distension.      Palpations: Abdomen is soft.      Tenderness: There is no abdominal tenderness.      Comments: Surgical incisions clean and dry, hypoactive sounds   Neurological:      Comments: Awake and moving spontaneously, but not following commands           Review of Systems    Vents:  Vent Mode: A/C (10/26/24 0717)  Ventilator Initiated: Yes (10/17/24 0102)  Set Rate: 12 BPM (10/26/24 0717)  Vt Set: 350 mL (10/26/24 0717)  Pressure Support: 7 cmH20  (10/25/24 1517)  PEEP/CPAP: 5 cmH20 (10/26/24 0717)  Oxygen Concentration (%): 21 (10/26/24 0745)  Peak Airway Pressure: 26 cmH20 (10/26/24 0717)  Plateau Pressure: 18 cmH20 (10/26/24 0717)  Total Ve: 6.01 L/m (10/26/24 0717)  Negative Inspiratory Force (cm H2O): 0 (10/26/24 0717)  F/VT Ratio<105 (RSBI): (!) 40.71 (10/26/24 0717)    Lines/Drains/Airways       Central Venous Catheter Line  Duration             Percutaneous Central Line - Triple Lumen  10/21/24 0814 Internal Jugular Left 5 days              Drain  Duration                  NG/OG Tube 10/16/24 0515 16 Fr. Right nostril 10 days         Urethral Catheter 10/16/24 0156 Double-lumen 16 Fr. 10 days         Gastrostomy/Enterostomy 10/21/24 0844 Gastrostomy tube w/ balloon LUQ feeding 5 days              Airway  Duration                  Airway - Non-Surgical 10/22/24 1305 Endotracheal Tube 3 days                    Significant Labs:    CBC/Anemia Profile:  Recent Labs   Lab 10/25/24  0408 10/26/24  0431   WBC 17.03* 16.55*   HGB 9.1* 9.0*   HCT 26.2* 27.0*    457*   MCV 77* 79*   RDW 13.7 14.1        Chemistries:  Recent Labs   Lab 10/25/24  0408 10/26/24  0431    143   K 3.2* 3.6   * 108   CO2 23 28   BUN 11 11   CREATININE 0.6 0.6   CALCIUM 8.1* 8.2*   ALBUMIN 1.6* 1.6*   PROT 5.9* 6.2   BILITOT 2.2* 1.3*   ALKPHOS 121 136   ALT 13 14   AST 19 17   MG 1.8 2.1   PHOS 2.4* 3.0       All pertinent labs within the past 24 hours have been reviewed.    Significant Imaging:  I have reviewed all pertinent imaging results/findings within the past 24 hours.

## 2024-10-26 NOTE — PROGRESS NOTES
[facility] - Intensive Care ([facility])  General Surgery  Progress Note    Subjective:     Interval History: Small smearing of feces over last 24hrs. Trophic Tfs continue. TPN ongoing.    Medications:  Continuous Infusions:   dexmedeTOMIDine (Precedex) infusion (titrating)  0-1.4 mcg/kg/hr Intravenous Continuous 15.79 mL/hr at 10/26/24 1100 1.4 mcg/kg/hr at 10/26/24 1100    D10W   Intravenous Continuous PRN        fentanyl  0-250 mcg/hr Intravenous Continuous 2.5 mL/hr at 10/26/24 1100 25 mcg/hr at 10/26/24 1100    TPN ADULT CENTRAL LINE CUSTOM   Intravenous Continuous 50 mL/hr at 10/26/24 1000 Rate Verify at 10/26/24 1000    TPN ADULT CENTRAL LINE CUSTOM   Intravenous Continuous         Scheduled Meds:   baclofen  10 mg Per G Tube BID    ceFEPime IV (PEDS and ADULTS)  2 g Intravenous Q8H    chlorhexidine  15 mL Mouth/Throat BID    cloNIDine 0.1 mg/24 hr td ptwk  1 patch Transdermal Q7 Days    diazePAM  2 mg Per G Tube BID    enoxparin  30 mg Subcutaneous Q24H (prophylaxis, 1700)    famotidine  20 mg Per G Tube BID    levETIRAcetam (Keppra) IV (PEDS and ADULTS)  2,000 mg Intravenous Q12H    PHENObarbital  65 mg Intravenous BID     PRN Meds:  Current Facility-Administered Medications:     0.9%  NaCl infusion (for blood administration), , Intravenous, Q24H PRN    albuterol-ipratropium, 3 mL, Nebulization, Q6H PRN    D10W, , Intravenous, Continuous PRN    dextrose 10%, 12.5 g, Intravenous, PRN    dextrose 10%, 25 g, Intravenous, PRN    glucagon (human recombinant), 1 mg, Intramuscular, PRN    insulin aspart U-100, 0-10 Units, Subcutaneous, Q4H PRN     Review of patient's allergies indicates:   Allergen Reactions    Strawberries [strawberry] Hives     STRAWBERRIES ALLERGIC REACTIONS   (SEIZURES AND HIVES )     Objective:     Vital Signs (Most Recent):  Temp: 100.2 °F (37.9 °C) (10/26/24 1100)  Pulse: 105 (10/26/24 1100)  Resp: (!) 0 (10/26/24 1100)  BP: 136/78 (10/26/24 1100)  SpO2: 100 % (10/26/24 1100) Vital Signs (24h  Range):  Temp:  [96.8 °F (36 °C)-100.6 °F (38.1 °C)] 100.2 °F (37.9 °C)  Pulse:  [] 105  Resp:  [0-33] 0  SpO2:  [95 %-100 %] 100 %  BP: (108-157)/(66-96) 136/78     Weight: 46 kg (101 lb 6.6 oz)  Body mass index is 17.41 kg/m².    Intake/Output - Last 3 Shifts         10/24 0700  10/25 0659 10/25 0700  10/26 0659 10/26 0700  10/27 0659    I.V. (mL/kg) 634.5 (13.8) 629.8 (13.7)     Blood 262.5      NG/GT 90 230 195    IV Piggyback 431.2 528.5     TPN  378.7     Total Intake(mL/kg) 1418.3 (30.8) 1767 (38.4) 195 (4.2)    Urine (mL/kg/hr) 810 (0.7) 585 (0.5) 155 (0.7)    Drains 950      Stool 0 0     Total Output 1760 585 155    Net -341.8 +1182 +40           Stool Occurrence 0 x 2 x              Physical Exam  Constitutional:       Appearance: He is ill-appearing.      Comments: Intubated    HENT:      Nose:      Comments: NG tube in place with tube feeds refluxed into tubing  Cardiovascular:      Rate and Rhythm: Tachycardia present.   Pulmonary:      Comments: Intubated  Abdominal:      Comments: Gastrostomy in place.  No rebound or guarding.  Midline incision c/d/I with staples in place. Abdomen soft and non distended          Significant Labs:  I have reviewed all pertinent lab results within the past 24 hours.  CBC:   Recent Labs   Lab 10/26/24  0431   WBC 16.55*   RBC 3.42*   HGB 9.0*   HCT 27.0*   *   MCV 79*   MCH 26.3*   MCHC 33.3     BMP:   Recent Labs   Lab 10/26/24  0431   *      K 3.6      CO2 28   BUN 11   CREATININE 0.6   CALCIUM 8.2*   MG 2.1     CMP:   Recent Labs   Lab 10/26/24  0431   *   CALCIUM 8.2*   ALBUMIN 1.6*   PROT 6.2      K 3.6   CO2 28      BUN 11   CREATININE 0.6   ALKPHOS 136   ALT 14   AST 17   BILITOT 1.3*     LFTs:   Recent Labs   Lab 10/26/24  0431   ALT 14   AST 17   ALKPHOS 136   BILITOT 1.3*   PROT 6.2   ALBUMIN 1.6*       Significant Diagnostics:  I have reviewed all pertinent imaging results/findings within the past 24  hours.  Assessment/Plan:     * Acute hypercapnic respiratory failure  Management per critical care.    Patient is currently intubated     MAYELIN (acute kidney injury)  Per critical Care    Bowel obstruction  POD#8/5 s/p ex lap, extensive lysis of adhesions, small bowel resection, takedown of gastrostomy tube with partial gastrectomy, abdomen left open in discontinuity with abthera in place.  Now status post small-bowel anastomosis, open gastrostomy placement and abdominal closure    - continue trophic tube feeds through Gtube. Would not go above 10cc/hr given reflux into NG tube seen on exam this AM. Continue TPN until tolerating tube feeds. Continue to check frequent residuals  - recommend pain control with IV meds  - remainder of care per primary team    PEG (percutaneous endoscopic gastrostomy) status  Surgically replaced Gastrostomy tube in place    Nonverbal  Monitor    Legally blind  Per critical care    Intellectual disability with epilepsy  Per critical care    Spastic quadriplegic cerebral palsy  Further management critical care medicine      Chilo Skaggs MD  General Surgery  [facility] - Intensive Care ([facility])

## 2024-10-26 NOTE — PLAN OF CARE
Problem: Infection  Goal: Absence of Infection Signs and Symptoms  Outcome: Progressing     Problem: Adult Inpatient Plan of Care  Goal: Plan of Care Review  Outcome: Progressing  Goal: Patient-Specific Goal (Individualized)  Outcome: Progressing  Goal: Absence of Hospital-Acquired Illness or Injury  Outcome: Progressing  Goal: Optimal Comfort and Wellbeing  Outcome: Progressing  Goal: Readiness for Transition of Care  Outcome: Progressing     Problem: Fall Injury Risk  Goal: Absence of Fall and Fall-Related Injury  Outcome: Progressing         Pt remains intubated/sedated on vent Fent and precedex gtts continue to infuse to maintain a RASS goal of 0 as ordered. Patient from ST on monitor NSR. Patient febrile with TMAX of 100.2 obtained rectally.   Patient continues to have marginal UOP via the viveros in place.   TF continues to infuse at goal rate of 10 mL/hr. TPN initiated and continues to infuse. All wounds CDI. Patient turned q 2 hrs.

## 2024-10-26 NOTE — ASSESSMENT & PLAN NOTE
POD#8/5 s/p ex lap, extensive lysis of adhesions, small bowel resection, takedown of gastrostomy tube with partial gastrectomy, abdomen left open in discontinuity with abthera in place.  Now status post small-bowel anastomosis, open gastrostomy placement and abdominal closure    - continue trophic tube feeds through Gtube. Would not go above 10cc/hr given reflux into NG tube seen on exam this AM. Continue TPN until tolerating tube feeds. Continue to check frequent residuals  - recommend pain control with IV meds  - remainder of care per primary team

## 2024-10-26 NOTE — ASSESSMENT & PLAN NOTE
MAYELIN is likely due to pre-renal azotemia due to intravascular volume depletion. Baseline creatinine is  0.8 . Most recent creatinine and eGFR are listed below.  Recent Labs     10/24/24  0425 10/25/24  0408 10/26/24  0431   CREATININE 0.6 0.6 0.6   EGFRNORACEVR >60 >60 >60        Plan  - Avoid nephrotoxins and renally dose meds for GFR listed above  - Monitor urine output, serial BMP, and adjust therapy as needed  - Maintain [place] catheter - IVF boluses prn  - Resolved after fluid resuscitation

## 2024-10-27 LAB
ALBUMIN SERPL BCP-MCNC: 1.7 G/DL (ref 3.5–5.2)
ALP SERPL-CCNC: 133 U/L (ref 40–150)
ALT SERPL W/O P-5'-P-CCNC: 12 U/L (ref 10–44)
ANION GAP SERPL CALC-SCNC: 8 MMOL/L (ref 8–16)
AST SERPL-CCNC: 15 U/L (ref 10–40)
BASOPHILS # BLD AUTO: 0.05 K/UL (ref 0–0.2)
BASOPHILS NFR BLD: 0.3 % (ref 0–1.9)
BILIRUB SERPL-MCNC: 0.9 MG/DL (ref 0.1–1)
BUN SERPL-MCNC: 12 MG/DL (ref 6–20)
CALCIUM SERPL-MCNC: 8.2 MG/DL (ref 8.7–10.5)
CHLORIDE SERPL-SCNC: 102 MMOL/L (ref 95–110)
CO2 SERPL-SCNC: 29 MMOL/L (ref 23–29)
CREAT SERPL-MCNC: 0.6 MG/DL (ref 0.5–1.4)
DIFFERENTIAL METHOD BLD: ABNORMAL
EOSINOPHIL # BLD AUTO: 0.5 K/UL (ref 0–0.5)
EOSINOPHIL NFR BLD: 2.8 % (ref 0–8)
ERYTHROCYTE [DISTWIDTH] IN BLOOD BY AUTOMATED COUNT: 14.6 % (ref 11.5–14.5)
EST. GFR  (NO RACE VARIABLE): >60 ML/MIN/1.73 M^2
GLUCOSE SERPL-MCNC: 147 MG/DL (ref 70–110)
HCT VFR BLD AUTO: 26 % (ref 40–54)
HGB BLD-MCNC: 8.6 G/DL (ref 14–18)
IMM GRANULOCYTES # BLD AUTO: 0.25 K/UL (ref 0–0.04)
IMM GRANULOCYTES NFR BLD AUTO: 1.4 % (ref 0–0.5)
LYMPHOCYTES # BLD AUTO: 2 K/UL (ref 1–4.8)
LYMPHOCYTES NFR BLD: 11.1 % (ref 18–48)
MAGNESIUM SERPL-MCNC: 2 MG/DL (ref 1.6–2.6)
MCH RBC QN AUTO: 26.9 PG (ref 27–31)
MCHC RBC AUTO-ENTMCNC: 33.1 G/DL (ref 32–36)
MCV RBC AUTO: 81 FL (ref 82–98)
MONOCYTES # BLD AUTO: 1.3 K/UL (ref 0.3–1)
MONOCYTES NFR BLD: 7.1 % (ref 4–15)
NEUTROPHILS # BLD AUTO: 14.2 K/UL (ref 1.8–7.7)
NEUTROPHILS NFR BLD: 77.3 % (ref 38–73)
NRBC BLD-RTO: 0 /100 WBC
PHOSPHATE SERPL-MCNC: 3 MG/DL (ref 2.7–4.5)
PLATELET # BLD AUTO: 470 K/UL (ref 150–450)
PMV BLD AUTO: 10.5 FL (ref 9.2–12.9)
POCT GLUCOSE: 124 MG/DL (ref 70–110)
POCT GLUCOSE: 126 MG/DL (ref 70–110)
POCT GLUCOSE: 143 MG/DL (ref 70–110)
POCT GLUCOSE: 149 MG/DL (ref 70–110)
POCT GLUCOSE: 153 MG/DL (ref 70–110)
POTASSIUM SERPL-SCNC: 4 MMOL/L (ref 3.5–5.1)
PROT SERPL-MCNC: 6.2 G/DL (ref 6–8.4)
RBC # BLD AUTO: 3.2 M/UL (ref 4.6–6.2)
SODIUM SERPL-SCNC: 139 MMOL/L (ref 136–145)
WBC # BLD AUTO: 18.28 K/UL (ref 3.9–12.7)

## 2024-10-27 PROCEDURE — 25000003 PHARM REV CODE 250: Performed by: NURSE PRACTITIONER

## 2024-10-27 PROCEDURE — 80053 COMPREHEN METABOLIC PANEL: CPT | Performed by: NURSE PRACTITIONER

## 2024-10-27 PROCEDURE — 27100171 HC OXYGEN HIGH FLOW UP TO 24 HOURS

## 2024-10-27 PROCEDURE — 99900035 HC TECH TIME PER 15 MIN (STAT)

## 2024-10-27 PROCEDURE — 63600175 PHARM REV CODE 636 W HCPCS: Performed by: NURSE PRACTITIONER

## 2024-10-27 PROCEDURE — 20000000 HC ICU ROOM

## 2024-10-27 PROCEDURE — 83735 ASSAY OF MAGNESIUM: CPT | Performed by: NURSE PRACTITIONER

## 2024-10-27 PROCEDURE — 99024 POSTOP FOLLOW-UP VISIT: CPT | Mod: ,,, | Performed by: COLON & RECTAL SURGERY

## 2024-10-27 PROCEDURE — 63600175 PHARM REV CODE 636 W HCPCS: Performed by: INTERNAL MEDICINE

## 2024-10-27 PROCEDURE — 25000003 PHARM REV CODE 250: Performed by: INTERNAL MEDICINE

## 2024-10-27 PROCEDURE — 99900026 HC AIRWAY MAINTENANCE (STAT)

## 2024-10-27 PROCEDURE — 94761 N-INVAS EAR/PLS OXIMETRY MLT: CPT

## 2024-10-27 PROCEDURE — C9399 UNCLASSIFIED DRUGS OR BIOLOG: HCPCS | Performed by: INTERNAL MEDICINE

## 2024-10-27 PROCEDURE — 84100 ASSAY OF PHOSPHORUS: CPT | Performed by: NURSE PRACTITIONER

## 2024-10-27 PROCEDURE — 94003 VENT MGMT INPAT SUBQ DAY: CPT

## 2024-10-27 PROCEDURE — 85025 COMPLETE CBC W/AUTO DIFF WBC: CPT | Performed by: NURSE PRACTITIONER

## 2024-10-27 PROCEDURE — A4217 STERILE WATER/SALINE, 500 ML: HCPCS | Performed by: INTERNAL MEDICINE

## 2024-10-27 RX ADMIN — FAMOTIDINE 20 MG: 40 POWDER, FOR SUSPENSION ORAL at 08:10

## 2024-10-27 RX ADMIN — DIAZEPAM 2 MG: 2 TABLET ORAL at 08:10

## 2024-10-27 RX ADMIN — FAMOTIDINE 20 MG: 40 POWDER, FOR SUSPENSION ORAL at 09:10

## 2024-10-27 RX ADMIN — CHLORHEXIDINE GLUCONATE 0.12% ORAL RINSE 15 ML: 1.2 LIQUID ORAL at 09:10

## 2024-10-27 RX ADMIN — CHLORHEXIDINE GLUCONATE 0.12% ORAL RINSE 15 ML: 1.2 LIQUID ORAL at 08:10

## 2024-10-27 RX ADMIN — CEFEPIME 2 G: 2 INJECTION, POWDER, FOR SOLUTION INTRAVENOUS at 11:10

## 2024-10-27 RX ADMIN — PHENOBARBITAL SODIUM 65 MG: 65 INJECTION INTRAMUSCULAR at 09:10

## 2024-10-27 RX ADMIN — LEVETIRACETAM 2000 MG: 500 INJECTION, SOLUTION INTRAVENOUS at 09:10

## 2024-10-27 RX ADMIN — ENOXAPARIN SODIUM 30 MG: 30 INJECTION SUBCUTANEOUS at 05:10

## 2024-10-27 RX ADMIN — CEFEPIME 2 G: 2 INJECTION, POWDER, FOR SOLUTION INTRAVENOUS at 03:10

## 2024-10-27 RX ADMIN — MAGNESIUM SULFATE HEPTAHYDRATE: 500 INJECTION, SOLUTION INTRAMUSCULAR; INTRAVENOUS at 11:10

## 2024-10-27 RX ADMIN — INSULIN ASPART 1 UNITS: 100 INJECTION, SOLUTION INTRAVENOUS; SUBCUTANEOUS at 11:10

## 2024-10-27 RX ADMIN — BACLOFEN 10 MG: 10 TABLET ORAL at 08:10

## 2024-10-27 RX ADMIN — PHENOBARBITAL SODIUM 65 MG: 65 INJECTION INTRAMUSCULAR at 08:10

## 2024-10-27 RX ADMIN — LEVETIRACETAM 2000 MG: 500 INJECTION, SOLUTION INTRAVENOUS at 08:10

## 2024-10-27 RX ADMIN — CEFEPIME 2 G: 2 INJECTION, POWDER, FOR SOLUTION INTRAVENOUS at 08:10

## 2024-10-27 RX ADMIN — BACLOFEN 10 MG: 10 TABLET ORAL at 09:10

## 2024-10-27 RX ADMIN — DEXMEDETOMIDINE HYDROCHLORIDE 1.4 MCG/KG/HR: 4 INJECTION INTRAVENOUS at 08:10

## 2024-10-27 RX ADMIN — DIAZEPAM 2 MG: 2 TABLET ORAL at 09:10

## 2024-10-27 RX ADMIN — DEXMEDETOMIDINE HYDROCHLORIDE 1.4 MCG/KG/HR: 4 INJECTION INTRAVENOUS at 12:10

## 2024-10-27 RX ADMIN — FENTANYL CITRATE 75 MCG/HR: 50 INJECTION INTRAVENOUS at 04:10

## 2024-10-27 RX ADMIN — DEXMEDETOMIDINE HYDROCHLORIDE 1.4 MCG/KG/HR: 4 INJECTION INTRAVENOUS at 06:10

## 2024-10-27 RX ADMIN — DEXMEDETOMIDINE HYDROCHLORIDE 1.4 MCG/KG/HR: 4 INJECTION INTRAVENOUS at 01:10

## 2024-10-27 NOTE — ASSESSMENT & PLAN NOTE
Patient with Hypercapnic Respiratory failure which is Acute on chronic. Unclear if on O2 at the NH. Supplemental oxygen was provided and noted-     Vent Mode: A/C  Oxygen Concentration (%):  [21] 21  Resp Rate Total:  [12 br/min-56 br/min] 16 br/min  Vt Set:  [350 mL] 350 mL  PEEP/CPAP:  [5 cmH20] 5 cmH20  Pressure Support:  [12 cmH20-15 cmH20] 15 cmH20  Mean Airway Pressure:  [8 cmH20-15 cmH20] 8.7 cmH20    .   Signs/symptoms of respiratory failure include- tachypnea, increased work of breathing, respiratory distress, and use of accessory muscles. Contributing diagnoses includes - Aspiration Labs and images were reviewed. Patient Has recent ABG, which has been reviewed. Will treat underlying causes and adjust management of respiratory failure as follows-     Wean vent settings as tolerated  Broad spectrum antibiotics- stopped, cultures NGTD  Duonebs prn  Sedation: Precedex, Fentanyl  Will keep intubated for surgeries planned  Bowel rest-NPO    10/21 - SAT/SBT starting this afternoon.  Nonverbal and blind baseline status will likely complicate extubation course.  10/22- failed SBT yesterday due to apnea.   Sedation off this morning and plan SBT again today.  10/23 - extubated yesterday, but quickly failed due to anxiety and difficulty with secretions.  Dropped his sat and got extremely tachypneic.  Reintubated and initially had high requirements but now weaned back to 40% and 5 of PEEP.  Some concern for aspiration of secretions.  Will coordinate with mom for her to be present next time we attempt.  10/24 - vent weaned to 21%.  Starting moving back to SAT/SBT today.  10/25 - Fentanyl off this morning and tried SBT with sister at bedside to help calm him down. We let him go about 35 mins on PSV, but RR consistently in the 40's -50's with tidal volume below 200 @ 7cm H2O. Placed back on AC/VC.  Will try again this afternoon when his mom arrives.  10/26 - continues to fail fairly quickly, even with PSV up to 12 cmH2O.   Ongoing discussions with mom regarding trach.  10/27 - mother requested we not trial pressure support today.

## 2024-10-27 NOTE — PROGRESS NOTES
[facility] - Intensive Care ([facility])  General Surgery  Progress Note    Subjective:     Interval History: larger bowel movement over past 24hrs. Low residuals on TF checks.    Medications:  Continuous Infusions:   dexmedeTOMIDine (Precedex) infusion (titrating)  0-1.4 mcg/kg/hr Intravenous Continuous 15.79 mL/hr at 10/27/24 1100 1.4 mcg/kg/hr at 10/27/24 1100    D10W   Intravenous Continuous PRN        fentanyl  0-250 mcg/hr Intravenous Continuous 7.5 mL/hr at 10/27/24 1100 75 mcg/hr at 10/27/24 1100    TPN ADULT CENTRAL LINE CUSTOM   Intravenous Continuous 50 mL/hr at 10/27/24 1100 Rate Verify at 10/27/24 1100    TPN ADULT CENTRAL LINE CUSTOM   Intravenous Continuous         Scheduled Meds:   baclofen  10 mg Per G Tube BID    ceFEPime IV (PEDS and ADULTS)  2 g Intravenous Q8H    chlorhexidine  15 mL Mouth/Throat BID    cloNIDine 0.1 mg/24 hr td ptwk  1 patch Transdermal Q7 Days    diazePAM  2 mg Per G Tube BID    enoxparin  30 mg Subcutaneous Q24H (prophylaxis, 1700)    famotidine  20 mg Per G Tube BID    levETIRAcetam (Keppra) IV (PEDS and ADULTS)  2,000 mg Intravenous Q12H    PHENObarbital  65 mg Intravenous BID     PRN Meds:  Current Facility-Administered Medications:     0.9%  NaCl infusion (for blood administration), , Intravenous, Q24H PRN    acetaminophen, 650 mg, Per NG tube, Q6H PRN    albuterol-ipratropium, 3 mL, Nebulization, Q6H PRN    D10W, , Intravenous, Continuous PRN    dextrose 10%, 12.5 g, Intravenous, PRN    dextrose 10%, 25 g, Intravenous, PRN    glucagon (human recombinant), 1 mg, Intramuscular, PRN    insulin aspart U-100, 0-10 Units, Subcutaneous, Q4H PRN     Review of patient's allergies indicates:   Allergen Reactions    Strawberries [strawberry] Hives     STRAWBERRIES ALLERGIC REACTIONS   (SEIZURES AND HIVES )     Objective:     Vital Signs (Most Recent):  Temp: 99 °F (37.2 °C) (10/27/24 1015)  Pulse: 99 (10/27/24 1015)  Resp: 16 (10/27/24 1015)  BP: 135/78 (10/27/24 1000)  SpO2: 100 %  (10/27/24 1015) Vital Signs (24h Range):  Temp:  [95.4 °F (35.2 °C)-101 °F (38.3 °C)] 99 °F (37.2 °C)  Pulse:  [] 99  Resp:  [0-67] 16  SpO2:  [99 %-100 %] 100 %  BP: (109-148)/(69-89) 135/78     Weight: 46 kg (101 lb 6.6 oz)  Body mass index is 17.41 kg/m².    Intake/Output - Last 3 Shifts         10/25 0700  10/26 0659 10/26 0700  10/27 0659 10/27 0700  10/28 0659    I.V. (mL/kg) 629.8 (13.7) 538.8 (11.7) 111.3 (2.4)    Blood       NG/ 345 130    IV Piggyback 528.5      .7 1196.5 236    Total Intake(mL/kg) 1767 (38.4) 2080.3 (45.2) 477.3 (10.4)    Urine (mL/kg/hr) 585 (0.5) 885 (0.8) 225 (1.2)    Drains       Stool 0 0     Total Output 585 885 225    Net +1182 +1195.3 +252.3           Stool Occurrence 2 x 1 x              Physical Exam  Constitutional:       Appearance: He is ill-appearing.      Comments: Intubated    HENT:      Nose:      Comments: NG tube in place  Cardiovascular:      Rate and Rhythm: Normal rate.   Pulmonary:      Comments: Intubated  Abdominal:      Comments: Gastrostomy in place.  Midline incision c/d/I with staples in place. Mild distention          Significant Labs:  I have reviewed all pertinent lab results within the past 24 hours.  CBC:   Recent Labs   Lab 10/27/24  0430   WBC 18.28*   RBC 3.20*   HGB 8.6*   HCT 26.0*   *   MCV 81*   MCH 26.9*   MCHC 33.1     BMP:   Recent Labs   Lab 10/27/24  0430   *      K 4.0      CO2 29   BUN 12   CREATININE 0.6   CALCIUM 8.2*   MG 2.0     CMP:   Recent Labs   Lab 10/27/24  0430   *   CALCIUM 8.2*   ALBUMIN 1.7*   PROT 6.2      K 4.0   CO2 29      BUN 12   CREATININE 0.6   ALKPHOS 133   ALT 12   AST 15   BILITOT 0.9     LFTs:   Recent Labs   Lab 10/27/24  0430   ALT 12   AST 15   ALKPHOS 133   BILITOT 0.9   PROT 6.2   ALBUMIN 1.7*       Significant Diagnostics:  I have reviewed all pertinent imaging results/findings within the past 24 hours.  Assessment/Plan:     * Acute hypercapnic  respiratory failure  Management per critical care.    Patient is currently intubated     MAYELIN (acute kidney injury)  Per critical Care    Bowel obstruction  POD#9/6 s/p ex lap, extensive lysis of adhesions, small bowel resection, takedown of gastrostomy tube with partial gastrectomy, abdomen left open in discontinuity with abthera in place.  Now status post small-bowel anastomosis, open gastrostomy placement and abdominal closure    - okay to slowly advance tube feeds via Gtube and check residuals. Back down rate if high residuals.  - Continue TPN until tolerating tube feeds  - recommend pain control with IV meds  - remainder of care per primary team    PEG (percutaneous endoscopic gastrostomy) status  Surgically replaced Gastrostomy tube in place    Nonverbal  Monitor    Legally blind  Per critical care    Intellectual disability with epilepsy  Per critical care    Spastic quadriplegic cerebral palsy  Further management critical care medicine      Chilo Skaggs MD  General Surgery  [facility] - Intensive Care ([facility])

## 2024-10-27 NOTE — ASSESSMENT & PLAN NOTE
KUB shows small bowel obstruction  NGT to suction  Keep NPO  CT abd/pelvis shows high grade bowel obstruction  Gen Surg consulted  Fecal impaction on Abd XR- disimpacted  SBFT XR shows high-grade small bowel obstruction    Under went Ex lap 10/18 with extensive lysis of adhesions, small bowel resection, takedown of gastrostomy tube with partial gastrectomy, abdomen left open in discontinuity with abthera in place  Ex lap on 10/21 with small-bowel anastomosis and G tube placement    10/24 - Bowel sounds improving, but no BM yet  10/25 - Bowel sounds improving, but no BM yet.  Started TPN  10/26 - tolerating trickle feeds and had good BM this afternoon.  10/27 - BM overnight.  Tolerating trickle feeds.

## 2024-10-27 NOTE — ASSESSMENT & PLAN NOTE
MAYELIN is likely due to pre-renal azotemia due to intravascular volume depletion. Baseline creatinine is  0.8 . Most recent creatinine and eGFR are listed below.  Recent Labs     10/25/24  0408 10/26/24  0431 10/27/24  0430   CREATININE 0.6 0.6 0.6   EGFRNORACEVR >60 >60 >60        Plan  - Avoid nephrotoxins and renally dose meds for GFR listed above  - Monitor urine output, serial BMP, and adjust therapy as needed  - Maintain [place] catheter - IVF boluses prn  - Resolved after fluid resuscitation

## 2024-10-27 NOTE — PLAN OF CARE
Problem: Infection  Goal: Absence of Infection Signs and Symptoms  Outcome: Not Progressing     Problem: Skin Injury Risk Increased  Goal: Skin Health and Integrity  Outcome: Not Progressing     Problem: Adult Inpatient Plan of Care  Goal: Plan of Care Review  Outcome: Not Progressing  Goal: Patient-Specific Goal (Individualized)  Outcome: Not Progressing  Goal: Absence of Hospital-Acquired Illness or Injury  Outcome: Not Progressing  Goal: Optimal Comfort and Wellbeing  Outcome: Not Progressing  Goal: Readiness for Transition of Care  Outcome: Not Progressing     Problem: Fall Injury Risk  Goal: Absence of Fall and Fall-Related Injury  Outcome: Not Progressing     Problem: Wound  Goal: Optimal Coping  Outcome: Not Progressing  Goal: Optimal Functional Ability  Outcome: Not Progressing  Goal: Absence of Infection Signs and Symptoms  Outcome: Not Progressing  Goal: Improved Oral Intake  Outcome: Not Progressing  Goal: Optimal Pain Control and Function  Outcome: Not Progressing  Goal: Skin Health and Integrity  Outcome: Not Progressing  Goal: Optimal Wound Healing  Outcome: Not Progressing     Problem: Acute Kidney Injury/Impairment  Goal: Fluid and Electrolyte Balance  Outcome: Not Progressing  Goal: Improved Oral Intake  Outcome: Not Progressing  Goal: Effective Renal Function  Outcome: Not Progressing     Problem: Mechanical Ventilation Invasive  Goal: Effective Communication  Outcome: Not Progressing  Goal: Optimal Device Function  Outcome: Not Progressing  Goal: Mechanical Ventilation Liberation  Outcome: Not Progressing  Goal: Optimal Nutrition Delivery  Outcome: Not Progressing  Goal: Absence of Device-Related Skin and Tissue Injury  Outcome: Not Progressing  Goal: Absence of Ventilator-Induced Lung Injury  Outcome: Not Progressing     Pt remains intubated/sedated on vent Fent and precedex gtts continue to infuse to maintain a RASS goal of 0 as ordered. Patient remains afebrile with TMAX of  via rectal core  temp monitoring.   Patient continues to have marginal UOP via the viveros in place.   TF continues to infuse at goal rate of 10 mL/hr. TPN continues to infuse as ordered. All wounds CDI. Patient turned q 2 hrs with pillows and wedge.

## 2024-10-27 NOTE — SUBJECTIVE & OBJECTIVE
Interval History: No acute events.  Mother requested that we not trial pressure support today.  Vent and hemodynamics are stable.      Objective:     Vital Signs (Most Recent):  Temp: 99 °F (37.2 °C) (10/27/24 1015)  Pulse: 99 (10/27/24 1015)  Resp: 16 (10/27/24 1015)  BP: 135/78 (10/27/24 1000)  SpO2: 100 % (10/27/24 1015) Vital Signs (24h Range):  Temp:  [95.4 °F (35.2 °C)-101 °F (38.3 °C)] 99 °F (37.2 °C)  Pulse:  [] 99  Resp:  [0-67] 16  SpO2:  [99 %-100 %] 100 %  BP: (109-148)/(69-89) 135/78     Weight: 46 kg (101 lb 6.6 oz)  Body mass index is 17.41 kg/m².      Intake/Output Summary (Last 24 hours) at 10/27/2024 1124  Last data filed at 10/27/2024 1100  Gross per 24 hour   Intake 2362.63 ml   Output 955 ml   Net 1407.63 ml        Physical Exam  Vitals and nursing note reviewed.   Constitutional:       General: He is not in acute distress.     Appearance: He is ill-appearing.   Cardiovascular:      Rate and Rhythm: Normal rate and regular rhythm.      Pulses: Normal pulses.      Heart sounds: No murmur heard.  Pulmonary:      Effort: Pulmonary effort is normal. No respiratory distress.      Breath sounds: Rales (right) present. No wheezing or rhonchi.   Abdominal:      General: Abdomen is flat. There is no distension.      Palpations: Abdomen is soft.      Tenderness: There is no abdominal tenderness.   Musculoskeletal:      Right lower leg: No edema.      Left lower leg: No edema.      Comments: Chronic contracture   Neurological:      Mental Status: He is alert.      Comments: Moves spontaneously, but not following commands           Review of Systems    Vents:  Vent Mode: A/C (10/27/24 0920)  Ventilator Initiated: Yes (10/17/24 0102)  Set Rate: 12 BPM (10/27/24 0920)  Vt Set: 350 mL (10/27/24 0920)  Pressure Support: 15 cmH20 (10/26/24 1516)  PEEP/CPAP: 5 cmH20 (10/27/24 0920)  Oxygen Concentration (%): 21 (10/27/24 1015)  Peak Airway Pressure: 26 cmH20 (10/27/24 0920)  Plateau Pressure: 18 cmH20  (10/27/24 0920)  Total Ve: 5.95 L/m (10/27/24 0920)  Negative Inspiratory Force (cm H2O): 0 (10/27/24 0920)  F/VT Ratio<105 (RSBI): (!) 40.38 (10/27/24 0920)    Lines/Drains/Airways       Central Venous Catheter Line  Duration             Percutaneous Central Line - Triple Lumen  10/21/24 0814 Internal Jugular Left 6 days              Drain  Duration                  NG/OG Tube 10/16/24 0515 16 Fr. Right nostril 11 days         Urethral Catheter 10/16/24 0156 Double-lumen 16 Fr. 11 days         Gastrostomy/Enterostomy 10/21/24 0844 Gastrostomy tube w/ balloon LUQ feeding 6 days              Airway  Duration                  Airway - Non-Surgical 10/22/24 1305 Endotracheal Tube 4 days                    Significant Labs:    CBC/Anemia Profile:  Recent Labs   Lab 10/26/24  0431 10/27/24  0430   WBC 16.55* 18.28*   HGB 9.0* 8.6*   HCT 27.0* 26.0*   * 470*   MCV 79* 81*   RDW 14.1 14.6*        Chemistries:  Recent Labs   Lab 10/26/24  0431 10/27/24  0430    139   K 3.6 4.0    102   CO2 28 29   BUN 11 12   CREATININE 0.6 0.6   CALCIUM 8.2* 8.2*   ALBUMIN 1.6* 1.7*   PROT 6.2 6.2   BILITOT 1.3* 0.9   ALKPHOS 136 133   ALT 14 12   AST 17 15   MG 2.1 2.0   PHOS 3.0 3.0       All pertinent labs within the past 24 hours have been reviewed.    Significant Imaging:  I have reviewed all pertinent imaging results/findings within the past 24 hours.

## 2024-10-27 NOTE — SUBJECTIVE & OBJECTIVE
Interval History: larger bowel movement over past 24hrs. Low residuals on TF checks.    Medications:  Continuous Infusions:   dexmedeTOMIDine (Precedex) infusion (titrating)  0-1.4 mcg/kg/hr Intravenous Continuous 15.79 mL/hr at 10/27/24 1100 1.4 mcg/kg/hr at 10/27/24 1100    D10W   Intravenous Continuous PRN        fentanyl  0-250 mcg/hr Intravenous Continuous 7.5 mL/hr at 10/27/24 1100 75 mcg/hr at 10/27/24 1100    TPN ADULT CENTRAL LINE CUSTOM   Intravenous Continuous 50 mL/hr at 10/27/24 1100 Rate Verify at 10/27/24 1100    TPN ADULT CENTRAL LINE CUSTOM   Intravenous Continuous         Scheduled Meds:   baclofen  10 mg Per G Tube BID    ceFEPime IV (PEDS and ADULTS)  2 g Intravenous Q8H    chlorhexidine  15 mL Mouth/Throat BID    cloNIDine 0.1 mg/24 hr td ptwk  1 patch Transdermal Q7 Days    diazePAM  2 mg Per G Tube BID    enoxparin  30 mg Subcutaneous Q24H (prophylaxis, 1700)    famotidine  20 mg Per G Tube BID    levETIRAcetam (Keppra) IV (PEDS and ADULTS)  2,000 mg Intravenous Q12H    PHENObarbital  65 mg Intravenous BID     PRN Meds:  Current Facility-Administered Medications:     0.9%  NaCl infusion (for blood administration), , Intravenous, Q24H PRN    acetaminophen, 650 mg, Per NG tube, Q6H PRN    albuterol-ipratropium, 3 mL, Nebulization, Q6H PRN    D10W, , Intravenous, Continuous PRN    dextrose 10%, 12.5 g, Intravenous, PRN    dextrose 10%, 25 g, Intravenous, PRN    glucagon (human recombinant), 1 mg, Intramuscular, PRN    insulin aspart U-100, 0-10 Units, Subcutaneous, Q4H PRN     Review of patient's allergies indicates:   Allergen Reactions    Strawberries [strawberry] Hives     STRAWBERRIES ALLERGIC REACTIONS   (SEIZURES AND HIVES )     Objective:     Vital Signs (Most Recent):  Temp: 99 °F (37.2 °C) (10/27/24 1015)  Pulse: 99 (10/27/24 1015)  Resp: 16 (10/27/24 1015)  BP: 135/78 (10/27/24 1000)  SpO2: 100 % (10/27/24 1015) Vital Signs (24h Range):  Temp:  [95.4 °F (35.2 °C)-101 °F (38.3 °C)] 99 °F  (37.2 °C)  Pulse:  [] 99  Resp:  [0-67] 16  SpO2:  [99 %-100 %] 100 %  BP: (109-148)/(69-89) 135/78     Weight: 46 kg (101 lb 6.6 oz)  Body mass index is 17.41 kg/m².    Intake/Output - Last 3 Shifts         10/25 0700  10/26 0659 10/26 0700  10/27 0659 10/27 0700  10/28 0659    I.V. (mL/kg) 629.8 (13.7) 538.8 (11.7) 111.3 (2.4)    Blood       NG/ 345 130    IV Piggyback 528.5      .7 1196.5 236    Total Intake(mL/kg) 1767 (38.4) 2080.3 (45.2) 477.3 (10.4)    Urine (mL/kg/hr) 585 (0.5) 885 (0.8) 225 (1.2)    Drains       Stool 0 0     Total Output 585 885 225    Net +1182 +1195.3 +252.3           Stool Occurrence 2 x 1 x              Physical Exam  Constitutional:       Appearance: He is ill-appearing.      Comments: Intubated    HENT:      Nose:      Comments: NG tube in place  Cardiovascular:      Rate and Rhythm: Normal rate.   Pulmonary:      Comments: Intubated  Abdominal:      Comments: Gastrostomy in place.  Midline incision c/d/I with staples in place. Mild distention          Significant Labs:  I have reviewed all pertinent lab results within the past 24 hours.  CBC:   Recent Labs   Lab 10/27/24  0430   WBC 18.28*   RBC 3.20*   HGB 8.6*   HCT 26.0*   *   MCV 81*   MCH 26.9*   MCHC 33.1     BMP:   Recent Labs   Lab 10/27/24  0430   *      K 4.0      CO2 29   BUN 12   CREATININE 0.6   CALCIUM 8.2*   MG 2.0     CMP:   Recent Labs   Lab 10/27/24  0430   *   CALCIUM 8.2*   ALBUMIN 1.7*   PROT 6.2      K 4.0   CO2 29      BUN 12   CREATININE 0.6   ALKPHOS 133   ALT 12   AST 15   BILITOT 0.9     LFTs:   Recent Labs   Lab 10/27/24  0430   ALT 12   AST 15   ALKPHOS 133   BILITOT 0.9   PROT 6.2   ALBUMIN 1.7*       Significant Diagnostics:  I have reviewed all pertinent imaging results/findings within the past 24 hours.

## 2024-10-27 NOTE — ASSESSMENT & PLAN NOTE
POD#9/6 s/p ex lap, extensive lysis of adhesions, small bowel resection, takedown of gastrostomy tube with partial gastrectomy, abdomen left open in discontinuity with abthera in place.  Now status post small-bowel anastomosis, open gastrostomy placement and abdominal closure    - okay to slowly advance tube feeds via Gtube and check residuals. Back down rate if high residuals.  - Continue TPN until tolerating tube feeds  - recommend pain control with IV meds  - remainder of care per primary team

## 2024-10-27 NOTE — PROGRESS NOTES
[facility] Care ([facility])  Critical Care Medicine  Progress Note    Patient Name: Ronny Ramey  MRN: [id number]  Admission Date: 10/16/2024  Hospital Length of Stay: 11 days  Code Status: Full Code  Attending Provider: Kiel Perez MD  Primary Care Provider: Herman Nathan DNP   Principal Problem: Acute hypercapnic respiratory failure    Subjective:     HPI:  Ronny Ramey is a 23 yr old male [facility] resident with CP, spastic quadriplegia, s/p G tube, epilepsy who presented to [facility] ED on 10/16 with shortness of breath/respiratory distress. Patient was placed on CPAP with little improvement. KUB shows small bowel obstruction. Patient was intubated and transferred to [facility]. Critical care consulted for admission. Patient with history of admissions for bowel obstruction and aspiration pneumonia.     Hospital/ICU Course:  10/17/2024: Patient with decreasing UOP overnight. Renal function worsening. Small bolus given with little response. Additional boluses today. HTN overnight, clonidine patch started. Continue bowel rest. NGT advanced per Abd XR, continued bowel obstruction on XR as well as fecal impaction. Will attempt to disimpact. Gen Surg following. Propofol switched to precedex for vent weaning.  10/18/2024: XR small bowel FT shows high-grade small bowel obstruction. Gen surg planning for ex lap today. Will keep intubated for procedure. Cultures NGTD, antibiotics stopped.  10/19/2024: Patient s/p ex lap with extensive lysis of adhesions, small bowel resection, takedown of gastrostomy tube with partial gastrectomy, abdomen left open in discontinuity with abthera in place. Gen Surg plans to take for exploratory laparotomy Monday 10/21 for small-bowel anastomosis and gastrostomy tube placement. Keeping intubated for surgery.   10/20/2024: Remains intubated. Pending surgery tomorrow. Drop in H&H overnight, but no signs of bleeding, vital signs stable.    10/21 - To the OR this morning with small bowel anastamosis and G-tube placement.  Remains on the vent.  Hemodynamics stable.  10/22 - NAEON.  Vent and hemodynamics stable.  Extubated, but quickly failed due to anxiety and secretions with severe tachypnea and desats  10/23 - GALO following reintubation.  Vent weaned back down.  Hemodynamics stable.    10/24 - vent settings minimal.  1 unit of blood today.  Hemodynamics stable.  10/25 - NAEON.  21% FiO2 on the vent.  Failed SBT with sister at bedside.  Hemodynamics stable.  10/26 - failed SBT with daughter and then mother at bedside yesterday due to rapid/shallow breathing.  Vent and hemodynamics stable.    10/27 - NAEON.  Vent and hemodynamics stable.    Interval History: No acute events.  Mother requested that we not trial pressure support today.  Vent and hemodynamics are stable.      Objective:     Vital Signs (Most Recent):  Temp: 99 °F (37.2 °C) (10/27/24 1015)  Pulse: 99 (10/27/24 1015)  Resp: 16 (10/27/24 1015)  BP: 135/78 (10/27/24 1000)  SpO2: 100 % (10/27/24 1015) Vital Signs (24h Range):  Temp:  [95.4 °F (35.2 °C)-101 °F (38.3 °C)] 99 °F (37.2 °C)  Pulse:  [] 99  Resp:  [0-67] 16  SpO2:  [99 %-100 %] 100 %  BP: (109-148)/(69-89) 135/78     Weight: 46 kg (101 lb 6.6 oz)  Body mass index is 17.41 kg/m².      Intake/Output Summary (Last 24 hours) at 10/27/2024 1124  Last data filed at 10/27/2024 1100  Gross per 24 hour   Intake 2362.63 ml   Output 955 ml   Net 1407.63 ml        Physical Exam  Vitals and nursing note reviewed.   Constitutional:       General: He is not in acute distress.     Appearance: He is ill-appearing.   Cardiovascular:      Rate and Rhythm: Normal rate and regular rhythm.      Pulses: Normal pulses.      Heart sounds: No murmur heard.  Pulmonary:      Effort: Pulmonary effort is normal. No respiratory distress.      Breath sounds: Rales (right) present. No wheezing or rhonchi.   Abdominal:      General: Abdomen is flat. There is  no distension.      Palpations: Abdomen is soft.      Tenderness: There is no abdominal tenderness.   Musculoskeletal:      Right lower leg: No edema.      Left lower leg: No edema.      Comments: Chronic contracture   Neurological:      Mental Status: He is alert.      Comments: Moves spontaneously, but not following commands           Review of Systems    Vents:  Vent Mode: A/C (10/27/24 0920)  Ventilator Initiated: Yes (10/17/24 0102)  Set Rate: 12 BPM (10/27/24 0920)  Vt Set: 350 mL (10/27/24 0920)  Pressure Support: 15 cmH20 (10/26/24 1516)  PEEP/CPAP: 5 cmH20 (10/27/24 0920)  Oxygen Concentration (%): 21 (10/27/24 1015)  Peak Airway Pressure: 26 cmH20 (10/27/24 0920)  Plateau Pressure: 18 cmH20 (10/27/24 0920)  Total Ve: 5.95 L/m (10/27/24 0920)  Negative Inspiratory Force (cm H2O): 0 (10/27/24 0920)  F/VT Ratio<105 (RSBI): (!) 40.38 (10/27/24 0920)    Lines/Drains/Airways       Central Venous Catheter Line  Duration             Percutaneous Central Line - Triple Lumen  10/21/24 0814 Internal Jugular Left 6 days              Drain  Duration                  NG/OG Tube 10/16/24 0515 16 Fr. Right nostril 11 days         Urethral Catheter 10/16/24 0156 Double-lumen 16 Fr. 11 days         Gastrostomy/Enterostomy 10/21/24 0844 Gastrostomy tube w/ balloon LUQ feeding 6 days              Airway  Duration                  Airway - Non-Surgical 10/22/24 1305 Endotracheal Tube 4 days                    Significant Labs:    CBC/Anemia Profile:  Recent Labs   Lab 10/26/24  0431 10/27/24  0430   WBC 16.55* 18.28*   HGB 9.0* 8.6*   HCT 27.0* 26.0*   * 470*   MCV 79* 81*   RDW 14.1 14.6*        Chemistries:  Recent Labs   Lab 10/26/24  0431 10/27/24  0430    139   K 3.6 4.0    102   CO2 28 29   BUN 11 12   CREATININE 0.6 0.6   CALCIUM 8.2* 8.2*   ALBUMIN 1.6* 1.7*   PROT 6.2 6.2   BILITOT 1.3* 0.9   ALKPHOS 136 133   ALT 14 12   AST 17 15   MG 2.1 2.0   PHOS 3.0 3.0       All pertinent labs within the past  24 hours have been reviewed.    Significant Imaging:  I have reviewed all pertinent imaging results/findings within the past 24 hours.    ABG  Recent Labs   Lab 10/24/24  0406   PH 7.451*   PO2 74*   PCO2 32.5*   HCO3 22.7*   BE -1     Assessment/Plan:     Neuro  Intellectual disability with epilepsy  Home AEDs restarted: Keppra and phenobarb    Spastic quadriplegic cerebral palsy  Turn q 2hrs  Aspiration precautions  Baclofen restarted at half home dose 10/23    Ophtho  Legally blind  Supportive Care    Pulmonary  * Acute hypercapnic respiratory failure  Patient with Hypercapnic Respiratory failure which is Acute on chronic. Unclear if on O2 at the NH. Supplemental oxygen was provided and noted-     Vent Mode: A/C  Oxygen Concentration (%):  [21] 21  Resp Rate Total:  [12 br/min-56 br/min] 16 br/min  Vt Set:  [350 mL] 350 mL  PEEP/CPAP:  [5 cmH20] 5 cmH20  Pressure Support:  [12 cmH20-15 cmH20] 15 cmH20  Mean Airway Pressure:  [8 cmH20-15 cmH20] 8.7 cmH20    .   Signs/symptoms of respiratory failure include- tachypnea, increased work of breathing, respiratory distress, and use of accessory muscles. Contributing diagnoses includes - Aspiration Labs and images were reviewed. Patient Has recent ABG, which has been reviewed. Will treat underlying causes and adjust management of respiratory failure as follows-     Wean vent settings as tolerated  Broad spectrum antibiotics- stopped, cultures NGTD  Duonebs prn  Sedation: Precedex, Fentanyl  Will keep intubated for surgeries planned  Bowel rest-NPO    10/21 - SAT/SBT starting this afternoon.  Nonverbal and blind baseline status will likely complicate extubation course.  10/22- failed SBT yesterday due to apnea.   Sedation off this morning and plan SBT again today.  10/23 - extubated yesterday, but quickly failed due to anxiety and difficulty with secretions.  Dropped his sat and got extremely tachypneic.  Reintubated and initially had high requirements but now weaned back  to 40% and 5 of PEEP.  Some concern for aspiration of secretions.  Will coordinate with mom for her to be present next time we attempt.  10/24 - vent weaned to 21%.  Starting moving back to SAT/SBT today.  10/25 - Fentanyl off this morning and tried SBT with sister at bedside to help calm him down. We let him go about 35 mins on PSV, but RR consistently in the 40's -50's with tidal volume below 200 @ 7cm H2O. Placed back on AC/VC.  Will try again this afternoon when his mom arrives.  10/26 - continues to fail fairly quickly, even with PSV up to 12 cmH2O.  Ongoing discussions with mom regarding trach.  10/27 - mother requested we not trial pressure support today.      Renal/  MAYELIN (acute kidney injury)  MAYELIN is likely due to pre-renal azotemia due to intravascular volume depletion. Baseline creatinine is  0.8 . Most recent creatinine and eGFR are listed below.  Recent Labs     10/25/24  0408 10/26/24  0431 10/27/24  0430   CREATININE 0.6 0.6 0.6   EGFRNORACEVR >60 >60 >60        Plan  - Avoid nephrotoxins and renally dose meds for GFR listed above  - Monitor urine output, serial BMP, and adjust therapy as needed  - Maintain [place] catheter - IVF boluses prn  - Resolved after fluid resuscitation      Endocrine  Body mass index (BMI) less than 19  Nutrition consult for Tube feeding recs when able to resume tube feeds  NPO currently due to bowel surgery  Start TPN 10/25    GI  Bowel obstruction  KUB shows small bowel obstruction  NGT to suction  Keep NPO  CT abd/pelvis shows high grade bowel obstruction  Gen Surg consulted  Fecal impaction on Abd XR- disimpacted  SBFT XR shows high-grade small bowel obstruction    Under went Ex lap 10/18 with extensive lysis of adhesions, small bowel resection, takedown of gastrostomy tube with partial gastrectomy, abdomen left open in discontinuity with abthera in place  Ex lap on 10/21 with small-bowel anastomosis and G tube placement    10/24 - Bowel sounds improving, but no BM  yet  10/25 - Bowel sounds improving, but no BM yet.  Started TPN  10/26 - tolerating trickle feeds and had good BM this afternoon.  10/27 - BM overnight.  Tolerating trickle feeds.    PEG (percutaneous endoscopic gastrostomy) status  Patient noted to have a percutaneous endoscopic gastrostomy tube in place. I have personally inspected the tube.Tube was placed on this admission, with date of procedure- 10/16  There are no signs of drainage or infection around the site. Routine care to be done by wound care and nursing staff.     G tube removed during ex-lap on 10/18  Tube feedings on hold due to Bowel obstruction  Ex lap 10/21 with G tube replacement  Starting to use for meds 10/23  Trickled feeds started 10/25          Critical Care Time: 35 minutes  Critical secondary to respiratory failure, infusion of high risk medications      Critical care was time spent personally by me on the following activities: development of treatment plan with patient or surrogate and bedside caregivers, discussions with consultants, evaluation of patient's response to treatment, examination of patient, ordering and performing treatments and interventions, ordering and review of laboratory studies, ordering and review of radiographic studies, pulse oximetry, re-evaluation of patient's condition. This critical care time did not overlap with that of any other provider or involve time for any procedures.     Kiel Perez MD  Critical Care Medicine  [facility] - Intensive Care ([facility])

## 2024-10-28 LAB
ALBUMIN SERPL BCP-MCNC: 1.8 G/DL (ref 3.5–5.2)
ALP SERPL-CCNC: 165 U/L (ref 40–150)
ALT SERPL W/O P-5'-P-CCNC: 13 U/L (ref 10–44)
ANION GAP SERPL CALC-SCNC: 7 MMOL/L (ref 8–16)
AST SERPL-CCNC: 18 U/L (ref 10–40)
BASOPHILS # BLD AUTO: 0.06 K/UL (ref 0–0.2)
BASOPHILS NFR BLD: 0.2 % (ref 0–1.9)
BILIRUB SERPL-MCNC: 0.7 MG/DL (ref 0.1–1)
BUN SERPL-MCNC: 13 MG/DL (ref 6–20)
CALCIUM SERPL-MCNC: 8.3 MG/DL (ref 8.7–10.5)
CHLORIDE SERPL-SCNC: 101 MMOL/L (ref 95–110)
CO2 SERPL-SCNC: 30 MMOL/L (ref 23–29)
CREAT SERPL-MCNC: 0.6 MG/DL (ref 0.5–1.4)
DIFFERENTIAL METHOD BLD: ABNORMAL
EOSINOPHIL # BLD AUTO: 0.5 K/UL (ref 0–0.5)
EOSINOPHIL NFR BLD: 1.8 % (ref 0–8)
ERYTHROCYTE [DISTWIDTH] IN BLOOD BY AUTOMATED COUNT: 14.8 % (ref 11.5–14.5)
EST. GFR  (NO RACE VARIABLE): >60 ML/MIN/1.73 M^2
GLUCOSE SERPL-MCNC: 123 MG/DL (ref 70–110)
HCT VFR BLD AUTO: 26.4 % (ref 40–54)
HGB BLD-MCNC: 9.1 G/DL (ref 14–18)
IMM GRANULOCYTES # BLD AUTO: 0.3 K/UL (ref 0–0.04)
IMM GRANULOCYTES NFR BLD AUTO: 1.1 % (ref 0–0.5)
LYMPHOCYTES # BLD AUTO: 2.1 K/UL (ref 1–4.8)
LYMPHOCYTES NFR BLD: 7.8 % (ref 18–48)
MAGNESIUM SERPL-MCNC: 1.8 MG/DL (ref 1.6–2.6)
MCH RBC QN AUTO: 27.4 PG (ref 27–31)
MCHC RBC AUTO-ENTMCNC: 34.5 G/DL (ref 32–36)
MCV RBC AUTO: 80 FL (ref 82–98)
MONOCYTES # BLD AUTO: 1.4 K/UL (ref 0.3–1)
MONOCYTES NFR BLD: 5.4 % (ref 4–15)
NEUTROPHILS # BLD AUTO: 21.9 K/UL (ref 1.8–7.7)
NEUTROPHILS NFR BLD: 83.7 % (ref 38–73)
NRBC BLD-RTO: 0 /100 WBC
PHOSPHATE SERPL-MCNC: 2.8 MG/DL (ref 2.7–4.5)
PLATELET # BLD AUTO: 539 K/UL (ref 150–450)
PMV BLD AUTO: 10.9 FL (ref 9.2–12.9)
POCT GLUCOSE: 129 MG/DL (ref 70–110)
POCT GLUCOSE: 136 MG/DL (ref 70–110)
POCT GLUCOSE: 137 MG/DL (ref 70–110)
POTASSIUM SERPL-SCNC: 4.3 MMOL/L (ref 3.5–5.1)
PROT SERPL-MCNC: 6.7 G/DL (ref 6–8.4)
RBC # BLD AUTO: 3.32 M/UL (ref 4.6–6.2)
SODIUM SERPL-SCNC: 138 MMOL/L (ref 136–145)
WBC # BLD AUTO: 26.15 K/UL (ref 3.9–12.7)

## 2024-10-28 PROCEDURE — 25000003 PHARM REV CODE 250: Performed by: NURSE PRACTITIONER

## 2024-10-28 PROCEDURE — 25000003 PHARM REV CODE 250: Performed by: INTERNAL MEDICINE

## 2024-10-28 PROCEDURE — 99900026 HC AIRWAY MAINTENANCE (STAT)

## 2024-10-28 PROCEDURE — 84100 ASSAY OF PHOSPHORUS: CPT | Performed by: NURSE PRACTITIONER

## 2024-10-28 PROCEDURE — 94003 VENT MGMT INPAT SUBQ DAY: CPT

## 2024-10-28 PROCEDURE — C9399 UNCLASSIFIED DRUGS OR BIOLOG: HCPCS | Performed by: INTERNAL MEDICINE

## 2024-10-28 PROCEDURE — 80053 COMPREHEN METABOLIC PANEL: CPT | Performed by: NURSE PRACTITIONER

## 2024-10-28 PROCEDURE — 63600175 PHARM REV CODE 636 W HCPCS: Performed by: INTERNAL MEDICINE

## 2024-10-28 PROCEDURE — 83735 ASSAY OF MAGNESIUM: CPT | Performed by: NURSE PRACTITIONER

## 2024-10-28 PROCEDURE — 94761 N-INVAS EAR/PLS OXIMETRY MLT: CPT

## 2024-10-28 PROCEDURE — 99900035 HC TECH TIME PER 15 MIN (STAT)

## 2024-10-28 PROCEDURE — 85025 COMPLETE CBC W/AUTO DIFF WBC: CPT | Performed by: NURSE PRACTITIONER

## 2024-10-28 PROCEDURE — 63600175 PHARM REV CODE 636 W HCPCS: Performed by: NURSE PRACTITIONER

## 2024-10-28 PROCEDURE — 20000000 HC ICU ROOM

## 2024-10-28 PROCEDURE — 27100171 HC OXYGEN HIGH FLOW UP TO 24 HOURS

## 2024-10-28 PROCEDURE — 63600175 PHARM REV CODE 636 W HCPCS: Performed by: SPECIALIST

## 2024-10-28 RX ORDER — LEVETIRACETAM 100 MG/ML
2000 SOLUTION ORAL 2 TIMES DAILY
Status: DISCONTINUED | OUTPATIENT
Start: 2024-10-28 | End: 2024-10-31

## 2024-10-28 RX ORDER — MAGNESIUM SULFATE HEPTAHYDRATE 40 MG/ML
2 INJECTION, SOLUTION INTRAVENOUS ONCE
Status: COMPLETED | OUTPATIENT
Start: 2024-10-28 | End: 2024-10-28

## 2024-10-28 RX ORDER — PHENOBARBITAL 32.4 MG/1
64.8 TABLET ORAL 2 TIMES DAILY
Status: DISCONTINUED | OUTPATIENT
Start: 2024-10-28 | End: 2024-11-05 | Stop reason: HOSPADM

## 2024-10-28 RX ORDER — BACLOFEN 10 MG/1
20 TABLET ORAL 2 TIMES DAILY
Status: DISCONTINUED | OUTPATIENT
Start: 2024-10-28 | End: 2024-11-05 | Stop reason: HOSPADM

## 2024-10-28 RX ORDER — ACETAMINOPHEN 650 MG/20.3ML
650 LIQUID ORAL EVERY 6 HOURS PRN
Status: DISCONTINUED | OUTPATIENT
Start: 2024-10-28 | End: 2024-11-05 | Stop reason: HOSPADM

## 2024-10-28 RX ADMIN — CHLORHEXIDINE GLUCONATE 0.12% ORAL RINSE 15 ML: 1.2 LIQUID ORAL at 09:10

## 2024-10-28 RX ADMIN — FAMOTIDINE 20 MG: 40 POWDER, FOR SUSPENSION ORAL at 09:10

## 2024-10-28 RX ADMIN — LEVETIRACETAM 2000 MG: 500 INJECTION, SOLUTION INTRAVENOUS at 09:10

## 2024-10-28 RX ADMIN — DEXMEDETOMIDINE HYDROCHLORIDE 1.4 MCG/KG/HR: 4 INJECTION INTRAVENOUS at 09:10

## 2024-10-28 RX ADMIN — PHENOBARBITAL SODIUM 65 MG: 65 INJECTION INTRAMUSCULAR at 09:10

## 2024-10-28 RX ADMIN — MAGNESIUM SULFATE HEPTAHYDRATE 2 G: 40 INJECTION, SOLUTION INTRAVENOUS at 11:10

## 2024-10-28 RX ADMIN — DIAZEPAM 2 MG: 2 TABLET ORAL at 09:10

## 2024-10-28 RX ADMIN — DEXMEDETOMIDINE HYDROCHLORIDE 1.4 MCG/KG/HR: 4 INJECTION INTRAVENOUS at 02:10

## 2024-10-28 RX ADMIN — CEFEPIME 2 G: 2 INJECTION, POWDER, FOR SOLUTION INTRAVENOUS at 11:10

## 2024-10-28 RX ADMIN — DEXMEDETOMIDINE HYDROCHLORIDE 1.4 MCG/KG/HR: 4 INJECTION INTRAVENOUS at 03:10

## 2024-10-28 RX ADMIN — CEFEPIME 2 G: 2 INJECTION, POWDER, FOR SOLUTION INTRAVENOUS at 03:10

## 2024-10-28 RX ADMIN — LEVETIRACETAM 2000 MG: 100 SOLUTION ORAL at 09:10

## 2024-10-28 RX ADMIN — FENTANYL CITRATE 50 MCG/HR: 50 INJECTION INTRAVENOUS at 05:10

## 2024-10-28 RX ADMIN — BACLOFEN 20 MG: 10 TABLET ORAL at 09:10

## 2024-10-28 RX ADMIN — BACLOFEN 10 MG: 10 TABLET ORAL at 09:10

## 2024-10-28 RX ADMIN — ENOXAPARIN SODIUM 30 MG: 30 INJECTION SUBCUTANEOUS at 06:10

## 2024-10-28 RX ADMIN — PHENOBARBITAL 64.8 MG: 32.4 TABLET ORAL at 09:10

## 2024-10-28 NOTE — ASSESSMENT & PLAN NOTE
POD#10/7 s/p ex lap, extensive lysis of adhesions, small bowel resection, takedown of gastrostomy tube with partial gastrectomy, abdomen left open in discontinuity with abthera in place.  Now status post small-bowel anastomosis, open gastrostomy placement and abdominal closure    - okay to advance G-tube tubes to goal then to bolus feeds as indicated  - remainder of care per primary team  - we will follow from afar.  Plan to remove staples 10-14 days postoperatively

## 2024-10-28 NOTE — PROGRESS NOTES
[facility] - Intensive Care [facility])  General Surgery  Progress Note    Subjective:     History of Present Illness:  23-year-old male with a history of several palsy presented to the emergency room with hypoxia which was felt to be secondary to aspiration.  According to the emergency room notes the patient required CPAP in route.  In the emergency room he was noted to have some abdominal distention.  The patient was intubated and subsequently admitted to the intensive care unit.      The patient does have a history of bowel obstructions but they may have been related constipation.    Post-Op Info:  Procedure(s) (LRB):  LAPAROTOMY, EXPLORATORY, OPEN GASTROSTOMY TUBE (N/A)   7 Days Post-Op     Interval History:  Tolerating tube feeds at 20 cc/hour with minimal residual.  Patient had large bowel movement per nursing    Medications:  Continuous Infusions:   dexmedeTOMIDine (Precedex) infusion (titrating)  0-1.4 mcg/kg/hr Intravenous Continuous 15.79 mL/hr at 10/28/24 1518 1.4 mcg/kg/hr at 10/28/24 1518    fentanyl  0-250 mcg/hr Intravenous Continuous 5 mL/hr at 10/28/24 1500 50 mcg/hr at 10/28/24 1500     Scheduled Meds:   baclofen  20 mg Per G Tube BID    chlorhexidine  15 mL Mouth/Throat BID    cloNIDine 0.1 mg/24 hr td ptwk  1 patch Transdermal Q7 Days    diazePAM  2 mg Per G Tube BID    enoxparin  30 mg Subcutaneous Q24H (prophylaxis, 1700)    famotidine  20 mg Per G Tube BID    levETIRAcetam  2,000 mg Per G Tube BID    PHENobarbitaL  64.8 mg Per G Tube BID     PRN Meds:  Current Facility-Administered Medications:     0.9%  NaCl infusion (for blood administration), , Intravenous, Q24H PRN    acetaminophen, 650 mg, Per G Tube, Q6H PRN    albuterol-ipratropium, 3 mL, Nebulization, Q6H PRN    dextrose 10%, 12.5 g, Intravenous, PRN    dextrose 10%, 25 g, Intravenous, PRN    glucagon (human recombinant), 1 mg, Intramuscular, PRN    insulin aspart U-100, 0-10 Units, Subcutaneous, Q4H PRN     Review of patient's allergies  indicates:   Allergen Reactions    Strawberries [strawberry] Hives     STRAWBERRIES ALLERGIC REACTIONS   (SEIZURES AND HIVES )     Objective:     Vital Signs (Most Recent):  Temp: 98.6 °F (37 °C) (10/28/24 1530)  Pulse: 101 (10/28/24 1530)  Resp: 16 (10/28/24 1530)  BP: (!) 150/97 (10/28/24 1501)  SpO2: 100 % (10/28/24 1530) Vital Signs (24h Range):  Temp:  [94.6 °F (34.8 °C)-100.2 °F (37.9 °C)] 98.6 °F (37 °C)  Pulse:  [] 101  Resp:  [12-37] 16  SpO2:  [99 %-100 %] 100 %  BP: (104-150)/() 150/97     Weight: 46 kg (101 lb 6.6 oz)  Body mass index is 17.41 kg/m².    Intake/Output - Last 3 Shifts         10/26 0700  10/27 0659 10/27 0700  10/28 0659 10/28 0700  10/29 0659    I.V. (mL/kg) 538.8 (11.7) 550.2 (12) 259.1 (5.6)    NG/ 430 240    IV Piggyback       TPN 1196.5 1154.5 350.6    Total Intake(mL/kg) 2080.3 (45.2) 2134.6 (46.4) 849.7 (18.5)    Urine (mL/kg/hr) 885 (0.8) 1220 (1.1) 395 (0.9)    Stool 0 0     Total Output 885 1220 395    Net +1195.3 +914.6 +454.7           Stool Occurrence 1 x 1 x              Physical Exam  Constitutional:       Appearance: He is ill-appearing.   Cardiovascular:      Rate and Rhythm: Normal rate and regular rhythm.   Pulmonary:      Comments: Intubated, mechanically ventilated, no distress  Abdominal:      Comments: Soft, nondistended, nontender.  G-tube in place with tube feeds infusing          Significant Labs:  I have reviewed all pertinent lab results within the past 24 hours.  CBC:   Recent Labs   Lab 10/28/24  0356   WBC 26.15*   RBC 3.32*   HGB 9.1*   HCT 26.4*   *   MCV 80*   MCH 27.4   MCHC 34.5     CMP:   Recent Labs   Lab 10/28/24  0356   *   CALCIUM 8.3*   ALBUMIN 1.8*   PROT 6.7      K 4.3   CO2 30*      BUN 13   CREATININE 0.6   ALKPHOS 165*   ALT 13   AST 18   BILITOT 0.7       Significant Diagnostics:  I have reviewed all pertinent imaging results/findings within the past 24 hours.  Assessment/Plan:     * Acute  hypercapnic respiratory failure  Management per critical care.    Patient is currently intubated     MAYELIN (acute kidney injury)  Per critical Care    Bowel obstruction  POD#10/7 s/p ex lap, extensive lysis of adhesions, small bowel resection, takedown of gastrostomy tube with partial gastrectomy, abdomen left open in discontinuity with abthera in place.  Now status post small-bowel anastomosis, open gastrostomy placement and abdominal closure    - okay to advance G-tube tubes to goal then to bolus feeds as indicated  - remainder of care per primary team  - we will follow from afar.  Plan to remove staples 10-14 days postoperatively    PEG (percutaneous endoscopic gastrostomy) status  Surgically replaced Gastrostomy tube in place    Nonverbal  Monitor    Legally blind  Per critical care    Intellectual disability with epilepsy  Per critical care    Spastic quadriplegic cerebral palsy  Further management critical care medicine        Anna Marie Mo MD  General Surgery  [facility] - Intensive Care ([facility])

## 2024-10-28 NOTE — PROGRESS NOTES
[facility] ([facility])  Critical Care Medicine  Progress Note    Patient Name: Ronny Ramey  MRN: [id number]  Admission Date: 10/16/2024  Hospital Length of Stay: 12 days  Code Status: Full Code  Attending Provider: Sandee Hardin MD  Primary Care Provider: Herman Nathan DNP   Principal Problem: Acute hypercapnic respiratory failure    Subjective:     HPI:  Ronny Ramey is a 23 yr old male [facility] resident with CP, spastic quadriplegia, s/p G tube, epilepsy who presented to [facility] ED on 10/16 with shortness of breath/respiratory distress. Patient was placed on CPAP with little improvement. KUB shows small bowel obstruction. Patient was intubated and transferred to [facility]. Critical care consulted for admission. Patient with history of admissions for bowel obstruction and aspiration pneumonia.     Hospital/ICU Course:  10/17/2024: Patient with decreasing UOP overnight. Renal function worsening. Small bolus given with little response. Additional boluses today. HTN overnight, clonidine patch started. Continue bowel rest. NGT advanced per Abd XR, continued bowel obstruction on XR as well as fecal impaction. Will attempt to disimpact. Gen Surg following. Propofol switched to precedex for vent weaning.  10/18/2024: XR small bowel FT shows high-grade small bowel obstruction. Gen surg planning for ex lap today. Will keep intubated for procedure. Cultures NGTD, antibiotics stopped.  10/19/2024: Patient s/p ex lap with extensive lysis of adhesions, small bowel resection, takedown of gastrostomy tube with partial gastrectomy, abdomen left open in discontinuity with abthera in place. Gen Surg plans to take for exploratory laparotomy Monday 10/21 for small-bowel anastomosis and gastrostomy tube placement. Keeping intubated for surgery.   10/20/2024: Remains intubated. Pending surgery tomorrow. Drop in H&H overnight, but no signs of bleeding, vital signs stable.    10/21 - To the OR this morning with small bowel anastamosis and G-tube placement.  Remains on the vent.  Hemodynamics stable.  10/22 - NAEON.  Vent and hemodynamics stable.  Extubated, but quickly failed due to anxiety and secretions with severe tachypnea and desats  10/23 - GALO following reintubation.  Vent weaned back down.  Hemodynamics stable.    10/24 - vent settings minimal.  1 unit of blood today.  Hemodynamics stable.  10/25 - NAEON.  21% FiO2 on the vent.  Failed SBT with sister at bedside.  Hemodynamics stable.  10/26 - failed SBT with daughter and then mother at bedside yesterday due to rapid/shallow breathing.  Vent and hemodynamics stable.    10/27 - NAEON.  Vent and hemodynamics stable.  10/28 - Failed SBT. On full support. No hemodynamic issues;        Objective:     Vital Signs (Most Recent):  Temp: 99.3 °F (37.4 °C) (10/28/24 0931)  Pulse: 101 (10/28/24 0931)  Resp: (!) 21 (10/28/24 0931)  BP: 119/75 (10/28/24 0700)  SpO2: 100 % (10/28/24 0931) Vital Signs (24h Range):  Temp:  [94.6 °F (34.8 °C)-100.2 °F (37.9 °C)] 99.3 °F (37.4 °C)  Pulse:  [] 101  Resp:  [14-37] 21  SpO2:  [99 %-100 %] 100 %  BP: (104-144)/(63-89) 119/75     Weight: 46 kg (101 lb 6.6 oz)  Body mass index is 17.41 kg/m².      Intake/Output Summary (Last 24 hours) at 10/28/2024 1149  Last data filed at 10/28/2024 0900  Gross per 24 hour   Intake 1897.03 ml   Output 1115 ml   Net 782.03 ml        Physical Exam  Vitals and nursing note reviewed.   Constitutional:       General: He is not in acute distress.     Appearance: He is normal weight. He is ill-appearing. He is not toxic-appearing or diaphoretic.   HENT:      Head: Normocephalic.   Eyes:      Pupils: Pupils are equal, round, and reactive to light.   Cardiovascular:      Rate and Rhythm: Tachycardia present.      Heart sounds: Normal heart sounds.   Pulmonary:      Breath sounds: Normal breath sounds.   Abdominal:      General: There is no distension.   Neurological:       General: No focal deficit present.           Review of Systems   Unable to perform ROS: Acuity of condition       Vents:  Vent Mode: A/C (10/28/24 0931)  Ventilator Initiated: Yes (10/17/24 0102)  Set Rate: 12 BPM (10/28/24 0931)  Vt Set: 350 mL (10/28/24 0931)  Pressure Support: 15 cmH20 (10/26/24 1516)  PEEP/CPAP: 5 cmH20 (10/28/24 0931)  Oxygen Concentration (%): 21 (10/28/24 0931)  Peak Airway Pressure: 32 cmH20 (10/28/24 0931)  Plateau Pressure: 18 cmH20 (10/28/24 0931)  Total Ve: 7.41 L/m (10/28/24 0931)  Negative Inspiratory Force (cm H2O): 0 (10/28/24 0931)  F/VT Ratio<105 (RSBI): (!) 62.5 (10/28/24 0931)    Lines/Drains/Airways       Central Venous Catheter Line  Duration             Percutaneous Central Line - Triple Lumen  10/21/24 0814 Internal Jugular Left 7 days              Drain  Duration                  NG/OG Tube 10/16/24 0515 16 Fr. Right nostril 12 days         Urethral Catheter 10/16/24 0156 Double-lumen 16 Fr. 12 days         Gastrostomy/Enterostomy 10/21/24 0844 Gastrostomy tube w/ balloon LUQ feeding 7 days              Airway  Duration                  Airway - Non-Surgical 10/22/24 1305 Endotracheal Tube 5 days                    Significant Labs:    CBC/Anemia Profile:  Recent Labs   Lab 10/27/24  0430 10/28/24  0356   WBC 18.28* 26.15*   HGB 8.6* 9.1*   HCT 26.0* 26.4*   * 539*   MCV 81* 80*   RDW 14.6* 14.8*        Chemistries:  Recent Labs   Lab 10/27/24  0430 10/28/24  0356    138   K 4.0 4.3    101   CO2 29 30*   BUN 12 13   CREATININE 0.6 0.6   CALCIUM 8.2* 8.3*   ALBUMIN 1.7* 1.8*   PROT 6.2 6.7   BILITOT 0.9 0.7   ALKPHOS 133 165*   ALT 12 13   AST 15 18   MG 2.0 1.8   PHOS 3.0 2.8       All pertinent labs within the past 24 hours have been reviewed.    Significant Imaging:  I have reviewed all pertinent imaging results/findings within the past 24 hours.    AB  Recent Labs   Lab 10/24/24  0406   PH 7.451*   PO2 74*   PCO2 32.5*   HCO3 22.7*   BE -1      Assessment/Plan:     Neuro  Intellectual disability with epilepsy  Home AEDs restarted: Keppra and phenobarb    Spastic quadriplegic cerebral palsy  Turn q 2hrs  Aspiration precautions  Baclofen restarted at half home dose 10/23    Ophtho  Legally blind  Supportive Care    Pulmonary  * Acute hypercapnic respiratory failure  Patient with Hypercapnic Respiratory failure which is Acute on chronic. Unclear if on O2 at the NH. Supplemental oxygen was provided and noted-     Vent Mode: A/C  Oxygen Concentration (%):  [21] 21  Resp Rate Total:  [13 br/min-32 br/min] 16 br/min  Vt Set:  [350 mL] 350 mL  PEEP/CPAP:  [5 cmH20] 5 cmH20  Mean Airway Pressure:  [8.1 wgH77-03 cmH20] 11 cmH20    .   Signs/symptoms of respiratory failure include- tachypnea, increased work of breathing, respiratory distress, and use of accessory muscles. Contributing diagnoses includes - Aspiration Labs and images were reviewed. Patient Has recent ABG, which has been reviewed. Will treat underlying causes and adjust management of respiratory failure as follows-     Wean vent settings as tolerated  Broad spectrum antibiotics- stopped, cultures NGTD  Duonebs prn  Sedation: Precedex, Fentanyl  Will keep intubated for surgeries planned  Bowel rest-NPO    10/21 - SAT/SBT starting this afternoon.  Nonverbal and blind baseline status will likely complicate extubation course.  10/22- failed SBT yesterday due to apnea.   Sedation off this morning and plan SBT again today.  10/23 - extubated yesterday, but quickly failed due to anxiety and difficulty with secretions.  Dropped his sat and got extremely tachypneic.  Reintubated and initially had high requirements but now weaned back to 40% and 5 of PEEP.  Some concern for aspiration of secretions.  Will coordinate with mom for her to be present next time we attempt.  10/24 - vent weaned to 21%.  Starting moving back to SAT/SBT today.  10/25 - Fentanyl off this morning and tried SBT with sister at bedside to  help calm him down. We let him go about 35 mins on PSV, but RR consistently in the 40's -50's with tidal volume below 200 @ 7cm H2O. Placed back on AC/VC.  Will try again this afternoon when his mom arrives.  10/26 - continues to fail fairly quickly, even with PSV up to 12 cmH2O.  Ongoing discussions with mom regarding trach.  10/27 - mother requested we not trial pressure support today.      10/28 Awaiting family. Will need a trach    Renal/  MAYELIN (acute kidney injury)  MAYELIN is likely due to pre-renal azotemia due to intravascular volume depletion. Baseline creatinine is  0.8 . Most recent creatinine and eGFR are listed below.  Recent Labs     10/25/24  0408 10/26/24  0431 10/27/24  0430   CREATININE 0.6 0.6 0.6   EGFRNORACEVR >60 >60 >60        Plan  - Avoid nephrotoxins and renally dose meds for GFR listed above  - Monitor urine output, serial BMP, and adjust therapy as needed  - Maintain [place] catheter - IVF boluses prn  - Resolved after fluid resuscitation      Endocrine  Body mass index (BMI) less than 19  Nutrition consult for Tube feeding recs when able to resume tube feeds  NPO currently due to bowel surgery  Start TPN 10/25    GI  Bowel obstruction  KUB shows small bowel obstruction  NGT to suction  Keep NPO  CT abd/pelvis shows high grade bowel obstruction  Gen Surg consulted  Fecal impaction on Abd XR- disimpacted  SBFT XR shows high-grade small bowel obstruction    Under went Ex lap 10/18 with extensive lysis of adhesions, small bowel resection, takedown of gastrostomy tube with partial gastrectomy, abdomen left open in discontinuity with abthera in place  Ex lap on 10/21 with small-bowel anastomosis and G tube placement    10/24 - Bowel sounds improving, but no BM yet  10/25 - Bowel sounds improving, but no BM yet.  Started TPN  10/26 - tolerating trickle feeds and had good BM this afternoon.  10/27 - BM overnight.  Tolerating trickle feeds.    PEG (percutaneous endoscopic gastrostomy) status  Patient  noted to have a percutaneous endoscopic gastrostomy tube in place. I have personally inspected the tube.Tube was placed on this admission, with date of procedure- 10/16  There are no signs of drainage or infection around the site. Routine care to be done by wound care and nursing staff.     G tube removed during ex-lap on 10/18  Tube feedings on hold due to Bowel obstruction  Ex lap 10/21 with G tube replacement  Starting to use for meds 10/23  Trickled feeds started 10/25           Critical Care Daily Checklist:    A: Awake: RASS Goal/Actual Goal: RASS Goal: 0-->alert and calm  Actual:     B: Spontaneous Breathing Trial Performed? Spon. Breathing Trial Initiated?: (S) Initiated (10/26/24 1502)   C: SAT & SBT Coordinated?  yes                      D: Delirium: CAM-ICU Overall CAM-ICU: Negative   E: Early Mobility Performed? yes   F: Feeding Goal: Goals: 1. Pt will be initiated onto TPN within 24 hrs 2. Pt will tolerate and intake >75% EEN and EPN prior to RD follow up 3. Pt will tolerate and intake Edwin BID prior to RD follow up  Status: Nutrition Goal Status: new   Current Diet Order   Procedures    Diet NPO      AS: Analgesia/Sedation yes   T: Thromboembolic Prophylaxis yes   H: HOB > 300 yes   U: Stress Ulcer Prophylaxis (if needed) yes   G: Glucose Control yes   B: Bowel Function Stool Occurrence: 1   I: Indwelling Catheter (Lines & Lancaster) Necessity yes   D: De-escalation of Antimicrobials/Pharmacotherapies yes    Plan for the day/ETD yes    Code Status:  Family/Goals of Care: Full Code  yes     Critical Care Time: 38 minutes  Critical secondary to Patient has a condition that poses threat to life and bodily function: Severe Respiratory Distress      Critical care was time spent personally by me on the following activities: development of treatment plan with patient or surrogate and bedside caregivers, discussions with consultants, evaluation of patient's response to treatment, examination of patient, ordering  and performing treatments and interventions, ordering and review of laboratory studies, ordering and review of radiographic studies, pulse oximetry, re-evaluation of patient's condition. This critical care time did not overlap with that of any other provider or involve time for any procedures.     Sandee Hardin MD  Critical Care Medicine  [facility] [place] - Intensive [facility])

## 2024-10-28 NOTE — PLAN OF CARE
Pt remains intubated/sedated on vent. Fent and precedex gtts continue to infuse to maintain a RASS goal of 0 as ordered. Patient febrile with TMAX of  100.2 via rectal core temp monitoring PRN Acetaminophen not administered as order parameters not met. Patient had one large formed bowel movement during shift.   Patient continues to have increased UOP via the viveros in place.   TF continues to infuse at rate of 20 mL/hr. TPN continues to infuse as ordered. All wounds CDI. Patient turned q 2 hrs with pillows and wedge. POC reviewed with patient and patient's mother. Care remains ongoing.           Problem: Infection  Goal: Absence of Infection Signs and Symptoms  Outcome: Unable to Meet     Problem: Skin Injury Risk Increased  Goal: Skin Health and Integrity  Outcome: Unable to Meet     Problem: Adult Inpatient Plan of Care  Goal: Plan of Care Review  Outcome: Unable to Meet  Goal: Patient-Specific Goal (Individualized)  Outcome: Unable to Meet  Goal: Absence of Hospital-Acquired Illness or Injury  Outcome: Unable to Meet  Goal: Optimal Comfort and Wellbeing  Outcome: Unable to Meet  Goal: Readiness for Transition of Care  Outcome: Unable to Meet     Problem: Fall Injury Risk  Goal: Absence of Fall and Fall-Related Injury  Outcome: Unable to Meet     Problem: Wound  Goal: Optimal Coping  Outcome: Unable to Meet  Goal: Optimal Functional Ability  Outcome: Unable to Meet  Goal: Absence of Infection Signs and Symptoms  Outcome: Unable to Meet  Goal: Improved Oral Intake  Outcome: Unable to Meet  Goal: Optimal Pain Control and Function  Outcome: Unable to Meet  Goal: Skin Health and Integrity  Outcome: Unable to Meet  Goal: Optimal Wound Healing  Outcome: Unable to Meet     Problem: Acute Kidney Injury/Impairment  Goal: Fluid and Electrolyte Balance  Outcome: Unable to Meet  Goal: Improved Oral Intake  Outcome: Unable to Meet  Goal: Effective Renal Function  Outcome: Unable to Meet

## 2024-10-28 NOTE — SUBJECTIVE & OBJECTIVE
Interval History:  Tolerating tube feeds at 20 cc/hour with minimal residual.  Patient had large bowel movement per nursing    Medications:  Continuous Infusions:   dexmedeTOMIDine (Precedex) infusion (titrating)  0-1.4 mcg/kg/hr Intravenous Continuous 15.79 mL/hr at 10/28/24 1518 1.4 mcg/kg/hr at 10/28/24 1518    fentanyl  0-250 mcg/hr Intravenous Continuous 5 mL/hr at 10/28/24 1500 50 mcg/hr at 10/28/24 1500     Scheduled Meds:   baclofen  20 mg Per G Tube BID    chlorhexidine  15 mL Mouth/Throat BID    cloNIDine 0.1 mg/24 hr td ptwk  1 patch Transdermal Q7 Days    diazePAM  2 mg Per G Tube BID    enoxparin  30 mg Subcutaneous Q24H (prophylaxis, 1700)    famotidine  20 mg Per G Tube BID    levETIRAcetam  2,000 mg Per G Tube BID    PHENobarbitaL  64.8 mg Per G Tube BID     PRN Meds:  Current Facility-Administered Medications:     0.9%  NaCl infusion (for blood administration), , Intravenous, Q24H PRN    acetaminophen, 650 mg, Per G Tube, Q6H PRN    albuterol-ipratropium, 3 mL, Nebulization, Q6H PRN    dextrose 10%, 12.5 g, Intravenous, PRN    dextrose 10%, 25 g, Intravenous, PRN    glucagon (human recombinant), 1 mg, Intramuscular, PRN    insulin aspart U-100, 0-10 Units, Subcutaneous, Q4H PRN     Review of patient's allergies indicates:   Allergen Reactions    Strawberries [strawberry] Hives     STRAWBERRIES ALLERGIC REACTIONS   (SEIZURES AND HIVES )     Objective:     Vital Signs (Most Recent):  Temp: 98.6 °F (37 °C) (10/28/24 1530)  Pulse: 101 (10/28/24 1530)  Resp: 16 (10/28/24 1530)  BP: (!) 150/97 (10/28/24 1501)  SpO2: 100 % (10/28/24 1530) Vital Signs (24h Range):  Temp:  [94.6 °F (34.8 °C)-100.2 °F (37.9 °C)] 98.6 °F (37 °C)  Pulse:  [] 101  Resp:  [12-37] 16  SpO2:  [99 %-100 %] 100 %  BP: (104-150)/() 150/97     Weight: 46 kg (101 lb 6.6 oz)  Body mass index is 17.41 kg/m².    Intake/Output - Last 3 Shifts         10/26 0700  10/27 0659 10/27 0700  10/28 0659 10/28 0700  10/29 0659    I.V.  (mL/kg) 538.8 (11.7) 550.2 (12) 259.1 (5.6)    NG/ 430 240    IV Piggyback       TPN 1196.5 1154.5 350.6    Total Intake(mL/kg) 2080.3 (45.2) 2134.6 (46.4) 849.7 (18.5)    Urine (mL/kg/hr) 885 (0.8) 1220 (1.1) 395 (0.9)    Stool 0 0     Total Output 885 1220 395    Net +1195.3 +914.6 +454.7           Stool Occurrence 1 x 1 x              Physical Exam  Constitutional:       Appearance: He is ill-appearing.   Cardiovascular:      Rate and Rhythm: Normal rate and regular rhythm.   Pulmonary:      Comments: Intubated, mechanically ventilated, no distress  Abdominal:      Comments: Soft, nondistended, nontender.  G-tube in place with tube feeds infusing          Significant Labs:  I have reviewed all pertinent lab results within the past 24 hours.  CBC:   Recent Labs   Lab 10/28/24  0356   WBC 26.15*   RBC 3.32*   HGB 9.1*   HCT 26.4*   *   MCV 80*   MCH 27.4   MCHC 34.5     CMP:   Recent Labs   Lab 10/28/24  0356   *   CALCIUM 8.3*   ALBUMIN 1.8*   PROT 6.7      K 4.3   CO2 30*      BUN 13   CREATININE 0.6   ALKPHOS 165*   ALT 13   AST 18   BILITOT 0.7       Significant Diagnostics:  I have reviewed all pertinent imaging results/findings within the past 24 hours.

## 2024-10-28 NOTE — SUBJECTIVE & OBJECTIVE
Objective:     Vital Signs (Most Recent):  Temp: 99.3 °F (37.4 °C) (10/28/24 0931)  Pulse: 101 (10/28/24 0931)  Resp: (!) 21 (10/28/24 0931)  BP: 119/75 (10/28/24 0700)  SpO2: 100 % (10/28/24 0931) Vital Signs (24h Range):  Temp:  [94.6 °F (34.8 °C)-100.2 °F (37.9 °C)] 99.3 °F (37.4 °C)  Pulse:  [] 101  Resp:  [14-37] 21  SpO2:  [99 %-100 %] 100 %  BP: (104-144)/(63-89) 119/75     Weight: 46 kg (101 lb 6.6 oz)  Body mass index is 17.41 kg/m².      Intake/Output Summary (Last 24 hours) at 10/28/2024 1149  Last data filed at 10/28/2024 0900  Gross per 24 hour   Intake 1897.03 ml   Output 1115 ml   Net 782.03 ml        Physical Exam  Vitals and nursing note reviewed.   Constitutional:       General: He is not in acute distress.     Appearance: He is normal weight. He is ill-appearing. He is not toxic-appearing or diaphoretic.   HENT:      Head: Normocephalic.   Eyes:      Pupils: Pupils are equal, round, and reactive to light.   Cardiovascular:      Rate and Rhythm: Tachycardia present.      Heart sounds: Normal heart sounds.   Pulmonary:      Breath sounds: Normal breath sounds.   Abdominal:      General: There is no distension.   Neurological:      General: No focal deficit present.           Review of Systems   Unable to perform ROS: Acuity of condition       Vents:  Vent Mode: A/C (10/28/24 0931)  Ventilator Initiated: Yes (10/17/24 0102)  Set Rate: 12 BPM (10/28/24 0931)  Vt Set: 350 mL (10/28/24 0931)  Pressure Support: 15 cmH20 (10/26/24 1516)  PEEP/CPAP: 5 cmH20 (10/28/24 0931)  Oxygen Concentration (%): 21 (10/28/24 0931)  Peak Airway Pressure: 32 cmH20 (10/28/24 0931)  Plateau Pressure: 18 cmH20 (10/28/24 0931)  Total Ve: 7.41 L/m (10/28/24 0931)  Negative Inspiratory Force (cm H2O): 0 (10/28/24 0931)  F/VT Ratio<105 (RSBI): (!) 62.5 (10/28/24 0931)    Lines/Drains/Airways       Central Venous Catheter Line  Duration             Percutaneous Central Line - Triple Lumen  10/21/24 0814 Internal  Jugular Left 7 days              Drain  Duration                  NG/OG Tube 10/16/24 0515 16 Fr. Right nostril 12 days         Urethral Catheter 10/16/24 0156 Double-lumen 16 Fr. 12 days         Gastrostomy/Enterostomy 10/21/24 0844 Gastrostomy tube w/ balloon LUQ feeding 7 days              Airway  Duration                  Airway - Non-Surgical 10/22/24 1305 Endotracheal Tube 5 days                    Significant Labs:    CBC/Anemia Profile:  Recent Labs   Lab 10/27/24  0430 10/28/24  0356   WBC 18.28* 26.15*   HGB 8.6* 9.1*   HCT 26.0* 26.4*   * 539*   MCV 81* 80*   RDW 14.6* 14.8*        Chemistries:  Recent Labs   Lab 10/27/24  0430 10/28/24  0356    138   K 4.0 4.3    101   CO2 29 30*   BUN 12 13   CREATININE 0.6 0.6   CALCIUM 8.2* 8.3*   ALBUMIN 1.7* 1.8*   PROT 6.2 6.7   BILITOT 0.9 0.7   ALKPHOS 133 165*   ALT 12 13   AST 15 18   MG 2.0 1.8   PHOS 3.0 2.8       All pertinent labs within the past 24 hours have been reviewed.    Significant Imaging:  I have reviewed all pertinent imaging results/findings within the past 24 hours.

## 2024-10-28 NOTE — ASSESSMENT & PLAN NOTE
Patient with Hypercapnic Respiratory failure which is Acute on chronic. Unclear if on O2 at the NH. Supplemental oxygen was provided and noted-     Vent Mode: A/C  Oxygen Concentration (%):  [21] 21  Resp Rate Total:  [13 br/min-32 br/min] 16 br/min  Vt Set:  [350 mL] 350 mL  PEEP/CPAP:  [5 cmH20] 5 cmH20  Mean Airway Pressure:  [8.1 bjN91-97 cmH20] 11 cmH20    .   Signs/symptoms of respiratory failure include- tachypnea, increased work of breathing, respiratory distress, and use of accessory muscles. Contributing diagnoses includes - Aspiration Labs and images were reviewed. Patient Has recent ABG, which has been reviewed. Will treat underlying causes and adjust management of respiratory failure as follows-     Wean vent settings as tolerated  Broad spectrum antibiotics- stopped, cultures NGTD  Duonebs prn  Sedation: Precedex, Fentanyl  Will keep intubated for surgeries planned  Bowel rest-NPO    10/21 - SAT/SBT starting this afternoon.  Nonverbal and blind baseline status will likely complicate extubation course.  10/22- failed SBT yesterday due to apnea.   Sedation off this morning and plan SBT again today.  10/23 - extubated yesterday, but quickly failed due to anxiety and difficulty with secretions.  Dropped his sat and got extremely tachypneic.  Reintubated and initially had high requirements but now weaned back to 40% and 5 of PEEP.  Some concern for aspiration of secretions.  Will coordinate with mom for her to be present next time we attempt.  10/24 - vent weaned to 21%.  Starting moving back to SAT/SBT today.  10/25 - Fentanyl off this morning and tried SBT with sister at bedside to help calm him down. We let him go about 35 mins on PSV, but RR consistently in the 40's -50's with tidal volume below 200 @ 7cm H2O. Placed back on AC/VC.  Will try again this afternoon when his mom arrives.  10/26 - continues to fail fairly quickly, even with PSV up to 12 cmH2O.  Ongoing discussions with mom regarding  trach.  10/27 - mother requested we not trial pressure support today.      10/28 Awaiting family. Will need a trach

## 2024-10-28 NOTE — PLAN OF CARE
10/28/24 0900   Rounds   Attendance Provider;Nurse ;Charge nurse;Nurse;Pharmacist   Discharge Plan A Long-term acute care facility (LTAC)   Why the patient remains in the hospital Requires continued medical care   Transition of Care Barriers None     Unable to extubate, family would like to wait until Wednesday for trach placement. Anticipate LTAC placement before returning to [facility] in [place].

## 2024-10-28 NOTE — PLAN OF CARE
Pt remains intubated/sedated on vent Fent and precedex gtt titrated for RASS 0  Sinus tach on monitor,   Failed SBT  Dr. Hardin at bedside POC discussed with mother about failed SBT trials, plan for trach   [place] removed, Voids per Qivi   TF increased @ goal 40 mL/hr  Turned q2 with pillows, heels elevated

## 2024-10-29 LAB
ALBUMIN SERPL BCP-MCNC: 2 G/DL (ref 3.5–5.2)
ALP SERPL-CCNC: 204 U/L (ref 40–150)
ALT SERPL W/O P-5'-P-CCNC: 21 U/L (ref 10–44)
ANION GAP SERPL CALC-SCNC: 8 MMOL/L (ref 8–16)
AST SERPL-CCNC: 27 U/L (ref 10–40)
BASOPHILS # BLD AUTO: 0.05 K/UL (ref 0–0.2)
BASOPHILS NFR BLD: 0.2 % (ref 0–1.9)
BILIRUB SERPL-MCNC: 0.7 MG/DL (ref 0.1–1)
BUN SERPL-MCNC: 16 MG/DL (ref 6–20)
CALCIUM SERPL-MCNC: 8.5 MG/DL (ref 8.7–10.5)
CHLORIDE SERPL-SCNC: 103 MMOL/L (ref 95–110)
CO2 SERPL-SCNC: 29 MMOL/L (ref 23–29)
CREAT SERPL-MCNC: 0.6 MG/DL (ref 0.5–1.4)
DIFFERENTIAL METHOD BLD: ABNORMAL
EOSINOPHIL # BLD AUTO: 0.1 K/UL (ref 0–0.5)
EOSINOPHIL NFR BLD: 0.4 % (ref 0–8)
ERYTHROCYTE [DISTWIDTH] IN BLOOD BY AUTOMATED COUNT: 15.1 % (ref 11.5–14.5)
EST. GFR  (NO RACE VARIABLE): >60 ML/MIN/1.73 M^2
GLUCOSE SERPL-MCNC: 95 MG/DL (ref 70–110)
HCT VFR BLD AUTO: 27.8 % (ref 40–54)
HGB BLD-MCNC: 9 G/DL (ref 14–18)
IMM GRANULOCYTES # BLD AUTO: 0.25 K/UL (ref 0–0.04)
IMM GRANULOCYTES NFR BLD AUTO: 1 % (ref 0–0.5)
LYMPHOCYTES # BLD AUTO: 1.9 K/UL (ref 1–4.8)
LYMPHOCYTES NFR BLD: 7.1 % (ref 18–48)
MAGNESIUM SERPL-MCNC: 2.1 MG/DL (ref 1.6–2.6)
MCH RBC QN AUTO: 26.5 PG (ref 27–31)
MCHC RBC AUTO-ENTMCNC: 32.4 G/DL (ref 32–36)
MCV RBC AUTO: 82 FL (ref 82–98)
MONOCYTES # BLD AUTO: 1.8 K/UL (ref 0.3–1)
MONOCYTES NFR BLD: 7 % (ref 4–15)
NEUTROPHILS # BLD AUTO: 22.2 K/UL (ref 1.8–7.7)
NEUTROPHILS NFR BLD: 84.3 % (ref 38–73)
NRBC BLD-RTO: 0 /100 WBC
PHOSPHATE SERPL-MCNC: 2.8 MG/DL (ref 2.7–4.5)
PLATELET # BLD AUTO: 591 K/UL (ref 150–450)
PMV BLD AUTO: 11.2 FL (ref 9.2–12.9)
POCT GLUCOSE: 103 MG/DL (ref 70–110)
POCT GLUCOSE: 116 MG/DL (ref 70–110)
POCT GLUCOSE: 133 MG/DL (ref 70–110)
POCT GLUCOSE: 98 MG/DL (ref 70–110)
POTASSIUM SERPL-SCNC: 4.1 MMOL/L (ref 3.5–5.1)
PROT SERPL-MCNC: 7.3 G/DL (ref 6–8.4)
RBC # BLD AUTO: 3.39 M/UL (ref 4.6–6.2)
SODIUM SERPL-SCNC: 140 MMOL/L (ref 136–145)
WBC # BLD AUTO: 26.29 K/UL (ref 3.9–12.7)

## 2024-10-29 PROCEDURE — 94003 VENT MGMT INPAT SUBQ DAY: CPT

## 2024-10-29 PROCEDURE — 80053 COMPREHEN METABOLIC PANEL: CPT | Performed by: NURSE PRACTITIONER

## 2024-10-29 PROCEDURE — 63600175 PHARM REV CODE 636 W HCPCS: Performed by: NURSE PRACTITIONER

## 2024-10-29 PROCEDURE — C1769 GUIDE WIRE: HCPCS

## 2024-10-29 PROCEDURE — 99900025 HC BRONCHOSCOPY-ASST (STAT)

## 2024-10-29 PROCEDURE — 83735 ASSAY OF MAGNESIUM: CPT | Performed by: NURSE PRACTITIONER

## 2024-10-29 PROCEDURE — 25000003 PHARM REV CODE 250: Performed by: INTERNAL MEDICINE

## 2024-10-29 PROCEDURE — 84100 ASSAY OF PHOSPHORUS: CPT | Performed by: NURSE PRACTITIONER

## 2024-10-29 PROCEDURE — C9399 UNCLASSIFIED DRUGS OR BIOLOG: HCPCS | Performed by: INTERNAL MEDICINE

## 2024-10-29 PROCEDURE — 27200966 HC CLOSED SUCTION SYSTEM

## 2024-10-29 PROCEDURE — 63600175 PHARM REV CODE 636 W HCPCS: Performed by: SPECIALIST

## 2024-10-29 PROCEDURE — 27100171 HC OXYGEN HIGH FLOW UP TO 24 HOURS

## 2024-10-29 PROCEDURE — 20000000 HC ICU ROOM

## 2024-10-29 PROCEDURE — 63600175 PHARM REV CODE 636 W HCPCS

## 2024-10-29 PROCEDURE — 99900026 HC AIRWAY MAINTENANCE (STAT)

## 2024-10-29 PROCEDURE — 87205 SMEAR GRAM STAIN: CPT | Performed by: NURSE PRACTITIONER

## 2024-10-29 PROCEDURE — 25000003 PHARM REV CODE 250: Performed by: SPECIALIST

## 2024-10-29 PROCEDURE — 0B113F4 BYPASS TRACHEA TO CUTANEOUS WITH TRACHEOSTOMY DEVICE, PERCUTANEOUS APPROACH: ICD-10-PCS | Performed by: SPECIALIST

## 2024-10-29 PROCEDURE — 51798 US URINE CAPACITY MEASURE: CPT

## 2024-10-29 PROCEDURE — 27202055 HC BRONCHOSCOPE, DISP

## 2024-10-29 PROCEDURE — 99900022

## 2024-10-29 PROCEDURE — 94761 N-INVAS EAR/PLS OXIMETRY MLT: CPT

## 2024-10-29 PROCEDURE — 25000003 PHARM REV CODE 250: Performed by: NURSE PRACTITIONER

## 2024-10-29 PROCEDURE — 87186 SC STD MICRODIL/AGAR DIL: CPT | Performed by: NURSE PRACTITIONER

## 2024-10-29 PROCEDURE — 31627 NAVIGATIONAL BRONCHOSCOPY: CPT

## 2024-10-29 PROCEDURE — 87070 CULTURE OTHR SPECIMN AEROBIC: CPT | Performed by: NURSE PRACTITIONER

## 2024-10-29 PROCEDURE — 85025 COMPLETE CBC W/AUTO DIFF WBC: CPT | Performed by: NURSE PRACTITIONER

## 2024-10-29 PROCEDURE — 99900035 HC TECH TIME PER 15 MIN (STAT)

## 2024-10-29 RX ORDER — LORAZEPAM 2 MG/ML
INJECTION INTRAMUSCULAR
Status: COMPLETED
Start: 2024-10-29 | End: 2024-10-29

## 2024-10-29 RX ORDER — LORAZEPAM 2 MG/ML
4 INJECTION INTRAMUSCULAR ONCE
Status: COMPLETED | OUTPATIENT
Start: 2024-10-29 | End: 2024-10-29

## 2024-10-29 RX ORDER — LIDOCAINE HYDROCHLORIDE 10 MG/ML
10 INJECTION, SOLUTION EPIDURAL; INFILTRATION; INTRACAUDAL; PERINEURAL ONCE
Status: COMPLETED | OUTPATIENT
Start: 2024-10-29 | End: 2024-10-29

## 2024-10-29 RX ORDER — VECURONIUM BROMIDE 1 MG/ML
0.08 INJECTION, POWDER, LYOPHILIZED, FOR SOLUTION INTRAVENOUS ONCE
Status: COMPLETED | OUTPATIENT
Start: 2024-10-29 | End: 2024-10-29

## 2024-10-29 RX ORDER — LORAZEPAM 2 MG/ML
2 INJECTION INTRAMUSCULAR ONCE
Status: COMPLETED | OUTPATIENT
Start: 2024-10-29 | End: 2024-10-29

## 2024-10-29 RX ORDER — FENTANYL CITRATE 50 UG/ML
200 INJECTION, SOLUTION INTRAMUSCULAR; INTRAVENOUS ONCE
Status: COMPLETED | OUTPATIENT
Start: 2024-10-29 | End: 2024-10-29

## 2024-10-29 RX ADMIN — LORAZEPAM 4 MG: 2 INJECTION INTRAMUSCULAR at 11:10

## 2024-10-29 RX ADMIN — LORAZEPAM 2 MG: 2 INJECTION INTRAMUSCULAR; INTRAVENOUS at 11:10

## 2024-10-29 RX ADMIN — DEXMEDETOMIDINE HYDROCHLORIDE 1.4 MCG/KG/HR: 4 INJECTION INTRAVENOUS at 03:10

## 2024-10-29 RX ADMIN — CHLORHEXIDINE GLUCONATE 0.12% ORAL RINSE 15 ML: 1.2 LIQUID ORAL at 08:10

## 2024-10-29 RX ADMIN — DIAZEPAM 2 MG: 2 TABLET ORAL at 09:10

## 2024-10-29 RX ADMIN — LEVETIRACETAM 2000 MG: 100 SOLUTION ORAL at 09:10

## 2024-10-29 RX ADMIN — BACLOFEN 20 MG: 10 TABLET ORAL at 09:10

## 2024-10-29 RX ADMIN — FAMOTIDINE 20 MG: 40 POWDER, FOR SUSPENSION ORAL at 09:10

## 2024-10-29 RX ADMIN — ENOXAPARIN SODIUM 30 MG: 30 INJECTION SUBCUTANEOUS at 05:10

## 2024-10-29 RX ADMIN — PHENOBARBITAL 64.8 MG: 32.4 TABLET ORAL at 08:10

## 2024-10-29 RX ADMIN — DIAZEPAM 2 MG: 2 TABLET ORAL at 08:10

## 2024-10-29 RX ADMIN — DEXMEDETOMIDINE HYDROCHLORIDE 1.4 MCG/KG/HR: 4 INJECTION INTRAVENOUS at 11:10

## 2024-10-29 RX ADMIN — DEXMEDETOMIDINE HYDROCHLORIDE 1.4 MCG/KG/HR: 4 INJECTION INTRAVENOUS at 10:10

## 2024-10-29 RX ADMIN — LEVETIRACETAM 2000 MG: 100 SOLUTION ORAL at 08:10

## 2024-10-29 RX ADMIN — DEXMEDETOMIDINE HYDROCHLORIDE 1.4 MCG/KG/HR: 4 INJECTION INTRAVENOUS at 05:10

## 2024-10-29 RX ADMIN — CHLORHEXIDINE GLUCONATE 0.12% ORAL RINSE 15 ML: 1.2 LIQUID ORAL at 09:10

## 2024-10-29 RX ADMIN — FENTANYL CITRATE 100 MCG/HR: 50 INJECTION INTRAVENOUS at 06:10

## 2024-10-29 RX ADMIN — LORAZEPAM 4 MG: 2 INJECTION INTRAMUSCULAR; INTRAVENOUS at 11:10

## 2024-10-29 RX ADMIN — LORAZEPAM 2 MG: 2 INJECTION INTRAMUSCULAR at 11:10

## 2024-10-29 RX ADMIN — BACLOFEN 20 MG: 10 TABLET ORAL at 08:10

## 2024-10-29 RX ADMIN — FENTANYL CITRATE 200 MCG: 50 INJECTION INTRAMUSCULAR; INTRAVENOUS at 11:10

## 2024-10-29 RX ADMIN — LIDOCAINE HYDROCHLORIDE 100 MG: 10 SOLUTION INTRAVENOUS at 12:10

## 2024-10-29 RX ADMIN — FAMOTIDINE 20 MG: 40 POWDER, FOR SUSPENSION ORAL at 08:10

## 2024-10-29 RX ADMIN — VECURONIUM BROMIDE 3.7 MG: 10 INJECTION, POWDER, LYOPHILIZED, FOR SOLUTION INTRAVENOUS at 11:10

## 2024-10-29 RX ADMIN — PHENOBARBITAL 64.8 MG: 32.4 TABLET ORAL at 09:10

## 2024-10-29 NOTE — PLAN OF CARE
Patient remains intubated/sedated on vent. Fent and precedex gtts continue to infuse to maintain a RASS goal of 0 per orders. Patient continues to have low grade temperatures with a TMAX of 100.2 via rectal core temp monitoring. Patient continues to urinate via the Quivi in place. TF held at 0000 per NP orders as patient pending procedure this AM. All wounds CDI. Patient turned q 2 hrs with pillows and wedge. POC reviewed with patient.      Problem: Infection  Goal: Absence of Infection Signs and Symptoms  Outcome: Unable to Meet     Problem: Skin Injury Risk Increased  Goal: Skin Health and Integrity  Outcome: Unable to Meet     Problem: Adult Inpatient Plan of Care  Goal: Plan of Care Review  Outcome: Unable to Meet  Goal: Patient-Specific Goal (Individualized)  Outcome: Unable to Meet  Goal: Absence of Hospital-Acquired Illness or Injury  Outcome: Unable to Meet  Goal: Optimal Comfort and Wellbeing  Outcome: Unable to Meet  Goal: Readiness for Transition of Care  Outcome: Unable to Meet     Problem: Fall Injury Risk  Goal: Absence of Fall and Fall-Related Injury  Outcome: Unable to Meet     Problem: Wound  Goal: Optimal Coping  Outcome: Unable to Meet  Goal: Optimal Functional Ability  Outcome: Unable to Meet  Goal: Absence of Infection Signs and Symptoms  Outcome: Unable to Meet  Goal: Improved Oral Intake  Outcome: Unable to Meet  Goal: Optimal Pain Control and Function  Outcome: Unable to Meet  Goal: Skin Health and Integrity  Outcome: Unable to Meet  Goal: Optimal Wound Healing  Outcome: Unable to Meet     Problem: Acute Kidney Injury/Impairment  Goal: Fluid and Electrolyte Balance  Outcome: Unable to Meet  Goal: Improved Oral Intake  Outcome: Unable to Meet  Goal: Effective Renal Function  Outcome: Unable to Meet     Problem: Mechanical Ventilation Invasive  Goal: Effective Communication  Outcome: Unable to Meet  Goal: Optimal Device Function  Outcome: Unable to Meet  Goal: Mechanical Ventilation  Liberation  Outcome: Unable to Meet  Goal: Optimal Nutrition Delivery  Outcome: Unable to Meet  Goal: Absence of Device-Related Skin and Tissue Injury  Outcome: Unable to Meet  Goal: Absence of Ventilator-Induced Lung Injury  Outcome: Unable to Meet     Problem: Artificial Airway  Goal: Effective Communication  Outcome: Unable to Meet  Goal: Optimal Device Function  Outcome: Unable to Meet  Goal: Absence of Device-Related Skin or Tissue Injury  Outcome: Unable to Meet

## 2024-10-29 NOTE — PROGRESS NOTES
[facility] ([facility])  Critical Care Medicine  Progress Note    Patient Name: Ronny Ramey  MRN: [id number]  Admission Date: 10/16/2024  Hospital Length of Stay: 13 days  Code Status: Full Code  Attending Provider: Sandee Hardin MD  Primary Care Provider: Herman Nathan DNP   Principal Problem: Acute hypercapnic respiratory failure    Subjective:     HPI:  Ronny Ramey is a 23 yr [facility] with CP, spastic quadriplegia, s/p G tube, epilepsy who presented to [facility] ED on 10/16 with shortness of breath/respiratory distress. Patient was placed on CPAP with little improvement. KUB shows small bowel obstruction. Patient was intubated and transferred to [facility]. Critical care consulted for admission. Patient with history of admissions for bowel obstruction and aspiration pneumonia.     Hospital/ICU Course:  10/17/2024: Patient with decreasing UOP overnight. Renal function worsening. Small bolus given with little response. Additional boluses today. HTN overnight, clonidine patch started. Continue bowel rest. NGT advanced per Abd XR, continued bowel obstruction on XR as well as fecal impaction. Will attempt to disimpact. Gen Surg following. Propofol switched to precedex for vent weaning.  10/18/2024: XR small bowel FT shows high-grade small bowel obstruction. Gen surg planning for ex lap today. Will keep intubated for procedure. Cultures NGTD, antibiotics stopped.  10/19/2024: Patient s/p ex lap with extensive lysis of adhesions, small bowel resection, takedown of gastrostomy tube with partial gastrectomy, abdomen left open in discontinuity with abthera in place. Gen Surg plans to take for exploratory laparotomy Monday 10/21 for small-bowel anastomosis and gastrostomy tube placement. Keeping intubated for surgery.   10/20/2024: Remains intubated. Pending surgery tomorrow. Drop in H&H overnight, but no signs of bleeding, vital signs stable.    10/21 - To the OR this morning with small bowel anastamosis and G-tube placement.  Remains on the vent.  Hemodynamics stable.  10/22 - NAEON.  Vent and hemodynamics stable.  Extubated, but quickly failed due to anxiety and secretions with severe tachypnea and desats  10/23 - GALO following reintubation.  Vent weaned back down.  Hemodynamics stable.    10/24 - vent settings minimal.  1 unit of blood today.  Hemodynamics stable.  10/25 - NAEON.  21% FiO2 on the vent.  Failed SBT with sister at bedside.  Hemodynamics stable.  10/26 - failed SBT with daughter and then mother at bedside yesterday due to rapid/shallow breathing.  Vent and hemodynamics stable.    10/27 - NAEON.  Vent and hemodynamics stable.  10/28 - Failed SBT. On full support. No hemodynamic issues;  10/29 Stable overnight. On vent. Extensive discussion with mother yesterday. All questions answered to her satisfaction. She consented to the trach        Objective:     Vital Signs (Most Recent):  Temp: 99.3 °F (37.4 °C) (10/29/24 0745)  Pulse: 102 (10/29/24 0745)  Resp: 17 (10/29/24 0745)  BP: 116/74 (10/29/24 0700)  SpO2: 97 % (10/29/24 0745) Vital Signs (24h Range):  Temp:  [96.4 °F (35.8 °C)-99.7 °F (37.6 °C)] 99.3 °F (37.4 °C)  Pulse:  [] 102  Resp:  [12-53] 17  SpO2:  [24 %-100 %] 97 %  BP: (116-170)/() 116/74     Weight: 46 kg (101 lb 6.6 oz)  Body mass index is 17.41 kg/m².      Intake/Output Summary (Last 24 hours) at 10/29/2024 0810  Last data filed at 10/29/2024 0700  Gross per 24 hour   Intake 1241.41 ml   Output 520 ml   Net 721.41 ml        Physical Exam  Vitals and nursing note reviewed.   Constitutional:       General: He is not in acute distress.     Appearance: He is ill-appearing. He is not toxic-appearing or diaphoretic.   HENT:      Head: Normocephalic.   Eyes:      Pupils: Pupils are equal, round, and reactive to light.   Cardiovascular:      Rate and Rhythm: Tachycardia present.      Pulses: Normal pulses.   Pulmonary:       Effort: No respiratory distress.   Abdominal:      Palpations: Abdomen is soft.           Review of Systems    Vents:  Vent Mode: A/C (10/29/24 0508)  Ventilator Initiated: Yes (10/17/24 0102)  Set Rate: 12 BPM (10/29/24 0508)  Vt Set: 350 mL (10/29/24 0508)  Pressure Support: 15 cmH20 (10/28/24 1656)  PEEP/CPAP: 5 cmH20 (10/29/24 0508)  Oxygen Concentration (%): 21 (10/29/24 0745)  Peak Airway Pressure: 41 cmH20 (10/29/24 0508)  Plateau Pressure: 18 cmH20 (10/29/24 0508)  Total Ve: 7.24 L/m (10/29/24 0508)  Negative Inspiratory Force (cm H2O): 0 (10/29/24 0508)  F/VT Ratio<105 (RSBI): 160 (10/29/24 0508)    Lines/Drains/Airways       Central Venous Catheter Line  Duration             Percutaneous Central Line - Triple Lumen  10/21/24 0814 Internal Jugular Left 7 days              Drain  Duration                  Gastrostomy/Enterostomy 10/21/24 0844 Gastrostomy tube w/ balloon LUQ feeding 7 days    Male External Urinary Catheter 10/28/24 1115 <1 day              Airway  Duration                  Airway - Non-Surgical 10/22/24 1305 Endotracheal Tube 6 days                    Significant Labs:    CBC/Anemia Profile:  Recent Labs   Lab 10/28/24  0356 10/29/24  0349   WBC 26.15* 26.29*   HGB 9.1* 9.0*   HCT 26.4* 27.8*   * 591*   MCV 80* 82   RDW 14.8* 15.1*        Chemistries:  Recent Labs   Lab 10/28/24  0356 10/29/24  0349    140   K 4.3 4.1    103   CO2 30* 29   BUN 13 16   CREATININE 0.6 0.6   CALCIUM 8.3* 8.5*   ALBUMIN 1.8* 2.0*   PROT 6.7 7.3   BILITOT 0.7 0.7   ALKPHOS 165* 204*   ALT 13 21   AST 18 27   MG 1.8 2.1   PHOS 2.8 2.8       All pertinent labs within the past 24 hours have been reviewed.    Significant Imaging:  I have reviewed all pertinent imaging results/findings within the past 24 hours.    ABG  Recent Labs   Lab 10/24/24  0406   PH 7.451*   PO2 74*   PCO2 32.5*   HCO3 22.7*   BE -1     Assessment/Plan:     Neuro  Intellectual disability with epilepsy  Home AEDs restarted:  Keppra and phenobarb    Spastic quadriplegic cerebral palsy  Turn q 2hrs  Aspiration precautions  Baclofen restarted at half home dose 10/23    Ophtho  Legally blind  Supportive Care    Pulmonary  * Acute hypercapnic respiratory failure  Patient with Hypercapnic Respiratory failure which is Acute on chronic. Unclear if on O2 at the NH. Supplemental oxygen was provided and noted-     Vent Mode: A/C  Oxygen Concentration (%):  [21] 21  Resp Rate Total:  [14 br/min-51 br/min] 19 br/min  Vt Set:  [350 mL] 350 mL  PEEP/CPAP:  [5 cmH20] 5 cmH20  Pressure Support:  [10 cmH20-15 cmH20] 15 cmH20  Mean Airway Pressure:  [8.7 cmH20-15 cmH20] 12 cmH20    .   Signs/symptoms of respiratory failure include- tachypnea, increased work of breathing, respiratory distress, and use of accessory muscles. Contributing diagnoses includes - Aspiration Labs and images were reviewed. Patient Has recent ABG, which has been reviewed. Will treat underlying causes and adjust management of respiratory failure as follows-     Wean vent settings as tolerated  Broad spectrum antibiotics- stopped, cultures NGTD  Duonebs prn  Sedation: Precedex, Fentanyl  Will keep intubated for surgeries planned  Bowel rest-NPO    10/21 - SAT/SBT starting this afternoon.  Nonverbal and blind baseline status will likely complicate extubation course.  10/22- failed SBT yesterday due to apnea.   Sedation off this morning and plan SBT again today.  10/23 - extubated yesterday, but quickly failed due to anxiety and difficulty with secretions.  Dropped his sat and got extremely tachypneic.  Reintubated and initially had high requirements but now weaned back to 40% and 5 of PEEP.  Some concern for aspiration of secretions.  Will coordinate with mom for her to be present next time we attempt.  10/24 - vent weaned to 21%.  Starting moving back to SAT/SBT today.  10/25 - Fentanyl off this morning and tried SBT with sister at bedside to help calm him down. We let him go about 35  mins on PSV, but RR consistently in the 40's -50's with tidal volume below 200 @ 7cm H2O. Placed back on AC/VC.  Will try again this afternoon when his mom arrives.  10/26 - continues to fail fairly quickly, even with PSV up to 12 cmH2O.  Ongoing discussions with mom regarding trach.  10/27 - mother requested we not trial pressure support today.      10/28 Awaiting family. Will need a trach    10/29  D/w mother extensively. She gave consent for trach.   CPAP / SBT trial done in the mothers presence yesterday  RR increased to 50 and he had secretions issues requiring suctioning and placing back on AC.  Mother was at the bedside and witnessed.     Renal/  MAYELIN (acute kidney injury)  MAYELIN is likely due to pre-renal azotemia due to intravascular volume depletion. Baseline creatinine is  0.8 . Most recent creatinine and eGFR are listed below.  Recent Labs     10/25/24  0408 10/26/24  0431 10/27/24  0430   CREATININE 0.6 0.6 0.6   EGFRNORACEVR >60 >60 >60        Plan  - Avoid nephrotoxins and renally dose meds for GFR listed above  - Monitor urine output, serial BMP, and adjust therapy as needed  - Maintain Lancaster catheter - IVF boluses prn  - Resolved after fluid resuscitation      Endocrine  Body mass index (BMI) less than 19  Nutrition consult for Tube feeding recs when able to resume tube feeds  NPO currently due to bowel surgery  Start TPN 10/25    GI  Bowel obstruction  KUB shows small bowel obstruction  NGT to suction  Keep NPO  CT abd/pelvis shows high grade bowel obstruction  Gen Surg consulted  Fecal impaction on Abd XR- disimpacted  SBFT XR shows high-grade small bowel obstruction    Under went Ex lap 10/18 with extensive lysis of adhesions, small bowel resection, takedown of gastrostomy tube with partial gastrectomy, abdomen left open in discontinuity with abthera in place  Ex lap on 10/21 with small-bowel anastomosis and G tube placement    10/24 - Bowel sounds improving, but no BM yet  10/25 - Bowel sounds  improving, but no BM yet.  Started TPN  10/26 - tolerating trickle feeds and had good BM this afternoon.  10/27 - BM overnight.  Tolerating trickle feeds.    PEG (percutaneous endoscopic gastrostomy) status  Patient noted to have a percutaneous endoscopic gastrostomy tube in place. I have personally inspected the tube.Tube was placed on this admission, with date of procedure- 10/16  There are no signs of drainage or infection around the site. Routine care to be done by wound care and nursing staff.     G tube removed during ex-lap on 10/18  Tube feedings on hold due to Bowel obstruction  Ex lap 10/21 with G tube replacement  Starting to use for meds 10/23  Trickled feeds started 10/25           Critical Care Daily Checklist:    A: Awake: RASS Goal/Actual Goal: RASS Goal: 0-->alert and calm  Actual:     B: Spontaneous Breathing Trial Performed? Spon. Breathing Trial Initiated?: Initiated (10/28/24 1654)   C: SAT & SBT Coordinated?  yes                      D: Delirium: CAM-ICU Overall CAM-ICU: Negative   E: Early Mobility Performed? yes   F: Feeding Goal: Goals: 1. Pt will be initiated onto TPN within 24 hrs 2. Pt will tolerate and intake >75% EEN and EPN prior to RD follow up 3. Pt will tolerate and intake Edwin BID prior to RD follow up  Status: Nutrition Goal Status: new   Current Diet Order   Procedures    Diet NPO      AS: Analgesia/Sedation yes   T: Thromboembolic Prophylaxis yes   H: HOB > 300 yes   U: Stress Ulcer Prophylaxis (if needed) yes   G: Glucose Control yes   B: Bowel Function Stool Occurrence: 1   I: Indwelling Catheter (Lines & Lancaster) Necessity yes   D: De-escalation of Antimicrobials/Pharmacotherapies yes    Plan for the day/ETD yes    Code Status:  Family/Goals of Care: Full Code  yes     Critical Care Time: 38 minutes  Critical secondary to Patient has a condition that poses threat to life and bodily function: Severe Respiratory Distress      Critical care was time spent personally by me on the  following activities: development of treatment plan with patient or surrogate and bedside caregivers, discussions with consultants, evaluation of patient's response to treatment, examination of patient, ordering and performing treatments and interventions, ordering and review of laboratory studies, ordering and review of radiographic studies, pulse oximetry, re-evaluation of patient's condition. This critical care time did not overlap with that of any other provider or involve time for any procedures.     Sandee Hardin MD  Critical Care Medicine  [facility] [place])

## 2024-10-29 NOTE — ASSESSMENT & PLAN NOTE
Patient with Hypercapnic Respiratory failure which is Acute on chronic. Unclear if on O2 at the NH. Supplemental oxygen was provided and noted-     Vent Mode: A/C  Oxygen Concentration (%):  [21] 21  Resp Rate Total:  [14 br/min-51 br/min] 19 br/min  Vt Set:  [350 mL] 350 mL  PEEP/CPAP:  [5 cmH20] 5 cmH20  Pressure Support:  [10 cmH20-15 cmH20] 15 cmH20  Mean Airway Pressure:  [8.7 cmH20-15 cmH20] 12 cmH20    .   Signs/symptoms of respiratory failure include- tachypnea, increased work of breathing, respiratory distress, and use of accessory muscles. Contributing diagnoses includes - Aspiration Labs and images were reviewed. Patient Has recent ABG, which has been reviewed. Will treat underlying causes and adjust management of respiratory failure as follows-     Wean vent settings as tolerated  Broad spectrum antibiotics- stopped, cultures NGTD  Duonebs prn  Sedation: Precedex, Fentanyl  Will keep intubated for surgeries planned  Bowel rest-NPO    10/21 - SAT/SBT starting this afternoon.  Nonverbal and blind baseline status will likely complicate extubation course.  10/22- failed SBT yesterday due to apnea.   Sedation off this morning and plan SBT again today.  10/23 - extubated yesterday, but quickly failed due to anxiety and difficulty with secretions.  Dropped his sat and got extremely tachypneic.  Reintubated and initially had high requirements but now weaned back to 40% and 5 of PEEP.  Some concern for aspiration of secretions.  Will coordinate with mom for her to be present next time we attempt.  10/24 - vent weaned to 21%.  Starting moving back to SAT/SBT today.  10/25 - Fentanyl off this morning and tried SBT with sister at bedside to help calm him down. We let him go about 35 mins on PSV, but RR consistently in the 40's -50's with tidal volume below 200 @ 7cm H2O. Placed back on AC/VC.  Will try again this afternoon when his mom arrives.  10/26 - continues to fail fairly quickly, even with PSV up to 12 cmH2O.   Ongoing discussions with mom regarding trach.  10/27 - mother requested we not trial pressure support today.      10/28 Awaiting family. Will need a trach    10/29  D/w mother extensively. She gave consent for trach.   CPAP / SBT trial done in the mothers presence yesterday  RR increased to 50 and he had secretions issues requiring suctioning and placing back on AC.  Mother was at the bedside and witnessed.

## 2024-10-29 NOTE — PROCEDURES
She is at risk for falling and has been provided with information to reduce the risk of falling at home.   "[facility] - Intensive Care ([facility])  Bronchoscopy   Procedure Note    SUMMARY     Date of Procedure: 10/29/2024    Procedure: assist Dr. Hardin with percutaneous trach     Provider: Francoise Elliott MD        Pre-Procedure Diagnosis: respiratory failure    Post-Procedure Diagnosis: same        Description of the Findings of the Procedure:     Patient's family was consented for the procedure with all risk and benefit of the procedure explained in detail by Dr. Hardin for trach procedure.   Thick white secretions were suctioned. The bronchocope was inserted into the main airway via the endotracheal tube. Tracheostomy in place. See Dr. Hardin's procedure note/ cxr ordered              Disposition: icu on vent    Attestation: I performed the procedure.  Ronny Ramey is a 23 y.o. male patient.    Temp: (!) 92.5 °F (33.6 °C) (10/29/24 1220)  Pulse: 82 (10/29/24 1220)  Resp: (!) 22 (10/29/24 1220)  BP: (!) 92/54 (10/29/24 1220)  SpO2: 100 % (10/29/24 1220)  Weight: 46 kg (101 lb 6.6 oz) (10/25/24 1018)  Height: 5' 4" (162.6 cm) (10/25/24 1041)       Procedures    10/29/2024    "

## 2024-10-29 NOTE — PLAN OF CARE
Percutaneous tracheostomy insertion at bedside today. Pt remains on Precedex and Fentanyl to maintain RASS 0. NSR on telemetry. Normotensive. #7 shiley in place. Obturator at bedside. AC/VC, 21% FiO2, +5 PEEP. Tube feeds restarted at goal rate of 40cc/hr. Pt turned with wedge and pillow support. Heel boots in place. Bed low, wheels locked, alarms audible. POC reviewed with mother today.       Problem: Infection  Goal: Absence of Infection Signs and Symptoms  Outcome: Progressing     Problem: Adult Inpatient Plan of Care  Goal: Readiness for Transition of Care  Outcome: Progressing     Problem: Adult Inpatient Plan of Care  Goal: Absence of Hospital-Acquired Illness or Injury  Outcome: Progressing     Problem: Fall Injury Risk  Goal: Absence of Fall and Fall-Related Injury  Outcome: Progressing     Problem: Wound  Goal: Skin Health and Integrity  Outcome: Progressing     Problem: Wound  Goal: Optimal Wound Healing  Outcome: Progressing     Problem: Mechanical Ventilation Invasive  Goal: Optimal Nutrition Delivery  Outcome: Progressing     Problem: Mechanical Ventilation Invasive  Goal: Absence of Device-Related Skin and Tissue Injury  Outcome: Progressing     Problem: Artificial Airway  Goal: Absence of Device-Related Skin or Tissue Injury  Outcome: Progressing

## 2024-10-29 NOTE — SUBJECTIVE & OBJECTIVE
Objective:     Vital Signs (Most Recent):  Temp: 99.3 °F (37.4 °C) (10/29/24 0745)  Pulse: 102 (10/29/24 0745)  Resp: 17 (10/29/24 0745)  BP: 116/74 (10/29/24 0700)  SpO2: 97 % (10/29/24 0745) Vital Signs (24h Range):  Temp:  [96.4 °F (35.8 °C)-99.7 °F (37.6 °C)] 99.3 °F (37.4 °C)  Pulse:  [] 102  Resp:  [12-53] 17  SpO2:  [24 %-100 %] 97 %  BP: (116-170)/() 116/74     Weight: 46 kg (101 lb 6.6 oz)  Body mass index is 17.41 kg/m².      Intake/Output Summary (Last 24 hours) at 10/29/2024 0810  Last data filed at 10/29/2024 0700  Gross per 24 hour   Intake 1241.41 ml   Output 520 ml   Net 721.41 ml        Physical Exam  Vitals and nursing note reviewed.   Constitutional:       General: He is not in acute distress.     Appearance: He is ill-appearing. He is not toxic-appearing or diaphoretic.   HENT:      Head: Normocephalic.   Eyes:      Pupils: Pupils are equal, round, and reactive to light.   Cardiovascular:      Rate and Rhythm: Tachycardia present.      Pulses: Normal pulses.   Pulmonary:      Effort: No respiratory distress.   Abdominal:      Palpations: Abdomen is soft.           Review of Systems    Vents:  Vent Mode: A/C (10/29/24 0508)  Ventilator Initiated: Yes (10/17/24 0102)  Set Rate: 12 BPM (10/29/24 0508)  Vt Set: 350 mL (10/29/24 0508)  Pressure Support: 15 cmH20 (10/28/24 1656)  PEEP/CPAP: 5 cmH20 (10/29/24 0508)  Oxygen Concentration (%): 21 (10/29/24 0745)  Peak Airway Pressure: 41 cmH20 (10/29/24 0508)  Plateau Pressure: 18 cmH20 (10/29/24 0508)  Total Ve: 7.24 L/m (10/29/24 0508)  Negative Inspiratory Force (cm H2O): 0 (10/29/24 0508)  F/VT Ratio<105 (RSBI): 160 (10/29/24 0508)    Lines/Drains/Airways       Central Venous Catheter Line  Duration             Percutaneous Central Line - Triple Lumen  10/21/24 0814 Internal Jugular Left 7 days              Drain  Duration                  Gastrostomy/Enterostomy 10/21/24 0844 Gastrostomy tube w/ balloon LUQ feeding 7 days    Male  External Urinary Catheter 10/28/24 1115 <1 day              Airway  Duration                  Airway - Non-Surgical 10/22/24 1305 Endotracheal Tube 6 days                    Significant Labs:    CBC/Anemia Profile:  Recent Labs   Lab 10/28/24  0356 10/29/24  0349   WBC 26.15* 26.29*   HGB 9.1* 9.0*   HCT 26.4* 27.8*   * 591*   MCV 80* 82   RDW 14.8* 15.1*        Chemistries:  Recent Labs   Lab 10/28/24  0356 10/29/24  0349    140   K 4.3 4.1    103   CO2 30* 29   BUN 13 16   CREATININE 0.6 0.6   CALCIUM 8.3* 8.5*   ALBUMIN 1.8* 2.0*   PROT 6.7 7.3   BILITOT 0.7 0.7   ALKPHOS 165* 204*   ALT 13 21   AST 18 27   MG 1.8 2.1   PHOS 2.8 2.8       All pertinent labs within the past 24 hours have been reviewed.    Significant Imaging:  I have reviewed all pertinent imaging results/findings within the past 24 hours.

## 2024-10-29 NOTE — PROCEDURES
Patient: Ronny Ramey   MRN: [id number]  Age: 23 y.o.    Sex: male   : 2000  Associated Diagnoses: Resp failure  Author: Dr Hardin    Operative Information:    Surgeon: Dr Hardin    Operative Information:    Operative/Procedure Date: 10/29/2024    Title of Operation  Perc trach    Clinical History  Pre-operative Diagnosis:  Resp failure   Post-operative Diagnosis: Same as pre-operative diagnosis.    Anesthesia:  Sedation      Preoperative Information:    Informed Consent  Signed by legal guardian    Risk Acknowledgement Statement  Include statement    Confirmed Correct  Patient  Procedure  Site  Patient, procedure, side, site  Safety procedures followed    Indications  Resp failure    Operative Timing  Elective    Operative Note    Tracheostomy Details    PERCUTANEOUS DILATIONAL TRACHEOTOMY WITH BRONCHOSCOPIC GUIDANCE  Performed by: Sandee Hardin MD.  Bronchoscopy Assistant: Dr Elliott  Indications: Chronic respiratory failure and need for ongoing mechanical ventilation.  Consent: Given patient's intubation and sedation, the patient was unable to provide consent. Discussed the procedure with the patient's decision maker, including the indications, risks, benefits, and alternatives. All questions were answered. Written consent was obtained and placed in the chart.  Preprocedure: Universal protocol was followed for this procedure. Prior to the initiation of sedation or the procedure, a timeout/Pause for the Cause was performed. The patient's identity was verified by confirming the patient's wrist band for name, date of birth, and medical record number. Everyone in the room was in agreement with the patient identify, the procedure to be performed, consent was in place and matched the planned procedure, and the procedure site. The area was cleaned with a CHG scrub and draped with large sterile barrier. Hand hygiene was performed, and cap, mask, sterile gown, and sterile gloves were worn.  The patient was covered by a large sterile drape. Sterile technique was maintained for the entire procedure.  Anesthesia: The patient was intubated and sedated prior to the procedure. Additional midazolam and fentanyl was given for deep sedation. Please refer to the accompanying procedural sedation form for additional details. Once the patient was adequately sedated and with continuous BIS monitoring, vecuronium was administered for paralysis.  Procedure: The patient was placed in the supine position. The anterior neck was prepped and draped in usual sterile fashion. 1% lidocaine was administered approximately 2 fingerbreadths above the sternal notch for local anesthesia. A 1.5-cm horizontal incision was then performed 2 fingerbreadths above the sternal notch. Using a curved Evelyn, blunt dissection was performed down to the level of the pretracheal fascia. At this point, the bronchoscope was introduced through the endotracheal tube and the trachea was properly visualized. The endotracheal tube was then gradually withdrawn within the trachea under direct bronchoscopic visualization. Proper midline position was confirmed by bouncing the needle from the tracheostomy tray over the trachea with bronchoscopic examination. The needle was advanced into the trachea and proper positioning was confirmed with direct visualization. The needle was then removed leaving a white outer cannula in position. The wire from the tracheostomy tray was then advanced through the white outer cannula. The cannula was then removed. The small, blue dilator was then advanced over the wire into the trachea. Once proper dilatation was achieved, the dilator was removed. The large, tapered dilator was then advanced over the wire into the trachea. The dilator was removed leaving the wire and white inner cannula in position. A number 6 percutaneous Shiley tracheostomy tube was then advanced over the wire and white inner cannula into the trachea. Proper  positioning was confirmed with bronchoscopic visualization. The tracheostomy tube was then sutured in place with two nylon sutures. It was further secured with a tracheostomy tie.  Estimated blood loss: Less than 5 mL.  Complications: None immediate.

## 2024-10-29 NOTE — PLAN OF CARE
10/29/24 1354   Post-Acute Status   Post-Acute Authorization Placement   Post-Acute Placement Status Referrals Sent   Discharge Delays None known at this time   Discharge Plan   Discharge Plan A Long-term acute care facility (LTAC)     Attempted to reach patient's mother @ [phone] to discuss discharge recommendations of LTAC before returning to [facility] in [place]. Unable to reach her and voicemail did not . Referrals sent to [facility], and [facility] for clinical review and bed availability.     [facility] in [place] accepted patient.

## 2024-10-30 PROBLEM — Z93.0 TRACHEOSTOMY IN PLACE: Status: ACTIVE | Noted: 2024-10-30

## 2024-10-30 LAB
ALBUMIN SERPL BCP-MCNC: 2.1 G/DL (ref 3.5–5.2)
ALP SERPL-CCNC: 209 U/L (ref 40–150)
ALT SERPL W/O P-5'-P-CCNC: 32 U/L (ref 10–44)
ANION GAP SERPL CALC-SCNC: 10 MMOL/L (ref 8–16)
AST SERPL-CCNC: 38 U/L (ref 10–40)
BASOPHILS # BLD AUTO: 0.06 K/UL (ref 0–0.2)
BASOPHILS NFR BLD: 0.3 % (ref 0–1.9)
BILIRUB SERPL-MCNC: 0.6 MG/DL (ref 0.1–1)
BUN SERPL-MCNC: 15 MG/DL (ref 6–20)
CALCIUM SERPL-MCNC: 8.6 MG/DL (ref 8.7–10.5)
CHLORIDE SERPL-SCNC: 102 MMOL/L (ref 95–110)
CO2 SERPL-SCNC: 26 MMOL/L (ref 23–29)
CREAT SERPL-MCNC: 0.6 MG/DL (ref 0.5–1.4)
DIFFERENTIAL METHOD BLD: ABNORMAL
EOSINOPHIL # BLD AUTO: 0.3 K/UL (ref 0–0.5)
EOSINOPHIL NFR BLD: 1.7 % (ref 0–8)
ERYTHROCYTE [DISTWIDTH] IN BLOOD BY AUTOMATED COUNT: 15.3 % (ref 11.5–14.5)
EST. GFR  (NO RACE VARIABLE): >60 ML/MIN/1.73 M^2
GLUCOSE SERPL-MCNC: 109 MG/DL (ref 70–110)
HCT VFR BLD AUTO: 28.6 % (ref 40–54)
HGB BLD-MCNC: 9.2 G/DL (ref 14–18)
IMM GRANULOCYTES # BLD AUTO: 0.21 K/UL (ref 0–0.04)
IMM GRANULOCYTES NFR BLD AUTO: 1.1 % (ref 0–0.5)
LYMPHOCYTES # BLD AUTO: 2.2 K/UL (ref 1–4.8)
LYMPHOCYTES NFR BLD: 11.8 % (ref 18–48)
MAGNESIUM SERPL-MCNC: 1.7 MG/DL (ref 1.6–2.6)
MCH RBC QN AUTO: 26.3 PG (ref 27–31)
MCHC RBC AUTO-ENTMCNC: 32.2 G/DL (ref 32–36)
MCV RBC AUTO: 82 FL (ref 82–98)
MONOCYTES # BLD AUTO: 1.8 K/UL (ref 0.3–1)
MONOCYTES NFR BLD: 9.8 % (ref 4–15)
NEUTROPHILS # BLD AUTO: 13.8 K/UL (ref 1.8–7.7)
NEUTROPHILS NFR BLD: 75.3 % (ref 38–73)
NRBC BLD-RTO: 0 /100 WBC
PHOSPHATE SERPL-MCNC: 2.8 MG/DL (ref 2.7–4.5)
PLATELET # BLD AUTO: 604 K/UL (ref 150–450)
PMV BLD AUTO: 10.4 FL (ref 9.2–12.9)
POCT GLUCOSE: 101 MG/DL (ref 70–110)
POCT GLUCOSE: 117 MG/DL (ref 70–110)
POTASSIUM SERPL-SCNC: 3.8 MMOL/L (ref 3.5–5.1)
PROT SERPL-MCNC: 7.5 G/DL (ref 6–8.4)
RBC # BLD AUTO: 3.5 M/UL (ref 4.6–6.2)
SODIUM SERPL-SCNC: 138 MMOL/L (ref 136–145)
WBC # BLD AUTO: 18.31 K/UL (ref 3.9–12.7)

## 2024-10-30 PROCEDURE — 27100171 HC OXYGEN HIGH FLOW UP TO 24 HOURS

## 2024-10-30 PROCEDURE — 85025 COMPLETE CBC W/AUTO DIFF WBC: CPT | Performed by: NURSE PRACTITIONER

## 2024-10-30 PROCEDURE — 25000003 PHARM REV CODE 250: Performed by: INTERNAL MEDICINE

## 2024-10-30 PROCEDURE — C9399 UNCLASSIFIED DRUGS OR BIOLOG: HCPCS | Performed by: INTERNAL MEDICINE

## 2024-10-30 PROCEDURE — 94761 N-INVAS EAR/PLS OXIMETRY MLT: CPT

## 2024-10-30 PROCEDURE — 25000003 PHARM REV CODE 250: Performed by: SPECIALIST

## 2024-10-30 PROCEDURE — 25000003 PHARM REV CODE 250: Performed by: NURSE PRACTITIONER

## 2024-10-30 PROCEDURE — 83735 ASSAY OF MAGNESIUM: CPT | Performed by: NURSE PRACTITIONER

## 2024-10-30 PROCEDURE — 84100 ASSAY OF PHOSPHORUS: CPT | Performed by: NURSE PRACTITIONER

## 2024-10-30 PROCEDURE — 99900026 HC AIRWAY MAINTENANCE (STAT)

## 2024-10-30 PROCEDURE — 94003 VENT MGMT INPAT SUBQ DAY: CPT

## 2024-10-30 PROCEDURE — 63600175 PHARM REV CODE 636 W HCPCS: Performed by: NURSE PRACTITIONER

## 2024-10-30 PROCEDURE — 80053 COMPREHEN METABOLIC PANEL: CPT | Performed by: NURSE PRACTITIONER

## 2024-10-30 PROCEDURE — 99900035 HC TECH TIME PER 15 MIN (STAT)

## 2024-10-30 PROCEDURE — 20000000 HC ICU ROOM

## 2024-10-30 RX ORDER — LANOLIN ALCOHOL/MO/W.PET/CERES
400 CREAM (GRAM) TOPICAL ONCE
Status: COMPLETED | OUTPATIENT
Start: 2024-10-30 | End: 2024-10-30

## 2024-10-30 RX ORDER — OXYCODONE HYDROCHLORIDE 5 MG/1
5 TABLET ORAL EVERY 6 HOURS
Status: DISCONTINUED | OUTPATIENT
Start: 2024-10-30 | End: 2024-11-03

## 2024-10-30 RX ADMIN — OXYCODONE HYDROCHLORIDE 5 MG: 5 TABLET ORAL at 10:10

## 2024-10-30 RX ADMIN — DIAZEPAM 2 MG: 2 TABLET ORAL at 09:10

## 2024-10-30 RX ADMIN — ENOXAPARIN SODIUM 30 MG: 30 INJECTION SUBCUTANEOUS at 04:10

## 2024-10-30 RX ADMIN — FENTANYL CITRATE 200 MCG/HR: 50 INJECTION INTRAVENOUS at 04:10

## 2024-10-30 RX ADMIN — Medication 400 MG: at 01:10

## 2024-10-30 RX ADMIN — DEXMEDETOMIDINE HYDROCHLORIDE 0.7 MCG/KG/HR: 4 INJECTION INTRAVENOUS at 02:10

## 2024-10-30 RX ADMIN — CHLORHEXIDINE GLUCONATE 0.12% ORAL RINSE 15 ML: 1.2 LIQUID ORAL at 08:10

## 2024-10-30 RX ADMIN — BACLOFEN 20 MG: 10 TABLET ORAL at 09:10

## 2024-10-30 RX ADMIN — PHENOBARBITAL 64.8 MG: 32.4 TABLET ORAL at 08:10

## 2024-10-30 RX ADMIN — PHENOBARBITAL 64.8 MG: 32.4 TABLET ORAL at 09:10

## 2024-10-30 RX ADMIN — FAMOTIDINE 20 MG: 40 POWDER, FOR SUSPENSION ORAL at 09:10

## 2024-10-30 RX ADMIN — LEVETIRACETAM 2000 MG: 100 SOLUTION ORAL at 09:10

## 2024-10-30 RX ADMIN — DEXMEDETOMIDINE HYDROCHLORIDE 1.4 MCG/KG/HR: 4 INJECTION INTRAVENOUS at 06:10

## 2024-10-30 RX ADMIN — CLONIDINE 1 PATCH: 0.1 PATCH TRANSDERMAL at 08:10

## 2024-10-30 RX ADMIN — Medication 400 MG: at 06:10

## 2024-10-30 RX ADMIN — LEVETIRACETAM 2000 MG: 100 SOLUTION ORAL at 08:10

## 2024-10-30 RX ADMIN — OXYCODONE HYDROCHLORIDE 5 MG: 5 TABLET ORAL at 05:10

## 2024-10-30 RX ADMIN — DIAZEPAM 2 MG: 2 TABLET ORAL at 08:10

## 2024-10-30 RX ADMIN — OXYCODONE HYDROCHLORIDE 5 MG: 5 TABLET ORAL at 11:10

## 2024-10-30 RX ADMIN — DEXMEDETOMIDINE HYDROCHLORIDE 1.4 MCG/KG/HR: 4 INJECTION INTRAVENOUS at 09:10

## 2024-10-30 RX ADMIN — FAMOTIDINE 20 MG: 40 POWDER, FOR SUSPENSION ORAL at 08:10

## 2024-10-30 RX ADMIN — BACLOFEN 20 MG: 10 TABLET ORAL at 08:10

## 2024-10-30 RX ADMIN — CHLORHEXIDINE GLUCONATE 0.12% ORAL RINSE 15 ML: 1.2 LIQUID ORAL at 09:10

## 2024-10-30 NOTE — PLAN OF CARE
10/30/24 1600   Post-Acute Status   Post-Acute Authorization Placement   Post-Acute Placement Status Pending payor review/awaiting authorization (if required)   Discharge Delays None known at this time   Discharge Plan   Discharge Plan A Long-term acute care facility (LTAC)     Spoke to mother, Wil, to obtain preference. She would like [facility] in [place]. Advised liaison to submit for insurance authorization.

## 2024-10-30 NOTE — PLAN OF CARE
Pt overall status unchanged today, VSS when calm and relaxed on vent. Patient has sudden episodes where he becomes extremely agitated causing heart rate to jump into 140's, resp rate to get up to 50, and 's. When this occurs it requires placing pt back onto high doses of continuous IV sedation thus negating progress made through the day weaning patient off sedation. No family has visited so far today

## 2024-10-30 NOTE — PROGRESS NOTES
[facility] ([facility])  Critical Care Medicine  Progress Note    Patient Name: Ronny Ramey  MRN: [id number]  Admission Date: 10/16/2024  Hospital Length of Stay: 14 days  Code Status: Full Code  Attending Provider: Sandee Hardin MD  Primary Care Provider: Herman Nathan DNP   Principal Problem: Acute hypercapnic respiratory failure    Subjective:     HPI:  Ronny Rame[facility] is a 23 [facility] with CP, spastic quadriplegia, s/p G tube, epilepsy who presented to [facility] ED on 10/16 with shortness of breath/respiratory distress. Patient was placed on CPAP with little improvement. KUB shows small bowel obstruction. Patient was intubated and transferred to [facility]. Critical care consulted for admission. Patient with history of admissions for bowel obstruction and aspiration pneumonia.     Hospital/ICU Course:  10/17/2024: Patient with decreasing UOP overnight. Renal function worsening. Small bolus given with little response. Additional boluses today. HTN overnight, clonidine patch started. Continue bowel rest. NGT advanced per Abd XR, continued bowel obstruction on XR as well as fecal impaction. Will attempt to disimpact. Gen Surg following. Propofol switched to precedex for vent weaning.  10/18/2024: XR small bowel FT shows high-grade small bowel obstruction. Gen surg planning for ex lap today. Will keep intubated for procedure. Cultures NGTD, antibiotics stopped.  10/19/2024: Patient s/p ex lap with extensive lysis of adhesions, small bowel resection, takedown of gastrostomy tube with partial gastrectomy, abdomen left open in discontinuity with abthera in place. Gen Surg plans to take for exploratory laparotomy Monday 10/21 for small-bowel anastomosis and gastrostomy tube placement. Keeping intubated for surgery.   10/20/2024: Remains intubated. Pending surgery tomorrow. Drop in H&H overnight, but no signs of bleeding, vital signs stable.    10/21 - To the OR this morning with small bowel anastamosis and G-tube placement.  Remains on the vent.  Hemodynamics stable.  10/22 - NAEON.  Vent and hemodynamics stable.  Extubated, but quickly failed due to anxiety and secretions with severe tachypnea and desats  10/23 - GALO following reintubation.  Vent weaned back down.  Hemodynamics stable.    10/24 - vent settings minimal.  1 unit of blood today.  Hemodynamics stable.  10/25 - NAEON.  21% FiO2 on the vent.  Failed SBT with sister at bedside.  Hemodynamics stable.  10/26 - failed SBT with daughter and then mother at bedside yesterday due to rapid/shallow breathing.  Vent and hemodynamics stable.    10/27 - NAEON.  Vent and hemodynamics stable.  10/28 - Failed SBT. On full support. No hemodynamic issues;  10/29 Stable overnight. On vent. Extensive discussion with mother yesterday. All questions answered to her satisfaction. She consented to the trach  10/30 stable. On vent. S/p trach yesterday      Objective:     Vital Signs (Most Recent):  Temp: 98.7 °F (37.1 °C) (10/30/24 1100)  Pulse: (!) 117 (10/30/24 1130)  Resp: 18 (10/30/24 1130)  BP: 134/78 (10/30/24 1130)  SpO2: 100 % (10/30/24 1130) Vital Signs (24h Range):  Temp:  [97.5 °F (36.4 °C)-99.1 °F (37.3 °C)] 98.7 °F (37.1 °C)  Pulse:  [] 117  Resp:  [13-65] 18  SpO2:  [97 %-100 %] 100 %  BP: ()/() 134/78     Weight: 46.1 kg (101 lb 10.1 oz)  Body mass index is 17.45 kg/m².      Intake/Output Summary (Last 24 hours) at 10/30/2024 1154  Last data filed at 10/30/2024 1100  Gross per 24 hour   Intake 1443.09 ml   Output 500 ml   Net 943.09 ml        Physical Exam  Vitals and nursing note reviewed.   HENT:      Head: Normocephalic.   Eyes:      Pupils: Pupils are equal, round, and reactive to light.   Cardiovascular:      Rate and Rhythm: Tachycardia present.      Pulses: Normal pulses.   Pulmonary:      Effort: No respiratory distress.      Breath sounds: No wheezing.   Abdominal:       Palpations: Abdomen is soft.   Neurological:      General: No focal deficit present.      Mental Status: He is alert.           Review of Systems   Unable to perform ROS: Acuity of condition       Vents:  Vent Mode: A/C (10/30/24 1120)  Ventilator Initiated: Yes (10/17/24 0102)  Set Rate: 12 BPM (10/30/24 1120)  Vt Set: 350 mL (10/30/24 1120)  Pressure Support: 15 cmH20 (10/28/24 1656)  PEEP/CPAP: 5 cmH20 (10/30/24 1120)  Oxygen Concentration (%): 21 (10/30/24 1130)  Peak Airway Pressure: 22 cmH20 (10/30/24 1120)  Plateau Pressure: 18 cmH20 (10/30/24 1120)  Total Ve: 5.66 L/m (10/30/24 1120)  Negative Inspiratory Force (cm H2O): 0 (10/30/24 1120)  F/VT Ratio<105 (RSBI): (!) 52.63 (10/30/24 1120)    Lines/Drains/Airways       Central Venous Catheter Line  Duration             Percutaneous Central Line - Triple Lumen  10/21/24 0814 Internal Jugular Left 9 days              Drain  Duration                  Gastrostomy/Enterostomy 10/21/24 0844 Gastrostomy tube w/ balloon LUQ feeding 9 days    Male External Urinary Catheter 10/28/24 1115 2 days              Airway  Duration             Adult Surgical Airway 10/29/24 1156 Shiley Cuffed 6.0/ 75mm <1 day                    Significant Labs:    CBC/Anemia Profile:  Recent Labs   Lab 10/29/24  0349 10/30/24  0455   WBC 26.29* 18.31*   HGB 9.0* 9.2*   HCT 27.8* 28.6*   * 604*   MCV 82 82   RDW 15.1* 15.3*        Chemistries:  Recent Labs   Lab 10/29/24  0349 10/30/24  0455    138   K 4.1 3.8    102   CO2 29 26   BUN 16 15   CREATININE 0.6 0.6   CALCIUM 8.5* 8.6*   ALBUMIN 2.0* 2.1*   PROT 7.3 7.5   BILITOT 0.7 0.6   ALKPHOS 204* 209*   ALT 21 32   AST 27 38   MG 2.1 1.7   PHOS 2.8 2.8       All pertinent labs within the past 24 hours have been reviewed.    Significant Imaging:  I have reviewed all pertinent imaging results/findings within the past 24 hours.    [facility]  Recent Labs   Lab 10/24/24  0406   PH 7.451*   PO2 74*   PCO2 32.5*   HCO3 22.7*   BE -1      Assessment/Plan:     Neuro  Intellectual disability with epilepsy  Home AEDs restarted: Keppra and phenobarb    Spastic quadriplegic cerebral palsy  Turn q 2hrs  Aspiration precautions  Baclofen restarted at half home dose 10/23    Ophtho  Legally blind  Supportive Care    ENT  Tracheostomy in place  S/p Trach # 6 Radha on 10/29    Pulmonary  * Acute hypercapnic respiratory failure  Patient with Hypercapnic Respiratory failure which is Acute on chronic. Unclear if on O2 at the NH. Supplemental oxygen was provided and noted-     Vent Mode: A/C  Oxygen Concentration (%):  [] 21  Resp Rate Total:  [12 br/min-40 br/min] 18 br/min  Vt Set:  [350 mL] 350 mL  PEEP/CPAP:  [5 cmH20] 5 cmH20  Mean Airway Pressure:  [8 whI30-13 cmH20] 8.1 cmH20    .   Signs/symptoms of respiratory failure include- tachypnea, increased work of breathing, respiratory distress, and use of accessory muscles. Contributing diagnoses includes - Aspiration Labs and images were reviewed. Patient Has recent ABG, which has been reviewed. Will treat underlying causes and adjust management of respiratory failure as follows-     Wean vent settings as tolerated  Broad spectrum antibiotics- stopped, cultures NGTD  Duonebs prn  Sedation: Precedex, Fentanyl  Will keep intubated for surgeries planned  Bowel rest-NPO    10/21 - SAT/SBT starting this afternoon.  Nonverbal and blind baseline status will likely complicate extubation course.  10/22- failed SBT yesterday due to apnea.   Sedation off this morning and plan SBT again today.  10/23 - extubated yesterday, but quickly failed due to anxiety and difficulty with secretions.  Dropped his sat and got extremely tachypneic.  Reintubated and initially had high requirements but now weaned back to 40% and 5 of PEEP.  Some concern for aspiration of secretions.  Will coordinate with mom for her to be present next time we attempt.  10/24 - vent weaned to 21%.  Starting moving back to SAT/SBT today.  10/25 -  Fentanyl off this morning and tried SBT with sister at bedside to help calm him down. We let him go about 35 mins on PSV, but RR consistently in the 40's -50's with tidal volume below 200 @ 7cm H2O. Placed back on AC/VC.  Will try again this afternoon when his mom arrives.  10/26 - continues to fail fairly quickly, even with PSV up to 12 cmH2O.  Ongoing discussions with mom regarding trach.  10/27 - mother requested we not trial pressure support today.      10/28 Awaiting family. Will need a trach    10/29  D/w mother extensively. She gave consent for trach.   CPAP / SBT trial done in the mothers presence yesterday  RR increased to 50 and he had secretions issues requiring suctioning and placing back on AC.  Mother was at the bedside and witnessed.     10/20 S/p trach. Transition to LTAC      Renal/  MAYELIN (acute kidney injury)  MAYELIN is likely due to pre-renal azotemia due to intravascular volume depletion. Baseline creatinine is  0.8 . Most recent creatinine and eGFR are listed below.  Recent Labs     10/25/24  0408 10/26/24  0431 10/27/24  0430   CREATININE 0.6 0.6 0.6   EGFRNORACEVR >60 >60 >60        Plan  - Avoid nephrotoxins and renally dose meds for GFR listed above  - Monitor urine output, serial BMP, and adjust therapy as needed  - Maintain Lancaster catheter - IVF boluses prn  - Resolved after fluid resuscitation      Endocrine  Body mass index (BMI) less than 19  Nutrition consult for Tube feeding recs when able to resume tube feeds  NPO currently due to bowel surgery  Start TPN 10/25    GI  Bowel obstruction  KUB shows small bowel obstruction  NGT to suction  Keep NPO  CT abd/pelvis shows high grade bowel obstruction  Gen Surg consulted  Fecal impaction on Abd XR- disimpacted  SBFT XR shows high-grade small bowel obstruction    Under went Ex lap 10/18 with extensive lysis of adhesions, small bowel resection, takedown of gastrostomy tube with partial gastrectomy, abdomen left open in discontinuity with abthera  in place  Ex lap on 10/21 with small-bowel anastomosis and G tube placement    10/24 - Bowel sounds improving, but no BM yet  10/25 - Bowel sounds improving, but no BM yet.  Started TPN  10/26 - tolerating trickle feeds and had good BM this afternoon.  10/27 - BM overnight.  Tolerating trickle feeds.    PEG (percutaneous endoscopic gastrostomy) status  Patient noted to have a percutaneous endoscopic gastrostomy tube in place. I have personally inspected the tube.Tube was placed on this admission, with date of procedure- 10/16  There are no signs of drainage or infection around the site. Routine care to be done by wound care and nursing staff.     G tube removed during ex-lap on 10/18  Tube feedings on hold due to Bowel obstruction  Ex lap 10/21 with G tube replacement  Starting to use for meds 10/23  Trickled feeds started 10/25           Critical Care Daily Checklist:    A: Awake: RASS Goal/Actual Goal: RASS Goal: 0-->alert and calm  Actual:     B: Spontaneous Breathing Trial Performed? Spon. Breathing Trial Initiated?: Initiated (10/28/24 8847)   C: SAT & SBT Coordinated?  yes                      D: Delirium: CAM-ICU Overall CAM-ICU: Negative   E: Early Mobility Performed? yes   F: Feeding Goal: Goals: 1. Pt will be initiated onto TPN within 24 hrs 2. Pt will tolerate and intake >75% EEN and EPN prior to RD follow up 3. Pt will tolerate and intake Edwin BID prior to RD follow up  Status: Nutrition Goal Status: new   Current Diet Order   Procedures    Diet NPO      AS: Analgesia/Sedation yes   T: Thromboembolic Prophylaxis yes   H: HOB > 300 yes   U: Stress Ulcer Prophylaxis (if needed) yes   G: Glucose Control yes   B: Bowel Function Stool Occurrence: 1   I: Indwelling Catheter (Lines & Lancaster) Necessity yes   D: De-escalation of Antimicrobials/Pharmacotherapies yes    Plan for the day/ETD yes    Code Status:  Family/Goals of Care: Full Code  yes     Critical Care Time: 38 minutes  Critical secondary to Patient  has a condition that poses threat to life and bodily function: Severe Respiratory Distress      Critical care was time spent personally by me on the following activities: development of treatment plan with patient or surrogate and bedside caregivers, discussions with consultants, evaluation of patient's response to treatment, examination of patient, ordering and performing treatments and interventions, ordering and review of laboratory studies, ordering and review of radiographic studies, pulse oximetry, re-evaluation of patient's condition. This critical care time did not overlap with that of any other provider or involve time for any procedures.     Sandee Hardin MD  Critical Care Medicine  '[facility])

## 2024-10-30 NOTE — PLAN OF CARE
Pt remains on vent. Red/bloody secretions noted from trach. Increased HR started early this AM. Pt thrashing head around in bed, appears in pain. Sedation increased with improvement in pt behavior. Afebrile. ST on heart monitor. TF at goal. Pt voided 500ml this shift. Turned q2h. Bed low and locked. Safety precautions maintained. Will continue to monitor.

## 2024-10-30 NOTE — PROGRESS NOTES
[facility] - [facility] ([facility])  Adult Nutrition  Progress Note    SUMMARY       Recommendations    Recommendation/Intervention:   1. Recommend pt continues on continuous Enteral nutrition, Peptamen 1.5 w/ Prebio via PEG tube, goal rate 40 mL/hr  -Formula at goal rate provides: 1440 kcals/day (89% EEN), 65 g protein/day (100% EPN), 184 g CHO/day (92% CHO needs), 741 mL free formula water/day (46% fluid needs)  -145 mL q4h free water flushes (870 mL/day) = total from formula + FWF = 1611 mL water/day (100% fluid needs), per MD/NP  -Monitor Mg, K+, Na, Phos and Glu, correct as indicated   2. Recommend pt continues Edwin BID via PEG tube for wound healing   3. Weigh twice weekly    Goals:   1. Pt will be initiated onto TPN within 24 hrs (Resolved)  2. Pt will tolerate and intake >75% EEN and EPN prior to RD follow up (Meeting)  3. Pt will tolerate and intake Edwin BID prior to RD follow up  Nutrition Goal Status: resolved, meeting, continues  Communication of RD Recs: other (comment) (POC, sticky note)    Assessment and Plan    Endocrine  Moderate protein-calorie malnutrition  Malnutrition Type:  Context: acute on chronic illness  Level: severe    Related to (etiology):   Physiological causes increasing nutrient needs d/t illness  Alteration in GI tract structure/function (Improved)    Signs and Symptoms (as evidenced by):   BMI < 18.9  Underweight with loss of fat and muscle  Inc localized fluid accumulation  Unable to eat sufficient energy/protein to maintain a healthy weight  Wounds    Malnutrition Characteristic Summary:  Energy Intake (Malnutrition): less than 75% for greater than 7 days  Subcutaneous Fat (Malnutrition): mild depletion  Muscle Mass (Malnutrition): severe depletion  Fluid Accumulation (Malnutrition):  (1+ trace)    Interventions/Recommendations (treatment strategy):  1. Enteral nutrition management  2. Amino acids medical food supplement therapy  3. Collaboration by nutrition professional with  other providers    Nutrition Diagnosis Status:   Continues         Malnutrition Assessment (10/29/24):  Malnutrition Context: chronic illness  Malnutrition Level: severe  Skin (Micronutrient): wounds unhealed, edema (Neri score = 11 (high risk)       Energy Intake (Malnutrition): less than 75% for greater than 7 days  Subcutaneous Fat (Malnutrition): mild depletion  Muscle Mass (Malnutrition): severe depletion  Fluid Accumulation (Malnutrition):  (1+ trace)       Clavicle and Acromion Bone Region (Muscle Loss): well nourished (Acromion process may slightly protrude)  Patellar Region (Muscle Loss): severe depletion (Bones prominent, little sign of muscle around knee)  Anterior Thigh Region (Muscle Loss): severe depletion (Obviously thin)  Posterior Calf Region (Muscle Loss): severe depletion (Thin, minimal to no muscle definition)                 Reason for Assessment    Reason For Assessment: consult, new TPN (TPN - custom)  Diagnosis:  (Acute hypercapnic respiratory failure)  General Information Comments:   10/25/24: 23 y.o. Male admitted for Acute hypercapnic respiratory failure. PMH: Spastic quadriplegic cerbal palsy, Intellectual disability w/ epilepsy, Legally blind, Non verbal, PEG, BMI < 19, Bowel obstruction, MAYELIN, Oropharyngeal dysphagia. Pt currently intubated and sedated, NPO x 9 days, in the ICU. RD  consulted for custom TPN recommendations. RD currently following pt. EMR noted MAYELIN resolved, POD 7  s/p ex lap, extensive lysis of adhesions, small bowel resection, takedown of gastrostomy tube with partial gastrectomy, abdomen left open in discontinuity with abthera in place, POD 4 s/p small-bowel anastomosis, open gastrostomy placement and abdominal closure; 10/24/24 no bowel function yet, small amount of bowel sounds; 10/25/24 no formed BM but clear mucus from rectum on incontinence pad, plans for repeat SBT today, pt sedated on Fentanyl and Precedex, increasingly more tachycardic d/t attempt to decrease  sedation, NG tube to LWIS w/ 500 ml output, replacing electrolytes K,Phos, Mg. Discussed pt during rounds, confirmed no BM and plan for SBT w/ attempt to extubate once family is present, pt has a PICC line, pt to start TPN today. RD informed Pharmacy of TPN recs via secure chat. Reviewed chart: LBM 10/17 (x 8 days no BM); Skin: pale, incision abd WDL; Altered skin: PI R ischial tuberosity full thickness tissue loss, ulceration L ankle black, details per Wound care note 10/23/24; Neri score: 9 (very high risk); Edema: none. Labs, meds, weight reviewed. Note labs 10/25/24: K+ (L), Phos (L). Last weight charted 10/22/24 101 lbs (BMI 17.41, protein-energy malnutrition grade I), updated weight for accuracy warranted. Mild mlanutrition noted at previous enconter, will perform full NFPE when appropriate. RD will continue to follow and monitor pt's nutritional status during admit.    Follow up:   10/30/24: RD follow up. Pt currently intubated and sedated on Fentanyl and Precedex, NPO, in the ICU, receiving continuous EN Peptamen 1.5 w/ Prebio via PEG tube at goal rate of 40 mL/hr. EMR noted pt was started on TPN 10/25/24 and stopped yesterday 10/29, Trickle feeds started 10/28, tolerating at 40 mL/hr, TF held during Trach procedure yesterday 10/29 then restarted and pt is tolerating again at goal rate 40 mL/hr, pt failed SBT multiple times, Trach performed yesterday 10/29, red/bloody secretions noted from trach and sedation increased w/ improvement in pt behavior noted today 10/30. RD following pt during rounds, RN reported this AM that the trach site looks good, confirmed pt tolerating TF at goal rate and TPN stopped. Visual NFPE yesterday (10/29), pt legs visible, severe malnutrition noted. RD added Edwin to pt's orders and trays to help w/ wound healing. Reviewed chart: Total VE: 8.9; LBM 10/29; Skin: WDL, incision abd WDL; Altered skin: stage 4 PI R ischial tuberosity full thickness tissue loss, ulceration L ankle  "maroon/purple/intact/dry, details per Wound care note 10/30/24; Neri score improved to 11 (high risk); Edema: 1+ trace bilateral hand. Labs, meds, weight reviewed. Weight charted 10/25/24 101 lbs, 10/30/24 101 lbs (BMI 17.45, protein-energy malnutrition grade I), wt stable. RD will continue to follow and monitor pt's nutritional status during admit.     Nutrition Discharge Planning: Continuous enteral nutrition Peptamen 1.5 w/ Prebio via PEG tube at 40 mL/hr, FWF per MD/NP + Edwin BID via PEG tube     Nutrition Risk Screen    Nutrition Risk Screen: tube feeding or parenteral nutrition    Nutrition Related Social Determinants of Health: SDOH: Unable to assess at this time.     Nutrition/Diet History    Spiritual, Cultural Beliefs, Synagogue Practices, Values that Affect Care: no  Food Allergies: other (see comments) (Strawberries)  Factors Affecting Nutritional Intake: impaired cognitive status/motor control, NPO, on mechanical ventilation, altered gastrointestinal function, difficulty/impaired swallowing    Anthropometrics    Temp: 98.7 °F (37.1 °C)  Height: 5' 4" (162.6 cm)  Height (inches): 64 in  Weight Method: Bed Scale  Weight: 46.1 kg (101 lb 10.1 oz)  Weight (lb): 101.63 lb  Ideal Body Weight (IBW), Male: 130 lb  % Ideal Body Weight, Male (lb): 78.01 %  % Ideal Body Weight Malnutrition: 70-79%: moderate deficit  BMI (Calculated): 17.4  BMI Grade: 17 - 18.4 protein-energy malnutrition grade I  Additional Documentation: Tetraplegia (Quadriplegia) Ideal Body Weight (IBW) Adjustment  Tetraplegia (Quadriplegia) Ideal Body Weight (IBW) Adjustment: 117 lb 3.9 oz (IBW - 10%)    Wt Readings from Last 15 Encounters:   10/30/24 46.1 kg (101 lb 10.1 oz)   06/02/23 49.9 kg (110 lb)   03/15/23 50 kg (110 lb 3.7 oz)   05/13/22 52.3 kg (115 lb 3.1 oz) (2%, Z= -2.11)*   09/20/21 50.5 kg (111 lb 5.3 oz) (1%, Z= -2.29)*   06/16/21 47.3 kg (104 lb 4.4 oz) (<1%, Z= -2.81)*   11/25/20 47.9 kg (105 lb 9.6 oz) (<1%, Z= -2.54)* " "  11/02/20 46 kg (101 lb 6.6 oz) (<1%, Z= -2.88)*   10/27/20 45.2 kg (99 lb 10.4 oz) (<1%, Z= -3.04)*   10/18/20 44.8 kg (98 lb 14 oz) (<1%, Z= -3.11)*   10/13/20 43.2 kg (95 lb 3.8 oz) (<1%, Z= -3.46)*   09/29/20 44.5 kg (98 lb) (<1%, Z= -3.17)*   09/29/20 43.3 kg (95 lb 7.4 oz) (<1%, Z= -3.42)*   07/09/20 51.3 kg (113 lb) (3%, Z= -1.83)*   03/11/20 52.7 kg (116 lb 2.9 oz) (7%, Z= -1.50)*     * Growth percentiles are based on [facility] (Boys, 2-20 Years) data.       Lab/Procedures/Meds    Pertinent Labs Reviewed: reviewed  Pertinent Labs Comments: K+ (L), Calcium (L), Total protein (L), Albumin (L), Phos (L), Cl (H), Total bilrubin (H)  Pertinent Medications Reviewed: reviewed  Pertinent Medications Comments: phenobarbital, levetiracetam, famotidine, enoxaparin, diazepam, clonidine patch, Abx, baclofen    BMP  Lab Results   Component Value Date     10/30/2024    K 3.8 10/30/2024     10/30/2024    CO2 26 10/30/2024    BUN 15 10/30/2024    CREATININE 0.6 10/30/2024    CALCIUM 8.6 (L) 10/30/2024    ANIONGAP 10 10/30/2024    EGFRNORACEVR >60 10/30/2024     Lab Results   Component Value Date    CALCIUM 8.6 (L) 10/30/2024    PHOS 2.8 10/30/2024     Lab Results   Component Value Date    ALBUMIN 2.1 (L) 10/30/2024     Lab Results   Component Value Date    ALT 32 10/30/2024    AST 38 10/30/2024     (H) 10/27/2020    ALKPHOS 209 (H) 10/30/2024    BILITOT 0.6 10/30/2024     Recent Labs   Lab 10/30/24  0459   POCTGLUCOSE 117*     No results found for: "LABA1C", "HGBA1C"    Lab Results   Component Value Date    WBC 18.31 (H) 10/30/2024    HGB 9.2 (L) 10/30/2024    HCT 28.6 (L) 10/30/2024    MCV 82 10/30/2024     (H) 10/30/2024       Scheduled Meds:   baclofen  20 mg Per G Tube BID    chlorhexidine  15 mL Mouth/Throat BID    cloNIDine 0.1 mg/24 hr td ptwk  1 patch Transdermal Q7 Days    diazePAM  2 mg Per G Tube BID    enoxparin  30 mg Subcutaneous Q24H (prophylaxis, 1700)    famotidine  20 mg Per G Tube BID "    levETIRAcetam  2,000 mg Per G Tube BID    oxyCODONE  5 mg Per G Tube Q6H    PHENobarbitaL  64.8 mg Per G Tube BID     Continuous Infusions:   dexmedeTOMIDine (Precedex) infusion (titrating)  0-1.4 mcg/kg/hr Intravenous Continuous 15.79 mL/hr at 10/30/24 1500 1.4 mcg/kg/hr at 10/30/24 1500    fentanyl  0-250 mcg/hr Intravenous Continuous 20 mL/hr at 10/30/24 1500 200 mcg/hr at 10/30/24 1500     PRN Meds:.  Current Facility-Administered Medications:     acetaminophen, 650 mg, Per G Tube, Q6H PRN    albuterol-ipratropium, 3 mL, Nebulization, Q6H PRN    dextrose 10%, 12.5 g, Intravenous, PRN    dextrose 10%, 25 g, Intravenous, PRN    glucagon (human recombinant), 1 mg, Intramuscular, PRN      Physical Findings/Assessment         Estimated/Assessed Needs    Weight Used For Calorie Calculations: 46.1 kg (101 lb 10.1 oz)  Energy Calorie Requirements (kcal): 1611 kcals ([facility] (Critical care-vent, Total VE: 8.9 vs Pressure injury, underweight)  Energy Need Method: [facility]  Protein Requirements: 55-92 g (1.2-2.0 g/kg ABW (Critical care-vent, BMI < 30 vs Pressure injury vs Underweight)  Weight Used For Protein Calculations: 46.1 kg (101 lb 10.1 oz)  Fluid Requirements (mL): 1611 mL (1 mL/kcal)  Estimated Fluid Requirement Method: RDA Method  RDA Method (mL): 1611  CHO Requirement: 201 g (1611 kcals/8)      Nutrition Prescription Ordered    Current Diet Order: NPO  Current Nutrition Support Formula Ordered: Peptamen 1.5 w/Prebio  Current Nutrition Support Rate Ordered: 40 (ml)  Current Nutrition Support Frequency Ordered: continuous  Oral Nutrition Supplement: Edwin BID    Evaluation of Received Nutrient/Fluid Intake  I/O: (Net since admit):  10/25/24: +4084.9 mL  10/30/24: +9467 mL    Enteral Calories (kcal): 1440  Enteral Protein (gm): 65  Enteral (Free Water) Fluid (mL): 741  Free Water Flush Fluid (mL): 50  % Kcal Needs: 89%  % Protein Needs: 100%    Energy Calories Required: meeting needs  Protein Required:  meeting needs  Fluid Required: meeting needs  Total Fluid Intake (mL): 1357.4  Tolerance: tolerating  % Intake of Estimated Energy Needs: 75 - 100 %  % Meal Intake: NPO    Nutrition Risk    Level of Risk/Frequency of Follow-up: high (F/u x 2 weekly)     Monitor and Evaluation    Food and Nutrient Intake: energy intake, enteral nutrition intake, parenteral nutrition intake  Food and Nutrient Adminstration: enteral and parenteral nutrition administration  Knowledge/Beliefs/Attitudes: food and nutrition knowledge/skill, beliefs and attitudes  Physical Activity and Function: nutrition-related ADLs and IADLs, factors affecting access to physical activity  Anthropometric Measurements: weight, weight change, body mass index  Biochemical Data, Medical Tests and Procedures: electrolyte and renal panel, gastrointestinal profile, glucose/endocrine profile, lipid profile, inflammatory profile  Nutrition-Focused Physical Findings: overall appearance     Nutrition Follow-Up    RD Follow-up?: Yes  Deanna Bowman, BS, RDN, LDN

## 2024-10-30 NOTE — ASSESSMENT & PLAN NOTE
Patient with Hypercapnic Respiratory failure which is Acute on chronic. Unclear if on O2 at the NH. Supplemental oxygen was provided and noted-     Vent Mode: A/C  Oxygen Concentration (%):  [] 21  Resp Rate Total:  [12 br/min-40 br/min] 18 br/min  Vt Set:  [350 mL] 350 mL  PEEP/CPAP:  [5 cmH20] 5 cmH20  Mean Airway Pressure:  [8 spD87-77 cmH20] 8.1 cmH20    .   Signs/symptoms of respiratory failure include- tachypnea, increased work of breathing, respiratory distress, and use of accessory muscles. Contributing diagnoses includes - Aspiration Labs and images were reviewed. Patient Has recent ABG, which has been reviewed. Will treat underlying causes and adjust management of respiratory failure as follows-     Wean vent settings as tolerated  Broad spectrum antibiotics- stopped, cultures NGTD  Duonebs prn  Sedation: Precedex, Fentanyl  Will keep intubated for surgeries planned  Bowel rest-NPO    10/21 - SAT/SBT starting this afternoon.  Nonverbal and blind baseline status will likely complicate extubation course.  10/22- failed SBT yesterday due to apnea.   Sedation off this morning and plan SBT again today.  10/23 - extubated yesterday, but quickly failed due to anxiety and difficulty with secretions.  Dropped his sat and got extremely tachypneic.  Reintubated and initially had high requirements but now weaned back to 40% and 5 of PEEP.  Some concern for aspiration of secretions.  Will coordinate with mom for her to be present next time we attempt.  10/24 - vent weaned to 21%.  Starting moving back to SAT/SBT today.  10/25 - Fentanyl off this morning and tried SBT with sister at bedside to help calm him down. We let him go about 35 mins on PSV, but RR consistently in the 40's -50's with tidal volume below 200 @ 7cm H2O. Placed back on AC/VC.  Will try again this afternoon when his mom arrives.  10/26 - continues to fail fairly quickly, even with PSV up to 12 cmH2O.  Ongoing discussions with mom regarding  trach.  10/27 - mother requested we not trial pressure support today.      10/28 Awaiting family. Will need a trach    10/29  D/w mother extensively. She gave consent for trach.   CPAP / SBT trial done in the mothers presence yesterday  RR increased to 50 and he had secretions issues requiring suctioning and placing back on AC.  Mother was at the bedside and witnessed.     10/20 S/p trach. Transition to LTAC

## 2024-10-30 NOTE — SUBJECTIVE & OBJECTIVE
Objective:     Vital Signs (Most Recent):  Temp: 98.7 °F (37.1 °C) (10/30/24 1100)  Pulse: (!) 117 (10/30/24 1130)  Resp: 18 (10/30/24 1130)  BP: 134/78 (10/30/24 1130)  SpO2: 100 % (10/30/24 1130) Vital Signs (24h Range):  Temp:  [97.5 °F (36.4 °C)-99.1 °F (37.3 °C)] 98.7 °F (37.1 °C)  Pulse:  [] 117  Resp:  [13-65] 18  SpO2:  [97 %-100 %] 100 %  BP: ()/() 134/78     Weight: 46.1 kg (101 lb 10.1 oz)  Body mass index is 17.45 kg/m².      Intake/Output Summary (Last 24 hours) at 10/30/2024 1154  Last data filed at 10/30/2024 1100  Gross per 24 hour   Intake 1443.09 ml   Output 500 ml   Net 943.09 ml        Physical Exam  Vitals and nursing note reviewed.   HENT:      Head: Normocephalic.   Eyes:      Pupils: Pupils are equal, round, and reactive to light.   Cardiovascular:      Rate and Rhythm: Tachycardia present.      Pulses: Normal pulses.   Pulmonary:      Effort: No respiratory distress.      Breath sounds: No wheezing.   Abdominal:      Palpations: Abdomen is soft.   Neurological:      General: No focal deficit present.      Mental Status: He is alert.           Review of Systems   Unable to perform ROS: Acuity of condition       Vents:  Vent Mode: A/C (10/30/24 1120)  Ventilator Initiated: Yes (10/17/24 0102)  Set Rate: 12 BPM (10/30/24 1120)  Vt Set: 350 mL (10/30/24 1120)  Pressure Support: 15 cmH20 (10/28/24 1656)  PEEP/CPAP: 5 cmH20 (10/30/24 1120)  Oxygen Concentration (%): 21 (10/30/24 1130)  Peak Airway Pressure: 22 cmH20 (10/30/24 1120)  Plateau Pressure: 18 cmH20 (10/30/24 1120)  Total Ve: 5.66 L/m (10/30/24 1120)  Negative Inspiratory Force (cm H2O): 0 (10/30/24 1120)  F/VT Ratio<105 (RSBI): (!) 52.63 (10/30/24 1120)    Lines/Drains/Airways       Central Venous Catheter Line  Duration             Percutaneous Central Line - Triple Lumen  10/21/24 0814 Internal Jugular Left 9 days              Drain  Duration                  Gastrostomy/Enterostomy 10/21/24 0844 Gastrostomy tube  w/ balloon LUQ feeding 9 days    Male External Urinary Catheter 10/28/24 1115 2 days              Airway  Duration             Adult Surgical Airway 10/29/24 1156 Shiley Cuffed 6.0/ 75mm <1 day                    Significant Labs:    CBC/Anemia Profile:  Recent Labs   Lab 10/29/24  0349 10/30/24  0455   WBC 26.29* 18.31*   HGB 9.0* 9.2*   HCT 27.8* 28.6*   * 604*   MCV 82 82   RDW 15.1* 15.3*        Chemistries:  Recent Labs   Lab 10/29/24  0349 10/30/24  0455    138   K 4.1 3.8    102   CO2 29 26   BUN 16 15   CREATININE 0.6 0.6   CALCIUM 8.5* 8.6*   ALBUMIN 2.0* 2.1*   PROT 7.3 7.5   BILITOT 0.7 0.6   ALKPHOS 204* 209*   ALT 21 32   AST 27 38   MG 2.1 1.7   PHOS 2.8 2.8       All pertinent labs within the past 24 hours have been reviewed.    Significant Imaging:  I have reviewed all pertinent imaging results/findings within the past 24 hours.

## 2024-10-30 NOTE — PLAN OF CARE
Nutrition Recommendations/Interventions for malnutrition 10/30/24:   1. Recommend pt continues on continuous Enteral nutrition, Peptamen 1.5 w/ Prebio via PEG tube, goal rate 40 mL/hr  -145 mL q4h free water flushes (870 mL/day), per MD/NP  -Monitor Mg, K+, Na, Phos and Glu, correct as indicated   2. Recommend pt continues Edwin BID via PEG tube for wound healing   3. Weigh twice weekly  4. Collaboration by nutrition professional with other providers     Goals:   1. Pt will be initiated onto TPN within 24 hrs (Resolved)  2. Pt will tolerate and intake >75% EEN and EPN prior to RD follow up (Meeting)  3. Pt will tolerate and intake Edwin BID prior to RD follow up    YAN Kitchen, RDN, LDN

## 2024-10-30 NOTE — PROGRESS NOTES
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  Receives Homebound tutoring at [employer] where he attends during the week.     Social Drivers of Health     Financial Resource Strain: Patient Unable To Answer (10/17/2024)    Overall Financial Resource Strain (CARDIA)     Difficulty of Paying Living Expenses: Patient unable to answer   Food Insecurity: Patient Unable To Answer (10/17/2024)    Hunger Vital Sign     Worried About Running Out of Food in the Last Year: Patient unable to answer     Ran Out of Food in the Last Year: Patient unable to answer   Transportation Needs: Patient Unable To Answer (10/17/2024)    TRANSPORTATION NEEDS     Transportation : Patient unable to answer   Stress: Patient Unable To Answer (10/17/2024)    [employer] - Occupational Stress Questionnaire     Feeling of Stress : Patient unable to answer   Housing Stability: Patient Unable To Answer (10/17/2024)    Housing Stability Vital Sign     Unable to Pay for Housing in the Last Year: Patient unable to answer     Homeless in the Last Year: Patient unable to answer       Precautions:     Allergies as of 10/16/2024 -  Reviewed 10/16/2024   Allergen Reaction Noted    Strawberries [strawberry] Hives 08/14/2013       WO Assessment Details/Treatment     Followup with Mr. Ramey due to present on admission wounds.  Skin assessed   Left medial ankle eschar still present, no changes edges fixed.   Patient turned to left side.  Sacrum intact with no redness or breakdown. Preventive sacral foam dressing maintained.  Foam dressing and gauze packing removed from wound to right ischium. 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 Wound bed moist red granulation tissue, bone remains palpable. Cleansed with vashe, then filled with aquacel ag advantage and covered with foam dressing. Continue with ordered wound care every MWF and prn excess drainage, extra supplies left at bedside.  Patient positioned with pillow support on left side.  He is currently on ICU Isolibrium ART bed.        10/30/24 0958   WOCN Assessment   WOCN Total Time (mins) 45   Visit Date 10/30/24   Visit Time 0958   Consult Type Follow Up   WOCN Speciality Wound   Wound pressure;At risk for pressure Injury   Number of Wounds 2   Intervention assessed;changed;applied;chart review;orders        Wound 10/16/24 0945 Pressure Injury Right Ischial tuberosity   Date First Assessed/Time First Assessed: 10/16/24 0945   Present on Original Admission: Yes  Primary Wound Type: Pressure Injury  Side: Right  Location: Ischial tuberosity  Is this injury device related?: No   Wound Image    Pressure Injury Stage 4   Dressing Appearance Moist drainage   Drainage Amount Small   Drainage Characteristics/Odor Serosanguineous   Appearance Pink;Red   Tissue loss description Full thickness   Red (%), Wound Tissue Color 100 %   Periwound Area Dry;Intact   Wound Length (cm) 2 cm   Wound Width (cm) 1.8 cm   Wound Depth (cm) 1 cm   Wound Volume (cm^3) 3.6 cm^3   Wound Surface Area  (cm^2) 3.6 cm^2   Undermining (depth (cm)/location) 4 cm from 1 o'clock to 5 o'clock   Care Cleansed with:;Wound cleanser;Applied:;Skin Barrier   Dressing Applied;Hydrofiber;Foam   Packing packed with;hydrofiber/alginate  (aquacel ag)   Packing Inserted  1   Packing Removed 1  (gauze packing)   Periwound Care Dry periwound area maintained;Skin barrier film applied   Dressing Change Due 11/01/24        Wound 10/16/24 Ulceration Left medial Ankle   Date First Assessed: 10/16/24   Present on Original Admission: Yes  Primary Wound Type: Ulceration  Side: Left  Orientation: medial  Location: Ankle   Wound Image    Dressing Appearance Open to air   Drainage Amount None   Appearance Maroon;Purple  (scabbed over)   Periwound Area Intact;Dry       10/30/2024

## 2024-10-31 LAB
ALBUMIN SERPL BCP-MCNC: 2 G/DL (ref 3.5–5.2)
ALP SERPL-CCNC: 189 U/L (ref 40–150)
ALT SERPL W/O P-5'-P-CCNC: 28 U/L (ref 10–44)
ANION GAP SERPL CALC-SCNC: 8 MMOL/L (ref 8–16)
AST SERPL-CCNC: 20 U/L (ref 10–40)
BASOPHILS # BLD AUTO: 0.02 K/UL (ref 0–0.2)
BASOPHILS # BLD AUTO: 0.03 K/UL (ref 0–0.2)
BASOPHILS NFR BLD: 0.1 % (ref 0–1.9)
BASOPHILS NFR BLD: 0.2 % (ref 0–1.9)
BILIRUB SERPL-MCNC: 0.6 MG/DL (ref 0.1–1)
BUN SERPL-MCNC: 17 MG/DL (ref 6–20)
CALCIUM SERPL-MCNC: 8.7 MG/DL (ref 8.7–10.5)
CHLORIDE SERPL-SCNC: 100 MMOL/L (ref 95–110)
CO2 SERPL-SCNC: 29 MMOL/L (ref 23–29)
CREAT SERPL-MCNC: 0.5 MG/DL (ref 0.5–1.4)
DIFFERENTIAL METHOD BLD: ABNORMAL
DIFFERENTIAL METHOD BLD: ABNORMAL
EOSINOPHIL # BLD AUTO: 0.1 K/UL (ref 0–0.5)
EOSINOPHIL # BLD AUTO: 0.3 K/UL (ref 0–0.5)
EOSINOPHIL NFR BLD: 0.7 % (ref 0–8)
EOSINOPHIL NFR BLD: 1.5 % (ref 0–8)
ERYTHROCYTE [DISTWIDTH] IN BLOOD BY AUTOMATED COUNT: 14.9 % (ref 11.5–14.5)
ERYTHROCYTE [DISTWIDTH] IN BLOOD BY AUTOMATED COUNT: 15.1 % (ref 11.5–14.5)
EST. GFR  (NO RACE VARIABLE): >60 ML/MIN/1.73 M^2
GLUCOSE SERPL-MCNC: 115 MG/DL (ref 70–110)
HCT VFR BLD AUTO: 26.4 % (ref 40–54)
HCT VFR BLD AUTO: 27.4 % (ref 40–54)
HGB BLD-MCNC: 8.7 G/DL (ref 14–18)
HGB BLD-MCNC: 8.9 G/DL (ref 14–18)
IMM GRANULOCYTES # BLD AUTO: 0.11 K/UL (ref 0–0.04)
IMM GRANULOCYTES # BLD AUTO: 0.17 K/UL (ref 0–0.04)
IMM GRANULOCYTES NFR BLD AUTO: 0.6 % (ref 0–0.5)
IMM GRANULOCYTES NFR BLD AUTO: 0.9 % (ref 0–0.5)
LACTATE SERPL-SCNC: 0.7 MMOL/L (ref 0.5–2.2)
LYMPHOCYTES # BLD AUTO: 1.4 K/UL (ref 1–4.8)
LYMPHOCYTES # BLD AUTO: 1.6 K/UL (ref 1–4.8)
LYMPHOCYTES NFR BLD: 8.2 % (ref 18–48)
LYMPHOCYTES NFR BLD: 8.7 % (ref 18–48)
MAGNESIUM SERPL-MCNC: 1.8 MG/DL (ref 1.6–2.6)
MCH RBC QN AUTO: 26.8 PG (ref 27–31)
MCH RBC QN AUTO: 27 PG (ref 27–31)
MCHC RBC AUTO-ENTMCNC: 32.5 G/DL (ref 32–36)
MCHC RBC AUTO-ENTMCNC: 33 G/DL (ref 32–36)
MCV RBC AUTO: 82 FL (ref 82–98)
MCV RBC AUTO: 83 FL (ref 82–98)
MONOCYTES # BLD AUTO: 1.4 K/UL (ref 0.3–1)
MONOCYTES # BLD AUTO: 1.9 K/UL (ref 0.3–1)
MONOCYTES NFR BLD: 10.5 % (ref 4–15)
MONOCYTES NFR BLD: 8.1 % (ref 4–15)
NEUTROPHILS # BLD AUTO: 14.3 K/UL (ref 1.8–7.7)
NEUTROPHILS # BLD AUTO: 14.5 K/UL (ref 1.8–7.7)
NEUTROPHILS NFR BLD: 78.2 % (ref 38–73)
NEUTROPHILS NFR BLD: 82.3 % (ref 38–73)
NRBC BLD-RTO: 0 /100 WBC
NRBC BLD-RTO: 0 /100 WBC
PHOSPHATE SERPL-MCNC: 2.6 MG/DL (ref 2.7–4.5)
PLATELET # BLD AUTO: 601 K/UL (ref 150–450)
PLATELET # BLD AUTO: 624 K/UL (ref 150–450)
PMV BLD AUTO: 10.7 FL (ref 9.2–12.9)
PMV BLD AUTO: 11.5 FL (ref 9.2–12.9)
POTASSIUM SERPL-SCNC: 3.9 MMOL/L (ref 3.5–5.1)
PROCALCITONIN SERPL IA-MCNC: 0.16 NG/ML
PROT SERPL-MCNC: 7.4 G/DL (ref 6–8.4)
RBC # BLD AUTO: 3.22 M/UL (ref 4.6–6.2)
RBC # BLD AUTO: 3.32 M/UL (ref 4.6–6.2)
SODIUM SERPL-SCNC: 137 MMOL/L (ref 136–145)
WBC # BLD AUTO: 17.35 K/UL (ref 3.9–12.7)
WBC # BLD AUTO: 18.52 K/UL (ref 3.9–12.7)

## 2024-10-31 PROCEDURE — 83605 ASSAY OF LACTIC ACID: CPT | Performed by: NURSE PRACTITIONER

## 2024-10-31 PROCEDURE — 80053 COMPREHEN METABOLIC PANEL: CPT | Performed by: NURSE PRACTITIONER

## 2024-10-31 PROCEDURE — 25000003 PHARM REV CODE 250: Performed by: NURSE PRACTITIONER

## 2024-10-31 PROCEDURE — 63600175 PHARM REV CODE 636 W HCPCS: Performed by: NURSE PRACTITIONER

## 2024-10-31 PROCEDURE — 27100171 HC OXYGEN HIGH FLOW UP TO 24 HOURS

## 2024-10-31 PROCEDURE — 99900035 HC TECH TIME PER 15 MIN (STAT)

## 2024-10-31 PROCEDURE — 25000003 PHARM REV CODE 250: Performed by: SPECIALIST

## 2024-10-31 PROCEDURE — 63600175 PHARM REV CODE 636 W HCPCS: Performed by: SPECIALIST

## 2024-10-31 PROCEDURE — 25000003 PHARM REV CODE 250: Performed by: INTERNAL MEDICINE

## 2024-10-31 PROCEDURE — 84145 PROCALCITONIN (PCT): CPT | Performed by: NURSE PRACTITIONER

## 2024-10-31 PROCEDURE — 94003 VENT MGMT INPAT SUBQ DAY: CPT

## 2024-10-31 PROCEDURE — 20000000 HC ICU ROOM

## 2024-10-31 PROCEDURE — 99900026 HC AIRWAY MAINTENANCE (STAT)

## 2024-10-31 PROCEDURE — 99900022

## 2024-10-31 PROCEDURE — 85025 COMPLETE CBC W/AUTO DIFF WBC: CPT | Performed by: NURSE PRACTITIONER

## 2024-10-31 PROCEDURE — C9399 UNCLASSIFIED DRUGS OR BIOLOG: HCPCS | Performed by: INTERNAL MEDICINE

## 2024-10-31 PROCEDURE — 85025 COMPLETE CBC W/AUTO DIFF WBC: CPT | Mod: 91 | Performed by: NURSE PRACTITIONER

## 2024-10-31 PROCEDURE — 84100 ASSAY OF PHOSPHORUS: CPT | Performed by: NURSE PRACTITIONER

## 2024-10-31 PROCEDURE — 83735 ASSAY OF MAGNESIUM: CPT | Performed by: NURSE PRACTITIONER

## 2024-10-31 PROCEDURE — 94761 N-INVAS EAR/PLS OXIMETRY MLT: CPT

## 2024-10-31 RX ORDER — QUETIAPINE FUMARATE 25 MG/1
25 TABLET, FILM COATED ORAL 2 TIMES DAILY
Status: DISCONTINUED | OUTPATIENT
Start: 2024-10-31 | End: 2024-11-02

## 2024-10-31 RX ORDER — LORAZEPAM 2 MG/ML
2 INJECTION INTRAMUSCULAR EVERY 4 HOURS PRN
Status: DISCONTINUED | OUTPATIENT
Start: 2024-10-31 | End: 2024-11-04

## 2024-10-31 RX ORDER — LEVETIRACETAM 500 MG/5ML
2000 INJECTION, SOLUTION, CONCENTRATE INTRAVENOUS EVERY 12 HOURS
Status: DISCONTINUED | OUTPATIENT
Start: 2024-10-31 | End: 2024-11-04

## 2024-10-31 RX ORDER — SODIUM,POTASSIUM PHOSPHATES 280-250MG
2 POWDER IN PACKET (EA) ORAL
Status: COMPLETED | OUTPATIENT
Start: 2024-10-31 | End: 2024-10-31

## 2024-10-31 RX ORDER — FENTANYL 25 UG/1
1 PATCH TRANSDERMAL
Status: DISCONTINUED | OUTPATIENT
Start: 2024-10-31 | End: 2024-11-03

## 2024-10-31 RX ORDER — MAGNESIUM SULFATE HEPTAHYDRATE 40 MG/ML
2 INJECTION, SOLUTION INTRAVENOUS ONCE
Status: COMPLETED | OUTPATIENT
Start: 2024-10-31 | End: 2024-10-31

## 2024-10-31 RX ADMIN — OXYCODONE HYDROCHLORIDE 5 MG: 5 TABLET ORAL at 05:10

## 2024-10-31 RX ADMIN — DIAZEPAM 2 MG: 2 TABLET ORAL at 08:10

## 2024-10-31 RX ADMIN — DEXMEDETOMIDINE HYDROCHLORIDE 1.4 MCG/KG/HR: 4 INJECTION INTRAVENOUS at 03:10

## 2024-10-31 RX ADMIN — Medication 2 PACKET: at 09:10

## 2024-10-31 RX ADMIN — QUETIAPINE FUMARATE 25 MG: 25 TABLET ORAL at 10:10

## 2024-10-31 RX ADMIN — MAGNESIUM SULFATE HEPTAHYDRATE 2 G: 40 INJECTION, SOLUTION INTRAVENOUS at 09:10

## 2024-10-31 RX ADMIN — LEVETIRACETAM 2000 MG: 100 INJECTION, SOLUTION INTRAVENOUS at 09:10

## 2024-10-31 RX ADMIN — DEXMEDETOMIDINE HYDROCHLORIDE 1.4 MCG/KG/HR: 4 INJECTION INTRAVENOUS at 10:10

## 2024-10-31 RX ADMIN — PIPERACILLIN SODIUM AND TAZOBACTAM SODIUM 4.5 G: 4; .5 INJECTION, POWDER, FOR SOLUTION INTRAVENOUS at 09:10

## 2024-10-31 RX ADMIN — BACLOFEN 20 MG: 10 TABLET ORAL at 08:10

## 2024-10-31 RX ADMIN — OXYCODONE HYDROCHLORIDE 5 MG: 5 TABLET ORAL at 11:10

## 2024-10-31 RX ADMIN — ENOXAPARIN SODIUM 30 MG: 30 INJECTION SUBCUTANEOUS at 05:10

## 2024-10-31 RX ADMIN — DEXMEDETOMIDINE HYDROCHLORIDE 1.4 MCG/KG/HR: 4 INJECTION INTRAVENOUS at 04:10

## 2024-10-31 RX ADMIN — FENTANYL CITRATE 200 MCG/HR: 50 INJECTION INTRAVENOUS at 08:10

## 2024-10-31 RX ADMIN — Medication 2 PACKET: at 02:10

## 2024-10-31 RX ADMIN — CHLORHEXIDINE GLUCONATE 0.12% ORAL RINSE 15 ML: 1.2 LIQUID ORAL at 08:10

## 2024-10-31 RX ADMIN — FAMOTIDINE 20 MG: 40 POWDER, FOR SUSPENSION ORAL at 08:10

## 2024-10-31 RX ADMIN — PHENOBARBITAL 64.8 MG: 32.4 TABLET ORAL at 08:10

## 2024-10-31 RX ADMIN — FENTANYL 1 PATCH: 25 PATCH TRANSDERMAL at 10:10

## 2024-10-31 RX ADMIN — PIPERACILLIN SODIUM AND TAZOBACTAM SODIUM 4.5 G: 4; .5 INJECTION, POWDER, FOR SOLUTION INTRAVENOUS at 02:10

## 2024-10-31 RX ADMIN — DEXMEDETOMIDINE HYDROCHLORIDE 1.4 MCG/KG/HR: 4 INJECTION INTRAVENOUS at 08:10

## 2024-10-31 RX ADMIN — LEVETIRACETAM 2000 MG: 100 SOLUTION ORAL at 08:10

## 2024-10-31 RX ADMIN — FENTANYL CITRATE 200 MCG/HR: 50 INJECTION INTRAVENOUS at 06:10

## 2024-10-31 NOTE — PROGRESS NOTES
[facility] - Intensive Care ([facility])  Critical Care Medicine  Progress Note    Patient Name: Ronny Ramey  MRN: [id number]  Admission Date: 10/16/2024  Hospital Length of Stay: 15 days  Code Status: Full Code  Attending Provider: Sandee Hardin MD  Primary Care Provider: Herman Nathan DNP   Principal Problem: Acute hypercapnic respiratory failure    Subjective:     HPI:  Ronny Ramey is a 23 yr old male [facility] resident with CP, spastic quadriplegia, s/p G tube, epilepsy who presented to [facility] ED on 10/16 with shortness of breath/respiratory distress. Patient was placed on CPAP with little improvement. KUB shows small bowel obstruction. Patient was intubated and transferred to [facility]. Critical care consulted for admission. Patient with history of admissions for bowel obstruction and aspiration pneumonia.     Hospital/ICU Course:  10/17/2024: Patient with decreasing UOP overnight. Renal function worsening. Small bolus given with little response. Additional boluses today. HTN overnight, clonidine patch started. Continue bowel rest. NGT advanced per Abd XR, continued bowel obstruction on XR as well as fecal impaction. Will attempt to disimpact. Gen Surg following. Propofol switched to precedex for vent weaning.  10/18/2024: XR small bowel FT shows high-grade small bowel obstruction. Gen surg planning for ex lap today. Will keep intubated for procedure. Cultures NGTD, antibiotics stopped.  10/19/2024: Patient s/p ex lap with extensive lysis of adhesions, small bowel resection, takedown of gastrostomy tube with partial gastrectomy, abdomen left open in discontinuity with abthera in place. Gen Surg plans to take for exploratory laparotomy Monday 10/21 for small-bowel anastomosis and gastrostomy tube placement. Keeping intubated for surgery.   10/20/2024: Remains intubated. Pending surgery tomorrow. Drop in H&H overnight, but no signs of bleeding, vital signs stable.    10/21 - To the OR this morning with small bowel anastamosis and G-tube placement.  Remains on the vent.  Hemodynamics stable.  10/22 - NAEON.  Vent and hemodynamics stable.  Extubated, but quickly failed due to anxiety and secretions with severe tachypnea and desats  10/23 - GALO following reintubation.  Vent weaned back down.  Hemodynamics stable.    10/24 - vent settings minimal.  1 unit of blood today.  Hemodynamics stable.  10/25 - NAEON.  21% FiO2 on the vent.  Failed SBT with sister at bedside.  Hemodynamics stable.  10/26 - failed SBT with daughter and then mother at bedside yesterday due to rapid/shallow breathing.  Vent and hemodynamics stable.    10/27 - NAEON.  Vent and hemodynamics stable.  10/28 - Failed SBT. On full support. No hemodynamic issues;  10/29 Stable overnight. On vent. Extensive discussion with mother yesterday. All questions answered to her satisfaction. She consented to the trach  10/30 stable. On vent. S/p trach yesterday  10/31 Stable overnight. On full vent support. Requiring precedex and fentanyl. Periods of agitation.         Objective:     Vital Signs (Most Recent):  Temp: 98.5 °F (36.9 °C) (10/31/24 0700)  Pulse: 94 (10/31/24 0905)  Resp: (!) 60 (10/31/24 0905)  BP: 118/77 (10/31/24 0905)  SpO2: 100 % (10/31/24 0905) Vital Signs (24h Range):  Temp:  [98 °F (36.7 °C)-98.7 °F (37.1 °C)] 98.5 °F (36.9 °C)  Pulse:  [] 94  Resp:  [13-63] 60  SpO2:  [97 %-100 %] 100 %  BP: (118-157)/() 118/77     Weight: 46.1 kg (101 lb 10.1 oz)  Body mass index is 17.45 kg/m².      Intake/Output Summary (Last 24 hours) at 10/31/2024 1002  Last data filed at 10/31/2024 0859  Gross per 24 hour   Intake 1726.87 ml   Output 550 ml   Net 1176.87 ml        Physical Exam  Vitals and nursing note reviewed.   Constitutional:       General: He is not in acute distress.     Appearance: He is ill-appearing. He is not toxic-appearing or diaphoretic.   HENT:      Head: Normocephalic.   Eyes:      Pupils:  Pupils are equal, round, and reactive to light.   Cardiovascular:      Rate and Rhythm: Tachycardia present.      Pulses: Normal pulses.   Pulmonary:      Effort: Pulmonary effort is normal. No respiratory distress.   Abdominal:      Palpations: Abdomen is soft.           Review of Systems   Unable to perform ROS: Acuity of condition       Vents:  Vent Mode: A/C (10/31/24 0905)  Ventilator Initiated: Yes (10/17/24 0102)  Set Rate: 8 BPM (10/31/24 0905)  Vt Set: 350 mL (10/31/24 0905)  Pressure Support: 15 cmH20 (10/28/24 1656)  PEEP/CPAP: 5 cmH20 (10/31/24 0905)  Oxygen Concentration (%): 21 (10/31/24 0905)  Peak Airway Pressure: 25 cmH20 (10/31/24 0905)  Plateau Pressure: 20 cmH20 (10/31/24 0905)  Total Ve: 5.88 L/m (10/31/24 0905)  Negative Inspiratory Force (cm H2O): 0 (10/31/24 0905)  F/VT Ratio<105 (RSBI): 161.29 (10/31/24 0905)    Lines/Drains/Airways       Central Venous Catheter Line  Duration             Percutaneous Central Line - Triple Lumen  10/21/24 0814 Internal Jugular Left 10 days              Drain  Duration                  Gastrostomy/Enterostomy 10/21/24 0844 Gastrostomy tube w/ balloon LUQ feeding 10 days    Male External Urinary Catheter 10/28/24 1115 2 days              Airway  Duration             Adult Surgical Airway 10/29/24 1156 Shiley Cuffed 6.0/ 75mm 1 day                    Significant Labs:    CBC/Anemia Profile:  Recent Labs   Lab 10/30/24  0455 10/31/24  0457   WBC 18.31* 18.52*   HGB 9.2* 8.7*   HCT 28.6* 26.4*   * 601*   MCV 82 82   RDW 15.3* 15.1*        Chemistries:  Recent Labs   Lab 10/30/24  0455 10/31/24  0457    137   K 3.8 3.9    100   CO2 26 29   BUN 15 17   CREATININE 0.6 0.5   CALCIUM 8.6* 8.7   ALBUMIN 2.1* 2.0*   PROT 7.5 7.4   BILITOT 0.6 0.6   ALKPHOS 209* 189*   ALT 32 28   AST 38 20   MG 1.7 1.8   PHOS 2.8 2.6*       All pertinent labs within the past 24 hours have been reviewed.    Significant Imaging:  I have reviewed all pertinent imaging  "results/findings within the past 24 hours.    ABG  No results for input(s): "PH", "PO2", "PCO2", "HCO3", "BE" in the last 168 hours.  Assessment/Plan:     Neuro  Intellectual disability with epilepsy  Home AEDs restarted: Keppra and phenobarb    Spastic quadriplegic cerebral palsy  Turn q 2hrs  Aspiration precautions  Baclofen restarted at half home dose 10/23    Ophtho  Legally blind  Supportive Care    ENT  Tracheostomy in place  S/p Trach # 6 Shilley on 10/29    Pulmonary  * Acute hypercapnic respiratory failure  Patient with Hypercapnic Respiratory failure which is Acute on chronic. Unclear if on O2 at the NH. Supplemental oxygen was provided and noted-     Vent Mode: A/C  Oxygen Concentration (%):  [21] 21  Resp Rate Total:  [14 br/min-34 br/min] 16 br/min  Vt Set:  [350 mL] 350 mL  PEEP/CPAP:  [5 cmH20] 5 cmH20  Mean Airway Pressure:  [7.7 kzC30-76 cmH20] 8.6 cmH20    .   Signs/symptoms of respiratory failure include- tachypnea, increased work of breathing, respiratory distress, and use of accessory muscles. Contributing diagnoses includes - Aspiration Labs and images were reviewed. Patient Has recent ABG, which has been reviewed. Will treat underlying causes and adjust management of respiratory failure as follows-     Wean vent settings as tolerated  Broad spectrum antibiotics- stopped, cultures NGTD  Duonebs prn  Sedation: Precedex, Fentanyl  Will keep intubated for surgeries planned  Bowel rest-NPO    10/21 - SAT/SBT starting this afternoon.  Nonverbal and blind baseline status will likely complicate extubation course.  10/22- failed SBT yesterday due to apnea.   Sedation off this morning and plan SBT again today.  10/23 - extubated yesterday, but quickly failed due to anxiety and difficulty with secretions.  Dropped his sat and got extremely tachypneic.  Reintubated and initially had high requirements but now weaned back to 40% and 5 of PEEP.  Some concern for aspiration of secretions.  Will coordinate " with mom for her to be present next time we attempt.  10/24 - vent weaned to 21%.  Starting moving back to SAT/SBT today.  10/25 - Fentanyl off this morning and tried SBT with sister at bedside to help calm him down. We let him go about 35 mins on PSV, but RR consistently in the 40's -50's with tidal volume below 200 @ 7cm H2O. Placed back on AC/VC.  Will try again this afternoon when his mom arrives.  10/26 - continues to fail fairly quickly, even with PSV up to 12 cmH2O.  Ongoing discussions with mom regarding trach.  10/27 - mother requested we not trial pressure support today.      10/28 Awaiting family. Will need a trach    10/29  D/w mother extensively. She gave consent for trach.   CPAP / SBT trial done in the mothers presence yesterday  RR increased to 50 and he had secretions issues requiring suctioning and placing back on AC.  Mother was at the bedside and witnessed.     10/30 S/p trach. Transition to LTAC    10/31   Will decrease rate to 8 and try CPAP trial this afternoon  Still requiring fentanyl and precedex  Will start low dose fentanyl patch  Sequel low dose for agitation and try to wean off precedex  Potential transfer to LTAC on the 1st        Renal/  MAYELIN (acute kidney injury)  MAYELIN is likely due to pre-renal azotemia due to intravascular volume depletion. Baseline creatinine is  0.8 . Most recent creatinine and eGFR are listed below.  Recent Labs     10/25/24  0408 10/26/24  0431 10/27/24  0430   CREATININE 0.6 0.6 0.6   EGFRNORACEVR >60 >60 >60        Plan  - Avoid nephrotoxins and renally dose meds for GFR listed above  - Monitor urine output, serial BMP, and adjust therapy as needed  - Maintain Lancaster catheter - IVF boluses prn  - Resolved after fluid resuscitation      Endocrine  Body mass index (BMI) less than 19  Nutrition consult for Tube feeding recs when able to resume tube feeds  NPO currently due to bowel surgery  Start TPN 10/25    GI  Bowel obstruction  KUB shows small bowel  obstruction  NGT to suction  Keep NPO  CT abd/pelvis shows high grade bowel obstruction  Gen Surg consulted  Fecal impaction on Abd XR- disimpacted  SBFT XR shows high-grade small bowel obstruction    Under went Ex lap 10/18 with extensive lysis of adhesions, small bowel resection, takedown of gastrostomy tube with partial gastrectomy, abdomen left open in discontinuity with abthera in place  Ex lap on 10/21 with small-bowel anastomosis and G tube placement    10/24 - Bowel sounds improving, but no BM yet  10/25 - Bowel sounds improving, but no BM yet.  Started TPN  10/26 - tolerating trickle feeds and had good BM this afternoon.  10/27 - BM overnight.  Tolerating trickle feeds.    PEG (percutaneous endoscopic gastrostomy) status  Patient noted to have a percutaneous endoscopic gastrostomy tube in place. I have personally inspected the tube.Tube was placed on this admission, with date of procedure- 10/16  There are no signs of drainage or infection around the site. Routine care to be done by wound care and nursing staff.     G tube removed during ex-lap on 10/18  Tube feedings on hold due to Bowel obstruction  Ex lap 10/21 with G tube replacement  Starting to use for meds 10/23  Trickled feeds started 10/25           Critical Care Daily Checklist:    A: Awake: RASS Goal/Actual Goal: RASS Goal: 0-->alert and calm  Actual:     B: Spontaneous Breathing Trial Performed? Spon. Breathing Trial Initiated?: Initiated (10/28/24 8144)   C: SAT & SBT Coordinated?  yes                      D: Delirium: CAM-ICU Overall CAM-ICU: Negative   E: Early Mobility Performed? yes   F: Feeding Goal: Goals: 1. Pt will be initiated onto TPN within 24 hrs (Resolved)  2. Pt will tolerate and intake >75% EEN and EPN prior to RD follow up (Meeting)  3. Pt will tolerate and intake Edwin BID prior to RD follow up  Status: Nutrition Goal Status: new   Current Diet Order   Procedures    Diet NPO      AS: Analgesia/Sedation yes   T: Thromboembolic  Prophylaxis yes   H: HOB > 300 yes   U: Stress Ulcer Prophylaxis (if needed) yes   G: Glucose Control yes   B: Bowel Function Stool Occurrence: 1   I: Indwelling Catheter (Lines & Lancaster) Necessity yes   D: De-escalation of Antimicrobials/Pharmacotherapies yes    Plan for the day/ETD yes    Code Status:  Family/Goals of Care: Full Code  yes     Critical Care Time: 38 minutes  Critical secondary to Patient has a condition that poses threat to life and bodily function: Severe Respiratory Distress      Critical care was time spent personally by me on the following activities: development of treatment plan with patient or surrogate and bedside caregivers, discussions with consultants, evaluation of patient's response to treatment, examination of patient, ordering and performing treatments and interventions, ordering and review of laboratory studies, ordering and review of radiographic studies, pulse oximetry, re-evaluation of patient's condition. This critical care time did not overlap with that of any other provider or involve time for any procedures.     Sandee Hardin MD  Critical Care Medicine  [facility] - Intensive Care [facility])

## 2024-10-31 NOTE — PLAN OF CARE
No significant changes overnight. Remains on vent. On fent and dex for sedation to maintain a RASS goal of 0. Tolerating TF. Voided 250ml. Turned q2h. Heel boot on. Bed low and locked. Bed alarm on. Safety precautions maintained. Will continue to monitor.

## 2024-10-31 NOTE — PLAN OF CARE
Pt status unchanged today. Attempts at weaning Fent were made, patient became agitated with RR in 30s, /110s, requiring return to increased Fent doses. Turns q2 hr. Heel boots on. Family visiting at bedside.     Problem: Infection  Goal: Absence of Infection Signs and Symptoms  10/31/2024 1735 by Anjel Benz RN  Outcome: Progressing  10/31/2024 1731 by Anjel Benz RN  Outcome: Progressing  Intervention: Prevent or Manage Infection  10/31/2024 1735 by Anjel Benz RN  Flowsheets (Taken 10/31/2024 1735)  Fever Reduction/Comfort Measures:   lightweight bedding   lightweight clothing  Infection Management: aseptic technique maintained  Isolation Precautions: precautions maintained  10/31/2024 1731 by Anjel Benz RN  Flowsheets (Taken 10/31/2024 1731)  Fever Reduction/Comfort Measures:   lightweight bedding   lightweight clothing  Infection Management: aseptic technique maintained  Isolation Precautions: protective     Problem: Skin Injury Risk Increased  Goal: Skin Health and Integrity  Outcome: Progressing  Intervention: Optimize Skin Protection  Flowsheets (Taken 10/31/2024 1735)  Pressure Reduction Techniques:   pressure points protected   weight shift assistance provided  Pressure Reduction Devices:   pressure-redistributing mattress utilized   foam padding utilized   positioning supports utilized   heel offloading device utilized  Head of Bed (HOB) Positioning: HOB at 30-45 degrees  Intervention: Promote and Optimize Oral Intake  Flowsheets (Taken 10/31/2024 1735)  Oral Nutrition Promotion: calorie-dense liquids provided     Problem: Adult Inpatient Plan of Care  Goal: Plan of Care Review  Outcome: Progressing  Goal: Patient-Specific Goal (Individualized)  Outcome: Progressing  Goal: Absence of Hospital-Acquired Illness or Injury  Outcome: Progressing  Goal: Optimal Comfort and Wellbeing  Outcome: Progressing  Goal: Readiness for Transition of Care  Outcome: Progressing     Problem: Fall Injury  Risk  Goal: Absence of Fall and Fall-Related Injury  Outcome: Progressing  Intervention: Promote Injury-Free Environment  Flowsheets (Taken 10/31/2024 0213)  Safety Promotion/Fall Prevention:   Fall Risk reviewed with patient/family   room near unit station     Problem: Wound  Goal: Optimal Coping  Outcome: Progressing  Goal: Optimal Functional Ability  Outcome: Progressing  Goal: Absence of Infection Signs and Symptoms  Outcome: Progressing  Goal: Improved Oral Intake  Outcome: Progressing  Goal: Optimal Pain Control and Function  Outcome: Progressing  Goal: Skin Health and Integrity  Outcome: Progressing  Goal: Optimal Wound Healing  Outcome: Progressing     Problem: Acute Kidney Injury/Impairment  Goal: Fluid and Electrolyte Balance  Outcome: Progressing  Goal: Improved Oral Intake  Outcome: Progressing  Goal: Effective Renal Function  Outcome: Progressing     Problem: Mechanical Ventilation Invasive  Goal: Effective Communication  Outcome: Progressing  Goal: Optimal Device Function  Outcome: Progressing  Goal: Mechanical Ventilation Liberation  Outcome: Progressing  Goal: Optimal Nutrition Delivery  Outcome: Progressing  Goal: Absence of Device-Related Skin and Tissue Injury  Outcome: Progressing  Goal: Absence of Ventilator-Induced Lung Injury  Outcome: Progressing     Problem: Artificial Airway  Goal: Effective Communication  Outcome: Progressing  Goal: Optimal Device Function  Outcome: Progressing  Goal: Absence of Device-Related Skin or Tissue Injury  Outcome: Progressing

## 2024-10-31 NOTE — SUBJECTIVE & OBJECTIVE
Objective:     Vital Signs (Most Recent):  Temp: 98.5 °F (36.9 °C) (10/31/24 0700)  Pulse: 94 (10/31/24 0905)  Resp: (!) 60 (10/31/24 0905)  BP: 118/77 (10/31/24 0905)  SpO2: 100 % (10/31/24 0905) Vital Signs (24h Range):  Temp:  [98 °F (36.7 °C)-98.7 °F (37.1 °C)] 98.5 °F (36.9 °C)  Pulse:  [] 94  Resp:  [13-63] 60  SpO2:  [97 %-100 %] 100 %  BP: (118-157)/() 118/77     Weight: 46.1 kg (101 lb 10.1 oz)  Body mass index is 17.45 kg/m².      Intake/Output Summary (Last 24 hours) at 10/31/2024 1002  Last data filed at 10/31/2024 0859  Gross per 24 hour   Intake 1726.87 ml   Output 550 ml   Net 1176.87 ml        Physical Exam  Vitals and nursing note reviewed.   Constitutional:       General: He is not in acute distress.     Appearance: He is ill-appearing. He is not toxic-appearing or diaphoretic.   HENT:      Head: Normocephalic.   Eyes:      Pupils: Pupils are equal, round, and reactive to light.   Cardiovascular:      Rate and Rhythm: Tachycardia present.      Pulses: Normal pulses.   Pulmonary:      Effort: Pulmonary effort is normal. No respiratory distress.   Abdominal:      Palpations: Abdomen is soft.           Review of Systems   Unable to perform ROS: Acuity of condition       Vents:  Vent Mode: A/C (10/31/24 0905)  Ventilator Initiated: Yes (10/17/24 0102)  Set Rate: 8 BPM (10/31/24 0905)  Vt Set: 350 mL (10/31/24 0905)  Pressure Support: 15 cmH20 (10/28/24 1656)  PEEP/CPAP: 5 cmH20 (10/31/24 0905)  Oxygen Concentration (%): 21 (10/31/24 0905)  Peak Airway Pressure: 25 cmH20 (10/31/24 0905)  Plateau Pressure: 20 cmH20 (10/31/24 0905)  Total Ve: 5.88 L/m (10/31/24 0905)  Negative Inspiratory Force (cm H2O): 0 (10/31/24 0905)  F/VT Ratio<105 (RSBI): 161.29 (10/31/24 0905)    Lines/Drains/Airways       Central Venous Catheter Line  Duration             Percutaneous Central Line - Triple Lumen  10/21/24 0814 Internal Jugular Left 10 days              Drain  Duration                   Gastrostomy/Enterostomy 10/21/24 0844 Gastrostomy tube w/ balloon LUQ feeding 10 days    Male External Urinary Catheter 10/28/24 1115 2 days              Airway  Duration             Adult Surgical Airway 10/29/24 1156 Shiley Cuffed 6.0/ 75mm 1 day                    Significant Labs:    CBC/Anemia Profile:  Recent Labs   Lab 10/30/24  0455 10/31/24  0457   WBC 18.31* 18.52*   HGB 9.2* 8.7*   HCT 28.6* 26.4*   * 601*   MCV 82 82   RDW 15.3* 15.1*        Chemistries:  Recent Labs   Lab 10/30/24  0455 10/31/24  0457    137   K 3.8 3.9    100   CO2 26 29   BUN 15 17   CREATININE 0.6 0.5   CALCIUM 8.6* 8.7   ALBUMIN 2.1* 2.0*   PROT 7.5 7.4   BILITOT 0.6 0.6   ALKPHOS 209* 189*   ALT 32 28   AST 38 20   MG 1.7 1.8   PHOS 2.8 2.6*       All pertinent labs within the past 24 hours have been reviewed.    Significant Imaging:  I have reviewed all pertinent imaging results/findings within the past 24 hours.

## 2024-10-31 NOTE — ASSESSMENT & PLAN NOTE
Patient with Hypercapnic Respiratory failure which is Acute on chronic. Unclear if on O2 at the NH. Supplemental oxygen was provided and noted-     Vent Mode: A/C  Oxygen Concentration (%):  [21] 21  Resp Rate Total:  [14 br/min-34 br/min] 16 br/min  Vt Set:  [350 mL] 350 mL  PEEP/CPAP:  [5 cmH20] 5 cmH20  Mean Airway Pressure:  [7.7 caQ42-79 cmH20] 8.6 cmH20    .   Signs/symptoms of respiratory failure include- tachypnea, increased work of breathing, respiratory distress, and use of accessory muscles. Contributing diagnoses includes - Aspiration Labs and images were reviewed. Patient Has recent ABG, which has been reviewed. Will treat underlying causes and adjust management of respiratory failure as follows-     Wean vent settings as tolerated  Broad spectrum antibiotics- stopped, cultures NGTD  Duonebs prn  Sedation: Precedex, Fentanyl  Will keep intubated for surgeries planned  Bowel rest-NPO    10/21 - SAT/SBT starting this afternoon.  Nonverbal and blind baseline status will likely complicate extubation course.  10/22- failed SBT yesterday due to apnea.   Sedation off this morning and plan SBT again today.  10/23 - extubated yesterday, but quickly failed due to anxiety and difficulty with secretions.  Dropped his sat and got extremely tachypneic.  Reintubated and initially had high requirements but now weaned back to 40% and 5 of PEEP.  Some concern for aspiration of secretions.  Will coordinate with mom for her to be present next time we attempt.  10/24 - vent weaned to 21%.  Starting moving back to SAT/SBT today.  10/25 - Fentanyl off this morning and tried SBT with sister at bedside to help calm him down. We let him go about 35 mins on PSV, but RR consistently in the 40's -50's with tidal volume below 200 @ 7cm H2O. Placed back on AC/VC.  Will try again this afternoon when his mom arrives.  10/26 - continues to fail fairly quickly, even with PSV up to 12 cmH2O.  Ongoing discussions with mom regarding  trach.  10/27 - mother requested we not trial pressure support today.      10/28 Awaiting family. Will need a trach    10/29  D/w mother extensively. She gave consent for trach.   CPAP / SBT trial done in the mothers presence yesterday  RR increased to 50 and he had secretions issues requiring suctioning and placing back on AC.  Mother was at the bedside and witnessed.     10/30 S/p trach. Transition to LTAC    10/31   Will decrease rate to 8 and try CPAP trial this afternoon  Still requiring fentanyl and precedex  Will start low dose fentanyl patch  Sequel low dose for agitation and try to wean off precedex  Potential transfer to LTAC on the 1st

## 2024-11-01 LAB
ALBUMIN SERPL BCP-MCNC: 1.8 G/DL (ref 3.5–5.2)
ALP SERPL-CCNC: 185 U/L (ref 40–150)
ALT SERPL W/O P-5'-P-CCNC: 26 U/L (ref 10–44)
ANION GAP SERPL CALC-SCNC: 7 MMOL/L (ref 8–16)
AST SERPL-CCNC: 22 U/L (ref 10–40)
BACTERIA SPEC AEROBE CULT: ABNORMAL
BACTERIA SPEC AEROBE CULT: ABNORMAL
BASOPHILS # BLD AUTO: 0.03 K/UL (ref 0–0.2)
BASOPHILS NFR BLD: 0.3 % (ref 0–1.9)
BILIRUB SERPL-MCNC: 0.6 MG/DL (ref 0.1–1)
BUN SERPL-MCNC: 13 MG/DL (ref 6–20)
CALCIUM SERPL-MCNC: 8.5 MG/DL (ref 8.7–10.5)
CHLORIDE SERPL-SCNC: 96 MMOL/L (ref 95–110)
CO2 SERPL-SCNC: 33 MMOL/L (ref 23–29)
CREAT SERPL-MCNC: 0.6 MG/DL (ref 0.5–1.4)
DIFFERENTIAL METHOD BLD: ABNORMAL
EOSINOPHIL # BLD AUTO: 0.3 K/UL (ref 0–0.5)
EOSINOPHIL NFR BLD: 2.5 % (ref 0–8)
ERYTHROCYTE [DISTWIDTH] IN BLOOD BY AUTOMATED COUNT: 14.9 % (ref 11.5–14.5)
EST. GFR  (NO RACE VARIABLE): >60 ML/MIN/1.73 M^2
GLUCOSE SERPL-MCNC: 86 MG/DL (ref 70–110)
GRAM STN SPEC: ABNORMAL
HCT VFR BLD AUTO: 25.2 % (ref 40–54)
HGB BLD-MCNC: 7.9 G/DL (ref 14–18)
IMM GRANULOCYTES # BLD AUTO: 0.07 K/UL (ref 0–0.04)
IMM GRANULOCYTES NFR BLD AUTO: 0.6 % (ref 0–0.5)
LYMPHOCYTES # BLD AUTO: 2.2 K/UL (ref 1–4.8)
LYMPHOCYTES NFR BLD: 19.4 % (ref 18–48)
MAGNESIUM SERPL-MCNC: 2 MG/DL (ref 1.6–2.6)
MCH RBC QN AUTO: 26.1 PG (ref 27–31)
MCHC RBC AUTO-ENTMCNC: 31.3 G/DL (ref 32–36)
MCV RBC AUTO: 83 FL (ref 82–98)
MONOCYTES # BLD AUTO: 1.1 K/UL (ref 0.3–1)
MONOCYTES NFR BLD: 9.9 % (ref 4–15)
NEUTROPHILS # BLD AUTO: 7.6 K/UL (ref 1.8–7.7)
NEUTROPHILS NFR BLD: 67.3 % (ref 38–73)
NRBC BLD-RTO: 0 /100 WBC
PHOSPHATE SERPL-MCNC: 4 MG/DL (ref 2.7–4.5)
PLATELET # BLD AUTO: 572 K/UL (ref 150–450)
PMV BLD AUTO: 10.9 FL (ref 9.2–12.9)
POCT GLUCOSE: 101 MG/DL (ref 70–110)
POCT GLUCOSE: 88 MG/DL (ref 70–110)
POCT GLUCOSE: 95 MG/DL (ref 70–110)
POTASSIUM SERPL-SCNC: 3.9 MMOL/L (ref 3.5–5.1)
PROT SERPL-MCNC: 6.6 G/DL (ref 6–8.4)
RBC # BLD AUTO: 3.03 M/UL (ref 4.6–6.2)
SODIUM SERPL-SCNC: 136 MMOL/L (ref 136–145)
WBC # BLD AUTO: 11.28 K/UL (ref 3.9–12.7)

## 2024-11-01 PROCEDURE — 80053 COMPREHEN METABOLIC PANEL: CPT | Performed by: NURSE PRACTITIONER

## 2024-11-01 PROCEDURE — 83735 ASSAY OF MAGNESIUM: CPT | Performed by: NURSE PRACTITIONER

## 2024-11-01 PROCEDURE — 63600175 PHARM REV CODE 636 W HCPCS: Performed by: NURSE PRACTITIONER

## 2024-11-01 PROCEDURE — 20000000 HC ICU ROOM

## 2024-11-01 PROCEDURE — 99900026 HC AIRWAY MAINTENANCE (STAT)

## 2024-11-01 PROCEDURE — 25000003 PHARM REV CODE 250

## 2024-11-01 PROCEDURE — 25000003 PHARM REV CODE 250: Performed by: NURSE PRACTITIONER

## 2024-11-01 PROCEDURE — 84100 ASSAY OF PHOSPHORUS: CPT | Performed by: NURSE PRACTITIONER

## 2024-11-01 PROCEDURE — 25000003 PHARM REV CODE 250: Performed by: INTERNAL MEDICINE

## 2024-11-01 PROCEDURE — 85025 COMPLETE CBC W/AUTO DIFF WBC: CPT | Performed by: NURSE PRACTITIONER

## 2024-11-01 PROCEDURE — 99024 POSTOP FOLLOW-UP VISIT: CPT | Mod: ,,,

## 2024-11-01 PROCEDURE — C9399 UNCLASSIFIED DRUGS OR BIOLOG: HCPCS | Performed by: INTERNAL MEDICINE

## 2024-11-01 PROCEDURE — 63600175 PHARM REV CODE 636 W HCPCS: Performed by: INTERNAL MEDICINE

## 2024-11-01 PROCEDURE — 27100171 HC OXYGEN HIGH FLOW UP TO 24 HOURS

## 2024-11-01 PROCEDURE — 25000003 PHARM REV CODE 250: Performed by: SPECIALIST

## 2024-11-01 PROCEDURE — 94003 VENT MGMT INPAT SUBQ DAY: CPT

## 2024-11-01 PROCEDURE — 99900035 HC TECH TIME PER 15 MIN (STAT)

## 2024-11-01 PROCEDURE — 63600175 PHARM REV CODE 636 W HCPCS: Performed by: SPECIALIST

## 2024-11-01 PROCEDURE — 94761 N-INVAS EAR/PLS OXIMETRY MLT: CPT

## 2024-11-01 RX ORDER — METOCLOPRAMIDE HYDROCHLORIDE 5 MG/ML
10 INJECTION INTRAMUSCULAR; INTRAVENOUS EVERY 6 HOURS
Status: DISCONTINUED | OUTPATIENT
Start: 2024-11-01 | End: 2024-11-05 | Stop reason: HOSPADM

## 2024-11-01 RX ORDER — POLYETHYLENE GLYCOL 3350 17 G/17G
17 POWDER, FOR SOLUTION ORAL 2 TIMES DAILY
Status: DISCONTINUED | OUTPATIENT
Start: 2024-11-01 | End: 2024-11-05 | Stop reason: HOSPADM

## 2024-11-01 RX ADMIN — FENTANYL CITRATE 50 MCG/HR: 50 INJECTION INTRAVENOUS at 06:11

## 2024-11-01 RX ADMIN — BACLOFEN 20 MG: 10 TABLET ORAL at 01:11

## 2024-11-01 RX ADMIN — QUETIAPINE FUMARATE 25 MG: 25 TABLET ORAL at 08:11

## 2024-11-01 RX ADMIN — DEXMEDETOMIDINE HYDROCHLORIDE 1.4 MCG/KG/HR: 4 INJECTION INTRAVENOUS at 11:11

## 2024-11-01 RX ADMIN — MEROPENEM 2 G: 2 INJECTION, POWDER, FOR SOLUTION INTRAVENOUS at 01:11

## 2024-11-01 RX ADMIN — METOCLOPRAMIDE 10 MG: 5 INJECTION, SOLUTION INTRAMUSCULAR; INTRAVENOUS at 11:11

## 2024-11-01 RX ADMIN — METOCLOPRAMIDE 10 MG: 5 INJECTION, SOLUTION INTRAMUSCULAR; INTRAVENOUS at 09:11

## 2024-11-01 RX ADMIN — LEVETIRACETAM 2000 MG: 100 INJECTION, SOLUTION INTRAVENOUS at 08:11

## 2024-11-01 RX ADMIN — QUETIAPINE FUMARATE 25 MG: 25 TABLET ORAL at 01:11

## 2024-11-01 RX ADMIN — DEXMEDETOMIDINE HYDROCHLORIDE 1.4 MCG/KG/HR: 4 INJECTION INTRAVENOUS at 04:11

## 2024-11-01 RX ADMIN — METOCLOPRAMIDE 10 MG: 5 INJECTION, SOLUTION INTRAMUSCULAR; INTRAVENOUS at 05:11

## 2024-11-01 RX ADMIN — CHLORHEXIDINE GLUCONATE 0.12% ORAL RINSE 15 ML: 1.2 LIQUID ORAL at 08:11

## 2024-11-01 RX ADMIN — DIAZEPAM 2 MG: 2 TABLET ORAL at 08:11

## 2024-11-01 RX ADMIN — CHLORHEXIDINE GLUCONATE 0.12% ORAL RINSE 15 ML: 1.2 LIQUID ORAL at 09:11

## 2024-11-01 RX ADMIN — OXYCODONE HYDROCHLORIDE 5 MG: 5 TABLET ORAL at 01:11

## 2024-11-01 RX ADMIN — FAMOTIDINE 20 MG: 40 POWDER, FOR SUSPENSION ORAL at 08:11

## 2024-11-01 RX ADMIN — FENTANYL CITRATE 100 MCG/HR: 50 INJECTION INTRAVENOUS at 11:11

## 2024-11-01 RX ADMIN — PHENOBARBITAL 64.8 MG: 32.4 TABLET ORAL at 01:11

## 2024-11-01 RX ADMIN — PIPERACILLIN SODIUM AND TAZOBACTAM SODIUM 4.5 G: 4; .5 INJECTION, POWDER, FOR SOLUTION INTRAVENOUS at 06:11

## 2024-11-01 RX ADMIN — MEROPENEM 2 G: 2 INJECTION, POWDER, FOR SOLUTION INTRAVENOUS at 08:11

## 2024-11-01 RX ADMIN — ENOXAPARIN SODIUM 30 MG: 30 INJECTION SUBCUTANEOUS at 05:11

## 2024-11-01 RX ADMIN — FAMOTIDINE 20 MG: 40 POWDER, FOR SUSPENSION ORAL at 01:11

## 2024-11-01 RX ADMIN — BACLOFEN 20 MG: 10 TABLET ORAL at 08:11

## 2024-11-01 RX ADMIN — OXYCODONE HYDROCHLORIDE 5 MG: 5 TABLET ORAL at 05:11

## 2024-11-01 RX ADMIN — Medication 1 ENEMA: at 02:11

## 2024-11-01 RX ADMIN — OXYCODONE HYDROCHLORIDE 5 MG: 5 TABLET ORAL at 11:11

## 2024-11-01 RX ADMIN — POLYETHYLENE GLYCOL 3350 17 G: 17 POWDER, FOR SOLUTION ORAL at 01:11

## 2024-11-01 RX ADMIN — POLYETHYLENE GLYCOL 3350 17 G: 17 POWDER, FOR SOLUTION ORAL at 08:11

## 2024-11-01 RX ADMIN — DIAZEPAM 2 MG: 2 TABLET ORAL at 01:11

## 2024-11-01 RX ADMIN — PHENOBARBITAL 64.8 MG: 32.4 TABLET ORAL at 08:11

## 2024-11-01 NOTE — NURSING
Pt. Abdomen taut and distended on initial assessment.   G tube residual checked, over 500ml, Provider notified and at bedside.  XR ABD ordered, completed. Bladder Scan completed: 88 ml.   Gen Surg. Contacted, Order to place PEG to gravity overnight.   Placed to Gravity into Drainage bag.   Scheduled XR ABD in the AM.     See flowsheets for output.

## 2024-11-01 NOTE — SUBJECTIVE & OBJECTIVE
Interval History: last reported BM 10/30. Concern for abdominal distention, G tube residuals checked (>500mL), PEG to gravity,  KUB with no significant distention. No vomiting.     Medications:  Continuous Infusions:   dexmedeTOMIDine (Precedex) infusion (titrating)  0-1.4 mcg/kg/hr Intravenous Continuous 15.79 mL/hr at 11/01/24 1200 1.4 mcg/kg/hr at 11/01/24 1200    fentanyl  0-250 mcg/hr Intravenous Continuous 10 mL/hr at 11/01/24 1200 100 mcg/hr at 11/01/24 1200     Scheduled Meds:   baclofen  20 mg Per G Tube BID    chlorhexidine  15 mL Mouth/Throat BID    cloNIDine 0.1 mg/24 hr td ptwk  1 patch Transdermal Q7 Days    diazePAM  2 mg Per G Tube BID    enoxparin  30 mg Subcutaneous Q24H (prophylaxis, 1700)    famotidine  20 mg Per G Tube BID    fentaNYL  1 patch Transdermal Q72H    levETIRAcetam (Keppra) IV (PEDS and ADULTS)  2,000 mg Intravenous Q12H    meropenem IV (PEDS and ADULTS)  2 g Intravenous Q8H    metoclopramide  10 mg Intravenous Q6H    oxyCODONE  5 mg Per G Tube Q6H    PHENobarbitaL  64.8 mg Per G Tube BID    polyethylene glycol  17 g Per G Tube BID    QUEtiapine  25 mg Per G Tube BID    sodium phosphates  1 enema Rectal Once     PRN Meds:  Current Facility-Administered Medications:     acetaminophen, 650 mg, Per G Tube, Q6H PRN    albuterol-ipratropium, 3 mL, Nebulization, Q6H PRN    dextrose 10%, 12.5 g, Intravenous, PRN    dextrose 10%, 25 g, Intravenous, PRN    glucagon (human recombinant), 1 mg, Intramuscular, PRN    lorazepam, 2 mg, Intravenous, Q4H PRN     Review of patient's allergies indicates:   Allergen Reactions    Strawberries [strawberry] Hives     STRAWBERRIES ALLERGIC REACTIONS   (SEIZURES AND HIVES )     Objective:     Vital Signs (Most Recent):  Temp: 98.1 °F (36.7 °C) (11/01/24 0301)  Pulse: 70 (11/01/24 1200)  Resp: 11 (11/01/24 1200)  BP: 123/80 (11/01/24 1200)  SpO2: 100 % (11/01/24 1200) Vital Signs (24h Range):  Temp:  [98 °F (36.7 °C)-98.5 °F (36.9 °C)] 98.1 °F (36.7  °C)  Pulse:  [] 70  Resp:  [11-47] 11  SpO2:  [99 %-100 %] 100 %  BP: ()/() 123/80     Weight: 46.1 kg (101 lb 10.1 oz)  Body mass index is 17.45 kg/m².    Intake/Output - Last 3 Shifts         10/30 0700  10/31 0659 10/31 0700 11/01 0659 11/01 0700 11/02 0659    I.V. (mL/kg) 757.3 (16.4) 874.3 (19) 193.2 (4.2)    NG/GT 1200 690     IV Piggyback  176.3 95.2    Total Intake(mL/kg) 1957.3 (42.5) 1740.6 (37.8) 288.4 (6.3)    Urine (mL/kg/hr) 550 (0.5) 550 (0.5)     Drains  1675     Stool 0      Total Output 550 2225     Net +1407.3 -484.4 +288.4           Stool Occurrence 1 x               Physical Exam  Constitutional:       Appearance: He is ill-appearing.   Cardiovascular:      Rate and Rhythm: Normal rate.   Pulmonary:      Comments: Tracheostomy in place, no distress  Abdominal:      Comments: Soft, nondistended, nontender.  G-tube in place to gravity. Midline incision well healed without erythema or drainage          Significant Labs:  I have reviewed all pertinent lab results within the past 24 hours.  CBC:   Recent Labs   Lab 11/01/24  0407   WBC 11.28   RBC 3.03*   HGB 7.9*   HCT 25.2*   *   MCV 83   MCH 26.1*   MCHC 31.3*     CMP:   Recent Labs   Lab 11/01/24  0407   GLU 86   CALCIUM 8.5*   ALBUMIN 1.8*   PROT 6.6      K 3.9   CO2 33*   CL 96   BUN 13   CREATININE 0.6   ALKPHOS 185*   ALT 26   AST 22   BILITOT 0.6       Significant Diagnostics:  I have reviewed all pertinent imaging results/findings within the past 24 hours.    KUB reviewed

## 2024-11-01 NOTE — PROGRESS NOTES
[facility] ([facility])  Critical Care - Medicine  Progress Note    Patient Name: Ronny Ramey  MRN: [id number]  Admission Date: 10/16/2024  Hospital Length of Stay: 16 days  Code Status: Full Code  Attending Provider: Robert Booth MD  Primary Care Provider: Herman Nathan DNP   Principal Problem: Acute hypercapnic respiratory failure    Subjective:     HPI:   23 yr old male [facility] resident with CP, spastic quadriplegia, s/p G tube, epilepsy who presented to [facility] ED on 10/16 with shortness of breath/respiratory distress. Patient was placed on CPAP with little improvement. KUB shows small bowel obstruction. Patient was intubated and transferred to [facility]. Critical care consulted for admission. Patient with history of admissions for bowel obstruction and aspiration pneumonia.     Hospital/ICU Course:   10/17/2024: Patient with decreasing UOP overnight. Renal function worsening. Small bolus given with little response. Additional boluses today. HTN overnight, clonidine patch started. Continue bowel rest. NGT advanced per Abd XR, continued bowel obstruction on XR as well as fecal impaction. Will attempt to disimpact. Gen Surg following. Propofol switched to precedex for vent weaning.  10/18/2024: XR small bowel FT shows high-grade small bowel obstruction. Gen surg planning for ex lap today. Will keep intubated for procedure. Cultures NGTD, antibiotics stopped.  10/19/2024: Patient s/p ex lap with extensive lysis of adhesions, small bowel resection, takedown of gastrostomy tube with partial gastrectomy, abdomen left open in discontinuity with abthera in place. Gen Surg plans to take for exploratory laparotomy Monday 10/21 for small-bowel anastomosis and gastrostomy tube placement. Keeping intubated for surgery.   10/20/2024: Remains intubated. Pending surgery tomorrow. Drop in H&H overnight, but no signs of bleeding, vital signs stable.   10/21 - To the OR this morning  with small bowel anastamosis and G-tube placement.  Remains on the vent.  Hemodynamics stable.  10/22 - NAEON.  Vent and hemodynamics stable.  Extubated, but quickly failed due to anxiety and secretions with severe tachypnea and desats  10/23 - GALO following reintubation.  Vent weaned back down.  Hemodynamics stable.    10/24 - vent settings minimal.  1 unit of blood today.  Hemodynamics stable.  10/25 - NAEON.  21% FiO2 on the vent.  Failed SBT with sister at bedside.  Hemodynamics stable.  10/26 - failed SBT with daughter and then mother at bedside yesterday due to rapid/shallow breathing.  Vent and hemodynamics stable.    10/27 - NAEON.  Vent and hemodynamics stable.  10/28 - Failed SBT. On full support. No hemodynamic issues;  10/29 Unchanged overnight. On vent. Extensive discussion with mother yesterday. All questions answered to her satisfaction. She consented to the trach  10/30 Unchanged. On vent. S/p trach yesterday  10/31 Unchanged  overnight. On full vent support. Requiring precedex and fentanyl. Periods of agitation.     Interval History/Significant Events:   11/1: no significant events last 24 hours. Remains ventilator dependent. Fentanyl patch placed, weaning fentanyl infusion    Review of Systems unobtainable due to intubation/vent  Objective:     Vital Signs (Most Recent):  Temp: 98.1 °F (36.7 °C) (11/01/24 0301)  Pulse: 75 (11/01/24 0914)  Resp: 12 (11/01/24 0914)  BP: 113/67 (11/01/24 0600)  SpO2: 100 % (11/01/24 0914) Vital Signs (24h Range):  Temp:  [98 °F (36.7 °C)-98.7 °F (37.1 °C)] 98.1 °F (36.7 °C)  Pulse:  [] 75  Resp:  [12-47] 12  SpO2:  [99 %-100 %] 100 %  BP: ()/() 113/67     Weight: 46.1 kg (101 lb 10.1 oz)  Body mass index is 17.45 kg/m².      Intake/Output Summary (Last 24 hours) at 11/1/2024 1048  Last data filed at 11/1/2024 0600  Gross per 24 hour   Intake 1333.51 ml   Output 2225 ml   Net -891.49 ml       Physical Exam  Constitutional:       General: He is not in  acute distress.     Appearance: He is ill-appearing. He is not toxic-appearing or diaphoretic.   HENT:      Head: Normocephalic.   Eyes:      Pupils: Pupils are equal, round, and reactive to light.   Cardiovascular:      Rate and Rhythm: Tachycardia present.      Pulses: Normal pulses.   Pulmonary:      Effort: Pulmonary effort is normal. No respiratory distress.   Abdominal:      Palpations: Abdomen is soft.     Vents:  Vent Mode: A/C (11/01/24 0914)  Ventilator Initiated: Yes (10/17/24 0102)  Set Rate: 8 BPM (11/01/24 0914)  Vt Set: 350 mL (11/01/24 0914)  Pressure Support: 15 cmH20 (10/28/24 1656)  PEEP/CPAP: 5 cmH20 (11/01/24 0914)  Oxygen Concentration (%): 21 (11/01/24 0914)  Peak Airway Pressure: 27 cmH20 (11/01/24 0914)  Plateau Pressure: 14 cmH20 (11/01/24 0914)  Total Ve: 3.85 L/m (11/01/24 0914)  Negative Inspiratory Force (cm H2O): 0 (11/01/24 0914)  F/VT Ratio<105 (RSBI): (!) 32.7 (11/01/24 0914)    Lines/Drains/Airways       Central Venous Catheter Line  Duration             Percutaneous Central Line - Triple Lumen  10/21/24 0814 Internal Jugular Left 11 days              Drain  Duration                  Gastrostomy/Enterostomy 10/21/24 0844 Gastrostomy tube w/ balloon LUQ feeding 11 days    Male External Urinary Catheter 10/28/24 1115 3 days              Airway  Duration             Adult Surgical Airway 10/29/24 1156 Shiley Cuffed 6.0/ 75mm 2 days                    Significant Labs:    CBC/Anemia Profile:  Recent Labs   Lab 10/31/24  0457 10/31/24  2010 11/01/24  0407   WBC 18.52* 17.35* 11.28   HGB 8.7* 8.9* 7.9*   HCT 26.4* 27.4* 25.2*   * 624* 572*   MCV 82 83 83   RDW 15.1* 14.9* 14.9*        Chemistries:  Recent Labs   Lab 10/31/24  0457 11/01/24  0407    136   K 3.9 3.9    96   CO2 29 33*   BUN 17 13   CREATININE 0.5 0.6   CALCIUM 8.7 8.5*   ALBUMIN 2.0* 1.8*   PROT 7.4 6.6   BILITOT 0.6 0.6   ALKPHOS 189* 185*   ALT 28 26   AST 20 22   MG 1.8 2.0   PHOS 2.6* 4.0        Assessment/Plan:     Active Diagnoses:    Diagnosis Date Noted POA    PRINCIPAL PROBLEM:  Acute hypercapnic respiratory failure [J96.02] 10/16/2024 Yes    Tracheostomy in place [Z93.0] 10/30/2024 Not Applicable    Moderate protein-calorie malnutrition [E44.0] 10/25/2024 Yes    Body mass index (BMI) less than 19 [Z68.1] 10/16/2024 Not Applicable    Bowel obstruction [K56.609] 10/16/2024 Yes    MAYELIN (acute kidney injury) [N17.9] 10/16/2024 No    PEG (percutaneous endoscopic gastrostomy) status [Z93.1] 09/11/2023 Not Applicable    Nonverbal [R47.01] 09/11/2023 Yes    Legally blind [H54.8] 09/11/2023 Yes    Intellectual disability with epilepsy [F79, G40.909] 08/19/2013 Yes    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] 04/09/2013 Yes      Problems Resolved During this Admission:    Diagnosis Date Noted Date Resolved POA    Aspiration pneumonia [J69.0] 10/16/2024 10/20/2024 Yes     - Not much progress thus far with ventilator weaning  - Unclear if he will be permanently ventilator dependent  - Wean fentanyl gtt as able  - Significant bowel issues persist  - Currently with ileus  - Holding tube feeds  - Add reglan  - Wound care  - Supportive care  - Poor long term prognosis  - No family present at time of my visit      Critical Care Time: 36 minutes  Critical secondary to mechanical ventilation, high risk meds infusion     Critical care was time spent personally by me on the following activities: development of treatment plan with patient or surrogate and bedside caregivers, discussions with consultants, evaluation of patient's response to treatment, examination of patient, ordering and performing treatments and interventions, ordering and review of laboratory studies, ordering and review of radiographic studies, pulse oximetry, re-evaluation of patient's condition.  This critical care time did not overlap with that of any other provider or involve time for any procedures.     Robert Booth MD  Critical Care -  Medicine  [facility] - [facility] ([facility])

## 2024-11-01 NOTE — PLAN OF CARE
11/01/24 0809   Post-Acute Status   Post-Acute Authorization Placement   Post-Acute Placement Status Set-up Complete/Auth obtained   Discharge Delays (!) Change in Medical Condition   Discharge Plan   Discharge Plan A Long-term acute care facility (LTAC)     [facility] received insurance authorization. Changes in abdomen overnight, imaging order, PEG to gravity. Updated liaison. They are able to admit over the weekend once medically cleared.

## 2024-11-01 NOTE — PLAN OF CARE
Pt weaned off of fentanyl GTT today by end of shift per MD order. Hydrocodone and Fent patch used to help wean  Pt remains on vent, trach   Precedex Gtt at 1.4   Enema given, small bowel movement resulted.   Pt G tube output green and bilious,  consistent throughout shift   Surgery gave okay to resume G tube med regiment but hold feeds.  All vitals stable at this time within ordered paramaters.

## 2024-11-01 NOTE — ASSESSMENT & PLAN NOTE
s/p ex lap, extensive lysis of adhesions, small bowel resection, takedown of gastrostomy tube with partial gastrectomy, abdomen left open in discontinuity with abthera in place.  Now status post small-bowel anastomosis, open gastrostomy placement and abdominal closure    - staples removed at bedside today   - no significant distention on exam. Last bowel movement 2 days ago. Would hold tube feeds until further bowel function. Ok for po meds through G tube. Needs bowel regimen - start miralax bid, enemas   - remainder of care per primary team

## 2024-11-01 NOTE — PROGRESS NOTES
[facility] [facility] Int[facility] ([facility])  General Surgery  Progress Note    Subjective:     Interval History: last reported BM 10/30. Concern for abdominal distention, G tube residuals checked (>500mL), PEG to gravity,  KUB with no significant distention. No vomiting.     Medications:  Continuous Infusions:   dexmedeTOMIDine (Precedex) infusion (titrating)  0-1.4 mcg/kg/hr Intravenous Continuous 15.79 mL/hr at 11/01/24 1200 1.4 mcg/kg/hr at 11/01/24 1200    fentanyl  0-250 mcg/hr Intravenous Continuous 10 mL/hr at 11/01/24 1200 100 mcg/hr at 11/01/24 1200     Scheduled Meds:   baclofen  20 mg Per G Tube BID    chlorhexidine  15 mL Mouth/Throat BID    cloNIDine 0.1 mg/24 hr td ptwk  1 patch Transdermal Q7 Days    diazePAM  2 mg Per G Tube BID    enoxparin  30 mg Subcutaneous Q24H (prophylaxis, 1700)    famotidine  20 mg Per G Tube BID    fentaNYL  1 patch Transdermal Q72H    levETIRAcetam (Keppra) IV (PEDS and ADULTS)  2,000 mg Intravenous Q12H    meropenem IV (PEDS and ADULTS)  2 g Intravenous Q8H    metoclopramide  10 mg Intravenous Q6H    oxyCODONE  5 mg Per G Tube Q6H    PHENobarbitaL  64.8 mg Per G Tube BID    polyethylene glycol  17 g Per G Tube BID    QUEtiapine  25 mg Per G Tube BID    sodium phosphates  1 enema Rectal Once     PRN Meds:  Current Facility-Administered Medications:     acetaminophen, 650 mg, Per G Tube, Q6H PRN    albuterol-ipratropium, 3 mL, Nebulization, Q6H PRN    dextrose 10%, 12.5 g, Intravenous, PRN    dextrose 10%, 25 g, Intravenous, PRN    glucagon (human recombinant), 1 mg, Intramuscular, PRN    lorazepam, 2 mg, Intravenous, Q4H PRN     Review of patient's allergies indicates:   Allergen Reactions    Strawberries [strawberry] Hives     STRAWBERRIES ALLERGIC REACTIONS   (SEIZURES AND HIVES )     Objective:     Vital Signs (Most Recent):  Temp: 98.1 °F (36.7 °C) (11/01/24 0301)  Pulse: 70 (11/01/24 1200)  Resp: 11 (11/01/24 1200)  BP: 123/80 (11/01/24 1200)  SpO2: 100 % (11/01/24 1200) Vital  Signs (24h Range):  Temp:  [98 °F (36.7 °C)-98.5 °F (36.9 °C)] 98.1 °F (36.7 °C)  Pulse:  [] 70  Resp:  [11-47] 11  SpO2:  [99 %-100 %] 100 %  BP: ()/() 123/80     Weight: 46.1 kg (101 lb 10.1 oz)  Body mass index is 17.45 kg/m².    Intake/Output - Last 3 Shifts         10/30 0700  10/31 0659 10/31 0700  11/01 0659 11/01 0700  11/02 0659    I.V. (mL/kg) 757.3 (16.4) 874.3 (19) 193.2 (4.2)    NG/GT 1200 690     IV Piggyback  176.3 95.2    Total Intake(mL/kg) 1957.3 (42.5) 1740.6 (37.8) 288.4 (6.3)    Urine (mL/kg/hr) 550 (0.5) 550 (0.5)     Drains  1675     Stool 0      Total Output 550 2225     Net +1407.3 -484.4 +288.4           Stool Occurrence 1 x               Physical Exam  Constitutional:       Appearance: He is ill-appearing.   Cardiovascular:      Rate and Rhythm: Normal rate.   Pulmonary:      Comments: Tracheostomy in place, no distress  Abdominal:      Comments: Soft, nondistended, nontender.  G-tube in place to gravity. Midline incision well healed without erythema or drainage          Significant Labs:  I have reviewed all pertinent lab results within the past 24 hours.  CBC:   Recent Labs   Lab 11/01/24  0407   WBC 11.28   RBC 3.03*   HGB 7.9*   HCT 25.2*   *   MCV 83   MCH 26.1*   MCHC 31.3*     CMP:   Recent Labs   Lab 11/01/24  0407   GLU 86   CALCIUM 8.5*   ALBUMIN 1.8*   PROT 6.6      K 3.9   CO2 33*   CL 96   BUN 13   CREATININE 0.6   ALKPHOS 185*   ALT 26   AST 22   BILITOT 0.6       Significant Diagnostics:  I have reviewed all pertinent imaging results/findings within the past 24 hours.    KUB reviewed   Assessment/Plan:     * Acute hypercapnic respiratory failure  Management per critical care.  Tracheostomy in place    MAYELIN (acute kidney injury)  Per critical Care    Bowel obstruction  s/p ex lap, extensive lysis of adhesions, small bowel resection, takedown of gastrostomy tube with partial gastrectomy, abdomen left open in discontinuity with abthera in place.   Now status post small-bowel anastomosis, open gastrostomy placement and abdominal closure    - staples removed at bedside today   - no significant distention on exam. Last bowel movement 2 days ago. Would hold tube feeds until further bowel function. Ok for po meds through G tube. Needs bowel regimen - start miralax bid, enemas   - remainder of care per primary team    PEG (percutaneous endoscopic gastrostomy) status  Surgically replaced Gastrostomy tube in place    Nonverbal  Monitor    Legally blind  Per critical care    Intellectual disability with epilepsy  Per critical care    Spastic quadriplegic cerebral palsy  Further management critical care medicine        JARRED IbrahimC  General Surgery  [facility] ([facility])

## 2024-11-02 LAB
ALBUMIN SERPL BCP-MCNC: 2 G/DL (ref 3.5–5.2)
ALP SERPL-CCNC: 198 U/L (ref 40–150)
ALT SERPL W/O P-5'-P-CCNC: 25 U/L (ref 10–44)
ANION GAP SERPL CALC-SCNC: 16 MMOL/L (ref 8–16)
AST SERPL-CCNC: 20 U/L (ref 10–40)
BASOPHILS # BLD AUTO: 0.05 K/UL (ref 0–0.2)
BASOPHILS NFR BLD: 0.4 % (ref 0–1.9)
BILIRUB SERPL-MCNC: 0.5 MG/DL (ref 0.1–1)
BUN SERPL-MCNC: 9 MG/DL (ref 6–20)
CALCIUM SERPL-MCNC: 8.6 MG/DL (ref 8.7–10.5)
CHLORIDE SERPL-SCNC: 95 MMOL/L (ref 95–110)
CO2 SERPL-SCNC: 29 MMOL/L (ref 23–29)
CREAT SERPL-MCNC: 0.6 MG/DL (ref 0.5–1.4)
DIFFERENTIAL METHOD BLD: ABNORMAL
EOSINOPHIL # BLD AUTO: 0.3 K/UL (ref 0–0.5)
EOSINOPHIL NFR BLD: 2.2 % (ref 0–8)
ERYTHROCYTE [DISTWIDTH] IN BLOOD BY AUTOMATED COUNT: 14.8 % (ref 11.5–14.5)
EST. GFR  (NO RACE VARIABLE): >60 ML/MIN/1.73 M^2
GLUCOSE SERPL-MCNC: 83 MG/DL (ref 70–110)
HCT VFR BLD AUTO: 24.9 % (ref 40–54)
HGB BLD-MCNC: 7.9 G/DL (ref 14–18)
IMM GRANULOCYTES # BLD AUTO: 0.08 K/UL (ref 0–0.04)
IMM GRANULOCYTES NFR BLD AUTO: 0.6 % (ref 0–0.5)
LYMPHOCYTES # BLD AUTO: 1.6 K/UL (ref 1–4.8)
LYMPHOCYTES NFR BLD: 12.1 % (ref 18–48)
MAGNESIUM SERPL-MCNC: 1.7 MG/DL (ref 1.6–2.6)
MCH RBC QN AUTO: 26.2 PG (ref 27–31)
MCHC RBC AUTO-ENTMCNC: 31.7 G/DL (ref 32–36)
MCV RBC AUTO: 83 FL (ref 82–98)
MONOCYTES # BLD AUTO: 0.9 K/UL (ref 0.3–1)
MONOCYTES NFR BLD: 7.2 % (ref 4–15)
NEUTROPHILS # BLD AUTO: 10 K/UL (ref 1.8–7.7)
NEUTROPHILS NFR BLD: 77.5 % (ref 38–73)
NRBC BLD-RTO: 0 /100 WBC
PHOSPHATE SERPL-MCNC: 3.4 MG/DL (ref 2.7–4.5)
PLATELET # BLD AUTO: 720 K/UL (ref 150–450)
PMV BLD AUTO: 10.7 FL (ref 9.2–12.9)
POCT GLUCOSE: 85 MG/DL (ref 70–110)
POTASSIUM SERPL-SCNC: 3.4 MMOL/L (ref 3.5–5.1)
PROT SERPL-MCNC: 6.7 G/DL (ref 6–8.4)
RBC # BLD AUTO: 3.01 M/UL (ref 4.6–6.2)
SODIUM SERPL-SCNC: 140 MMOL/L (ref 136–145)
WBC # BLD AUTO: 12.91 K/UL (ref 3.9–12.7)

## 2024-11-02 PROCEDURE — 99900035 HC TECH TIME PER 15 MIN (STAT)

## 2024-11-02 PROCEDURE — 27100171 HC OXYGEN HIGH FLOW UP TO 24 HOURS

## 2024-11-02 PROCEDURE — 25000003 PHARM REV CODE 250: Performed by: SPECIALIST

## 2024-11-02 PROCEDURE — 94761 N-INVAS EAR/PLS OXIMETRY MLT: CPT

## 2024-11-02 PROCEDURE — 94003 VENT MGMT INPAT SUBQ DAY: CPT

## 2024-11-02 PROCEDURE — 25000003 PHARM REV CODE 250: Performed by: NURSE PRACTITIONER

## 2024-11-02 PROCEDURE — C9399 UNCLASSIFIED DRUGS OR BIOLOG: HCPCS | Performed by: INTERNAL MEDICINE

## 2024-11-02 PROCEDURE — 84100 ASSAY OF PHOSPHORUS: CPT | Performed by: NURSE PRACTITIONER

## 2024-11-02 PROCEDURE — 25000003 PHARM REV CODE 250: Performed by: INTERNAL MEDICINE

## 2024-11-02 PROCEDURE — 99900026 HC AIRWAY MAINTENANCE (STAT)

## 2024-11-02 PROCEDURE — 80053 COMPREHEN METABOLIC PANEL: CPT | Performed by: NURSE PRACTITIONER

## 2024-11-02 PROCEDURE — 83735 ASSAY OF MAGNESIUM: CPT | Performed by: NURSE PRACTITIONER

## 2024-11-02 PROCEDURE — 20000000 HC ICU ROOM

## 2024-11-02 PROCEDURE — 85025 COMPLETE CBC W/AUTO DIFF WBC: CPT | Performed by: NURSE PRACTITIONER

## 2024-11-02 PROCEDURE — 25000003 PHARM REV CODE 250

## 2024-11-02 PROCEDURE — 63600175 PHARM REV CODE 636 W HCPCS: Performed by: INTERNAL MEDICINE

## 2024-11-02 PROCEDURE — 63600175 PHARM REV CODE 636 W HCPCS: Performed by: NURSE PRACTITIONER

## 2024-11-02 RX ORDER — QUETIAPINE FUMARATE 25 MG/1
25 TABLET, FILM COATED ORAL ONCE
Status: COMPLETED | OUTPATIENT
Start: 2024-11-02 | End: 2024-11-02

## 2024-11-02 RX ORDER — LANOLIN ALCOHOL/MO/W.PET/CERES
800 CREAM (GRAM) TOPICAL ONCE
Status: COMPLETED | OUTPATIENT
Start: 2024-11-02 | End: 2024-11-02

## 2024-11-02 RX ORDER — BISACODYL 10 MG/1
10 SUPPOSITORY RECTAL DAILY
Status: DISCONTINUED | OUTPATIENT
Start: 2024-11-02 | End: 2024-11-05 | Stop reason: HOSPADM

## 2024-11-02 RX ORDER — QUETIAPINE FUMARATE 25 MG/1
50 TABLET, FILM COATED ORAL 2 TIMES DAILY
Status: DISCONTINUED | OUTPATIENT
Start: 2024-11-02 | End: 2024-11-03

## 2024-11-02 RX ORDER — AMOXICILLIN 250 MG
1 CAPSULE ORAL 2 TIMES DAILY
Status: DISCONTINUED | OUTPATIENT
Start: 2024-11-02 | End: 2024-11-05 | Stop reason: HOSPADM

## 2024-11-02 RX ADMIN — FAMOTIDINE 20 MG: 40 POWDER, FOR SUSPENSION ORAL at 08:11

## 2024-11-02 RX ADMIN — QUETIAPINE FUMARATE 25 MG: 25 TABLET ORAL at 09:11

## 2024-11-02 RX ADMIN — MEROPENEM 2 G: 2 INJECTION, POWDER, FOR SOLUTION INTRAVENOUS at 08:11

## 2024-11-02 RX ADMIN — POTASSIUM BICARBONATE 40 MEQ: 391 TABLET, EFFERVESCENT ORAL at 07:11

## 2024-11-02 RX ADMIN — POLYETHYLENE GLYCOL 3350 17 G: 17 POWDER, FOR SOLUTION ORAL at 07:11

## 2024-11-02 RX ADMIN — METOCLOPRAMIDE 10 MG: 5 INJECTION, SOLUTION INTRAMUSCULAR; INTRAVENOUS at 11:11

## 2024-11-02 RX ADMIN — DIAZEPAM 2 MG: 2 TABLET ORAL at 08:11

## 2024-11-02 RX ADMIN — PHENOBARBITAL 64.8 MG: 32.4 TABLET ORAL at 08:11

## 2024-11-02 RX ADMIN — CHLORHEXIDINE GLUCONATE 0.12% ORAL RINSE 15 ML: 1.2 LIQUID ORAL at 07:11

## 2024-11-02 RX ADMIN — QUETIAPINE FUMARATE 25 MG: 25 TABLET ORAL at 07:11

## 2024-11-02 RX ADMIN — DEXMEDETOMIDINE HYDROCHLORIDE 1.4 MCG/KG/HR: 4 INJECTION INTRAVENOUS at 11:11

## 2024-11-02 RX ADMIN — DEXMEDETOMIDINE HYDROCHLORIDE 1.4 MCG/KG/HR: 4 INJECTION INTRAVENOUS at 05:11

## 2024-11-02 RX ADMIN — BACLOFEN 20 MG: 10 TABLET ORAL at 08:11

## 2024-11-02 RX ADMIN — MEROPENEM 2 G: 2 INJECTION, POWDER, FOR SOLUTION INTRAVENOUS at 05:11

## 2024-11-02 RX ADMIN — BACLOFEN 20 MG: 10 TABLET ORAL at 07:11

## 2024-11-02 RX ADMIN — SENNOSIDES AND DOCUSATE SODIUM 1 TABLET: 50; 8.6 TABLET ORAL at 08:11

## 2024-11-02 RX ADMIN — QUETIAPINE FUMARATE 50 MG: 25 TABLET ORAL at 08:11

## 2024-11-02 RX ADMIN — METOCLOPRAMIDE 10 MG: 5 INJECTION, SOLUTION INTRAMUSCULAR; INTRAVENOUS at 05:11

## 2024-11-02 RX ADMIN — MEROPENEM 2 G: 2 INJECTION, POWDER, FOR SOLUTION INTRAVENOUS at 02:11

## 2024-11-02 RX ADMIN — CHLORHEXIDINE GLUCONATE 0.12% ORAL RINSE 15 ML: 1.2 LIQUID ORAL at 08:11

## 2024-11-02 RX ADMIN — Medication 800 MG: at 07:11

## 2024-11-02 RX ADMIN — OXYCODONE HYDROCHLORIDE 5 MG: 5 TABLET ORAL at 06:11

## 2024-11-02 RX ADMIN — LEVETIRACETAM 2000 MG: 100 INJECTION, SOLUTION INTRAVENOUS at 09:11

## 2024-11-02 RX ADMIN — METOCLOPRAMIDE 10 MG: 5 INJECTION, SOLUTION INTRAMUSCULAR; INTRAVENOUS at 06:11

## 2024-11-02 RX ADMIN — ENOXAPARIN SODIUM 30 MG: 30 INJECTION SUBCUTANEOUS at 05:11

## 2024-11-02 RX ADMIN — OXYCODONE HYDROCHLORIDE 5 MG: 5 TABLET ORAL at 05:11

## 2024-11-02 RX ADMIN — POLYETHYLENE GLYCOL 3350 17 G: 17 POWDER, FOR SOLUTION ORAL at 08:11

## 2024-11-02 RX ADMIN — OXYCODONE HYDROCHLORIDE 5 MG: 5 TABLET ORAL at 11:11

## 2024-11-02 RX ADMIN — LEVETIRACETAM 2000 MG: 100 INJECTION, SOLUTION INTRAVENOUS at 08:11

## 2024-11-02 RX ADMIN — BISACODYL 10 MG: 10 SUPPOSITORY RECTAL at 07:11

## 2024-11-02 RX ADMIN — DEXMEDETOMIDINE HYDROCHLORIDE 1.4 MCG/KG/HR: 4 INJECTION INTRAVENOUS at 10:11

## 2024-11-02 RX ADMIN — SENNOSIDES AND DOCUSATE SODIUM 1 TABLET: 50; 8.6 TABLET ORAL at 07:11

## 2024-11-02 NOTE — SUBJECTIVE & OBJECTIVE
Interval History:  Abdomen softer today.  No bowel movement yesterday.  Minimal G-tube drainage which is then, clear bilious    Medications:  Continuous Infusions:   dexmedeTOMIDine (Precedex) infusion (titrating)  0-1.4 mcg/kg/hr Intravenous Continuous 15.79 mL/hr at 11/02/24 0600 1.4 mcg/kg/hr at 11/02/24 0600    fentanyl  0-250 mcg/hr Intravenous Continuous 10 mL/hr at 11/02/24 0600 100 mcg/hr at 11/02/24 0600     Scheduled Meds:   baclofen  20 mg Per G Tube BID    bisacodyL  10 mg Rectal Daily    chlorhexidine  15 mL Mouth/Throat BID    cloNIDine 0.1 mg/24 hr td ptwk  1 patch Transdermal Q7 Days    diazePAM  2 mg Per G Tube BID    enoxparin  30 mg Subcutaneous Q24H (prophylaxis, 1700)    famotidine  20 mg Per G Tube BID    fentaNYL  1 patch Transdermal Q72H    levETIRAcetam (Keppra) IV (PEDS and ADULTS)  2,000 mg Intravenous Q12H    meropenem IV (PEDS and ADULTS)  2 g Intravenous Q8H    metoclopramide  10 mg Intravenous Q6H    oxyCODONE  5 mg Per G Tube Q6H    PHENobarbitaL  64.8 mg Per G Tube BID    polyethylene glycol  17 g Per G Tube BID    QUEtiapine  25 mg Per G Tube BID    senna-docusate 8.6-50 mg  1 tablet Per G Tube BID     PRN Meds:  Current Facility-Administered Medications:     acetaminophen, 650 mg, Per G Tube, Q6H PRN    albuterol-ipratropium, 3 mL, Nebulization, Q6H PRN    dextrose 10%, 12.5 g, Intravenous, PRN    dextrose 10%, 25 g, Intravenous, PRN    glucagon (human recombinant), 1 mg, Intramuscular, PRN    lorazepam, 2 mg, Intravenous, Q4H PRN     Review of patient's allergies indicates:   Allergen Reactions    Strawberries [strawberry] Hives     STRAWBERRIES ALLERGIC REACTIONS   (SEIZURES AND HIVES )     Objective:     Vital Signs (Most Recent):  Temp: 97.9 °F (36.6 °C) (11/02/24 0705)  Pulse: 101 (11/02/24 0800)  Resp: (!) 28 (11/02/24 0800)  BP: 126/75 (11/02/24 0800)  SpO2: 100 % (11/02/24 0800) Vital Signs (24h Range):  Temp:  [97.2 °F (36.2 °C)-99.3 °F (37.4 °C)] 97.9 °F (36.6 °C)  Pulse:   [] 101  Resp:  [10-37] 28  SpO2:  [97 %-100 %] 100 %  BP: ()/() 126/75     Weight: 46.1 kg (101 lb 10.1 oz)  Body mass index is 17.45 kg/m².    Intake/Output - Last 3 Shifts         10/31 0700  11/01 0659 11/01 0700  11/02 0659 11/02 0700 11/03 0659    I.V. (mL/kg) 874.3 (19) 615.6 (13.4)     NG/      IV Piggyback 176.3 331     Total Intake(mL/kg) 1740.6 (37.8) 946.5 (20.5)     Urine (mL/kg/hr) 550 (0.5) 750 (0.7)     Drains 1675 1075     Stool       Total Output 2225 1825     Net -484.4 -878.5                     Physical Exam  Constitutional:       Appearance: He is ill-appearing.   HENT:      Head:      Comments: Tracheostomy in place.  Cardiovascular:      Rate and Rhythm: Normal rate and regular rhythm.   Pulmonary:      Comments: Mechanically ventilated via tracheostomy.  No distress  Abdominal:      Comments: Abdomen softer, nondistended.  Incisions clean, dry and intact.  G-tube with thin, clear bilious drainage   Neurological:      Mental Status: Mental status is at baseline.          Significant Labs:  I have reviewed all pertinent lab results within the past 24 hours.  CBC:   Recent Labs   Lab 11/02/24  0450   WBC 12.91*   RBC 3.01*   HGB 7.9*   HCT 24.9*   *   MCV 83   MCH 26.2*   MCHC 31.7*     CMP:   Recent Labs   Lab 11/02/24  0450   GLU 83   CALCIUM 8.6*   ALBUMIN 2.0*   PROT 6.7      K 3.4*   CO2 29   CL 95   BUN 9   CREATININE 0.6   ALKPHOS 198*   ALT 25   AST 20   BILITOT 0.5       Significant Diagnostics:  I have reviewed all pertinent imaging results/findings within the past 24 hours.

## 2024-11-02 NOTE — PROGRESS NOTES
[facility] - [facility] Care ([facility])  Critical Care - Medicine  Progress Note    Patient Name: Ronny Ramey  MRN: [id number]  Admission Date: 10/16/2024  Hospital Length of Stay: 17 days  Code Status: Full Code  Attending Provider: Robert Booth MD  Primary Care Provider: Herman Nathan DNP   Principal Problem: Acute hypercapnic respiratory failure    Subjective:     HPI:   23 yr old male [facility] resident with CP, spastic quadriplegia, s/p G tube, epilepsy who presented to [facility] ED on 10/16 with shortness of breath/respiratory distress. Patient was placed on CPAP with little improvement. KUB shows small bowel obstruction. Patient was intubated and transferred to [facility]. Critical care consulted for admission. Patient with history of admissions for bowel obstruction and aspiration pneumonia.     Hospital/ICU Course:   10/17/2024: Patient with decreasing UOP overnight. Renal function worsening. Small bolus given with little response. Additional boluses today. HTN overnight, clonidine patch started. Continue bowel rest. NGT advanced per Abd XR, continued bowel obstruction on XR as well as fecal impaction. Will attempt to disimpact. Gen Surg following. Propofol switched to precedex for vent weaning.  10/18/2024: XR small bowel FT shows high-grade small bowel obstruction. Gen surg planning for ex lap today. Will keep intubated for procedure. Cultures NGTD, antibiotics stopped.  10/19/2024: Patient s/p ex lap with extensive lysis of adhesions, small bowel resection, takedown of gastrostomy tube with partial gastrectomy, abdomen left open in discontinuity with abthera in place. Gen Surg plans to take for exploratory laparotomy Monday 10/21 for small-bowel anastomosis and gastrostomy tube placement. Keeping intubated for surgery.   10/20/2024: Remains intubated. Pending surgery tomorrow. Drop in H&H overnight, but no signs of bleeding, vital signs stable.   10/21 - To the OR this morning  with small bowel anastamosis and G-tube placement.  Remains on the vent.  Hemodynamics stable.  10/22 - NAEON.  Vent and hemodynamics stable.  Extubated, but quickly failed due to anxiety and secretions with severe tachypnea and desats  10/23 - GALO following reintubation.  Vent weaned back down.  Hemodynamics stable.    10/24 - vent settings minimal.  1 unit of blood today.  Hemodynamics stable.  10/25 - NAEON.  21% FiO2 on the vent.  Failed SBT with sister at bedside.  Hemodynamics stable.  10/26 - failed SBT with daughter and then mother at bedside yesterday due to rapid/shallow breathing.  Vent and hemodynamics stable.    10/27 - NAEON.  Vent and hemodynamics stable.  10/28 - Failed SBT. On full support. No hemodynamic issues;  10/29 Unchanged overnight. On vent. Extensive discussion with mother yesterday. All questions answered to her satisfaction. She consented to the trach  10/30 Unchanged. On vent. S/p trach yesterday  10/31 Unchanged  overnight. On full vent support. Requiring precedex and fentanyl. Periods of agitation.   11/1: no significant events last 24 hours. Remains ventilator dependent. Fentanyl patch placed, weaning fentanyl infusion     Interval History/Significant Events:   11/2: No events overnight. Abd less distended    Review of Systems unobtainable due to intubation/vent   Objective:     Vital Signs (Most Recent):  Temp: 97.9 °F (36.6 °C) (11/02/24 0705)  Pulse: 94 (11/02/24 0916)  Resp: 17 (11/02/24 0916)  BP: 126/75 (11/02/24 0800)  SpO2: 100 % (11/02/24 0916) Vital Signs (24h Range):  Temp:  [97.2 °F (36.2 °C)-99.3 °F (37.4 °C)] 97.9 °F (36.6 °C)  Pulse:  [] 94  Resp:  [10-37] 17  SpO2:  [97 %-100 %] 100 %  BP: ()/() 126/75     Weight: 46.1 kg (101 lb 10.1 oz)  Body mass index is 17.45 kg/m².      Intake/Output Summary (Last 24 hours) at 11/2/2024 1008  Last data filed at 11/2/2024 0900  Gross per 24 hour   Intake 1012.09 ml   Output 1465 ml   Net -452.91 ml       Physical  Exam  Constitutional:       General: He is not in acute distress.     Appearance: He is ill-appearing. He is not toxic-appearing or diaphoretic.   HENT:      Head: Normocephalic.   Eyes:      Pupils: Pupils are equal, round, and reactive to light.   Cardiovascular:      Rate and Rhythm: Tachycardia present.      Pulses: Normal pulses.   Pulmonary:      Effort: Pulmonary effort is normal. No respiratory distress.   Abdominal:      Palpations: Abdomen is soft.    Vents:  Vent Mode: A/C (11/02/24 0916)  Ventilator Initiated: Yes (10/17/24 0102)  Set Rate: 8 BPM (11/02/24 0916)  Vt Set: 350 mL (11/02/24 0916)  Pressure Support: 15 cmH20 (10/28/24 1656)  PEEP/CPAP: 5 cmH20 (11/02/24 0916)  Oxygen Concentration (%): 21 (11/02/24 0916)  Peak Airway Pressure: 28 cmH20 (11/02/24 0916)  Plateau Pressure: 15 cmH20 (11/02/24 0916)  Total Ve: 4.35 L/m (11/02/24 0916)  Negative Inspiratory Force (cm H2O): 0 (11/02/24 0916)  F/VT Ratio<105 (RSBI): (!) 41.26 (11/02/24 0916)    Lines/Drains/Airways       Central Venous Catheter Line  Duration             Percutaneous Central Line - Triple Lumen  10/21/24 0814 Internal Jugular Left 12 days              Drain  Duration                  Gastrostomy/Enterostomy 10/21/24 0844 Gastrostomy tube w/ balloon LUQ feeding 12 days    Male External Urinary Catheter 10/28/24 1115 4 days              Airway  Duration             Adult Surgical Airway 10/29/24 1156 Shiley Cuffed 6.0/ 75mm 3 days                    Significant Labs:    CBC/Anemia Profile:  Recent Labs   Lab 10/31/24  2010 11/01/24  0407 11/02/24  0450   WBC 17.35* 11.28 12.91*   HGB 8.9* 7.9* 7.9*   HCT 27.4* 25.2* 24.9*   * 572* 720*   MCV 83 83 83   RDW 14.9* 14.9* 14.8*        Chemistries:  Recent Labs   Lab 11/01/24 0407 11/02/24  0450    140   K 3.9 3.4*   CL 96 95   CO2 33* 29   BUN 13 9   CREATININE 0.6 0.6   CALCIUM 8.5* 8.6*   ALBUMIN 1.8* 2.0*   PROT 6.6 6.7   BILITOT 0.6 0.5   ALKPHOS 185* 198*   ALT 26 25    AST 22 20   MG 2.0 1.7   PHOS 4.0 3.4       Assessment/Plan:     Active Diagnoses:    Diagnosis Date Noted POA    PRINCIPAL PROBLEM:  Acute hypercapnic respiratory failure [J96.02] 10/16/2024 Yes    Tracheostomy in place [Z93.0] 10/30/2024 Not Applicable    Moderate protein-calorie malnutrition [E44.0] 10/25/2024 Yes    Body mass index (BMI) less than 19 [Z68.1] 10/16/2024 Not Applicable    Bowel obstruction [K56.609] 10/16/2024 Yes    MAYELIN (acute kidney injury) [N17.9] 10/16/2024 No    PEG (percutaneous endoscopic gastrostomy) status [Z93.1] 09/11/2023 Not Applicable    Nonverbal [R47.01] 09/11/2023 Yes    Legally blind [H54.8] 09/11/2023 Yes    Intellectual disability with epilepsy [F79, G40.909] 08/19/2013 Yes    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] 04/09/2013 Yes      Problems Resolved During this Admission:    Diagnosis Date Noted Date Resolved POA    Aspiration pneumonia [J69.0] 10/16/2024 10/20/2024 Yes       - Not much progress thus far with ventilator weaning  - Unclear if he will be permanently ventilator dependent  - Wean fentanyl gtt as able  - Uptitrate seroquel  - Significant bowel issues persist  - Currently with ileus  - Holding tube feeds  - Add reglan  - Wound care  - Supportive care  - Poor long term prognosis  - No family present at time of my visit    Critical Care Time: 36 minutes  Critical secondary to mechanical ventilation, high risk meds infusion      Critical care was time spent personally by me on the following activities: development of treatment plan with patient or surrogate and bedside caregivers, discussions with consultants, evaluation of patient's response to treatment, examination of patient, ordering and performing treatments and interventions, ordering and review of laboratory studies, ordering and review of radiographic studies, pulse oximetry, re-evaluation of patient's condition.  This critical care time did not overlap with that of any other provider or involve time  for any procedures.     Robert Booth MD  Critical Care - Medicine  Former[facility] - Intensive Care ([facility])

## 2024-11-02 NOTE — ASSESSMENT & PLAN NOTE
s/p ex lap, extensive lysis of adhesions, small bowel resection, takedown of gastrostomy tube with partial gastrectomy, abdomen left open in discontinuity with abthera in place.  Now status post small-bowel anastomosis, open gastrostomy placement and abdominal closure    - staples removed at bedside today   - abdominal distention improved today  - G-tube output improved today  - would recommend bowel regimen with MiraLax b.i.d.  - the okay to resume tube feeds and assess for tolerance.

## 2024-11-02 NOTE — PROGRESS NOTES
[facility] - Intensive [facility])  General Surgery  Progress Note    Subjective:     History of Present Illness:  23-year-old male with a history of several palsy presented to the emergency room with hypoxia which was felt to be secondary to aspiration.  According to the emergency room notes the patient required CPAP in route.  In the emergency room he was noted to have some abdominal distention.  The patient was intubated and subsequently admitted to the intensive care unit.      The patient does have a history of bowel obstructions but they may have been related constipation.    Post-Op Info:  Procedure(s) (LRB):  LAPAROTOMY, EXPLORATORY, OPEN GASTROSTOMY TUBE (N/A)   12 Days Post-Op     Interval History:  Abdomen softer today.  No bowel movement yesterday.  Minimal G-tube drainage which is then, clear bilious    Medications:  Continuous Infusions:   dexmedeTOMIDine (Precedex) infusion (titrating)  0-1.4 mcg/kg/hr Intravenous Continuous 15.79 mL/hr at 11/02/24 0600 1.4 mcg/kg/hr at 11/02/24 0600    fentanyl  0-250 mcg/hr Intravenous Continuous 10 mL/hr at 11/02/24 0600 100 mcg/hr at 11/02/24 0600     Scheduled Meds:   baclofen  20 mg Per G Tube BID    bisacodyL  10 mg Rectal Daily    chlorhexidine  15 mL Mouth/Throat BID    cloNIDine 0.1 mg/24 hr td ptwk  1 patch Transdermal Q7 Days    diazePAM  2 mg Per G Tube BID    enoxparin  30 mg Subcutaneous Q24H (prophylaxis, 1700)    famotidine  20 mg Per G Tube BID    fentaNYL  1 patch Transdermal Q72H    levETIRAcetam (Keppra) IV (PEDS and ADULTS)  2,000 mg Intravenous Q12H    meropenem IV (PEDS and ADULTS)  2 g Intravenous Q8H    metoclopramide  10 mg Intravenous Q6H    oxyCODONE  5 mg Per G Tube Q6H    PHENobarbitaL  64.8 mg Per G Tube BID    polyethylene glycol  17 g Per G Tube BID    QUEtiapine  25 mg Per G Tube BID    senna-docusate 8.6-50 mg  1 tablet Per G Tube BID     PRN Meds:  Current Facility-Administered Medications:     acetaminophen, 650 mg, Per G Tube,  Q6H PRN    albuterol-ipratropium, 3 mL, Nebulization, Q6H PRN    dextrose 10%, 12.5 g, Intravenous, PRN    dextrose 10%, 25 g, Intravenous, PRN    glucagon (human recombinant), 1 mg, Intramuscular, PRN    lorazepam, 2 mg, Intravenous, Q4H PRN     Review of patient's allergies indicates:   Allergen Reactions    Strawberries [strawberry] Hives     STRAWBERRIES ALLERGIC REACTIONS   (SEIZURES AND HIVES )     Objective:     Vital Signs (Most Recent):  Temp: 97.9 °F (36.6 °C) (11/02/24 0705)  Pulse: 101 (11/02/24 0800)  Resp: (!) 28 (11/02/24 0800)  BP: 126/75 (11/02/24 0800)  SpO2: 100 % (11/02/24 0800) Vital Signs (24h Range):  Temp:  [97.2 °F (36.2 °C)-99.3 °F (37.4 °C)] 97.9 °F (36.6 °C)  Pulse:  [] 101  Resp:  [10-37] 28  SpO2:  [97 %-100 %] 100 %  BP: ()/() 126/75     Weight: 46.1 kg (101 lb 10.1 oz)  Body mass index is 17.45 kg/m².    Intake/Output - Last 3 Shifts         10/31 0700  11/01 0659 11/01 0700 11/02 0659 11/02 0700 11/03 0659    I.V. (mL/kg) 874.3 (19) 615.6 (13.4)     NG/      IV Piggyback 176.3 331     Total Intake(mL/kg) 1740.6 (37.8) 946.5 (20.5)     Urine (mL/kg/hr) 550 (0.5) 750 (0.7)     Drains 1675 1075     Stool       Total Output 2225 1825     Net -484.4 -878.5                     Physical Exam  Constitutional:       Appearance: He is ill-appearing.   HENT:      Head:      Comments: Tracheostomy in place.  Cardiovascular:      Rate and Rhythm: Normal rate and regular rhythm.   Pulmonary:      Comments: Mechanically ventilated via tracheostomy.  No distress  Abdominal:      Comments: Abdomen softer, nondistended.  Incisions clean, dry and intact.  G-tube with thin, clear bilious drainage   Neurological:      Mental Status: Mental status is at baseline.          Significant Labs:  I have reviewed all pertinent lab results within the past 24 hours.  CBC:   Recent Labs   Lab 11/02/24  0450   WBC 12.91*   RBC 3.01*   HGB 7.9*   HCT 24.9*   *   MCV 83   MCH 26.2*    MCHC 31.7*     CMP:   Recent Labs   Lab 11/02/24  0450   GLU 83   CALCIUM 8.6*   ALBUMIN 2.0*   PROT 6.7      K 3.4*   CO2 29   CL 95   BUN 9   CREATININE 0.6   ALKPHOS 198*   ALT 25   AST 20   BILITOT 0.5       Significant Diagnostics:  I have reviewed all pertinent imaging results/findings within the past 24 hours.  Assessment/Plan:     * Acute hypercapnic respiratory failure  Management per critical care.  Tracheostomy in place    MAYELIN (acute kidney injury)  Per critical Care    Bowel obstruction  s/p ex lap, extensive lysis of adhesions, small bowel resection, takedown of gastrostomy tube with partial gastrectomy, abdomen left open in discontinuity with abthera in place.  Now status post small-bowel anastomosis, open gastrostomy placement and abdominal closure    - staples removed at bedside today   - abdominal distention improved today  - G-tube output improved today  - would recommend bowel regimen with MiraLax b.i.d.  - the okay to resume tube feeds and assess for tolerance.    PEG (percutaneous endoscopic gastrostomy) status  Surgically replaced Gastrostomy tube in place    Nonverbal  Monitor    Legally blind  Per critical care    Intellectual disability with epilepsy  Per critical care    Spastic quadriplegic cerebral palsy  Further management critical care medicine        Anna Marie Mo MD  General Surgery  [facility] ([facility])

## 2024-11-03 PROBLEM — N17.9 AKI (ACUTE KIDNEY INJURY): Status: RESOLVED | Noted: 2024-10-16 | Resolved: 2024-11-03

## 2024-11-03 LAB
ALBUMIN SERPL BCP-MCNC: 2.1 G/DL (ref 3.5–5.2)
ALP SERPL-CCNC: 219 U/L (ref 40–150)
ALT SERPL W/O P-5'-P-CCNC: 20 U/L (ref 10–44)
ANION GAP SERPL CALC-SCNC: 9 MMOL/L (ref 8–16)
AST SERPL-CCNC: 17 U/L (ref 10–40)
BASOPHILS # BLD AUTO: 0.03 K/UL (ref 0–0.2)
BASOPHILS NFR BLD: 0.2 % (ref 0–1.9)
BILIRUB SERPL-MCNC: 0.5 MG/DL (ref 0.1–1)
BUN SERPL-MCNC: 7 MG/DL (ref 6–20)
CALCIUM SERPL-MCNC: 8.8 MG/DL (ref 8.7–10.5)
CHLORIDE SERPL-SCNC: 96 MMOL/L (ref 95–110)
CO2 SERPL-SCNC: 32 MMOL/L (ref 23–29)
CREAT SERPL-MCNC: 0.6 MG/DL (ref 0.5–1.4)
DIFFERENTIAL METHOD BLD: ABNORMAL
EOSINOPHIL # BLD AUTO: 0.3 K/UL (ref 0–0.5)
EOSINOPHIL NFR BLD: 2.7 % (ref 0–8)
ERYTHROCYTE [DISTWIDTH] IN BLOOD BY AUTOMATED COUNT: 14.6 % (ref 11.5–14.5)
EST. GFR  (NO RACE VARIABLE): >60 ML/MIN/1.73 M^2
GLUCOSE SERPL-MCNC: 96 MG/DL (ref 70–110)
HCT VFR BLD AUTO: 27.7 % (ref 40–54)
HGB BLD-MCNC: 8.7 G/DL (ref 14–18)
IMM GRANULOCYTES # BLD AUTO: 0.05 K/UL (ref 0–0.04)
IMM GRANULOCYTES NFR BLD AUTO: 0.4 % (ref 0–0.5)
LYMPHOCYTES # BLD AUTO: 1.9 K/UL (ref 1–4.8)
LYMPHOCYTES NFR BLD: 15.9 % (ref 18–48)
MAGNESIUM SERPL-MCNC: 1.7 MG/DL (ref 1.6–2.6)
MCH RBC QN AUTO: 26 PG (ref 27–31)
MCHC RBC AUTO-ENTMCNC: 31.4 G/DL (ref 32–36)
MCV RBC AUTO: 83 FL (ref 82–98)
MONOCYTES # BLD AUTO: 0.9 K/UL (ref 0.3–1)
MONOCYTES NFR BLD: 7.8 % (ref 4–15)
NEUTROPHILS # BLD AUTO: 8.8 K/UL (ref 1.8–7.7)
NEUTROPHILS NFR BLD: 73 % (ref 38–73)
NRBC BLD-RTO: 0 /100 WBC
PHOSPHATE SERPL-MCNC: 2.2 MG/DL (ref 2.7–4.5)
PLATELET # BLD AUTO: 854 K/UL (ref 150–450)
PMV BLD AUTO: 10.8 FL (ref 9.2–12.9)
POCT GLUCOSE: 91 MG/DL (ref 70–110)
POTASSIUM SERPL-SCNC: 3.7 MMOL/L (ref 3.5–5.1)
PROT SERPL-MCNC: 7.3 G/DL (ref 6–8.4)
RBC # BLD AUTO: 3.34 M/UL (ref 4.6–6.2)
SODIUM SERPL-SCNC: 137 MMOL/L (ref 136–145)
WBC # BLD AUTO: 12.03 K/UL (ref 3.9–12.7)

## 2024-11-03 PROCEDURE — 25000003 PHARM REV CODE 250: Performed by: NURSE PRACTITIONER

## 2024-11-03 PROCEDURE — 99900035 HC TECH TIME PER 15 MIN (STAT)

## 2024-11-03 PROCEDURE — 27100171 HC OXYGEN HIGH FLOW UP TO 24 HOURS

## 2024-11-03 PROCEDURE — 99900026 HC AIRWAY MAINTENANCE (STAT)

## 2024-11-03 PROCEDURE — C9399 UNCLASSIFIED DRUGS OR BIOLOG: HCPCS | Performed by: INTERNAL MEDICINE

## 2024-11-03 PROCEDURE — 85025 COMPLETE CBC W/AUTO DIFF WBC: CPT | Performed by: NURSE PRACTITIONER

## 2024-11-03 PROCEDURE — 63600175 PHARM REV CODE 636 W HCPCS: Performed by: NURSE PRACTITIONER

## 2024-11-03 PROCEDURE — 20000000 HC ICU ROOM

## 2024-11-03 PROCEDURE — 25000003 PHARM REV CODE 250

## 2024-11-03 PROCEDURE — 94761 N-INVAS EAR/PLS OXIMETRY MLT: CPT

## 2024-11-03 PROCEDURE — 80053 COMPREHEN METABOLIC PANEL: CPT | Performed by: NURSE PRACTITIONER

## 2024-11-03 PROCEDURE — 84100 ASSAY OF PHOSPHORUS: CPT | Performed by: NURSE PRACTITIONER

## 2024-11-03 PROCEDURE — 25000003 PHARM REV CODE 250: Performed by: INTERNAL MEDICINE

## 2024-11-03 PROCEDURE — 83735 ASSAY OF MAGNESIUM: CPT | Performed by: NURSE PRACTITIONER

## 2024-11-03 PROCEDURE — 94003 VENT MGMT INPAT SUBQ DAY: CPT

## 2024-11-03 PROCEDURE — 63600175 PHARM REV CODE 636 W HCPCS: Performed by: INTERNAL MEDICINE

## 2024-11-03 RX ORDER — DEXTROMETHORPHAN/PSEUDOEPHED 2.5-7.5/.8
40 DROPS ORAL 4 TIMES DAILY
Status: DISCONTINUED | OUTPATIENT
Start: 2024-11-03 | End: 2024-11-05 | Stop reason: HOSPADM

## 2024-11-03 RX ORDER — LANOLIN ALCOHOL/MO/W.PET/CERES
800 CREAM (GRAM) TOPICAL ONCE
Status: COMPLETED | OUTPATIENT
Start: 2024-11-03 | End: 2024-11-03

## 2024-11-03 RX ORDER — FENTANYL 50 UG/1
1 PATCH TRANSDERMAL
Status: DISCONTINUED | OUTPATIENT
Start: 2024-11-03 | End: 2024-11-05 | Stop reason: HOSPADM

## 2024-11-03 RX ORDER — QUETIAPINE FUMARATE 100 MG/1
100 TABLET, FILM COATED ORAL 2 TIMES DAILY
Status: DISCONTINUED | OUTPATIENT
Start: 2024-11-03 | End: 2024-11-05 | Stop reason: HOSPADM

## 2024-11-03 RX ADMIN — LEVETIRACETAM 2000 MG: 100 INJECTION, SOLUTION INTRAVENOUS at 08:11

## 2024-11-03 RX ADMIN — SENNOSIDES AND DOCUSATE SODIUM 1 TABLET: 50; 8.6 TABLET ORAL at 08:11

## 2024-11-03 RX ADMIN — FENTANYL 1 PATCH: 50 PATCH TRANSDERMAL at 11:11

## 2024-11-03 RX ADMIN — MEROPENEM 2 G: 2 INJECTION, POWDER, FOR SOLUTION INTRAVENOUS at 08:11

## 2024-11-03 RX ADMIN — FAMOTIDINE 20 MG: 40 POWDER, FOR SUSPENSION ORAL at 08:11

## 2024-11-03 RX ADMIN — METOCLOPRAMIDE 10 MG: 5 INJECTION, SOLUTION INTRAMUSCULAR; INTRAVENOUS at 05:11

## 2024-11-03 RX ADMIN — DEXMEDETOMIDINE HYDROCHLORIDE 1.4 MCG/KG/HR: 4 INJECTION INTRAVENOUS at 10:11

## 2024-11-03 RX ADMIN — ENOXAPARIN SODIUM 30 MG: 30 INJECTION SUBCUTANEOUS at 04:11

## 2024-11-03 RX ADMIN — Medication 800 MG: at 08:11

## 2024-11-03 RX ADMIN — QUETIAPINE FUMARATE 50 MG: 25 TABLET ORAL at 08:11

## 2024-11-03 RX ADMIN — BISACODYL 10 MG: 10 SUPPOSITORY RECTAL at 08:11

## 2024-11-03 RX ADMIN — PHENOBARBITAL 64.8 MG: 32.4 TABLET ORAL at 08:11

## 2024-11-03 RX ADMIN — QUETIAPINE FUMARATE 100 MG: 100 TABLET ORAL at 08:11

## 2024-11-03 RX ADMIN — FENTANYL CITRATE 50 MCG/HR: 50 INJECTION INTRAVENOUS at 03:11

## 2024-11-03 RX ADMIN — POLYETHYLENE GLYCOL 3350 17 G: 17 POWDER, FOR SOLUTION ORAL at 08:11

## 2024-11-03 RX ADMIN — DEXMEDETOMIDINE HYDROCHLORIDE 1.4 MCG/KG/HR: 4 INJECTION INTRAVENOUS at 04:11

## 2024-11-03 RX ADMIN — DIAZEPAM 2 MG: 2 TABLET ORAL at 08:11

## 2024-11-03 RX ADMIN — OXYCODONE HYDROCHLORIDE 5 MG: 5 TABLET ORAL at 05:11

## 2024-11-03 RX ADMIN — MEROPENEM 2 G: 2 INJECTION, POWDER, FOR SOLUTION INTRAVENOUS at 05:11

## 2024-11-03 RX ADMIN — METOCLOPRAMIDE 10 MG: 5 INJECTION, SOLUTION INTRAMUSCULAR; INTRAVENOUS at 11:11

## 2024-11-03 RX ADMIN — CHLORHEXIDINE GLUCONATE 0.12% ORAL RINSE 15 ML: 1.2 LIQUID ORAL at 08:11

## 2024-11-03 RX ADMIN — SIMETHICONE 40 MG: 20 SUSPENSION/ DROPS ORAL at 06:11

## 2024-11-03 RX ADMIN — MEROPENEM 2 G: 2 INJECTION, POWDER, FOR SOLUTION INTRAVENOUS at 01:11

## 2024-11-03 RX ADMIN — POTASSIUM BICARBONATE 40 MEQ: 391 TABLET, EFFERVESCENT ORAL at 08:11

## 2024-11-03 RX ADMIN — BACLOFEN 20 MG: 10 TABLET ORAL at 08:11

## 2024-11-03 RX ADMIN — SIMETHICONE 40 MG: 20 SUSPENSION/ DROPS ORAL at 08:11

## 2024-11-03 NOTE — ASSESSMENT & PLAN NOTE
KUB shows small bowel obstruction  CT abd/pelvis shows high grade bowel obstruction  Gen Surg consulted  SBFT XR shows high-grade small bowel obstruction    Underwent Ex lap 10/18 with extensive lysis of adhesions, small bowel resection, takedown of gastrostomy tube with partial gastrectomy, abdomen left open in discontinuity with abthera in place  Ex lap on 10/21 with small-bowel anastomosis and G tube placement    Aggressive bowel regimen    11/3 Having bowel movements, however abdomen still firm. Obtain KUB, pause TF.

## 2024-11-03 NOTE — ASSESSMENT & PLAN NOTE
s/p ex lap, extensive lysis of adhesions, small bowel resection, takedown of gastrostomy tube with partial gastrectomy, abdomen left open in discontinuity with abthera in place.  Now status post small-bowel anastomosis, open gastrostomy placement and abdominal closure    - tolerating Tfs, continue to advance to foal  - having multiple BM.   - minimal residuals. Defer to ICU to continue residual checks  - would recommend bowel regimen with miralax BID  - please call should questions or concerns arise

## 2024-11-03 NOTE — ASSESSMENT & PLAN NOTE
Nutrition consulted. Most recent weight and BMI monitored-     Measurements:  Wt Readings from Last 1 Encounters:   10/30/24 46.1 kg (101 lb 10.1 oz)   Body mass index is 17.45 kg/m².    Patient has been screened and assessed by RD.    Malnutrition Type:  Context: chronic illness  Level: severe    Malnutrition Characteristic Summary:  Energy Intake (Malnutrition): less than 75% for greater than 7 days  Subcutaneous Fat (Malnutrition): mild depletion  Muscle Mass (Malnutrition): severe depletion  Fluid Accumulation (Malnutrition):  (1+ trace)    Interventions/Recommendations (treatment strategy):  1. Enteral nutrition management  2. Amino acids medical food supplement therapy  3. Collaboration by nutrition professional with other providers

## 2024-11-03 NOTE — PROGRESS NOTES
[facility] ([facility])  Critical Care Medicine  Progress Note    Patient Name: Ronny Ramey  MRN: [id number]  Admission Date: 10/16/2024  Hospital Length of Stay: 18 days  Code Status: Full Code  Attending Provider: Kiel Perez MD  Primary Care Provider: Herman Nathan DNP   Principal Problem: Acute hypercapnic respiratory failure    Subjective:     HPI:  Ronny Ramey is a 23 yr old male [facility] resident with CP, spastic quadriplegia, s/p G tube, epilepsy who presented to [facility] ED on 10/16 with shortness of breath/respiratory distress. Patient was placed on CPAP with little improvement. KUB shows small bowel obstruction. Patient was intubated and transferred to [facility]. Critical care consulted for admission. Patient with history of admissions for bowel obstruction and aspiration pneumonia.     Hospital/ICU Course:  10/17/2024: Patient with decreasing UOP overnight. Renal function worsening. Small bolus given with little response. Additional boluses today. HTN overnight, clonidine patch started. Continue bowel rest. NGT advanced per Abd XR, continued bowel obstruction on XR as well as fecal impaction. Will attempt to disimpact. Gen Surg following. Propofol switched to precedex for vent weaning.  10/18/2024: XR small bowel FT shows high-grade small bowel obstruction. Gen surg planning for ex lap today. Will keep intubated for procedure. Cultures NGTD, antibiotics stopped.  10/19/2024: Patient s/p ex lap with extensive lysis of adhesions, small bowel resection, takedown of gastrostomy tube with partial gastrectomy, abdomen left open in discontinuity with abthera in place. Gen Surg plans to take for exploratory laparotomy Monday 10/21 for small-bowel anastomosis and gastrostomy tube placement. Keeping intubated for surgery.   10/20/2024: Remains intubated. Pending surgery tomorrow. Drop in H&H overnight, but no signs of bleeding, vital signs stable.    10/21 - To the OR this morning with small bowel anastamosis and G-tube placement.  Remains on the vent.  Hemodynamics stable.  10/22 - NAEON.  Vent and hemodynamics stable.  Extubated, but quickly failed due to anxiety and secretions with severe tachypnea and desats  10/23 - GALO following reintubation.  Vent weaned back down.  Hemodynamics stable.    10/24 - vent settings minimal.  1 unit of blood today.  Hemodynamics stable.  10/25 - NAEON.  21% FiO2 on the vent.  Failed SBT with sister at bedside.  Hemodynamics stable.  10/26 - failed SBT with daughter and then mother at bedside yesterday due to rapid/shallow breathing.  Vent and hemodynamics stable.    10/27 - NAEON.  Vent and hemodynamics stable.  10/28 - Failed SBT. On full support. No hemodynamic issues;  10/29 Stable overnight. On vent. Extensive discussion with mother yesterday. All questions answered to her satisfaction. She consented to the trach  10/30 stable. On vent. S/p trach yesterday  10/31 Stable overnight. On full vent support. Requiring precedex and fentanyl. Periods of agitation.   11/1: no significant events last 24 hours. Remains ventilator dependent. Fentanyl patch placed, weaning fentanyl infusion   11/2: No events overnight. Abd less distended.  11/3: Tube feedings restarted yesterday. Bowel regimen started. Seroquel and Fentanyl patch increased. Weaning fentanyl drip. Having bowel movements. Abdomen still firm. Will reassess after holding Tube feedings and obtain KUB.     Interval History: Patient with trach tube, nonverbal at baseline, unable to participate.       Objective:     Vital Signs (Most Recent):  Temp: 99.3 °F (37.4 °C) (11/03/24 0400)  Pulse: (!) 124 (11/03/24 0700)  Resp: (!) 36 (11/03/24 0700)  BP: (!) 143/93 (11/03/24 0700)  SpO2: 99 % (11/03/24 0700) Vital Signs (24h Range):  Temp:  [97.8 °F (36.6 °C)-99.3 °F (37.4 °C)] 99.3 °F (37.4 °C)  Pulse:  [] 124  Resp:  [9-56] 36  SpO2:  [99 %-100 %] 99 %  BP:  (102-159)/() 143/93     Weight: 46.1 kg (101 lb 10.1 oz)  Body mass index is 17.45 kg/m².      Intake/Output Summary (Last 24 hours) at 11/3/2024 0810  Last data filed at 11/3/2024 0700  Gross per 24 hour   Intake 1282.21 ml   Output 700 ml   Net 582.21 ml        Physical Exam  Constitutional:       Appearance: He is ill-appearing.   HENT:      Head: Atraumatic.      Nose: Nose normal.      Mouth/Throat:      Mouth: Mucous membranes are moist.   Cardiovascular:      Rate and Rhythm: Regular rhythm. Tachycardia present.      Pulses: Normal pulses.      Heart sounds: Normal heart sounds.   Pulmonary:      Effort: Pulmonary effort is normal. No respiratory distress.      Breath sounds: Normal breath sounds.   Abdominal:      General: Bowel sounds are normal. There is distension.      Comments: PEG tube in place  Firm  Abdominal incision well approximated   Musculoskeletal:      Comments: BUE contractures  BLE contractures   Skin:     General: Skin is warm and dry.   Neurological:      Mental Status: Mental status is at baseline.      Comments: Baseline nonverbal, legally blind   Psychiatric:         Behavior: Behavior is hyperactive.         Judgment: Judgment is impulsive.           Review of Systems   Unable to perform ROS: Patient nonverbal       Vents:  Vent Mode: A/C (11/03/24 0527)  Ventilator Initiated: Yes (10/17/24 0102)  Set Rate: 8 BPM (11/03/24 0527)  Vt Set: 350 mL (11/03/24 0527)  Pressure Support: 15 cmH20 (10/28/24 1656)  PEEP/CPAP: 5 cmH20 (11/03/24 0527)  Oxygen Concentration (%): 21 (11/03/24 0700)  Peak Airway Pressure: 28 cmH20 (11/03/24 0527)  Plateau Pressure: 14 cmH20 (11/03/24 0527)  Total Ve: 5.85 L/m (11/03/24 0527)  Negative Inspiratory Force (cm H2O): 0 (11/03/24 0527)  F/VT Ratio<105 (RSBI): (!) 45.58 (11/03/24 0527)    Lines/Drains/Airways       Central Venous Catheter Line  Duration             Percutaneous Central Line - Triple Lumen  10/21/24 0814 Internal Jugular Left 13 days  "             Drain  Duration                  Gastrostomy/Enterostomy 10/21/24 0844 Gastrostomy tube w/ balloon LUQ feeding 13 days    Male External Urinary Catheter 10/28/24 1115 5 days              Airway  Duration             Adult Surgical Airway 10/29/24 1156 Shiley Cuffed 6.0/ 75mm 4 days                    Significant Labs:    CBC/Anemia Profile:  Recent Labs   Lab 11/02/24  0450 11/03/24  0414   WBC 12.91* 12.03   HGB 7.9* 8.7*   HCT 24.9* 27.7*   * 854*   MCV 83 83   RDW 14.8* 14.6*        Chemistries:  Recent Labs   Lab 11/02/24  0450 11/03/24  0414    137   K 3.4* 3.7   CL 95 96   CO2 29 32*   BUN 9 7   CREATININE 0.6 0.6   CALCIUM 8.6* 8.8   ALBUMIN 2.0* 2.1*   PROT 6.7 7.3   BILITOT 0.5 0.5   ALKPHOS 198* 219*   ALT 25 20   AST 20 17   MG 1.7 1.7   PHOS 3.4 2.2*       All pertinent labs within the past 24 hours have been reviewed.    Significant Imaging:  I have reviewed all pertinent imaging results/findings within the past 24 hours.    ABG  No results for input(s): "PH", "PO2", "PCO2", "HCO3", "BE" in the last 168 hours.  Assessment/Plan:     Neuro  Intellectual disability with epilepsy  Cont home AEDs: Keppra and phenobarb    Spastic quadriplegic cerebral palsy  Turn q 2hrs  Aspiration precautions  Continue baclofen, valium, AEDs  Increase seroquel, fentanyl patch  Weaning Fentanyl gtt and Precedex    Ophtho  Legally blind  Supportive Care    ENT  Tracheostomy in place  S/p Trach # 6 Reva on 10/29  Needs LTAC for vent weaning    Pulmonary  * Acute hypercapnic respiratory failure  Patient with Hypercapnic Respiratory failure which is Acute on chronic. Unclear if on O2 at the NH. Supplemental oxygen was provided and noted-     Vent Mode: A/C  Oxygen Concentration (%):  [21] 21  Resp Rate Total:  [9.8 br/min-23 br/min] 15 br/min  Vt Set:  [350 mL] 350 mL  PEEP/CPAP:  [5 cmH20] 5 cmH20  Mean Airway Pressure:  [7.1 cmH20-9.8 cmH20] 9.4 cmH20    Duonebs prn  Sedation: Precedex, Fentanyl- " Weaning    10/30 S/p trach.      - Not much progress thus far with ventilator weaning  - Unclear if he will be permanently ventilator dependent  - Significant bowel issues persist  - Wound care  - Supportive care  - LTAC pending    Endocrine  Moderate protein-calorie malnutrition  Nutrition consulted. Most recent weight and BMI monitored-     Measurements:  Wt Readings from Last 1 Encounters:   10/30/24 46.1 kg (101 lb 10.1 oz)   Body mass index is 17.45 kg/m².    Patient has been screened and assessed by RD.    Malnutrition Type:  Context: chronic illness  Level: severe    Malnutrition Characteristic Summary:  Energy Intake (Malnutrition): less than 75% for greater than 7 days  Subcutaneous Fat (Malnutrition): mild depletion  Muscle Mass (Malnutrition): severe depletion  Fluid Accumulation (Malnutrition):  (1+ trace)    Interventions/Recommendations (treatment strategy):  1. Enteral nutrition management  2. Amino acids medical food supplement therapy  3. Collaboration by nutrition professional with other providers      Body mass index (BMI) less than 19  Cont TF    GI  Bowel obstruction  KUB shows small bowel obstruction  CT abd/pelvis shows high grade bowel obstruction  Gen Surg consulted  SBFT XR shows high-grade small bowel obstruction    Underwent Ex lap 10/18 with extensive lysis of adhesions, small bowel resection, takedown of gastrostomy tube with partial gastrectomy, abdomen left open in discontinuity with abthera in place  Ex lap on 10/21 with small-bowel anastomosis and G tube placement    Aggressive bowel regimen    11/3 Having bowel movements, however abdomen still firm. Obtain KUB, pause TF.    PEG (percutaneous endoscopic gastrostomy) status  Patient noted to have a percutaneous endoscopic gastrostomy tube in place. I have personally inspected the tube.Tube was placed on this admission, with date of procedure- 10/16  There are no signs of drainage or infection around the site. Routine care to be done  by wound care and nursing staff.     G tube removed during ex-lap on 10/18  Tube feedings on hold due to Bowel obstruction  Ex lap 10/21 with G tube replacement  Starting to use for meds 10/23  Trickled feeds started 10/25        Level III     Amanda Francis NP  Critical Care Medicine  [facility] ([facility])

## 2024-11-03 NOTE — PLAN OF CARE
Pt awake and alert. RASS 0 on Precedex and Fentanyl gtt. Fentanyl patch dose increased. Seroquel dose increased. Sinus tachycardia on telemetry. Normotensive. Abdomen remains taut. KUB showed gas distention. Simethicone initiated. Tube feeds held all day, restarted at 1700 at 10cc/hr. Per NP leave at this rate until pt is reassessed tomorrow. Voiding per external catheter. Pt turned with wedge support. Bed low, wheels locked, alarms audible.

## 2024-11-03 NOTE — SUBJECTIVE & OBJECTIVE
Interval History:  Resume trickle tube feeds yesterday.  Patient had multiple bowel movements.  Minimal residuals per nursing    Medications:  Continuous Infusions:   dexmedeTOMIDine (Precedex) infusion (titrating)  0-1.4 mcg/kg/hr Intravenous Continuous 15.79 mL/hr at 11/03/24 0700 1.4 mcg/kg/hr at 11/03/24 0700    fentanyl  0-250 mcg/hr Intravenous Continuous 5 mL/hr at 11/03/24 0700 50 mcg/hr at 11/03/24 0700     Scheduled Meds:   baclofen  20 mg Per G Tube BID    bisacodyL  10 mg Rectal Daily    chlorhexidine  15 mL Mouth/Throat BID    cloNIDine 0.1 mg/24 hr td ptwk  1 patch Transdermal Q7 Days    diazePAM  2 mg Per G Tube BID    enoxparin  30 mg Subcutaneous Q24H (prophylaxis, 1700)    famotidine  20 mg Per G Tube BID    fentaNYL  1 patch Transdermal Q72H    levETIRAcetam (Keppra) IV (PEDS and ADULTS)  2,000 mg Intravenous Q12H    magnesium oxide  800 mg Per G Tube Once    meropenem IV (PEDS and ADULTS)  2 g Intravenous Q8H    metoclopramide  10 mg Intravenous Q6H    oxyCODONE  5 mg Per G Tube Q6H    PHENobarbitaL  64.8 mg Per G Tube BID    polyethylene glycol  17 g Per G Tube BID    potassium bicarbonate  40 mEq Per G Tube Once    QUEtiapine  50 mg Per G Tube BID    senna-docusate 8.6-50 mg  1 tablet Per G Tube BID     PRN Meds:  Current Facility-Administered Medications:     acetaminophen, 650 mg, Per G Tube, Q6H PRN    albuterol-ipratropium, 3 mL, Nebulization, Q6H PRN    dextrose 10%, 12.5 g, Intravenous, PRN    dextrose 10%, 25 g, Intravenous, PRN    glucagon (human recombinant), 1 mg, Intramuscular, PRN    lorazepam, 2 mg, Intravenous, Q4H PRN     Review of patient's allergies indicates:   Allergen Reactions    Strawberries [strawberry] Hives     STRAWBERRIES ALLERGIC REACTIONS   (SEIZURES AND HIVES )     Objective:     Vital Signs (Most Recent):  Temp: 99.3 °F (37.4 °C) (11/03/24 0400)  Pulse: (!) 124 (11/03/24 0700)  Resp: (!) 36 (11/03/24 0700)  BP: (!) 143/93 (11/03/24 0700)  SpO2: 99 % (11/03/24  0700) Vital Signs (24h Range):  Temp:  [97.8 °F (36.6 °C)-99.3 °F (37.4 °C)] 99.3 °F (37.4 °C)  Pulse:  [] 124  Resp:  [9-56] 36  SpO2:  [99 %-100 %] 99 %  BP: (102-159)/() 143/93     Weight: 46.1 kg (101 lb 10.1 oz)  Body mass index is 17.45 kg/m².    Intake/Output - Last 3 Shifts         11/01 0700 11/02 0659 11/02 0700 11/03 0659 11/03 0700 11/04 0659    I.V. (mL/kg) 615.6 (13.4) 514.8 (11.2) 20.7 (0.4)    NG/GT  530 30    IV Piggyback 331 313.3 36.7    Total Intake(mL/kg) 946.5 (20.5) 1358.1 (29.5) 87.4 (1.9)    Urine (mL/kg/hr) 750 (0.7) 650 (0.6) 50 (1)    Drains 1075 25     Stool  0     Total Output 1825 675 50    Net -878.5 +683.1 +37.4           Stool Occurrence  2 x              Physical Exam  Constitutional:       Appearance: He is ill-appearing.   Cardiovascular:      Rate and Rhythm: Regular rhythm. Tachycardia present.   Pulmonary:      Comments: Tracheostomy, mechanically ventilated. No distress  Abdominal:      Comments: Slightly distended but soft. Nontender but difficult to assess. G tube in place with tube feeds. Incision c/d/i          Significant Labs:  I have reviewed all pertinent lab results within the past 24 hours.  CBC:   Recent Labs   Lab 11/03/24 0414   WBC 12.03   RBC 3.34*   HGB 8.7*   HCT 27.7*   *   MCV 83   MCH 26.0*   MCHC 31.4*     CMP:   Recent Labs   Lab 11/03/24 0414   GLU 96   CALCIUM 8.8   ALBUMIN 2.1*   PROT 7.3      K 3.7   CO2 32*   CL 96   BUN 7   CREATININE 0.6   ALKPHOS 219*   ALT 20   AST 17   BILITOT 0.5       Significant Diagnostics:  I have reviewed all pertinent imaging results/findings within the past 24 hours.

## 2024-11-03 NOTE — ASSESSMENT & PLAN NOTE
Turn q 2hrs  Aspiration precautions  Continue baclofen, valium, AEDs  Increase seroquel, fentanyl patch  Weaning Fentanyl gtt and Precedex

## 2024-11-03 NOTE — SUBJECTIVE & OBJECTIVE
Interval History: Patient with trach tube, nonverbal at baseline, unable to participate.       Objective:     Vital Signs (Most Recent):  Temp: 99.3 °F (37.4 °C) (11/03/24 0400)  Pulse: (!) 124 (11/03/24 0700)  Resp: (!) 36 (11/03/24 0700)  BP: (!) 143/93 (11/03/24 0700)  SpO2: 99 % (11/03/24 0700) Vital Signs (24h Range):  Temp:  [97.8 °F (36.6 °C)-99.3 °F (37.4 °C)] 99.3 °F (37.4 °C)  Pulse:  [] 124  Resp:  [9-56] 36  SpO2:  [99 %-100 %] 99 %  BP: (102-159)/() 143/93     Weight: 46.1 kg (101 lb 10.1 oz)  Body mass index is 17.45 kg/m².      Intake/Output Summary (Last 24 hours) at 11/3/2024 0810  Last data filed at 11/3/2024 0700  Gross per 24 hour   Intake 1282.21 ml   Output 700 ml   Net 582.21 ml        Physical Exam  Constitutional:       Appearance: He is ill-appearing.   HENT:      Head: Atraumatic.      Nose: Nose normal.      Mouth/Throat:      Mouth: Mucous membranes are moist.   Cardiovascular:      Rate and Rhythm: Regular rhythm. Tachycardia present.      Pulses: Normal pulses.      Heart sounds: Normal heart sounds.   Pulmonary:      Effort: Pulmonary effort is normal. No respiratory distress.      Breath sounds: Normal breath sounds.   Abdominal:      General: Bowel sounds are normal. There is distension.      Comments: PEG tube in place  Firm  Abdominal incision well approximated   Musculoskeletal:      Comments: BUE contractures  BLE contractures   Skin:     General: Skin is warm and dry.   Neurological:      Mental Status: Mental status is at baseline.      Comments: Baseline nonverbal, legally blind   Psychiatric:         Behavior: Behavior is hyperactive.         Judgment: Judgment is impulsive.           Review of Systems   Unable to perform ROS: Patient nonverbal       Vents:  Vent Mode: A/C (11/03/24 0527)  Ventilator Initiated: Yes (10/17/24 0102)  Set Rate: 8 BPM (11/03/24 0527)  Vt Set: 350 mL (11/03/24 0527)  Pressure Support: 15 cmH20 (10/28/24 1656)  PEEP/CPAP: 5 cmH20  (11/03/24 0527)  Oxygen Concentration (%): 21 (11/03/24 0700)  Peak Airway Pressure: 28 cmH20 (11/03/24 0527)  Plateau Pressure: 14 cmH20 (11/03/24 0527)  Total Ve: 5.85 L/m (11/03/24 0527)  Negative Inspiratory Force (cm H2O): 0 (11/03/24 0527)  F/VT Ratio<105 (RSBI): (!) 45.58 (11/03/24 0527)    Lines/Drains/Airways       Central Venous Catheter Line  Duration             Percutaneous Central Line - Triple Lumen  10/21/24 0814 Internal Jugular Left 13 days              Drain  Duration                  Gastrostomy/Enterostomy 10/21/24 0844 Gastrostomy tube w/ balloon LUQ feeding 13 days    Male External Urinary Catheter 10/28/24 1115 5 days              Airway  Duration             Adult Surgical Airway 10/29/24 1156 Shiley Cuffed 6.0/ 75mm 4 days                    Significant Labs:    CBC/Anemia Profile:  Recent Labs   Lab 11/02/24 0450 11/03/24 0414   WBC 12.91* 12.03   HGB 7.9* 8.7*   HCT 24.9* 27.7*   * 854*   MCV 83 83   RDW 14.8* 14.6*        Chemistries:  Recent Labs   Lab 11/02/24 0450 11/03/24 0414    137   K 3.4* 3.7   CL 95 96   CO2 29 32*   BUN 9 7   CREATININE 0.6 0.6   CALCIUM 8.6* 8.8   ALBUMIN 2.0* 2.1*   PROT 6.7 7.3   BILITOT 0.5 0.5   ALKPHOS 198* 219*   ALT 25 20   AST 20 17   MG 1.7 1.7   PHOS 3.4 2.2*       All pertinent labs within the past 24 hours have been reviewed.    Significant Imaging:  I have reviewed all pertinent imaging results/findings within the past 24 hours.

## 2024-11-03 NOTE — PLAN OF CARE
Fentanyl drip weaned to 50mcg/hr.   Tube feeds started currently @ 20mls/hr. Miralax and Suppository given today. 1 moderate BM.  350uop to external cath.   No other issues.   Mother was here this morning and updated on POC and questions answered.    Problem: Infection  Goal: Absence of Infection Signs and Symptoms  Outcome: Progressing     Problem: Skin Injury Risk Increased  Goal: Skin Health and Integrity  Outcome: Progressing     Problem: Adult Inpatient Plan of Care  Goal: Plan of Care Review  Outcome: Progressing  Goal: Patient-Specific Goal (Individualized)  Outcome: Progressing  Goal: Absence of Hospital-Acquired Illness or Injury  Outcome: Progressing  Goal: Optimal Comfort and Wellbeing  Outcome: Progressing  Goal: Readiness for Transition of Care  Outcome: Progressing     Problem: Fall Injury Risk  Goal: Absence of Fall and Fall-Related Injury  Outcome: Progressing     Problem: Wound  Goal: Optimal Coping  Outcome: Progressing  Goal: Optimal Functional Ability  Outcome: Progressing  Goal: Absence of Infection Signs and Symptoms  Outcome: Progressing  Goal: Improved Oral Intake  Outcome: Progressing  Goal: Optimal Pain Control and Function  Outcome: Progressing  Goal: Skin Health and Integrity  Outcome: Progressing  Goal: Optimal Wound Healing  Outcome: Progressing     Problem: Acute Kidney Injury/Impairment  Goal: Fluid and Electrolyte Balance  Outcome: Progressing  Goal: Improved Oral Intake  Outcome: Progressing  Goal: Effective Renal Function  Outcome: Progressing     Problem: Mechanical Ventilation Invasive  Goal: Effective Communication  Outcome: Progressing  Goal: Optimal Device Function  Outcome: Progressing  Goal: Mechanical Ventilation Liberation  Outcome: Progressing  Goal: Optimal Nutrition Delivery  Outcome: Progressing  Goal: Absence of Device-Related Skin and Tissue Injury  Outcome: Progressing  Goal: Absence of Ventilator-Induced Lung Injury  Outcome: Progressing     Problem: Artificial  Airway  Goal: Effective Communication  Outcome: Progressing  Goal: Optimal Device Function  Outcome: Progressing  Goal: Absence of Device-Related Skin or Tissue Injury  Outcome: Progressing

## 2024-11-03 NOTE — PROGRESS NOTES
[facility])  General Surgery  Progress Note    Subjective:     History of Present Illness:  23-year-old male with a history of several palsy presented to the emergency room with hypoxia which was felt to be secondary to aspiration.  According to the emergency room notes the patient required CPAP in route.  In the emergency room he was noted to have some abdominal distention.  The patient was intubated and subsequently admitted to the intensive care unit.      The patient does have a history of bowel obstructions but they may have been related constipation.    Post-Op Info:  Procedure(s) (LRB):  LAPAROTOMY, EXPLORATORY, OPEN GASTROSTOMY TUBE (N/A)   13 Days Post-Op     Interval History:  Resume trickle tube feeds yesterday.  Patient had multiple bowel movements.  Minimal residuals per nursing    Medications:  Continuous Infusions:   dexmedeTOMIDine (Precedex) infusion (titrating)  0-1.4 mcg/kg/hr Intravenous Continuous 15.79 mL/hr at 11/03/24 0700 1.4 mcg/kg/hr at 11/03/24 0700    fentanyl  0-250 mcg/hr Intravenous Continuous 5 mL/hr at 11/03/24 0700 50 mcg/hr at 11/03/24 0700     Scheduled Meds:   baclofen  20 mg Per G Tube BID    bisacodyL  10 mg Rectal Daily    chlorhexidine  15 mL Mouth/Throat BID    cloNIDine 0.1 mg/24 hr td ptwk  1 patch Transdermal Q7 Days    diazePAM  2 mg Per G Tube BID    enoxparin  30 mg Subcutaneous Q24H (prophylaxis, 1700)    famotidine  20 mg Per G Tube BID    fentaNYL  1 patch Transdermal Q72H    levETIRAcetam (Keppra) IV (PEDS and ADULTS)  2,000 mg Intravenous Q12H    magnesium oxide  800 mg Per G Tube Once    meropenem IV (PEDS and ADULTS)  2 g Intravenous Q8H    metoclopramide  10 mg Intravenous Q6H    oxyCODONE  5 mg Per G Tube Q6H    PHENobarbitaL  64.8 mg Per G Tube BID    polyethylene glycol  17 g Per G Tube BID    potassium bicarbonate  40 mEq Per G Tube Once    QUEtiapine  50 mg Per G Tube BID    senna-docusate 8.6-50 mg  1 tablet Per G Tube BID     PRN  Meds:  Current Facility-Administered Medications:     acetaminophen, 650 mg, Per G Tube, Q6H PRN    albuterol-ipratropium, 3 mL, Nebulization, Q6H PRN    dextrose 10%, 12.5 g, Intravenous, PRN    dextrose 10%, 25 g, Intravenous, PRN    glucagon (human recombinant), 1 mg, Intramuscular, PRN    lorazepam, 2 mg, Intravenous, Q4H PRN     Review of patient's allergies indicates:   Allergen Reactions    Strawberries [strawberry] Hives     STRAWBERRIES ALLERGIC REACTIONS   (SEIZURES AND HIVES )     Objective:     Vital Signs (Most Recent):  Temp: 99.3 °F (37.4 °C) (11/03/24 0400)  Pulse: (!) 124 (11/03/24 0700)  Resp: (!) 36 (11/03/24 0700)  BP: (!) 143/93 (11/03/24 0700)  SpO2: 99 % (11/03/24 0700) Vital Signs (24h Range):  Temp:  [97.8 °F (36.6 °C)-99.3 °F (37.4 °C)] 99.3 °F (37.4 °C)  Pulse:  [] 124  Resp:  [9-56] 36  SpO2:  [99 %-100 %] 99 %  BP: (102-159)/() 143/93     Weight: 46.1 kg (101 lb 10.1 oz)  Body mass index is 17.45 kg/m².    Intake/Output - Last 3 Shifts         11/01 0700 11/02 0659 11/02 0700 11/03 0659 11/03 0700 11/04 0659    I.V. (mL/kg) 615.6 (13.4) 514.8 (11.2) 20.7 (0.4)    NG/GT  530 30    IV Piggyback 331 313.3 36.7    Total Intake(mL/kg) 946.5 (20.5) 1358.1 (29.5) 87.4 (1.9)    Urine (mL/kg/hr) 750 (0.7) 650 (0.6) 50 (1)    Drains 1075 25     Stool  0     Total Output 1825 675 50    Net -878.5 +683.1 +37.4           Stool Occurrence  2 x              Physical Exam  Constitutional:       Appearance: He is ill-appearing.   Cardiovascular:      Rate and Rhythm: Regular rhythm. Tachycardia present.   Pulmonary:      Comments: Tracheostomy, mechanically ventilated. No distress  Abdominal:      Comments: Slightly distended but soft. Nontender but difficult to assess. G tube in place with tube feeds. Incision c/d/i          Significant Labs:  I have reviewed all pertinent lab results within the past 24 hours.  CBC:   Recent Labs   Lab 11/03/24  0414   WBC 12.03   RBC 3.34*   HGB 8.7*    HCT 27.7*   *   MCV 83   MCH 26.0*   MCHC 31.4*     CMP:   Recent Labs   Lab 11/03/24  0414   GLU 96   CALCIUM 8.8   ALBUMIN 2.1*   PROT 7.3      K 3.7   CO2 32*   CL 96   BUN 7   CREATININE 0.6   ALKPHOS 219*   ALT 20   AST 17   BILITOT 0.5       Significant Diagnostics:  I have reviewed all pertinent imaging results/findings within the past 24 hours.  Assessment/Plan:     * Acute hypercapnic respiratory failure  Management per critical care.  Tracheostomy in place    MAYELIN (acute kidney injury)  Per critical Care    Bowel obstruction  s/p ex lap, extensive lysis of adhesions, small bowel resection, takedown of gastrostomy tube with partial gastrectomy, abdomen left open in discontinuity with abthera in place.  Now status post small-bowel anastomosis, open gastrostomy placement and abdominal closure    - tolerating Tfs, continue to advance to foal  - having multiple BM.   - minimal residuals. Defer to ICU to continue residual checks  - would recommend bowel regimen with miralax BID  - please call should questions or concerns arise    PEG (percutaneous endoscopic gastrostomy) status  Surgically replaced Gastrostomy tube in place    Nonverbal  Monitor    Legally blind  Per critical care    Intellectual disability with epilepsy  Per critical care    Spastic quadriplegic cerebral palsy  Further management critical care medicine        Anna Marie Mo MD  General Surgery  [facility] ([facility]

## 2024-11-04 LAB
ALBUMIN SERPL BCP-MCNC: 2.1 G/DL (ref 3.5–5.2)
ALP SERPL-CCNC: 215 U/L (ref 40–150)
ALT SERPL W/O P-5'-P-CCNC: 22 U/L (ref 10–44)
ANION GAP SERPL CALC-SCNC: 8 MMOL/L (ref 8–16)
AST SERPL-CCNC: 17 U/L (ref 10–40)
BASOPHILS # BLD AUTO: 0.04 K/UL (ref 0–0.2)
BASOPHILS NFR BLD: 0.4 % (ref 0–1.9)
BILIRUB SERPL-MCNC: 0.5 MG/DL (ref 0.1–1)
BUN SERPL-MCNC: 7 MG/DL (ref 6–20)
CALCIUM SERPL-MCNC: 8.7 MG/DL (ref 8.7–10.5)
CHLORIDE SERPL-SCNC: 99 MMOL/L (ref 95–110)
CO2 SERPL-SCNC: 31 MMOL/L (ref 23–29)
CREAT SERPL-MCNC: 0.6 MG/DL (ref 0.5–1.4)
DIFFERENTIAL METHOD BLD: ABNORMAL
EOSINOPHIL # BLD AUTO: 0.3 K/UL (ref 0–0.5)
EOSINOPHIL NFR BLD: 3.5 % (ref 0–8)
ERYTHROCYTE [DISTWIDTH] IN BLOOD BY AUTOMATED COUNT: 14.6 % (ref 11.5–14.5)
EST. GFR  (NO RACE VARIABLE): >60 ML/MIN/1.73 M^2
GLUCOSE SERPL-MCNC: 95 MG/DL (ref 70–110)
HCT VFR BLD AUTO: 25.7 % (ref 40–54)
HGB BLD-MCNC: 8.3 G/DL (ref 14–18)
IMM GRANULOCYTES # BLD AUTO: 0.04 K/UL (ref 0–0.04)
IMM GRANULOCYTES NFR BLD AUTO: 0.4 % (ref 0–0.5)
LYMPHOCYTES # BLD AUTO: 1.8 K/UL (ref 1–4.8)
LYMPHOCYTES NFR BLD: 19.5 % (ref 18–48)
MAGNESIUM SERPL-MCNC: 1.8 MG/DL (ref 1.6–2.6)
MCH RBC QN AUTO: 26.3 PG (ref 27–31)
MCHC RBC AUTO-ENTMCNC: 32.3 G/DL (ref 32–36)
MCV RBC AUTO: 82 FL (ref 82–98)
MONOCYTES # BLD AUTO: 0.9 K/UL (ref 0.3–1)
MONOCYTES NFR BLD: 9.1 % (ref 4–15)
NEUTROPHILS # BLD AUTO: 6.3 K/UL (ref 1.8–7.7)
NEUTROPHILS NFR BLD: 67.1 % (ref 38–73)
NRBC BLD-RTO: 0 /100 WBC
PHOSPHATE SERPL-MCNC: 2.3 MG/DL (ref 2.7–4.5)
PLATELET # BLD AUTO: 800 K/UL (ref 150–450)
PMV BLD AUTO: 9.9 FL (ref 9.2–12.9)
POCT GLUCOSE: 90 MG/DL (ref 70–110)
POTASSIUM SERPL-SCNC: 3.8 MMOL/L (ref 3.5–5.1)
PROT SERPL-MCNC: 7.1 G/DL (ref 6–8.4)
RBC # BLD AUTO: 3.15 M/UL (ref 4.6–6.2)
SODIUM SERPL-SCNC: 138 MMOL/L (ref 136–145)
WBC # BLD AUTO: 9.33 K/UL (ref 3.9–12.7)

## 2024-11-04 PROCEDURE — 63600175 PHARM REV CODE 636 W HCPCS

## 2024-11-04 PROCEDURE — 99900026 HC AIRWAY MAINTENANCE (STAT)

## 2024-11-04 PROCEDURE — 85025 COMPLETE CBC W/AUTO DIFF WBC: CPT | Performed by: NURSE PRACTITIONER

## 2024-11-04 PROCEDURE — 25000003 PHARM REV CODE 250: Performed by: INTERNAL MEDICINE

## 2024-11-04 PROCEDURE — 63600175 PHARM REV CODE 636 W HCPCS: Performed by: INTERNAL MEDICINE

## 2024-11-04 PROCEDURE — 25000003 PHARM REV CODE 250

## 2024-11-04 PROCEDURE — 25000003 PHARM REV CODE 250: Performed by: NURSE PRACTITIONER

## 2024-11-04 PROCEDURE — 84100 ASSAY OF PHOSPHORUS: CPT | Performed by: NURSE PRACTITIONER

## 2024-11-04 PROCEDURE — 63600175 PHARM REV CODE 636 W HCPCS: Performed by: NURSE PRACTITIONER

## 2024-11-04 PROCEDURE — 94761 N-INVAS EAR/PLS OXIMETRY MLT: CPT

## 2024-11-04 PROCEDURE — 94003 VENT MGMT INPAT SUBQ DAY: CPT

## 2024-11-04 PROCEDURE — 27100171 HC OXYGEN HIGH FLOW UP TO 24 HOURS

## 2024-11-04 PROCEDURE — 20000000 HC ICU ROOM

## 2024-11-04 PROCEDURE — 83735 ASSAY OF MAGNESIUM: CPT | Performed by: NURSE PRACTITIONER

## 2024-11-04 PROCEDURE — C9399 UNCLASSIFIED DRUGS OR BIOLOG: HCPCS | Performed by: INTERNAL MEDICINE

## 2024-11-04 PROCEDURE — 99900035 HC TECH TIME PER 15 MIN (STAT)

## 2024-11-04 PROCEDURE — 80053 COMPREHEN METABOLIC PANEL: CPT | Performed by: NURSE PRACTITIONER

## 2024-11-04 RX ORDER — CLONIDINE 0.1 MG/24H
1 PATCH, EXTENDED RELEASE TRANSDERMAL
Status: DISCONTINUED | OUTPATIENT
Start: 2024-11-04 | End: 2024-11-05 | Stop reason: HOSPADM

## 2024-11-04 RX ORDER — LORAZEPAM 2 MG/ML
2 INJECTION INTRAMUSCULAR EVERY 4 HOURS PRN
Status: DISCONTINUED | OUTPATIENT
Start: 2024-11-04 | End: 2024-11-05 | Stop reason: HOSPADM

## 2024-11-04 RX ORDER — LANOLIN ALCOHOL/MO/W.PET/CERES
800 CREAM (GRAM) TOPICAL 2 TIMES DAILY
Status: COMPLETED | OUTPATIENT
Start: 2024-11-04 | End: 2024-11-04

## 2024-11-04 RX ORDER — FENTANYL CITRATE 50 UG/ML
25 INJECTION, SOLUTION INTRAMUSCULAR; INTRAVENOUS EVERY 4 HOURS PRN
Status: DISCONTINUED | OUTPATIENT
Start: 2024-11-04 | End: 2024-11-05 | Stop reason: HOSPADM

## 2024-11-04 RX ORDER — LEVETIRACETAM 100 MG/ML
2000 SOLUTION ORAL 2 TIMES DAILY
Status: DISCONTINUED | OUTPATIENT
Start: 2024-11-04 | End: 2024-11-05 | Stop reason: HOSPADM

## 2024-11-04 RX ORDER — SODIUM,POTASSIUM PHOSPHATES 280-250MG
2 POWDER IN PACKET (EA) ORAL
Status: COMPLETED | OUTPATIENT
Start: 2024-11-04 | End: 2024-11-04

## 2024-11-04 RX ADMIN — METOCLOPRAMIDE 10 MG: 5 INJECTION, SOLUTION INTRAMUSCULAR; INTRAVENOUS at 06:11

## 2024-11-04 RX ADMIN — Medication 800 MG: at 08:11

## 2024-11-04 RX ADMIN — MEROPENEM 2 G: 2 INJECTION, POWDER, FOR SOLUTION INTRAVENOUS at 06:11

## 2024-11-04 RX ADMIN — BISACODYL 10 MG: 10 SUPPOSITORY RECTAL at 08:11

## 2024-11-04 RX ADMIN — ENOXAPARIN SODIUM 30 MG: 30 INJECTION SUBCUTANEOUS at 05:11

## 2024-11-04 RX ADMIN — CHLORHEXIDINE GLUCONATE 0.12% ORAL RINSE 15 ML: 1.2 LIQUID ORAL at 08:11

## 2024-11-04 RX ADMIN — QUETIAPINE FUMARATE 100 MG: 100 TABLET ORAL at 08:11

## 2024-11-04 RX ADMIN — MEROPENEM 2 G: 2 INJECTION, POWDER, FOR SOLUTION INTRAVENOUS at 09:11

## 2024-11-04 RX ADMIN — LEVETIRACETAM 2000 MG: 100 INJECTION, SOLUTION INTRAVENOUS at 08:11

## 2024-11-04 RX ADMIN — Medication 2 PACKET: at 10:11

## 2024-11-04 RX ADMIN — LORAZEPAM 2 MG: 2 INJECTION INTRAMUSCULAR; INTRAVENOUS at 07:11

## 2024-11-04 RX ADMIN — FAMOTIDINE 20 MG: 40 POWDER, FOR SUSPENSION ORAL at 08:11

## 2024-11-04 RX ADMIN — CLONIDINE 1 PATCH: 0.1 PATCH TRANSDERMAL at 10:11

## 2024-11-04 RX ADMIN — SENNOSIDES AND DOCUSATE SODIUM 1 TABLET: 50; 8.6 TABLET ORAL at 08:11

## 2024-11-04 RX ADMIN — METOCLOPRAMIDE 10 MG: 5 INJECTION, SOLUTION INTRAMUSCULAR; INTRAVENOUS at 11:11

## 2024-11-04 RX ADMIN — SIMETHICONE 40 MG: 20 SUSPENSION/ DROPS ORAL at 02:11

## 2024-11-04 RX ADMIN — Medication 2 PACKET: at 08:11

## 2024-11-04 RX ADMIN — BACLOFEN 20 MG: 10 TABLET ORAL at 08:11

## 2024-11-04 RX ADMIN — PHENOBARBITAL 64.8 MG: 32.4 TABLET ORAL at 08:11

## 2024-11-04 RX ADMIN — LORAZEPAM 2 MG: 2 INJECTION INTRAMUSCULAR; INTRAVENOUS at 11:11

## 2024-11-04 RX ADMIN — DEXMEDETOMIDINE HYDROCHLORIDE 1.4 MCG/KG/HR: 4 INJECTION INTRAVENOUS at 05:11

## 2024-11-04 RX ADMIN — DIAZEPAM 2 MG: 2 TABLET ORAL at 08:11

## 2024-11-04 RX ADMIN — Medication 800 MG: at 10:11

## 2024-11-04 RX ADMIN — Medication 2 PACKET: at 05:11

## 2024-11-04 RX ADMIN — POLYETHYLENE GLYCOL 3350 17 G: 17 POWDER, FOR SOLUTION ORAL at 08:11

## 2024-11-04 RX ADMIN — LEVETIRACETAM 2000 MG: 100 SOLUTION ORAL at 08:11

## 2024-11-04 RX ADMIN — FENTANYL CITRATE 25 MCG: 50 INJECTION INTRAMUSCULAR; INTRAVENOUS at 09:11

## 2024-11-04 RX ADMIN — SIMETHICONE 40 MG: 20 SUSPENSION/ DROPS ORAL at 05:11

## 2024-11-04 RX ADMIN — METOCLOPRAMIDE 10 MG: 5 INJECTION, SOLUTION INTRAMUSCULAR; INTRAVENOUS at 02:11

## 2024-11-04 RX ADMIN — SIMETHICONE 40 MG: 20 SUSPENSION/ DROPS ORAL at 08:11

## 2024-11-04 RX ADMIN — MEROPENEM 2 G: 2 INJECTION, POWDER, FOR SOLUTION INTRAVENOUS at 02:11

## 2024-11-04 NOTE — ASSESSMENT & PLAN NOTE
- Patient noted to have a percutaneous endoscopic gastrostomy tube in place. I have personally inspected the tube.Tube was placed on this admission, with date of procedure- 10/16  There are no signs of drainage or infection around the site. Routine care to be done by wound care and nursing staff.   - G tube removed during ex-lap on 10/18  - Tube feeds were on hold for period due to bowel obstruction  - Ex lap 10/21 with G tube replacement  - Starting to use for meds 10/23  - Trickled feeds started 10/25  - Held again recently due to increased bowel distention, possible partial obstruction; trickle feeds restarted again yesterday 11/3 and patient has had multiple bowel movements today  - Continue tube feeds and advance as tolerated to goal rate 40cc/hr; continue med administration via peg

## 2024-11-04 NOTE — ASSESSMENT & PLAN NOTE
- Nutrition consulted. Most recent weight and BMI monitored  - Measurements:  Wt Readings from Last 1 Encounters:   11/03/24 45.9 kg (101 lb 3.1 oz)   Body mass index is 17.37 kg/m².  - Patient has been screened and assessed by RD.    Malnutrition Type:  Context: chronic illness  Level: severe    Malnutrition Characteristic Summary:  Energy Intake (Malnutrition): less than 75% for greater than 7 days  Subcutaneous Fat (Malnutrition): mild depletion  Muscle Mass (Malnutrition): severe depletion  Fluid Accumulation (Malnutrition):  (1+ trace)    Interventions/Recommendations (treatment strategy):  1. Enteral nutrition management  2. Amino acids medical food supplement therapy  3. Collaboration by nutrition professional with other providers

## 2024-11-04 NOTE — SUBJECTIVE & OBJECTIVE
Objective:     Vital Signs (Most Recent):  Temp: 98 °F (36.7 °C) (11/04/24 1515)  Pulse: (!) 131 (11/04/24 1530)  Resp: (!) 58 (11/04/24 1530)  BP: (!) 179/100 (11/04/24 1530)  SpO2: 95 % (11/04/24 1530) Vital Signs (24h Range):  Temp:  [97.8 °F (36.6 °C)-98.6 °F (37 °C)] 98 °F (36.7 °C)  Pulse:  [] 131  Resp:  [11-58] 58  SpO2:  [95 %-100 %] 95 %  BP: (108-179)/() 179/100     Weight: 45.9 kg (101 lb 3.1 oz)  Body mass index is 17.37 kg/m².  Intake/Output Summary (Last 24 hours) at 11/4/2024 1558  Last data filed at 11/4/2024 1515  Gross per 24 hour   Intake 757.36 ml   Output 1700 ml   Net -942.64 ml     Physical Exam  Constitutional:       Appearance: He is ill-appearing.   HENT:      Head: Normocephalic.      Mouth/Throat:      Comments: Trach in place  Cardiovascular:      Rate and Rhythm: Tachycardia present.      Pulses: Normal pulses.   Pulmonary:      Effort: Pulmonary effort is normal.      Breath sounds: No wheezing.   Abdominal:      General: There is distension.      Palpations: Abdomen is soft.      Comments: PEG in place   Musculoskeletal:         General: No swelling.      Comments: Bilateral upper/lower contractures   Skin:     General: Skin is warm.      Capillary Refill: Capillary refill takes less than 2 seconds.      Coloration: Skin is not jaundiced.      Findings: No bruising.     Review of Systems   Unable to perform ROS: Patient nonverbal     Vents:  Vent Mode: A/C (11/04/24 1350)  Ventilator Initiated: Yes (10/17/24 0102)  Set Rate: 8 BPM (11/04/24 1350)  Vt Set: 350 mL (11/04/24 1350)  Pressure Support: 15 cmH20 (10/28/24 1656)  PEEP/CPAP: 5 cmH20 (11/04/24 1350)  Oxygen Concentration (%): 21 (11/04/24 1530)  Peak Airway Pressure: 32 cmH20 (11/04/24 1350)  Plateau Pressure: 18 cmH20 (11/04/24 1350)  Total Ve: 6.59 L/m (11/04/24 1350)  Negative Inspiratory Force (cm H2O): 0 (11/04/24 1350)  F/VT Ratio<105 (RSBI): (!) 34.23 (11/04/24 1350)    Lines/Drains/Airways       Central  Venous Catheter Line  Duration             Percutaneous Central Line - Triple Lumen  10/21/24 0814 Internal Jugular Left 14 days              Drain  Duration                  Gastrostomy/Enterostomy 10/21/24 0844 Gastrostomy tube w/ balloon LUQ feeding 14 days    Male External Urinary Catheter 10/28/24 1115 7 days              Airway  Duration             Adult Surgical Airway 10/29/24 1156 Shiley Cuffed 6.0/ 75mm 6 days                    Significant Labs:    CBC/Anemia Profile:  Recent Labs   Lab 11/03/24 0414 11/04/24  0349   WBC 12.03 9.33   HGB 8.7* 8.3*   HCT 27.7* 25.7*   * 800*   MCV 83 82   RDW 14.6* 14.6*        Chemistries:  Recent Labs   Lab 11/03/24 0414 11/04/24 0349    138   K 3.7 3.8   CL 96 99   CO2 32* 31*   BUN 7 7   CREATININE 0.6 0.6   CALCIUM 8.8 8.7   ALBUMIN 2.1* 2.1*   PROT 7.3 7.1   BILITOT 0.5 0.5   ALKPHOS 219* 215*   ALT 20 22   AST 17 17   MG 1.7 1.8   PHOS 2.2* 2.3*     POCT Glucose:   Recent Labs   Lab 11/02/24  2349 11/03/24  2314 11/04/24  0852   POCTGLUCOSE 85 91 90     Significant Imaging:  I have reviewed all pertinent imaging results/findings within the past 24 hours.  I have reviewed and interpreted all pertinent imaging results/findings within the past 24 hours.   Double Island Pedicle Flap Text: The defect edges were debeveled with a #15 scalpel blade.  Given the location of the defect, shape of the defect and the proximity to free margins a double island pedicle advancement flap was deemed most appropriate.  Using a sterile surgical marker, an appropriate advancement flap was drawn incorporating the defect, outlining the appropriate donor tissue and placing the expected incisions within the relaxed skin tension lines where possible.    The area thus outlined was incised deep to adipose tissue with a #15 scalpel blade.  The skin margins were undermined to an appropriate distance in all directions around the primary defect and laterally outward around the island pedicle utilizing iris scissors.  There was minimal undermining beneath the pedicle flap.

## 2024-11-04 NOTE — ASSESSMENT & PLAN NOTE
- KUB shows small bowel obstruction; CT abd/pelvis shows high grade bowel obstruction; Gen Surg consulted; SBFT XR shows high-grade small bowel obstruction  - Underwent Ex lap 10/18 with extensive lysis of adhesions, small bowel resection, takedown of gastrostomy tube with partial gastrectomy, abdomen left open in discontinuity with abthera in place  - Ex lap on 10/21 with small-bowel anastomosis and G tube placement  - Suspected repeat bowel obstruction with abd. Distention few days ago, tube feeds paused but began to have bm, restarted trickle feeds and tolerating; will advance as tolerated

## 2024-11-04 NOTE — ASSESSMENT & PLAN NOTE
- Patient with Hypercapnic Respiratory failure which is Acute on chronic. Unclear if on O2 at the NH. Supplemental oxygen was provided and noted-   Vent Mode: A/C  Oxygen Concentration (%):  [21] 21  Resp Rate Total:  [9.8 br/min-23 br/min] 19 br/min  Vt Set:  [350 mL] 350 mL  PEEP/CPAP:  [5 cmH20] 5 cmH20  Mean Airway Pressure:  [7.1 cmH20-10 cmH20] 9.8 cmH20  - Aditi prn  - Sedation has been weaned  - Now s/p trach performed on 10/30; have not been able to wean   - Pending LTAC placement

## 2024-11-04 NOTE — PROGRESS NOTES
[facility] - Intensive Care ([facility])  Critical Care Medicine  Progress Note    Patient Name: Ronny Ramey  MRN: [id number]  Admission Date: 10/16/2024  Hospital Length of Stay: 19 days  Code Status: Full Code  Attending Provider: Francoise Elliott MD  Primary Care Provider: Herman Nathan DNP   Principal Problem: Acute hypercapnic respiratory failure    Subjective:     HPI:  Ronny Ramey is a 23 yr old male [facility] resident with CP, spastic quadriplegia, s/p G tube, epilepsy who presented to [facility] ED on 10/16 with shortness of breath/respiratory distress. Patient was placed on CPAP with little improvement. KUB shows small bowel obstruction. Patient was intubated and transferred to [facility]. Critical care consulted for admission. Patient with history of admissions for bowel obstruction and aspiration pneumonia.     Hospital/ICU Course:  10/17/2024: Patient with decreasing UOP overnight. Renal function worsening. Small bolus given with little response. Additional boluses today. HTN overnight, clonidine patch started. Continue bowel rest. NGT advanced per Abd XR, continued bowel obstruction on XR as well as fecal impaction. Will attempt to disimpact. Gen Surg following. Propofol switched to precedex for vent weaning.    10/18/2024: XR small bowel FT shows high-grade small bowel obstruction. Gen surg planning for ex lap today. Will keep intubated for procedure. Cultures NGTD, antibiotics stopped.    10/19/2024: Patient s/p ex lap with extensive lysis of adhesions, small bowel resection, takedown of gastrostomy tube with partial gastrectomy, abdomen left open in discontinuity with abthera in place. Gen Surg plans to take for exploratory laparotomy Monday 10/21 for small-bowel anastomosis and gastrostomy tube placement. Keeping intubated for surgery.     10/20/2024: Remains intubated. Pending surgery tomorrow. Drop in H&H overnight, but no signs of bleeding, vital signs stable.      10/21/2024: To the OR this morning with small bowel anastamosis and G-tube placement.  Remains on the vent.  Hemodynamics stable.    10/22/2024: NAEON.  Vent and hemodynamics stable.  Extubated, but quickly failed due to anxiety and secretions with severe tachypnea and desats    10/23/2024: GALO following reintubation.  Vent weaned back down.  Hemodynamics stable.      10/24/2024: Vent settings minimal.  1 unit of blood today.  Hemodynamics stable.    10/25/2024: NAEON.  21% FiO2 on the vent.  Failed SBT with sister at bedside.  Hemodynamics stable.    10/26/2024: Failed SBT with daughter and then mother at bedside yesterday due to rapid/shallow breathing.  Vent and hemodynamics stable.      10/27/2024: NAEON.  Vent and hemodynamics stable.    10/28/2024: Failed SBT. On full support. No hemodynamic issues    10/29/2024: Stable overnight. On vent. Extensive discussion with mother yesterday. All questions answered to her satisfaction. She consented to the trach    10/30/2024: stable. On vent. S/p trach yesterday    10/31/2024: Stable overnight. On full vent support. Requiring precedex and fentanyl. Periods of agitation.     11/01/2024: no significant events last 24 hours. Remains ventilator dependent. Fentanyl patch placed, weaning fentanyl infusion     11/02/2024: No events overnight. Abd less distended.    11/03/2024: Tube feedings restarted yesterday. Bowel regimen started. Seroquel and Fentanyl patch increased. Weaning fentanyl drip. Having bowel movements. Abdomen still firm. Will reassess after holding Tube feedings and obtain KUB.     11/04/2024: Multiple bowel movements today. Fentanyl/precedex have been turned off. Abdomen feels soft.     Objective:     Vital Signs (Most Recent):  Temp: 98 °F (36.7 °C) (11/04/24 1515)  Pulse: (!) 131 (11/04/24 1530)  Resp: (!) 58 (11/04/24 1530)  BP: (!) 179/100 (11/04/24 1530)  SpO2: 95 % (11/04/24 1530) Vital Signs (24h Range):  Temp:  [97.8 °F (36.6 °C)-98.6 °F (37 °C)]  98 °F (36.7 °C)  Pulse:  [] 131  Resp:  [11-58] 58  SpO2:  [95 %-100 %] 95 %  BP: (108-179)/() 179/100     Weight: 45.9 kg (101 lb 3.1 oz)  Body mass index is 17.37 kg/m².  Intake/Output Summary (Last 24 hours) at 11/4/2024 1558  Last data filed at 11/4/2024 1515  Gross per 24 hour   Intake 757.36 ml   Output 1700 ml   Net -942.64 ml     Physical Exam  Constitutional:       Appearance: He is ill-appearing.   HENT:      Head: Normocephalic.      Mouth/Throat:      Comments: Trach in place  Cardiovascular:      Rate and Rhythm: Tachycardia present.      Pulses: Normal pulses.   Pulmonary:      Effort: Pulmonary effort is normal.      Breath sounds: No wheezing.   Abdominal:      General: There is distension.      Palpations: Abdomen is soft.      Comments: PEG in place   Musculoskeletal:         General: No swelling.      Comments: Bilateral upper/lower contractures   Skin:     General: Skin is warm.      Capillary Refill: Capillary refill takes less than 2 seconds.      Coloration: Skin is not jaundiced.      Findings: No bruising.     Review of Systems   Unable to perform ROS: Patient nonverbal     Vents:  Vent Mode: A/C (11/04/24 1350)  Ventilator Initiated: Yes (10/17/24 0102)  Set Rate: 8 BPM (11/04/24 1350)  Vt Set: 350 mL (11/04/24 1350)  Pressure Support: 15 cmH20 (10/28/24 1656)  PEEP/CPAP: 5 cmH20 (11/04/24 1350)  Oxygen Concentration (%): 21 (11/04/24 1530)  Peak Airway Pressure: 32 cmH20 (11/04/24 1350)  Plateau Pressure: 18 cmH20 (11/04/24 1350)  Total Ve: 6.59 L/m (11/04/24 1350)  Negative Inspiratory Force (cm H2O): 0 (11/04/24 1350)  F/VT Ratio<105 (RSBI): (!) 34.23 (11/04/24 1350)    Lines/Drains/Airways       Central Venous Catheter Line  Duration             Percutaneous Central Line - Triple Lumen  10/21/24 0814 Internal Jugular Left 14 days              Drain  Duration                  Gastrostomy/Enterostomy 10/21/24 0844 Gastrostomy tube w/ balloon LUQ feeding 14 days    Male  External Urinary Catheter 10/28/24 1115 7 days              Airway  Duration             Adult Surgical Airway 10/29/24 1156 Reva Cuffed 6.0/ 75mm 6 days                    Significant Labs:    CBC/Anemia Profile:  Recent Labs   Lab 11/03/24 0414 11/04/24 0349   WBC 12.03 9.33   HGB 8.7* 8.3*   HCT 27.7* 25.7*   * 800*   MCV 83 82   RDW 14.6* 14.6*        Chemistries:  Recent Labs   Lab 11/03/24 0414 11/04/24 0349    138   K 3.7 3.8   CL 96 99   CO2 32* 31*   BUN 7 7   CREATININE 0.6 0.6   CALCIUM 8.8 8.7   ALBUMIN 2.1* 2.1*   PROT 7.3 7.1   BILITOT 0.5 0.5   ALKPHOS 219* 215*   ALT 20 22   AST 17 17   MG 1.7 1.8   PHOS 2.2* 2.3*     POCT Glucose:   Recent Labs   Lab 11/02/24  2349 11/03/24  2314 11/04/24  0852   POCTGLUCOSE 85 91 90     Significant Imaging:  I have reviewed all pertinent imaging results/findings within the past 24 hours.  I have reviewed and interpreted all pertinent imaging results/findings within the past 24 hours.    Assessment/Plan:     Neuro  Intellectual disability with epilepsy  - Cont home Keppra + Phenobarbital   - Seizure precautions  - Ativan prn     Spastic quadriplegic cerebral palsy  - Continue frequent re-positioning; turn q2hrs  - Aspiration precautions  - Continue baclofen, schedule valium, keppra, phenobarbital, seroquel, fentanyl patch   - Supportive care    Ophtho  Legally blind  - Supportive Care    ENT  Tracheostomy in place  - S/p Trach # 6 Reva on 10/29  - Needs LTAC for vent weaning    Pulmonary  * Acute hypercapnic respiratory failure  - Patient with Hypercapnic Respiratory failure which is Acute on chronic. Unclear if on O2 at the NH. Supplemental oxygen was provided and noted-   Vent Mode: A/C  Oxygen Concentration (%):  [21] 21  Resp Rate Total:  [9.8 br/min-23 br/min] 19 br/min  Vt Set:  [350 mL] 350 mL  PEEP/CPAP:  [5 cmH20] 5 cmH20  Mean Airway Pressure:  [7.1 cmH20-10 cmH20] 9.8 cmH20  - Duonebs prn  - Sedation has been weaned  - Now s/p trach  performed on 10/30; have not been able to wean   - Pending LTAC placement     Endocrine  Moderate protein-calorie malnutrition  - Nutrition consulted. Most recent weight and BMI monitored  - Measurements:  Wt Readings from Last 1 Encounters:   11/03/24 45.9 kg (101 lb 3.1 oz)   Body mass index is 17.37 kg/m².  - Patient has been screened and assessed by RD.    Malnutrition Type:  Context: chronic illness  Level: severe    Malnutrition Characteristic Summary:  Energy Intake (Malnutrition): less than 75% for greater than 7 days  Subcutaneous Fat (Malnutrition): mild depletion  Muscle Mass (Malnutrition): severe depletion  Fluid Accumulation (Malnutrition):  (1+ trace)    Interventions/Recommendations (treatment strategy):  1. Enteral nutrition management  2. Amino acids medical food supplement therapy  3. Collaboration by nutrition professional with other providers    Body mass index (BMI) less than 19  - Continue with tube feeds; advance as able    GI  Bowel obstruction  - KUB shows small bowel obstruction; CT abd/pelvis shows high grade bowel obstruction; Gen Surg consulted; SBFT XR shows high-grade small bowel obstruction  - Underwent Ex lap 10/18 with extensive lysis of adhesions, small bowel resection, takedown of gastrostomy tube with partial gastrectomy, abdomen left open in discontinuity with abthera in place  - Ex lap on 10/21 with small-bowel anastomosis and G tube placement  - Suspected repeat bowel obstruction with abd. Distention few days ago, tube feeds paused but began to have bm, restarted trickle feeds and tolerating; will advance as tolerated    PEG (percutaneous endoscopic gastrostomy) status  - Patient noted to have a percutaneous endoscopic gastrostomy tube in place. I have personally inspected the tube.Tube was placed on this admission, with date of procedure- 10/16  There are no signs of drainage or infection around the site. Routine care to be done by wound care and nursing staff.   - G tube  removed during ex-lap on 10/18  - Tube feeds were on hold for period due to bowel obstruction  - Ex lap 10/21 with G tube replacement  - Starting to use for meds 10/23  - Trickled feeds started 10/25  - Held again recently due to increased bowel distention, possible partial obstruction; trickle feeds restarted again yesterday 11/3 and patient has had multiple bowel movements today  - Continue tube feeds and advance as tolerated to goal rate 40cc/hr; continue med administration via peg     Critical Care Daily Checklist:    A: Awake: RASS Goal/Actual Goal: RASS Goal: 0-->alert and calm  Actual:     B: Spontaneous Breathing Trial Performed? Spon. Breathing Trial Initiated?: Initiated (10/28/24 1641)   C: SAT & SBT Coordinated?  Has failed                    D: Delirium: CAM-ICU Overall CAM-ICU: Negative   E: Early Mobility Performed? No   F: Feeding Goal: Goals: 1. Pt will be initiated onto TPN within 24 hrs (Resolved)  2. Pt will tolerate and intake >75% EEN and EPN prior to RD follow up (Meeting)  3. Pt will tolerate and intake Edwin BID prior to RD follow up  Status: Nutrition Goal Status: new   Current Diet Order   Procedures    Diet NPO      AS: Analgesia/Sedation Fentanyl/precedex weaned, fentanyl patch    T: Thromboembolic Prophylaxis SQ Lovenox   H: HOB > 300 Yes   U: Stress Ulcer Prophylaxis (if needed) Pepcid via PEG   G: Glucose Control Monitor   B: Bowel Function Stool Occurrence: 1   I: Indwelling Catheter (Lines & Viveros) Necessity L IJ CVL, PEG, trach, viveros   D: De-escalation of Antimicrobials/Pharmacotherapies IV Merrem    Plan for the day/ETD Pending LTAC placement     Code Status:  Family/Goals of Care: Full Code       Critical Care Time: 37 minutes  Critical secondary to Patient has a condition that poses threat to life and bodily function: Trach dependent      Critical care was time spent personally by me on the following activities: development of treatment plan with patient or surrogate and  bedside caregivers, discussions with consultants, evaluation of patient's response to treatment, examination of patient, ordering and performing treatments and interventions, ordering and review of laboratory studies, ordering and review of radiographic studies, pulse oximetry, re-evaluation of patient's condition. This critical care time did not overlap with that of any other provider or involve time for any procedures.     Eyad Vaughn PA-C  Critical Care Medicine  [facility] - Intensive Care ([facility])

## 2024-11-05 VITALS
SYSTOLIC BLOOD PRESSURE: 134 MMHG | RESPIRATION RATE: 14 BRPM | HEART RATE: 113 BPM | BODY MASS INDEX: 15.77 KG/M2 | HEIGHT: 64 IN | DIASTOLIC BLOOD PRESSURE: 96 MMHG | OXYGEN SATURATION: 97 % | WEIGHT: 92.38 LBS | TEMPERATURE: 99 F

## 2024-11-05 PROBLEM — K56.609 BOWEL OBSTRUCTION: Status: RESOLVED | Noted: 2024-10-16 | Resolved: 2024-11-05

## 2024-11-05 LAB
ALBUMIN SERPL BCP-MCNC: 2.5 G/DL (ref 3.5–5.2)
ALP SERPL-CCNC: 280 U/L (ref 40–150)
ALT SERPL W/O P-5'-P-CCNC: 23 U/L (ref 10–44)
ANION GAP SERPL CALC-SCNC: 13 MMOL/L (ref 8–16)
ANISOCYTOSIS BLD QL SMEAR: SLIGHT
AST SERPL-CCNC: 21 U/L (ref 10–40)
BASOPHILS # BLD AUTO: 0.1 K/UL (ref 0–0.2)
BASOPHILS NFR BLD: 0.6 % (ref 0–1.9)
BILIRUB SERPL-MCNC: 0.6 MG/DL (ref 0.1–1)
BUN SERPL-MCNC: 6 MG/DL (ref 6–20)
CALCIUM SERPL-MCNC: 8.7 MG/DL (ref 8.7–10.5)
CHLORIDE SERPL-SCNC: 98 MMOL/L (ref 95–110)
CO2 SERPL-SCNC: 31 MMOL/L (ref 23–29)
CREAT SERPL-MCNC: 0.7 MG/DL (ref 0.5–1.4)
DIFFERENTIAL METHOD BLD: ABNORMAL
EOSINOPHIL # BLD AUTO: 0.1 K/UL (ref 0–0.5)
EOSINOPHIL NFR BLD: 0.6 % (ref 0–8)
ERYTHROCYTE [DISTWIDTH] IN BLOOD BY AUTOMATED COUNT: 14.4 % (ref 11.5–14.5)
EST. GFR  (NO RACE VARIABLE): >60 ML/MIN/1.73 M^2
GLUCOSE SERPL-MCNC: 95 MG/DL (ref 70–110)
HCT VFR BLD AUTO: 30.5 % (ref 40–54)
HGB BLD-MCNC: 9.6 G/DL (ref 14–18)
IMM GRANULOCYTES # BLD AUTO: 0.09 K/UL (ref 0–0.04)
IMM GRANULOCYTES NFR BLD AUTO: 0.5 % (ref 0–0.5)
LYMPHOCYTES # BLD AUTO: 1.7 K/UL (ref 1–4.8)
LYMPHOCYTES NFR BLD: 9.6 % (ref 18–48)
MAGNESIUM SERPL-MCNC: 1.9 MG/DL (ref 1.6–2.6)
MCH RBC QN AUTO: 25.9 PG (ref 27–31)
MCHC RBC AUTO-ENTMCNC: 31.5 G/DL (ref 32–36)
MCV RBC AUTO: 82 FL (ref 82–98)
MONOCYTES # BLD AUTO: 1.2 K/UL (ref 0.3–1)
MONOCYTES NFR BLD: 6.6 % (ref 4–15)
NEUTROPHILS # BLD AUTO: 14.8 K/UL (ref 1.8–7.7)
NEUTROPHILS NFR BLD: 82.1 % (ref 38–73)
NRBC BLD-RTO: 0 /100 WBC
PATH REV BLD -IMP: NORMAL
PHOSPHATE SERPL-MCNC: 3 MG/DL (ref 2.7–4.5)
PLATELET # BLD AUTO: 1104 K/UL (ref 150–450)
PLATELET BLD QL SMEAR: ABNORMAL
PMV BLD AUTO: 9.8 FL (ref 9.2–12.9)
POIKILOCYTOSIS BLD QL SMEAR: SLIGHT
POTASSIUM SERPL-SCNC: 4.1 MMOL/L (ref 3.5–5.1)
PROT SERPL-MCNC: 7.8 G/DL (ref 6–8.4)
RBC # BLD AUTO: 3.7 M/UL (ref 4.6–6.2)
SMUDGE CELLS BLD QL SMEAR: PRESENT
SODIUM SERPL-SCNC: 142 MMOL/L (ref 136–145)
STOMATOCYTES BLD QL SMEAR: PRESENT
TARGETS BLD QL SMEAR: ABNORMAL
WBC # BLD AUTO: 18.05 K/UL (ref 3.9–12.7)

## 2024-11-05 PROCEDURE — 25000003 PHARM REV CODE 250

## 2024-11-05 PROCEDURE — 83735 ASSAY OF MAGNESIUM: CPT | Performed by: NURSE PRACTITIONER

## 2024-11-05 PROCEDURE — 99900026 HC AIRWAY MAINTENANCE (STAT)

## 2024-11-05 PROCEDURE — 80053 COMPREHEN METABOLIC PANEL: CPT | Performed by: NURSE PRACTITIONER

## 2024-11-05 PROCEDURE — 63600175 PHARM REV CODE 636 W HCPCS: Performed by: INTERNAL MEDICINE

## 2024-11-05 PROCEDURE — 99900035 HC TECH TIME PER 15 MIN (STAT)

## 2024-11-05 PROCEDURE — 63600175 PHARM REV CODE 636 W HCPCS: Performed by: NURSE PRACTITIONER

## 2024-11-05 PROCEDURE — 36415 COLL VENOUS BLD VENIPUNCTURE: CPT | Performed by: NURSE PRACTITIONER

## 2024-11-05 PROCEDURE — 94761 N-INVAS EAR/PLS OXIMETRY MLT: CPT

## 2024-11-05 PROCEDURE — 85025 COMPLETE CBC W/AUTO DIFF WBC: CPT | Performed by: NURSE PRACTITIONER

## 2024-11-05 PROCEDURE — 27100171 HC OXYGEN HIGH FLOW UP TO 24 HOURS

## 2024-11-05 PROCEDURE — 25000003 PHARM REV CODE 250: Performed by: INTERNAL MEDICINE

## 2024-11-05 PROCEDURE — 63600175 PHARM REV CODE 636 W HCPCS

## 2024-11-05 PROCEDURE — 84100 ASSAY OF PHOSPHORUS: CPT | Performed by: NURSE PRACTITIONER

## 2024-11-05 PROCEDURE — 85060 BLOOD SMEAR INTERPRETATION: CPT | Mod: ,,, | Performed by: STUDENT IN AN ORGANIZED HEALTH CARE EDUCATION/TRAINING PROGRAM

## 2024-11-05 PROCEDURE — 25000003 PHARM REV CODE 250: Performed by: NURSE PRACTITIONER

## 2024-11-05 PROCEDURE — 94003 VENT MGMT INPAT SUBQ DAY: CPT

## 2024-11-05 RX ORDER — KETOROLAC TROMETHAMINE 30 MG/ML
10 INJECTION, SOLUTION INTRAMUSCULAR; INTRAVENOUS ONCE
Status: COMPLETED | OUTPATIENT
Start: 2024-11-05 | End: 2024-11-05

## 2024-11-05 RX ORDER — DEXTROMETHORPHAN/PSEUDOEPHED 2.5-7.5/.8
40 DROPS ORAL 4 TIMES DAILY
Qty: 18 ML | Refills: 0 | Status: SHIPPED | OUTPATIENT
Start: 2024-11-05

## 2024-11-05 RX ORDER — CLONIDINE 0.1 MG/24H
1 PATCH, EXTENDED RELEASE TRANSDERMAL
Qty: 4 PATCH | Refills: 11 | Status: SHIPPED | OUTPATIENT
Start: 2024-11-11 | End: 2025-11-11

## 2024-11-05 RX ORDER — METOPROLOL TARTRATE 1 MG/ML
10 INJECTION, SOLUTION INTRAVENOUS ONCE
Status: COMPLETED | OUTPATIENT
Start: 2024-11-05 | End: 2024-11-05

## 2024-11-05 RX ORDER — AMOXICILLIN 250 MG
1 CAPSULE ORAL 2 TIMES DAILY
Qty: 30 TABLET | Refills: 0 | Status: SHIPPED | OUTPATIENT
Start: 2024-11-05 | End: 2024-11-20

## 2024-11-05 RX ORDER — POLYETHYLENE GLYCOL 3350 17 G/17G
17 POWDER, FOR SOLUTION ORAL 2 TIMES DAILY
Qty: 30 EACH | Refills: 0 | Status: SHIPPED | OUTPATIENT
Start: 2024-11-05 | End: 2024-11-20

## 2024-11-05 RX ORDER — QUETIAPINE FUMARATE 100 MG/1
100 TABLET, FILM COATED ORAL 2 TIMES DAILY
Qty: 60 TABLET | Refills: 11 | Status: SHIPPED | OUTPATIENT
Start: 2024-11-05 | End: 2025-11-05

## 2024-11-05 RX ADMIN — METOROPROLOL TARTRATE 10 MG: 5 INJECTION, SOLUTION INTRAVENOUS at 02:11

## 2024-11-05 RX ADMIN — METOCLOPRAMIDE 10 MG: 5 INJECTION, SOLUTION INTRAMUSCULAR; INTRAVENOUS at 11:11

## 2024-11-05 RX ADMIN — BISACODYL 10 MG: 10 SUPPOSITORY RECTAL at 08:11

## 2024-11-05 RX ADMIN — MEROPENEM 2 G: 2 INJECTION, POWDER, FOR SOLUTION INTRAVENOUS at 12:11

## 2024-11-05 RX ADMIN — SIMETHICONE 40 MG: 20 SUSPENSION/ DROPS ORAL at 09:11

## 2024-11-05 RX ADMIN — MEROPENEM 2 G: 2 INJECTION, POWDER, FOR SOLUTION INTRAVENOUS at 05:11

## 2024-11-05 RX ADMIN — METOCLOPRAMIDE 10 MG: 5 INJECTION, SOLUTION INTRAMUSCULAR; INTRAVENOUS at 05:11

## 2024-11-05 RX ADMIN — KETOROLAC TROMETHAMINE 10 MG: 30 INJECTION, SOLUTION INTRAMUSCULAR at 02:11

## 2024-11-05 RX ADMIN — BACLOFEN 20 MG: 10 TABLET ORAL at 08:11

## 2024-11-05 RX ADMIN — FAMOTIDINE 20 MG: 40 POWDER, FOR SUSPENSION ORAL at 08:11

## 2024-11-05 RX ADMIN — QUETIAPINE FUMARATE 100 MG: 100 TABLET ORAL at 08:11

## 2024-11-05 RX ADMIN — PHENOBARBITAL 64.8 MG: 32.4 TABLET ORAL at 08:11

## 2024-11-05 RX ADMIN — CHLORHEXIDINE GLUCONATE 0.12% ORAL RINSE 15 ML: 1.2 LIQUID ORAL at 08:11

## 2024-11-05 RX ADMIN — FENTANYL CITRATE 25 MCG: 50 INJECTION INTRAMUSCULAR; INTRAVENOUS at 01:11

## 2024-11-05 RX ADMIN — DIAZEPAM 2 MG: 2 TABLET ORAL at 08:11

## 2024-11-05 RX ADMIN — POLYETHYLENE GLYCOL 3350 17 G: 17 POWDER, FOR SOLUTION ORAL at 08:11

## 2024-11-05 RX ADMIN — SENNOSIDES AND DOCUSATE SODIUM 1 TABLET: 50; 8.6 TABLET ORAL at 08:11

## 2024-11-05 RX ADMIN — LEVETIRACETAM 2000 MG: 100 SOLUTION ORAL at 08:11

## 2024-11-05 NOTE — PLAN OF CARE
[facility] - Intensive Care (Hospital)  Discharge Final Note    Primary Care Provider: Herman Nathan DNP    Expected Discharge Date: 11/5/2024    Final Discharge Note (most recent)       Final Note - 11/05/24 1228          Final Note    Assessment Type Final Discharge Note     Anticipated Discharge Disposition [facility] Resources/Appts/Education Provided Post-Acute resouces added to AVS        Post-Acute Status    Post-Acute Authorization Placement     Post-Acute Placement Status Set-up Complete/Auth obtained     Discharge Delays None known at this time                     Important Message from Medicare             Contact Info       [facility] [place] [id number]  [facility] [id number]   Phone: [phone]       Next Steps: Follow up          DC disposition: [facility]   Family notified: mother, Wil   Transportation: [place] within the hour   Nurse provided with phone number for report.  All DC info uploaded into [employer].

## 2024-11-05 NOTE — PLAN OF CARE
Pt remains on vent via trach. Neurologically unchanged, GCS 9. Tachycardic 150s-160s beginning of shift, BP elevated. Clonidine patch had fallen off; replaced & BPs improved. PRN ativan given Q4, PRN fentanyl started per NP; only small improvement in HR. 1 time metoprolol IVP and toradol given w/improvement in HR to 100s-120s. Pt resting more comfortably this AM. Tmax 100.1. Tolerating TF @ 10mL/hr. 1 small, loose BM this shift. Good UOP per condom cath. Q2 turns, heels floated, no further skin breakdown noted. Fall precautions in place, bed alarm on.

## 2024-11-05 NOTE — DISCHARGE SUMMARY
[facility] ([facility])  Critical Care Medicine  Discharge Summary      Patient Name: Ronny Ramey  MRN: [id number]  Admission Date: 10/16/2024  Hospital Length of Stay: 20 days  Discharge Date and Time:  11/05/2024 2:17 PM  Attending Physician: No att. providers found   Discharging Provider: Eyad Vaughn PA-C  Primary Care Provider: Herman Nathan DNP  Reason for Admission: Respiratory distress    HPI:   Ronny Ramey is a 23 yr old male [facility] resident with CP, spastic quadriplegia, s/p G tube, epilepsy who presented to [facility] ED on 10/16 with shortness of breath/respiratory distress. Patient was placed on CPAP with little improvement. KUB shows small bowel obstruction. Patient was intubated and transferred to [facility]. Critical care consulted for admission. Patient with history of admissions for bowel obstruction and aspiration pneumonia.     Procedure(s) (LRB):  LAPAROTOMY, EXPLORATORY, OPEN GASTROSTOMY TUBE (N/A)    Indwelling Lines/Drains at Time of Discharge:   Lines/Drains/Airways       Central Venous Catheter Line  Duration             Percutaneous Central Line - Triple Lumen  10/21/24 0814 Internal Jugular Left 15 days              Drain  Duration                  Gastrostomy/Enterostomy 10/21/24 0844 Gastrostomy tube w/ balloon LUQ feeding 15 days    Male External Urinary Catheter 10/28/24 1115 Small 8 days              Airway  Duration             Adult Surgical Airway 10/29/24 1156 Shiley Cuffed 6.0/ 75mm 7 days                  Hospital Course:   10/17/2024: Patient with decreasing UOP overnight. Renal function worsening. Small bolus given with little response. Additional boluses today. HTN overnight, clonidine patch started. Continue bowel rest. NGT advanced per Abd XR, continued bowel obstruction on XR as well as fecal impaction. Will attempt to disimpact. Gen Surg following. Propofol switched to precedex for vent weaning.    10/18/2024: XR small  bowel FT shows high-grade small bowel obstruction. Gen surg planning for ex lap today. Will keep intubated for procedure. Cultures NGTD, antibiotics stopped.    10/19/2024: Patient s/p ex lap with extensive lysis of adhesions, small bowel resection, takedown of gastrostomy tube with partial gastrectomy, abdomen left open in discontinuity with abthera in place. Gen Surg plans to take for exploratory laparotomy Monday 10/21 for small-bowel anastomosis and gastrostomy tube placement. Keeping intubated for surgery.     10/20/2024: Remains intubated. Pending surgery tomorrow. Drop in H&H overnight, but no signs of bleeding, vital signs stable.     10/21/2024: To the OR this morning with small bowel anastamosis and G-tube placement.  Remains on the vent.  Hemodynamics stable.    10/22/2024: NAEON.  Vent and hemodynamics stable.  Extubated, but quickly failed due to anxiety and secretions with severe tachypnea and desats    10/23/2024: GALO following reintubation.  Vent weaned back down.  Hemodynamics stable.      10/24/2024: Vent settings minimal.  1 unit of blood today.  Hemodynamics stable.    10/25/2024: NAEON.  21% FiO2 on the vent.  Failed SBT with sister at bedside.  Hemodynamics stable.    10/26/2024: Failed SBT with daughter and then mother at bedside yesterday due to rapid/shallow breathing.  Vent and hemodynamics stable.      10/27/2024: NAEON.  Vent and hemodynamics stable.    10/28/2024: Failed SBT. On full support. No hemodynamic issues    10/29/2024: Stable overnight. On vent. Extensive discussion with mother yesterday. All questions answered to her satisfaction. She consented to the trach    10/30/2024: stable. On vent. S/p trach yesterday    10/31/2024: Stable overnight. On full vent support. Requiring precedex and fentanyl. Periods of agitation.     11/01/2024: no significant events last 24 hours. Remains ventilator dependent. Fentanyl patch placed, weaning fentanyl infusion     11/02/2024: No events  overnight. Abd less distended.    11/03/2024: Tube feedings restarted yesterday. Bowel regimen started. Seroquel and Fentanyl patch increased. Weaning fentanyl drip. Having bowel movements. Abdomen still firm. Will reassess after holding Tube feedings and obtain KUB.     11/04/2024: Multiple bowel movements today. Fentanyl/precedex have been turned off. Abdomen feels soft.     11/05/2024:HR/BP improved this am, patient did not have clonidine patch on. Replaced overnight. Having bowel movements, abdomen soft. Patient is stable for d/c. Will transfer to LTAC facility ([facility]) for continued care. Continue bowel regimen. Will continue 3 more days IV abx to complete course.     Consults (From admission, onward)          Status Ordering Provider     Inpatient consult to Registered Dietitian/Nutritionist  Once        Provider:  (Not yet assigned)    Completed SAMREEN AVILA     Inpatient consult to General Surgery  Once        Provider:  (Not yet assigned)    Completed AMY MAY     Inpatient consult to Registered Dietitian/Nutritionist  Once        Provider:  (Not yet assigned)    Completed SAMREEN AVILA     Inpatient consult to Registered Dietitian/Nutritionist  Once        Provider:  (Not yet assigned)    Completed AMY MAY          Significant Labs:  Recent Lab Results         11/05/24  0451   11/05/24  0430        Albumin 2.5                  ALT 23         Anion Gap 13         Aniso Slight         AST 21         Baso # 0.10         Basophil % 0.6         BILIRUBIN TOTAL 0.6  Comment: For infants and newborns, interpretation of results should be based  on gestational age, weight and in agreement with clinical  observations.    Premature Infant recommended reference ranges:  Up to 24 hours.............<8.0 mg/dL  Up to 48 hours............<12.0 mg/dL  3-5 days..................<15.0 mg/dL  6-29 days.................<15.0 mg/dL           BUN 6         Calcium 8.7          Chloride 98         CO2 31         Creatinine 0.7         Differential Method Automated         eGFR >60         Eos # 0.1         Eos % 0.6         Glucose 95         Gran # (ANC) 14.8         Gran % 82.1         Hematocrit 30.5         Hemoglobin 9.6         Immature Grans (Abs) 0.09  Comment: Mild elevation in immature granulocytes is non specific and   can be seen in a variety of conditions including stress response,   acute inflammation, trauma and pregnancy. Correlation with other   laboratory and clinical findings is essential.           Immature Granulocytes 0.5         Lymph # 1.7         Lymph % 9.6         Magnesium  1.9         MCH 25.9         MCHC 31.5         MCV 82         Mono # 1.2         Mono % 6.6         MPV 9.8         nRBC 0         Pathologist Review   Review required       Phosphorus Level 3.0         Platelet Estimate Increased         Platelet Count 1,104  Comment: platelet critical result(s) called and verbal readback obtained from   carrie albrecht rn by KAT5 11/05/2024 05:18           Poikilocytosis Slight         Potassium 4.1         PROTEIN TOTAL 7.8         RBC 3.70         RDW 14.4         Smudge Cells Present  Comment: Smudge cells present;Substantial numbers may affect the   accuracy of the differential.           Sodium 142         Stomatocytes Present         Target Cells Occasional         WBC 18.05               Significant Imaging:  Imaging Results              X-Ray Chest AP Portable (Final result)  Result time 10/16/24 08:42:37      Final result by Mina Geiger (Vince), MD (10/16/24 08:42:37)                   Impression:      As above.      Electronically signed by: Mina Geiger MD  Date:    10/16/2024  Time:    08:42               Narrative:    EXAMINATION:  XR CHEST AP PORTABLE    CLINICAL HISTORY:  Status post intubation,    FINDINGS:  One view.  Endotracheal tube and enteric tube in good position.  Heart is normal in size.  Lungs appear clear.                                        X-Ray Abdomen Flat And Erect (Final result)  Result time 10/16/24 08:41:26      Final result by Mina Geiger MD (Timothy) (10/16/24 08:41:26)                   Impression:      Findings concerning for small bowel obstruction.  Follow-up recommended.      Electronically signed by: Mina Geiger MD  Date:    10/16/2024  Time:    08:41               Narrative:    EXAMINATION:  XR ABDOMEN FLAT AND ERECT    CLINICAL HISTORY:  Abdominal distension;    COMPARISON:  None.    FINDINGS:  Two views of the abdomen. No free air.  Peg tube in place.  Multiple dilated loops of small bowel concerning for small bowel obstruction.    Scoliosis.  No lung infiltrates.                                       X-Ray Chest AP Portable (Final result)  Result time 10/16/24 08:36:10      Final result by Garett Stallworth MD (10/16/24 08:36:10)                   Impression:      No acute finding in the chest.      Electronically signed by: Garett Stallworth  Date:    10/16/2024  Time:    08:36               Narrative:    EXAMINATION:  XR CHEST AP PORTABLE    CLINICAL HISTORY:  Sepsis;    FINDINGS:  Comparison: None available.    Mediastinal silhouette is within normal limits.  The lungs are clear.  No pneumothorax or pleural effusion.  No acute osseous finding.                                       Pending Diagnostic Studies:       None          Final Active Diagnoses:    Diagnosis Date Noted POA    PRINCIPAL PROBLEM:  Acute hypercapnic respiratory failure [J96.02] 10/16/2024 Yes    Tracheostomy in place [Z93.0] 10/30/2024 Not Applicable    Moderate protein-calorie malnutrition [E44.0] 10/25/2024 Yes    Body mass index (BMI) less than 19 [Z68.1] 10/16/2024 Not Applicable    PEG (percutaneous endoscopic gastrostomy) status [Z93.1] 09/11/2023 Not Applicable    Nonverbal [R47.01] 09/11/2023 Yes    Legally blind [H54.8] 09/11/2023 Yes    Intellectual disability with epilepsy [F79, G40.909] 08/19/2013 Yes    Spastic quadriplegic  cerebral palsy [G80.0] 04/09/2013 Yes      Problems Resolved During this Admission:    Diagnosis Date Noted Date Resolved POA    Aspiration pneumonia [J69.0] 10/16/2024 10/20/2024 Yes    Bowel obstruction [K56.609] 10/16/2024 11/05/2024 Yes    MAYELIN (acute kidney injury) [N17.9] 10/16/2024 11/03/2024 No     No new Assessment & Plan notes have been filed under this hospital service since the last note was generated.  Service: Pulmonology    Discharged Condition: stable    Disposition: Long Term Acute Care     Follow-up Information       [facility] Follow up.    Contact information:  [address]  [facility] [address] [phone]                         Patient Instructions:      Notify your health care provider if you experience any of the following:  temperature >100.4     Notify your health care provider if you experience any of the following:  difficulty breathing or increased cough     Tube Feedings/Formulas   Order Comments: Currently at 10ml/hr - can advance to goal rate of 40 as tolerates     Order Specific Question Answer Comments   Select Adult Formula: Peptamen 1.5 Prebio    Route: Gastrostomy    Formula Rate (mL/hr): 40      Medications:  Reconciled Home Medications:      Medication List        START taking these medications      0.9% NaCl PgBk 100 mL with meropenem 2 gram SolR 2 g  Inject 2 g into the vein every 8 (eight) hours. for 3 days     cloNIDine 0.1 mg/24 hr td ptwk 0.1 mg/24 hr  Commonly known as: CATAPRES  Place 1 patch onto the skin every 7 days.  Start taking on: November 11, 2024     polyethylene glycol 17 gram Pwpk  Commonly known as: GLYCOLAX  17 g by Per G Tube route 2 (two) times daily. for 30 doses     QUEtiapine 100 MG Tab  Commonly known as: SEROQUEL  1 tablet (100 mg total) by Per G Tube route 2 (two) times daily.     senna-docusate 8.6-50 mg 8.6-50 mg per tablet  Commonly known as: PERICOLACE  1 tablet by Per G Tube route 2 (two) times daily. for 30  doses     simethicone 40 mg/0.6 mL drops  Commonly known as: MYLICON  0.6 mLs (40 mg total) by Per G Tube route 4 (four) times daily.            CONTINUE taking these medications      baclofen 20 MG tablet  Commonly known as: LIORESAL  1 tablet (20 mg total) by Per G Tube route 2 (two) times daily.     cloNIDine 0.1 MG tablet  Commonly known as: CATAPRES  1 tablet (0.1 mg total) by Per G Tube route nightly.     diazePAM 5 mg/5 mL (1 mg/mL) oral solution  Commonly known as: VALIUM  TAKE 3 ML(3 MG) BY MOUTH TWICE DAILY     gentamicin 0.1 % ointment  Commonly known as: GARAMYCIN     levETIRAcetam 100 mg/mL Soln  Commonly known as: KEPPRA  TAKE 20 ML(2000 MG) VIA GTUBE TWICE DAILY     montelukast 5 MG chewable tablet  Commonly known as: SINGULAIR  Take 1 tablet (5 mg total) by mouth every evening.     mupirocin 2 % ointment  Commonly known as: BACTROBAN  Apply topically 3 (three) times daily.     PHENobarbitaL 64.8 MG tablet  Take 1 tablet (64.8 mg total) by mouth 2 (two) times daily.     tiZANidine 2 MG tablet  Commonly known as: ZANAFLEX            STOP taking these medications      meloxicam 7.5 MG tablet  Commonly known as: MOBIC            ASK your doctor about these medications      midazolam 5 mg/spray (0.1 mL) Spry  1 Spray per nostril for seizure >3 minutes. May repeat in the other nostril if the seizure continues beyond 10 minutes.     pulse oximeter device  Commonly known as: pulse oximeter  by Apply Externally route 2 (two) times a day. Use twice daily at 8 AM and 3 PM and record the value in [facility] as directed.              Eyad Vaughn PA-C  Critical Care Medicine  [facility] - [facility] ([facility])

## 2024-11-05 NOTE — PLAN OF CARE
Pt with trach on vent. REA orientation. GCS 9; no verbal response; withdraws to pain. VSS with hypertensive episodes and tachycardia (max ) this shift; providers aware. Afebrile. SR/ST on monitor. Vent settings of 21% FiO2, PEEP 5, RR 8, and . Condom cath in place; 1600mL UOP this shift. Moderate liquid BM x4 this shift. Family updated at bedside.    Pt resting comfortably in bed with side rails up x3; locked and lowered with alarm set. Monitor alarms on and audible.

## 2024-11-06 LAB — PATH REV BLD -IMP: NORMAL

## 2024-12-09 ENCOUNTER — HOSPITAL ENCOUNTER (INPATIENT)
Facility: HOSPITAL | Age: 24
LOS: 2 days | DRG: 204 | End: 2024-12-11
Attending: SPECIALIST | Admitting: SPECIALIST
Payer: COMMERCIAL

## 2024-12-09 DIAGNOSIS — J96.10 CHRONIC RESPIRATORY FAILURE: ICD-10-CM

## 2024-12-09 PROBLEM — R06.89 CHRONIC RESPIRATORY INSUFFICIENCY: Status: ACTIVE | Noted: 2024-10-16

## 2024-12-09 PROCEDURE — 25000003 PHARM REV CODE 250: Performed by: SPECIALIST

## 2024-12-09 PROCEDURE — 20000000 HC ICU ROOM

## 2024-12-09 PROCEDURE — 25000003 PHARM REV CODE 250: Performed by: NURSE PRACTITIONER

## 2024-12-09 RX ORDER — METOPROLOL TARTRATE 50 MG/1
50 TABLET ORAL 2 TIMES DAILY
Status: DISCONTINUED | OUTPATIENT
Start: 2024-12-09 | End: 2024-12-11 | Stop reason: HOSPADM

## 2024-12-09 RX ORDER — QUETIAPINE FUMARATE 100 MG/1
100 TABLET, FILM COATED ORAL 2 TIMES DAILY
Status: DISCONTINUED | OUTPATIENT
Start: 2024-12-09 | End: 2024-12-11 | Stop reason: HOSPADM

## 2024-12-09 RX ORDER — DIAZEPAM 5 MG/5ML
3 SOLUTION ORAL 2 TIMES DAILY
Status: DISCONTINUED | OUTPATIENT
Start: 2024-12-09 | End: 2024-12-09 | Stop reason: SDUPTHER

## 2024-12-09 RX ORDER — ONDANSETRON HYDROCHLORIDE 2 MG/ML
8 INJECTION, SOLUTION INTRAVENOUS EVERY 6 HOURS PRN
Status: DISCONTINUED | OUTPATIENT
Start: 2024-12-09 | End: 2024-12-11 | Stop reason: HOSPADM

## 2024-12-09 RX ORDER — BACLOFEN 10 MG/1
20 TABLET ORAL 2 TIMES DAILY
Status: DISCONTINUED | OUTPATIENT
Start: 2024-12-09 | End: 2024-12-09

## 2024-12-09 RX ORDER — DIAZEPAM 5 MG/5ML
3 SOLUTION ORAL 2 TIMES DAILY
Status: DISCONTINUED | OUTPATIENT
Start: 2024-12-09 | End: 2024-12-09

## 2024-12-09 RX ORDER — DIAZEPAM 2 MG/1
2 TABLET ORAL 2 TIMES DAILY
Status: DISCONTINUED | OUTPATIENT
Start: 2024-12-09 | End: 2024-12-11 | Stop reason: HOSPADM

## 2024-12-09 RX ORDER — ACETAMINOPHEN 650 MG/20.3ML
650 LIQUID ORAL EVERY 6 HOURS PRN
Status: DISCONTINUED | OUTPATIENT
Start: 2024-12-09 | End: 2024-12-09

## 2024-12-09 RX ORDER — CLONIDINE HYDROCHLORIDE 0.1 MG/1
0.1 TABLET ORAL NIGHTLY
Status: DISCONTINUED | OUTPATIENT
Start: 2024-12-09 | End: 2024-12-11 | Stop reason: HOSPADM

## 2024-12-09 RX ORDER — ACETAMINOPHEN 650 MG/20.3ML
650 LIQUID ORAL EVERY 6 HOURS PRN
Status: DISCONTINUED | OUTPATIENT
Start: 2024-12-09 | End: 2024-12-11 | Stop reason: HOSPADM

## 2024-12-09 RX ORDER — BACLOFEN 5 MG/ML
20 SUSPENSION ORAL 2 TIMES DAILY
Status: DISCONTINUED | OUTPATIENT
Start: 2024-12-09 | End: 2024-12-09

## 2024-12-09 RX ORDER — LORAZEPAM 2 MG/ML
2 INJECTION INTRAMUSCULAR EVERY 4 HOURS PRN
Status: DISCONTINUED | OUTPATIENT
Start: 2024-12-09 | End: 2024-12-11 | Stop reason: HOSPADM

## 2024-12-09 RX ORDER — LEVETIRACETAM 100 MG/ML
1000 SOLUTION ORAL 2 TIMES DAILY
Status: DISCONTINUED | OUTPATIENT
Start: 2024-12-09 | End: 2024-12-11 | Stop reason: HOSPADM

## 2024-12-09 RX ORDER — PHENOBARBITAL 32.4 MG/1
64.8 TABLET ORAL 2 TIMES DAILY
Status: DISCONTINUED | OUTPATIENT
Start: 2024-12-09 | End: 2024-12-11 | Stop reason: HOSPADM

## 2024-12-09 RX ORDER — ENOXAPARIN SODIUM 100 MG/ML
30 INJECTION SUBCUTANEOUS EVERY 24 HOURS
Status: DISCONTINUED | OUTPATIENT
Start: 2024-12-10 | End: 2024-12-11 | Stop reason: HOSPADM

## 2024-12-09 RX ORDER — CLONIDINE 0.1 MG/24H
1 PATCH, EXTENDED RELEASE TRANSDERMAL WEEKLY
Status: DISCONTINUED | OUTPATIENT
Start: 2024-12-16 | End: 2024-12-11 | Stop reason: HOSPADM

## 2024-12-09 RX ORDER — BACLOFEN 10 MG/1
20 TABLET ORAL 2 TIMES DAILY
Status: DISCONTINUED | OUTPATIENT
Start: 2024-12-10 | End: 2024-12-11 | Stop reason: HOSPADM

## 2024-12-09 RX ADMIN — QUETIAPINE FUMARATE 100 MG: 100 TABLET ORAL at 11:12

## 2024-12-09 RX ADMIN — CLONIDINE HYDROCHLORIDE 0.1 MG: 0.1 TABLET ORAL at 11:12

## 2024-12-09 RX ADMIN — LEVETIRACETAM 1000 MG: 100 SOLUTION ORAL at 11:12

## 2024-12-09 RX ADMIN — METOPROLOL TARTRATE 50 MG: 50 TABLET, FILM COATED ORAL at 11:12

## 2024-12-09 RX ADMIN — PHENOBARBITAL 64.8 MG: 32.4 TABLET ORAL at 11:12

## 2024-12-09 RX ADMIN — DIAZEPAM 2 MG: 2 TABLET ORAL at 11:12

## 2024-12-09 RX ADMIN — BACLOFEN 20 MG: 10 TABLET ORAL at 11:12

## 2024-12-09 NOTE — PROVIDER TRANSFER
(Physician in Lead of Transfers)  Outside Transfer Acceptance Note / Regional Referral Center      Upon patient arrival, please contact Critical Care Medicine on call.    Referring facility: [facility]   Referring provider: ANETA MCNEAL  Accepting facility: [facility]  Accepting provider: GUANAKO MOLINA  Admitting provider: RASHAD CASEY  Reason for transfer: Higher Level of Care   Transfer diagnosis: Chronic Respitory Failure; Needs a trach replacement  Transfer specialty requested: Critical Care Medicine  Transfer specialty notified: Yes  Transfer level: NUMBER 1-5: 2  Bed type requested: ICU  Isolation status: No active isolations   Admission class or status: IP- Inpatient      Narrative     24-year-old male with a history of cerebral palsy, spastic quadriplegia, pneumonia, seizure disorder, and G-tube placement previously admitted to [facility] October 16-November 5 with respiratory failure and small bowel obstruction.  During his stay he had tracheostomy.  Bowel function returned.  He was subsequently discharged to LTAC on November 5.  His trach came out last week.  Respiratory status has been fairly stable since, though he had a couple of apneic episodes by report.  He remains hemodynamically stable and in no distress.  He continues tube feeds and continues wound care to his hip.  The referring team spoke with the patient's family, and they feel it would be best to have the trach in long term since he appeared to do better from a respiratory and secretions standpoint with it in place.  They spoke with Critical Care Medicine at [facility].  Will plan transfer to Critical Care Medicine at [facility] for replacement of trach tube.  Referring provider felt patient was stable for transfer.    December 9:  White blood cells 12, hemoglobin 11.8, hematocrit 39.1, platelets 283, glucose 94, BUN 20, creatinine 0.46, sodium 137, potassium  4.2, chloride 100, CO2 29, calcium 8.6, albumin 3.3, total bilirubin 0.2, AST 35, ALT 76      VS:  Temperature 99.2°, pulse 94, blood pressure 112/66, O2 sats 100%    Instructions    Admit to Critical Care Medicine      NATIVIDAD Neumann MD  [facility] Medicine Staff  Cell: [phone]

## 2024-12-10 LAB
ANION GAP SERPL CALC-SCNC: 11 MMOL/L (ref 8–16)
BASOPHILS # BLD AUTO: 0.05 K/UL (ref 0–0.2)
BASOPHILS NFR BLD: 0.4 % (ref 0–1.9)
BUN SERPL-MCNC: 17 MG/DL (ref 6–20)
CALCIUM SERPL-MCNC: 9.1 MG/DL (ref 8.7–10.5)
CHLORIDE SERPL-SCNC: 100 MMOL/L (ref 95–110)
CO2 SERPL-SCNC: 29 MMOL/L (ref 23–29)
CREAT SERPL-MCNC: 0.6 MG/DL (ref 0.5–1.4)
DIFFERENTIAL METHOD BLD: ABNORMAL
EOSINOPHIL # BLD AUTO: 0.9 K/UL (ref 0–0.5)
EOSINOPHIL NFR BLD: 7.7 % (ref 0–8)
ERYTHROCYTE [DISTWIDTH] IN BLOOD BY AUTOMATED COUNT: 14.6 % (ref 11.5–14.5)
EST. GFR  (NO RACE VARIABLE): >60 ML/MIN/1.73 M^2
GLUCOSE SERPL-MCNC: 106 MG/DL (ref 70–110)
HCT VFR BLD AUTO: 36.4 % (ref 40–54)
HGB BLD-MCNC: 11.7 G/DL (ref 14–18)
IMM GRANULOCYTES # BLD AUTO: 0.03 K/UL (ref 0–0.04)
IMM GRANULOCYTES NFR BLD AUTO: 0.2 % (ref 0–0.5)
LYMPHOCYTES # BLD AUTO: 1.4 K/UL (ref 1–4.8)
LYMPHOCYTES NFR BLD: 11.3 % (ref 18–48)
MAGNESIUM SERPL-MCNC: 1.7 MG/DL (ref 1.6–2.6)
MCH RBC QN AUTO: 25.4 PG (ref 27–31)
MCHC RBC AUTO-ENTMCNC: 32.1 G/DL (ref 32–36)
MCV RBC AUTO: 79 FL (ref 82–98)
MONOCYTES # BLD AUTO: 0.8 K/UL (ref 0.3–1)
MONOCYTES NFR BLD: 6.4 % (ref 4–15)
NEUTROPHILS # BLD AUTO: 9.1 K/UL (ref 1.8–7.7)
NEUTROPHILS NFR BLD: 74 % (ref 38–73)
NRBC BLD-RTO: 0 /100 WBC
PHOSPHATE SERPL-MCNC: 3.6 MG/DL (ref 2.7–4.5)
PLATELET # BLD AUTO: 279 K/UL (ref 150–450)
PMV BLD AUTO: 13 FL (ref 9.2–12.9)
POTASSIUM SERPL-SCNC: 3.6 MMOL/L (ref 3.5–5.1)
RBC # BLD AUTO: 4.61 M/UL (ref 4.6–6.2)
SODIUM SERPL-SCNC: 140 MMOL/L (ref 136–145)
WBC # BLD AUTO: 12.25 K/UL (ref 3.9–12.7)

## 2024-12-10 PROCEDURE — 94761 N-INVAS EAR/PLS OXIMETRY MLT: CPT

## 2024-12-10 PROCEDURE — 83735 ASSAY OF MAGNESIUM: CPT | Performed by: NURSE PRACTITIONER

## 2024-12-10 PROCEDURE — 99900035 HC TECH TIME PER 15 MIN (STAT)

## 2024-12-10 PROCEDURE — 25000003 PHARM REV CODE 250: Performed by: SPECIALIST

## 2024-12-10 PROCEDURE — 63600175 PHARM REV CODE 636 W HCPCS: Performed by: NURSE PRACTITIONER

## 2024-12-10 PROCEDURE — 25000003 PHARM REV CODE 250: Performed by: NURSE PRACTITIONER

## 2024-12-10 PROCEDURE — 99223 1ST HOSP IP/OBS HIGH 75: CPT | Mod: 24,,, | Performed by: COLON & RECTAL SURGERY

## 2024-12-10 PROCEDURE — 27000221 HC OXYGEN, UP TO 24 HOURS

## 2024-12-10 PROCEDURE — 84100 ASSAY OF PHOSPHORUS: CPT | Performed by: NURSE PRACTITIONER

## 2024-12-10 PROCEDURE — 51798 US URINE CAPACITY MEASURE: CPT

## 2024-12-10 PROCEDURE — 85025 COMPLETE CBC W/AUTO DIFF WBC: CPT | Performed by: NURSE PRACTITIONER

## 2024-12-10 PROCEDURE — 80048 BASIC METABOLIC PNL TOTAL CA: CPT | Performed by: NURSE PRACTITIONER

## 2024-12-10 PROCEDURE — 11000001 HC ACUTE MED/SURG PRIVATE ROOM

## 2024-12-10 RX ORDER — LANOLIN ALCOHOL/MO/W.PET/CERES
800 CREAM (GRAM) TOPICAL 2 TIMES DAILY
Status: COMPLETED | OUTPATIENT
Start: 2024-12-10 | End: 2024-12-10

## 2024-12-10 RX ORDER — MUPIROCIN 20 MG/G
OINTMENT TOPICAL 2 TIMES DAILY
Status: DISCONTINUED | OUTPATIENT
Start: 2024-12-10 | End: 2024-12-11 | Stop reason: HOSPADM

## 2024-12-10 RX ADMIN — PHENOBARBITAL 64.8 MG: 32.4 TABLET ORAL at 09:12

## 2024-12-10 RX ADMIN — METOPROLOL TARTRATE 50 MG: 50 TABLET, FILM COATED ORAL at 09:12

## 2024-12-10 RX ADMIN — DIAZEPAM 2 MG: 2 TABLET ORAL at 09:12

## 2024-12-10 RX ADMIN — ENOXAPARIN SODIUM 30 MG: 40 INJECTION SUBCUTANEOUS at 05:12

## 2024-12-10 RX ADMIN — LEVETIRACETAM 1000 MG: 100 SOLUTION ORAL at 09:12

## 2024-12-10 RX ADMIN — MUPIROCIN: 20 OINTMENT TOPICAL at 09:12

## 2024-12-10 RX ADMIN — BACLOFEN 20 MG: 10 TABLET ORAL at 09:12

## 2024-12-10 RX ADMIN — QUETIAPINE FUMARATE 100 MG: 100 TABLET ORAL at 09:12

## 2024-12-10 RX ADMIN — Medication 800 MG: at 02:12

## 2024-12-10 RX ADMIN — CLONIDINE HYDROCHLORIDE 0.1 MG: 0.1 TABLET ORAL at 09:12

## 2024-12-10 RX ADMIN — POTASSIUM BICARBONATE 40 MEQ: 391 TABLET, EFFERVESCENT ORAL at 02:12

## 2024-12-10 RX ADMIN — Medication 800 MG: at 09:12

## 2024-12-10 NOTE — CONSULTS
[facility] - [facility] Care ([facility])  [facility] Medicine  Consult Note    Patient Name: Ronny Ramey  MRN: [id number]  Admission Date: 12/9/2024  Hospital Length of Stay: 1 days  Attending Physician: Quinton Lopez MD   Primary Care Provider: Herman Nathan DNP           Patient information was obtained from past medical records, ER records, and primary team.     Inpatient consult to Hospitalist  Consult performed by: Quinton Lopez MD  Consult ordered by: Sandee Hardin MD        Subjective:     Principal Problem: Chronic respiratory insufficiency    Chief Complaint: No chief complaint on file.       HPI: 24 yr old male NH resident with CP, spastic quadriplegia, s/p G tube, epilepsy   Admitted at this facility from 10/16 to 11/5 with respiratory failure, aspiration pneumonia, and small bowel obstruction. Underwent ex lap on 10/19 followed by small bowel re anastomosis and g tube placement on 10/21. He failed a trial of extubation on 10/22 and ultimately trach was placed on 10/29 and was discharged to [facility] on vent on 11/5 for ongoing weaning and care     He returns to [facility] ICU on night of 12/9. Per report he was weaned from mechanical ventilation with trach intact at [facility] however on 12/1 the trach was unintentionally removed. Since that time he has had increasing difficulty with secretion management and reported infrequent apnea episodes.  We were contacted to admit for trach re-placement today     Hospital/ICU Course:  12/10 No acute resp issues  Secretions seemed controlled  ---  [facility] consulted for AOC  Plans for possible trach replacement pending  Surgery consulted for trach placement  Patient awake, alert, pleasant  Discussed plan of care with ICU RN  No family at bedside    Past Medical History:   Diagnosis Date    Allergy     Cerebral palsy     Dysphagia, oropharyngeal phase     Encounter for blood transfusion     General anesthetics causing adverse effect in therapeutic use      Pneumonia     Premature baby     23 1/2 weeks     Seizure disorder     Seizures     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    Vision abnormalities        Past Surgical History:   Procedure Laterality Date    ADENOIDECTOMY      APPLICATION OF WOUND VACUUM-ASSISTED CLOSURE DEVICE N/A 10/18/2024    Procedure: APPLICATION, WOUND VAC;  Surgeon: Anna Marie Mo MD;  Location: [facility];  Service: Colon and Rectal;  Laterality: N/A;    BACLOFEN PUMP IMPLANTATION N/A 2020    Procedure: INSERTION, INTRATHECAL BACLOFEN PUMP;  Surgeon: Robbi Cain MD;  Location: [facility] OR 2ND FLR;  Service: Neurosurgery;  Laterality: N/A;  toronto I, asa 1, lateral left down, regular bed reversed, christine, [employer] rep, co surgeon DR Miller    BACLOFEN PUMP IMPLANTATION  2020    CIRCUMCISION      EXCISION, SMALL INTESTINE N/A 10/18/2024    Procedure: EXCISION, SMALL INTESTINE;  Surgeon: Anna Marie Mo MD;  Location: [facility] OR;  Service: Colon and Rectal;  Laterality: N/A;    EYE SURGERY      as a     GASTROSTOMY TUBE PLACEMENT      HERNIA REPAIR      HIP SURGERY      INJECTION OF ANESTHETIC AGENT INTO TISSUE PLANE DEFINED BY TRANSVERSUS ABDOMINIS MUSCLE N/A 10/18/2024    Procedure: BLOCK, TRANSVERSUS ABDOMINIS PLANE;  Surgeon: Anna Marie Mo MD;  [facility]: [facility];  Service: Colon and Rectal;  Laterality: N/A;    LAPAROTOMY, EXPLORATORY N/A 10/18/2024    Procedure: LAPAROTOMY, EXPLORATORY;  Surgeon: Anna Marie Mo MD;  Location: [facility] OR;  Service: Colon and Rectal;  Laterality: N/A;  Lysis of adhesions    LAPAROTOMY, EXPLORATORY N/A 10/21/2024    Procedure: LAPAROTOMY, EXPLORATORY, OPEN GASTROSTOMY TUBE;  Surgeon: Anna Marie Mo MD;  [facility]: [facility];  Service: Colon and Rectal;  Laterality: N/A;    LUMBAR PUNCTURE WITH INJECTION OF BACLOFEN N/A 3/11/2020    Procedure: LUMBAR PUNCTURE, WITH BACLOFEN INJECTION;  Surgeon: Cristiane Sky MD;  Location: [facility] OR 2ND FLR;  Service: Neurosurgery;  Laterality: N/A;  toronto I,  asa 1, lateral left down, regular bed reversed , christine , [employer] co surgeon DR Karlin NISSEN FUNDOPLICATION      PARTIAL GASTRECTOMY N/A 10/18/2024    Procedure: GASTRECTOMY, PARTIAL;  Surgeon: Anna Marie Mo MD;  Location: [facility];  Service: Colon and Rectal;  Laterality: N/A;    REMOVAL OF BACLOFEN PUMP Right 10/15/2020    Procedure: REMOVAL, BACLOFEN PUMP;  Surgeon: Marcy Macedo MD;  Location: [address];  Service: Neurosurgery;  Laterality: Right;    TONSILLECTOMY      TYMPANOSTOMY TUBE PLACEMENT         Review of patient's allergies indicates:   Allergen Reactions    Strawberries [strawberry] Hives     STRAWBERRIES ALLERGIC REACTIONS   (SEIZURES AND HIVES )       No current facility-administered medications on file prior to encounter.     Current Outpatient Medications on File Prior to Encounter   Medication Sig    baclofen (LIORESAL) 20 MG tablet 1 tablet (20 mg total) by Per G Tube route 2 (two) times daily.    cloNIDine (CATAPRES) 0.1 MG tablet 1 tablet (0.1 mg total) by Per G Tube route nightly.    cloNIDine 0.1 mg/24 hr td ptwk (CATAPRES) 0.1 mg/24 hr Place 1 patch onto the skin every 7 days.    diazePAM (VALIUM) 5 mg/5 mL (1 mg/mL) oral solution TAKE 3 ML(3 MG) BY MOUTH TWICE DAILY    gentamicin (GARAMYCIN) 0.1 % ointment     levETIRAcetam (KEPPRA) 100 mg/mL Soln TAKE 20 ML(2000 MG) VIA GTUBE TWICE DAILY    midazolam 5 mg/spray (0.1 mL) Spry 1 Spray per nostril for seizure >3 minutes. May repeat in the other nostril if the seizure continues beyond 10 minutes. (Patient not taking: Reported on 10/25/2023)    montelukast (SINGULAIR) 5 MG chewable tablet Take 1 tablet (5 mg total) by mouth every evening.    mupirocin (BACTROBAN) 2 % ointment Apply topically 3 (three) times daily.    PHENobarbitaL 64.8 MG tablet Take 1 tablet (64.8 mg total) by mouth 2 (two) times daily.    pulse oximeter (PULSE OXIMETER) device by Apply Externally route 2 (two) times a day. Use twice daily at 8 AM and 3 PM  and record the value in MyChart as directed. (Patient not taking: Reported on 10/25/2023)    QUEtiapine (SEROQUEL) 100 MG Tab 1 tablet (100 mg total) by Per G Tube route 2 (two) times daily.    simethicone (MYLICON) 40 mg/0.6 mL drops 0.6 mLs (40 mg total) by Per G Tube route 4 (four) times daily.    tiZANidine (ZANAFLEX) 2 MG tablet      Family History       Problem Relation (Age of Onset)    Cancer Maternal Grandmother          Tobacco Use    Smoking status: Never    Smokeless tobacco: Never   Substance and Sexual Activity    Alcohol use: No    Drug use: No    Sexual activity: Never     Review of Systems   All other systems reviewed and are negative.    Objective:     Vital Signs (Most Recent):  Temp: 98.4 °F (36.9 °C) (12/10/24 0701)  Pulse: 76 (12/10/24 1301)  Resp: (!) 29 (12/10/24 1301)  BP: (!) 96/51 (12/10/24 1301)  SpO2: 100 % (12/10/24 1301) Vital Signs (24h Range):  Temp:  [97.7 °F (36.5 °C)-99.9 °F (37.7 °C)] 98.4 °F (36.9 °C)  Pulse:  [] 76  Resp:  [18-50] 29  SpO2:  [95 %-100 %] 100 %  BP: ()/() 96/51     Weight: 44 kg (97 lb)  Body mass index is 16.65 kg/m².     Physical Exam  Constitutional:       General: He is not in acute distress.  Cardiovascular:      Rate and Rhythm: Normal rate and regular rhythm.      Heart sounds: No murmur heard.  Pulmonary:      Effort: Pulmonary effort is normal. No respiratory distress.      Breath sounds: Normal breath sounds. No wheezing.   Abdominal:      General: There is no distension.      Palpations: Abdomen is soft.      Tenderness: There is no abdominal tenderness.      Comments: PEG   Musculoskeletal:      Comments: RUE PICC  Contractures   Neurological:      Mental Status: He is alert.          Significant Labs: All pertinent labs within the past 24 hours have been reviewed.  Recent Lab Results         12/10/24  0520        Anion Gap 11       Baso # 0.05       Basophil % 0.4       BUN 17       Calcium 9.1       Chloride 100       CO2 29        Creatinine 0.6       Differential Method Automated       eGFR >60       Eos # 0.9       Eos % 7.7       Glucose 106       Gran # (ANC) 9.1       Gran % 74.0       Hematocrit 36.4       Hemoglobin 11.7       Immature Grans (Abs) 0.03  Comment: Mild elevation in immature granulocytes is non specific and   can be seen in a variety of conditions including stress response,   acute inflammation, trauma and pregnancy. Correlation with other   laboratory and clinical findings is essential.         Immature Granulocytes 0.2       Lymph # 1.4       Lymph % 11.3       Magnesium  1.7       MCH 25.4       MCHC 32.1       MCV 79       Mono # 0.8       Mono % 6.4       MPV 13.0       nRBC 0       Phosphorus Level 3.6       Platelet Count 279       Potassium 3.6       RBC 4.61       RDW 14.6       Sodium 140       WBC 12.25               Significant Imaging: I have reviewed all pertinent imaging results/findings within the past 24 hours.    X-Ray Chest 1 View   Final Result      1.  Negative for acute process.      2.  Stable and incidental findings as noted above.         Electronically signed by: Eliseo Gtz MD   Date:    12/10/2024   Time:    13:47          Assessment/Plan:     * Chronic respiratory insufficiency  Unintentional trach removal on 12/1 with ongoing difficulty with secretions and apneic periods reported  Will need open trach placement  Consult general surgery in am  Supplemental oxygen for goal sat > 92%  Can trial BiPAP overnight if needed for apnea  Suction prn, turn q2h for pulmonary toilet     10/12   At this time he does not have signs of aspiration  CxR is clear  Will need d/w mother about goals of care  We can hold off on trach at this time.  Secretions are less.   He does remains at risk  Stable for floor  Surgery consult    Spastic quadriplegic cerebral palsy  Turn q2h  Continue baclofen, valium, seroquel    Intellectual disability with epilepsy  Continue baseline keppra and phenobarb  Monitor for  evidence of seizure    Moderate protein-calorie malnutrition  Nutrition consulted. Most recent weight and BMI monitored-     Measurements:  Wt Readings from Last 1 Encounters:   12/10/24 44 kg (97 lb)   Body mass index is 16.65 kg/m².    Patient has been screened and assessed by RD.    Malnutrition Type:  Context: acute illness or injury, chronic illness  Level: moderate    Malnutrition Characteristic Summary:  Subcutaneous Fat (Malnutrition): mild depletion  Muscle Mass (Malnutrition): mild depletion    Interventions/Recommendations (treatment strategy):  1. Initiate pt onto continuous Enteral nutrition, recommend Nutren 2.0 via PEG tube, goal rate 35 mL/hr, starting at 10 mL/hr then progress to goal rate within 24 hrs if pt is tolerating or per MD/NP -Formula at goal rate will provide: 1680 kcals/day (100% EEN), 71 g protein/day (108% EPN), 181 g CHO/day (86% CHO needs), 581 mL free formula water/day (35% fluid needs) -160 mL q4h free water flushes (960 mL/day) = total from formula + FWF = 1541 mL water/day (100% fluid needs), per MD/NP -Check Mg< K+, Na, Phos and Glu before and during initation, correct as indicated 2. Recommend Edwin BID via PEG tube for wound healing 3. Weigh twice weekly        VTE Risk Mitigation (From admission, onward)           Ordered     enoxaparin injection 30 mg  Daily         12/09/24 4024                      Thank you for your consult. I will follow-up with patient. Please contact us if you have any additional questions.    Quinton Lopez MD  Department of Hospital Medicine   '[place] - Intensive Care ([facility])

## 2024-12-10 NOTE — PROGRESS NOTES
[facility] - Int[facility] Care ([facility])  Critical Care Medicine  Progress Note    Patient Name: Ronny Ramey  MRN: [id number]  Admission Date: 12/9/2024  Hospital Length of Stay: 1 days  Code Status: Full Code  Attending Provider: Sandee Hardin MD  Primary Care Provider: Herman Nathan DNP   Principal Problem: Chronic respiratory insufficiency    Subjective:     HPI:  24 yr old male NH resident with CP, spastic quadriplegia, s/p G tube, epilepsy   Admitted at this facility from 10/16 to 11/5 with respiratory failure, aspiration pneumonia, and small bowel obstruction. Underwent ex lap on 10/19 followed by small bowel re anastomosis and g tube placement on 10/21. He failed a trial of extubation on 10/22 and ultimately trach was placed on 10/29 and was discharged to [facility] on vent on 11/5 for ongoing weaning and care    He returns to [facility] ICU on night of 12/9. Per report he was weaned from mechanical ventilation with trach intact at AM however on 12/1 the trach was unintentionally removed. Since that time he has had increasing difficulty with secretion management and reported infrequent apnea episodes.  We were contacted to admit for trach re-placement today    Hospital/ICU Course:  12/10 No acute resp issues  Secretions seemed controlled        Objective:     Vital Signs (Most Recent):  Temp: 98.4 °F (36.9 °C) (12/10/24 0701)  Pulse: 76 (12/10/24 1301)  Resp: (!) 29 (12/10/24 1301)  BP: (!) 96/51 (12/10/24 1301)  SpO2: 100 % (12/10/24 1301) Vital Signs (24h Range):  Temp:  [97.7 °F (36.5 °C)-99.9 °F (37.7 °C)] 98.4 °F (36.9 °C)  Pulse:  [] 76  Resp:  [18-50] 29  SpO2:  [95 %-100 %] 100 %  BP: ()/() 96/51     Weight: 44 kg (97 lb)  Body mass index is 16.65 kg/m².      Intake/Output Summary (Last 24 hours) at 12/10/2024 1356  Last data filed at 12/10/2024 0510  Gross per 24 hour   Intake 40 ml   Output 150 ml   Net -110 ml        Physical Exam  Vitals and nursing note  "reviewed.   HENT:      Head: Normocephalic.   Eyes:      Pupils: Pupils are equal, round, and reactive to light.   Cardiovascular:      Rate and Rhythm: Tachycardia present.      Pulses: Normal pulses.   Pulmonary:      Effort: No respiratory distress.   Abdominal:      Palpations: Abdomen is soft.   Neurological:      General: No focal deficit present.      Mental Status: He is alert.           Review of Systems   Unable to perform ROS: Acuity of condition       Vents:  Oxygen Concentration (%): 28 (12/10/24 0715)    Lines/Drains/Airways       Peripherally Inserted Central Catheter Line  Duration             PICC Double Lumen right basilic -- days              Drain  Duration                  Gastrostomy/Enterostomy 10/21/24 0844 Gastrostomy tube w/ balloon LUQ feeding 50 days    Male External Urinary Catheter 12/09/24 2048 <1 day                    Significant Labs:    CBC/Anemia Profile:  Recent Labs   Lab 12/10/24  0520   WBC 12.25   HGB 11.7*   HCT 36.4*      MCV 79*   RDW 14.6*        Chemistries:  Recent Labs   Lab 12/10/24  0520      K 3.6      CO2 29   BUN 17   CREATININE 0.6   CALCIUM 9.1   MG 1.7   PHOS 3.6       All pertinent labs within the past 24 hours have been reviewed.    Significant Imaging:  I have reviewed all pertinent imaging results/findings within the past 24 hours.    ABG  No results for input(s): "PH", "PO2", "PCO2", "HCO3", "BE" in the last 168 hours.  Assessment/Plan:     Neuro  Intellectual disability with epilepsy  Continue baseline keppra and phenobarb  Monitor for evidence of seizure    Spastic quadriplegic cerebral palsy  Turn q2h  Continue baclofen, valium, seroquel    Ophtho  Legally blind  Supportive care    Pulmonary  * Chronic respiratory insufficiency  Unintentional trach removal on 12/1 with ongoing difficulty with secretions and apneic periods reported  Will need open trach placement  Consult general surgery in am  Supplemental oxygen for goal sat > " 92%  Can trial BiPAP overnight if needed for apnea  Suction prn, turn q2h for pulmonary toilet    10/12   At this time he does not have signs of aspiration  CxR is clear  Will need d/w mother about goals of care  We can hold off on trach at this time.  Secretions are less.   He does remains at risk    Stable for floor.        Endocrine  Moderate protein-calorie malnutrition  Nutrition consulted. Most recent weight and BMI monitored-     Measurements:  Wt Readings from Last 1 Encounters:   12/09/24 42.3 kg (93 lb 4.8 oz)   There is no height or weight on file to calculate BMI.    Patient will be screened and assessed by RD.    Malnutrition Type:  Context:    Level:      Malnutrition Characteristic Summary:       Interventions/Recommendations (treatment strategy):              Sandee Hardin MD  Critical Care Medicine  '[place] - Intensive Care ([facility])

## 2024-12-10 NOTE — SUBJECTIVE & OBJECTIVE
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    Vision abnormalities        Past Surgical History:   Procedure Laterality Date    ADENOIDECTOMY      APPLICATION OF WOUND VACUUM-ASSISTED CLOSURE DEVICE N/A 10/18/2024    Procedure: APPLICATION, WOUND VAC;  Surgeon: Anna Marie Mo MD;  Location: [facility] OR;  Service: Colon and Rectal;  Laterality: N/A;    BACLOFEN PUMP IMPLANTATION N/A 2020    Procedure: INSERTION, INTRATHECAL BACLOFEN PUMP;  Surgeon: Robbi Cain MD;  Location: [facility] OR 2ND FLR;  Service: Neurosurgery;  Laterality: N/A;  toronto I, asa 1, lateral left down, regular bed reversed, christine, [employer] rep, co surgeon DR Miller    BACLOFEN PUMP IMPLANTATION  2020    CIRCUMCISION      EXCISION, SMALL INTESTINE N/A 10/18/2024    Procedure: EXCISION, SMALL INTESTINE;  Surgeon: Anna Marie Mo MD;  Location: [facility] OR;  Service: Colon and Rectal;  Laterality: N/A;    EYE SURGERY      as a     GASTROSTOMY TUBE PLACEMENT      HERNIA REPAIR      HIP SURGERY      INJECTION OF ANESTHETIC AGENT INTO TISSUE PLANE DEFINED BY TRANSVERSUS ABDOMINIS MUSCLE N/A 10/18/2024    Procedure: BLOCK, TRANSVERSUS ABDOMINIS PLANE;  Surgeon: Anna Marie Mo MD;  Location: [facility];  Service: Colon and Rectal;  Laterality: N/A;    LAPAROTOMY, EXPLORATORY N/A 10/18/2024    Procedure: LAPAROTOMY, EXPLORATORY;  Surgeon: Anna Marie Mo MD;  Location: [facility] OR;  Service: Colon and Rectal;  Laterality: N/A;  Lysis of adhesions    LAPAROTOMY, EXPLORATORY N/A 10/21/2024    Procedure: LAPAROTOMY, EXPLORATORY, OPEN GASTROSTOMY TUBE;  Surgeon: Anna Marie Mo MD;  Location: [facility] OR;  Service: Colon and Rectal;  Laterality: N/A;    LUMBAR  PUNCTURE WITH INJECTION OF BACLOFEN N/A 3/11/2020    Procedure: LUMBAR PUNCTURE, WITH BACLOFEN INJECTION;  Surgeon: Cristiane Sky MD;  Location: [facility] OR 2ND FLR;  Service: Neurosurgery;  Laterality: N/A;  toronto I, asa 1, lateral left down, regular bed reversed , christine , [employer] co surgeon DR Miller    NISSEN FUNDOPLICATION      PARTIAL GASTRECTOMY N/A 10/18/2024    Procedure: GASTRECTOMY, PARTIAL;  Surgeon: Anna Marie Mo MD;  [facility];  Service: Colon and Rectal;  Laterality: N/A;    REMOVAL OF BACLOFEN PUMP Right 10/15/2020    Procedure: REMOVAL, BACLOFEN PUMP;  Surgeon: Marcy Macedo MD;  Location: [facility] OR 2ND FLR;  Service: Neurosurgery;  Laterality: Right;    TONSILLECTOMY      TYMPANOSTOMY TUBE PLACEMENT         Review of patient's allergies indicates:   Allergen Reactions    Strawberries [strawberry] Hives     STRAWBERRIES ALLERGIC REACTIONS   (SEIZURES AND HIVES )       No current facility-administered medications on file prior to encounter.     Current Outpatient Medications on File Prior to Encounter   Medication Sig    baclofen (LIORESAL) 20 MG tablet 1 tablet (20 mg total) by Per G Tube route 2 (two) times daily.    cloNIDine (CATAPRES) 0.1 MG tablet 1 tablet (0.1 mg total) by Per G Tube route nightly.    cloNIDine 0.1 mg/24 hr td ptwk (CATAPRES) 0.1 mg/24 hr Place 1 patch onto the skin every 7 days.    diazePAM (VALIUM) 5 mg/5 mL (1 mg/mL) oral solution TAKE 3 ML(3 MG) BY MOUTH TWICE DAILY    gentamicin (GARAMYCIN) 0.1 % ointment     levETIRAcetam (KEPPRA) 100 mg/mL Soln TAKE 20 ML(2000 MG) VIA GTUBE TWICE DAILY    midazolam 5 mg/spray (0.1 mL) Spry 1 Spray per nostril for seizure >3 minutes. May repeat in the other nostril if the seizure continues beyond 10 minutes. (Patient not taking: Reported on 10/25/2023)    montelukast (SINGULAIR) 5 MG chewable tablet Take 1 tablet (5 mg total) by mouth every evening.    mupirocin (BACTROBAN) 2 % ointment Apply topically 3 (three) times  daily.    PHENobarbitaL 64.8 MG tablet Take 1 tablet (64.8 mg total) by mouth 2 (two) times daily.    pulse oximeter (PULSE OXIMETER) device by Apply Externally route 2 (two) times a day. Use twice daily at 8 AM and 3 PM and record the value in MyChart as directed. (Patient not taking: Reported on 10/25/2023)    QUEtiapine (SEROQUEL) 100 MG Tab 1 tablet (100 mg total) by Per G Tube route 2 (two) times daily.    simethicone (MYLICON) 40 mg/0.6 mL drops 0.6 mLs (40 mg total) by Per G Tube route 4 (four) times daily.    tiZANidine (ZANAFLEX) 2 MG tablet      Family History       Problem Relation (Age of Onset)    Cancer Maternal Grandmother          Tobacco Use    Smoking status: Never    Smokeless tobacco: Never   Substance and Sexual Activity    Alcohol use: No    Drug use: No    Sexual activity: Never     Review of Systems   All other systems reviewed and are negative.    Objective:     Vital Signs (Most Recent):  Temp: 98.4 °F (36.9 °C) (12/10/24 0701)  Pulse: 76 (12/10/24 1301)  Resp: (!) 29 (12/10/24 1301)  BP: (!) 96/51 (12/10/24 1301)  SpO2: 100 % (12/10/24 1301) Vital Signs (24h Range):  Temp:  [97.7 °F (36.5 °C)-99.9 °F (37.7 °C)] 98.4 °F (36.9 °C)  Pulse:  [] 76  Resp:  [18-50] 29  SpO2:  [95 %-100 %] 100 %  BP: ()/() 96/51     Weight: 44 kg (97 lb)  Body mass index is 16.65 kg/m².     Physical Exam  Constitutional:       General: He is not in acute distress.  Cardiovascular:      Rate and Rhythm: Normal rate and regular rhythm.      Heart sounds: No murmur heard.  Pulmonary:      Effort: Pulmonary effort is normal. No respiratory distress.      Breath sounds: Normal breath sounds. No wheezing.   Abdominal:      General: There is no distension.      Palpations: Abdomen is soft.      Tenderness: There is no abdominal tenderness.      Comments: PEG   Musculoskeletal:      Comments: RUE PICC  Contractures   Neurological:      Mental Status: He is alert.          Significant Labs: All pertinent  labs within the past 24 hours have been reviewed.  Recent Lab Results         12/10/24  0520        Anion Gap 11       Baso # 0.05       Basophil % 0.4       BUN 17       Calcium 9.1       Chloride 100       CO2 29       Creatinine 0.6       Differential Method Automated       eGFR >60       Eos # 0.9       Eos % 7.7       Glucose 106       Gran # (ANC) 9.1       Gran % 74.0       Hematocrit 36.4       Hemoglobin 11.7       Immature Grans (Abs) 0.03  Comment: Mild elevation in immature granulocytes is non specific and   can be seen in a variety of conditions including stress response,   acute inflammation, trauma and pregnancy. Correlation with other   laboratory and clinical findings is essential.         Immature Granulocytes 0.2       Lymph # 1.4       Lymph % 11.3       Magnesium  1.7       MCH 25.4       MCHC 32.1       MCV 79       Mono # 0.8       Mono % 6.4       MPV 13.0       nRBC 0       Phosphorus Level 3.6       Platelet Count 279       Potassium 3.6       RBC 4.61       RDW 14.6       Sodium 140       WBC 12.25               Significant Imaging: I have reviewed all pertinent imaging results/findings within the past 24 hours.    X-Ray Chest 1 View   Final Result      1.  Negative for acute process.      2.  Stable and incidental findings as noted above.         Electronically signed by: Eliseo Gtz MD   Date:    12/10/2024   Time:    13:47

## 2024-12-10 NOTE — ASSESSMENT & PLAN NOTE
Unintentional trach removal on 12/1 with ongoing difficulty with secretions and apneic periods reported  Will need open trach placement  Consult general surgery in am  Supplemental oxygen for goal sat > 92%  Can trial BiPAP overnight if needed for apnea  Suction prn, turn q2h for pulmonary toilet

## 2024-12-10 NOTE — EICU
EICU BRIEF INITIAL EVALUATION:       HISTORY:      [age] year old man admitted to the ICU for trach replacement.  Trach unintentionally removed on 12/01 and he has had apnea episodes with increasing difficulties with secretions and some apnea episodes.    History of cerebral palsy, intellectual disability,, hypertension spastic quadriplegia, G-tube, seizure disorder, malnutrition, bowel obstruction, BMI 16    DVT prophylaxis Lovenox   GI prophylaxis not indicated    /97, P 118, R 27, O2 sat 97 ,   Mildly restless on room air      CAMERA ASSESSMENT: Yes      TELEMETRY: Reviewed      NOTES: Reviewed     LABS: Reviewed      IMAGING: Personally reviewed.      DISCUSSED with bedside nurse        ASSESSMENT AND PLAN:       Chronic respiratory insufficiency status post dislodgement of tracheostomy-pending tracheostomy replacement.  Continue monitoring.  Pulmonary toilet as needed      I have reviewed the plan of care for the patient and agree with the plan of care unless noted otherwise above.      EVERTON Perez MD  eICU Attending  [phone]    This report has been created through the use of M-Modal dictation software. Typographical and content errors may occur with this process. While efforts are made to detect and correct such errors, in some cases errors will persist. For this reason, wording in this document should be considered in the proper context and not strictly verbatim.

## 2024-12-10 NOTE — ASSESSMENT & PLAN NOTE
Unintentional trach removal on 12/1 with ongoing difficulty with secretions and apneic periods reported  Will need open trach placement  Consult general surgery in am  Supplemental oxygen for goal sat > 92%  Can trial BiPAP overnight if needed for apnea  Suction prn, turn q2h for pulmonary toilet     10/12   At this time he does not have signs of aspiration  CxR is clear  Will need d/w mother about goals of care  We can hold off on trach at this time.  Secretions are less.   He does remains at risk  Stable for floor  Surgery consult

## 2024-12-10 NOTE — ASSESSMENT & PLAN NOTE
Nutrition consulted. Most recent weight and BMI monitored-     Measurements:  Wt Readings from Last 1 Encounters:   12/10/24 44 kg (97 lb)   Body mass index is 16.65 kg/m².    Patient has been screened and assessed by RD.    Malnutrition Type:  Context: acute illness or injury, chronic illness  Level: moderate    Malnutrition Characteristic Summary:  Subcutaneous Fat (Malnutrition): mild depletion  Muscle Mass (Malnutrition): mild depletion    Interventions/Recommendations (treatment strategy):  1. Initiate pt onto continuous Enteral nutrition, recommend Nutren 2.0 via PEG tube, goal rate 35 mL/hr, starting at 10 mL/hr then progress to goal rate within 24 hrs if pt is tolerating or per MD/NP -Formula at goal rate will provide: 1680 kcals/day (100% EEN), 71 g protein/day (108% EPN), 181 g CHO/day (86% CHO needs), 581 mL free formula water/day (35% fluid needs) -160 mL q4h free water flushes (960 mL/day) = total from formula + FWF = 1541 mL water/day (100% fluid needs), per MD/NP -Check Mg< K+, Na, Phos and Glu before and during initation, correct as indicated 2. Recommend Edwin BID via PEG tube for wound healing 3. Weigh twice weekly

## 2024-12-10 NOTE — CONSULTS
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  Receives Homebound tutoring at [employer] where he attends during the week.     Social Drivers of Health     Financial Resource Strain: Patient Unable To Answer (10/17/2024)    Overall Financial Resource Strain (CARDIA)     Difficulty of Paying Living Expenses: Patient unable to answer   Food Insecurity: Patient Unable To Answer (10/17/2024)    Hunger Vital Sign     Worried About Running Out of Food in the Last Year: Patient unable to answer     Ran Out of Food in the Last Year: Patient unable to answer   Transportation Needs: Patient Unable To Answer (10/17/2024)    TRANSPORTATION NEEDS     Transportation : Patient unable to answer   Stress: Patient Unable To Answer (10/17/2024)    [employer] - Occupational Stress Questionnaire     Feeling of Stress : Patient unable to answer   Housing Stability: Patient Unable To Answer (10/17/2024)    Housing Stability Vital Sign     Unable to Pay for Housing in the Last Year: Patient unable to answer     Homeless in the Last Year: Patient unable to answer       Precautions:     Allergies as of 12/09/2024 - Reviewed  12/09/2024   Allergen Reaction Noted    Strawberries [strawberry] Hives 08/14/2013       WOC Assessment Details/Treatment     Consulted on this 23 y/o M patient due to present on admission wounds. Patient well known to WOC team from prior admit in October, discharged to AM LTAC with trach, PEG, and healing chronic stage 4 pressure injury to right lateral ischium/hip region. He was readmitted yesterday from AM due to trach dislodged and need to be replaced. PMH significant for CP, spastic quadriplegia, epilepsy, recent SBO.  Today, patient is awake and alert in ICU, NC in place.  Prior eschar to left medial ankle has resolved, now with scar tissue. Left heel intact, heel offloading boot maintained.  Prior TRACH site to neck noted, fixed eschar overlying. Cleansed with vashe and patted dry, left MAURICIO.  Anterior neck/former TRACH site:      RLE contracted. Right heel resolving stage 2 pressure injury noted, appears to be a red blister that is reabsorbing. Painted with cavilon and foam dressing applied. Floated off buttock with towel roll.  Sacrum and back intact with no redness or breakdown noted.  Right lateral ischium./hip chronic healing stage 4 pressure injury noted that today measures 1x1x1.5cm with unddermining noted from 1-5 o'CLock extending up to 1cm, and tunnel noted at 5 o'clock extending 3cm, small amount serous drainage noted. Cleansed with vashe and allowed to dwell, patted dry. Filled with aquacel cut into spiral, then covered with foam dressing. Recommend wound care twice weekly and prn excess drainage/soiled.  Left hip intact.  Left lateral forehead stage 1 pressure injury noted on admit with nonblanchable redness. Painted with cavilon and left MAURICIO as no current source of pressure present.    Patient is on ICU Isolibrium ART bed, positioned with pillows and heel boot to LLE.    Recommendations made to primary team for wound care and pressure injury prevention interventions . Orders placed.         12/10/24 0850   WOCN Assessment   WOCN Total Time (mins) 60   Visit Date 12/10/24   Visit Time 0850   Consult Type New   WOCN Speciality Wound   Wound pressure;At risk for pressure Injury   Intervention assessed;applied;chart review;coordination of care;team conference;orders   Teaching   (unable due to patient condition)        Wound 10/16/24 0945 Pressure Injury Right lateral Ischial tuberosity #1   Date First Assessed/Time First Assessed: 10/16/24 0945   Present on Original Admission: Yes  Primary Wound Type: Pressure Injury  Side: Right  Orientation: lateral  Location: Ischial tuberosity  Wound Number: #1  Is this injury device related?: No   Wound Image    Pressure Injury Stage 4  (chronic, healing stage 4 (from prior admit))   Dressing Appearance Intact   Drainage Amount Small   Drainage Characteristics/Odor Serous   Appearance Red;Pink;Moist   Tissue loss description Full thickness   Red (%), Wound Tissue Color 100 %   Periwound Area Scar tissue   Wound Edges Open;Defined   Wound Length (cm) 1 cm   Wound Width (cm) 1 cm   Wound Depth (cm) 1.5 cm   Wound Volume (cm^3) 1.5 cm^3   Wound Surface Area (cm^2) 1 cm^2   Tunneling (depth (cm)/location) 3cm at 5 o'clock   Undermining (depth (cm)/location) up to 1cm from 1-4 o'clock   Care Cleansed with:;Antimicrobial agent;Applied:;Skin Barrier   Dressing Applied;Hydrofiber;Foam   Packing packing removed;packed with;hydrofiber/alginate   Packing Inserted  1   Packing Removed 1   Periwound Care Absorptive dressing applied;Cleansed with pH balanced cleanser;Skin barrier film applied   Dressing Change Due 12/13/24        Wound 12/10/24 Pressure Injury Right Heel   Date First Assessed: 12/10/24   Present on Original Admission: Yes  Primary Wound Type: Pressure Injury  Side: Right  Location: Heel   Wound Image    Pressure Injury Stage 2   Dressing Appearance Open to air   Drainage Amount None   Appearance Pink;Red;Blistered  (blister reabsorbed)   Wound Edges Defined    Wound Length (cm) 2 cm   Wound Width (cm) 2 cm   Wound Surface Area (cm^2) 4 cm^2   Care Cleansed with:;Antimicrobial agent;Applied:;Skin Barrier   Dressing Foam   Dressing Change Due 12/17/24        Wound 12/10/24 Pressure Injury Left lateral Frontal region   Date First Assessed: 12/10/24   Present on Original Admission: Yes  Primary Wound Type: Pressure Injury  Side: Left  Orientation: lateral  Location: Frontal region   Wound Image    Pressure Injury Stage 1   Drainage Amount None   Appearance Red   Tissue loss description Not applicable   Red (%), Wound Tissue Color 100 %   Periwound Area Intact   Wound Length (cm) 2 cm   Wound Width (cm) 1.5 cm   Wound Surface Area (cm^2) 3 cm^2   Care Applied:;Skin Barrier       12/10/2024

## 2024-12-10 NOTE — SUBJECTIVE & OBJECTIVE
Objective:     Vital Signs (Most Recent):  Temp: 98.4 °F (36.9 °C) (12/10/24 0701)  Pulse: 76 (12/10/24 1301)  Resp: (!) 29 (12/10/24 1301)  BP: (!) 96/51 (12/10/24 1301)  SpO2: 100 % (12/10/24 1301) Vital Signs (24h Range):  Temp:  [97.7 °F (36.5 °C)-99.9 °F (37.7 °C)] 98.4 °F (36.9 °C)  Pulse:  [] 76  Resp:  [18-50] 29  SpO2:  [95 %-100 %] 100 %  BP: ()/() 96/51     Weight: 44 kg (97 lb)  Body mass index is 16.65 kg/m².      Intake/Output Summary (Last 24 hours) at 12/10/2024 1356  Last data filed at 12/10/2024 0510  Gross per 24 hour   Intake 40 ml   Output 150 ml   Net -110 ml        Physical Exam  Vitals and nursing note reviewed.   HENT:      Head: Normocephalic.   Eyes:      Pupils: Pupils are equal, round, and reactive to light.   Cardiovascular:      Rate and Rhythm: Tachycardia present.      Pulses: Normal pulses.   Pulmonary:      Effort: No respiratory distress.   Abdominal:      Palpations: Abdomen is soft.   Neurological:      General: No focal deficit present.      Mental Status: He is alert.           Review of Systems   Unable to perform ROS: Acuity of condition       Vents:  Oxygen Concentration (%): 28 (12/10/24 0715)    Lines/Drains/Airways       Peripherally Inserted Central Catheter Line  Duration             PICC Double Lumen right basilic -- days              Drain  Duration                  Gastrostomy/Enterostomy 10/21/24 0844 Gastrostomy tube w/ balloon LUQ feeding 50 days    Male External Urinary Catheter 12/09/24 2048 <1 day                    Significant Labs:    CBC/Anemia Profile:  Recent Labs   Lab 12/10/24  0520   WBC 12.25   HGB 11.7*   HCT 36.4*      MCV 79*   RDW 14.6*        Chemistries:  Recent Labs   Lab 12/10/24  0520      K 3.6      CO2 29   BUN 17   CREATININE 0.6   CALCIUM 9.1   MG 1.7   PHOS 3.6       All pertinent labs within the past 24 hours have been reviewed.    Significant Imaging:  I have reviewed all pertinent imaging  results/findings within the past 24 hours.

## 2024-12-10 NOTE — HPI
24 yr old male NH resident with CP, spastic quadriplegia, s/p G tube, epilepsy   Admitted at this facility from 10/16 to 11/5 with respiratory failure, aspiration pneumonia, and small bowel obstruction. Underwent ex lap on 10/19 followed by small bowel re anastomosis and g tube placement on 10/21. He failed a trial of extubation on 10/22 and ultimately trach was placed on 10/29 and was discharged to [facility] on vent on 11/5 for ongoing weaning and care     He returns to [facility] ICU on night of 12/9. Per report he was weaned from mechanical ventilation with trach intact at AM however on 12/1 the trach was unintentionally removed. Since that time he has had increasing difficulty with secretion management and reported infrequent apnea episodes.  We were contacted to admit for trach re-placement today     Hospital/ICU Course:  12/10 No acute resp issues  Secretions seemed controlled  ---  [facility] [place]  Plans for possible trach replacement pending  Surgery consulted for trach placement  Patient awake, alert, pleasant  Discussed plan of care with ICU RN  No family at bedside

## 2024-12-10 NOTE — ASSESSMENT & PLAN NOTE
Unintentional trach removal on 12/1 with ongoing difficulty with secretions and apneic periods reported  Will need open trach placement  Consult general surgery in am  Supplemental oxygen for goal sat > 92%  Can trial BiPAP overnight if needed for apnea  Suction prn, turn q2h for pulmonary toilet    10/12   At this time he does not have signs of aspiration  CxR is clear  Will need d/w mother about goals of care  We can hold off on trach at this time.  Secretions are less.   He does remains at risk    Stable for floor.

## 2024-12-10 NOTE — ASSESSMENT & PLAN NOTE
Malnutrition Type:  Context: acute on chronic illness  Level: moderate    Related to (etiology):   Physiological causes increasing nutrient needs d/t illness  Altered GI function  Psychological causes    Signs and Symptoms (as evidenced by):   BMI < 18.5  Underweight with loss of fat and muscle  Unable to eat sufficient protein/energy to maintain a healthy weight  Wound    Malnutrition Characteristic Summary:  Subcutaneous Fat (Malnutrition): mild depletion  Muscle Mass (Malnutrition): mild depletion    Interventions/Recommendations (treatment strategy):  1. Enteral nutrition management  2. Amino acids medical food supplement therapy  3. Collaboration by nutrition professional with other providers     Nutrition Diagnosis Status:   Continues

## 2024-12-10 NOTE — PLAN OF CARE
Nutrition Recommendations/Interventions for malnutrition 12/10/24:   1. Initiate pt onto continuous Enteral nutrition, recommend Nutren 2.0 via PEG tube, goal rate 35 mL/hr, starting at 10 mL/hr then progress to goal rate within 24 hrs if pt is tolerating or per MD/NP   -160 mL q4h free water flushes (960 mL/day), per MD/NP   -Check Mg< K+, Na, Phos and Glu before and during initation, correct as indicated   2. Recommend Edwin BID via PEG tube for wound healing   3. Weigh twice weekly  4. Collaboration by nutrition professional with other providers     Goals:   1. Pt will be initiated onto continuous EN within 24 hrs   2. Pt will tolerate and intake >75% EEN and EPN prior to RD follow up  3. Pt will tolerate and intake Edwin BID prior to RD follow up    YAN Kitchen, RDN, LDN    Please advise. Patient was seeing Dr. Varinder Bruce for nephrology and would like a second opinion.   She would like a referral for Dr. Ba Oakley

## 2024-12-10 NOTE — CONSULTS
[facility] - Intensive Care ([facility])  Adult Nutrition  Consult Note    SUMMARY     Recommendations    Recommendation/Intervention:   1. Initiate pt onto continuous Enteral nutrition, recommend Nutren 2.0 via PEG tube, goal rate 35 mL/hr, starting at 10 mL/hr then progress to goal rate within 24 hrs if pt is tolerating or per MD/NP   -Formula at goal rate will provide: 1680 kcals/day (100% EEN), 71 g protein/day (108% EPN), 181 g CHO/day (86% CHO needs), 581 mL free formula water/day (35% fluid needs)   -160 mL q4h free water flushes (960 mL/day) = total from formula + FWF = 1541 mL water/day (100% fluid needs), per MD/NP   -Check Mg< K+, Na, Phos and Glu before and during initation, correct as indicated   2. Recommend Edwin BID via PEG tube for wound healing   3. Weigh twice weekly    Goals:   1. Pt will be initiated onto continuous EN within 24 hrs   2. Pt will tolerate and intake >75% EEN and EPN prior to RD follow up  3. Pt will tolerate and intake Edwin BID prior to RD follow up  Nutrition Goal Status: new  Communication of RD Recs: other (comment) (POC, sticky note, secure chat)    Assessment and Plan    Endocrine  Moderate protein-calorie malnutrition  Malnutrition Type:  Context: acute on chronic illness  Level: moderate    Related to (etiology):   Physiological causes increasing nutrient needs d/t illness  Altered GI function  Psychological causes    Signs and Symptoms (as evidenced by):   BMI < 18.5  Underweight with loss of fat and muscle  Unable to eat sufficient protein/energy to maintain a healthy weight  Wound    Malnutrition Characteristic Summary:  Subcutaneous Fat (Malnutrition): mild depletion  Muscle Mass (Malnutrition): mild depletion    Interventions/Recommendations (treatment strategy):  1. Enteral nutrition management  2. Amino acids medical food supplement therapy  3. Collaboration by nutrition professional with other providers     Nutrition Diagnosis Status:   Continues         Malnutrition  "Assessment (12/10/24):  Malnutrition Context: acute illness or injury, chronic illness  Malnutrition Level: moderate  Skin (Micronutrient): wounds unhealed (Neri score = 10 (high risk)       Subcutaneous Fat (Malnutrition): mild depletion  Muscle Mass (Malnutrition): mild depletion   Orbital Region (Subcutaneous Fat Loss): well nourished   Yarsani Region (Muscle Loss): well nourished                 Reason for Assessment    Reason For Assessment: consult, new tube feeding (Tube feeding recs)  Diagnosis:  (Chronic respiratory insufficiency)  General Information Comments:   12/10/24: 24 y.o. Male admitted for Chronic respiratory insufficiency. PMH: Spastic quadriplegic cerebral palsy, Intellectual disability w/ epilepsy, Legally blind, Moderate protein-calorie malnutrition; Hx: PEG, Trach, Non verbal, Oropharyngeal dysphagia. Pt currently NPO, in the ICU. H&P noted pt was recently addmitted and underwent ex lap on 10/19/24, small bowel re anastomosis and g tube placement on 10/21/24 and a trach was placed on 10/29 d/t failed extubations, pt was d/c'd to  [facility] LT[facility] on the vent 1/5/24; noted pt's " trach was unintentionally removed". Discussed pt during rounds, MD stated possible re-trach. Spoke to RN, reproted that the pt was on enteral nutrition formula TwoCal at 35 mL/hr w/ 30 mL every hour FWF's, informed RN that RD will provide TF recs for equlivalent formula. RD ordered Edwin and TF's, noted FWF per MD/NP and informed RN via secure chat. LBM 12/8 (per [facility] staff) and stage 3 PI ischial tuberosity full thickness tissue loss charted, note details per Wound care noted 12/10/24. Labs, meds, weights reviewed. Heriberto charted 11/5/24 92 lbs, 12/10/24 97 lbs (BMI 16.65, Protein-energy malnutrition grade II), +5 lb wt gain x 1 month. Visual NFPE performed d/t pt sleeping, mild temporal and orbital malnutrition noted, will perform full   NFPE at follow up. RD will continue to follow and monitor pt's nutritional status during " "admit.    Nutrition Discharge Planning: Continuous enteral nutrition Nutren 2.0 or TwoCal via PEG tube, goal rate 35 mL/hr + FWF per MD/NP + Edwin BID via PEG tube    Nutrition Risk Screen    Nutrition Risk Screen: large or nonhealing wound, burn or pressure injury, tube feeding or parenteral nutrition    Nutrition Related Social Determinants of Health: SDOH: Adequate food in home environment and None Identified    Nutrition/Diet History    Spiritual, Cultural Beliefs, Jewish Practices, Values that Affect Care: no  Food Allergies: other (see comments) (Strawberries)  Factors Affecting Nutritional Intake: impaired cognitive status/motor control, NPO  Nutrition Support Formula Prior to Admit: Twocal HN  Nutrition Support Rate Prior to Admit: 35 (ml)  Nutrtion Support Frequency Prior to Admit: continuous  Nutrition Support Provision Prior to Admit: PEG tube    Anthropometrics    Temp: 98.4 °F (36.9 °C)  Height Method: Estimated  Height: 5' 4" (162.6 cm)  Height (inches): 64 in  Weight Method: Bed Scale  Weight: 44 kg (97 lb)  Weight (lb): 97 lb  Ideal Body Weight (IBW), Male: 130 lb  % Ideal Body Weight, Male (lb): 74.62 %  % Ideal Body Weight Malnutrition: 70-79%: moderate deficit  BMI (Calculated): 16.6  BMI Grade: 16 - 16.9 protein-energy malnutrition grade II  Additional Documentation: Tetraplegia (Quadriplegia) Ideal Body Weight (IBW) Adjustment  Tetraplegia (Quadriplegia) Ideal Body Weight (IBW) Adjustment: 110 lb 8 oz (-15% of IBW)    Wt Readings from Last 15 Encounters:   12/10/24 44 kg (97 lb)   12/09/24 42.3 kg (93 lb 4.8 oz)   11/05/24 41.9 kg (92 lb 6 oz)   06/02/23 49.9 kg (110 lb)   03/15/23 50 kg (110 lb 3.7 oz)   05/13/22 52.3 kg (115 lb 3.1 oz) (2%, Z= -2.11)*   09/20/21 50.5 kg (111 lb 5.3 oz) (1%, Z= -2.29)*   06/16/21 47.3 kg (104 lb 4.4 oz) (<1%, Z= -2.81)*   11/25/20 47.9 kg (105 lb 9.6 oz) (<1%, Z= -2.54)*   11/02/20 46 kg (101 lb 6.6 oz) (<1%, Z= -2.88)*   10/27/20 45.2 kg (99 lb 10.4 oz) " "(<1%, Z= -3.04)*   10/18/20 44.8 kg (98 lb 14 oz) (<1%, Z= -3.11)*   10/13/20 43.2 kg (95 lb 3.8 oz) (<1%, Z= -3.46)*   09/29/20 44.5 kg (98 lb) (<1%, Z= -3.17)*   09/29/20 43.3 kg (95 lb 7.4 oz) (<1%, Z= -3.42)*     * Growth percentiles are based on [facility] (Boys, 2-20 Years) data.       Lab/Procedures/Meds    Pertinent Labs Reviewed: reviewed  Pertinent Medications Reviewed: reviewed    BMP  Lab Results   Component Value Date     12/10/2024    K 3.6 12/10/2024     12/10/2024    CO2 29 12/10/2024    BUN 17 12/10/2024    CREATININE 0.6 12/10/2024    CALCIUM 9.1 12/10/2024    ANIONGAP 11 12/10/2024    EGFRNORACEVR >60 12/10/2024     Lab Results   Component Value Date    CALCIUM 9.1 12/10/2024    PHOS 3.6 12/10/2024     Lab Results   Component Value Date    ALBUMIN 2.5 (L) 11/05/2024     Lab Results   Component Value Date    ALT 23 11/05/2024    AST 21 11/05/2024     (H) 10/27/2020    ALKPHOS 280 (H) 11/05/2024    BILITOT 0.6 11/05/2024     No results for input(s): "POCTGLUCOSE" in the last 24 hours.    No results found for: "LABA1C", "HGBA1C"    Lab Results   Component Value Date    WBC 12.25 12/10/2024    HGB 11.7 (L) 12/10/2024    HCT 36.4 (L) 12/10/2024    MCV 79 (L) 12/10/2024     12/10/2024       Scheduled Meds:   baclofen  20 mg Per G Tube BID    [START ON 12/16/2024] cloNIDine 0.1 mg/24 hr td ptwk  1 patch Transdermal Weekly    cloNIDine  0.1 mg Per G Tube Nightly    diazePAM  2 mg Per G Tube BID    enoxaparin  30 mg Subcutaneous Daily    levETIRAcetam  1,000 mg Per G Tube BID    metoprolol tartrate  50 mg Per G Tube BID    PHENobarbitaL  64.8 mg Per G Tube BID    QUEtiapine  100 mg Per G Tube BID     Continuous Infusions:  PRN Meds:.  Current Facility-Administered Medications:     acetaminophen, 650 mg, Per G Tube, Q6H PRN    lorazepam, 2 mg, Intravenous, Q4H PRN    ondansetron, 8 mg, Intravenous, Q6H PRN      Physical Findings/Assessment         Estimated/Assessed Needs    Weight Used " For Calorie Calculations: 44 kg (97 lb)  Energy Calorie Requirements (kcal): 8639-2193 kcals (35-40 kcals/kg ABW (Pressure injury, underweight)  Energy Need Method: Kcal/kg  Protein Requirements: 55-66 g (1.25-1.5 g/kg ABW (Pressure injury vs Underweight)  Weight Used For Protein Calculations: 44 kg (97 lb)  Fluid Requirements (mL): 3591-6866 mL (1 mL/kcal)  Estimated Fluid Requirement Method: RDA Method  RDA Method (mL): 1540  CHO Requirement: 192-220 g (0459-6217 kcals/8)      Nutrition Prescription Ordered    Current Diet Order: NPO  Oral Nutrition Supplement: Edwin BID    Evaluation of Received Nutrient/Fluid Intake  I/O: (Net since admit):  12/10/24: -110 mL    % Kcal Needs: 0%  % Protein Needs: 0%    Energy Calories Required: not meeting needs  Protein Required: not meeting needs  Fluid Required: not meeting needs  Total Fluid Intake (mL): 40  Tolerance: other (see comments) (Currently no intake)  % Intake of Estimated Energy Needs: 0 - 25 %  % Meal Intake: NPO    Nutrition Risk    Level of Risk/Frequency of Follow-up: high (F/u x 2 weekly)       Monitor and Evaluation    Food and Nutrient Intake: energy intake, enteral nutrition intake  Food and Nutrient Adminstration: enteral and parenteral nutrition administration  Knowledge/Beliefs/Attitudes: food and nutrition knowledge/skill, beliefs and attitudes  Physical Activity and Function: nutrition-related ADLs and IADLs, factors affecting access to physical activity  Anthropometric Measurements: weight, weight change, body mass index  Biochemical Data, Medical Tests and Procedures: electrolyte and renal panel, gastrointestinal profile  Nutrition-Focused Physical Findings: overall appearance       Nutrition Follow-Up    RD Follow-up?: Yes  Deanna Bowman, BS, RDN, LDN

## 2024-12-10 NOTE — PLAN OF CARE
Problem: Adult Inpatient Plan of Care  Goal: Plan of Care Review  Outcome: Progressing  Goal: Patient-Specific Goal (Individualized)  Outcome: Progressing  Goal: Absence of Hospital-Acquired Illness or Injury  Outcome: Progressing  Goal: Optimal Comfort and Wellbeing  Outcome: Progressing  Goal: Readiness for Transition of Care  Outcome: Progressing     Problem: Infection  Goal: Absence of Infection Signs and Symptoms  Outcome: Progressing     Problem: Wound  Goal: Optimal Coping  Outcome: Progressing  Goal: Optimal Functional Ability  Outcome: Progressing  Goal: Absence of Infection Signs and Symptoms  Outcome: Progressing  Goal: Improved Oral Intake  Outcome: Progressing  Goal: Optimal Pain Control and Function  Outcome: Progressing  Goal: Skin Health and Integrity  Outcome: Progressing  Goal: Optimal Wound Healing  Outcome: Progressing     Problem: Skin Injury Risk Increased  Goal: Skin Health and Integrity  Outcome: Progressing     Problem: Fall Injury Risk  Goal: Absence of Fall and Fall-Related Injury  Outcome: Progressing     Patient remained free of falls/injury/trauma throughout shift. Patient remains nonverbal with responses to pain and spontaneous eye movements. Patient remains on 2 L NC without any instances of desaturations. Patient continues to void per the Quivi in place. Wound care consult placed for wounds. PICC dressing change performed. Patient turned q 2 hrs with pillows.

## 2024-12-10 NOTE — ASSESSMENT & PLAN NOTE
Nutrition consulted. Most recent weight and BMI monitored-     Measurements:  Wt Readings from Last 1 Encounters:   12/09/24 42.3 kg (93 lb 4.8 oz)   There is no height or weight on file to calculate BMI.    Patient will be screened and assessed by RD.    Malnutrition Type:  Context:    Level:      Malnutrition Characteristic Summary:       Interventions/Recommendations (treatment strategy):

## 2024-12-10 NOTE — PLAN OF CARE
[facility] - Intensive Care (Hospital)  Initial Discharge Assessment       Primary Care Provider: Herman Nathan DNP    Admission Diagnosis: Chronic respiratory failure [J96.10]    Admission Date: 12/9/2024  Expected Discharge Date:     Transition of Care Barriers: None    Payor: BLUE CROSS BLUE SHIELD / Plan: BCBS OF [place] PL[place] / Product Type: Commercial /     Extended Emergency Contact Information  Primary Emergency Contact: Blu Ramey  Work Phone: [phone]  Mobile Phone: [phone]  Relation: Mother  Secondary Emergency Contact: Marciano Skinner   [place]  Mobile Phone: [phone]  Relation: Other    Discharge Plan A: Long-term acute care facility (LTAC)         [id number] - [place], [place] - [address] AT [facility] [address]  [address]  [place] [address]  Phone: [phone] Fax: [phone]      Initial Assessment (most recent)       Adult Discharge Assessment - 12/10/24 1248          Discharge Assessment    Assessment Type Discharge Planning Assessment     Confirmed/corrected address, phone number and insurance Yes     Confirmed Demographics Correct on Facesheet     Source of Information health record     Communicated ABDIEL with patient/caregiver Date not available/Unable to determine     Reason For Admission Chronic respiratory insufficiency     People in Home facility resident     Facility Arrived From: [facility]     Do you expect to return to your current living situation? Yes     Do you have help at home or someone to help you manage your care at home? Yes     Who are your caregiver(s) and their phone number(s)? facility staff     Prior to hospitilization cognitive status: Unable to Assess     Current cognitive status: Unable to Assess   non verbal    Walking or Climbing Stairs Difficulty yes     Walking or Climbing Stairs ambulation difficulty, dependent     Mobility Management bed bound, total assist     Dressing/Bathing  Difficulty yes     Dressing/Bathing bathing difficulty, dependent     Home Accessibility wheelchair accessible     Home Layout Able to live on 1st floor     Equipment Currently Used at Home other (see comments)   facility DME    Readmission within 30 days? No     Patient currently being followed by outpatient case management? No     Do you currently have service(s) that help you manage your care at home? No     Do you take prescription medications? Yes     Do you have prescription coverage? Yes     Coverage Commercial and medicaid     Do you have any problems affording any of your prescribed medications? No     Is the patient taking medications as prescribed? yes     Who is going to help you get home at discharge? [facility]adian     How do you get to doctors appointments? health plan transportation     Are you on dialysis? No     Do you take coumadin? No     Discharge Plan A Long-term acute care facility (LTAC)     DME Needed Upon Discharge  none     Transition of Care Barriers None                   Anticipated DC dispo: return to [facility]   Prior Level of Function: dependent with all ADLs, bed bound, non verbal    People in home: facility resident ([facility])    Comments:  Chart review completed for discuss discharge planning. CM left voicemail for pts motherWil. Patient was at [facility] for vent weaning post trach placement and is a resident at [facility] in [place].Confirmed demographics, insurance, and emergency contacts. CM discharge needs depends on hospital progress. CM will continue following to assist with other needs. Discharge plan has been determined by review of patient's clinical status, future medical and therapeutic needs, and coverage/benefits for post-acute care in coordination with multidisciplinary team members.

## 2024-12-10 NOTE — HPI
24 yr old male NH resident with CP, spastic quadriplegia, s/p G tube, epilepsy   Admitted at this facility from 10/16 to 11/5 with respiratory failure, aspiration pneumonia, and small bowel obstruction. Underwent ex lap on 10/19 followed by small bowel re anastomosis and g tube placement on 10/21. He failed a trial of extubation on 10/22 and ultimately trach was placed on 10/29 and was discharged to [facility] on vent on 11/5 for ongoing weaning and care    He returns to [facility] ICU on night of 12/9. Per report he was weaned from mechanical ventilation with trach intact at AM however on 12/1 the trach was unintentionally removed. Since that time he has had increasing difficulty with secretion management and reported infrequent apnea episodes.  We were contacted to admit for trach re-placement today

## 2024-12-11 VITALS
WEIGHT: 102.94 LBS | TEMPERATURE: 99 F | RESPIRATION RATE: 18 BRPM | HEART RATE: 114 BPM | DIASTOLIC BLOOD PRESSURE: 63 MMHG | OXYGEN SATURATION: 96 % | SYSTOLIC BLOOD PRESSURE: 117 MMHG | HEIGHT: 64 IN | BODY MASS INDEX: 17.57 KG/M2

## 2024-12-11 LAB
ALBUMIN SERPL BCP-MCNC: 3.3 G/DL (ref 3.5–5.2)
ALP SERPL-CCNC: 170 U/L (ref 40–150)
ALT SERPL W/O P-5'-P-CCNC: 128 U/L (ref 10–44)
ANION GAP SERPL CALC-SCNC: 9 MMOL/L (ref 8–16)
AST SERPL-CCNC: 61 U/L (ref 10–40)
BASOPHILS # BLD AUTO: 0.04 K/UL (ref 0–0.2)
BASOPHILS NFR BLD: 0.3 % (ref 0–1.9)
BILIRUB DIRECT SERPL-MCNC: 0.1 MG/DL (ref 0.1–0.3)
BILIRUB SERPL-MCNC: 0.2 MG/DL (ref 0.1–1)
BUN SERPL-MCNC: 17 MG/DL (ref 6–20)
CALCIUM SERPL-MCNC: 9.2 MG/DL (ref 8.7–10.5)
CHLORIDE SERPL-SCNC: 100 MMOL/L (ref 95–110)
CO2 SERPL-SCNC: 30 MMOL/L (ref 23–29)
CREAT SERPL-MCNC: 0.6 MG/DL (ref 0.5–1.4)
DIFFERENTIAL METHOD BLD: ABNORMAL
EOSINOPHIL # BLD AUTO: 1 K/UL (ref 0–0.5)
EOSINOPHIL NFR BLD: 6.4 % (ref 0–8)
ERYTHROCYTE [DISTWIDTH] IN BLOOD BY AUTOMATED COUNT: 14.6 % (ref 11.5–14.5)
EST. GFR  (NO RACE VARIABLE): >60 ML/MIN/1.73 M^2
GLUCOSE SERPL-MCNC: 121 MG/DL (ref 70–110)
HCT VFR BLD AUTO: 37.1 % (ref 40–54)
HGB BLD-MCNC: 12 G/DL (ref 14–18)
IMM GRANULOCYTES # BLD AUTO: 0.05 K/UL (ref 0–0.04)
IMM GRANULOCYTES NFR BLD AUTO: 0.3 % (ref 0–0.5)
LYMPHOCYTES # BLD AUTO: 1.7 K/UL (ref 1–4.8)
LYMPHOCYTES NFR BLD: 10.7 % (ref 18–48)
MAGNESIUM SERPL-MCNC: 1.8 MG/DL (ref 1.6–2.6)
MCH RBC QN AUTO: 25.3 PG (ref 27–31)
MCHC RBC AUTO-ENTMCNC: 32.3 G/DL (ref 32–36)
MCV RBC AUTO: 78 FL (ref 82–98)
MONOCYTES # BLD AUTO: 1.2 K/UL (ref 0.3–1)
MONOCYTES NFR BLD: 7.3 % (ref 4–15)
NEUTROPHILS # BLD AUTO: 11.9 K/UL (ref 1.8–7.7)
NEUTROPHILS NFR BLD: 75 % (ref 38–73)
NRBC BLD-RTO: 0 /100 WBC
PLATELET # BLD AUTO: 280 K/UL (ref 150–450)
PMV BLD AUTO: 13.8 FL (ref 9.2–12.9)
POTASSIUM SERPL-SCNC: 3.7 MMOL/L (ref 3.5–5.1)
PROT SERPL-MCNC: 7.1 G/DL (ref 6–8.4)
RBC # BLD AUTO: 4.74 M/UL (ref 4.6–6.2)
SODIUM SERPL-SCNC: 139 MMOL/L (ref 136–145)
WBC # BLD AUTO: 15.83 K/UL (ref 3.9–12.7)

## 2024-12-11 PROCEDURE — 80048 BASIC METABOLIC PNL TOTAL CA: CPT | Performed by: NURSE PRACTITIONER

## 2024-12-11 PROCEDURE — 83735 ASSAY OF MAGNESIUM: CPT | Performed by: NURSE PRACTITIONER

## 2024-12-11 PROCEDURE — 80076 HEPATIC FUNCTION PANEL: CPT | Performed by: SPECIALIST

## 2024-12-11 PROCEDURE — 25000003 PHARM REV CODE 250: Performed by: NURSE PRACTITIONER

## 2024-12-11 PROCEDURE — 85025 COMPLETE CBC W/AUTO DIFF WBC: CPT | Performed by: NURSE PRACTITIONER

## 2024-12-11 PROCEDURE — 94761 N-INVAS EAR/PLS OXIMETRY MLT: CPT

## 2024-12-11 PROCEDURE — 25000003 PHARM REV CODE 250: Performed by: SPECIALIST

## 2024-12-11 RX ORDER — METOPROLOL TARTRATE 50 MG/1
50 TABLET ORAL 2 TIMES DAILY
Qty: 180 TABLET | Refills: 3 | Status: SHIPPED | OUTPATIENT
Start: 2024-12-11 | End: 2025-12-11

## 2024-12-11 RX ADMIN — QUETIAPINE FUMARATE 100 MG: 100 TABLET ORAL at 09:12

## 2024-12-11 RX ADMIN — DIAZEPAM 2 MG: 2 TABLET ORAL at 10:12

## 2024-12-11 RX ADMIN — LEVETIRACETAM 1000 MG: 100 SOLUTION ORAL at 09:12

## 2024-12-11 RX ADMIN — METOPROLOL TARTRATE 50 MG: 50 TABLET, FILM COATED ORAL at 09:12

## 2024-12-11 RX ADMIN — BACLOFEN 20 MG: 10 TABLET ORAL at 09:12

## 2024-12-11 RX ADMIN — MUPIROCIN: 20 OINTMENT TOPICAL at 09:12

## 2024-12-11 RX ADMIN — PHENOBARBITAL 64.8 MG: 32.4 TABLET ORAL at 09:12

## 2024-12-11 NOTE — PLAN OF CARE
[facility] - Med Surg  Discharge Final Note    Primary Care Provider: Herman Nathan DNP    Expected Discharge Date: 12/11/2024    Final Discharge Note (most recent)       Final Note - 12/11/24 1230          Final Note    Assessment Type Final Discharge Note     Anticipated Discharge Disposition half-way Nursing Facility                                Contact Info       Herman Nathan DNP   Specialty: Family Medicine   Relationship: PCP - General    [address] [place] [address]  [address] [place] [address]   Phone: [phone]       Next Steps: Schedule an appointment as soon as possible for a visit in 1 week(s)    Instructions: Hospital discharge follow up          [facility] to  [facility]

## 2024-12-11 NOTE — PLAN OF CARE
Patient remained safe and stable for the duration of the shift. Patient resting in bed with safety and fall prevention measures in place. Plan of care reviewed. Initiated tube feeding diet. Patient tolerating well. Remains on 2 liters oxygen per nasal cannula. Transfer orders received with plans to transfer to room 572 on Med/Surg.

## 2024-12-11 NOTE — CONSULTS
[facility]  General Surgery  Consult Note    Patient Name: Ronny Ramey  MRN: [id number]  Code Status: Full Code  Admission Date: 12/9/2024  Hospital Length of Stay: 1 days  Attending Physician: Quinton Lopez MD  Primary Care Provider: Herman Nathan DNP    Patient information was obtained from past medical records and primary team.     Inpatient consult to General Surgery  Consult performed by: Chilo Skaggs MD  Consult ordered by: Amanda Francis NP        Subjective:     Principal Problem: Chronic respiratory insufficiency    History of Present Illness: 25yo M with a PMHx significant for CP, spastic quadriplegia, previous respiratory failure, epilepsy and previous open gastrostomy tube placement and small bowel resection who was readmitted to the ICU and medical service from LT for possible repeat tracheostomy placement.  Patient previously required ex lap with gastrostomy tube removal and small-bowel resection and eventual re anastomosis and gastrostomy tube placement in October 2024.  He was failed to wean postoperatively and eventually required tracheostomy placement on 10/29/2024.  He was discharged to LT for ongoing vent weaning and care.  Unfortunately his tracheostomy was reportedly unintentionally removed on 12/01/2024.  He reportedly has had difficulty with his secretion management and frequent apnea episodes.  He was readmitted to the ICU and medical service for evaluation for for repeat trach placement.  Since admission and surgery is consulted for evaluation for open tracheostomy placement.  However, he has had chest x-ray showing no evidence of ongoing infiltrates and has appeared to have well controlled secretions.     No current facility-administered medications on file prior to encounter.     Current Outpatient Medications on File Prior to Encounter   Medication Sig    baclofen (LIORESAL) 20 MG tablet 1 tablet (20 mg total) by Per G Tube route 2 (two)  times daily.    cloNIDine (CATAPRES) 0.1 MG tablet 1 tablet (0.1 mg total) by Per G Tube route nightly.    cloNIDine 0.1 mg/24 hr td ptwk (CATAPRES) 0.1 mg/24 hr Place 1 patch onto the skin every 7 days.    diazePAM (VALIUM) 5 mg/5 mL (1 mg/mL) oral solution TAKE 3 ML(3 MG) BY MOUTH TWICE DAILY    gentamicin (GARAMYCIN) 0.1 % ointment     levETIRAcetam (KEPPRA) 100 mg/mL Soln TAKE 20 ML(2000 MG) VIA GTUBE TWICE DAILY    midazolam 5 mg/spray (0.1 mL) Spry 1 Spray per nostril for seizure >3 minutes. May repeat in the other nostril if the seizure continues beyond 10 minutes. (Patient not taking: Reported on 10/25/2023)    montelukast (SINGULAIR) 5 MG chewable tablet Take 1 tablet (5 mg total) by mouth every evening.    mupirocin (BACTROBAN) 2 % ointment Apply topically 3 (three) times daily.    PHENobarbitaL 64.8 MG tablet Take 1 tablet (64.8 mg total) by mouth 2 (two) times daily.    pulse oximeter (PULSE OXIMETER) device by Apply Externally route 2 (two) times a day. Use twice daily at 8 AM and 3 PM and [facility]. (Patient not taking: Reported on 10/25/2023)    QUEtiapine (SEROQUEL) 100 MG Tab 1 tablet (100 mg total) by Per G Tube route 2 (two) times daily.    simethicone (MYLICON) 40 mg/0.6 mL drops 0.6 mLs (40 mg total) by Per G Tube route 4 (four) times daily.    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    Vision abnormalities      Past Surgical History:   Procedure Laterality Date    ADENOIDECTOMY       APPLICATION OF WOUND VACUUM-ASSISTED CLOSURE DEVICE N/A 10/18/2024    Procedure: APPLICATION, WOUND VAC;  Surgeon: Anna Marie Mo MD;  Location: [facility] OR;  Service: Colon and Rectal;  Laterality: N/A;    BACLOFEN PUMP IMPLANTATION N/A 2020    Procedure: INSERTION, INTRATHECAL BACLOFEN PUMP;  Surgeon: Robbi Cain MD;  Location: [facility] OR 2ND FLR;  Service: Neurosurgery;  Laterality: N/A;  toronto I, asa 1, lateral left down, regular bed reversed, christine, [employer] rep, co surgeon DR Miller    BACLOFEN PUMP IMPLANTATION  2020    CIRCUMCISION      EXCISION, SMALL INTESTINE N/A 10/18/2024    Procedure: EXCISION, SMALL INTESTINE;  Surgeon: Anna Marie Mo MD;  Location: [facility] OR;  Service: Colon and Rectal;  Laterality: N/A;    EYE SURGERY      as a     GASTROSTOMY TUBE PLACEMENT      HERNIA REPAIR      HIP SURGERY      INJECTION OF ANESTHETIC AGENT INTO TISSUE PLANE DEFINED BY TRANSVERSUS ABDOMINIS MUSCLE N/A 10/18/2024    Procedure: BLOCK, TRANSVERSUS ABDOMINIS PLANE;  Surgeon: Anna Marie Mo MD;  Location: [facility] OR;  Service: Colon and Rectal;  Laterality: N/A;    LAPAROTOMY, EXPLORATORY N/A 10/18/2024    Procedure: LAPAROTOMY, EXPLORATORY;  Surgeon: Anna Marie Mo MD;  Location: [facility] OR;  Service: Colon and Rectal;  Laterality: N/A;  Lysis of adhesions    LAPAROTOMY, EXPLORATORY N/A 10/21/2024    Procedure: LAPAROTOMY, EXPLORATORY, OPEN GASTROSTOMY TUBE;  Surgeon: Anna Marie Mo MD;  Location: [facility] OR;  Service: Colon and Rectal;  Laterality: N/A;    LUMBAR PUNCTURE WITH INJECTION OF BACLOFEN N/A 3/11/2020    Procedure: LUMBAR PUNCTURE, WITH BACLOFEN INJECTION;  Surgeon: Cristiane Sky MD;  Location: [facility] OR 2ND FLR;  Service: Neurosurgery;  Laterality: N/A;  toronto I, asa 1, lateral left down, regular bed reversed , christine , [employer] co surgeon DR Miller    NISSEN FUNDOPLICATION      PARTIAL GASTRECTOMY N/A 10/18/2024    Procedure: GASTRECTOMY, PARTIAL;  Surgeon: Anna Marie Mo  MD;  Location: [facility] OR;  Service: Colon and Rectal;  Laterality: N/A;    REMOVAL OF BACLOFEN PUMP Right 10/15/2020    Procedure: REMOVAL, BACLOFEN PUMP;  Surgeon: Marcy Macedo MD;  [facility];  Service: Neurosurgery;  Laterality: Right;    TONSILLECTOMY      TYMPANOSTOMY TUBE PLACEMENT       Family History       Problem Relation (Age of Onset)    Cancer Maternal Grandmother          Tobacco Use    Smoking status: Never    Smokeless tobacco: Never   Substance and Sexual Activity    Alcohol use: No    Drug use: No    Sexual activity: Never     Review of Systems   Unable to perform ROS: Patient nonverbal     Objective:     Vital Signs (Most Recent):  Temp: 99 °F (37.2 °C) (12/10/24 1937)  Pulse: (!) 116 (12/10/24 2021)  Resp: 18 (12/10/24 2021)  BP: 127/83 (12/10/24 1937)  SpO2: (!) 92 % (12/10/24 2021) Vital Signs (24h Range):  Temp:  [98.4 °F (36.9 °C)-99.7 °F (37.6 °C)] 99 °F (37.2 °C)  Pulse:  [] 116  Resp:  [18-50] 18  SpO2:  [92 %-100 %] 92 %  BP: ()/() 127/83     Weight: 44 kg (97 lb)  Body mass index is 16.65 kg/m².     Physical Exam  Constitutional:       General: He is not in acute distress.     Appearance: He is well-developed.   HENT:      Head: Atraumatic.   Eyes:      Conjunctiva/sclera: Conjunctivae normal.   Neck:      Thyroid: No thyromegaly.   Cardiovascular:      Rate and Rhythm: Tachycardia present.   Pulmonary:      Effort: Pulmonary effort is normal. No respiratory distress.      Comments: Previous tracheostomy site with eschar overlying and almost completely closed  Abdominal:      Comments: Soft, nondistended, nontender; well-healing midline incision; G-tube in place   Musculoskeletal:      Comments: contractures   Skin:     General: Skin is warm and dry.      Findings: No rash.            I have reviewed all pertinent lab results within the past 24 hours.  CBC:   Recent Labs   Lab 12/10/24  0520   WBC 12.25   RBC 4.61   HGB 11.7*   HCT 36.4*      MCV  79*   MCH 25.4*   MCHC 32.1     BMP:   Recent Labs   Lab 12/10/24  0520         K 3.6      CO2 29   BUN 17   CREATININE 0.6   CALCIUM 9.1   MG 1.7     CMP:   Recent Labs   Lab 12/10/24  0520      CALCIUM 9.1      K 3.6   CO2 29      BUN 17   CREATININE 0.6       Significant Diagnostics:  I have reviewed all pertinent imaging results/findings within the past 24 hours.    CXR:  FINDINGS:  EKG leads overlie the chest, which is rotated to the right.  Elevation of the left hemidiaphragm again seen.  The lungs are clear. The cardiac silhouette size is normal. The trachea is midline and the mediastinal width is normal. Negative for focal infiltrate, effusion or pneumothorax. Pulmonary vasculature is normal. Negative for osseous abnormalities. Interval removal of tracheostomy cannula.  Right arm PICC line with tip in the high right atrium.  Stable convex left curvature of the thoracic spine.  Gastrostomy tube is in place.  Nonspecific bowel gas pattern.     Impression:     1.  Negative for acute process.     2.  Stable and incidental findings as noted above.    Assessment/Plan:     * Chronic respiratory insufficiency  Previous chronic respiratory failure s/p perc trach by ICU service in Oct 2024 which has been inadvertently removed on 12/1/24    - No urgent surgical intervention required at this time.  Patient has tracheostomy site appears to be clean and not infected.  Patient is maintaining his own airway and is controlling his secretions well without radiographic evidence for issues on today's chest x-ray.  After discussing with the primary team, they would like to hold off on repeat tracheostomy placement at this time which I think is reasonable.  If there is concern for ongoing respiratory issues or secretion management in the future, we will be happy to revisit the idea of repeat tracheostomy placement via open approach in the future.  - we will sign off for now.  Please reconsult  if we can be of further assistance or questions arise.    Moderate protein-calorie malnutrition  Care per primary team    Legally blind  Care per primary team    Intellectual disability with epilepsy  Care per primary team    Spastic quadriplegic cerebral palsy  Care per primary team      VTE Risk Mitigation (From admission, onward)           Ordered     enoxaparin injection 30 mg  Daily         12/09/24 1678                    Thank you for your consult. I will sign off. Please contact us if you have any additional questions.    Chilo Skaggs MD  General Surgery  [facility]

## 2024-12-11 NOTE — SUBJECTIVE & OBJECTIVE
No current facility-administered medications on file prior to encounter.     Current Outpatient Medications on File Prior to Encounter   Medication Sig    baclofen (LIORESAL) 20 MG tablet 1 tablet (20 mg total) by Per G Tube route 2 (two) times daily.    cloNIDine (CATAPRES) 0.1 MG tablet 1 tablet (0.1 mg total) by Per G Tube route nightly.    cloNIDine 0.1 mg/24 hr td ptwk (CATAPRES) 0.1 mg/24 hr Place 1 patch onto the skin every 7 days.    diazePAM (VALIUM) 5 mg/5 mL (1 mg/mL) oral solution TAKE 3 ML(3 MG) BY MOUTH TWICE DAILY    gentamicin (GARAMYCIN) 0.1 % ointment     levETIRAcetam (KEPPRA) 100 mg/mL Soln TAKE 20 ML(2000 MG) VIA GTUBE TWICE DAILY    midazolam 5 mg/spray (0.1 mL) Spry 1 Spray per nostril for seizure >3 minutes. May repeat in the other nostril if the seizure continues beyond 10 minutes. (Patient not taking: Reported on 10/25/2023)    montelukast (SINGULAIR) 5 MG chewable tablet Take 1 tablet (5 mg total) by mouth every evening.    mupirocin (BACTROBAN) 2 % ointment Apply topically 3 (three) times daily.    PHENobarbitaL 64.8 MG tablet Take 1 tablet (64.8 mg total) by mouth 2 (two) times daily.    pulse oximeter (PULSE OXIMETER) device by Apply Externally route 2 (two) times a day. Use twice daily at 8 AM and 3 PM and [facility]. (Patient not taking: Reported on 10/25/2023)    QUEtiapine (SEROQUEL) 100 MG Tab 1 tablet (100 mg total) by Per G Tube route 2 (two) times daily.    simethicone (MYLICON) 40 mg/0.6 mL drops 0.6 mLs (40 mg total) by Per G Tube route 4 (four) times daily.    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    Vision abnormalities      Past Surgical History:   Procedure Laterality Date    ADENOIDECTOMY      APPLICATION OF WOUND VACUUM-ASSISTED CLOSURE DEVICE N/A 10/18/2024    Procedure: APPLICATION, WOUND VAC;  Surgeon: Anna Marie Mo MD;  Location: [facility] OR;  Service: Colon and Rectal;  Laterality: N/A;    BACLOFEN PUMP IMPLANTATION N/A 2020    Procedure: INSERTION, INTRATHECAL BACLOFEN PUMP;  Surgeon: Robbi Cain MD;  Location: [facility] OR 2ND FLR;  Service: Neurosurgery;  Laterality: N/A;  toronto I, asa 1, lateral left down, regular bed reversed, christine, [employer] rep, co surgeon DR Miller    BACLOFEN PUMP IMPLANTATION  2020    CIRCUMCISION      EXCISION, SMALL INTESTINE N/A 10/18/2024    Procedure: EXCISION, SMALL INTESTINE;  Surgeon: Anna Marie Mo MD;  Location: [facility] OR;  Service: Colon and Rectal;  Laterality: N/A;    EYE SURGERY      as a     GASTROSTOMY TUBE PLACEMENT      HERNIA REPAIR      HIP SURGERY      INJECTION OF ANESTHETIC AGENT INTO TISSUE PLANE DEFINED BY TRANSVERSUS ABDOMINIS MUSCLE N/A 10/18/2024    Procedure: BLOCK, TRANSVERSUS ABDOMINIS PLANE;  Surgeon: Anna Marie Mo MD;  Location: [facility] OR;  Service: Colon and Rectal;  Laterality: N/A;    LAPAROTOMY, EXPLORATORY N/A 10/18/2024    Procedure: LAPAROTOMY, EXPLORATORY;  Surgeon: Anna Marie Mo MD;  Location: [facility] OR;  Service: Colon and Rectal;  Laterality: N/A;  Lysis of adhesions    LAPAROTOMY, EXPLORATORY N/A 10/21/2024    Procedure: LAPAROTOMY, EXPLORATORY, OPEN GASTROSTOMY TUBE;  Surgeon: Anna Marie Mo MD;  Location: [facility] OR;  Service: Colon and Rectal;  Laterality: N/A;    LUMBAR PUNCTURE WITH INJECTION OF BACLOFEN N/A 3/11/2020    Procedure: LUMBAR PUNCTURE, WITH BACLOFEN INJECTION;  Surgeon: Cristiane Sky MD;  Location: [facility] OR 2ND FLR;  Service: Neurosurgery;  Laterality:  N/A;  toronto I, asa 1, lateral left down, regular bed reversed , christine , [employer] co surgeon DR Karlin NISSEN FUNDOPLICATION      PARTIAL GASTRECTOMY N/A 10/18/2024    Procedure: GASTRECTOMY, PARTIAL;  Surgeon: Anna Marie Mo MD;  Location: [facility] OR;  Service: Colon and Rectal;  Laterality: N/A;    REMOVAL OF BACLOFEN PUMP Right 10/15/2020    Procedure: REMOVAL, BACLOFEN PUMP;  Surgeon: Marcy Macedo MD;  Location: [facility] OR [address] [place] [address];  Service: Neurosurgery;  Laterality: Right;    TONSILLECTOMY      TYMPANOSTOMY TUBE PLACEMENT       Family History       Problem Relation (Age of Onset)    Cancer Maternal Grandmother          Tobacco Use    Smoking status: Never    Smokeless tobacco: Never   Substance and Sexual Activity    Alcohol use: No    Drug use: No    Sexual activity: Never     Review of Systems   Unable to perform ROS: Patient nonverbal     Objective:     Vital Signs (Most Recent):  Temp: 99 °F (37.2 °C) (12/10/24 1937)  Pulse: (!) 116 (12/10/24 2021)  Resp: 18 (12/10/24 2021)  BP: 127/83 (12/10/24 1937)  SpO2: (!) 92 % (12/10/24 2021) Vital Signs (24h Range):  Temp:  [98.4 °F (36.9 °C)-99.7 °F (37.6 °C)] 99 °F (37.2 °C)  Pulse:  [] 116  Resp:  [18-50] 18  SpO2:  [92 %-100 %] 92 %  BP: ()/() 127/83     Weight: 44 kg (97 lb)  Body mass index is 16.65 kg/m².     Physical Exam  Constitutional:       General: He is not in acute distress.     Appearance: He is well-developed.   HENT:      Head: Atraumatic.   Eyes:      Conjunctiva/sclera: Conjunctivae normal.   Neck:      Thyroid: No thyromegaly.   Cardiovascular:      Rate and Rhythm: Tachycardia present.   Pulmonary:      Effort: Pulmonary effort is normal. No respiratory distress.      Comments: Previous tracheostomy site with eschar overlying and almost completely closed  Abdominal:      Comments: Soft, nondistended, nontender; well-healing midline incision; G-tube in place   Musculoskeletal:      Comments: contractures   Skin:      General: Skin is warm and dry.      Findings: No rash.            I have reviewed all pertinent lab results within the past 24 hours.  CBC:   Recent Labs   Lab 12/10/24  0520   WBC 12.25   RBC 4.61   HGB 11.7*   HCT 36.4*      MCV 79*   MCH 25.4*   MCHC 32.1     BMP:   Recent Labs   Lab 12/10/24  0520         K 3.6      CO2 29   BUN 17   CREATININE 0.6   CALCIUM 9.1   MG 1.7     CMP:   Recent Labs   Lab 12/10/24  0520      CALCIUM 9.1      K 3.6   CO2 29      BUN 17   CREATININE 0.6       Significant Diagnostics:  I have reviewed all pertinent imaging results/findings within the past 24 hours.    CXR:  FINDINGS:  EKG leads overlie the chest, which is rotated to the right.  Elevation of the left hemidiaphragm again seen.  The lungs are clear. The cardiac silhouette size is normal. The trachea is midline and the mediastinal width is normal. Negative for focal infiltrate, effusion or pneumothorax. Pulmonary vasculature is normal. Negative for osseous abnormalities. Interval removal of tracheostomy cannula.  Right arm PICC line with tip in the high right atrium.  Stable convex left curvature of the thoracic spine.  Gastrostomy tube is in place.  Nonspecific bowel gas pattern.     Impression:     1.  Negative for acute process.     2.  Stable and incidental findings as noted above.

## 2024-12-11 NOTE — PLAN OF CARE
Patient discharged to facility. AVS given and explained to patient. Patient verbalizes understanding. Tele monitor removed and given to US.

## 2024-12-11 NOTE — NURSING
Patient transported to room 572. No distress noted. Personal belongings are in possession. Telemetry monitor in place. Mother notified of transport.

## 2024-12-11 NOTE — HOSPITAL COURSE
12/10 No acute resp issues  Secretions seemed controlled  ---  [facility] consulted for AOC  Plans for possible trach replacement pending  Surgery consulted for trach placement  Patient awake, alert, pleasant  Discussed plan of care with ICU RN  No family at bedside    12/11 NAEON, remains stable on room air  Patient maintaining his own airway, controlling his secretions  Surgery consulted, no plans for repeat tracheostomy at this time  Does not require LTAC level of care at this time  Stable for discharge to SNF, resident at [facility] in [place], [place].  F/U with PCP in 1-2 weeks for further management

## 2024-12-11 NOTE — ASSESSMENT & PLAN NOTE
Previous chronic respiratory failure s/p perc trach by ICU service in Oct 2024 which has been inadvertently removed on 12/1/24    - No urgent surgical intervention required at this time.  Patient has tracheostomy site appears to be clean and not infected.  Patient is maintaining his own airway and is controlling his secretions well without radiographic evidence for issues on today's chest x-ray.  After discussing with the primary team, they would like to hold off on repeat tracheostomy placement at this time which I think is reasonable.  If there is concern for ongoing respiratory issues or secretion management in the future, we will be happy to revisit the idea of repeat tracheostomy placement via open approach in the future.  - we will sign off for now.  Please reconsult if we can be of further assistance or questions arise.

## 2024-12-11 NOTE — DISCHARGE SUMMARY
[facility] Medicine  Discharge Summary      Patient Name: Ronny Ramey  MRN: [id number]  DRISS: [id number]  Patient Class: IP- Inpatient  Admission Date: 12/9/2024  Hospital Length of Stay: 2 days  Discharge Date and Time: No discharge date for patient encounter.  Attending Physician: Quinton Lopez MD   Discharging Provider: Quinton Lopez MD  Primary Care Provider: Herman Nathan DNP    Primary Care Team: Networked reference to record PCT     HPI:   24 yr old male NH resident with CP, spastic quadriplegia, s/p G tube, epilepsy   Admitted at this facility from 10/16 to 11/5 with respiratory failure, aspiration pneumonia, and small bowel obstruction. Underwent ex lap on 10/19 followed by small bowel re anastomosis and g tube placement on 10/21. He failed a trial of extubation on 10/22 and ultimately trach was placed on 10/29 and was discharged to [facility] on vent on 11/5 for ongoing weaning and care     He returns to [facility] ICU on night of 12/9. Per report he was weaned from mechanical ventilation with trach intact at [facility] however on 12/1 the trach was unintentionally removed. Since that time he has had increasing difficulty with secretion management and reported infrequent apnea episodes.  We were contacted to admit for trach re-placement today     Hospital/ICU Course:  12/10 No acute resp issues  Secretions seemed controlled  ---  HMS consulted for AOC  Plans for possible trach replacement pending  Surgery consulted for trach placement  Patient awake, alert, pleasant  Discussed plan of care with ICU RN  No family at bedside    * No surgery found *      Hospital Course:   12/10 No acute resp issues  Secretions seemed controlled  ---  HMS consulted for AOC  Plans for possible trach replacement pending  Surgery consulted for trach placement  Patient awake, alert, pleasant  Discussed plan of care with ICU RN  No family at bedside    12/11 NAEON, remains stable on room air  Patient  maintaining his own airway, controlling his secretions  Surgery consulted, no plans for repeat tracheostomy at this time  Does not require LTAC level of care at this time  Stable for discharge to SNF, resident at [facility] in [place], [place].  F/U with PCP in 1-2 weeks for further management     Goals of Care Treatment Preferences:  Code Status: Full Code         Consults:   Consults (From admission, onward)          Status Ordering Provider     Inpatient consult to Hospitalist  Once        Provider:  Quinton Lopez MD    Completed RASHAD CASEY     Inpatient consult to General Surgery  Once        Provider:  Chilo Skaggs MD    Completed AMY MAY     Inpatient consult to Registered Dietitian/Nutritionist  Once        Provider:  (Not yet assigned)    Completed RASHAD CASEY     Inpatient consult to Registered Dietitian/Nutritionist  Once        Provider:  (Not yet assigned)    Completed RASHAD CASEY.            * Chronic respiratory insufficiency  Unintentional trach removal on 12/1 with ongoing difficulty with secretions and apneic periods reported  Will need open trach placement  Consult general surgery in am  Supplemental oxygen for goal sat > 92%  Can trial BiPAP overnight if needed for apnea  Suction prn, turn q2h for pulmonary toilet     10/12   At this time he does not have signs of aspiration  CxR is clear  Will need d/w mother about goals of care  We can hold off on trach at this time.  Secretions are less.   He does remains at risk  Stable for floor  Surgery consult    Spastic quadriplegic cerebral palsy  Turn q2h  Continue baclofen, valium, seroquel    Intellectual disability with epilepsy  Continue baseline keppra and phenobarb  Monitor for evidence of seizure    Moderate protein-calorie malnutrition  Nutrition consulted. Most recent weight and BMI monitored-     Measurements:  Wt Readings from Last 1 Encounters:   12/10/24 44 kg (97 lb)   Body mass index is 16.65  kg/m².    Patient has been screened and assessed by RD.    Malnutrition Type:  Context: acute illness or injury, chronic illness  Level: moderate    Malnutrition Characteristic Summary:  Subcutaneous Fat (Malnutrition): mild depletion  Muscle Mass (Malnutrition): mild depletion    Interventions/Recommendations (treatment strategy):  1. Initiate pt onto continuous Enteral nutrition, recommend Nutren 2.0 via PEG tube, goal rate 35 mL/hr, starting at 10 mL/hr then progress to goal rate within 24 hrs if pt is tolerating or per MD/NP -Formula at goal rate will provide: 1680 kcals/day (100% EEN), 71 g protein/day (108% EPN), 181 g CHO/day (86% CHO needs), 581 mL free formula water/day (35% fluid needs) -160 mL q4h free water flushes (960 mL/day) = total from formula + FWF = 1541 mL water/day (100% fluid needs), per MD/NP -Check Mg< K+, Na, Phos and Glu before and during initation, correct as indicated 2. Recommend Edwin BID via PEG tube for wound healing 3. Weigh twice weekly        Final Active Diagnoses:    Diagnosis Date Noted POA    PRINCIPAL PROBLEM:  Chronic respiratory insufficiency [R06.89] 10/16/2024 Yes    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] 04/09/2013 Yes    Intellectual disability with epilepsy [F79, G40.909] 08/19/2013 Yes    Moderate protein-calorie malnutrition [E44.0] 10/25/2024 Yes    Legally blind [H54.8] 09/11/2023 Yes      Problems Resolved During this Admission:       Discharged Condition: stable    Disposition: Skilled Nursing Facility    Follow Up:   Follow-up Information       Herman Nathan, DNP. Schedule an appointment as soon as possible for a visit in 1 week(s).    Specialty: Family Medicine  Why: Hospital discharge follow up  Contact information:  [address]  [address]  [place] [address] [phone]                           Patient Instructions:   No discharge procedures on file.    Significant Diagnostic Studies: Labs: All labs within the past 24 hours have  been reviewed    Pending Diagnostic Studies:       None           Medications:  Reconciled Home Medications:      Medication List        START taking these medications      metoprolol tartrate 50 MG tablet  Commonly known as: LOPRESSOR  1 tablet (50 mg total) by Per G Tube route 2 (two) times daily.            CONTINUE taking these medications      baclofen 20 MG tablet  Commonly known as: LIORESAL  1 tablet (20 mg total) by Per G Tube route 2 (two) times daily.     cloNIDine 0.1 MG tablet  Commonly known as: CATAPRES  1 tablet (0.1 mg total) by Per G Tube route nightly.     cloNIDine 0.1 mg/24 hr td ptwk 0.1 mg/24 hr  Commonly known as: CATAPRES  Place 1 patch onto the skin every 7 days.     diazePAM 5 mg/5 mL (1 mg/mL) oral solution  Commonly known as: VALIUM  TAKE 3 ML(3 MG) BY MOUTH TWICE DAILY     gentamicin 0.1 % ointment  Commonly known as: GARAMYCIN     levETIRAcetam 100 mg/mL Soln  Commonly known as: KEPPRA  TAKE 20 ML(2000 MG) VIA GTUBE TWICE DAILY     montelukast 5 MG chewable tablet  Commonly known as: SINGULAIR  Take 1 tablet (5 mg total) by mouth every evening.     mupirocin 2 % ointment  Commonly known as: BACTROBAN  Apply topically 3 (three) times daily.     PHENobarbitaL 64.8 MG tablet  Take 1 tablet (64.8 mg total) by mouth 2 (two) times daily.     QUEtiapine 100 MG Tab  Commonly known as: SEROQUEL  1 tablet (100 mg total) by Per G Tube route 2 (two) times daily.     simethicone 40 mg/0.6 mL drops  Commonly known as: MYLICON  0.6 mLs (40 mg total) by Per G Tube route 4 (four) times daily.     tiZANidine 2 MG tablet  Commonly known as: ZANAFLEX            ASK your doctor about these medications      midazolam 5 mg/spray (0.1 mL) Spry  1 Spray per nostril for seizure >3 minutes. May repeat in the other nostril if the seizure continues beyond 10 minutes.     pulse oximeter device  Commonly known as: pulse oximeter  by Apply Externally route 2 (two) times a day. Use twice daily at 8 AM and 3 PM and  record the value in MyChart as directed.              Indwelling Lines/Drains at time of discharge:   Lines/Drains/Airways       Peripherally Inserted Central Catheter Line  Duration             PICC Double Lumen right basilic -- days              Drain  Duration                  Gastrostomy/Enterostomy 10/21/24 0844 Gastrostomy tube w/ balloon LUQ feeding 51 days    Male External Urinary Catheter 12/09/24 2047 1 day                    Time spent on the discharge of patient: 35 minutes         Quinton Lopez MD  Department of Hospital Medicine  '[facility] Surg

## 2024-12-11 NOTE — HPI
25yo M with a PMHx significant for CP, spastic quadriplegia, previous respiratory failure, epilepsy and previous open gastrostomy tube placement and small bowel resection who was readmitted to the ICU and medical service from LTAC for possible repeat tracheostomy placement.  Patient previously required ex lap with gastrostomy tube removal and small-bowel resection and eventual re anastomosis and gastrostomy tube placement in October 2024.  He was failed to wean postoperatively and eventually required tracheostomy placement on 10/29/2024.  He was discharged to LTAC for ongoing vent weaning and care.  Unfortunately his tracheostomy was reportedly unintentionally removed on 12/01/2024.  He reportedly has had difficulty with his secretion management and frequent apnea episodes.  He was readmitted to the ICU and medical service for evaluation for for repeat trach placement.  Since admission and surgery is consulted for evaluation for open tracheostomy placement.  However, he has had chest x-ray showing no evidence of ongoing infiltrates and has appeared to have well controlled secretions.

## 2025-04-25 ENCOUNTER — HOSPITAL ENCOUNTER (EMERGENCY)
Facility: HOSPITAL | Age: 25
Discharge: HOME | End: 2025-04-25
Attending: EMERGENCY MEDICINE
Payer: COMMERCIAL

## 2025-04-25 VITALS
SYSTOLIC BLOOD PRESSURE: 125 MMHG | HEART RATE: 82 BPM | TEMPERATURE: 98 F | DIASTOLIC BLOOD PRESSURE: 64 MMHG | RESPIRATION RATE: 17 BRPM | OXYGEN SATURATION: 97 %

## 2025-04-25 DIAGNOSIS — K94.23 PEG TUBE MALFUNCTION: Primary | ICD-10-CM

## 2025-04-25 PROCEDURE — 99283 EMERGENCY DEPT VISIT LOW MDM: CPT | Mod: 25

## 2025-04-25 PROCEDURE — 0DH67UZ INSERTION OF FEEDING DEVICE INTO STOMACH, VIA NATURAL OR ARTIFICIAL OPENING: ICD-10-PCS | Performed by: EMERGENCY MEDICINE

## 2025-04-25 PROCEDURE — 25500020 PHARM REV CODE 255: Performed by: EMERGENCY MEDICINE

## 2025-04-25 PROCEDURE — 43762 RPLC GTUBE NO REVJ TRC: CPT

## 2025-04-25 RX ORDER — DIATRIZOATE MEGLUMINE AND DIATRIZOATE SODIUM 660; 100 MG/ML; MG/ML
120 SOLUTION ORAL; RECTAL
Status: COMPLETED | OUTPATIENT
Start: 2025-04-25 | End: 2025-04-25

## 2025-04-25 RX ADMIN — DIATRIZOATE MEGLUMINE AND DIATRIZOATE SODIUM 30 ML: 600; 100 SOLUTION ORAL; RECTAL at 05:04

## 2025-04-25 NOTE — ED PROVIDER NOTES
SCRIBE #1 NOTE: IJamel, sherry scribing for, and in the presence of, Brigida Watts MD. I have scribed the entire note.       History     Chief Complaint   Patient presents with    PEG tube replacement     PEG tube removed inadvertently at 2am. PMHx cerebral palsy     Review of patient's allergies indicates:   Allergen Reactions    Strawberries [strawberry] Hives     STRAWBERRIES ALLERGIC REACTIONS   (SEIZURES AND HIVES )         History of Present Illness     HPI    4/25/2025, 5:32 AM  History obtained from the medical records, mother, and EMS      History of Present Illness: Ronny Ramey is a 24 y.o. male patient with a PMHx of Cerebral palsy, who presents to the Emergency Department for evaluation of a PEG tbe replacement after his PEG tube was removed inadvertently at 2 AM. No mitigating or exacerbating factors reported. No prior Tx specified.  No further complaints or concerns at this time.       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 OR;  Service: Colon and Rectal;  Laterality: N/A;    BACLOFEN PUMP IMPLANTATION N/A 7/9/2020    Procedure: INSERTION, INTRATHECAL BACLOFEN PUMP;  Surgeon: Robbi Cain MD;  Location: [facility] OR [address] [place] [address];  Service: Neurosurgery;  Laterality: N/A;  toronto I, asa 1, lateral left down, regular bed reversed,  christine, [employer] rep, co surgeon DR Miller    BACLOFEN PUMP IMPLANTATION  2020    CIRCUMCISION      EXCISION, SMALL INTESTINE N/A 10/18/2024    Procedure: EXCISION, SMALL INTESTINE;  Surgeon: Anna Marie Mo MD;  Location: [facility] OR;  Service: Colon and Rectal;  Laterality: N/A;    EYE SURGERY      as a     GASTROSTOMY TUBE PLACEMENT      HERNIA REPAIR      HIP SURGERY      INJECTION OF ANESTHETIC AGENT INTO TISSUE PLANE DEFINED BY TRANSVERSUS ABDOMINIS MUSCLE N/A 10/18/2024    Procedure: BLOCK, TRANSVERSUS ABDOMINIS PLANE;  Surgeon: Anna Marie Mo MD;  Location: [facility] OR;  Service: Colon and Rectal;  Laterality: N/A;    LAPAROTOMY, EXPLORATORY N/A 10/18/2024    Procedure: LAPAROTOMY, EXPLORATORY;  Surgeon: Anna Marie Mo MD;  Location: [facility] OR;  Service: Colon and Rectal;  Laterality: N/A;  Lysis of adhesions    LAPAROTOMY, EXPLORATORY N/A 10/21/2024    Procedure: LAPAROTOMY, EXPLORATORY, OPEN GASTROSTOMY TUBE;  Surgeon: Anna Marie Mo MD;  Location: [facility] OR;  Service: Colon and Rectal;  Laterality: N/A;    LUMBAR PUNCTURE WITH INJECTION OF BACLOFEN N/A 3/11/2020    Procedure: LUMBAR PUNCTURE, WITH BACLOFEN INJECTION;  Surgeon: Cristiane Sky MD;  Location: [facility] OR 2ND FLR;  Service: Neurosurgery;  Laterality: N/A;  toronto I, asa 1, lateral left down, regular bed reversed , christine , [employer] co surgeon DR Miller    NISSEN FUNDOPLICATION      PARTIAL GASTRECTOMY N/A 10/18/2024    Procedure: GASTRECTOMY, PARTIAL;  Surgeon: Anna Marie Mo MD;  Location: [facility] OR;  Service: Colon and Rectal;  Laterality: N/A;    REMOVAL OF BACLOFEN PUMP Right 10/15/2020    Procedure: REMOVAL, BACLOFEN PUMP;  Surgeon: Marcy Macedo MD;  Location: [facility] OR 2ND FLR;  Service: Neurosurgery;  Laterality: Right;    TONSILLECTOMY      TYMPANOSTOMY TUBE PLACEMENT           Family History:  Family History   Problem Relation Name Age of Onset    Cancer Maternal Grandmother          cervial and breast    Clotting  "disorder Neg Hx      Anesthesia problems Neg Hx         Social History:  Social History     Tobacco Use    Smoking status: Never    Smokeless tobacco: Never   Substance and Sexual Activity    Alcohol use: No    Drug use: No    Sexual activity: Never        Review of Systems     Review of Systems   Gastrointestinal:         (+) PEG tube removed        Physical Exam     Initial Vitals [04/25/25 0503]   BP Pulse Resp Temp SpO2   125/64 82 17 97.7 °F (36.5 °C) 97 %      MAP       --          Physical Exam  Nursing Notes and Vital Signs Reviewed.  Constitutional: Patient is in no acute distress. Well-developed and well-nourished.  Head: Atraumatic. Normocephalic.  Eyes: PERRL. EOM intact. Conjunctivae are not pale. No scleral icterus.  ENT: Mucous membranes are moist. Oropharynx is clear and symmetric.    Neck: Supple. Full ROM. No lymphadenopathy.  Cardiovascular: Regular rate. Regular rhythm. No murmurs, rubs, or gallops. Distal pulses are 2+ and symmetric.  Pulmonary/Chest: No respiratory distress. Clear to auscultation bilaterally. No wheezing or rales.  Abdominal: Soft and non-distended.  There is no tenderness.  No rebound, guarding, or rigidity. Good bowel sounds. Peg tube was removed  Genitourinary: No CVA tenderness.  Musculoskeletal: Moves all extremities. No obvious deformities. No edema. No calf tenderness.  Skin: Warm and dry.  Neurological:  Alert, awake, and appropriate.  Normal speech.  No acute focal neurological deficits are appreciated.  Psychiatric: Normal affect. Good eye contact. Appropriate in content.     ED Course   Feeding Tube    Date/Time: 4/25/2025 5:38 AM  Location procedure was performed: [facility] EMERGENCY DEPARTMENT    Performed by: Brigida Watts MD  Authorized by: Brigida Watts MD  Consent: Verbal consent obtained  Consent given by: parent  Patient identity confirmed: arm band and hospital-assigned identification number  Time out: Immediately prior to procedure a "time out" was called to " verify the correct patient, procedure, equipment, support staff and site/side marked as required.  Indications: tube dislodged  Preparation: Patient was prepped and draped in the usual sterile fashion.    Sedation:  Patient sedated: no    Local anesthesia used: no    Anesthesia:  Local anesthesia used: no  Tube type: gastrostomy  Patient position: supine  Procedure type: replacement  Tube size: 18 Fr  Endoscope used: no  Bulb inflation volume: 20 (ml)  Bulb inflation fluid: normal saline  Placement/position confirmation: x-ray  Tube placement difficulty: none  Complications: No  Specimens: No  Implants: No  Patient tolerance: patient tolerated the procedure well with no immediate complications        ED Vital Signs:  Vitals:    04/25/25 0503   BP: 125/64   Pulse: 82   Resp: 17   Temp: 97.7 °F (36.5 °C)   TempSrc: Oral   SpO2: 97%       Abnormal Lab Results:  Labs Reviewed - No data to display     All Lab Results:  None.    Imaging Results:  Imaging Results              XR Gastric tube check, non-radiologist performed (In process)                         ED Discussion     5:36 AM: Reassessed pt at this time. Discussed with patient and/or family/caretaker all pertinent ED information and results. Discussed pt dx and plan of tx. Gave the family all f/u and return to the ED instructions. All questions and concerns were addressed at this time. Patient and/or family/caretaker expresses understanding of information and instructions, and is comfortable with plan to discharge. Pt is stable for discharge.     I discussed with patient and/or family/caretaker that evaluation in the ED does not suggest any emergent or life threatening medical conditions requiring immediate intervention beyond what was provided in the ED, and I believe patient is safe for discharge.  Regardless, an unremarkable evaluation in the ED does not preclude the development or presence of a serious of life threatening condition. As such, I instructed that  the patient is to return immediately for any worsening or change in current symptoms.           Medical Decision Making  Amount and/or Complexity of Data Reviewed  Radiology: ordered. Decision-making details documented in ED Course.                ED Medication(s):  Medications - No data to display    New Prescriptions    No medications on file        Follow-up Information       Herman Nathan DNP In 3 days.    Specialty: Family Medicine  Contact information:  [address]  [address]  [place] [address] [phone]               [facility] - Emergency Dept..    Specialty: Emergency Medicine  Why: As needed, If symptoms worsen  Contact information:  [address]  [facility] [address] [phone]                               Scribe Attestation:   Scribe #1: I performed the above scribed service and the documentation accurately describes the services I performed. I attest to the accuracy of the note.     Attending:   Physician Attestation Statement for Scribe #1: I, Brigida Watts MD, personally performed the services described in this documentation, as scribed by Jamel Hardin, in my presence, and it is both accurate and complete.           Clinical Impression       ICD-10-CM ICD-9-CM   1. PEG tube malfunction  K94.23 536.42       Disposition:   Disposition: Discharged  Condition: Stable         Brigida Watts MD  04/25/25 5087

## 2025-04-26 ENCOUNTER — HOSPITAL ENCOUNTER (INPATIENT)
Facility: HOSPITAL | Age: 25
LOS: 3 days | DRG: 871 | End: 2025-04-29
Attending: EMERGENCY MEDICINE | Admitting: FAMILY MEDICINE
Payer: COMMERCIAL

## 2025-04-26 DIAGNOSIS — R06.02 SOB (SHORTNESS OF BREATH): ICD-10-CM

## 2025-04-26 DIAGNOSIS — A41.9 SEPSIS, DUE TO UNSPECIFIED ORGANISM, UNSPECIFIED WHETHER ACUTE ORGAN DYSFUNCTION PRESENT: Primary | ICD-10-CM

## 2025-04-26 DIAGNOSIS — R00.0 TACHYCARDIA: ICD-10-CM

## 2025-04-26 DIAGNOSIS — Z13.6 SCREENING FOR CARDIOVASCULAR CONDITION: ICD-10-CM

## 2025-04-26 DIAGNOSIS — R07.9 CHEST PAIN: ICD-10-CM

## 2025-04-26 PROBLEM — R65.10 SIRS (SYSTEMIC INFLAMMATORY RESPONSE SYNDROME): Status: ACTIVE | Noted: 2025-04-26

## 2025-04-26 LAB
ABSOLUTE EOSINOPHIL (OHS): 0.14 K/UL
ABSOLUTE MONOCYTE (OHS): 0.97 K/UL (ref 0.3–1)
ABSOLUTE NEUTROPHIL COUNT (OHS): 15.53 K/UL (ref 1.8–7.7)
ALBUMIN SERPL BCP-MCNC: 4 G/DL (ref 3.5–5.2)
ALP SERPL-CCNC: 255 UNIT/L (ref 40–150)
ALT SERPL W/O P-5'-P-CCNC: 160 UNIT/L (ref 10–44)
ANION GAP (OHS): 10 MMOL/L (ref 8–16)
AST SERPL-CCNC: 43 UNIT/L (ref 11–45)
BACTERIA #/AREA URNS AUTO: ABNORMAL /HPF
BASOPHILS # BLD AUTO: 0.04 K/UL
BASOPHILS NFR BLD AUTO: 0.2 %
BILIRUB SERPL-MCNC: 0.3 MG/DL (ref 0.1–1)
BILIRUB UR QL STRIP.AUTO: NEGATIVE
BNP SERPL-MCNC: 15 PG/ML (ref 0–99)
BUN SERPL-MCNC: 19 MG/DL (ref 6–20)
CALCIUM SERPL-MCNC: 9.6 MG/DL (ref 8.7–10.5)
CHLORIDE SERPL-SCNC: 101 MMOL/L (ref 95–110)
CLARITY UR: CLEAR
CO2 SERPL-SCNC: 29 MMOL/L (ref 23–29)
COLOR UR AUTO: COLORLESS
CREAT SERPL-MCNC: 0.6 MG/DL (ref 0.5–1.4)
ERYTHROCYTE [DISTWIDTH] IN BLOOD BY AUTOMATED COUNT: 16.3 % (ref 11.5–14.5)
GFR SERPLBLD CREATININE-BSD FMLA CKD-EPI: >60 ML/MIN/1.73/M2
GLUCOSE SERPL-MCNC: 85 MG/DL (ref 70–110)
GLUCOSE UR QL STRIP: NEGATIVE
HCT VFR BLD AUTO: 47.9 % (ref 40–54)
HCV AB SERPL QL IA: NEGATIVE
HGB BLD-MCNC: 15.3 GM/DL (ref 14–18)
HGB UR QL STRIP: ABNORMAL
HIV 1+2 AB+HIV1 P24 AG SERPL QL IA: NEGATIVE
HOLD SPECIMEN: NORMAL
IMM GRANULOCYTES # BLD AUTO: 0.06 K/UL (ref 0–0.04)
IMM GRANULOCYTES NFR BLD AUTO: 0.3 % (ref 0–0.5)
INR PPP: 1 (ref 0.8–1.2)
KETONES UR QL STRIP: NEGATIVE
LACTATE SERPL-SCNC: 0.7 MMOL/L (ref 0.5–2.2)
LACTATE SERPL-SCNC: 0.9 MMOL/L (ref 0.5–2.2)
LEUKOCYTE ESTERASE UR QL STRIP: NEGATIVE
LIPASE SERPL-CCNC: 29 U/L (ref 4–60)
LYMPHOCYTES # BLD AUTO: 0.93 K/UL (ref 1–4.8)
MAGNESIUM SERPL-MCNC: 1.9 MG/DL (ref 1.6–2.6)
MCH RBC QN AUTO: 25.7 PG (ref 27–31)
MCHC RBC AUTO-ENTMCNC: 31.9 G/DL (ref 32–36)
MCV RBC AUTO: 81 FL (ref 82–98)
MICROSCOPIC COMMENT: ABNORMAL
NITRITE UR QL STRIP: NEGATIVE
NUCLEATED RBC (/100WBC) (OHS): 0 /100 WBC
OHS QRS DURATION: 84 MS
OHS QRS DURATION: 88 MS
OHS QTC CALCULATION: 430 MS
OHS QTC CALCULATION: 430 MS
PH UR STRIP: 7 [PH]
PLATELET # BLD AUTO: 249 K/UL (ref 150–450)
PMV BLD AUTO: 11.9 FL (ref 9.2–12.9)
POCT GLUCOSE: 118 MG/DL (ref 70–110)
POCT GLUCOSE: 83 MG/DL (ref 70–110)
POTASSIUM SERPL-SCNC: 4.6 MMOL/L (ref 3.5–5.1)
PROCALCITONIN SERPL-MCNC: 0.04 NG/ML
PROT SERPL-MCNC: 8.3 GM/DL (ref 6–8.4)
PROT UR QL STRIP: ABNORMAL
PROTHROMBIN TIME: 11.7 SECONDS (ref 9–12.5)
RBC # BLD AUTO: 5.95 M/UL (ref 4.6–6.2)
RBC #/AREA URNS AUTO: 94 /HPF (ref 0–4)
RELATIVE EOSINOPHIL (OHS): 0.8 %
RELATIVE LYMPHOCYTE (OHS): 5.3 % (ref 18–48)
RELATIVE MONOCYTE (OHS): 5.5 % (ref 4–15)
RELATIVE NEUTROPHIL (OHS): 87.9 % (ref 38–73)
SODIUM SERPL-SCNC: 140 MMOL/L (ref 136–145)
SP GR UR STRIP: 1.01
TROPONIN I SERPL DL<=0.01 NG/ML-MCNC: <0.006 NG/ML
UROBILINOGEN UR STRIP-ACNC: NEGATIVE EU/DL
WBC # BLD AUTO: 17.67 K/UL (ref 3.9–12.7)

## 2025-04-26 PROCEDURE — 25000003 PHARM REV CODE 250: Performed by: FAMILY MEDICINE

## 2025-04-26 PROCEDURE — 81003 URINALYSIS AUTO W/O SCOPE: CPT | Performed by: EMERGENCY MEDICINE

## 2025-04-26 PROCEDURE — 93005 ELECTROCARDIOGRAM TRACING: CPT

## 2025-04-26 PROCEDURE — 93010 ELECTROCARDIOGRAM REPORT: CPT | Mod: ,,, | Performed by: INTERNAL MEDICINE

## 2025-04-26 PROCEDURE — 96361 HYDRATE IV INFUSION ADD-ON: CPT

## 2025-04-26 PROCEDURE — 85610 PROTHROMBIN TIME: CPT | Performed by: EMERGENCY MEDICINE

## 2025-04-26 PROCEDURE — 83690 ASSAY OF LIPASE: CPT | Performed by: EMERGENCY MEDICINE

## 2025-04-26 PROCEDURE — 25500020 PHARM REV CODE 255: Performed by: FAMILY MEDICINE

## 2025-04-26 PROCEDURE — 99285 EMERGENCY DEPT VISIT HI MDM: CPT | Mod: 25

## 2025-04-26 PROCEDURE — 96360 HYDRATION IV INFUSION INIT: CPT

## 2025-04-26 PROCEDURE — 25000003 PHARM REV CODE 250: Performed by: NURSE PRACTITIONER

## 2025-04-26 PROCEDURE — 83880 ASSAY OF NATRIURETIC PEPTIDE: CPT | Performed by: EMERGENCY MEDICINE

## 2025-04-26 PROCEDURE — 63600175 PHARM REV CODE 636 W HCPCS: Performed by: NURSE PRACTITIONER

## 2025-04-26 PROCEDURE — 21400001 HC TELEMETRY ROOM

## 2025-04-26 PROCEDURE — 87389 HIV-1 AG W/HIV-1&-2 AB AG IA: CPT | Performed by: EMERGENCY MEDICINE

## 2025-04-26 PROCEDURE — 82435 ASSAY OF BLOOD CHLORIDE: CPT | Performed by: EMERGENCY MEDICINE

## 2025-04-26 PROCEDURE — 87040 BLOOD CULTURE FOR BACTERIA: CPT | Performed by: EMERGENCY MEDICINE

## 2025-04-26 PROCEDURE — 84484 ASSAY OF TROPONIN QUANT: CPT | Performed by: EMERGENCY MEDICINE

## 2025-04-26 PROCEDURE — 85025 COMPLETE CBC W/AUTO DIFF WBC: CPT | Performed by: EMERGENCY MEDICINE

## 2025-04-26 PROCEDURE — 93010 ELECTROCARDIOGRAM REPORT: CPT | Mod: 76,,, | Performed by: INTERNAL MEDICINE

## 2025-04-26 PROCEDURE — 83735 ASSAY OF MAGNESIUM: CPT | Performed by: EMERGENCY MEDICINE

## 2025-04-26 PROCEDURE — 36415 COLL VENOUS BLD VENIPUNCTURE: CPT | Performed by: EMERGENCY MEDICINE

## 2025-04-26 PROCEDURE — 86803 HEPATITIS C AB TEST: CPT | Performed by: EMERGENCY MEDICINE

## 2025-04-26 PROCEDURE — 84145 PROCALCITONIN (PCT): CPT | Performed by: EMERGENCY MEDICINE

## 2025-04-26 PROCEDURE — 83605 ASSAY OF LACTIC ACID: CPT | Performed by: EMERGENCY MEDICINE

## 2025-04-26 PROCEDURE — 25000003 PHARM REV CODE 250: Performed by: EMERGENCY MEDICINE

## 2025-04-26 RX ORDER — METOPROLOL TARTRATE 50 MG/1
50 TABLET ORAL 2 TIMES DAILY
Status: DISCONTINUED | OUTPATIENT
Start: 2025-04-26 | End: 2025-04-29 | Stop reason: HOSPADM

## 2025-04-26 RX ORDER — TALC
6 POWDER (GRAM) TOPICAL NIGHTLY PRN
Status: DISCONTINUED | OUTPATIENT
Start: 2025-04-26 | End: 2025-04-26

## 2025-04-26 RX ORDER — POLYETHYLENE GLYCOL 3350 17 G/17G
17 POWDER, FOR SOLUTION ORAL 2 TIMES DAILY PRN
Status: DISCONTINUED | OUTPATIENT
Start: 2025-04-26 | End: 2025-04-29 | Stop reason: HOSPADM

## 2025-04-26 RX ORDER — MONTELUKAST SODIUM 5 MG/1
5 TABLET, CHEWABLE ORAL NIGHTLY
Status: DISCONTINUED | OUTPATIENT
Start: 2025-04-26 | End: 2025-04-29 | Stop reason: HOSPADM

## 2025-04-26 RX ORDER — AMOXICILLIN 250 MG
1 CAPSULE ORAL 2 TIMES DAILY
Status: DISCONTINUED | OUTPATIENT
Start: 2025-04-26 | End: 2025-04-26

## 2025-04-26 RX ORDER — ALUMINUM HYDROXIDE, MAGNESIUM HYDROXIDE, AND SIMETHICONE 1200; 120; 1200 MG/30ML; MG/30ML; MG/30ML
30 SUSPENSION ORAL 4 TIMES DAILY PRN
Status: DISCONTINUED | OUTPATIENT
Start: 2025-04-26 | End: 2025-04-26

## 2025-04-26 RX ORDER — SODIUM CHLORIDE, SODIUM LACTATE, POTASSIUM CHLORIDE, CALCIUM CHLORIDE 600; 310; 30; 20 MG/100ML; MG/100ML; MG/100ML; MG/100ML
INJECTION, SOLUTION INTRAVENOUS CONTINUOUS
Status: DISCONTINUED | OUTPATIENT
Start: 2025-04-26 | End: 2025-04-28

## 2025-04-26 RX ORDER — AMOXICILLIN 250 MG
1 CAPSULE ORAL 2 TIMES DAILY
Status: DISCONTINUED | OUTPATIENT
Start: 2025-04-26 | End: 2025-04-29 | Stop reason: HOSPADM

## 2025-04-26 RX ORDER — ACETAMINOPHEN 325 MG/1
650 TABLET ORAL EVERY 4 HOURS PRN
Status: DISCONTINUED | OUTPATIENT
Start: 2025-04-26 | End: 2025-04-29 | Stop reason: HOSPADM

## 2025-04-26 RX ORDER — SIMETHICONE 80 MG
1 TABLET,CHEWABLE ORAL 4 TIMES DAILY PRN
Status: DISCONTINUED | OUTPATIENT
Start: 2025-04-26 | End: 2025-04-29 | Stop reason: HOSPADM

## 2025-04-26 RX ORDER — QUETIAPINE FUMARATE 100 MG/1
100 TABLET, FILM COATED ORAL 2 TIMES DAILY
Status: DISCONTINUED | OUTPATIENT
Start: 2025-04-26 | End: 2025-04-29 | Stop reason: HOSPADM

## 2025-04-26 RX ORDER — PHENOBARBITAL 32.4 MG/1
64.8 TABLET ORAL 2 TIMES DAILY
Status: DISCONTINUED | OUTPATIENT
Start: 2025-04-26 | End: 2025-04-29 | Stop reason: HOSPADM

## 2025-04-26 RX ORDER — ALUMINUM HYDROXIDE, MAGNESIUM HYDROXIDE, AND SIMETHICONE 1200; 120; 1200 MG/30ML; MG/30ML; MG/30ML
30 SUSPENSION ORAL 4 TIMES DAILY PRN
Status: DISCONTINUED | OUTPATIENT
Start: 2025-04-26 | End: 2025-04-29 | Stop reason: HOSPADM

## 2025-04-26 RX ORDER — CEFEPIME HYDROCHLORIDE 1 G/1
1 INJECTION, POWDER, FOR SOLUTION INTRAMUSCULAR; INTRAVENOUS
Status: DISCONTINUED | OUTPATIENT
Start: 2025-04-26 | End: 2025-04-26

## 2025-04-26 RX ORDER — ONDANSETRON HYDROCHLORIDE 2 MG/ML
4 INJECTION, SOLUTION INTRAVENOUS EVERY 6 HOURS PRN
Status: DISCONTINUED | OUTPATIENT
Start: 2025-04-26 | End: 2025-04-29 | Stop reason: HOSPADM

## 2025-04-26 RX ORDER — TALC
6 POWDER (GRAM) TOPICAL NIGHTLY PRN
Status: DISCONTINUED | OUTPATIENT
Start: 2025-04-26 | End: 2025-04-29 | Stop reason: HOSPADM

## 2025-04-26 RX ORDER — CEFEPIME HYDROCHLORIDE 1 G/1
1 INJECTION, POWDER, FOR SOLUTION INTRAMUSCULAR; INTRAVENOUS
Status: DISCONTINUED | OUTPATIENT
Start: 2025-04-26 | End: 2025-04-29 | Stop reason: HOSPADM

## 2025-04-26 RX ORDER — NALOXONE HCL 0.4 MG/ML
0.02 VIAL (ML) INJECTION
Status: DISCONTINUED | OUTPATIENT
Start: 2025-04-26 | End: 2025-04-29 | Stop reason: HOSPADM

## 2025-04-26 RX ORDER — MUPIROCIN 20 MG/G
OINTMENT TOPICAL 2 TIMES DAILY
Status: DISCONTINUED | OUTPATIENT
Start: 2025-04-26 | End: 2025-04-29 | Stop reason: HOSPADM

## 2025-04-26 RX ORDER — PROCHLORPERAZINE EDISYLATE 5 MG/ML
5 INJECTION INTRAMUSCULAR; INTRAVENOUS EVERY 6 HOURS PRN
Status: DISCONTINUED | OUTPATIENT
Start: 2025-04-26 | End: 2025-04-29 | Stop reason: HOSPADM

## 2025-04-26 RX ORDER — LEVETIRACETAM 100 MG/ML
2000 SOLUTION ORAL 2 TIMES DAILY
Status: DISCONTINUED | OUTPATIENT
Start: 2025-04-26 | End: 2025-04-29 | Stop reason: HOSPADM

## 2025-04-26 RX ORDER — POLYETHYLENE GLYCOL 3350 17 G/17G
17 POWDER, FOR SOLUTION ORAL 2 TIMES DAILY PRN
Status: DISCONTINUED | OUTPATIENT
Start: 2025-04-26 | End: 2025-04-26

## 2025-04-26 RX ORDER — LEVETIRACETAM 100 MG/ML
200 SOLUTION ORAL 2 TIMES DAILY
Status: DISCONTINUED | OUTPATIENT
Start: 2025-04-26 | End: 2025-04-26

## 2025-04-26 RX ORDER — IBUPROFEN 200 MG
24 TABLET ORAL
Status: DISCONTINUED | OUTPATIENT
Start: 2025-04-26 | End: 2025-04-28

## 2025-04-26 RX ORDER — GLUCAGON 1 MG
1 KIT INJECTION
Status: DISCONTINUED | OUTPATIENT
Start: 2025-04-26 | End: 2025-04-29 | Stop reason: HOSPADM

## 2025-04-26 RX ORDER — SIMETHICONE 80 MG
1 TABLET,CHEWABLE ORAL 4 TIMES DAILY PRN
Status: DISCONTINUED | OUTPATIENT
Start: 2025-04-26 | End: 2025-04-26

## 2025-04-26 RX ORDER — SODIUM CHLORIDE 0.9 % (FLUSH) 0.9 %
10 SYRINGE (ML) INJECTION EVERY 8 HOURS PRN
Status: DISCONTINUED | OUTPATIENT
Start: 2025-04-26 | End: 2025-04-29 | Stop reason: HOSPADM

## 2025-04-26 RX ORDER — BACLOFEN 10 MG/1
20 TABLET ORAL 2 TIMES DAILY
Status: DISCONTINUED | OUTPATIENT
Start: 2025-04-26 | End: 2025-04-29 | Stop reason: HOSPADM

## 2025-04-26 RX ORDER — CLONIDINE HYDROCHLORIDE 0.1 MG/1
0.1 TABLET ORAL NIGHTLY
Status: DISCONTINUED | OUTPATIENT
Start: 2025-04-26 | End: 2025-04-29 | Stop reason: HOSPADM

## 2025-04-26 RX ORDER — IPRATROPIUM BROMIDE AND ALBUTEROL SULFATE 2.5; .5 MG/3ML; MG/3ML
3 SOLUTION RESPIRATORY (INHALATION) EVERY 6 HOURS PRN
Status: DISCONTINUED | OUTPATIENT
Start: 2025-04-26 | End: 2025-04-29 | Stop reason: HOSPADM

## 2025-04-26 RX ORDER — DIAZEPAM 2 MG/1
2 TABLET ORAL 2 TIMES DAILY
Status: DISCONTINUED | OUTPATIENT
Start: 2025-04-26 | End: 2025-04-29 | Stop reason: HOSPADM

## 2025-04-26 RX ORDER — IBUPROFEN 200 MG
16 TABLET ORAL
Status: DISCONTINUED | OUTPATIENT
Start: 2025-04-26 | End: 2025-04-28

## 2025-04-26 RX ORDER — PHENOBARBITAL 32.4 MG/1
64.8 TABLET ORAL 2 TIMES DAILY
Status: DISCONTINUED | OUTPATIENT
Start: 2025-04-26 | End: 2025-04-26

## 2025-04-26 RX ADMIN — METOPROLOL TARTRATE 50 MG: 50 TABLET, FILM COATED ORAL at 09:04

## 2025-04-26 RX ADMIN — MONTELUKAST SODIUM 5 MG: 5 TABLET, CHEWABLE ORAL at 09:04

## 2025-04-26 RX ADMIN — SODIUM CHLORIDE 1824 ML: 9 INJECTION, SOLUTION INTRAVENOUS at 05:04

## 2025-04-26 RX ADMIN — SODIUM CHLORIDE, POTASSIUM CHLORIDE, SODIUM LACTATE AND CALCIUM CHLORIDE: 600; 310; 30; 20 INJECTION, SOLUTION INTRAVENOUS at 09:04

## 2025-04-26 RX ADMIN — BACLOFEN 20 MG: 10 TABLET ORAL at 09:04

## 2025-04-26 RX ADMIN — IOHEXOL 100 ML: 350 INJECTION, SOLUTION INTRAVENOUS at 11:04

## 2025-04-26 RX ADMIN — CEFEPIME 1 G: 1 INJECTION, POWDER, FOR SOLUTION INTRAMUSCULAR; INTRAVENOUS at 11:04

## 2025-04-26 RX ADMIN — CEFEPIME 1 G: 1 INJECTION, POWDER, FOR SOLUTION INTRAMUSCULAR; INTRAVENOUS at 09:04

## 2025-04-26 RX ADMIN — METOPROLOL TARTRATE 50 MG: 50 TABLET, FILM COATED ORAL at 10:04

## 2025-04-26 RX ADMIN — SODIUM CHLORIDE, POTASSIUM CHLORIDE, SODIUM LACTATE AND CALCIUM CHLORIDE: 600; 310; 30; 20 INJECTION, SOLUTION INTRAVENOUS at 10:04

## 2025-04-26 RX ADMIN — Medication 16 G: at 09:04

## 2025-04-26 RX ADMIN — SENNOSIDES AND DOCUSATE SODIUM 1 TABLET: 50; 8.6 TABLET ORAL at 09:04

## 2025-04-26 RX ADMIN — MUPIROCIN: 20 OINTMENT TOPICAL at 10:04

## 2025-04-26 RX ADMIN — BACLOFEN 20 MG: 10 TABLET ORAL at 10:04

## 2025-04-26 RX ADMIN — CEFEPIME 1 G: 1 INJECTION, POWDER, FOR SOLUTION INTRAMUSCULAR; INTRAVENOUS at 05:04

## 2025-04-26 RX ADMIN — DIAZEPAM 2 MG: 2 TABLET ORAL at 09:04

## 2025-04-26 RX ADMIN — DIAZEPAM 2 MG: 2 TABLET ORAL at 02:04

## 2025-04-26 RX ADMIN — LEVETIRACETAM 2000 MG: 100 SOLUTION ORAL at 09:04

## 2025-04-26 RX ADMIN — QUETIAPINE FUMARATE 100 MG: 100 TABLET ORAL at 10:04

## 2025-04-26 RX ADMIN — MUPIROCIN: 20 OINTMENT TOPICAL at 09:04

## 2025-04-26 RX ADMIN — QUETIAPINE FUMARATE 100 MG: 100 TABLET ORAL at 09:04

## 2025-04-26 RX ADMIN — PHENOBARBITAL 64.8 MG: 32.4 TABLET ORAL at 09:04

## 2025-04-26 RX ADMIN — CLONIDINE HYDROCHLORIDE 0.1 MG: 0.1 TABLET ORAL at 09:04

## 2025-04-26 NOTE — ASSESSMENT & PLAN NOTE
Nutrition consulted. Most recent weight and BMI monitored-     Measurements:  Wt Readings from Last 1 Encounters:   12/11/24 46.7 kg (102 lb 15.3 oz)   Body mass index is 17.67 kg/m².    Patient has been screened and assessed by RD.    Malnutrition Type:  Context:    Level:    Will restart tube feeding when appropriate

## 2025-04-26 NOTE — PLAN OF CARE
[facility] - Med Surg  Initial Discharge Assessment       Primary Care Provider: Herman Nathan DNP    Admission Diagnosis: Screening for cardiovascular condition [Z13.6]  SOB (shortness of breath) [R06.02]  Tachycardia [R00.0]  Sepsis, due to unspecified organism, unspecified whether acute organ dysfunction present [A41.9]    Admission Date: 4/26/2025  Expected Discharge Date:     Transition of Care Barriers: None    Payor: BLUE CROSS BLUE SHIELD / Plan: [employer] OF [place] PL[place] / Product Type: Commercial /     Extended Emergency Contact Information  Primary Emergency Contact: Blu Ramey  Work Phone: [phone]  Mobile Phone: [phone]  Relation: Mother  Secondary Emergency Contact: Marciano Skinner   [place]  Mobile Phone: [phone]  Relation: Other    Discharge Plan A: Return to nursing home         [id number] - [place], [place] - [address] AT [facility] [address]  [address]  [place] [address]  Phone: [phone] Fax: [phone]      Initial Assessment (most recent)       Adult Discharge Assessment - 04/26/25 1027          Discharge Assessment    Assessment Type Discharge Planning Assessment     Confirmed/corrected address, phone number and insurance Yes     Confirmed Demographics Correct on Facesheet     Source of Information family     Does patient/caregiver understand observation status Yes     Communicated ABDIEL with patient/caregiver Date not available/Unable to determine     People in Home facility resident     Facility Arrived From: [facility]     Do you expect to return to your current living situation? Yes     Do you have help at home or someone to help you manage your care at home? Yes     Who are your caregiver(s) and their phone number(s)? [facility]     Readmission within 30 days? No     Patient currently being followed by outpatient case management? No     Do you currently have service(s) that help you manage  your care at home? No     Do you take prescription medications? Yes     Do you have prescription coverage? Yes     Do you have any problems affording any of your prescribed medications? No     Is the patient taking medications as prescribed? yes     Who is going to help you get home at discharge? [facility]     How do you get to doctors appointments? agency     Are you on dialysis? No     Do you take coumadin? No     Discharge Plan A Return to nursing home     DME Needed Upon Discharge  none     Discharge Plan discussed with: Patient     Transition of Care Barriers None

## 2025-04-26 NOTE — SUBJECTIVE & OBJECTIVE
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 OR 2ND FLR;  Service: Neurosurgery;  Laterality: N/A;  toronto I, asa 1, lateral left down, regular bed reversed, christine, [employer] rep, co surgeon DR Miller    BACLOFEN PUMP IMPLANTATION  2020    CIRCUMCISION      EXCISION, SMALL INTESTINE N/A 10/18/2024    Procedure: EXCISION, SMALL INTESTINE;  Surgeon: Anna Marie Mo MD;  Location: [facility] OR;  Service: Colon and Rectal;  Laterality: N/A;    EYE SURGERY      as a     GASTROSTOMY TUBE PLACEMENT      HERNIA REPAIR      HIP SURGERY      INJECTION OF ANESTHETIC AGENT INTO TISSUE PLANE DEFINED BY TRANSVERSUS ABDOMINIS MUSCLE N/A 10/18/2024    Procedure: BLOCK, TRANSVERSUS ABDOMINIS PLANE;  Surgeon: Anna Marie Mo MD;  Location: [facility] OR;  Service: Colon and Rectal;  Laterality: N/A;    LAPAROTOMY, EXPLORATORY N/A 10/18/2024    Procedure: LAPAROTOMY, EXPLORATORY;  Surgeon: Anna Marie Mo MD;  Location: [facility] OR;  Service: Colon and Rectal;  Laterality: N/A;  Lysis of adhesions    LAPAROTOMY, EXPLORATORY N/A 10/21/2024    Procedure: LAPAROTOMY, EXPLORATORY, OPEN GASTROSTOMY TUBE;  Surgeon: Anna Marie Mo MD;  Location: [facility] OR;  Service: Colon and Rectal;  Laterality: N/A;    LUMBAR  PUNCTURE WITH INJECTION OF BACLOFEN N/A 3/11/2020    Procedure: LUMBAR PUNCTURE, WITH BACLOFEN INJECTION;  Surgeon: Cristiane Sky MD;  Location: [facility] OR 2ND FLR;  Service: Neurosurgery;  Laterality: N/A;  toronto I, asa 1, lateral left down, regular bed reversed , christine , [employer] co surgeon DR Miller    NISSEN FUNDOPLICATION      PARTIAL GASTRECTOMY N/A 10/18/2024    Procedure: GASTRECTOMY, PARTIAL;  Surgeon: Anna Marie Mo MD;  Location: [facility] OR;  Service: Colon and Rectal;  Laterality: N/A;    REMOVAL OF BACLOFEN PUMP Right 10/15/2020    Procedure: REMOVAL, BACLOFEN PUMP;  Surgeon: Marcy Macedo MD;  Location: [facility] OR 2ND FLR;  Service: Neurosurgery;  Laterality: Right;    TONSILLECTOMY      TYMPANOSTOMY TUBE PLACEMENT         Review of patient's allergies indicates:   Allergen Reactions    Strawberries [strawberry] Hives     STRAWBERRIES ALLERGIC REACTIONS   (SEIZURES AND HIVES )       No current facility-administered medications on file prior to encounter.     Current Outpatient Medications on File Prior to Encounter   Medication Sig    baclofen (LIORESAL) 20 MG tablet 1 tablet (20 mg total) by Per G Tube route 2 (two) times daily.    cloNIDine (CATAPRES) 0.1 MG tablet 1 tablet (0.1 mg total) by Per G Tube route nightly.    cloNIDine 0.1 mg/24 hr td ptwk (CATAPRES) 0.1 mg/24 hr Place 1 patch onto the skin every 7 days.    diazePAM (VALIUM) 5 mg/5 mL (1 mg/mL) oral solution TAKE 3 ML(3 MG) BY MOUTH TWICE DAILY    gentamicin (GARAMYCIN) 0.1 % ointment     levETIRAcetam (KEPPRA) 100 mg/mL Soln TAKE 20 ML(2000 MG) VIA GTUBE TWICE DAILY    metoprolol tartrate (LOPRESSOR) 50 MG tablet 1 tablet (50 mg total) by Per G Tube route 2 (two) times daily.    midazolam 5 mg/spray (0.1 mL) Spry 1 Spray per nostril for seizure >3 minutes. May repeat in the other nostril if the seizure continues beyond 10 minutes. (Patient not taking: Reported on 10/25/2023)    montelukast (SINGULAIR) 5 MG chewable tablet Take 1 tablet  (5 mg total) by mouth every evening.    mupirocin (BACTROBAN) 2 % ointment Apply topically 3 (three) times daily.    PHENobarbitaL 64.8 MG tablet Take 1 tablet (64.8 mg total) by mouth 2 (two) times daily.    pulse oximeter (PULSE OXIMETER) device by Apply Externally route 2 (two) times a day. Use twice daily at 8 AM and 3 PM and record the value in MyChart as directed. (Patient not taking: Reported on 10/25/2023)    QUEtiapine (SEROQUEL) 100 MG Tab 1 tablet (100 mg total) by Per G Tube route 2 (two) times daily.    simethicone (MYLICON) 40 mg/0.6 mL drops 0.6 mLs (40 mg total) by Per G Tube route 4 (four) times daily.    tiZANidine (ZANAFLEX) 2 MG tablet      Family History       Problem Relation (Age of Onset)    Cancer Maternal Grandmother          Tobacco Use    Smoking status: Never    Smokeless tobacco: Never   Substance and Sexual Activity    Alcohol use: No    Drug use: No    Sexual activity: Never     Review of Systems   Unable to perform ROS: Patient nonverbal     Objective:     Vital Signs (Most Recent):  Temp: 99.1 °F (37.3 °C) (04/26/25 0837)  Pulse: (!) 112 (04/26/25 1020)  Resp: 20 (04/26/25 0837)  BP: 114/66 (04/26/25 0837)  SpO2: (!) 91 % (04/26/25 0837) Vital Signs (24h Range):  Temp:  [98.9 °F (37.2 °C)-99.1 °F (37.3 °C)] 99.1 °F (37.3 °C)  Pulse:  [112-140] 112  Resp:  [17-32] 20  SpO2:  [91 %-97 %] 91 %  BP: (114-154)/(65-88) 114/66        Body mass index is 17.67 kg/m².     Physical Exam  Vitals and nursing note reviewed.   Constitutional:       General: He is not in acute distress.     Appearance: He is cachectic. He is ill-appearing. He is not diaphoretic.   HENT:      Head: Normocephalic and atraumatic.      Nose: Nose normal.   Eyes:      General: No scleral icterus.     Conjunctiva/sclera: Conjunctivae normal.      Comments: Stigmata of blindness    Cardiovascular:      Rate and Rhythm: Regular rhythm. Tachycardia present.      Heart sounds: Normal heart sounds. No murmur heard.     No  friction rub. No gallop.   Pulmonary:      Effort: Pulmonary effort is normal. Tachypnea present. No respiratory distress.      Breath sounds: Normal breath sounds. No stridor. No wheezing or rales.   Chest:      Chest wall: No tenderness.   Abdominal:      General: Bowel sounds are normal. There is distension (mild).      Palpations: Abdomen is soft.      Tenderness: There is no abdominal tenderness. There is no guarding or rebound.      Comments: PEG to right abdomen   Peg site C/D/I - no erythema/draiange   Musculoskeletal:         General: Deformity present. No tenderness.      Cervical back: Neck supple.      Comments: Contractures to all 4 extremities    Skin:     General: Skin is warm and dry.      Coloration: Skin is pale.      Findings: No erythema or rash.   Neurological:      Mental Status: He is alert.      Motor: Atrophy and abnormal muscle tone present.      Comments: Non verbal   Unable to follow commands due to severe cognitive impairment/developmental delay   Psychiatric:         Attention and Perception: He is inattentive.                Significant Labs: All pertinent labs within the past 24 hours have been reviewed.    Significant Imaging: I have reviewed all pertinent imaging results/findings within the past 24 hours.

## 2025-04-26 NOTE — ASSESSMENT & PLAN NOTE
T max 99.1, tachypnea, tachycardia, leukocytosis   LA and procalcitonin normal  UA/CXR unremarkable   Blood cultures   Abd xray ordered  CT chest/abd/pelvis   Empiric IV Cefepime

## 2025-04-26 NOTE — HPI
The patient is a 25 yo male NH resident with Cerebral Palsy, Spastic quadriplegia, Non verbal, s/p G tube, epilepsy, s/p trach and removal, Legally blind who presented to ED from nursing home for tachypnea, tachycardia and hypoxemia. Pt was seen in ED 4/25/25 for a Peg tube replacement. After arrival to Nursing home, the nurses noted the above symptoms. Mother at bedside. She states the pt is normally not on oxygen. ED report from nursing home denied fever, N/V, diarrhea. Pt tends to be constipated- last BM 4/24/25.     In the ED, -140. Tmax 99.1F, Oxygen saturation 88%- 2 liters oxygen via NC applied. Labs revealed WBC 17.6, , , BNP/troponin normal. LA normal. Ua unremarkable. CXR- clear.   Pt was given 30ml/kg bolus     SDM is pt's mother. Pt is a full code

## 2025-04-26 NOTE — PROGRESS NOTES
Pharmacist Renal Dose Adjustment Note    Ronny Ramey is a 24 y.o. male being treated with the medication cefepime.    Patient Data:    Vital Signs (Most Recent):  Temp: 99.1 °F (37.3 °C) (04/26/25 0837)  Pulse: (!) 113 (04/26/25 0837)  Resp: 20 (04/26/25 0837)  BP: 114/66 (04/26/25 0837)  SpO2: (!) 91 % (04/26/25 0837) Vital Signs (72h Range):  Temp:  [97.7 °F (36.5 °C)-99.1 °F (37.3 °C)]   Pulse:  []   Resp:  [17-32]   BP: (114-154)/(64-88)   SpO2:  [91 %-97 %]      Recent Labs   Lab 04/26/25  0455   CREATININE 0.6     Serum creatinine: 0.6 mg/dL 04/26/25 0455  Estimated creatinine clearance: 166.1 mL/min    Cefepime 1 g IV q8h will be changed to cefepime 1 g IV q6h per pharmacy renal dosing protocol for CrCl > 49 mL/min.    Pharmacist's Name: Olga Mckeon

## 2025-04-26 NOTE — ED PROVIDER NOTES
SCRIBE #1 NOTE: I, Jamel Hardin, am scribing for, and in the presence of, Wilbert Dugan Jr., MD. I have scribed the HPI, ROS, and PEx.     SCRIBE #2 NOTE: I, Deisi Allred, am scribing for, and in the presence of,  Margarita Novoa MD. I have scribed the remaining portions of the note not scribed by Scribe #1.      History     Chief Complaint   Patient presents with    Tachycardia     Was seen last night for peg tube issue. Now being sent for eval of tachycardia/hyperventilation.     Review of patient's allergies indicates:   Allergen Reactions    Strawberries [strawberry] Hives     STRAWBERRIES ALLERGIC REACTIONS   (SEIZURES AND HIVES )         History of Present Illness     HPI    4/26/2025, 5:19 AM  History obtained from the medical records and caretaker    HPI and ROS are limited due to patient being nonverbal.      History of Present Illness: Ronny Ramey is a 24 y.o. male patient with a PMHx of Cerebral palsy who presents to the Emergency Department for evaluation of Tachycardia which began today. Patient was seen in the ED last night for PEG tube replacement. Symptoms are constant and moderate in severity. No mitigating or exacerbating factors reported. Pt's caretaker also reports an increased oxygen level, hypertension, and hyperventilating. Patient's caretaker denies any vomiting or diarrhea, or cough for the patient. His last bowel movement was on Thursday; his caretaker states that not having a bowel movement for an extended period of time can affect his breathing. No prior Tx specified.  No further complaints or concerns at this time.       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    Vision abnormalities        Past Surgical History:  Past Surgical History:   Procedure Laterality Date    ADENOIDECTOMY      APPLICATION OF WOUND VACUUM-ASSISTED CLOSURE DEVICE N/A 10/18/2024    Procedure: APPLICATION, WOUND VAC;  Surgeon: Anna Marie Mo MD;  Location: [facility] OR;  Service: Colon and Rectal;  Laterality: N/A;    BACLOFEN PUMP IMPLANTATION N/A 2020    Procedure: INSERTION, INTRATHECAL BACLOFEN PUMP;  Surgeon: Robbi Cain MD;  Location: [facility] OR 2ND FLR;  Service: Neurosurgery;  Laterality: N/A;  toronto I, asa 1, lateral left down, regular bed reversed, christine, [employer] rep, co surgeon DR Miller    BACLOFEN PUMP IMPLANTATION  2020    CIRCUMCISION      EXCISION, SMALL INTESTINE N/A 10/18/2024    Procedure: EXCISION, SMALL INTESTINE;  Surgeon: Anna Marie Mo MD;  Location: [facility] OR;  Service: Colon and Rectal;  Laterality: N/A;    EYE SURGERY      as a     GASTROSTOMY TUBE PLACEMENT      HERNIA REPAIR      HIP SURGERY      INJECTION OF ANESTHETIC AGENT INTO TISSUE PLANE DEFINED BY TRANSVERSUS ABDOMINIS MUSCLE N/A 10/18/2024    Procedure: BLOCK, TRANSVERSUS ABDOMINIS PLANE;  Surgeon: Anna Marie Mo MD;  Location: [facility] OR;  Service: Colon and Rectal;  Laterality: N/A;    LAPAROTOMY, EXPLORATORY N/A 10/18/2024    Procedure: LAPAROTOMY, EXPLORATORY;  Surgeon: Anna Marie oM MD;  Location: [facility] OR;  Service: Colon and Rectal;  Laterality: N/A;  Lysis of adhesions    LAPAROTOMY, EXPLORATORY N/A 10/21/2024    Procedure: LAPAROTOMY, EXPLORATORY, OPEN GASTROSTOMY TUBE;  Surgeon: Anna Marie Mo MD;  Location: [facility] OR;  Service: Colon and Rectal;  Laterality: N/A;    LUMBAR PUNCTURE WITH INJECTION OF BACLOFEN N/A 3/11/2020    Procedure: LUMBAR PUNCTURE, WITH BACLOFEN INJECTION;  Surgeon: Cristiane Sky MD;  Location: [facility] OR 2ND FLR;  Service: Neurosurgery;  Laterality: N/A;  toronto I, asa 1,  lateral left down, regular bed reversed , christine , [employer] co surgeon DR Karlin NISSEN FUNDOPLICATION      PARTIAL GASTRECTOMY N/A 10/18/2024    Procedure: GASTRECTOMY, PARTIAL;  Surgeon: Anna Marie Mo MD;  Location: [facility];  Service: Colon and Rectal;  Laterality: N/A;    REMOVAL OF BACLOFEN PUMP Right 10/15/2020    Procedure: REMOVAL, BACLOFEN PUMP;  Surgeon: Marcy Macedo MD;  Location: [facility] OR [address] [place] [address];  Service: Neurosurgery;  Laterality: Right;    TONSILLECTOMY      TYMPANOSTOMY TUBE PLACEMENT           Family History:  Family History   Problem Relation Name Age of Onset    Cancer Maternal Grandmother          cervial and breast    Clotting disorder Neg Hx      Anesthesia problems Neg Hx         Social History:  Social History     Tobacco Use    Smoking status: Never    Smokeless tobacco: Never   Substance and Sexual Activity    Alcohol use: No    Drug use: No    Sexual activity: Never        Review of Systems     Review of Systems   Unable to perform ROS: Patient nonverbal   Respiratory:  Negative for cough.         (+)hyperventilating    (+) increased oxygen level   Cardiovascular:         (+) experiencing hypertension     Gastrointestinal:  Negative for diarrhea and vomiting.        Physical Exam     Initial Vitals [04/26/25 0357]   BP Pulse Resp Temp SpO2   (!) 154/88 (!) 126 (!) 32 98.9 °F (37.2 °C) 97 %      MAP       --          Physical Exam  Nursing Notes and Vital Signs Reviewed.  Constitutional: Patient is in no acute distress. Well-developed and well-nourished. Exam limited due to patient's mental status.  Head: Atraumatic. Normocephalic.  Eyes: PERRL. EOM intact. Conjunctivae are not pale. No scleral icterus.  ENT: Mucous membranes are moist. Oropharynx is clear and symmetric.    Neck: Supple. Full ROM. No lymphadenopathy.  Cardiovascular: Regular rate. Regular rhythm. No murmurs, rubs, or gallops. Distal pulses are 2+ and symmetric.  Pulmonary/Chest: No respiratory distress. Crackles  bilaterally. No wheezing or rales.  Abdominal: Soft and non-distended. No rebound, guarding, or rigidity. Good bowel sounds. Peg tube in place. No palpable abdominal masses.  Genitourinary: No CVA tenderness.  Musculoskeletal:  Contracted.  Skin: Warm and dry.  Neurological:  Alert, awake, and appropriate. No acute focal neurological deficits are appreciated.     ED Course   Procedures  ED Vital Signs:  Vitals:    04/26/25 1556 04/26/25 1701 04/26/25 1923 04/26/25 2019   BP: 128/71  116/60    Pulse: 99 98 104 102   Resp: 18  18    Temp: 98.4 °F (36.9 °C)  97.9 °F (36.6 °C)    TempSrc: Axillary  Oral    SpO2: 98%  97%    Height:        04/26/25 2100 04/26/25 2118 04/26/25 2300 04/27/25 0008   BP:  116/60  (!) 98/52   Pulse: 78 102 85 64   Resp:    18   Temp:    98.5 °F (36.9 °C)   TempSrc:    Axillary   SpO2:    97%   Height:        04/27/25 0100 04/27/25 0300 04/27/25 0409 04/27/25 0442   BP:    112/65   Pulse: (!) 58 75 72 (!) 55   Resp:    16   Temp:    98.2 °F (36.8 °C)   TempSrc:    Oral   SpO2:    99%   Height:        04/27/25 0515 04/27/25 0733 04/27/25 0813   BP:   (!) 127/57   Pulse: 60 89 91   Resp:   19   Temp:   97.7 °F (36.5 °C)   TempSrc:   Oral   SpO2:   (!) 93%   Height:          Abnormal Lab Results:  Labs Reviewed   COMPREHENSIVE METABOLIC PANEL - Abnormal       Result Value    Sodium 140      Potassium 4.6      Chloride 101      CO2 29      Glucose 85      BUN 19      Creatinine 0.6      Calcium 9.6      Protein Total 8.3      Albumin 4.0      Bilirubin Total 0.3       (*)     AST 43       (*)     Anion Gap 10      eGFR >60     URINALYSIS, REFLEX TO URINE CULTURE - Abnormal    Color, UA Colorless (*)     Appearance, UA Clear      pH, UA 7.0      Spec Grav UA 1.010      Protein, UA Trace (*)     Glucose, UA Negative      Ketones, UA Negative      Bilirubin, UA Negative      Blood, UA 2+ (*)     Nitrites, UA Negative      Urobilinogen, UA Negative      Leukocyte Esterase, UA Negative      CBC WITH DIFFERENTIAL - Abnormal    WBC 17.67 (*)     RBC 5.95      HGB 15.3      HCT 47.9      MCV 81 (*)     MCH 25.7 (*)     MCHC 31.9 (*)     RDW 16.3 (*)     Platelet Count 249      MPV 11.9      Nucleated RBC 0      Neut % 87.9 (*)     Lymph % 5.3 (*)     Mono % 5.5      Eos % 0.8      Basophil % 0.2      Imm Grans % 0.3      Neut # 15.53 (*)     Lymph # 0.93 (*)     Mono # 0.97      Eos # 0.14      Baso # 0.04      Imm Grans # 0.06 (*)    URINALYSIS MICROSCOPIC - Abnormal    RBC, UA 94 (*)     Bacteria, UA Rare      Microscopic Comment       HEPATITIS C ANTIBODY - Normal    Hep C Ab Interp Negative     HIV 1 / 2 ANTIBODY - Normal    HIV 1/2 Ag/Ab Negative     LACTIC ACID, PLASMA - Normal    Lactic Acid Level 0.9      Narrative:     Falsely low lactic acid results can be found in samples containing >=13.0 mg/dL total bilirubin and/or >=3.5 mg/dL direct bilirubin.    MAGNESIUM - Normal    Magnesium  1.9     PROTIME-INR - Normal    PT 11.7      INR 1.0     B-TYPE NATRIURETIC PEPTIDE - Normal    BNP 15     LIPASE - Normal    Lipase Level 29     TROPONIN I - Normal    Troponin-I <0.006     PROCALCITONIN - Normal    Procalcitonin 0.04     CBC W/ AUTO DIFFERENTIAL    Narrative:     The following orders were created for panel order CBC auto differential.  Procedure                               Abnormality         Status                     ---------                               -----------         ------                     CBC with Differential[[id number]]       Abnormal            Final result                 Please view results for these tests on the individual orders.   GREY TOP URINE HOLD    Extra Tube Hold for add-ons.     HEP C VIRUS HOLD SPECIMEN        All Lab Results:  Results for orders placed or performed during the hospital encounter of 04/26/25   EKG 12-lead    Collection Time: 04/26/25  4:24 AM   Result Value Ref Range    QRS Duration 84 ms    [facility] QTC Calculation 430 ms   Blood culture x two  cultures. Draw prior to antibiotics.    Collection Time: 04/26/25  4:55 AM    Specimen: Peripheral, Antecubital, Right; Blood   Result Value Ref Range    Blood Culture No Growth After 6 Hours    Hepatitis C Antibody    Collection Time: 04/26/25  4:55 AM   Result Value Ref Range    Hep C Ab Interp Negative Negative   HIV 1/2 Ag/Ab (4th Gen)    Collection Time: 04/26/25  4:55 AM   Result Value Ref Range    HIV 1/2 Ag/Ab Negative Negative   Comprehensive metabolic panel    Collection Time: 04/26/25  4:55 AM   Result Value Ref Range    Sodium 140 136 - 145 mmol/L    Potassium 4.6 3.5 - 5.1 mmol/L    Chloride 101 95 - 110 mmol/L    CO2 29 23 - 29 mmol/L    Glucose 85 70 - 110 mg/dL    BUN 19 6 - 20 mg/dL    Creatinine 0.6 0.5 - 1.4 mg/dL    Calcium 9.6 8.7 - 10.5 mg/dL    Protein Total 8.3 6.0 - 8.4 gm/dL    Albumin 4.0 3.5 - 5.2 g/dL    Bilirubin Total 0.3 0.1 - 1.0 mg/dL     (H) 40 - 150 unit/L    AST 43 11 - 45 unit/L     (H) 10 - 44 unit/L    Anion Gap 10 8 - 16 mmol/L    eGFR >60 >60 mL/min/1.73/m2   Lactic acid, plasma #1    Collection Time: 04/26/25  4:55 AM   Result Value Ref Range    Lactic Acid Level 0.9 0.5 - 2.2 mmol/L   Magnesium    Collection Time: 04/26/25  4:55 AM   Result Value Ref Range    Magnesium  1.9 1.6 - 2.6 mg/dL   Protime-INR    Collection Time: 04/26/25  4:55 AM   Result Value Ref Range    PT 11.7 9.0 - 12.5 seconds    INR 1.0 0.8 - 1.2   Brain natriuretic peptide    Collection Time: 04/26/25  4:55 AM   Result Value Ref Range    BNP 15 0 - 99 pg/mL   Lipase    Collection Time: 04/26/25  4:55 AM   Result Value Ref Range    Lipase Level 29 4 - 60 U/L   Troponin I    Collection Time: 04/26/25  4:55 AM   Result Value Ref Range    Troponin-I <0.006 <=0.026 ng/mL   Procalcitonin    Collection Time: 04/26/25  4:55 AM   Result Value Ref Range    Procalcitonin 0.04 <0.25 ng/mL   CBC with Differential    Collection Time: 04/26/25  4:55 AM   Result Value Ref Range    WBC 17.67 (H) 3.90 -  12.70 K/uL    RBC 5.95 4.60 - 6.20 M/uL    HGB 15.3 14.0 - 18.0 gm/dL    HCT 47.9 40.0 - 54.0 %    MCV 81 (L) 82 - 98 fL    MCH 25.7 (L) 27.0 - 31.0 pg    MCHC 31.9 (L) 32.0 - 36.0 g/dL    RDW 16.3 (H) 11.5 - 14.5 %    Platelet Count 249 150 - 450 K/uL    MPV 11.9 9.2 - 12.9 fL    Nucleated RBC 0 <=0 /100 WBC    Neut % 87.9 (H) 38 - 73 %    Lymph % 5.3 (L) 18 - 48 %    Mono % 5.5 4 - 15 %    Eos % 0.8 <=8 %    Basophil % 0.2 <=1.9 %    Imm Grans % 0.3 0.0 - 0.5 %    Neut # 15.53 (H) 1.8 - 7.7 K/uL    Lymph # 0.93 (L) 1 - 4.8 K/uL    Mono # 0.97 0.3 - 1 K/uL    Eos # 0.14 <=0.5 K/uL    Baso # 0.04 <=0.2 K/uL    Imm Grans # 0.06 (H) 0.00 - 0.04 K/uL   Blood culture x two cultures. Draw prior to antibiotics.    Collection Time: 04/26/25  4:57 AM    Specimen: Peripheral, Antecubital, Left; Blood   Result Value Ref Range    Blood Culture No Growth After 6 Hours    Urinalysis, Reflex to Urine Culture    Collection Time: 04/26/25  6:14 AM    Specimen: Urine   Result Value Ref Range    Color, UA Colorless (A) Straw, Cassy, Yellow, Light-Orange    Appearance, UA Clear Clear    pH, UA 7.0 5.0 - 8.0    Spec Grav UA 1.010 1.005 - 1.030    Protein, UA Trace (A) Negative    Glucose, UA Negative Negative    Ketones, UA Negative Negative    Bilirubin, UA Negative Negative    Blood, UA 2+ (A) Negative    Nitrites, UA Negative Negative    Urobilinogen, UA Negative <2.0 EU/dL    Leukocyte Esterase, UA Negative Negative   GREY TOP URINE HOLD    Collection Time: 04/26/25  6:14 AM   Result Value Ref Range    Extra Tube Hold for add-ons.    Urinalysis Microscopic    Collection Time: 04/26/25  6:14 AM   Result Value Ref Range    RBC, UA 94 (H) 0 - 4 /HPF    Bacteria, UA Rare None, Rare, Occasional /HPF    Microscopic Comment     EKG 12-lead    Collection Time: 04/26/25  8:15 AM   Result Value Ref Range    QRS Duration 88 ms    [facility] QTC Calculation 430 ms   Lactic acid, plasma #2    Collection Time: 04/26/25  8:37 AM   Result Value Ref Range     Lactic Acid Level 0.7 0.5 - 2.2 mmol/L   POCT glucose    Collection Time: 04/26/25  5:29 PM   Result Value Ref Range    POCT Glucose 83 70 - 110 mg/dL   POCT glucose    Collection Time: 04/26/25  9:16 PM   Result Value Ref Range    POCT Glucose 67 (L) 70 - 110 mg/dL   POCT glucose    Collection Time: 04/26/25 10:19 PM   Result Value Ref Range    POCT Glucose 118 (H) 70 - 110 mg/dL   Comprehensive Metabolic Panel (CMP)    Collection Time: 04/27/25  5:07 AM   Result Value Ref Range    Sodium 140 136 - 145 mmol/L    Potassium 4.2 3.5 - 5.1 mmol/L    Chloride 105 95 - 110 mmol/L    CO2 28 23 - 29 mmol/L    Glucose 73 70 - 110 mg/dL    BUN 12 6 - 20 mg/dL    Creatinine 0.6 0.5 - 1.4 mg/dL    Calcium 8.9 8.7 - 10.5 mg/dL    Protein Total 6.5 6.0 - 8.4 gm/dL    Albumin 3.0 (L) 3.5 - 5.2 g/dL    Bilirubin Total 0.3 0.1 - 1.0 mg/dL     (H) 40 - 150 unit/L    AST 36 11 - 45 unit/L     (H) 10 - 44 unit/L    Anion Gap 7 (L) 8 - 16 mmol/L    eGFR >60 >60 mL/min/1.73/m2   Magnesium    Collection Time: 04/27/25  5:07 AM   Result Value Ref Range    Magnesium  1.8 1.6 - 2.6 mg/dL   Phosphorus    Collection Time: 04/27/25  5:07 AM   Result Value Ref Range    Phosphorus Level 2.3 (L) 2.7 - 4.5 mg/dL   CBC with Differential    Collection Time: 04/27/25  5:07 AM   Result Value Ref Range    WBC 7.41 3.90 - 12.70 K/uL    RBC 4.84 4.60 - 6.20 M/uL    HGB 12.3 (L) 14.0 - 18.0 gm/dL    HCT 39.6 (L) 40.0 - 54.0 %    MCV 82 82 - 98 fL    MCH 25.4 (L) 27.0 - 31.0 pg    MCHC 31.1 (L) 32.0 - 36.0 g/dL    RDW 16.1 (H) 11.5 - 14.5 %    Platelet Count 181 150 - 450 K/uL    MPV 11.5 9.2 - 12.9 fL    Nucleated RBC 0 <=0 /100 WBC    Neut % 63.2 38 - 73 %    Lymph % 21.5 18 - 48 %    Mono % 11.5 4 - 15 %    Eos % 3.1 <=8 %    Basophil % 0.4 <=1.9 %    Imm Grans % 0.3 0.0 - 0.5 %    Neut # 4.69 1.8 - 7.7 K/uL    Lymph # 1.59 1 - 4.8 K/uL    Mono # 0.85 0.3 - 1 K/uL    Eos # 0.23 <=0.5 K/uL    Baso # 0.03 <=0.2 K/uL    Imm Grans # 0.02  0.00 - 0.04 K/uL   POCT glucose    Collection Time: 04/27/25  5:13 AM   Result Value Ref Range    POCT Glucose 74 70 - 110 mg/dL       Imaging Results:  Imaging Results              X-Ray Chest AP Portable (Final result)  Result time 04/26/25 06:18:05      Final result by Christopher Owen MD (04/26/25 06:18:05)                   Impression:     No acute abnormality identified.  Suboptimal positioning.    Finalized on: 4/26/2025 6:18 AM By:  Christopher Owen MD  [facility]# [id number]      2025-04-26 06:20:15.300     [facility]               Narrative:    EXAM: XR CHEST AP PORTABLE    CLINICAL HISTORY: Tachycardia.    FINDINGS:  Suboptimal positioning.  Heart normal size.  Lungs are clear.  No pleural fluid or pneumothorax.                                         The EKG was ordered, reviewed, and independently interpreted by the ED provider.  Interpretation time: 04:24:45  Rate: 127 BPM  Rhythm: sinus tachycardia  Interpretation: Lateral infarct, age undetermined. ST & T wave abnormality, consider inferior ischemia. No STEMI.             The Emergency Provider reviewed the vital signs and test results, which are outlined above.     ED Discussion   6:31 AM: Dr. Dugan transfers care of patient to Dr. Novoa pending lab results.    7:48 AM: Discussed case with Rajinder Saldana MD (Hospital Medicine). Dr. Saldana agrees with current care and management of pt and accepts admission.   Admitting Service: Hospital Medicine  Admitting Physician: Dr. Saldana  Admit to: Med/Tele    7:48 AM: Re-evaluated pt.  I have discussed test results, shared treatment plan, and the need for admission with patient/family/caretaker at bedside. Pt and/or family/caretaker express understanding at this time and agree with all information. All questions answered. Pt/caretaker/family member(s) have no further questions or concerns at this time. Pt is ready for admit.      ED Course as of 04/27/25 0817   Sat Apr 26, 2025   0517 Rhythm strip reviewed. Sinus tachycardia, no  stemi. 117 bpm. [LV]      ED Course User Index  [LV] Wilbert Dugan Jr., MD     Medical Decision Making  23 yo male with MR lives [place] was seen yesterday for displaced PEG tube.  It was replaced, but today patient sent to ED for hyperventilation and low O2.  On exam he has crackles bilaterally,wet sounding lung fields, HR 120s.  CXR negative, but O2 sats on 2L upper 80s, low 90s.  Mom says he uses O2 prn and doesn't usually need it, but since yesterday has been on 2L continuously.  WBC 17.6.  Septic bolus and IV rocephin given. Will admit to HM    Amount and/or Complexity of Data Reviewed  Independent Historian: parent  External Data Reviewed: labs, radiology, ECG and notes.  Labs: ordered. Decision-making details documented in ED Course.  Radiology: ordered and independent interpretation performed. Decision-making details documented in ED Course.  ECG/medicine tests: ordered and independent interpretation performed. Decision-making details documented in ED Course.    Risk  OTC drugs.  Prescription drug management.  Decision regarding hospitalization.    Critical Care  Total time providing critical care: 45 minutes                ED Medication(s):  Medications   lactated ringers infusion ( Intravenous New Bag 4/27/25 0720)   baclofen tablet 20 mg (20 mg Per G Tube Given 4/26/25 2118)   diazePAM tablet 2 mg (2 mg Per G Tube Given 4/26/25 2117)   metoprolol tartrate (LOPRESSOR) tablet 50 mg (50 mg Per G Tube Given 4/26/25 2118)   montelukast chewable tablet 5 mg (5 mg Per G Tube Given 4/26/25 2118)   QUEtiapine tablet 100 mg (100 mg Per G Tube Given 4/26/25 2118)   cloNIDine tablet 0.1 mg (0.1 mg Per G Tube Given 4/26/25 2118)   mupirocin 2 % ointment ( Nasal Given 4/26/25 2119)   ceFEPIme injection 1 g (1 g Intravenous Given 4/27/25 0301)   PHENobarbitaL tablet 64.8 mg (64.8 mg Per G Tube Given 4/26/25 2118)   levETIRAcetam 100 mg/mL solution 2,000 mg (2,000 mg Per G Tube Given 4/26/25 2119)   sodium chloride 0.9%  flush 10 mL (has no administration in time range)   albuterol-ipratropium 2.5 mg-0.5 mg/3 mL nebulizer solution 3 mL (has no administration in time range)   ondansetron injection 4 mg (has no administration in time range)   prochlorperazine injection Soln 5 mg (has no administration in time range)   acetaminophen tablet 650 mg (has no administration in time range)   naloxone 0.4 mg/mL injection 0.02 mg (has no administration in time range)   glucose chewable tablet 16 g (16 g Oral Given 4/26/25 2118)   glucose chewable tablet 24 g (has no administration in time range)   dextrose 50% injection 12.5 g (has no administration in time range)   dextrose 50% injection 25 g (has no administration in time range)   glucagon (human recombinant) injection 1 mg (has no administration in time range)   aluminum-magnesium hydroxide-simethicone 200-200-20 mg/5 mL suspension 30 mL (has no administration in time range)   melatonin tablet 6 mg (has no administration in time range)   polyethylene glycol packet 17 g (has no administration in time range)   senna-docusate 8.6-50 mg per tablet 1 tablet (1 tablet Per G Tube Given 4/26/25 2118)   simethicone chewable tablet 80 mg (has no administration in time range)   sodium chloride 0.9% bolus 1,824 mL 1,824 mL (0 mLs Intravenous Stopped 4/26/25 0800)   iohexoL (OMNIPAQUE 350) injection 100 mL (100 mLs Intravenous Given 4/26/25 1142)       Current Discharge Medication List                  Scribe Attestation:   Scribe #1: I performed the above scribed service and the documentation accurately describes the services I performed. I attest to the accuracy of the note.     Attending:   Physician Attestation Statement for Scribe #1: I, Wilbert Dugan Jr., MD, personally performed the services described in this documentation, as scribed by Jamel Hardin, in my presence, and it is both accurate and complete.       Scribe Attestation:   Scribe #2: I performed the above scribed service and the  documentation accurately describes the services I performed. I attest to the accuracy of the note.    Attending Attestation:           Physician Attestation for Scribe:    Physician Attestation Statement for Scribe #2: I, Margarita Novoa MD, reviewed documentation, as scribed by Deisi Allred in my presence, and it is both accurate and complete. I also acknowledge and confirm the content of the note done by Scribe #1.           Clinical Impression       ICD-10-CM ICD-9-CM   1. Sepsis, due to unspecified organism, unspecified whether acute organ dysfunction present  A41.9 038.9     995.91   2. Screening for cardiovascular condition  Z13.6 V81.2   3. Tachycardia  R00.0 785.0   4. SOB (shortness of breath)  R06.02 786.05   5. Chest pain  R07.9 786.50       Disposition:   Disposition: Admitted  Condition: Margarita Cameron MD  04/27/25 0818

## 2025-04-26 NOTE — H&P
[facility]  History & Physical    Patient Name: Ronny Ramey  MRN: [id number]  Patient Class: IP- Inpatient  Admission Date: 4/26/2025  Attending Physician: Rajinder Saldana MD   Primary Care Provider: Herman Nathan DNP         Patient information was obtained from parent and ER records.     Subjective:     Principal Problem:SIRS (systemic inflammatory response syndrome)    Chief Complaint:   Chief Complaint   Patient presents with    Tachycardia     Was seen last night for peg tube issue. Now being sent for eval of tachycardia/hyperventilation.        HPI: The patient is a 23 yo male NH resident with Cerebral Palsy, Spastic quadriplegia, Non verbal, s/p G tube, epilepsy, s/p trach and removal, Legally blind who presented to ED from nursing home for tachypnea, tachycardia and hypoxemia. Pt was seen in ED 4/25/25 for a Peg tube replacement. After arrival to Nursing home, the nurses noted the above symptoms. Mother at bedside. She states the pt is normally not on oxygen. ED report from nursing home denied fever, N/V, diarrhea. Pt tends to be constipated- last BM 4/24/25.     In the ED, -140. Tmax 99.1F, Oxygen saturation 88%- 2 liters oxygen via NC applied. Labs revealed WBC 17.6, , , BNP/troponin normal. LA normal. Ua unremarkable. CXR- clear.   Pt was given 30ml/kg bolus     SDM is pt's mother. 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    Vision abnormalities        Past Surgical History:   Procedure Laterality Date    ADENOIDECTOMY      APPLICATION OF WOUND VACUUM-ASSISTED CLOSURE DEVICE N/A 10/18/2024    Procedure: APPLICATION, WOUND VAC;  Surgeon:  Anna Marie Mo MD;  Location: [facility] OR;  Service: Colon and Rectal;  Laterality: N/A;    BACLOFEN PUMP IMPLANTATION N/A 2020    Procedure: INSERTION, INTRATHECAL BACLOFEN PUMP;  Surgeon: Robbi Cain MD;  Location: [facility] OR 2ND FLR;  Service: Neurosurgery;  Laterality: N/A;  toronto I, asa 1, lateral left down, regular bed reversed, christine, [employer] rep, co surgeon DR Miller    BACLOFEN PUMP IMPLANTATION  2020    CIRCUMCISION      EXCISION, SMALL INTESTINE N/A 10/18/2024    Procedure: EXCISION, SMALL INTESTINE;  Surgeon: Anna Marie Mo MD;  Location: [facility] OR;  Service: Colon and Rectal;  Laterality: N/A;    EYE SURGERY      as a     GASTROSTOMY TUBE PLACEMENT      HERNIA REPAIR      HIP SURGERY      INJECTION OF ANESTHETIC AGENT INTO TISSUE PLANE DEFINED BY TRANSVERSUS ABDOMINIS MUSCLE N/A 10/18/2024    Procedure: BLOCK, TRANSVERSUS ABDOMINIS PLANE;  Surgeon: Anna Marie Mo MD;  Location: [facility] OR;  Service: Colon and Rectal;  Laterality: N/A;    LAPAROTOMY, EXPLORATORY N/A 10/18/2024    Procedure: LAPAROTOMY, EXPLORATORY;  Surgeon: Anna Marie Mo MD;  Location: [facility] OR;  Service: Colon and Rectal;  Laterality: N/A;  Lysis of adhesions    LAPAROTOMY, EXPLORATORY N/A 10/21/2024    Procedure: LAPAROTOMY, EXPLORATORY, OPEN GASTROSTOMY TUBE;  Surgeon: Anna Marie Mo MD;  Location: [facility] OR;  Service: Colon and Rectal;  Laterality: N/A;    LUMBAR PUNCTURE WITH INJECTION OF BACLOFEN N/A 3/11/2020    Procedure: LUMBAR PUNCTURE, WITH BACLOFEN INJECTION;  Surgeon: Cristiane Sky MD;  Location: [facility] OR 2ND FLR;  Service: Neurosurgery;  Laterality: N/A;  toronto I, asa 1, lateral left down, regular bed reversed , christine , [employer] co surgeon DR Miller    NISSEN FUNDOPLICATION      PARTIAL GASTRECTOMY N/A 10/18/2024    Procedure: GASTRECTOMY, PARTIAL;  Surgeon: Anna Marie Mo MD;  Location: [facility] OR;  Service: Colon and Rectal;  Laterality: N/A;    REMOVAL OF BACLOFEN PUMP Right 10/15/2020     Procedure: REMOVAL, BACLOFEN PUMP;  Surgeon: Marcy Macedo MD;  Location: [facility] OR [address] [place] [address];  Service: Neurosurgery;  Laterality: Right;    TONSILLECTOMY      TYMPANOSTOMY TUBE PLACEMENT         Review of patient's allergies indicates:   Allergen Reactions    Strawberries [strawberry] Hives     STRAWBERRIES ALLERGIC REACTIONS   (SEIZURES AND HIVES )       No current facility-administered medications on file prior to encounter.     Current Outpatient Medications on File Prior to Encounter   Medication Sig    baclofen (LIORESAL) 20 MG tablet 1 tablet (20 mg total) by Per G Tube route 2 (two) times daily.    cloNIDine (CATAPRES) 0.1 MG tablet 1 tablet (0.1 mg total) by Per G Tube route nightly.    cloNIDine 0.1 mg/24 hr td ptwk (CATAPRES) 0.1 mg/24 hr Place 1 patch onto the skin every 7 days.    diazePAM (VALIUM) 5 mg/5 mL (1 mg/mL) oral solution TAKE 3 ML(3 MG) BY MOUTH TWICE DAILY    gentamicin (GARAMYCIN) 0.1 % ointment     levETIRAcetam (KEPPRA) 100 mg/mL Soln TAKE 20 ML(2000 MG) VIA GTUBE TWICE DAILY    metoprolol tartrate (LOPRESSOR) 50 MG tablet 1 tablet (50 mg total) by Per G Tube route 2 (two) times daily.    midazolam 5 mg/spray (0.1 mL) Spry 1 Spray per nostril for seizure >3 minutes. May repeat in the other nostril if the seizure continues beyond 10 minutes. (Patient not taking: Reported on 10/25/2023)    montelukast (SINGULAIR) 5 MG chewable tablet Take 1 tablet (5 mg total) by mouth every evening.    mupirocin (BACTROBAN) 2 % ointment Apply topically 3 (three) times daily.    PHENobarbitaL 64.8 MG tablet Take 1 tablet (64.8 mg total) by mouth 2 (two) times daily.    pulse oximeter (PULSE OXIMETER) device by Apply Externally route 2 (two) times a day. Use twice daily at 8 AM and 3 PM and record the value in [employer] as directed. (Patient not taking: Reported on 10/25/2023)    QUEtiapine (SEROQUEL) 100 MG Tab 1 tablet (100 mg total) by Per G Tube route 2 (two) times daily.    simethicone (MYLICON) 40  mg/0.6 mL drops 0.6 mLs (40 mg total) by Per G Tube route 4 (four) times daily.    tiZANidine (ZANAFLEX) 2 MG tablet      Family History       Problem Relation (Age of Onset)    Cancer Maternal Grandmother          Tobacco Use    Smoking status: Never    Smokeless tobacco: Never   Substance and Sexual Activity    Alcohol use: No    Drug use: No    Sexual activity: Never     Review of Systems   Unable to perform ROS: Patient nonverbal     Objective:     Vital Signs (Most Recent):  Temp: 99.1 °F (37.3 °C) (04/26/25 0837)  Pulse: (!) 112 (04/26/25 1020)  Resp: 20 (04/26/25 0837)  BP: 114/66 (04/26/25 0837)  SpO2: (!) 91 % (04/26/25 0837) Vital Signs (24h Range):  Temp:  [98.9 °F (37.2 °C)-99.1 °F (37.3 °C)] 99.1 °F (37.3 °C)  Pulse:  [112-140] 112  Resp:  [17-32] 20  SpO2:  [91 %-97 %] 91 %  BP: (114-154)/(65-88) 114/66        Body mass index is 17.67 kg/m².     Physical Exam  Vitals and nursing note reviewed.   Constitutional:       General: He is not in acute distress.     Appearance: He is cachectic. He is ill-appearing. He is not diaphoretic.   HENT:      Head: Normocephalic and atraumatic.      Nose: Nose normal.   Eyes:      General: No scleral icterus.     Conjunctiva/sclera: Conjunctivae normal.      Comments: Stigmata of blindness    Cardiovascular:      Rate and Rhythm: Regular rhythm. Tachycardia present.      Heart sounds: Normal heart sounds. No murmur heard.     No friction rub. No gallop.   Pulmonary:      Effort: Pulmonary effort is normal. Tachypnea present. No respiratory distress.      Breath sounds: Normal breath sounds. No stridor. No wheezing or rales.   Chest:      Chest wall: No tenderness.   Abdominal:      General: Bowel sounds are normal. There is distension (mild).      Palpations: Abdomen is soft.      Tenderness: There is no abdominal tenderness. There is no guarding or rebound.      Comments: PEG to right abdomen   Peg site C/D/I - no erythema/draiange   Musculoskeletal:         General:  Deformity present. No tenderness.      Cervical back: Neck supple.      Comments: Contractures to all 4 extremities    Skin:     General: Skin is warm and dry.      Coloration: Skin is pale.      Findings: No erythema or rash.   Neurological:      Mental Status: He is alert.      Motor: Atrophy and abnormal muscle tone present.      Comments: Non verbal   Unable to follow commands due to severe cognitive impairment/developmental delay   Psychiatric:         Attention and Perception: He is inattentive.                Significant Labs: All pertinent labs within the past 24 hours have been reviewed.    Significant Imaging: I have reviewed all pertinent imaging results/findings within the past 24 hours.  Assessment/Plan:     Assessment & Plan  SIRS (systemic inflammatory response syndrome)  T max 99.1, tachypnea, tachycardia, leukocytosis   LA and procalcitonin normal  UA/CXR unremarkable   Blood cultures   Abd xray ordered  CT chest/abd/pelvis   Empiric IV Cefepime       Spastic quadriplegic cerebral palsy  Born at 23 weeks with 5 month NICU stay. 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   Cont home meds baclofen, Versed    Intellectual disability with epilepsy  Supportive care   Seizure precautions   Cont Keppra and Phenobarbital   Cont Seroquel     Legally blind  Supportive care     PEG (percutaneous endoscopic gastrostomy) status  Patient noted to have a percutaneous endoscopic gastrostomy tube in place. I have personally inspected the tube.Tube was placed prior to this admission There are no signs of drainage or infection around the site. The tube is patent. Medications have converted to liquid form if available.  Routine care to be done by wound care and nursing staff.       Severe intellectual disabilities  Non verbal, supportive care    Moderate protein-calorie malnutrition  Nutrition consulted. Most recent weight and BMI monitored-      Measurements:  Wt Readings from Last 1 Encounters:   12/11/24 46.7 kg (102 lb 15.3 oz)   Body mass index is 17.67 kg/m².    Patient has been screened and assessed by RD.    Malnutrition Type:  Context:    Level:    Will restart tube feeding when appropriate    Tachycardia  Cont Lopressor     VTE Risk Mitigation (From admission, onward)      None                       Pharmacist Renal Dose Adjustment Note    Ronny Ramey is a 24 y.o. male being treated with the medication cefepime.    Patient Data:    Vital Signs (Most Recent):  Temp: 99.1 °F (37.3 °C) (04/26/25 0837)  Pulse: (!) 113 (04/26/25 0837)  Resp: 20 (04/26/25 0837)  BP: 114/66 (04/26/25 0837)  SpO2: (!) 91 % (04/26/25 0837) Vital Signs (72h Range):  Temp:  [97.7 °F (36.5 °C)-99.1 °F (37.3 °C)]   Pulse:  []   Resp:  [17-32]   BP: (114-154)/(64-88)   SpO2:  [91 %-97 %]      Recent Labs   Lab 04/26/25  0455   CREATININE 0.6     Serum creatinine: 0.6 mg/dL 04/26/25 0455  Estimated creatinine clearance: 166.1 mL/min    Cefepime 1 g IV q8h will be changed to cefepime 1 g IV q6h per pharmacy renal dosing protocol for CrCl > 49 mL/min.    Pharmacist's Name: Olga Kate NP  Department of Hospital Medicine  '[place] [facility]

## 2025-04-26 NOTE — ASSESSMENT & PLAN NOTE
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   Cont home meds baclofen, Versed

## 2025-04-27 LAB
ABSOLUTE EOSINOPHIL (OHS): 0.23 K/UL
ABSOLUTE MONOCYTE (OHS): 0.85 K/UL (ref 0.3–1)
ABSOLUTE NEUTROPHIL COUNT (OHS): 4.69 K/UL (ref 1.8–7.7)
ALBUMIN SERPL BCP-MCNC: 3 G/DL (ref 3.5–5.2)
ALP SERPL-CCNC: 197 UNIT/L (ref 40–150)
ALT SERPL W/O P-5'-P-CCNC: 108 UNIT/L (ref 10–44)
ANION GAP (OHS): 7 MMOL/L (ref 8–16)
AST SERPL-CCNC: 36 UNIT/L (ref 11–45)
BASOPHILS # BLD AUTO: 0.03 K/UL
BASOPHILS NFR BLD AUTO: 0.4 %
BILIRUB SERPL-MCNC: 0.3 MG/DL (ref 0.1–1)
BUN SERPL-MCNC: 12 MG/DL (ref 6–20)
CALCIUM SERPL-MCNC: 8.9 MG/DL (ref 8.7–10.5)
CHLORIDE SERPL-SCNC: 105 MMOL/L (ref 95–110)
CO2 SERPL-SCNC: 28 MMOL/L (ref 23–29)
CREAT SERPL-MCNC: 0.6 MG/DL (ref 0.5–1.4)
ERYTHROCYTE [DISTWIDTH] IN BLOOD BY AUTOMATED COUNT: 16.1 % (ref 11.5–14.5)
GFR SERPLBLD CREATININE-BSD FMLA CKD-EPI: >60 ML/MIN/1.73/M2
GLUCOSE SERPL-MCNC: 73 MG/DL (ref 70–110)
HCT VFR BLD AUTO: 39.6 % (ref 40–54)
HGB BLD-MCNC: 12.3 GM/DL (ref 14–18)
IMM GRANULOCYTES # BLD AUTO: 0.02 K/UL (ref 0–0.04)
IMM GRANULOCYTES NFR BLD AUTO: 0.3 % (ref 0–0.5)
LYMPHOCYTES # BLD AUTO: 1.59 K/UL (ref 1–4.8)
MAGNESIUM SERPL-MCNC: 1.8 MG/DL (ref 1.6–2.6)
MCH RBC QN AUTO: 25.4 PG (ref 27–31)
MCHC RBC AUTO-ENTMCNC: 31.1 G/DL (ref 32–36)
MCV RBC AUTO: 82 FL (ref 82–98)
NUCLEATED RBC (/100WBC) (OHS): 0 /100 WBC
PHOSPHATE SERPL-MCNC: 2.3 MG/DL (ref 2.7–4.5)
PLATELET # BLD AUTO: 181 K/UL (ref 150–450)
PMV BLD AUTO: 11.5 FL (ref 9.2–12.9)
POCT GLUCOSE: 65 MG/DL (ref 70–110)
POCT GLUCOSE: 67 MG/DL (ref 70–110)
POCT GLUCOSE: 74 MG/DL (ref 70–110)
POCT GLUCOSE: 78 MG/DL (ref 70–110)
POCT GLUCOSE: 81 MG/DL (ref 70–110)
POTASSIUM SERPL-SCNC: 4.2 MMOL/L (ref 3.5–5.1)
PROT SERPL-MCNC: 6.5 GM/DL (ref 6–8.4)
RBC # BLD AUTO: 4.84 M/UL (ref 4.6–6.2)
RELATIVE EOSINOPHIL (OHS): 3.1 %
RELATIVE LYMPHOCYTE (OHS): 21.5 % (ref 18–48)
RELATIVE MONOCYTE (OHS): 11.5 % (ref 4–15)
RELATIVE NEUTROPHIL (OHS): 63.2 % (ref 38–73)
SODIUM SERPL-SCNC: 140 MMOL/L (ref 136–145)
WBC # BLD AUTO: 7.41 K/UL (ref 3.9–12.7)

## 2025-04-27 PROCEDURE — 85025 COMPLETE CBC W/AUTO DIFF WBC: CPT | Performed by: NURSE PRACTITIONER

## 2025-04-27 PROCEDURE — 84100 ASSAY OF PHOSPHORUS: CPT | Performed by: NURSE PRACTITIONER

## 2025-04-27 PROCEDURE — 83735 ASSAY OF MAGNESIUM: CPT | Performed by: NURSE PRACTITIONER

## 2025-04-27 PROCEDURE — 82040 ASSAY OF SERUM ALBUMIN: CPT | Performed by: NURSE PRACTITIONER

## 2025-04-27 PROCEDURE — 63600175 PHARM REV CODE 636 W HCPCS: Performed by: NURSE PRACTITIONER

## 2025-04-27 PROCEDURE — 21400001 HC TELEMETRY ROOM

## 2025-04-27 PROCEDURE — 36415 COLL VENOUS BLD VENIPUNCTURE: CPT | Performed by: NURSE PRACTITIONER

## 2025-04-27 PROCEDURE — 25000003 PHARM REV CODE 250: Performed by: FAMILY MEDICINE

## 2025-04-27 PROCEDURE — 25000003 PHARM REV CODE 250: Performed by: NURSE PRACTITIONER

## 2025-04-27 RX ADMIN — MUPIROCIN: 20 OINTMENT TOPICAL at 09:04

## 2025-04-27 RX ADMIN — PHENOBARBITAL 64.8 MG: 32.4 TABLET ORAL at 09:04

## 2025-04-27 RX ADMIN — QUETIAPINE FUMARATE 100 MG: 100 TABLET ORAL at 09:04

## 2025-04-27 RX ADMIN — BACLOFEN 20 MG: 10 TABLET ORAL at 09:04

## 2025-04-27 RX ADMIN — CEFEPIME 1 G: 1 INJECTION, POWDER, FOR SOLUTION INTRAMUSCULAR; INTRAVENOUS at 03:04

## 2025-04-27 RX ADMIN — Medication 16 G: at 05:04

## 2025-04-27 RX ADMIN — METOPROLOL TARTRATE 50 MG: 50 TABLET, FILM COATED ORAL at 09:04

## 2025-04-27 RX ADMIN — LEVETIRACETAM 2000 MG: 100 SOLUTION ORAL at 09:04

## 2025-04-27 RX ADMIN — SODIUM CHLORIDE, POTASSIUM CHLORIDE, SODIUM LACTATE AND CALCIUM CHLORIDE: 600; 310; 30; 20 INJECTION, SOLUTION INTRAVENOUS at 07:04

## 2025-04-27 RX ADMIN — CLONIDINE HYDROCHLORIDE 0.1 MG: 0.1 TABLET ORAL at 09:04

## 2025-04-27 RX ADMIN — SENNOSIDES AND DOCUSATE SODIUM 1 TABLET: 50; 8.6 TABLET ORAL at 09:04

## 2025-04-27 RX ADMIN — MONTELUKAST SODIUM 5 MG: 5 TABLET, CHEWABLE ORAL at 09:04

## 2025-04-27 RX ADMIN — SODIUM CHLORIDE, POTASSIUM CHLORIDE, SODIUM LACTATE AND CALCIUM CHLORIDE: 600; 310; 30; 20 INJECTION, SOLUTION INTRAVENOUS at 05:04

## 2025-04-27 RX ADMIN — DIAZEPAM 2 MG: 2 TABLET ORAL at 09:04

## 2025-04-27 RX ADMIN — CEFEPIME 1 G: 1 INJECTION, POWDER, FOR SOLUTION INTRAMUSCULAR; INTRAVENOUS at 11:04

## 2025-04-27 RX ADMIN — CEFEPIME 1 G: 1 INJECTION, POWDER, FOR SOLUTION INTRAMUSCULAR; INTRAVENOUS at 10:04

## 2025-04-27 NOTE — PLAN OF CARE
Recommendations  1. Initiate pt onto continuous Enteral nutrition, recommend Nutren 2.0 via PEG tube, goal rate 35 mL/hr, starting at 10 mL/hr then progress to goal rate within 24 hrs if pt is tolerating or per MD/NP     -160 mL q4h free water flushes (960 mL/day  2. Recommend Edwin BID via PEG tube for wound healing     3. Weigh twice weekly    4. RD to monitor nutrition status    Goals: Pt will have a means to nurition by RD follow up  Nutrition Goal Status: new  Communication of RD Recs:  (RD remote)

## 2025-04-27 NOTE — HOSPITAL COURSE
"The patient remained afebrile from admission on 04/27/25 throughout the hospitalization. An initial leukocytosis resolved with empiric IV antibiotics; blood and urine cultures stayed NGTD, and CT imaging showed no free air, allowing surgical concern to be ruled out. Enteral feeds were cautiously restarted and advanced as tolerated. Respiratory status improved from supplemental O? requirements to stable room-air saturations, though serial spot checks continued. Attempts to obtain a sputum culture were unsuccessful, but the normalized WBC count and absence of fever reduced suspicion for pneumonia. With clinical stability, nutrition at goal, and no new infectious findings, discharge planning was initiated for return to the nursing home, contingent on sustained room-air sats and completion of current antibiotic course.    BP (!) 100/55 (BP Location: Right arm, Patient Position: Lying)   Pulse 78   Temp 97.8 °F (36.6 °C) (Oral)   Resp 18   Ht 5' 4" (1.626 m)   Wt 56 kg (123 lb 7.3 oz)   SpO2 (!) 94%   BMI 21.19 kg/m²     "

## 2025-04-27 NOTE — PLAN OF CARE
Discussed poc with pt, pt verbalized understanding    Purposeful rounding every 2hours    VS wnl  Cardiac monitoring in use, pt is PEPE, tele monitor # [id number]  Blood glucose monitoring   Fall precautions in place, remains injury free  Suction equipment at bedside    IVFs  Accurate I&Os  Abx given as prescribed  Bed locked at lowest position  Call light within reach    Chart check complete  Will cont with POC    Problem: Adult Inpatient Plan of Care  Goal: Plan of Care Review  Outcome: Progressing  Goal: Patient-Specific Goal (Individualized)  Outcome: Progressing  Goal: Absence of Hospital-Acquired Illness or Injury  Outcome: Progressing  Goal: Optimal Comfort and Wellbeing  Outcome: Progressing  Goal: Readiness for Transition of Care  Outcome: Progressing     Problem: Wound  Goal: Optimal Coping  Outcome: Progressing  Goal: Optimal Functional Ability  Outcome: Progressing  Goal: Absence of Infection Signs and Symptoms  Outcome: Progressing  Goal: Improved Oral Intake  Outcome: Progressing  Goal: Optimal Pain Control and Function  Outcome: Progressing  Goal: Skin Health and Integrity  Outcome: Progressing  Goal: Optimal Wound Healing  Outcome: Progressing     Problem: Skin Injury Risk Increased  Goal: Skin Health and Integrity  Outcome: Progressing     Problem: Infection  Goal: Absence of Infection Signs and Symptoms  Outcome: Progressing

## 2025-04-27 NOTE — CONSULTS
[facility]  Adult Nutrition  Consult Note    SUMMARY     Recommendations  1. Initiate pt onto continuous Enteral nutrition, recommend Nutren 2.0 via PEG tube, goal rate 35 mL/hr, starting at 10 mL/hr then progress to goal rate within 24 hrs if pt is tolerating or per MD/NP     -160 mL q4h free water flushes (960 mL/day  2. Recommend Edwin BID via PEG tube for wound healing     3. Weigh twice weekly    4. RD to monitor nutrition status    Goals: Pt will have a means to nurition by RD follow up  Nutrition Goal Status: new  Communication of RD Recs:  (RD remote)    Nutrition Discharge Planning     Nutrition Discharge Planning: Enteral nutrition (comments)  Enteral nutrition (comments): Continuous enteral nutrition Nutren 2.0 or TwoCal via PEG tube, goal rate 35 mL/hr + FWF per MD/NP + Edwin BID via PEG tube    Assessment and Plan  Nutrition Problem  Inadequate energy intake    Related to (etiology):   PEG tube issues    Signs and Symptoms (as evidenced by):   NPO status    Interventions (treatment strategy):  Collaboration of care with other providers  Enteral Nutrition    Nutrition Diagnosis Status:   New      Malnutrition Assessment           REA RD providing remote coverage                            Reason for Assessment    Reason For Assessment: consult, new tube feeding (malnutrition)  Diagnosis:  (SIRS)  General Information Comments: The patient is a 25 yo male NH resident with Cerebral Palsy, Spastic quadriplegia, Non verbal, s/p G tube, epilepsy, s/p trach and removal, Legally blind who presented to ED from nursing home for tachypnea, tachycardia and hypoxemia. Pt was seen in ED 4/25/25 for a Peg tube replacement. Peg in place. NPO. LR at 100 mL/hr. Per previous RD note, pt on 2 kcal/mL enteral nutrition. PIV. Neri 9- R ischial tuberosity, abd, R heel, frontal lobe region. Pt previously diagnosed with moderate malnutrition. In-house RD to assess NFPE at follow up.    Nutrition/Diet History    Food  "Allergies:  (strawberries)    Anthropometrics    Height: 5' 4" (162.6 cm)  Height (inches): 64 in  Height Method: Stated  Weight: 46.7 kg (102 lb 15.3 oz)  Weight (lb): 102.96 lb  Ideal Body Weight (IBW), Male: 130 lb  % Ideal Body Weight, Male (lb): 79.2 %  % Ideal Body Weight Malnutrition: 70-79%: moderate deficit  BMI (Calculated): 17.7  BMI Grade: less than 18.5 - underweight       Lab/Procedures/Meds    Pertinent Labs Reviewed: reviewed  CBC:  Recent Labs   Lab 04/27/25  0507   WBC 7.41   HGB 12.3*   HCT 39.6*        CMP:  Recent Labs   Lab 04/27/25  0507   GLUCOSE 73   CALCIUM 8.9   ALBUMIN 3.0*      K 4.2   CO2 28      BUN 12   CREATININE 0.6   ALKPHOS 197*   *   AST 36   BILITOT 0.3     Pertinent Medications Reviewed: reviewed  Scheduled Meds:   baclofen  20 mg Per G Tube BID    ceFEPime IV (PEDS and ADULTS)  1 g Intravenous Q6H    cloNIDine  0.1 mg Per G Tube Nightly    diazePAM  2 mg Per G Tube BID    levETIRAcetam  2,000 mg Per G Tube BID    metoprolol tartrate  50 mg Per G Tube BID    montelukast  5 mg Per G Tube QHS    mupirocin   Nasal BID    PHENobarbitaL  64.8 mg Per G Tube BID    QUEtiapine  100 mg Per G Tube BID    senna-docusate  1 tablet Per G Tube BID     Continuous Infusions:   lactated ringers   Intravenous Continuous 100 mL/hr at 04/27/25 0720 New Bag at 04/27/25 0720     PRN Meds:.  Current Facility-Administered Medications:     acetaminophen, 650 mg, Per G Tube, Q4H PRN    albuterol-ipratropium, 3 mL, Nebulization, Q6H PRN    aluminum-magnesium hydroxide-simethicone, 30 mL, Per G Tube, QID PRN    dextrose 50%, 12.5 g, Intravenous, PRN    dextrose 50%, 25 g, Intravenous, PRN    glucagon (human recombinant), 1 mg, Intramuscular, PRN    glucose, 16 g, Oral, PRN    glucose, 24 g, Oral, PRN    melatonin, 6 mg, Per G Tube, Nightly PRN    naloxone, 0.02 mg, Intravenous, PRN    ondansetron, 4 mg, Intravenous, Q6H PRN    polyethylene glycol, 17 g, Per G Tube, BID PRN    " prochlorperazine, 5 mg, Intravenous, Q6H PRN    simethicone, 1 tablet, Per G Tube, QID PRN    sodium chloride 0.9%, 10 mL, Intravenous, Q8H PRN    Estimated/Assessed Needs    Weight Used For Calorie Calculations: 46.7 kg (102 lb 15.3 oz)  Energy Calorie Requirements (kcal): 3368-7218  Energy Need Method: Kcal/kg (35-40)  Protein Requirements: 70g (1.5 g/kg)  Weight Used For Protein Calculations: 46.7 kg (102 lb 15.3 oz)  Fluid Requirements (mL): 1 mL/kcal  Estimated Fluid Requirement Method: RDA Method  RDA Method (mL): 1635         Nutrition Prescription Ordered    Current Diet Order: NPO    Evaluation of Received Nutrient/Fluid Intake    I/O: +2.7L since admit  Energy Calories Required: not meeting needs  Protein Required: not meeting needs  Fluid Required: meeting needs  Comments: LBM: 4/27  % Intake of Estimated Energy Needs: 0 - 25 %  % Meal Intake: NPO    Nutrition Risk    Level of Risk/Frequency of Follow-up: high     Monitor and Evaluation    Monitor and Evaluation: Enteral and parenteral nutrition administration, Weight, Electrolyte and renal panel, Glucose/endocrine profile, Inflammatory profile, Skin     Nutrition Follow-Up    RD Follow-up?: Yes    Lucrecia Correa RDN, LDN

## 2025-04-27 NOTE — PLAN OF CARE
Pt is stable. No Sx of acute distress  Pt is nonverbal, quadriplegic  Turned q. 2, applied foam dressings to heels and barrier cream to sacrum  Aspiration precautions in place and maintained; suction at bedside  Cardiac monitor: [id number]  LR at 100mL/hr  Blood glucose monitored  Nutrition consulted   NPO   PEG in place  Abx given as ordered    Chart orders reviewed. Bed in lowest position, wheels locked, call light within reach. Pt remains injury free. Will cont POC

## 2025-04-28 LAB
ABSOLUTE EOSINOPHIL (OHS): 0.22 K/UL
ABSOLUTE MONOCYTE (OHS): 0.89 K/UL (ref 0.3–1)
ABSOLUTE NEUTROPHIL COUNT (OHS): 5.2 K/UL (ref 1.8–7.7)
ALBUMIN SERPL BCP-MCNC: 3.1 G/DL (ref 3.5–5.2)
ALP SERPL-CCNC: 184 UNIT/L (ref 40–150)
ALT SERPL W/O P-5'-P-CCNC: 157 UNIT/L (ref 10–44)
ANION GAP (OHS): 7 MMOL/L (ref 8–16)
AST SERPL-CCNC: 71 UNIT/L (ref 11–45)
BASOPHILS # BLD AUTO: 0.03 K/UL
BASOPHILS NFR BLD AUTO: 0.4 %
BILIRUB SERPL-MCNC: 0.4 MG/DL (ref 0.1–1)
BUN SERPL-MCNC: 7 MG/DL (ref 6–20)
CALCIUM SERPL-MCNC: 8.5 MG/DL (ref 8.7–10.5)
CHLORIDE SERPL-SCNC: 106 MMOL/L (ref 95–110)
CO2 SERPL-SCNC: 30 MMOL/L (ref 23–29)
CREAT SERPL-MCNC: 0.6 MG/DL (ref 0.5–1.4)
ERYTHROCYTE [DISTWIDTH] IN BLOOD BY AUTOMATED COUNT: 15.8 % (ref 11.5–14.5)
GFR SERPLBLD CREATININE-BSD FMLA CKD-EPI: >60 ML/MIN/1.73/M2
GLUCOSE SERPL-MCNC: 66 MG/DL (ref 70–110)
HCT VFR BLD AUTO: 40.9 % (ref 40–54)
HGB BLD-MCNC: 12.9 GM/DL (ref 14–18)
IMM GRANULOCYTES # BLD AUTO: 0.02 K/UL (ref 0–0.04)
IMM GRANULOCYTES NFR BLD AUTO: 0.2 % (ref 0–0.5)
LYMPHOCYTES # BLD AUTO: 1.76 K/UL (ref 1–4.8)
MAGNESIUM SERPL-MCNC: 1.5 MG/DL (ref 1.6–2.6)
MCH RBC QN AUTO: 25.4 PG (ref 27–31)
MCHC RBC AUTO-ENTMCNC: 31.5 G/DL (ref 32–36)
MCV RBC AUTO: 81 FL (ref 82–98)
NUCLEATED RBC (/100WBC) (OHS): 0 /100 WBC
PHOSPHATE SERPL-MCNC: 2.3 MG/DL (ref 2.7–4.5)
PLATELET # BLD AUTO: 215 K/UL (ref 150–450)
PMV BLD AUTO: 12.2 FL (ref 9.2–12.9)
POCT GLUCOSE: 74 MG/DL (ref 70–110)
POCT GLUCOSE: 78 MG/DL (ref 70–110)
POCT GLUCOSE: 84 MG/DL (ref 70–110)
POCT GLUCOSE: 92 MG/DL (ref 70–110)
POTASSIUM SERPL-SCNC: 3.6 MMOL/L (ref 3.5–5.1)
PROT SERPL-MCNC: 6.7 GM/DL (ref 6–8.4)
RBC # BLD AUTO: 5.08 M/UL (ref 4.6–6.2)
RELATIVE EOSINOPHIL (OHS): 2.7 %
RELATIVE LYMPHOCYTE (OHS): 21.7 % (ref 18–48)
RELATIVE MONOCYTE (OHS): 11 % (ref 4–15)
RELATIVE NEUTROPHIL (OHS): 64 % (ref 38–73)
SODIUM SERPL-SCNC: 143 MMOL/L (ref 136–145)
WBC # BLD AUTO: 8.12 K/UL (ref 3.9–12.7)

## 2025-04-28 PROCEDURE — 84100 ASSAY OF PHOSPHORUS: CPT | Performed by: NURSE PRACTITIONER

## 2025-04-28 PROCEDURE — 21400001 HC TELEMETRY ROOM

## 2025-04-28 PROCEDURE — 83735 ASSAY OF MAGNESIUM: CPT | Performed by: NURSE PRACTITIONER

## 2025-04-28 PROCEDURE — 25000003 PHARM REV CODE 250: Performed by: FAMILY MEDICINE

## 2025-04-28 PROCEDURE — 80053 COMPREHEN METABOLIC PANEL: CPT | Performed by: NURSE PRACTITIONER

## 2025-04-28 PROCEDURE — 85025 COMPLETE CBC W/AUTO DIFF WBC: CPT | Performed by: NURSE PRACTITIONER

## 2025-04-28 PROCEDURE — 63600175 PHARM REV CODE 636 W HCPCS: Performed by: NURSE PRACTITIONER

## 2025-04-28 PROCEDURE — 36415 COLL VENOUS BLD VENIPUNCTURE: CPT | Performed by: NURSE PRACTITIONER

## 2025-04-28 PROCEDURE — 25000003 PHARM REV CODE 250: Performed by: NURSE PRACTITIONER

## 2025-04-28 PROCEDURE — 87205 SMEAR GRAM STAIN: CPT | Performed by: NURSE PRACTITIONER

## 2025-04-28 RX ORDER — IBUPROFEN 200 MG
16 TABLET ORAL
Status: DISCONTINUED | OUTPATIENT
Start: 2025-04-28 | End: 2025-04-29 | Stop reason: HOSPADM

## 2025-04-28 RX ORDER — IBUPROFEN 200 MG
24 TABLET ORAL
Status: DISCONTINUED | OUTPATIENT
Start: 2025-04-28 | End: 2025-04-29 | Stop reason: HOSPADM

## 2025-04-28 RX ADMIN — METOPROLOL TARTRATE 50 MG: 50 TABLET, FILM COATED ORAL at 09:04

## 2025-04-28 RX ADMIN — PHENOBARBITAL 64.8 MG: 32.4 TABLET ORAL at 10:04

## 2025-04-28 RX ADMIN — DIAZEPAM 2 MG: 2 TABLET ORAL at 10:04

## 2025-04-28 RX ADMIN — MONTELUKAST SODIUM 5 MG: 5 TABLET, CHEWABLE ORAL at 10:04

## 2025-04-28 RX ADMIN — MUPIROCIN: 20 OINTMENT TOPICAL at 10:04

## 2025-04-28 RX ADMIN — CEFEPIME 1 G: 1 INJECTION, POWDER, FOR SOLUTION INTRAMUSCULAR; INTRAVENOUS at 03:04

## 2025-04-28 RX ADMIN — SENNOSIDES AND DOCUSATE SODIUM 1 TABLET: 50; 8.6 TABLET ORAL at 10:04

## 2025-04-28 RX ADMIN — QUETIAPINE FUMARATE 100 MG: 100 TABLET ORAL at 09:04

## 2025-04-28 RX ADMIN — METOPROLOL TARTRATE 50 MG: 50 TABLET, FILM COATED ORAL at 10:04

## 2025-04-28 RX ADMIN — CEFEPIME 1 G: 1 INJECTION, POWDER, FOR SOLUTION INTRAMUSCULAR; INTRAVENOUS at 10:04

## 2025-04-28 RX ADMIN — PHENOBARBITAL 64.8 MG: 32.4 TABLET ORAL at 09:04

## 2025-04-28 RX ADMIN — BACLOFEN 20 MG: 10 TABLET ORAL at 10:04

## 2025-04-28 RX ADMIN — QUETIAPINE FUMARATE 100 MG: 100 TABLET ORAL at 10:04

## 2025-04-28 RX ADMIN — SENNOSIDES AND DOCUSATE SODIUM 1 TABLET: 50; 8.6 TABLET ORAL at 09:04

## 2025-04-28 RX ADMIN — CEFEPIME 1 G: 1 INJECTION, POWDER, FOR SOLUTION INTRAMUSCULAR; INTRAVENOUS at 05:04

## 2025-04-28 RX ADMIN — CLONIDINE HYDROCHLORIDE 0.1 MG: 0.1 TABLET ORAL at 10:04

## 2025-04-28 RX ADMIN — CEFEPIME 1 G: 1 INJECTION, POWDER, FOR SOLUTION INTRAMUSCULAR; INTRAVENOUS at 09:04

## 2025-04-28 RX ADMIN — BACLOFEN 20 MG: 10 TABLET ORAL at 09:04

## 2025-04-28 RX ADMIN — MUPIROCIN: 20 OINTMENT TOPICAL at 09:04

## 2025-04-28 RX ADMIN — LEVETIRACETAM 2000 MG: 100 SOLUTION ORAL at 09:04

## 2025-04-28 RX ADMIN — LEVETIRACETAM 2000 MG: 100 SOLUTION ORAL at 10:04

## 2025-04-28 NOTE — ASSESSMENT & PLAN NOTE
T max 99.1, tachypnea, tachycardia, leukocytosis   LA and procalcitonin normal  UA/CXR unremarkable   Blood cultures   Abd xray ordered  CT chest/abd/pelvis   Empiric IV Cefepime     04/28/2025  Improved/resolved  Difficulty weaning from O2

## 2025-04-28 NOTE — PLAN OF CARE
Purposeful rounding every 2hours    Turn q2hr HAPI preventions in place    Cardiac monitoring in use, pt is NSR, tele monitor # [id number]  Blood glucose monitoring   Fall precautions in place, remains injury free    Pain and nausea under control with PRN meds    IVFs d/c;ed    Accurate I&Os  Abx given as prescribed  Bed locked at lowest position  Call light within reach    Chart check complete  Will cont with POC    TF was going to be initiated today but on hold until pulmonolgy clears pt   On continuous pulse ox

## 2025-04-28 NOTE — CARE UPDATE
"Paged by nursing about clinical change. Patient has intermittent episodes of gasping for air. Sats are stable as is rest of VS.     /62 (BP Location: Right arm, Patient Position: Lying)   Pulse 104   Temp 98.9 °F (37.2 °C) (Oral)   Resp 19   Ht 5' 4" (1.626 m)   Wt 56 kg (123 lb 7.3 oz)   SpO2 (!) 90%   BMI 21.19 kg/m²       PLAN:   Restart of TF postponed for now. O2 placed as a precaution. Continuous pulse ox ordered. Given complexity of case, Pulmonology consulted for completion. Dr. Back notified of consult.  "

## 2025-04-28 NOTE — PLAN OF CARE
Nutrition Recommendation/Intervention 4/28/25:   1. Initiate pt onto continuous Enteral nutrition, recommend Nutren 2.0 via PEG goal rate 35 mL/hr, starting at 10 mL/hr then progress to goal rate within 24 hrs if pt is tolerating and per MD/NP  - 175 mL q4h free water flushes (1050 mL/day) = total 1631 mL water/day (92% fluid needs), per MD/NP  2. Recommend Edwin BID via PEG tube for wound healing.    3. Weigh twice weekly    4. Collaboration by nutrition professional with other providers     Goals:   1. Pt will have a means of nutrition by RD follow up.   2. Pt will tolerate >75% EEN and EPN by RD follow up.    Sandy Nur RDN, LDN

## 2025-04-28 NOTE — PLAN OF CARE
04/28/25 1056   Rounds   Attendance Provider;;Charge nurse;Physical therapist   Discharge Plan A Return to nursing home   Why the patient remains in the hospital Requires continued medical care   Transition of Care Barriers None     Patient has no d/c needs at this time. Sw to follow up, as needed, for d/c planning purposes.     Plan for pt to return to [facility] once medically stable for d/c.

## 2025-04-28 NOTE — PROGRESS NOTES
"[facility] Medicine  Progress Note    Patient Name: Ronny Ramey  MRN: [id number]  Patient Class: IP- Inpatient   Admission Date: 4/26/2025  Length of Stay: 2 days  Attending Physician: Yuval Liu MD  Primary Care Provider: Herman Nathan DNP        Subjective     Principal Problem:SIRS (systemic inflammatory response syndrome)        HPI:  The patient is a 25 yo male NH resident with Cerebral Palsy, Spastic quadriplegia, Non verbal, s/p G tube, epilepsy, s/p trach and removal, Legally blind who presented to ED from nursing home for tachypnea, tachycardia and hypoxemia. Pt was seen in ED 4/25/25 for a Peg tube replacement. After arrival to Nursing home, the nurses noted the above symptoms. Mother at bedside. She states the pt is normally not on oxygen. ED report from nursing home denied fever, N/V, diarrhea. Pt tends to be constipated- last BM 4/24/25.     In the ED, -140. Tmax 99.1F, Oxygen saturation 88%- 2 liters oxygen via NC applied. Labs revealed WBC 17.6, , , BNP/troponin normal. LA normal. Ua unremarkable. CXR- clear.   Pt was given 30ml/kg bolus     SDM is pt's mother. Pt is a full code    Overview/Hospital Course:  04/27/2025  Afebrile since admission. WBC trend to normal on AM labs. Follow cultures. Continue current treatment. Discussed with Radiology. Low concern for free air on CT scan.       Interval history:  See hospital course    Objective:   /62 (BP Location: Right arm, Patient Position: Lying)   Pulse 104   Temp 98.9 °F (37.2 °C) (Oral)   Resp 19   Ht 5' 4" (1.626 m)   Wt 56 kg (123 lb 7.3 oz)   SpO2 (!) 90%   BMI 21.19 kg/m²     Intake/Output Summary (Last 24 hours) at 4/28/2025 0844  Last data filed at 4/27/2025 1842  Gross per 24 hour   Intake 1214.19 ml   Output --   Net 1214.19 ml       PHYSICAL EXAM  Vitals reviewed  Constitutional:       General: He is not in acute distress.     Appearance: He is cachectic. He " "is ill-appearing. He is not diaphoretic.   Cardiovascular:      Rate and Rhythm: Regular rhythm. Tachycardia present.      Heart sounds: Normal heart sounds. No murmur heard.     No friction rub. No gallop.   Pulmonary:      Effort: Pulmonary effort is normal. Tachypnea present. No respiratory distress.      Breath sounds: Normal breath sounds. No stridor. No wheezing or rales.   Chest:      Chest wall: No tenderness.   Abdominal:      General: Bowel sounds are normal. There is distension (mild).      Palpations: Abdomen is soft.      Tenderness: There is no abdominal tenderness. There is no guarding or rebound.      Comments: PEG to right abdomen   Peg site C/D/I - no erythema/draiange   Musculoskeletal:         General: Deformity present. No tenderness.      Cervical back: Neck supple.      Comments: Contractures to all 4 extremities    Skin:     General: Skin is warm and dry.      Coloration: Skin is pale.      Findings: No erythema or rash.   Neurological:      Mental Status: He is alert.      Motor: Atrophy and abnormal muscle tone present.      Comments: Non verbal   Unable to follow commands due to severe cognitive impairment/developmental delay   Psychiatric:         Attention and Perception: He is inattentive.     LABS  All labs from the past 24 hours were reviewed.     BMP:   Recent Labs   Lab 04/28/25  0529      K 3.6      CO2 30*   BUN 7   CREATININE 0.6   CALCIUM 8.5*   MG 1.5*     CBC:   Recent Labs   Lab 04/27/25  0507 04/28/25  0529   WBC 7.41 8.12   HGB 12.3* 12.9*   HCT 39.6* 40.9    215     CMP:   Recent Labs   Lab 04/27/25  0507 04/28/25  0529    143   K 4.2 3.6    106   CO2 28 30*   BUN 12 7   CREATININE 0.6 0.6   CALCIUM 8.9 8.5*   ALBUMIN 3.0* 3.1*   BILITOT 0.3 0.4   ALKPHOS 197* 184*   AST 36 71*   * 157*   ANIONGAP 7* 7*     Cardiac Markers: No results for input(s): "CKMB", "MYOGLOBIN", "BNP", "TROPISTAT" in the last 48 hours.  Coagulation: No results " "for input(s): "PT", "INR", "APTT" in the last 48 hours.  Lactic Acid: No results for input(s): "LACTATE" in the last 48 hours.  Magnesium:   Recent Labs   Lab 04/27/25  0507 04/28/25  0529   MG 1.8 1.5*     Troponin: No results for input(s): "TROPONINI", "TROPONINIHS" in the last 48 hours.  TSH:   No results for input(s): "TSH" in the last 4320 hours.  Urine Studies:   No results for input(s): "COLORU", "APPEARANCEUA", "PHUR", "SPECGRAV", "PROTEINUA", "GLUCUA", "KETONESU", "BILIRUBINUA", "OCCULTUA", "NITRITE", "UROBILINOGEN", "LEUKOCYTESUR", "RBCUA", "WBCUA", "BACTERIA", "SQUAMEPITHEL", "HYALINECASTS" in the last 48 hours.    Invalid input(s): "WRIGHTSUR"    IMAGING  All imaging from the past 24 hours were reviewed.     Imaging Results              X-Ray Chest AP Portable (Final result)  Result time 04/26/25 06:18:05      Final result by Christopher Owen MD (04/26/25 06:18:05)                   Impression:     No acute abnormality identified.  Suboptimal positioning.    Finalized on: 4/26/2025 6:18 AM By:  Christopher Owen MD  [facility]# [id number]      2025-04-26 06:20:15.300     [facility]               Narrative:    EXAM: XR CHEST AP PORTABLE    CLINICAL HISTORY: Tachycardia.    FINDINGS:  Suboptimal positioning.  Heart normal size.  Lungs are clear.  No pleural fluid or pneumothorax.                                            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   Cont home meds baclofen, Versed    Intellectual disability with epilepsy  Supportive care   Seizure " precautions   Cont Keppra and Phenobarbital   Cont Seroquel     Legally blind  Supportive care     PEG (percutaneous endoscopic gastrostomy) status  Patient noted to have a percutaneous endoscopic gastrostomy tube in place. I have personally inspected the tube.Tube was placed prior to this admission There are no signs of drainage or infection around the site. The tube is patent. Medications have converted to liquid form if available.  Routine care to be done by wound care and nursing staff.     04/28/2025  Confirmed low suspicion for free air in abdomen with radiology  Restart TF this AM, slowly to reach goal of 35cc/hr  See dietary documentation for specifics on nutrition  Once back at home rate, can likely dispo     Severe intellectual disabilities  Non verbal, supportive care    Moderate protein-calorie malnutrition  Nutrition consulted. Most recent weight and BMI monitored-     Measurements:  Wt Readings from Last 1 Encounters:   04/28/25 56 kg (123 lb 7.3 oz)   Body mass index is 21.19 kg/m².    Patient has been screened and assessed by RD.    Malnutrition Type:  Context:    Level:    Will restart tube feeding when appropriate    Tachycardia  Cont Lopressor     VTE Risk Mitigation (From admission, onward)           Ordered     IP VTE LOW RISK PATIENT  Once         04/26/25 1144     Place sequential compression device  Until discontinued         04/26/25 1144                    Discharge Planning   ABDIEL:      Code Status: Full Code   Medical Readiness for Discharge Date:   Discharge Plan A: Return to nursing home                        Yuval Liu MD  Department of Hospital Medicine   [facility] Med S[facility]

## 2025-04-28 NOTE — CONSULTS
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    Chart reviewed.   Patient is a 23 y/o male patient admitted 4/26/25 for systemic inflammatory response syndrome. PMH significant for Cerebral Palsy, Spastic quadriplegia, Non verbal, s/p G tube, epilepsy, s/p trach and removal, Legally blind who presented to ED from nursing home for tachypnea, tachycardia and hypoxemia.   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     Patient resting in bed, awake and alert. Left side lying with foam wedge in place. Wound care nurse gave introduction and reason for visit.   Skin Assessed    --Socks removed from BLE, contractures noted to RLE. Right heel intact with some blanchable redness noted. Left heel intact with blanchable redness. Painted both with cavilon. Reapplied socks and applied heel offloading boots.  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 No open areas  are noted, obvious divet present to the area filled with pink and light red scar tissue. No drainage noted from area. Still believe wound has a ways to go before being fully healed, therefore is still at risk of breakdown to this area. Painted with cavilon and applied bordered foam dressing for prevention. Recommend peeling up twice a shift for assessment and fully changing dressing weekly.   --Sacrum is intact no redness noted. Applied clear moisture barrier cream.   --Old resolved trach site noted that is now epithelialized containing pink intact scar tissue. Painted with cavilon and left MAURICIO.     Patient on isotour bed with ART in place and functioning.   Pressure Injury Prevention Orderset initiated.    Orders placed, plan to F/U in a week.            04/28/25 0907   WOCN Assessment   WOCN Total Time (mins) 45   Visit Date 04/28/25   Visit Time 0907   Consult Type New   WOCN Speciality Wound   Wound At risk for pressure Injury   Intervention assessed;changed;applied;chart review;orders   Teaching on-going   Skin Interventions   Device Skin Pressure Protection absorbent pad utilized/changed;positioning supports utilized   Pressure Reduction Devices foam padding utilized;positioning supports utilized;heel offloading device utilized   Pressure Reduction Techniques frequent weight shift encouraged;weight shift assistance provided   Skin Protection incontinence pads utilized   Positioning   Body Position turned;left;side-lying   Head of Bed (HOB) Positioning HOB at 30 degrees   Positioning/Transfer Devices pillows;in use   Pressure Injury Prevention    Check Moisture Management Pad Done   Sacral Foam Dressing Patient incontinent, barrier cream applied   Heel protection technique Heel boot   Heel preventative measures Peel back dressing/boot, assess skin and reapply   Check Medical Devices Done        Wound 10/16/24 0945 Pressure Injury Right lateral Ischial tuberosity #1   Date First Assessed/Time First Assessed:  10/16/24 0945   Present on Original Admission: Yes  Primary Wound Type: Pressure Injury  Side: Right  Orientation: lateral  Location: Ischial tuberosity  Wound Number: #1  Is this injury device related?: No   Wound Image     Pressure Injury Stage 4   Dressing Appearance Open to air   Drainage Amount None   Drainage Characteristics/Odor No odor   Appearance Intact;Pink;Dry   Tissue loss description Not applicable   Periwound Area Intact;Dry;Pink;Scar tissue   Care Applied:;Skin Barrier   Dressing Applied;Foam   Periwound Care Skin barrier film applied   Dressing Change Due 05/05/25 04/28/2025         [1]   Social History  Socioeconomic History    Marital status: Single   Tobacco Use    Smoking status: Never    Smokeless tobacco: Never   Substance and Sexual Activity    Alcohol use: No    Drug use: No    Sexual activity: Never   Social History Narrative    Lives with parents and 2 siblings.  Receives Homebound tutoring at [employer] where he attends during the week.     Social Drivers of Health     Financial Resource Strain: Patient Unable To Answer (4/28/2025)    Overall Financial Resource Strain (CARDIA)     Difficulty of Paying Living Expenses: Patient unable to answer   Food Insecurity: Patient Unable To Answer (4/28/2025)    Hunger Vital Sign     Worried About Running Out of Food in the Last Year: Patient unable to answer     Ran Out of Food in the Last Year: Patient unable to answer   Transportation Needs: Patient Unable To Answer (4/28/2025)    PRAPARE - Transportation     Lack of Transportation (Medical): Patient unable to answer     Lack of Transportation (Non-Medical): Patient unable to answer   Stress: Patient Unable To Answer (4/28/2025)    [employer] - Occupational Stress Questionnaire     Feeling of Stress : Patient unable to answer   Housing Stability: Patient Unable To Answer (4/28/2025)    Housing Stability Vital Sign     Unable to Pay for Housing in  the Last Year: Patient unable to answer     Homeless in the Last Year: Patient unable to answer

## 2025-04-28 NOTE — ASSESSMENT & PLAN NOTE
Patient noted to have a percutaneous endoscopic gastrostomy tube in place. I have personally inspected the tube.Tube was placed prior to this admission There are no signs of drainage or infection around the site. The tube is patent. Medications have converted to liquid form if available.  Routine care to be done by wound care and nursing staff.     04/28/2025  Confirmed low suspicion for free air in abdomen with radiology  Restart TF this AM, slowly to reach goal of 35cc/hr  See dietary documentation for specifics on nutrition  Once back at home rate, can likely dispo

## 2025-04-28 NOTE — PROGRESS NOTES
[facility] Medicine  Progress Note    Patient Name: Ronny Ramey  MRN: [id number]  Patient Class: IP- Inpatient   Admission Date: 4/26/2025  Length of Stay: 2 days  Attending Physician: Yuval Liu MD  Primary Care Provider: Herman Nathan DNP        Subjective     Principal Problem:SIRS (systemic inflammatory response syndrome)        HPI:  The patient is a 25 yo male NH resident with Cerebral Palsy, Spastic quadriplegia, Non verbal, s/p G tube, epilepsy, s/p trach and removal, Legally blind who presented to ED from nursing home for tachypnea, tachycardia and hypoxemia. Pt was seen in ED 4/25/25 for a Peg tube replacement. After arrival to Nursing home, the nurses noted the above symptoms. Mother at bedside. She states the pt is normally not on oxygen. ED report from nursing home denied fever, N/V, diarrhea. Pt tends to be constipated- last BM 4/24/25.     In the ED, -140. Tmax 99.1F, Oxygen saturation 88%- 2 liters oxygen via NC applied. Labs revealed WBC 17.6, , , BNP/troponin normal. LA normal. Ua unremarkable. CXR- clear.   Pt was given 30ml/kg bolus     SDM is pt's mother. Pt is a full code    Overview/Hospital Course:  04/27/2025  Afebrile since admission. WBC trend to normal on AM labs. Follow cultures. Continue current treatment. Discussed with Radiology. Low concern for free air on CT scan.     04/28/2025  Cultures NGTD. Afebrile. Restart TF's slowly. Difficulty weaning off O2. Close to being ready for discharge. Will begin discharge planning. Otherwise continue current therapy as ordered      Interval history:  Seemingly stable on room air. Will repeat O2 sats at noon. Difficulty obtaining sputum culture. Will continue to try throughout day. No family at bedside this morning. Will return when mother returns. If stable on room air and TF's are at goal, then can discharge back to NH.     Objective:   /62 (BP Location: Right arm,  "Patient Position: Lying)   Pulse 104   Temp 98.9 °F (37.2 °C) (Oral)   Resp 19   Ht 5' 4" (1.626 m)   Wt 56 kg (123 lb 7.3 oz)   SpO2 (!) 90%   BMI 21.19 kg/m²     Intake/Output Summary (Last 24 hours) at 4/28/2025 0848  Last data filed at 4/27/2025 1842  Gross per 24 hour   Intake 1214.19 ml   Output --   Net 1214.19 ml       PHYSICAL EXAM  Vitals reviewed  Constitutional:       General: He is not in acute distress.     Appearance: He is cachectic. He is ill-appearing. He is not diaphoretic.   Cardiovascular:      Rate and Rhythm: Regular rhythm. Tachycardia present.      Heart sounds: Normal heart sounds. No murmur heard.     No friction rub. No gallop.   Pulmonary:      Effort: Pulmonary effort is normal. Tachypnea present. No respiratory distress.      Breath sounds: Normal breath sounds. No stridor. No wheezing or rales.   Chest:      Chest wall: No tenderness.   Abdominal:      General: Bowel sounds are normal. There is distension (mild).      Palpations: Abdomen is soft.      Tenderness: There is no abdominal tenderness. There is no guarding or rebound.      Comments: PEG to right abdomen   Peg site C/D/I - no erythema/draiange   Musculoskeletal:         General: Deformity present. No tenderness.      Cervical back: Neck supple.      Comments: Contractures to all 4 extremities    Skin:     General: Skin is warm and dry.      Coloration: Skin is pale.      Findings: No erythema or rash.   Neurological:      Mental Status: He is alert.      Motor: Atrophy and abnormal muscle tone present.      Comments: Non verbal   Unable to follow commands due to severe cognitive impairment/developmental delay   Psychiatric:         Attention and Perception: He is inattentive.     LABS  All labs from the past 24 hours were reviewed.     BMP:   Recent Labs   Lab 04/28/25  0529      K 3.6      CO2 30*   BUN 7   CREATININE 0.6   CALCIUM 8.5*   MG 1.5*     CBC:   Recent Labs   Lab 04/27/25  0507 04/28/25  0529 " "  WBC 7.41 8.12   HGB 12.3* 12.9*   HCT 39.6* 40.9    215     CMP:   Recent Labs   Lab 04/27/25  0507 04/28/25  0529    143   K 4.2 3.6    106   CO2 28 30*   BUN 12 7   CREATININE 0.6 0.6   CALCIUM 8.9 8.5*   ALBUMIN 3.0* 3.1*   BILITOT 0.3 0.4   ALKPHOS 197* 184*   AST 36 71*   * 157*   ANIONGAP 7* 7*     Cardiac Markers: No results for input(s): "CKMB", "MYOGLOBIN", "BNP", "TROPISTAT" in the last 48 hours.  Coagulation: No results for input(s): "PT", "INR", "APTT" in the last 48 hours.  Lactic Acid: No results for input(s): "LACTATE" in the last 48 hours.  Magnesium:   Recent Labs   Lab 04/27/25  0507 04/28/25  0529   MG 1.8 1.5*     Troponin: No results for input(s): "TROPONINI", "TROPONINIHS" in the last 48 hours.  TSH:   No results for input(s): "TSH" in the last 4320 hours.  Urine Studies:   No results for input(s): "COLORU", "APPEARANCEUA", "PHUR", "SPECGRAV", "PROTEINUA", "GLUCUA", "KETONESU", "BILIRUBINUA", "OCCULTUA", "NITRITE", "UROBILINOGEN", "LEUKOCYTESUR", "RBCUA", "WBCUA", "BACTERIA", "SQUAMEPITHEL", "HYALINECASTS" in the last 48 hours.    Invalid input(s): "WRIGHTSUR"    IMAGING  All imaging from the past 24 hours were reviewed.     Imaging Results              X-Ray Chest AP Portable (Final result)  Result time 04/26/25 06:18:05      Final result by Christopher Owen MD (04/26/25 06:18:05)                   Impression:     No acute abnormality identified.  Suboptimal positioning.    Finalized on: 4/26/2025 6:18 AM By:  Christopher Owen MD  [facility]# [id number]      2025-04-26 06:20:15.300     [facility]               Narrative:    EXAM: XR CHEST AP PORTABLE    CLINICAL HISTORY: Tachycardia.    FINDINGS:  Suboptimal positioning.  Heart normal size.  Lungs are clear.  No pleural fluid or pneumothorax.                                            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   Cont home meds baclofen, Versed    Intellectual disability with epilepsy  Supportive care   Seizure precautions   Cont Keppra and Phenobarbital   Cont Seroquel     Legally blind  Supportive care     PEG (percutaneous endoscopic gastrostomy) status  Patient noted to have a percutaneous endoscopic gastrostomy tube in place. I have personally inspected the tube.Tube was placed prior to this admission There are no signs of drainage or infection around the site. The tube is patent. Medications have converted to liquid form if available.  Routine care to be done by wound care and nursing staff.     04/28/2025  Confirmed low suspicion for free air in abdomen with radiology  Restart TF this AM, slowly to reach goal of 35cc/hr  See dietary documentation for specifics on nutrition  Once back at home rate, can likely dispo     Severe intellectual disabilities  Non verbal, supportive care    Moderate protein-calorie malnutrition  Nutrition consulted. Most recent weight and BMI monitored-     Measurements:  Wt Readings from Last 1 Encounters:   04/28/25 56 kg (123 lb 7.3 oz)   Body mass index is 21.19 kg/m².    Patient has been screened and assessed by RD.    Malnutrition Type:  Context:    Level:    Will restart tube feeding when appropriate    Tachycardia  Cont Lopressor       VTE Risk Mitigation (From admission, onward)           Ordered     IP VTE LOW RISK PATIENT  Once         04/26/25 1144     Place sequential compression device  Until discontinued         04/26/25 1144                    Discharge Planning   ABDIEL:      Code Status: Full Code   Medical  Readiness for Discharge Date:   Discharge Plan A: Return to nursing home                        Yuval Liu MD  Department of Hospital Medicine   [facility] Surg

## 2025-04-28 NOTE — NURSING
Mslesli HM this - I'm concerned about his resp status, every now and then he is head bobbing and tracheal tugging and seemingly gasping for air, his wet cough is so thick its like he's choking to get it up, I called RT to come induce a sputum collection and do some deep suctioning but I didn't know if you wanted any other interventions....I saw on the 26th his imaging was clear but I'm sure now its gotta be junky...jsut want it to be known about his resp status now before we start TF

## 2025-04-28 NOTE — PLAN OF CARE
Free from falls. Peg tube patent. Cardiac monitored [address]. Turned q2. Blood glucose monitored. Low loss device in place. No s/s of acute distress.

## 2025-04-28 NOTE — ASSESSMENT & PLAN NOTE
Nutrition consulted. Most recent weight and BMI monitored-     Measurements:  Wt Readings from Last 1 Encounters:   04/28/25 56 kg (123 lb 7.3 oz)   Body mass index is 21.19 kg/m².    Patient has been screened and assessed by RD.    Malnutrition Type:  Context:    Level:    Will restart tube feeding when appropriate

## 2025-04-28 NOTE — PROGRESS NOTES
[facility] - [facility]  Adult Nutrition  Progress Note    SUMMARY       Recommendations    Recommendation/Intervention:   1. Initiate pt onto continuous Enteral nutrition, recommend Nutren 2.0 via PEG goal rate 35 mL/hr, starting at 10 mL/hr then progress to goal rate within 24 hrs if pt is tolerating and per MD/NP  -Formula at goal rate provides: 1680 kcals/day (95% EEN), 71 g protein/day (100% EPN), 181g CHO (82% CHO needs), 581 mL free formula water/day (33% fluid needs).  - 175 mL q4h free water flushes (1050 mL/day) = total 1631 mL water/day (92% fluid needs), per MD/NP  -Check Mg, K+, Na, Phos and Glu before and during initiation, correct as indicated  2. Recommend Edwin BID via PEG tube for wound healing.    3. Weigh twice weekly    4. Collaboration by nutrition professional with other providers     Goals:   1. Pt will have a means of nutrition by RD follow up.   2. Pt will tolerate >75% EEN and EPN by RD follow  up.    Nutrition Goal Status: new/continues  Communication of RD Recs: other (comment) (RD progress note, POC, sticky note)    Nutrition Discharge Planning    Nutrition Discharge Planning: Enteral nutrition (comments)  Enteral nutrition (comments): Continuous enteral nutrition Nutren 2.0 or TwoCal via PEG tube, goal rate 35 mL/hr + FWF per MD/NP + Edwin BID via PEG tube    Assessment and Plan    Assessment and Plan  Nutrition Problem  Inadequate energy intake     Related to (etiology):   PEG tube issues     Signs and Symptoms (as evidenced by):   NPO status     Interventions (treatment strategy):  1. Management of composition and delivery rate of enteral nutrition.  2. Collaboration by nutrition professional with other providers     Nutrition Diagnosis Status:   Continues       Malnutrition Assessment 4/28/25:     Skin (Micronutrient): other (see comments) (Flaky, Neri Score 10)                                 Reason for Assessment    Reason For Assessment: RD follow-up  Diagnosis: other (see comments)  "(SIRS)    General Information Comments:   4/27/25: The patient is a 25 yo male NH resident with Cerebral Palsy, Spastic quadriplegia, Non verbal, s/p G tube, epilepsy, s/p trach and removal, Legally blind who presented to ED from nursing home for tachypnea, tachycardia and hypoxemia. Pt was seen in ED 4/25/25 for a Peg tube replacement. Peg in place. NPO. LR at 100 mL/hr. Per previous RD note, pt on 2 kcal/mL enteral nutrition. PIV. Neri 9- R ischial tuberosity, abd, R heel, frontal lobe region. Pt previously diagnosed with moderate malnutrition. In-house RD to assess NFPE at follow up.    Follow up:    4/28/25: Pt seen for follow up. Pt is currently NPO x2 days with tube feedings of Nutren 2.0 @35 ml/hr ordered but held dt respiratory status. RD spoke w/ RN about pt's tube feeding status. Pt had tube feedings ran x1 hour today which were held per RN dt pt's change in respiratory status. Pt's tube feeding regimen will meet 100% of pt's energy at goal. RD performed visual NFPE which did not show any muscle or fat wasting. Labs and meds reviewed.    Nutrition/Diet History    Nutrition Intake History: Nutren 2.0  Spiritual, Cultural Beliefs, [employer] Practices, Values that Affect Care: no  Food Allergies: other (see comments) (strawberries)  Factors Affecting Nutritional Intake: NPO, inability to feed self, altered gastrointestinal function, chewing difficulties/inability to chew food, difficulty/impaired swallowing  Nutrition Related Social Determinants of Health: SDOH: None Identified    Anthropometrics    Height: 5' 4" (162.6 cm)  Height (inches): 64 in  Height Method: Stated  Weight: 56 kg (123 lb 7.3 oz)  Weight (lb): 123.46 lb  Weight Method: Bed Scale  Ideal Body Weight (IBW), Male: 130 lb  % Ideal Body Weight, Male (lb): 94.97 %  % Ideal Body Weight Malnutrition: 70-79%: moderate deficit  BMI (Calculated): 21.2  BMI Grade: 18.5-24.9 - normal  Additional Documentation: Tetraplegia (Quadriplegia) Ideal Body " "Weight (IBW) Adjustment  Tetraplegia (Quadriplegia) Ideal Body Weight (IBW) Adjustment: 116 lb 13.5 oz    Wt Readings from Last 15 Encounters:   04/28/25 56 kg (123 lb 7.3 oz)   12/11/24 46.7 kg (102 lb 15.3 oz)   12/09/24 42.3 kg (93 lb 4.8 oz)   11/05/24 41.9 kg (92 lb 6 oz)   06/02/23 49.9 kg (110 lb)   03/15/23 50 kg (110 lb 3.7 oz)   05/13/22 52.3 kg (115 lb 3.1 oz) (2%, Z= -2.11)*   09/20/21 50.5 kg (111 lb 5.3 oz) (1%, Z= -2.29)*   06/16/21 47.3 kg (104 lb 4.4 oz) (<1%, Z= -2.81)*   11/25/20 47.9 kg (105 lb 9.6 oz) (<1%, Z= -2.54)*   11/02/20 46 kg (101 lb 6.6 oz) (<1%, Z= -2.88)*   10/27/20 45.2 kg (99 lb 10.4 oz) (<1%, Z= -3.04)*   10/18/20 44.8 kg (98 lb 14 oz) (<1%, Z= -3.11)*   10/13/20 43.2 kg (95 lb 3.8 oz) (<1%, Z= -3.46)*   09/29/20 44.5 kg (98 lb) (<1%, Z= -3.17)*     * Growth percentiles are based on CDC (Boys, 2-20 Years) data.       Lab/Procedures/Meds    Pertinent Labs Reviewed: reviewed  Pertinent Medications Reviewed: reviewed    BMP  Lab Results   Component Value Date     04/28/2025    K 3.6 04/28/2025     04/28/2025    CO2 30 (H) 04/28/2025    BUN 7 04/28/2025    CREATININE 0.6 04/28/2025    CALCIUM 8.5 (L) 04/28/2025    ANIONGAP 7 (L) 04/28/2025    EGFRNORACEVR >60 04/28/2025     Lab Results   Component Value Date    CALCIUM 8.5 (L) 04/28/2025    PHOS 2.3 (L) 04/28/2025     Lab Results   Component Value Date    ALBUMIN 3.1 (L) 04/28/2025     Lab Results   Component Value Date     (H) 04/28/2025    AST 71 (H) 04/28/2025     (H) 10/27/2020    ALKPHOS 184 (H) 04/28/2025    BILITOT 0.4 04/28/2025     Recent Labs   Lab 04/28/25  1123   POCTGLUCOSE 84     No results found for: "LABA1C", "HGBA1C"    Lab Results   Component Value Date    WBC 8.12 04/28/2025    HGB 12.9 (L) 04/28/2025    HCT 40.9 04/28/2025    MCV 81 (L) 04/28/2025     04/28/2025       Scheduled Meds:   baclofen  20 mg Per G Tube BID    ceFEPime IV (PEDS and ADULTS)  1 g Intravenous Q6H    cloNIDine  " 0.1 mg Per G Tube Nightly    diazePAM  2 mg Per G Tube BID    levETIRAcetam  2,000 mg Per G Tube BID    metoprolol tartrate  50 mg Per G Tube BID    montelukast  5 mg Per G Tube QHS    mupirocin   Nasal BID    PHENobarbitaL  64.8 mg Per G Tube BID    QUEtiapine  100 mg Per G Tube BID    senna-docusate  1 tablet Per G Tube BID     Continuous Infusions:  PRN Meds:.  Current Facility-Administered Medications:     acetaminophen, 650 mg, Per G Tube, Q4H PRN    albuterol-ipratropium, 3 mL, Nebulization, Q6H PRN    aluminum-magnesium hydroxide-simethicone, 30 mL, Per G Tube, QID PRN    dextrose 50%, 12.5 g, Intravenous, PRN    dextrose 50%, 25 g, Intravenous, PRN    glucagon (human recombinant), 1 mg, Intramuscular, PRN    glucose, 16 g, Per G Tube, PRN    glucose, 24 g, Per G Tube, PRN    melatonin, 6 mg, Per G Tube, Nightly PRN    naloxone, 0.02 mg, Intravenous, PRN    ondansetron, 4 mg, Intravenous, Q6H PRN    polyethylene glycol, 17 g, Per G Tube, BID PRN    prochlorperazine, 5 mg, Intravenous, Q6H PRN    simethicone, 1 tablet, Per G Tube, QID PRN    sodium chloride 0.9%, 10 mL, Intravenous, Q8H PRN      Estimated/Assessed Needs    Weight Used For Calorie Calculations: 56 kg (123 lb 7.3 oz)  Energy Calorie Requirements (kcal): 1027-6199 kcals  Energy Need Method: Jigar Foster (MSJ x 1.2-1.4 (dysphagia))  Protein Requirements: 67-84 g (1.2-1.5 g/kg (wound risk))  Weight Used For Protein Calculations: 56 kg (123 lb 7.3 oz)  Fluid Requirements (mL): 5899-1542 ml  Estimated Fluid Requirement Method: RDA Method  RDA Method (mL): 1765  CHO Requirement: 221-257 g cho (4559-0853 kcals/8)      Nutrition Prescription Ordered    Current Diet Order: NPO  Nutrition Order Comments: TF held x1 day  Current Nutrition Support Formula Ordered: Nutren 2.0  Current Nutrition Support Rate Ordered: 0 (ml)  Current Nutrition Support Frequency Ordered: continuous  Oral Nutrition Supplement: Edwin BID    Evaluation of Received  Nutrient/Fluid Intake    I/O: (Net since admit):  4/27/25: +2732.6 ml  4/28/25: +4106.8 ml    Enteral Calories (kcal): 0  Enteral Protein (gm): 0  Enteral (Free Water) Fluid (mL): 0  % Kcal Needs: 0  Energy Calories Required: not meeting needs  Protein Required: not meeting needs  Fluid Required: meeting needs  Total Fluid Intake (mL): 1214.2  Comments: LBM: 4/27  Tolerance: other (see comments) (NPO)  % Intake of Estimated Energy Needs: 0 - 25 %  % Meal Intake: NPO    Nutrition Risk    Level of Risk/Frequency of Follow-up: high (Follow up x2 weekly)     Monitor and Evaluation    Monitor and Evaluation: Enteral and parenteral nutrition administration, Weight, Electrolyte and renal panel, Glucose/endocrine profile, Inflammatory profile, Skin     Nutrition Follow-Up    RD Follow-up?: Yes (Follow up x2 weekly)    Sandy Nur RDN, LDN

## 2025-04-29 VITALS
WEIGHT: 123.44 LBS | HEART RATE: 78 BPM | TEMPERATURE: 98 F | RESPIRATION RATE: 18 BRPM | OXYGEN SATURATION: 94 % | SYSTOLIC BLOOD PRESSURE: 100 MMHG | HEIGHT: 64 IN | BODY MASS INDEX: 21.07 KG/M2 | DIASTOLIC BLOOD PRESSURE: 55 MMHG

## 2025-04-29 LAB
ABSOLUTE EOSINOPHIL (OHS): 0.37 K/UL
ABSOLUTE MONOCYTE (OHS): 0.95 K/UL (ref 0.3–1)
ABSOLUTE NEUTROPHIL COUNT (OHS): 5.71 K/UL (ref 1.8–7.7)
ALBUMIN SERPL BCP-MCNC: 3.1 G/DL (ref 3.5–5.2)
ALP SERPL-CCNC: 183 UNIT/L (ref 40–150)
ALT SERPL W/O P-5'-P-CCNC: 207 UNIT/L (ref 10–44)
ANION GAP (OHS): 8 MMOL/L (ref 8–16)
AST SERPL-CCNC: 93 UNIT/L (ref 11–45)
BASOPHILS # BLD AUTO: 0.04 K/UL
BASOPHILS NFR BLD AUTO: 0.4 %
BILIRUB SERPL-MCNC: 0.3 MG/DL (ref 0.1–1)
BUN SERPL-MCNC: 10 MG/DL (ref 6–20)
CALCIUM SERPL-MCNC: 8.8 MG/DL (ref 8.7–10.5)
CHLORIDE SERPL-SCNC: 103 MMOL/L (ref 95–110)
CO2 SERPL-SCNC: 31 MMOL/L (ref 23–29)
CREAT SERPL-MCNC: 0.6 MG/DL (ref 0.5–1.4)
ERYTHROCYTE [DISTWIDTH] IN BLOOD BY AUTOMATED COUNT: 15.7 % (ref 11.5–14.5)
GFR SERPLBLD CREATININE-BSD FMLA CKD-EPI: >60 ML/MIN/1.73/M2
GLUCOSE SERPL-MCNC: 86 MG/DL (ref 70–110)
HCT VFR BLD AUTO: 39.6 % (ref 40–54)
HGB BLD-MCNC: 12.5 GM/DL (ref 14–18)
IMM GRANULOCYTES # BLD AUTO: 0.02 K/UL (ref 0–0.04)
IMM GRANULOCYTES NFR BLD AUTO: 0.2 % (ref 0–0.5)
LYMPHOCYTES # BLD AUTO: 1.98 K/UL (ref 1–4.8)
MAGNESIUM SERPL-MCNC: 1.6 MG/DL (ref 1.6–2.6)
MCH RBC QN AUTO: 25.5 PG (ref 27–31)
MCHC RBC AUTO-ENTMCNC: 31.6 G/DL (ref 32–36)
MCV RBC AUTO: 81 FL (ref 82–98)
NUCLEATED RBC (/100WBC) (OHS): 0 /100 WBC
PHOSPHATE SERPL-MCNC: 2.7 MG/DL (ref 2.7–4.5)
PLATELET # BLD AUTO: 238 K/UL (ref 150–450)
PMV BLD AUTO: 11.8 FL (ref 9.2–12.9)
POCT GLUCOSE: 123 MG/DL (ref 70–110)
POCT GLUCOSE: 95 MG/DL (ref 70–110)
POTASSIUM SERPL-SCNC: 3.6 MMOL/L (ref 3.5–5.1)
PROT SERPL-MCNC: 6.6 GM/DL (ref 6–8.4)
RBC # BLD AUTO: 4.91 M/UL (ref 4.6–6.2)
RELATIVE EOSINOPHIL (OHS): 4.1 %
RELATIVE LYMPHOCYTE (OHS): 21.8 % (ref 18–48)
RELATIVE MONOCYTE (OHS): 10.5 % (ref 4–15)
RELATIVE NEUTROPHIL (OHS): 63 % (ref 38–73)
SODIUM SERPL-SCNC: 142 MMOL/L (ref 136–145)
WBC # BLD AUTO: 9.07 K/UL (ref 3.9–12.7)

## 2025-04-29 PROCEDURE — 85025 COMPLETE CBC W/AUTO DIFF WBC: CPT | Performed by: NURSE PRACTITIONER

## 2025-04-29 PROCEDURE — 63600175 PHARM REV CODE 636 W HCPCS: Performed by: NURSE PRACTITIONER

## 2025-04-29 PROCEDURE — 25000003 PHARM REV CODE 250: Performed by: FAMILY MEDICINE

## 2025-04-29 PROCEDURE — 99900035 HC TECH TIME PER 15 MIN (STAT)

## 2025-04-29 PROCEDURE — 80053 COMPREHEN METABOLIC PANEL: CPT | Performed by: NURSE PRACTITIONER

## 2025-04-29 PROCEDURE — 36415 COLL VENOUS BLD VENIPUNCTURE: CPT | Performed by: NURSE PRACTITIONER

## 2025-04-29 PROCEDURE — 84100 ASSAY OF PHOSPHORUS: CPT | Performed by: NURSE PRACTITIONER

## 2025-04-29 PROCEDURE — 83735 ASSAY OF MAGNESIUM: CPT | Performed by: NURSE PRACTITIONER

## 2025-04-29 PROCEDURE — 94761 N-INVAS EAR/PLS OXIMETRY MLT: CPT

## 2025-04-29 PROCEDURE — 25000003 PHARM REV CODE 250: Performed by: NURSE PRACTITIONER

## 2025-04-29 PROCEDURE — 27000221 HC OXYGEN, UP TO 24 HOURS

## 2025-04-29 RX ORDER — LEVOFLOXACIN 750 MG/1
750 TABLET, FILM COATED ORAL DAILY
Qty: 4 TABLET | Refills: 0 | Status: SHIPPED | OUTPATIENT
Start: 2025-04-29 | End: 2025-05-03

## 2025-04-29 RX ADMIN — LEVETIRACETAM 2000 MG: 100 SOLUTION ORAL at 09:04

## 2025-04-29 RX ADMIN — METOPROLOL TARTRATE 50 MG: 50 TABLET, FILM COATED ORAL at 09:04

## 2025-04-29 RX ADMIN — PHENOBARBITAL 64.8 MG: 32.4 TABLET ORAL at 09:04

## 2025-04-29 RX ADMIN — DIAZEPAM 2 MG: 2 TABLET ORAL at 09:04

## 2025-04-29 RX ADMIN — CEFEPIME 1 G: 1 INJECTION, POWDER, FOR SOLUTION INTRAMUSCULAR; INTRAVENOUS at 04:04

## 2025-04-29 RX ADMIN — CEFEPIME 1 G: 1 INJECTION, POWDER, FOR SOLUTION INTRAMUSCULAR; INTRAVENOUS at 09:04

## 2025-04-29 RX ADMIN — MUPIROCIN: 20 OINTMENT TOPICAL at 09:04

## 2025-04-29 RX ADMIN — SENNOSIDES AND DOCUSATE SODIUM 1 TABLET: 50; 8.6 TABLET ORAL at 09:04

## 2025-04-29 RX ADMIN — QUETIAPINE FUMARATE 100 MG: 100 TABLET ORAL at 09:04

## 2025-04-29 RX ADMIN — BACLOFEN 20 MG: 10 TABLET ORAL at 09:04

## 2025-04-29 NOTE — NURSING
Pt appropriate for discharge    Called report to luis [facility] in plaq spoke to hayley Guillen to transport pt - eta 3hrs   called mom to update

## 2025-04-29 NOTE — NURSING
Pt discharged to [facility]    PIV removed    Tele monitor removed    Left via [facility] with peg tube  Calling mom to update

## 2025-04-29 NOTE — PLAN OF CARE
[facility]  Discharge Final Note    Primary Care Provider: Herman Nathan DNP    Expected Discharge Date: 4/29/2025    Final Discharge Note (most recent)       Final Note - 04/29/25 1033          Final Note    Assessment Type Final Discharge Note     Anticipated Discharge Disposition prison Nursing Facility     Hospital Resources/Appts/Education Provided Post-Acute resouces added to AVS        Post-Acute Status    Discharge Delays Ambulance Transport/Facility Transport                   After-discharge care                Destination       [facility]   Service: Nursing Home    [id number] [place] [address]   Phone: [phone]                     Plan for return to [facility] today.     Sw contacted pt's mother, Patriece, to update her on d/c plans for today; no answer. Sw left a message notifying her of d/c plans for today.     Patient has no d/c needs at this time. Sw to follow up, as needed, for d/c planning purposes.

## 2025-04-29 NOTE — PLAN OF CARE
Purposeful rounding every 2hours    VS wnl  Cardiac monitoring in use  Blood glucose monitoring   Fall precautions in place, remains injury free    Tube feeds at 15 mL/hr. Pt tolerating well.    Accurate I&Os  Abx given as prescribed  Bed locked at lowest position  Call light within reach    Chart check continued  Will cont with POC

## 2025-05-01 LAB
BACTERIA BLD CULT: NORMAL
BACTERIA BLD CULT: NORMAL
BACTERIA SPEC CULT: ABNORMAL
BACTERIA SPEC CULT: ABNORMAL
GRAM STN SPEC: ABNORMAL

## 2025-05-11 ENCOUNTER — HOSPITAL ENCOUNTER (INPATIENT)
Facility: HOSPITAL | Age: 25
LOS: 20 days | Discharge: NURSING, REHAB OR LONG-TERM CARE | DRG: 004 | End: 2025-05-31
Attending: EMERGENCY MEDICINE | Admitting: INTERNAL MEDICINE
Payer: COMMERCIAL

## 2025-05-11 DIAGNOSIS — J96.22 ACUTE ON CHRONIC RESPIRATORY FAILURE WITH HYPOXIA AND HYPERCAPNIA: ICD-10-CM

## 2025-05-11 DIAGNOSIS — J96.01 SEPSIS WITH ACUTE HYPOXIC RESPIRATORY FAILURE WITHOUT SEPTIC SHOCK, DUE TO UNSPECIFIED ORGANISM: ICD-10-CM

## 2025-05-11 DIAGNOSIS — R00.1 BRADYCARDIA: ICD-10-CM

## 2025-05-11 DIAGNOSIS — J96.90 RESPIRATORY FAILURE: Primary | ICD-10-CM

## 2025-05-11 DIAGNOSIS — T68.XXXA HYPOTHERMIA, INITIAL ENCOUNTER: ICD-10-CM

## 2025-05-11 DIAGNOSIS — A41.9 SEPSIS WITH ACUTE HYPOXIC RESPIRATORY FAILURE WITHOUT SEPTIC SHOCK, DUE TO UNSPECIFIED ORGANISM: ICD-10-CM

## 2025-05-11 DIAGNOSIS — R65.20 SEPSIS WITH ACUTE HYPOXIC RESPIRATORY FAILURE WITHOUT SEPTIC SHOCK, DUE TO UNSPECIFIED ORGANISM: ICD-10-CM

## 2025-05-11 DIAGNOSIS — J96.21 ACUTE ON CHRONIC RESPIRATORY FAILURE WITH HYPOXIA AND HYPERCAPNIA: ICD-10-CM

## 2025-05-11 DIAGNOSIS — J96.01 ACUTE RESPIRATORY FAILURE WITH HYPOXIA: ICD-10-CM

## 2025-05-11 DIAGNOSIS — R79.89 ELEVATED LACTIC ACID LEVEL: ICD-10-CM

## 2025-05-11 DIAGNOSIS — Z13.6 SCREENING FOR CARDIOVASCULAR CONDITION: ICD-10-CM

## 2025-05-11 DIAGNOSIS — Z01.818 ENCOUNTER FOR INTUBATION: ICD-10-CM

## 2025-05-11 DIAGNOSIS — R91.8 RIGHT UPPER LOBE PULMONARY INFILTRATE: ICD-10-CM

## 2025-05-11 PROBLEM — Z93.0 TRACHEOSTOMY IN PLACE: Status: RESOLVED | Noted: 2024-10-30 | Resolved: 2025-05-11

## 2025-05-11 LAB
ABSOLUTE EOSINOPHIL (OHS): 0.05 K/UL
ABSOLUTE MONOCYTE (OHS): 0.36 K/UL (ref 0.3–1)
ABSOLUTE NEUTROPHIL COUNT (OHS): 14.85 K/UL (ref 1.8–7.7)
ALBUMIN SERPL BCP-MCNC: 3.4 G/DL (ref 3.5–5.2)
ALLENS TEST: ABNORMAL
ALP SERPL-CCNC: 169 UNIT/L (ref 40–150)
ALT SERPL W/O P-5'-P-CCNC: 147 UNIT/L (ref 10–44)
ANION GAP (OHS): 15 MMOL/L (ref 8–16)
APTT PPP: 31.9 SECONDS (ref 21–32)
AST SERPL-CCNC: 61 UNIT/L (ref 11–45)
BACTERIA #/AREA URNS HPF: NORMAL /HPF
BASOPHILS # BLD AUTO: 0.04 K/UL
BASOPHILS NFR BLD AUTO: 0.2 %
BILIRUB SERPL-MCNC: 0.5 MG/DL (ref 0.1–1)
BILIRUB UR QL STRIP.AUTO: NEGATIVE
BNP SERPL-MCNC: 71 PG/ML (ref 0–99)
BUN SERPL-MCNC: 19 MG/DL (ref 6–20)
CALCIUM SERPL-MCNC: 8.8 MG/DL (ref 8.7–10.5)
CHLORIDE SERPL-SCNC: 101 MMOL/L (ref 95–110)
CLARITY UR: CLEAR
CO2 SERPL-SCNC: 23 MMOL/L (ref 23–29)
COLOR UR AUTO: YELLOW
CREAT SERPL-MCNC: 0.6 MG/DL (ref 0.5–1.4)
CTP QC/QA: YES
CTP QC/QA: YES
DELSYS: ABNORMAL
ERYTHROCYTE [DISTWIDTH] IN BLOOD BY AUTOMATED COUNT: 15.9 % (ref 11.5–14.5)
ERYTHROCYTE [SEDIMENTATION RATE] IN BLOOD BY WESTERGREN METHOD: 20 MM/H
FIO2: 100
GFR SERPLBLD CREATININE-BSD FMLA CKD-EPI: >60 ML/MIN/1.73/M2
GLUCOSE SERPL-MCNC: 91 MG/DL (ref 70–110)
GLUCOSE UR QL STRIP: NEGATIVE
HCO3 UR-SCNC: 28.1 MMOL/L (ref 24–28)
HCT VFR BLD AUTO: 49.4 % (ref 40–54)
HGB BLD-MCNC: 15.4 GM/DL (ref 14–18)
HGB UR QL STRIP: NEGATIVE
HOLD SPECIMEN: NORMAL
HYALINE CASTS #/AREA URNS LPF: 0 /LPF (ref 0–1)
IMM GRANULOCYTES # BLD AUTO: 0.06 K/UL (ref 0–0.04)
IMM GRANULOCYTES NFR BLD AUTO: 0.4 % (ref 0–0.5)
INR PPP: 1.2 (ref 0.8–1.2)
KETONES UR QL STRIP: NEGATIVE
LACTATE SERPL-SCNC: 1.5 MMOL/L (ref 0.5–2.2)
LACTATE SERPL-SCNC: 4.2 MMOL/L (ref 0.5–2.2)
LEUKOCYTE ESTERASE UR QL STRIP: NEGATIVE
LYMPHOCYTES # BLD AUTO: 0.76 K/UL (ref 1–4.8)
MCH RBC QN AUTO: 25.4 PG (ref 27–31)
MCHC RBC AUTO-ENTMCNC: 31.2 G/DL (ref 32–36)
MCV RBC AUTO: 82 FL (ref 82–98)
MICROSCOPIC COMMENT: NORMAL
MODE: ABNORMAL
NITRITE UR QL STRIP: NEGATIVE
NUCLEATED RBC (/100WBC) (OHS): 0 /100 WBC
OHS QRS DURATION: 80 MS
OHS QTC CALCULATION: 412 MS
PCO2 BLDA: 53 MMHG (ref 35–45)
PEEP: 5
PH SMN: 7.33 [PH] (ref 7.35–7.45)
PH UR STRIP: 8 [PH]
PLATELET # BLD AUTO: 313 K/UL (ref 150–450)
PMV BLD AUTO: 12.1 FL (ref 9.2–12.9)
PO2 BLDA: 119 MMHG (ref 80–100)
POC BE: 2 MMOL/L (ref -2–2)
POC MOLECULAR INFLUENZA A AGN: NEGATIVE
POC MOLECULAR INFLUENZA B AGN: NEGATIVE
POC SATURATED O2: 98 % (ref 95–100)
POTASSIUM SERPL-SCNC: 3.3 MMOL/L (ref 3.5–5.1)
PROCALCITONIN SERPL-MCNC: 0.03 NG/ML
PROT SERPL-MCNC: 6.8 GM/DL (ref 6–8.4)
PROT UR QL STRIP: ABNORMAL
PROTHROMBIN TIME: 13.1 SECONDS (ref 9–12.5)
RBC # BLD AUTO: 6.06 M/UL (ref 4.6–6.2)
RBC #/AREA URNS HPF: 1 /HPF (ref 0–4)
RELATIVE EOSINOPHIL (OHS): 0.3 %
RELATIVE LYMPHOCYTE (OHS): 4.7 % (ref 18–48)
RELATIVE MONOCYTE (OHS): 2.2 % (ref 4–15)
RELATIVE NEUTROPHIL (OHS): 92.2 % (ref 38–73)
SAMPLE: ABNORMAL
SARS-COV-2 RDRP RESP QL NAA+PROBE: NEGATIVE
SITE: ABNORMAL
SODIUM SERPL-SCNC: 139 MMOL/L (ref 136–145)
SP GR UR STRIP: 1.02
TROPONIN I SERPL DL<=0.01 NG/ML-MCNC: <0.006 NG/ML
UROBILINOGEN UR STRIP-ACNC: NEGATIVE EU/DL
VT: 400
WBC # BLD AUTO: 16.12 K/UL (ref 3.9–12.7)
WBC #/AREA URNS HPF: 0 /HPF (ref 0–5)

## 2025-05-11 PROCEDURE — 85730 THROMBOPLASTIN TIME PARTIAL: CPT | Performed by: EMERGENCY MEDICINE

## 2025-05-11 PROCEDURE — 20000000 HC ICU ROOM

## 2025-05-11 PROCEDURE — 63600175 PHARM REV CODE 636 W HCPCS: Mod: ER | Performed by: EMERGENCY MEDICINE

## 2025-05-11 PROCEDURE — 87070 CULTURE OTHR SPECIMN AEROBIC: CPT | Performed by: NURSE PRACTITIONER

## 2025-05-11 PROCEDURE — 25000003 PHARM REV CODE 250: Mod: ER

## 2025-05-11 PROCEDURE — 96365 THER/PROPH/DIAG IV INF INIT: CPT | Mod: ER

## 2025-05-11 PROCEDURE — 85610 PROTHROMBIN TIME: CPT | Performed by: EMERGENCY MEDICINE

## 2025-05-11 PROCEDURE — 99900026 HC AIRWAY MAINTENANCE (STAT)

## 2025-05-11 PROCEDURE — 96374 THER/PROPH/DIAG INJ IV PUSH: CPT | Mod: 59,ER

## 2025-05-11 PROCEDURE — 02HV33Z INSERTION OF INFUSION DEVICE INTO SUPERIOR VENA CAVA, PERCUTANEOUS APPROACH: ICD-10-PCS | Performed by: EMERGENCY MEDICINE

## 2025-05-11 PROCEDURE — 63600175 PHARM REV CODE 636 W HCPCS: Performed by: NURSE PRACTITIONER

## 2025-05-11 PROCEDURE — 99900026 HC AIRWAY MAINTENANCE (STAT): Mod: ER

## 2025-05-11 PROCEDURE — 84484 ASSAY OF TROPONIN QUANT: CPT | Performed by: EMERGENCY MEDICINE

## 2025-05-11 PROCEDURE — 84145 PROCALCITONIN (PCT): CPT | Performed by: EMERGENCY MEDICINE

## 2025-05-11 PROCEDURE — 93005 ELECTROCARDIOGRAM TRACING: CPT | Mod: ER

## 2025-05-11 PROCEDURE — 25000003 PHARM REV CODE 250: Performed by: INTERNAL MEDICINE

## 2025-05-11 PROCEDURE — 25000003 PHARM REV CODE 250: Mod: ER | Performed by: EMERGENCY MEDICINE

## 2025-05-11 PROCEDURE — 94761 N-INVAS EAR/PLS OXIMETRY MLT: CPT | Mod: ER,XB

## 2025-05-11 PROCEDURE — 25000003 PHARM REV CODE 250: Performed by: NURSE PRACTITIONER

## 2025-05-11 PROCEDURE — 93010 ELECTROCARDIOGRAM REPORT: CPT | Mod: ,,, | Performed by: INTERNAL MEDICINE

## 2025-05-11 PROCEDURE — 36556 INSERT NON-TUNNEL CV CATH: CPT | Mod: RT,ER

## 2025-05-11 PROCEDURE — 94002 VENT MGMT INPAT INIT DAY: CPT | Mod: ER

## 2025-05-11 PROCEDURE — 80053 COMPREHEN METABOLIC PANEL: CPT | Performed by: EMERGENCY MEDICINE

## 2025-05-11 PROCEDURE — 5A1955Z RESPIRATORY VENTILATION, GREATER THAN 96 CONSECUTIVE HOURS: ICD-10-PCS | Performed by: EMERGENCY MEDICINE

## 2025-05-11 PROCEDURE — 99291 CRITICAL CARE FIRST HOUR: CPT | Mod: ER

## 2025-05-11 PROCEDURE — 99900035 HC TECH TIME PER 15 MIN (STAT): Mod: ER

## 2025-05-11 PROCEDURE — 0BH18EZ INSERTION OF ENDOTRACHEAL AIRWAY INTO TRACHEA, VIA NATURAL OR ARTIFICIAL OPENING ENDOSCOPIC: ICD-10-PCS | Performed by: EMERGENCY MEDICINE

## 2025-05-11 PROCEDURE — 27100171 HC OXYGEN HIGH FLOW UP TO 24 HOURS: Mod: ER

## 2025-05-11 PROCEDURE — 36600 WITHDRAWAL OF ARTERIAL BLOOD: CPT | Mod: ER

## 2025-05-11 PROCEDURE — 81003 URINALYSIS AUTO W/O SCOPE: CPT | Performed by: EMERGENCY MEDICINE

## 2025-05-11 PROCEDURE — 83880 ASSAY OF NATRIURETIC PEPTIDE: CPT | Performed by: EMERGENCY MEDICINE

## 2025-05-11 PROCEDURE — 83605 ASSAY OF LACTIC ACID: CPT | Performed by: EMERGENCY MEDICINE

## 2025-05-11 PROCEDURE — 96375 TX/PRO/DX INJ NEW DRUG ADDON: CPT | Mod: ER

## 2025-05-11 PROCEDURE — 82803 BLOOD GASES ANY COMBINATION: CPT | Mod: ER

## 2025-05-11 PROCEDURE — 85025 COMPLETE CBC W/AUTO DIFF WBC: CPT | Performed by: EMERGENCY MEDICINE

## 2025-05-11 PROCEDURE — 87040 BLOOD CULTURE FOR BACTERIA: CPT | Performed by: EMERGENCY MEDICINE

## 2025-05-11 PROCEDURE — 31500 INSERT EMERGENCY AIRWAY: CPT | Mod: ER

## 2025-05-11 PROCEDURE — 87635 SARS-COV-2 COVID-19 AMP PRB: CPT | Mod: ER | Performed by: EMERGENCY MEDICINE

## 2025-05-11 PROCEDURE — 63600175 PHARM REV CODE 636 W HCPCS: Mod: ER

## 2025-05-11 RX ORDER — PROPOFOL 10 MG/ML
20 VIAL (ML) INTRAVENOUS ONCE
Status: COMPLETED | OUTPATIENT
Start: 2025-05-11 | End: 2025-05-11

## 2025-05-11 RX ORDER — ENOXAPARIN SODIUM 100 MG/ML
40 INJECTION SUBCUTANEOUS EVERY 24 HOURS
Status: DISCONTINUED | OUTPATIENT
Start: 2025-05-11 | End: 2025-05-31 | Stop reason: HOSPADM

## 2025-05-11 RX ORDER — BACLOFEN 10 MG/1
20 TABLET ORAL 2 TIMES DAILY
Status: DISCONTINUED | OUTPATIENT
Start: 2025-05-11 | End: 2025-05-31 | Stop reason: HOSPADM

## 2025-05-11 RX ORDER — ROCURONIUM BROMIDE 10 MG/ML
0.6 INJECTION, SOLUTION INTRAVENOUS
Status: COMPLETED | OUTPATIENT
Start: 2025-05-11 | End: 2025-05-11

## 2025-05-11 RX ORDER — PROPOFOL 10 MG/ML
0-50 INJECTION, EMULSION INTRAVENOUS CONTINUOUS
Status: DISCONTINUED | OUTPATIENT
Start: 2025-05-11 | End: 2025-05-13

## 2025-05-11 RX ORDER — PROPOFOL 10 MG/ML
20 INJECTION, EMULSION INTRAVENOUS
Status: DISCONTINUED | OUTPATIENT
Start: 2025-05-11 | End: 2025-05-11 | Stop reason: SDUPTHER

## 2025-05-11 RX ORDER — PHENOBARBITAL 32.4 MG/1
64.8 TABLET ORAL 2 TIMES DAILY
COMMUNITY

## 2025-05-11 RX ORDER — ETOMIDATE 2 MG/ML
20 INJECTION INTRAVENOUS
Status: DISCONTINUED | OUTPATIENT
Start: 2025-05-11 | End: 2025-05-11

## 2025-05-11 RX ORDER — LEVETIRACETAM 100 MG/ML
2000 SOLUTION ORAL 2 TIMES DAILY
Status: DISCONTINUED | OUTPATIENT
Start: 2025-05-11 | End: 2025-05-31 | Stop reason: HOSPADM

## 2025-05-11 RX ORDER — LACTULOSE 10 G/15ML
10 SOLUTION ORAL; RECTAL DAILY
Status: ON HOLD | COMMUNITY
Start: 2025-04-17 | End: 2025-05-31

## 2025-05-11 RX ORDER — PROPOFOL 10 MG/ML
60 VIAL (ML) INTRAVENOUS ONCE
Status: DISCONTINUED | OUTPATIENT
Start: 2025-05-11 | End: 2025-05-11

## 2025-05-11 RX ORDER — PROPOFOL 10 MG/ML
INJECTION, EMULSION INTRAVENOUS
Status: COMPLETED
Start: 2025-05-11 | End: 2025-05-11

## 2025-05-11 RX ORDER — ROCURONIUM BROMIDE 10 MG/ML
INJECTION, SOLUTION INTRAVENOUS
Status: DISPENSED
Start: 2025-05-11 | End: 2025-05-12

## 2025-05-11 RX ORDER — DIAZEPAM 2 MG/1
2 TABLET ORAL 3 TIMES DAILY
Status: DISCONTINUED | OUTPATIENT
Start: 2025-05-12 | End: 2025-05-31 | Stop reason: HOSPADM

## 2025-05-11 RX ORDER — ETOMIDATE 2 MG/ML
20 INJECTION INTRAVENOUS
Status: COMPLETED | OUTPATIENT
Start: 2025-05-11 | End: 2025-05-11

## 2025-05-11 RX ORDER — ETOMIDATE 2 MG/ML
INJECTION INTRAVENOUS
Status: COMPLETED
Start: 2025-05-11 | End: 2025-05-11

## 2025-05-11 RX ORDER — CHLORHEXIDINE GLUCONATE ORAL RINSE 1.2 MG/ML
15 SOLUTION DENTAL 2 TIMES DAILY
Status: DISCONTINUED | OUTPATIENT
Start: 2025-05-11 | End: 2025-05-23

## 2025-05-11 RX ORDER — POTASSIUM CHLORIDE 7.45 MG/ML
10 INJECTION INTRAVENOUS
Status: COMPLETED | OUTPATIENT
Start: 2025-05-11 | End: 2025-05-11

## 2025-05-11 RX ORDER — FAMOTIDINE 20 MG/1
20 TABLET, FILM COATED ORAL EVERY 12 HOURS
Status: DISCONTINUED | OUTPATIENT
Start: 2025-05-11 | End: 2025-05-20

## 2025-05-11 RX ORDER — METOPROLOL TARTRATE 50 MG/1
50 TABLET ORAL 2 TIMES DAILY
Status: DISCONTINUED | OUTPATIENT
Start: 2025-05-11 | End: 2025-05-13

## 2025-05-11 RX ORDER — PROPOFOL 10 MG/ML
40 VIAL (ML) INTRAVENOUS ONCE
Status: COMPLETED | OUTPATIENT
Start: 2025-05-11 | End: 2025-05-11

## 2025-05-11 RX ORDER — DIAZEPAM 2 MG/1
2 TABLET ORAL 3 TIMES DAILY
COMMUNITY
Start: 2025-03-25

## 2025-05-11 RX ORDER — PHENOBARBITAL 32.4 MG/1
64.8 TABLET ORAL 2 TIMES DAILY
Status: DISCONTINUED | OUTPATIENT
Start: 2025-05-11 | End: 2025-05-12

## 2025-05-11 RX ORDER — MUPIROCIN 20 MG/G
OINTMENT TOPICAL 2 TIMES DAILY
Status: COMPLETED | OUTPATIENT
Start: 2025-05-11 | End: 2025-05-16

## 2025-05-11 RX ADMIN — METOPROLOL TARTRATE 50 MG: 50 TABLET, FILM COATED ORAL at 10:05

## 2025-05-11 RX ADMIN — PROPOFOL 20 MG: 10 INJECTION, EMULSION INTRAVENOUS at 03:05

## 2025-05-11 RX ADMIN — ROCURONIUM BROMIDE 33 MG: 10 INJECTION INTRAVENOUS at 03:05

## 2025-05-11 RX ADMIN — CHLORHEXIDINE GLUCONATE 0.12% ORAL RINSE 15 ML: 1.2 LIQUID ORAL at 10:05

## 2025-05-11 RX ADMIN — PHENOBARBITAL 64.8 MG: 32.4 TABLET ORAL at 10:05

## 2025-05-11 RX ADMIN — PIPERACILLIN SODIUM AND TAZOBACTAM SODIUM 4.5 G: 4; .5 INJECTION, POWDER, LYOPHILIZED, FOR SOLUTION INTRAVENOUS at 04:05

## 2025-05-11 RX ADMIN — PROPOFOL 5 MCG/KG/MIN: 10 INJECTION, EMULSION INTRAVENOUS at 04:05

## 2025-05-11 RX ADMIN — MUPIROCIN: 20 OINTMENT TOPICAL at 09:05

## 2025-05-11 RX ADMIN — FAMOTIDINE 20 MG: 20 TABLET, FILM COATED ORAL at 10:05

## 2025-05-11 RX ADMIN — POTASSIUM CHLORIDE 10 MEQ: 7.46 INJECTION, SOLUTION INTRAVENOUS at 06:05

## 2025-05-11 RX ADMIN — VANCOMYCIN HYDROCHLORIDE 1000 MG: 1 INJECTION, POWDER, LYOPHILIZED, FOR SOLUTION INTRAVENOUS at 04:05

## 2025-05-11 RX ADMIN — ENOXAPARIN SODIUM 40 MG: 40 INJECTION SUBCUTANEOUS at 10:05

## 2025-05-11 RX ADMIN — BACLOFEN 20 MG: 10 TABLET ORAL at 10:05

## 2025-05-11 RX ADMIN — ETOMIDATE 20 MG: 2 INJECTION INTRAVENOUS at 03:05

## 2025-05-11 RX ADMIN — PROPOFOL 40 MCG/KG/MIN: 10 INJECTION, EMULSION INTRAVENOUS at 07:05

## 2025-05-11 RX ADMIN — LEVETIRACETAM 2000 MG: 100 SOLUTION ORAL at 10:05

## 2025-05-11 RX ADMIN — PROPOFOL 40 MG: 10 INJECTION, EMULSION INTRAVENOUS at 03:05

## 2025-05-11 RX ADMIN — SODIUM CHLORIDE, POTASSIUM CHLORIDE, SODIUM LACTATE AND CALCIUM CHLORIDE 1674 ML: 600; 310; 30; 20 INJECTION, SOLUTION INTRAVENOUS at 04:05

## 2025-05-11 RX ADMIN — Medication 40 MG: at 03:05

## 2025-05-12 LAB
ABSOLUTE EOSINOPHIL (OHS): 0.03 K/UL
ABSOLUTE MONOCYTE (OHS): 1.04 K/UL (ref 0.3–1)
ABSOLUTE NEUTROPHIL COUNT (OHS): 19.14 K/UL (ref 1.8–7.7)
ALBUMIN SERPL BCP-MCNC: 2.9 G/DL (ref 3.5–5.2)
ALLENS TEST: ABNORMAL
ALP SERPL-CCNC: 139 UNIT/L (ref 40–150)
ALT SERPL W/O P-5'-P-CCNC: 292 UNIT/L (ref 10–44)
ANION GAP (OHS): 6 MMOL/L (ref 8–16)
AST SERPL-CCNC: 160 UNIT/L (ref 11–45)
BASOPHILS # BLD AUTO: 0.03 K/UL
BASOPHILS NFR BLD AUTO: 0.1 %
BILIRUB DIRECT SERPL-MCNC: 0.5 MG/DL (ref 0.1–0.3)
BILIRUB SERPL-MCNC: 0.8 MG/DL (ref 0.1–1)
BUN SERPL-MCNC: 20 MG/DL (ref 6–20)
CALCIUM SERPL-MCNC: 8.4 MG/DL (ref 8.7–10.5)
CHLORIDE SERPL-SCNC: 103 MMOL/L (ref 95–110)
CO2 SERPL-SCNC: 27 MMOL/L (ref 23–29)
CREAT SERPL-MCNC: 0.7 MG/DL (ref 0.5–1.4)
DELSYS: ABNORMAL
ERYTHROCYTE [DISTWIDTH] IN BLOOD BY AUTOMATED COUNT: 15.7 % (ref 11.5–14.5)
ERYTHROCYTE [SEDIMENTATION RATE] IN BLOOD BY WESTERGREN METHOD: 18 MM/H
FIO2: 50
GFR SERPLBLD CREATININE-BSD FMLA CKD-EPI: >60 ML/MIN/1.73/M2
GLUCOSE SERPL-MCNC: 104 MG/DL (ref 70–110)
HCO3 UR-SCNC: 25.3 MMOL/L (ref 24–28)
HCT VFR BLD AUTO: 40.3 % (ref 40–54)
HGB BLD-MCNC: 13 GM/DL (ref 14–18)
IMM GRANULOCYTES # BLD AUTO: 0.11 K/UL (ref 0–0.04)
IMM GRANULOCYTES NFR BLD AUTO: 0.5 % (ref 0–0.5)
LYMPHOCYTES # BLD AUTO: 0.91 K/UL (ref 1–4.8)
MAGNESIUM SERPL-MCNC: 1.5 MG/DL (ref 1.6–2.6)
MCH RBC QN AUTO: 25.3 PG (ref 27–31)
MCHC RBC AUTO-ENTMCNC: 32.3 G/DL (ref 32–36)
MCV RBC AUTO: 79 FL (ref 82–98)
MODE: ABNORMAL
NUCLEATED RBC (/100WBC) (OHS): 0 /100 WBC
PCO2 BLDA: 34.9 MMHG (ref 35–45)
PEEP: 5
PH SMN: 7.47 [PH] (ref 7.35–7.45)
PHOSPHATE SERPL-MCNC: 1.4 MG/DL (ref 2.7–4.5)
PLATELET # BLD AUTO: 270 K/UL (ref 150–450)
PMV BLD AUTO: 12.2 FL (ref 9.2–12.9)
PO2 BLDA: 104 MMHG (ref 80–100)
POC BE: 2 MMOL/L (ref -2–2)
POC SATURATED O2: 98 % (ref 95–100)
POTASSIUM SERPL-SCNC: 4.4 MMOL/L (ref 3.5–5.1)
PROCALCITONIN SERPL-MCNC: 7.71 NG/ML
PROT SERPL-MCNC: 6 GM/DL (ref 6–8.4)
RBC # BLD AUTO: 5.13 M/UL (ref 4.6–6.2)
RELATIVE EOSINOPHIL (OHS): 0.1 %
RELATIVE LYMPHOCYTE (OHS): 4.3 % (ref 18–48)
RELATIVE MONOCYTE (OHS): 4.9 % (ref 4–15)
RELATIVE NEUTROPHIL (OHS): 90.1 % (ref 38–73)
SAMPLE: ABNORMAL
SITE: ABNORMAL
SODIUM SERPL-SCNC: 136 MMOL/L (ref 136–145)
VANCOMYCIN SERPL-MCNC: 6.9 UG/ML (ref ?–80)
VANCOMYCIN SERPL-MCNC: 9.9 UG/ML (ref ?–80)
VT: 400
WBC # BLD AUTO: 21.26 K/UL (ref 3.9–12.7)

## 2025-05-12 PROCEDURE — 25000003 PHARM REV CODE 250: Performed by: NURSE PRACTITIONER

## 2025-05-12 PROCEDURE — 99900026 HC AIRWAY MAINTENANCE (STAT)

## 2025-05-12 PROCEDURE — 85025 COMPLETE CBC W/AUTO DIFF WBC: CPT | Performed by: NURSE PRACTITIONER

## 2025-05-12 PROCEDURE — 25000003 PHARM REV CODE 250: Performed by: EMERGENCY MEDICINE

## 2025-05-12 PROCEDURE — 94003 VENT MGMT INPAT SUBQ DAY: CPT

## 2025-05-12 PROCEDURE — 80069 RENAL FUNCTION PANEL: CPT | Performed by: NURSE PRACTITIONER

## 2025-05-12 PROCEDURE — 94761 N-INVAS EAR/PLS OXIMETRY MLT: CPT

## 2025-05-12 PROCEDURE — 80202 ASSAY OF VANCOMYCIN: CPT | Performed by: INTERNAL MEDICINE

## 2025-05-12 PROCEDURE — 83735 ASSAY OF MAGNESIUM: CPT | Performed by: NURSE PRACTITIONER

## 2025-05-12 PROCEDURE — 82803 BLOOD GASES ANY COMBINATION: CPT

## 2025-05-12 PROCEDURE — 25000003 PHARM REV CODE 250: Performed by: INTERNAL MEDICINE

## 2025-05-12 PROCEDURE — 20000000 HC ICU ROOM

## 2025-05-12 PROCEDURE — 63600175 PHARM REV CODE 636 W HCPCS: Performed by: NURSE PRACTITIONER

## 2025-05-12 PROCEDURE — 84460 ALANINE AMINO (ALT) (SGPT): CPT | Performed by: INTERNAL MEDICINE

## 2025-05-12 PROCEDURE — 84100 ASSAY OF PHOSPHORUS: CPT | Performed by: NURSE PRACTITIONER

## 2025-05-12 PROCEDURE — 27100171 HC OXYGEN HIGH FLOW UP TO 24 HOURS

## 2025-05-12 PROCEDURE — 36600 WITHDRAWAL OF ARTERIAL BLOOD: CPT

## 2025-05-12 PROCEDURE — 99900035 HC TECH TIME PER 15 MIN (STAT)

## 2025-05-12 PROCEDURE — 84145 PROCALCITONIN (PCT): CPT | Performed by: NURSE PRACTITIONER

## 2025-05-12 PROCEDURE — 63600175 PHARM REV CODE 636 W HCPCS: Performed by: INTERNAL MEDICINE

## 2025-05-12 PROCEDURE — 63600175 PHARM REV CODE 636 W HCPCS: Performed by: EMERGENCY MEDICINE

## 2025-05-12 RX ORDER — LANOLIN ALCOHOL/MO/W.PET/CERES
800 CREAM (GRAM) TOPICAL
Status: DISCONTINUED | OUTPATIENT
Start: 2025-05-12 | End: 2025-05-27

## 2025-05-12 RX ORDER — PHENOBARBITAL 32.4 MG/1
64.8 TABLET ORAL 2 TIMES DAILY
Status: DISCONTINUED | OUTPATIENT
Start: 2025-05-12 | End: 2025-05-31 | Stop reason: HOSPADM

## 2025-05-12 RX ORDER — NOREPINEPHRINE BITARTRATE 0.02 MG/ML
0-3 INJECTION, SOLUTION INTRAVENOUS CONTINUOUS
Status: DISCONTINUED | OUTPATIENT
Start: 2025-05-12 | End: 2025-05-17

## 2025-05-12 RX ORDER — SODIUM,POTASSIUM PHOSPHATES 280-250MG
2 POWDER IN PACKET (EA) ORAL
Status: DISCONTINUED | OUTPATIENT
Start: 2025-05-12 | End: 2025-05-31 | Stop reason: HOSPADM

## 2025-05-12 RX ADMIN — Medication 2 PACKET: at 09:05

## 2025-05-12 RX ADMIN — BACLOFEN 20 MG: 10 TABLET ORAL at 09:05

## 2025-05-12 RX ADMIN — NOREPINEPHRINE BITARTRATE 0.02 MCG/KG/MIN: 0.02 INJECTION, SOLUTION INTRAVENOUS at 06:05

## 2025-05-12 RX ADMIN — DIAZEPAM 2 MG: 2 TABLET ORAL at 09:05

## 2025-05-12 RX ADMIN — Medication 2 PACKET: at 06:05

## 2025-05-12 RX ADMIN — VANCOMYCIN HYDROCHLORIDE 750 MG: 750 INJECTION, POWDER, LYOPHILIZED, FOR SOLUTION INTRAVENOUS at 11:05

## 2025-05-12 RX ADMIN — Medication 2 PACKET: at 01:05

## 2025-05-12 RX ADMIN — ENOXAPARIN SODIUM 40 MG: 40 INJECTION SUBCUTANEOUS at 04:05

## 2025-05-12 RX ADMIN — CHLORHEXIDINE GLUCONATE 0.12% ORAL RINSE 15 ML: 1.2 LIQUID ORAL at 08:05

## 2025-05-12 RX ADMIN — PROPOFOL 25 MCG/KG/MIN: 10 INJECTION, EMULSION INTRAVENOUS at 06:05

## 2025-05-12 RX ADMIN — PIPERACILLIN SODIUM AND TAZOBACTAM SODIUM 4.5 G: 4; .5 INJECTION, POWDER, FOR SOLUTION INTRAVENOUS at 01:05

## 2025-05-12 RX ADMIN — PHENOBARBITAL 64.8 MG: 32.4 TABLET ORAL at 09:05

## 2025-05-12 RX ADMIN — DIAZEPAM 2 MG: 2 TABLET ORAL at 04:05

## 2025-05-12 RX ADMIN — MUPIROCIN: 20 OINTMENT TOPICAL at 09:05

## 2025-05-12 RX ADMIN — PHENOBARBITAL 64.8 MG: 32.4 TABLET ORAL at 08:05

## 2025-05-12 RX ADMIN — SODIUM CHLORIDE, POTASSIUM CHLORIDE, SODIUM LACTATE AND CALCIUM CHLORIDE 1000 ML: 600; 310; 30; 20 INJECTION, SOLUTION INTRAVENOUS at 04:05

## 2025-05-12 RX ADMIN — LACTULOSE 10 G: 20 SOLUTION ORAL at 09:05

## 2025-05-12 RX ADMIN — SODIUM CHLORIDE, POTASSIUM CHLORIDE, SODIUM LACTATE AND CALCIUM CHLORIDE 1000 ML: 600; 310; 30; 20 INJECTION, SOLUTION INTRAVENOUS at 02:05

## 2025-05-12 RX ADMIN — PIPERACILLIN SODIUM AND TAZOBACTAM SODIUM 4.5 G: 4; .5 INJECTION, POWDER, FOR SOLUTION INTRAVENOUS at 05:05

## 2025-05-12 RX ADMIN — FAMOTIDINE 20 MG: 20 TABLET, FILM COATED ORAL at 09:05

## 2025-05-12 RX ADMIN — Medication 800 MG: at 09:05

## 2025-05-12 RX ADMIN — PIPERACILLIN SODIUM AND TAZOBACTAM SODIUM 4.5 G: 4; .5 INJECTION, POWDER, FOR SOLUTION INTRAVENOUS at 09:05

## 2025-05-12 RX ADMIN — Medication 800 MG: at 05:05

## 2025-05-12 RX ADMIN — LEVETIRACETAM 2000 MG: 100 SOLUTION ORAL at 09:05

## 2025-05-12 RX ADMIN — LEVETIRACETAM 2000 MG: 100 SOLUTION ORAL at 08:05

## 2025-05-12 RX ADMIN — POTASSIUM BICARBONATE 40 MEQ: 391 TABLET, EFFERVESCENT ORAL at 02:05

## 2025-05-12 RX ADMIN — FAMOTIDINE 20 MG: 20 TABLET, FILM COATED ORAL at 08:05

## 2025-05-12 RX ADMIN — PROPOFOL 25 MCG/KG/MIN: 10 INJECTION, EMULSION INTRAVENOUS at 11:05

## 2025-05-12 RX ADMIN — MUPIROCIN: 20 OINTMENT TOPICAL at 08:05

## 2025-05-12 RX ADMIN — Medication 2 PACKET: at 05:05

## 2025-05-12 NOTE — ASSESSMENT & PLAN NOTE
Nutrition consulted. Most recent weight and BMI monitored-     Measurements:  Wt Readings from Last 1 Encounters:   05/11/25 53.3 kg (117 lb 8.1 oz)   Body mass index is 21.19 kg/m².    Patient has been screened and assessed by RD.    Malnutrition Type:  Context:    Level:    Will restart tube feeding when appropriate

## 2025-05-12 NOTE — DISCHARGE SUMMARY
[facility]  Discharge Summary      Patient Name: Ronny Ramey  MRN: [id number]  [facility]: [id number]  Patient Class: IP- Inpatient  Admission Date: 4/26/2025  Hospital Length of Stay: 3 days  Discharge Date and Time: 4/29/2025  1:35 PM  Attending Physician: No att. providers found   Discharging Provider: Yuval Liu MD  Primary Care Provider: Herman Nathan DNP    Primary Care Team: Networked reference to record PCT     HPI:   The patient is a 25 yo male NH resident with Cerebral Palsy, Spastic quadriplegia, Non verbal, s/p G tube, epilepsy, s/p trach and removal, Legally blind who presented to ED from nursing home for tachypnea, tachycardia and hypoxemia. Pt was seen in ED 4/25/25 for a Peg tube replacement. After arrival to Nursing home, the nurses noted the above symptoms. Mother at bedside. She states the pt is normally not on oxygen. ED report from nursing home denied fever, N/V, diarrhea. Pt tends to be constipated- last BM 4/24/25.     In the ED, -140. Tmax 99.1F, Oxygen saturation 88%- 2 liters oxygen via NC applied. Labs revealed WBC 17.6, , , BNP/troponin normal. LA normal. Ua unremarkable. CXR- clear.   Pt was given 30ml/kg bolus     SDM is pt's mother. Pt is a full code    * No surgery found *      Hospital Course:   The patient remained afebrile from admission on 04/27/25 throughout the hospitalization. An initial leukocytosis resolved with empiric IV antibiotics; blood and urine cultures stayed NGTD, and CT imaging showed no free air, allowing surgical concern to be ruled out. Enteral feeds were cautiously restarted and advanced as tolerated. Respiratory status improved from supplemental O? requirements to stable room-air saturations, though serial spot checks continued. Attempts to obtain a sputum culture were unsuccessful, but the normalized WBC count and absence of fever reduced suspicion for pneumonia. With clinical stability,  "nutrition at goal, and no new infectious findings, discharge planning was initiated for return to the nursing home, contingent on sustained room-air sats and completion of current antibiotic course.    BP (!) 100/55 (BP Location: Right arm, Patient Position: Lying)   Pulse 78   Temp 97.8 °F (36.6 °C) (Oral)   Resp 18   Ht 5' 4" (1.626 m)   Wt 56 kg (123 lb 7.3 oz)   SpO2 (!) 94%   BMI 21.19 kg/m²        Goals of Care Treatment Preferences:  Code Status: Full Code      SDOH Screening:  The patient was unable to be screened for utility difficulties, food insecurity, transport difficulties, housing insecurity, and interpersonal safety, so no concerns could be identified this admission.     Consults:   Consults (From admission, onward)          Status Ordering Provider     Inpatient consult to Registered Dietitian/Nutritionist  Once        Provider:  (Not yet assigned)    Completed ANDRE ANDRADE     Inpatient consult to Registered Dietitian/Nutritionist  Once        Provider:  (Not yet assigned)    Completed ANDRE ANDRADE            Assessment & Plan  SIRS (systemic inflammatory response syndrome)  T max 99.1, tachypnea, tachycardia, leukocytosis   LA and procalcitonin normal  UA/CXR unremarkable   Blood cultures   Abd xray ordered  CT chest/abd/pelvis   Empiric IV Cefepime     05/12/2025  Improved/resolved  Difficulty weaning from O2      Spastic quadriplegic cerebral palsy  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   Cont home meds baclofen, Versed    Intellectual disability with epilepsy  Supportive care   Seizure precautions   Cont Keppra and Phenobarbital   Cont Seroquel     Legally blind  Supportive care     PEG (percutaneous endoscopic gastrostomy) status  Patient noted to have a percutaneous endoscopic gastrostomy tube in place. I have personally inspected the tube.Tube was placed prior " to this admission There are no signs of drainage or infection around the site. The tube is patent. Medications have converted to liquid form if available.  Routine care to be done by wound care and nursing staff.     05/12/2025  Confirmed low suspicion for free air in abdomen with radiology  Restart TF this AM, slowly to reach goal of 35cc/hr  See dietary documentation for specifics on nutrition  Once back at home rate, can likely dispo     Severe intellectual disabilities  Non verbal, supportive care    Moderate protein-calorie malnutrition  Nutrition consulted. Most recent weight and BMI monitored-     Measurements:  Wt Readings from Last 1 Encounters:   05/11/25 53.3 kg (117 lb 8.1 oz)   Body mass index is 21.19 kg/m².    Patient has been screened and assessed by RD.    Malnutrition Type:  Context:    Level:    Will restart tube feeding when appropriate    Tachycardia  Cont Lopressor     Final Active Diagnoses:    Diagnosis Date Noted POA    PRINCIPAL PROBLEM:  SIRS (systemic inflammatory response syndrome) [R65.10] 04/26/2025 Yes    Tachycardia [R00.0] 04/26/2025 Yes    Moderate protein-calorie malnutrition [E44.0] 10/25/2024 Yes    PEG (percutaneous endoscopic gastrostomy) status [Z93.1] 09/11/2023 Not Applicable    Severe intellectual disabilities [F72] 09/11/2023 Yes    Legally blind [H54.8] 09/11/2023 Yes    Intellectual disability with epilepsy [F79, G40.909] 08/19/2013 Yes    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] 04/09/2013 Yes      Problems Resolved During this Admission:       Discharged Condition: good    Disposition: shelter Nursing Facili*    Follow Up:   Contact information for after-discharge care       Destination       [facility] NURSING AND REH[facility] .    Service: Nursing Home  Contact information:  [address] [facility] [address] [phone]                                 Patient Instructions:   No discharge procedures on file.    Significant  Diagnostic Studies: CULTURES NGTD    Pending Diagnostic Studies:       Procedure Component Value Units Date/Time    HCV Virus Hold Specimen [[id number]] Collected: 04/26/25 0457    Order Status: Sent Lab Status: In process Updated: 04/26/25 0508    Specimen: Blood            Medications:  Reconciled Home Medications:      Medication List        PAUSE taking these medications      cloNIDine 0.1 mg/24 hr td ptwk 0.1 mg/24 hr  Wait to take this until your doctor or other care provider tells you to start again.  Commonly known as: CATAPRES  Place 1 patch onto the skin every 7 days.            CONTINUE taking these medications      baclofen 20 MG tablet  Commonly known as: LIORESAL  1 tablet (20 mg total) by Per G Tube route 2 (two) times daily.     cloNIDine 0.1 MG tablet  Commonly known as: CATAPRES  1 tablet (0.1 mg total) by Per G Tube route nightly.     diazePAM 5 mg/5 mL (1 mg/mL) oral solution  Commonly known as: VALIUM  TAKE 3 ML(3 MG) BY MOUTH TWICE DAILY     levETIRAcetam 100 mg/mL Soln  Commonly known as: KEPPRA  TAKE 20 ML(2000 MG) VIA GTUBE TWICE DAILY     metoprolol tartrate 50 MG tablet  Commonly known as: LOPRESSOR  1 tablet (50 mg total) by Per G Tube route 2 (two) times daily.     montelukast 5 MG chewable tablet  Commonly known as: SINGULAIR  Take 1 tablet (5 mg total) by mouth every evening.     mupirocin 2 % ointment  Commonly known as: BACTROBAN  Apply topically 3 (three) times daily.     PHENobarbitaL 64.8 MG tablet  Take 1 tablet (64.8 mg total) by mouth 2 (two) times daily.     QUEtiapine 100 MG Tab  Commonly known as: SEROQUEL  1 tablet (100 mg total) by Per G Tube route 2 (two) times daily.     simethicone 40 mg/0.6 mL drops  Commonly known as: MYLICON  0.6 mLs (40 mg total) by Per G Tube route 4 (four) times daily.            STOP taking these medications      gentamicin 0.1 % ointment  Commonly known as: GARAMYCIN     midazolam 5 mg/spray (0.1 mL) Spry     tiZANidine 2 MG tablet  Commonly  known as: ZANAFLEX            ASK your doctor about these medications      levoFLOXacin 750 MG tablet  Commonly known as: LEVAQUIN  Take 1 tablet (750 mg total) by mouth once daily. for 4 days  Ask about: Should I take this medication?     miscellaneous medical supply Kit  by Apply Externally route 2 (two) times a day. Use twice daily at 8 AM and 3 PM and record the value in MyChart as directed.              Indwelling Lines/Drains at time of discharge:   Lines/Drains/Airways       Drain  Duration                  Gastrostomy/Enterostomy 10/21/24 0844 Gastrostomy tube w/ balloon LUQ feeding 202 days                    Time spent on the discharge of patient: 31 minutes  of time spent on discharge including examining patient, providing discharge instructions, arranging follow-up and documentation.           Yuval Liu MD  Department of Hospital Medicine  [facility] - [facility] S[facility]

## 2025-05-12 NOTE — ASSESSMENT & PLAN NOTE
T max 99.1, tachypnea, tachycardia, leukocytosis   LA and procalcitonin normal  UA/CXR unremarkable   Blood cultures   Abd xray ordered  CT chest/abd/pelvis   Empiric IV Cefepime     05/12/2025  Improved/resolved  Difficulty weaning from O2

## 2025-05-12 NOTE — ASSESSMENT & PLAN NOTE
Patient noted to have a percutaneous endoscopic gastrostomy tube in place. I have personally inspected the tube.Tube was placed prior to this admission There are no signs of drainage or infection around the site. The tube is patent. Medications have converted to liquid form if available.  Routine care to be done by wound care and nursing staff.     05/12/2025  Confirmed low suspicion for free air in abdomen with radiology  Restart TF this AM, slowly to reach goal of 35cc/hr  See dietary documentation for specifics on nutrition  Once back at home rate, can likely dispo

## 2025-05-13 LAB
ABSOLUTE EOSINOPHIL (OHS): 0.17 K/UL
ABSOLUTE MONOCYTE (OHS): 1.38 K/UL (ref 0.3–1)
ABSOLUTE NEUTROPHIL COUNT (OHS): 10.43 K/UL (ref 1.8–7.7)
ALBUMIN SERPL BCP-MCNC: 2.7 G/DL (ref 3.5–5.2)
ALLENS TEST: ABNORMAL
ALP SERPL-CCNC: 118 UNIT/L (ref 40–150)
ALT SERPL W/O P-5'-P-CCNC: 167 UNIT/L (ref 10–44)
ANION GAP (OHS): 7 MMOL/L (ref 8–16)
AORTIC ROOT ANNULUS: 3.1 CM
AORTIC SIZE INDEX: 1.9 CM/M2
ASCENDING AORTA: 2.9 CM
AST SERPL-CCNC: 55 UNIT/L (ref 11–45)
AV INDEX (PROSTH): 0.7
AV MEAN GRADIENT: 3 MMHG
AV PEAK GRADIENT: 7 MMHG
AV VALVE AREA BY VELOCITY RATIO: 2.2 CM²
AV VALVE AREA: 2 CM²
AV VELOCITY RATIO: 0.77
BASOPHILS # BLD AUTO: 0.05 K/UL
BASOPHILS NFR BLD AUTO: 0.3 %
BILIRUB DIRECT SERPL-MCNC: 0.2 MG/DL (ref 0.1–0.3)
BILIRUB SERPL-MCNC: 0.4 MG/DL (ref 0.1–1)
BSA FOR ECHO PROCEDURE: 1.55 M2
BUN SERPL-MCNC: 13 MG/DL (ref 6–20)
CALCIUM SERPL-MCNC: 8.1 MG/DL (ref 8.7–10.5)
CHLORIDE SERPL-SCNC: 106 MMOL/L (ref 95–110)
CO2 SERPL-SCNC: 29 MMOL/L (ref 23–29)
CREAT SERPL-MCNC: 0.7 MG/DL (ref 0.5–1.4)
CV ECHO LV RWT: 0.73 CM
DELSYS: ABNORMAL
DOP CALC AO PEAK VEL: 1.3 M/S
DOP CALC AO VTI: 28.9 CM
DOP CALC LVOT AREA: 2.8 CM2
DOP CALC LVOT DIAMETER: 1.9 CM
DOP CALC LVOT PEAK VEL: 1 M/S
DOP CALC LVOT STROKE VOLUME: 57 CM3
DOP CALC RVOT PEAK VEL: 0.65 M/S
DOP CALC RVOT VTI: 16.2 CM
DOP CALCLVOT PEAK VEL VTI: 20.1 CM
E WAVE DECELERATION TIME: 117 MSEC
E/A RATIO: 1.01
E/E' RATIO: 5 M/S
ECHO LV POSTERIOR WALL: 1.2 CM (ref 0.6–1.1)
ERYTHROCYTE [DISTWIDTH] IN BLOOD BY AUTOMATED COUNT: 16 % (ref 11.5–14.5)
ERYTHROCYTE [SEDIMENTATION RATE] IN BLOOD BY WESTERGREN METHOD: 14 MM/H
FIO2: 45
FRACTIONAL SHORTENING: 30.3 % (ref 28–44)
GFR SERPLBLD CREATININE-BSD FMLA CKD-EPI: >60 ML/MIN/1.73/M2
GLUCOSE SERPL-MCNC: 107 MG/DL (ref 70–110)
HCO3 UR-SCNC: 29.3 MMOL/L (ref 24–28)
HCT VFR BLD AUTO: 32.5 % (ref 40–54)
HGB BLD-MCNC: 10.7 GM/DL (ref 14–18)
IMM GRANULOCYTES # BLD AUTO: 0.07 K/UL (ref 0–0.04)
IMM GRANULOCYTES NFR BLD AUTO: 0.5 % (ref 0–0.5)
INTERVENTRICULAR SEPTUM: 1.3 CM (ref 0.6–1.1)
IVC DIAMETER: 0.75 CM
IVRT: 88 MSEC
LA MAJOR: 4.3 CM
LA MINOR: 4.4 CM
LA WIDTH: 3.6 CM
LEFT ATRIUM AREA SYSTOLIC (APICAL 2 CHAMBER): 9.4 CM2
LEFT ATRIUM AREA SYSTOLIC (APICAL 4 CHAMBER): 11.63 CM2
LEFT ATRIUM SIZE: 3.4 CM
LEFT ATRIUM VOLUME INDEX MOD: 16 ML/M2
LEFT ATRIUM VOLUME INDEX: 29 ML/M2
LEFT ATRIUM VOLUME MOD: 25 ML
LEFT ATRIUM VOLUME: 45 CM3
LEFT INTERNAL DIMENSION IN SYSTOLE: 2.3 CM (ref 2.1–4)
LEFT VENTRICLE DIASTOLIC VOLUME INDEX: 28.21 ML/M2
LEFT VENTRICLE DIASTOLIC VOLUME: 44 ML
LEFT VENTRICLE END SYSTOLIC VOLUME APICAL 2 CHAMBER: 20.49 ML
LEFT VENTRICLE END SYSTOLIC VOLUME APICAL 4 CHAMBER: 26.24 ML
LEFT VENTRICLE MASS INDEX: 85.3 G/M2
LEFT VENTRICLE SYSTOLIC VOLUME INDEX: 11.5 ML/M2
LEFT VENTRICLE SYSTOLIC VOLUME: 18 ML
LEFT VENTRICULAR INTERNAL DIMENSION IN DIASTOLE: 3.3 CM (ref 3.5–6)
LEFT VENTRICULAR MASS: 133 G
LV LATERAL E/E' RATIO: 4.6 M/S
LV SEPTAL E/E' RATIO: 6 M/S
LVED V (TEICH): 43.5 ML
LVES V (TEICH): 18.28 ML
LVOT MG: 2.05 MMHG
LVOT MV: 0.67 CM/S
LYMPHOCYTES # BLD AUTO: 3.29 K/UL (ref 1–4.8)
MAGNESIUM SERPL-MCNC: 1.8 MG/DL (ref 1.6–2.6)
MCH RBC QN AUTO: 26 PG (ref 27–31)
MCHC RBC AUTO-ENTMCNC: 32.9 G/DL (ref 32–36)
MCV RBC AUTO: 79 FL (ref 82–98)
MODE: ABNORMAL
MV PEAK A VEL: 0.77 M/S
MV PEAK E VEL: 0.78 M/S
NUCLEATED RBC (/100WBC) (OHS): 0 /100 WBC
OHS CV RV/LV RATIO: 0.7 CM
PCO2 BLDA: 37.6 MMHG (ref 35–45)
PEEP: 5
PH SMN: 7.5 [PH] (ref 7.35–7.45)
PHOSPHATE SERPL-MCNC: 2.5 MG/DL (ref 2.7–4.5)
PISA TR MAX VEL: 2.4 M/S
PLATELET # BLD AUTO: 251 K/UL (ref 150–450)
PMV BLD AUTO: 12 FL (ref 9.2–12.9)
PO2 BLDA: 143 MMHG (ref 80–100)
POC BE: 6 MMOL/L (ref -2–2)
POC SATURATED O2: 99 % (ref 95–100)
POCT GLUCOSE: 86 MG/DL (ref 70–110)
POCT GLUCOSE: 97 MG/DL (ref 70–110)
POTASSIUM SERPL-SCNC: 3 MMOL/L (ref 3.5–5.1)
PROCALCITONIN SERPL-MCNC: 5.39 NG/ML
PROT SERPL-MCNC: 5.7 GM/DL (ref 6–8.4)
PV MEAN GRADIENT: 1 MMHG
RA MAJOR: 3.66 CM
RA PRESSURE ESTIMATED: 3 MMHG
RA WIDTH: 3.3 CM
RBC # BLD AUTO: 4.12 M/UL (ref 4.6–6.2)
RELATIVE EOSINOPHIL (OHS): 1.1 %
RELATIVE LYMPHOCYTE (OHS): 21.4 % (ref 18–48)
RELATIVE MONOCYTE (OHS): 9 % (ref 4–15)
RELATIVE NEUTROPHIL (OHS): 67.7 % (ref 38–73)
RIGHT VENTRICLE DIASTOLIC BASEL DIMENSION: 2.3 CM
RIGHT VENTRICLE DIASTOLIC MID DIMENSION: 3.3 CM
RV MID DIAMA: 3.25 CM
RV TB RVSP: 5 MMHG
SAMPLE: ABNORMAL
SITE: ABNORMAL
SODIUM SERPL-SCNC: 142 MMOL/L (ref 136–145)
STJ: 3 CM
TDI LATERAL: 0.17 M/S
TDI SEPTAL: 0.13 M/S
TDI: 0.15 M/S
TR MAX PG: 22 MMHG
TRIGL SERPL-MCNC: 76 MG/DL (ref 30–150)
TV REST PULMONARY ARTERY PRESSURE: 26 MMHG
VT: 400
WBC # BLD AUTO: 15.39 K/UL (ref 3.9–12.7)
Z-SCORE OF LEFT VENTRICULAR DIMENSION IN END DIASTOLE: -3.1
Z-SCORE OF LEFT VENTRICULAR DIMENSION IN END SYSTOLE: -1.53

## 2025-05-13 PROCEDURE — 84100 ASSAY OF PHOSPHORUS: CPT | Performed by: NURSE PRACTITIONER

## 2025-05-13 PROCEDURE — 63600175 PHARM REV CODE 636 W HCPCS: Performed by: NURSE PRACTITIONER

## 2025-05-13 PROCEDURE — 94761 N-INVAS EAR/PLS OXIMETRY MLT: CPT

## 2025-05-13 PROCEDURE — 99900035 HC TECH TIME PER 15 MIN (STAT)

## 2025-05-13 PROCEDURE — 36600 WITHDRAWAL OF ARTERIAL BLOOD: CPT

## 2025-05-13 PROCEDURE — 25000003 PHARM REV CODE 250: Performed by: INTERNAL MEDICINE

## 2025-05-13 PROCEDURE — 94003 VENT MGMT INPAT SUBQ DAY: CPT

## 2025-05-13 PROCEDURE — 84145 PROCALCITONIN (PCT): CPT | Performed by: NURSE PRACTITIONER

## 2025-05-13 PROCEDURE — 82248 BILIRUBIN DIRECT: CPT | Performed by: INTERNAL MEDICINE

## 2025-05-13 PROCEDURE — 63600175 PHARM REV CODE 636 W HCPCS: Performed by: INTERNAL MEDICINE

## 2025-05-13 PROCEDURE — 85025 COMPLETE CBC W/AUTO DIFF WBC: CPT | Performed by: NURSE PRACTITIONER

## 2025-05-13 PROCEDURE — 27100171 HC OXYGEN HIGH FLOW UP TO 24 HOURS

## 2025-05-13 PROCEDURE — 20000000 HC ICU ROOM

## 2025-05-13 PROCEDURE — 25000003 PHARM REV CODE 250: Performed by: NURSE PRACTITIONER

## 2025-05-13 PROCEDURE — 83735 ASSAY OF MAGNESIUM: CPT | Performed by: NURSE PRACTITIONER

## 2025-05-13 PROCEDURE — 99900026 HC AIRWAY MAINTENANCE (STAT)

## 2025-05-13 PROCEDURE — 84478 ASSAY OF TRIGLYCERIDES: CPT | Performed by: INTERNAL MEDICINE

## 2025-05-13 PROCEDURE — 80048 BASIC METABOLIC PNL TOTAL CA: CPT | Performed by: NURSE PRACTITIONER

## 2025-05-13 PROCEDURE — 82803 BLOOD GASES ANY COMBINATION: CPT

## 2025-05-13 RX ORDER — MIDAZOLAM HYDROCHLORIDE 1 MG/ML
2 INJECTION, SOLUTION INTRAMUSCULAR; INTRAVENOUS
Status: DISCONTINUED | OUTPATIENT
Start: 2025-05-13 | End: 2025-05-16

## 2025-05-13 RX ORDER — SCOPOLAMINE 1 MG/3D
1 PATCH, EXTENDED RELEASE TRANSDERMAL
Status: DISCONTINUED | OUTPATIENT
Start: 2025-05-13 | End: 2025-05-14

## 2025-05-13 RX ADMIN — DIAZEPAM 2 MG: 2 TABLET ORAL at 02:05

## 2025-05-13 RX ADMIN — CHLORHEXIDINE GLUCONATE 0.12% ORAL RINSE 15 ML: 1.2 LIQUID ORAL at 08:05

## 2025-05-13 RX ADMIN — PIPERACILLIN SODIUM AND TAZOBACTAM SODIUM 4.5 G: 4; .5 INJECTION, POWDER, FOR SOLUTION INTRAVENOUS at 09:05

## 2025-05-13 RX ADMIN — BACLOFEN 20 MG: 10 TABLET ORAL at 08:05

## 2025-05-13 RX ADMIN — SODIUM CHLORIDE, POTASSIUM CHLORIDE, SODIUM LACTATE AND CALCIUM CHLORIDE 500 ML: 600; 310; 30; 20 INJECTION, SOLUTION INTRAVENOUS at 01:05

## 2025-05-13 RX ADMIN — POTASSIUM BICARBONATE 60 MEQ: 978 TABLET, EFFERVESCENT ORAL at 07:05

## 2025-05-13 RX ADMIN — MIDAZOLAM 2 MG: 1 INJECTION INTRAMUSCULAR; INTRAVENOUS at 06:05

## 2025-05-13 RX ADMIN — VANCOMYCIN HYDROCHLORIDE 750 MG: 750 INJECTION, POWDER, LYOPHILIZED, FOR SOLUTION INTRAVENOUS at 03:05

## 2025-05-13 RX ADMIN — PHENOBARBITAL 64.8 MG: 32.4 TABLET ORAL at 08:05

## 2025-05-13 RX ADMIN — DIAZEPAM 2 MG: 2 TABLET ORAL at 09:05

## 2025-05-13 RX ADMIN — LACTULOSE 10 G: 20 SOLUTION ORAL at 08:05

## 2025-05-13 RX ADMIN — CHLORHEXIDINE GLUCONATE 0.12% ORAL RINSE 15 ML: 1.2 LIQUID ORAL at 09:05

## 2025-05-13 RX ADMIN — SCOPOLAMINE 1 PATCH: 1.5 PATCH, EXTENDED RELEASE TRANSDERMAL at 05:05

## 2025-05-13 RX ADMIN — PHENOBARBITAL 64.8 MG: 32.4 TABLET ORAL at 09:05

## 2025-05-13 RX ADMIN — LEVETIRACETAM 2000 MG: 100 SOLUTION ORAL at 08:05

## 2025-05-13 RX ADMIN — MUPIROCIN: 20 OINTMENT TOPICAL at 09:05

## 2025-05-13 RX ADMIN — ENOXAPARIN SODIUM 40 MG: 40 INJECTION SUBCUTANEOUS at 05:05

## 2025-05-13 RX ADMIN — DIAZEPAM 2 MG: 2 TABLET ORAL at 08:05

## 2025-05-13 RX ADMIN — FAMOTIDINE 20 MG: 20 TABLET, FILM COATED ORAL at 08:05

## 2025-05-13 RX ADMIN — LEVETIRACETAM 2000 MG: 100 SOLUTION ORAL at 09:05

## 2025-05-13 RX ADMIN — FAMOTIDINE 20 MG: 20 TABLET, FILM COATED ORAL at 09:05

## 2025-05-13 RX ADMIN — PIPERACILLIN SODIUM AND TAZOBACTAM SODIUM 4.5 G: 4; .5 INJECTION, POWDER, FOR SOLUTION INTRAVENOUS at 03:05

## 2025-05-13 RX ADMIN — Medication 2 PACKET: at 07:05

## 2025-05-13 RX ADMIN — PIPERACILLIN SODIUM AND TAZOBACTAM SODIUM 4.5 G: 4; .5 INJECTION, POWDER, FOR SOLUTION INTRAVENOUS at 05:05

## 2025-05-13 RX ADMIN — VANCOMYCIN HYDROCHLORIDE 750 MG: 750 INJECTION, POWDER, LYOPHILIZED, FOR SOLUTION INTRAVENOUS at 02:05

## 2025-05-13 RX ADMIN — NOREPINEPHRINE BITARTRATE 0.02 MCG/KG/MIN: 0.02 INJECTION, SOLUTION INTRAVENOUS at 01:05

## 2025-05-13 RX ADMIN — BACLOFEN 20 MG: 10 TABLET ORAL at 09:05

## 2025-05-13 RX ADMIN — MIDAZOLAM 2 MG: 1 INJECTION INTRAMUSCULAR; INTRAVENOUS at 10:05

## 2025-05-13 RX ADMIN — MUPIROCIN: 20 OINTMENT TOPICAL at 08:05

## 2025-05-14 PROBLEM — R00.1 BRADYCARDIA: Status: ACTIVE | Noted: 2025-05-14

## 2025-05-14 LAB
ABSOLUTE EOSINOPHIL (OHS): 0.49 K/UL
ABSOLUTE MONOCYTE (OHS): 1.26 K/UL (ref 0.3–1)
ABSOLUTE NEUTROPHIL COUNT (OHS): 8.56 K/UL (ref 1.8–7.7)
ALLENS TEST: ABNORMAL
ANION GAP (OHS): 7 MMOL/L (ref 8–16)
BACTERIA SPT CULT: NORMAL
BASOPHILS # BLD AUTO: 0.04 K/UL
BASOPHILS NFR BLD AUTO: 0.3 %
BUN SERPL-MCNC: 8 MG/DL (ref 6–20)
CALCIUM SERPL-MCNC: 8.3 MG/DL (ref 8.7–10.5)
CHLORIDE SERPL-SCNC: 108 MMOL/L (ref 95–110)
CO2 SERPL-SCNC: 27 MMOL/L (ref 23–29)
CREAT SERPL-MCNC: 0.6 MG/DL (ref 0.5–1.4)
DELSYS: ABNORMAL
ERYTHROCYTE [DISTWIDTH] IN BLOOD BY AUTOMATED COUNT: 15.9 % (ref 11.5–14.5)
FIO2: 40
GFR SERPLBLD CREATININE-BSD FMLA CKD-EPI: >60 ML/MIN/1.73/M2
GLUCOSE SERPL-MCNC: 123 MG/DL (ref 70–110)
GRAM STN SPEC: NORMAL
HCO3 UR-SCNC: 31.6 MMOL/L (ref 24–28)
HCT VFR BLD AUTO: 32 % (ref 40–54)
HGB BLD-MCNC: 10.4 GM/DL (ref 14–18)
IMM GRANULOCYTES # BLD AUTO: 0.04 K/UL (ref 0–0.04)
IMM GRANULOCYTES NFR BLD AUTO: 0.3 % (ref 0–0.5)
LYMPHOCYTES # BLD AUTO: 2.15 K/UL (ref 1–4.8)
MAGNESIUM SERPL-MCNC: 1.7 MG/DL (ref 1.6–2.6)
MCH RBC QN AUTO: 25.9 PG (ref 27–31)
MCHC RBC AUTO-ENTMCNC: 32.5 G/DL (ref 32–36)
MCV RBC AUTO: 80 FL (ref 82–98)
MIN VOL: 6.14
MODE: ABNORMAL
NUCLEATED RBC (/100WBC) (OHS): 0 /100 WBC
PCO2 BLDA: 47.3 MMHG (ref 35–45)
PEEP: 5
PH SMN: 7.43 [PH] (ref 7.35–7.45)
PHOSPHATE SERPL-MCNC: 2.4 MG/DL (ref 2.7–4.5)
PLATELET # BLD AUTO: 229 K/UL (ref 150–450)
PMV BLD AUTO: 12.9 FL (ref 9.2–12.9)
PO2 BLDA: 95 MMHG (ref 80–100)
POC BE: 7 MMOL/L (ref -2–2)
POC SATURATED O2: 97 % (ref 95–100)
POCT GLUCOSE: 105 MG/DL (ref 70–110)
POCT GLUCOSE: 114 MG/DL (ref 70–110)
POCT GLUCOSE: 80 MG/DL (ref 70–110)
POTASSIUM SERPL-SCNC: 3.4 MMOL/L (ref 3.5–5.1)
PROCALCITONIN SERPL-MCNC: 2.14 NG/ML
PS: 5
RBC # BLD AUTO: 4.02 M/UL (ref 4.6–6.2)
RELATIVE EOSINOPHIL (OHS): 3.9 %
RELATIVE LYMPHOCYTE (OHS): 17.1 % (ref 18–48)
RELATIVE MONOCYTE (OHS): 10 % (ref 4–15)
RELATIVE NEUTROPHIL (OHS): 68.4 % (ref 38–73)
SAMPLE: ABNORMAL
SITE: ABNORMAL
SODIUM SERPL-SCNC: 142 MMOL/L (ref 136–145)
SP02: 100
SPONT RATE: 22
VOL: 307
WBC # BLD AUTO: 12.54 K/UL (ref 3.9–12.7)

## 2025-05-14 PROCEDURE — 63600175 PHARM REV CODE 636 W HCPCS: Performed by: NURSE PRACTITIONER

## 2025-05-14 PROCEDURE — 93010 ELECTROCARDIOGRAM REPORT: CPT | Mod: ,,, | Performed by: INTERNAL MEDICINE

## 2025-05-14 PROCEDURE — 94761 N-INVAS EAR/PLS OXIMETRY MLT: CPT

## 2025-05-14 PROCEDURE — 84145 PROCALCITONIN (PCT): CPT | Performed by: NURSE PRACTITIONER

## 2025-05-14 PROCEDURE — 99900035 HC TECH TIME PER 15 MIN (STAT)

## 2025-05-14 PROCEDURE — 25000003 PHARM REV CODE 250: Performed by: INTERNAL MEDICINE

## 2025-05-14 PROCEDURE — 93005 ELECTROCARDIOGRAM TRACING: CPT

## 2025-05-14 PROCEDURE — 83735 ASSAY OF MAGNESIUM: CPT | Performed by: NURSE PRACTITIONER

## 2025-05-14 PROCEDURE — 63600175 PHARM REV CODE 636 W HCPCS: Performed by: INTERNAL MEDICINE

## 2025-05-14 PROCEDURE — 84100 ASSAY OF PHOSPHORUS: CPT | Performed by: NURSE PRACTITIONER

## 2025-05-14 PROCEDURE — 25000003 PHARM REV CODE 250: Performed by: NURSE PRACTITIONER

## 2025-05-14 PROCEDURE — 85025 COMPLETE CBC W/AUTO DIFF WBC: CPT | Performed by: NURSE PRACTITIONER

## 2025-05-14 PROCEDURE — 27100171 HC OXYGEN HIGH FLOW UP TO 24 HOURS

## 2025-05-14 PROCEDURE — 20000000 HC ICU ROOM

## 2025-05-14 PROCEDURE — 82803 BLOOD GASES ANY COMBINATION: CPT

## 2025-05-14 PROCEDURE — 80048 BASIC METABOLIC PNL TOTAL CA: CPT | Performed by: NURSE PRACTITIONER

## 2025-05-14 PROCEDURE — 99900026 HC AIRWAY MAINTENANCE (STAT)

## 2025-05-14 PROCEDURE — 36600 WITHDRAWAL OF ARTERIAL BLOOD: CPT

## 2025-05-14 PROCEDURE — 94003 VENT MGMT INPAT SUBQ DAY: CPT

## 2025-05-14 RX ORDER — GLUCAGON 1 MG
1 KIT INJECTION
Status: DISCONTINUED | OUTPATIENT
Start: 2025-05-14 | End: 2025-05-21

## 2025-05-14 RX ORDER — INSULIN ASPART 100 [IU]/ML
0-10 INJECTION, SOLUTION INTRAVENOUS; SUBCUTANEOUS EVERY 4 HOURS PRN
Status: DISCONTINUED | OUTPATIENT
Start: 2025-05-14 | End: 2025-05-21

## 2025-05-14 RX ORDER — MIDAZOLAM HYDROCHLORIDE 1 MG/ML
2 INJECTION, SOLUTION INTRAMUSCULAR; INTRAVENOUS ONCE
Status: COMPLETED | OUTPATIENT
Start: 2025-05-14 | End: 2025-05-14

## 2025-05-14 RX ORDER — MAGNESIUM SULFATE HEPTAHYDRATE 40 MG/ML
2 INJECTION, SOLUTION INTRAVENOUS ONCE
Status: COMPLETED | OUTPATIENT
Start: 2025-05-14 | End: 2025-05-14

## 2025-05-14 RX ADMIN — CHLORHEXIDINE GLUCONATE 0.12% ORAL RINSE 15 ML: 1.2 LIQUID ORAL at 09:05

## 2025-05-14 RX ADMIN — BACLOFEN 20 MG: 10 TABLET ORAL at 08:05

## 2025-05-14 RX ADMIN — MIDAZOLAM 2 MG: 1 INJECTION INTRAMUSCULAR; INTRAVENOUS at 07:05

## 2025-05-14 RX ADMIN — DIAZEPAM 2 MG: 2 TABLET ORAL at 09:05

## 2025-05-14 RX ADMIN — MIDAZOLAM 2 MG: 1 INJECTION INTRAMUSCULAR; INTRAVENOUS at 02:05

## 2025-05-14 RX ADMIN — CHLORHEXIDINE GLUCONATE 0.12% ORAL RINSE 15 ML: 1.2 LIQUID ORAL at 08:05

## 2025-05-14 RX ADMIN — PIPERACILLIN SODIUM AND TAZOBACTAM SODIUM 4.5 G: 4; .5 INJECTION, POWDER, FOR SOLUTION INTRAVENOUS at 05:05

## 2025-05-14 RX ADMIN — POTASSIUM BICARBONATE 35 MEQ: 391 TABLET, EFFERVESCENT ORAL at 01:05

## 2025-05-14 RX ADMIN — POTASSIUM BICARBONATE 35 MEQ: 391 TABLET, EFFERVESCENT ORAL at 03:05

## 2025-05-14 RX ADMIN — PIPERACILLIN SODIUM AND TAZOBACTAM SODIUM 4.5 G: 4; .5 INJECTION, POWDER, FOR SOLUTION INTRAVENOUS at 02:05

## 2025-05-14 RX ADMIN — LEVETIRACETAM 2000 MG: 100 SOLUTION ORAL at 09:05

## 2025-05-14 RX ADMIN — PHENOBARBITAL 64.8 MG: 32.4 TABLET ORAL at 09:05

## 2025-05-14 RX ADMIN — FAMOTIDINE 20 MG: 20 TABLET, FILM COATED ORAL at 09:05

## 2025-05-14 RX ADMIN — MUPIROCIN: 20 OINTMENT TOPICAL at 08:05

## 2025-05-14 RX ADMIN — FAMOTIDINE 20 MG: 20 TABLET, FILM COATED ORAL at 08:05

## 2025-05-14 RX ADMIN — LEVETIRACETAM 2000 MG: 100 SOLUTION ORAL at 08:05

## 2025-05-14 RX ADMIN — DIAZEPAM 2 MG: 2 TABLET ORAL at 08:05

## 2025-05-14 RX ADMIN — VANCOMYCIN HYDROCHLORIDE 750 MG: 750 INJECTION, POWDER, LYOPHILIZED, FOR SOLUTION INTRAVENOUS at 02:05

## 2025-05-14 RX ADMIN — MIDAZOLAM 2 MG: 1 INJECTION INTRAMUSCULAR; INTRAVENOUS at 10:05

## 2025-05-14 RX ADMIN — DIAZEPAM 2 MG: 2 TABLET ORAL at 03:05

## 2025-05-14 RX ADMIN — PHENOBARBITAL 64.8 MG: 32.4 TABLET ORAL at 08:05

## 2025-05-14 RX ADMIN — BACLOFEN 20 MG: 10 TABLET ORAL at 09:05

## 2025-05-14 RX ADMIN — MUPIROCIN: 20 OINTMENT TOPICAL at 09:05

## 2025-05-14 RX ADMIN — MAGNESIUM SULFATE HEPTAHYDRATE 2 G: 40 INJECTION, SOLUTION INTRAVENOUS at 09:05

## 2025-05-14 RX ADMIN — PIPERACILLIN SODIUM AND TAZOBACTAM SODIUM 4.5 G: 4; .5 INJECTION, POWDER, FOR SOLUTION INTRAVENOUS at 09:05

## 2025-05-14 RX ADMIN — ENOXAPARIN SODIUM 40 MG: 40 INJECTION SUBCUTANEOUS at 05:05

## 2025-05-15 LAB
ABSOLUTE EOSINOPHIL (OHS): 0.58 K/UL
ABSOLUTE MONOCYTE (OHS): 0.7 K/UL (ref 0.3–1)
ABSOLUTE NEUTROPHIL COUNT (OHS): 5.4 K/UL (ref 1.8–7.7)
ANION GAP (OHS): 7 MMOL/L (ref 8–16)
BASOPHILS # BLD AUTO: 0.02 K/UL
BASOPHILS NFR BLD AUTO: 0.2 %
BUN SERPL-MCNC: 9 MG/DL (ref 6–20)
CALCIUM SERPL-MCNC: 8.6 MG/DL (ref 8.7–10.5)
CHLORIDE SERPL-SCNC: 108 MMOL/L (ref 95–110)
CO2 SERPL-SCNC: 28 MMOL/L (ref 23–29)
CREAT SERPL-MCNC: 0.6 MG/DL (ref 0.5–1.4)
ERYTHROCYTE [DISTWIDTH] IN BLOOD BY AUTOMATED COUNT: 15.8 % (ref 11.5–14.5)
GFR SERPLBLD CREATININE-BSD FMLA CKD-EPI: >60 ML/MIN/1.73/M2
GLUCOSE SERPL-MCNC: 114 MG/DL (ref 70–110)
HCT VFR BLD AUTO: 32 % (ref 40–54)
HGB BLD-MCNC: 10.2 GM/DL (ref 14–18)
IMM GRANULOCYTES # BLD AUTO: 0.03 K/UL (ref 0–0.04)
IMM GRANULOCYTES NFR BLD AUTO: 0.4 % (ref 0–0.5)
LYMPHOCYTES # BLD AUTO: 1.39 K/UL (ref 1–4.8)
MAGNESIUM SERPL-MCNC: 1.8 MG/DL (ref 1.6–2.6)
MCH RBC QN AUTO: 25.8 PG (ref 27–31)
MCHC RBC AUTO-ENTMCNC: 31.9 G/DL (ref 32–36)
MCV RBC AUTO: 81 FL (ref 82–98)
NUCLEATED RBC (/100WBC) (OHS): 0 /100 WBC
PHOSPHATE SERPL-MCNC: 2 MG/DL (ref 2.7–4.5)
PLATELET # BLD AUTO: 224 K/UL (ref 150–450)
PMV BLD AUTO: 12.5 FL (ref 9.2–12.9)
POCT GLUCOSE: 108 MG/DL (ref 70–110)
POCT GLUCOSE: 114 MG/DL (ref 70–110)
POCT GLUCOSE: 84 MG/DL (ref 70–110)
POTASSIUM SERPL-SCNC: 3.9 MMOL/L (ref 3.5–5.1)
PROCALCITONIN SERPL-MCNC: 1.03 NG/ML
RBC # BLD AUTO: 3.95 M/UL (ref 4.6–6.2)
RELATIVE EOSINOPHIL (OHS): 7.1 %
RELATIVE LYMPHOCYTE (OHS): 17.1 % (ref 18–48)
RELATIVE MONOCYTE (OHS): 8.6 % (ref 4–15)
RELATIVE NEUTROPHIL (OHS): 66.6 % (ref 38–73)
SODIUM SERPL-SCNC: 143 MMOL/L (ref 136–145)
WBC # BLD AUTO: 8.12 K/UL (ref 3.9–12.7)

## 2025-05-15 PROCEDURE — 94761 N-INVAS EAR/PLS OXIMETRY MLT: CPT

## 2025-05-15 PROCEDURE — 80048 BASIC METABOLIC PNL TOTAL CA: CPT | Performed by: NURSE PRACTITIONER

## 2025-05-15 PROCEDURE — 20000000 HC ICU ROOM

## 2025-05-15 PROCEDURE — 25000003 PHARM REV CODE 250: Performed by: NURSE PRACTITIONER

## 2025-05-15 PROCEDURE — 63600175 PHARM REV CODE 636 W HCPCS: Performed by: NURSE PRACTITIONER

## 2025-05-15 PROCEDURE — 99900035 HC TECH TIME PER 15 MIN (STAT)

## 2025-05-15 PROCEDURE — 83735 ASSAY OF MAGNESIUM: CPT | Performed by: NURSE PRACTITIONER

## 2025-05-15 PROCEDURE — 94760 N-INVAS EAR/PLS OXIMETRY 1: CPT

## 2025-05-15 PROCEDURE — 94003 VENT MGMT INPAT SUBQ DAY: CPT

## 2025-05-15 PROCEDURE — 84100 ASSAY OF PHOSPHORUS: CPT | Performed by: NURSE PRACTITIONER

## 2025-05-15 PROCEDURE — 99900026 HC AIRWAY MAINTENANCE (STAT)

## 2025-05-15 PROCEDURE — 84145 PROCALCITONIN (PCT): CPT | Performed by: NURSE PRACTITIONER

## 2025-05-15 PROCEDURE — 25000003 PHARM REV CODE 250: Performed by: INTERNAL MEDICINE

## 2025-05-15 PROCEDURE — 85025 COMPLETE CBC W/AUTO DIFF WBC: CPT | Performed by: NURSE PRACTITIONER

## 2025-05-15 PROCEDURE — 27100171 HC OXYGEN HIGH FLOW UP TO 24 HOURS

## 2025-05-15 RX ORDER — QUETIAPINE FUMARATE 100 MG/1
100 TABLET, FILM COATED ORAL NIGHTLY
Status: DISCONTINUED | OUTPATIENT
Start: 2025-05-15 | End: 2025-05-16

## 2025-05-15 RX ADMIN — DIAZEPAM 2 MG: 2 TABLET ORAL at 04:05

## 2025-05-15 RX ADMIN — PIPERACILLIN SODIUM AND TAZOBACTAM SODIUM 4.5 G: 4; .5 INJECTION, POWDER, FOR SOLUTION INTRAVENOUS at 09:05

## 2025-05-15 RX ADMIN — FAMOTIDINE 20 MG: 20 TABLET, FILM COATED ORAL at 09:05

## 2025-05-15 RX ADMIN — MUPIROCIN: 20 OINTMENT TOPICAL at 09:05

## 2025-05-15 RX ADMIN — POTASSIUM BICARBONATE 50 MEQ: 978 TABLET, EFFERVESCENT ORAL at 05:05

## 2025-05-15 RX ADMIN — Medication 800 MG: at 05:05

## 2025-05-15 RX ADMIN — LEVETIRACETAM 2000 MG: 100 SOLUTION ORAL at 09:05

## 2025-05-15 RX ADMIN — DIAZEPAM 2 MG: 2 TABLET ORAL at 09:05

## 2025-05-15 RX ADMIN — CHLORHEXIDINE GLUCONATE 0.12% ORAL RINSE 15 ML: 1.2 LIQUID ORAL at 09:05

## 2025-05-15 RX ADMIN — PIPERACILLIN SODIUM AND TAZOBACTAM SODIUM 4.5 G: 4; .5 INJECTION, POWDER, FOR SOLUTION INTRAVENOUS at 02:05

## 2025-05-15 RX ADMIN — PHENOBARBITAL 64.8 MG: 32.4 TABLET ORAL at 09:05

## 2025-05-15 RX ADMIN — QUETIAPINE FUMARATE 100 MG: 100 TABLET ORAL at 09:05

## 2025-05-15 RX ADMIN — PIPERACILLIN SODIUM AND TAZOBACTAM SODIUM 4.5 G: 4; .5 INJECTION, POWDER, FOR SOLUTION INTRAVENOUS at 06:05

## 2025-05-15 RX ADMIN — ENOXAPARIN SODIUM 40 MG: 40 INJECTION SUBCUTANEOUS at 04:05

## 2025-05-15 RX ADMIN — BACLOFEN 20 MG: 10 TABLET ORAL at 09:05

## 2025-05-15 RX ADMIN — Medication 2 PACKET: at 05:05

## 2025-05-16 LAB
ABSOLUTE EOSINOPHIL (OHS): 0.56 K/UL
ABSOLUTE MONOCYTE (OHS): 0.68 K/UL (ref 0.3–1)
ABSOLUTE NEUTROPHIL COUNT (OHS): 4.04 K/UL (ref 1.8–7.7)
ALLENS TEST: ABNORMAL
ANION GAP (OHS): 7 MMOL/L (ref 8–16)
BASOPHILS # BLD AUTO: 0.03 K/UL
BASOPHILS NFR BLD AUTO: 0.4 %
BUN SERPL-MCNC: 11 MG/DL (ref 6–20)
CALCIUM SERPL-MCNC: 8.7 MG/DL (ref 8.7–10.5)
CHLORIDE SERPL-SCNC: 108 MMOL/L (ref 95–110)
CO2 SERPL-SCNC: 28 MMOL/L (ref 23–29)
CREAT SERPL-MCNC: 0.7 MG/DL (ref 0.5–1.4)
DELSYS: ABNORMAL
ERYTHROCYTE [DISTWIDTH] IN BLOOD BY AUTOMATED COUNT: 15.7 % (ref 11.5–14.5)
ERYTHROCYTE [SEDIMENTATION RATE] IN BLOOD BY WESTERGREN METHOD: 12 MM/H
FIO2: 30
GFR SERPLBLD CREATININE-BSD FMLA CKD-EPI: >60 ML/MIN/1.73/M2
GLUCOSE SERPL-MCNC: 102 MG/DL (ref 70–110)
HCO3 UR-SCNC: 27.7 MMOL/L (ref 24–28)
HCT VFR BLD AUTO: 32.1 % (ref 40–54)
HGB BLD-MCNC: 10.3 GM/DL (ref 14–18)
IMM GRANULOCYTES # BLD AUTO: 0.02 K/UL (ref 0–0.04)
IMM GRANULOCYTES NFR BLD AUTO: 0.3 % (ref 0–0.5)
LYMPHOCYTES # BLD AUTO: 1.85 K/UL (ref 1–4.8)
MAGNESIUM SERPL-MCNC: 1.8 MG/DL (ref 1.6–2.6)
MCH RBC QN AUTO: 26.1 PG (ref 27–31)
MCHC RBC AUTO-ENTMCNC: 32.1 G/DL (ref 32–36)
MCV RBC AUTO: 82 FL (ref 82–98)
MODE: ABNORMAL
NUCLEATED RBC (/100WBC) (OHS): 0 /100 WBC
OHS QRS DURATION: 100 MS
OHS QTC CALCULATION: 378 MS
PCO2 BLDA: 43.4 MMHG (ref 35–45)
PEEP: 5
PH SMN: 7.41 [PH] (ref 7.35–7.45)
PHOSPHATE SERPL-MCNC: 3.4 MG/DL (ref 2.7–4.5)
PLATELET # BLD AUTO: 214 K/UL (ref 150–450)
PMV BLD AUTO: 11.5 FL (ref 9.2–12.9)
PO2 BLDA: 74 MMHG (ref 80–100)
POC BE: 3 MMOL/L (ref -2–2)
POC SATURATED O2: 95 % (ref 95–100)
POCT GLUCOSE: 104 MG/DL (ref 70–110)
POCT GLUCOSE: 106 MG/DL (ref 70–110)
POCT GLUCOSE: 111 MG/DL (ref 70–110)
POCT GLUCOSE: 111 MG/DL (ref 70–110)
POCT GLUCOSE: 117 MG/DL (ref 70–110)
POTASSIUM SERPL-SCNC: 3.9 MMOL/L (ref 3.5–5.1)
PROCALCITONIN SERPL-MCNC: 0.44 NG/ML
RBC # BLD AUTO: 3.94 M/UL (ref 4.6–6.2)
RELATIVE EOSINOPHIL (OHS): 7.8 %
RELATIVE LYMPHOCYTE (OHS): 25.8 % (ref 18–48)
RELATIVE MONOCYTE (OHS): 9.5 % (ref 4–15)
RELATIVE NEUTROPHIL (OHS): 56.2 % (ref 38–73)
SAMPLE: ABNORMAL
SITE: ABNORMAL
SODIUM SERPL-SCNC: 143 MMOL/L (ref 136–145)
VT: 400
WBC # BLD AUTO: 7.18 K/UL (ref 3.9–12.7)

## 2025-05-16 PROCEDURE — 94761 N-INVAS EAR/PLS OXIMETRY MLT: CPT

## 2025-05-16 PROCEDURE — 82803 BLOOD GASES ANY COMBINATION: CPT

## 2025-05-16 PROCEDURE — 20000000 HC ICU ROOM

## 2025-05-16 PROCEDURE — 36600 WITHDRAWAL OF ARTERIAL BLOOD: CPT

## 2025-05-16 PROCEDURE — 25000003 PHARM REV CODE 250: Performed by: NURSE PRACTITIONER

## 2025-05-16 PROCEDURE — 27100171 HC OXYGEN HIGH FLOW UP TO 24 HOURS

## 2025-05-16 PROCEDURE — 85025 COMPLETE CBC W/AUTO DIFF WBC: CPT | Performed by: NURSE PRACTITIONER

## 2025-05-16 PROCEDURE — 84100 ASSAY OF PHOSPHORUS: CPT | Performed by: NURSE PRACTITIONER

## 2025-05-16 PROCEDURE — 99900035 HC TECH TIME PER 15 MIN (STAT)

## 2025-05-16 PROCEDURE — 80048 BASIC METABOLIC PNL TOTAL CA: CPT | Performed by: NURSE PRACTITIONER

## 2025-05-16 PROCEDURE — 84145 PROCALCITONIN (PCT): CPT | Performed by: NURSE PRACTITIONER

## 2025-05-16 PROCEDURE — 94760 N-INVAS EAR/PLS OXIMETRY 1: CPT

## 2025-05-16 PROCEDURE — 99900026 HC AIRWAY MAINTENANCE (STAT)

## 2025-05-16 PROCEDURE — 63600175 PHARM REV CODE 636 W HCPCS: Performed by: NURSE PRACTITIONER

## 2025-05-16 PROCEDURE — 83735 ASSAY OF MAGNESIUM: CPT | Performed by: NURSE PRACTITIONER

## 2025-05-16 PROCEDURE — 25000003 PHARM REV CODE 250: Performed by: INTERNAL MEDICINE

## 2025-05-16 PROCEDURE — 94003 VENT MGMT INPAT SUBQ DAY: CPT

## 2025-05-16 RX ORDER — QUETIAPINE FUMARATE 25 MG/1
50 TABLET, FILM COATED ORAL NIGHTLY
Status: DISCONTINUED | OUTPATIENT
Start: 2025-05-16 | End: 2025-05-17

## 2025-05-16 RX ADMIN — MUPIROCIN: 20 OINTMENT TOPICAL at 08:05

## 2025-05-16 RX ADMIN — DIAZEPAM 2 MG: 2 TABLET ORAL at 03:05

## 2025-05-16 RX ADMIN — PIPERACILLIN SODIUM AND TAZOBACTAM SODIUM 4.5 G: 4; .5 INJECTION, POWDER, FOR SOLUTION INTRAVENOUS at 02:05

## 2025-05-16 RX ADMIN — DIAZEPAM 2 MG: 2 TABLET ORAL at 08:05

## 2025-05-16 RX ADMIN — LEVETIRACETAM 2000 MG: 100 SOLUTION ORAL at 08:05

## 2025-05-16 RX ADMIN — PHENOBARBITAL 64.8 MG: 32.4 TABLET ORAL at 08:05

## 2025-05-16 RX ADMIN — QUETIAPINE FUMARATE 50 MG: 25 TABLET ORAL at 08:05

## 2025-05-16 RX ADMIN — CHLORHEXIDINE GLUCONATE 0.12% ORAL RINSE 15 ML: 1.2 LIQUID ORAL at 08:05

## 2025-05-16 RX ADMIN — PIPERACILLIN SODIUM AND TAZOBACTAM SODIUM 4.5 G: 4; .5 INJECTION, POWDER, FOR SOLUTION INTRAVENOUS at 06:05

## 2025-05-16 RX ADMIN — FAMOTIDINE 20 MG: 20 TABLET, FILM COATED ORAL at 08:05

## 2025-05-16 RX ADMIN — PIPERACILLIN SODIUM AND TAZOBACTAM SODIUM 4.5 G: 4; .5 INJECTION, POWDER, FOR SOLUTION INTRAVENOUS at 10:05

## 2025-05-16 RX ADMIN — BACLOFEN 20 MG: 10 TABLET ORAL at 08:05

## 2025-05-16 RX ADMIN — ENOXAPARIN SODIUM 40 MG: 40 INJECTION SUBCUTANEOUS at 06:05

## 2025-05-16 RX ADMIN — Medication 800 MG: at 05:05

## 2025-05-17 LAB
ABSOLUTE EOSINOPHIL (OHS): 0.51 K/UL
ABSOLUTE MONOCYTE (OHS): 0.7 K/UL (ref 0.3–1)
ABSOLUTE NEUTROPHIL COUNT (OHS): 4.63 K/UL (ref 1.8–7.7)
ANION GAP (OHS): 4 MMOL/L (ref 8–16)
BACTERIA BLD CULT: NORMAL
BACTERIA BLD CULT: NORMAL
BASOPHILS # BLD AUTO: 0.02 K/UL
BASOPHILS NFR BLD AUTO: 0.3 %
BUN SERPL-MCNC: 15 MG/DL (ref 6–20)
CALCIUM SERPL-MCNC: 8.6 MG/DL (ref 8.7–10.5)
CHLORIDE SERPL-SCNC: 108 MMOL/L (ref 95–110)
CO2 SERPL-SCNC: 28 MMOL/L (ref 23–29)
CREAT SERPL-MCNC: 0.6 MG/DL (ref 0.5–1.4)
ERYTHROCYTE [DISTWIDTH] IN BLOOD BY AUTOMATED COUNT: 15.8 % (ref 11.5–14.5)
GFR SERPLBLD CREATININE-BSD FMLA CKD-EPI: >60 ML/MIN/1.73/M2
GLUCOSE SERPL-MCNC: 108 MG/DL (ref 70–110)
HCT VFR BLD AUTO: 32 % (ref 40–54)
HGB BLD-MCNC: 10.3 GM/DL (ref 14–18)
IMM GRANULOCYTES # BLD AUTO: 0.05 K/UL (ref 0–0.04)
IMM GRANULOCYTES NFR BLD AUTO: 0.6 % (ref 0–0.5)
LYMPHOCYTES # BLD AUTO: 1.83 K/UL (ref 1–4.8)
MAGNESIUM SERPL-MCNC: 1.8 MG/DL (ref 1.6–2.6)
MCH RBC QN AUTO: 25.9 PG (ref 27–31)
MCHC RBC AUTO-ENTMCNC: 32.2 G/DL (ref 32–36)
MCV RBC AUTO: 81 FL (ref 82–98)
NUCLEATED RBC (/100WBC) (OHS): 0 /100 WBC
PHOSPHATE SERPL-MCNC: 3.3 MG/DL (ref 2.7–4.5)
PLATELET # BLD AUTO: 219 K/UL (ref 150–450)
PMV BLD AUTO: 11.8 FL (ref 9.2–12.9)
POCT GLUCOSE: 105 MG/DL (ref 70–110)
POCT GLUCOSE: 109 MG/DL (ref 70–110)
POCT GLUCOSE: 120 MG/DL (ref 70–110)
POCT GLUCOSE: 79 MG/DL (ref 70–110)
POCT GLUCOSE: 88 MG/DL (ref 70–110)
POCT GLUCOSE: 93 MG/DL (ref 70–110)
POTASSIUM SERPL-SCNC: 4 MMOL/L (ref 3.5–5.1)
RBC # BLD AUTO: 3.97 M/UL (ref 4.6–6.2)
RELATIVE EOSINOPHIL (OHS): 6.6 %
RELATIVE LYMPHOCYTE (OHS): 23.6 % (ref 18–48)
RELATIVE MONOCYTE (OHS): 9 % (ref 4–15)
RELATIVE NEUTROPHIL (OHS): 59.9 % (ref 38–73)
SODIUM SERPL-SCNC: 140 MMOL/L (ref 136–145)
WBC # BLD AUTO: 7.74 K/UL (ref 3.9–12.7)

## 2025-05-17 PROCEDURE — 20000000 HC ICU ROOM

## 2025-05-17 PROCEDURE — 99900035 HC TECH TIME PER 15 MIN (STAT)

## 2025-05-17 PROCEDURE — 25000003 PHARM REV CODE 250: Performed by: NURSE PRACTITIONER

## 2025-05-17 PROCEDURE — 80048 BASIC METABOLIC PNL TOTAL CA: CPT | Performed by: NURSE PRACTITIONER

## 2025-05-17 PROCEDURE — 83735 ASSAY OF MAGNESIUM: CPT | Performed by: NURSE PRACTITIONER

## 2025-05-17 PROCEDURE — 85025 COMPLETE CBC W/AUTO DIFF WBC: CPT | Performed by: NURSE PRACTITIONER

## 2025-05-17 PROCEDURE — 25000003 PHARM REV CODE 250: Performed by: INTERNAL MEDICINE

## 2025-05-17 PROCEDURE — 63600175 PHARM REV CODE 636 W HCPCS: Performed by: NURSE PRACTITIONER

## 2025-05-17 PROCEDURE — 94640 AIRWAY INHALATION TREATMENT: CPT

## 2025-05-17 PROCEDURE — 94003 VENT MGMT INPAT SUBQ DAY: CPT

## 2025-05-17 PROCEDURE — 27100171 HC OXYGEN HIGH FLOW UP TO 24 HOURS

## 2025-05-17 PROCEDURE — 84100 ASSAY OF PHOSPHORUS: CPT | Performed by: NURSE PRACTITIONER

## 2025-05-17 PROCEDURE — 99222 1ST HOSP IP/OBS MODERATE 55: CPT | Mod: FS,,, | Performed by: SURGERY

## 2025-05-17 PROCEDURE — 25000242 PHARM REV CODE 250 ALT 637 W/ HCPCS

## 2025-05-17 PROCEDURE — 94761 N-INVAS EAR/PLS OXIMETRY MLT: CPT

## 2025-05-17 PROCEDURE — 99900026 HC AIRWAY MAINTENANCE (STAT)

## 2025-05-17 RX ORDER — QUETIAPINE FUMARATE 25 MG/1
25 TABLET, FILM COATED ORAL NIGHTLY
Status: DISCONTINUED | OUTPATIENT
Start: 2025-05-17 | End: 2025-05-31 | Stop reason: HOSPADM

## 2025-05-17 RX ORDER — POLYETHYLENE GLYCOL 3350 17 G/17G
17 POWDER, FOR SOLUTION ORAL DAILY
Status: DISCONTINUED | OUTPATIENT
Start: 2025-05-17 | End: 2025-05-31 | Stop reason: HOSPADM

## 2025-05-17 RX ORDER — IPRATROPIUM BROMIDE AND ALBUTEROL SULFATE 2.5; .5 MG/3ML; MG/3ML
3 SOLUTION RESPIRATORY (INHALATION) EVERY 6 HOURS PRN
Status: DISCONTINUED | OUTPATIENT
Start: 2025-05-17 | End: 2025-05-31 | Stop reason: HOSPADM

## 2025-05-17 RX ORDER — AMOXICILLIN 250 MG
1 CAPSULE ORAL 2 TIMES DAILY
Status: DISCONTINUED | OUTPATIENT
Start: 2025-05-17 | End: 2025-05-31 | Stop reason: HOSPADM

## 2025-05-17 RX ADMIN — PHENOBARBITAL 64.8 MG: 32.4 TABLET ORAL at 08:05

## 2025-05-17 RX ADMIN — BACLOFEN 20 MG: 10 TABLET ORAL at 08:05

## 2025-05-17 RX ADMIN — FAMOTIDINE 20 MG: 20 TABLET, FILM COATED ORAL at 08:05

## 2025-05-17 RX ADMIN — Medication 800 MG: at 12:05

## 2025-05-17 RX ADMIN — DIAZEPAM 2 MG: 2 TABLET ORAL at 08:05

## 2025-05-17 RX ADMIN — PIPERACILLIN SODIUM AND TAZOBACTAM SODIUM 4.5 G: 4; .5 INJECTION, POWDER, FOR SOLUTION INTRAVENOUS at 06:05

## 2025-05-17 RX ADMIN — LEVETIRACETAM 2000 MG: 100 SOLUTION ORAL at 09:05

## 2025-05-17 RX ADMIN — PIPERACILLIN SODIUM AND TAZOBACTAM SODIUM 4.5 G: 4; .5 INJECTION, POWDER, FOR SOLUTION INTRAVENOUS at 10:05

## 2025-05-17 RX ADMIN — CHLORHEXIDINE GLUCONATE 0.12% ORAL RINSE 15 ML: 1.2 LIQUID ORAL at 08:05

## 2025-05-17 RX ADMIN — IPRATROPIUM BROMIDE AND ALBUTEROL SULFATE 3 ML: 2.5; .5 SOLUTION RESPIRATORY (INHALATION) at 08:05

## 2025-05-17 RX ADMIN — LEVETIRACETAM 2000 MG: 100 SOLUTION ORAL at 08:05

## 2025-05-17 RX ADMIN — SENNOSIDES AND DOCUSATE SODIUM 1 TABLET: 50; 8.6 TABLET ORAL at 08:05

## 2025-05-17 RX ADMIN — Medication 800 MG: at 05:05

## 2025-05-17 RX ADMIN — QUETIAPINE FUMARATE 25 MG: 25 TABLET ORAL at 08:05

## 2025-05-17 RX ADMIN — POLYETHYLENE GLYCOL 3350 17 G: 17 POWDER, FOR SOLUTION ORAL at 08:05

## 2025-05-17 RX ADMIN — Medication 800 MG: at 01:05

## 2025-05-17 RX ADMIN — PIPERACILLIN SODIUM AND TAZOBACTAM SODIUM 4.5 G: 4; .5 INJECTION, POWDER, FOR SOLUTION INTRAVENOUS at 02:05

## 2025-05-17 RX ADMIN — ENOXAPARIN SODIUM 40 MG: 40 INJECTION SUBCUTANEOUS at 06:05

## 2025-05-17 RX ADMIN — DIAZEPAM 2 MG: 2 TABLET ORAL at 03:05

## 2025-05-18 LAB
ABSOLUTE EOSINOPHIL (OHS): 0.53 K/UL
ABSOLUTE MONOCYTE (OHS): 0.64 K/UL (ref 0.3–1)
ABSOLUTE NEUTROPHIL COUNT (OHS): 4.41 K/UL (ref 1.8–7.7)
ANION GAP (OHS): 7 MMOL/L (ref 8–16)
BASOPHILS # BLD AUTO: 0.04 K/UL
BASOPHILS NFR BLD AUTO: 0.5 %
BUN SERPL-MCNC: 14 MG/DL (ref 6–20)
CALCIUM SERPL-MCNC: 8.6 MG/DL (ref 8.7–10.5)
CHLORIDE SERPL-SCNC: 106 MMOL/L (ref 95–110)
CO2 SERPL-SCNC: 27 MMOL/L (ref 23–29)
CREAT SERPL-MCNC: 0.7 MG/DL (ref 0.5–1.4)
ERYTHROCYTE [DISTWIDTH] IN BLOOD BY AUTOMATED COUNT: 15.8 % (ref 11.5–14.5)
GFR SERPLBLD CREATININE-BSD FMLA CKD-EPI: >60 ML/MIN/1.73/M2
GLUCOSE SERPL-MCNC: 100 MG/DL (ref 70–110)
HCT VFR BLD AUTO: 32.3 % (ref 40–54)
HGB BLD-MCNC: 10.4 GM/DL (ref 14–18)
IMM GRANULOCYTES # BLD AUTO: 0.03 K/UL (ref 0–0.04)
IMM GRANULOCYTES NFR BLD AUTO: 0.4 % (ref 0–0.5)
LYMPHOCYTES # BLD AUTO: 1.76 K/UL (ref 1–4.8)
MAGNESIUM SERPL-MCNC: 2 MG/DL (ref 1.6–2.6)
MCH RBC QN AUTO: 25.9 PG (ref 27–31)
MCHC RBC AUTO-ENTMCNC: 32.2 G/DL (ref 32–36)
MCV RBC AUTO: 80 FL (ref 82–98)
NUCLEATED RBC (/100WBC) (OHS): 0 /100 WBC
PHOSPHATE SERPL-MCNC: 3.6 MG/DL (ref 2.7–4.5)
PLATELET # BLD AUTO: 227 K/UL (ref 150–450)
PMV BLD AUTO: 12 FL (ref 9.2–12.9)
POCT GLUCOSE: 108 MG/DL (ref 70–110)
POCT GLUCOSE: 109 MG/DL (ref 70–110)
POCT GLUCOSE: 113 MG/DL (ref 70–110)
POCT GLUCOSE: 134 MG/DL (ref 70–110)
POCT GLUCOSE: 95 MG/DL (ref 70–110)
POTASSIUM SERPL-SCNC: 4.1 MMOL/L (ref 3.5–5.1)
RBC # BLD AUTO: 4.02 M/UL (ref 4.6–6.2)
RELATIVE EOSINOPHIL (OHS): 7.2 %
RELATIVE LYMPHOCYTE (OHS): 23.8 % (ref 18–48)
RELATIVE MONOCYTE (OHS): 8.6 % (ref 4–15)
RELATIVE NEUTROPHIL (OHS): 59.5 % (ref 38–73)
SODIUM SERPL-SCNC: 140 MMOL/L (ref 136–145)
WBC # BLD AUTO: 7.41 K/UL (ref 3.9–12.7)

## 2025-05-18 PROCEDURE — 25000242 PHARM REV CODE 250 ALT 637 W/ HCPCS

## 2025-05-18 PROCEDURE — 94003 VENT MGMT INPAT SUBQ DAY: CPT

## 2025-05-18 PROCEDURE — 99900026 HC AIRWAY MAINTENANCE (STAT)

## 2025-05-18 PROCEDURE — 25000003 PHARM REV CODE 250: Performed by: INTERNAL MEDICINE

## 2025-05-18 PROCEDURE — 99232 SBSQ HOSP IP/OBS MODERATE 35: CPT | Mod: ,,, | Performed by: SURGERY

## 2025-05-18 PROCEDURE — 25000003 PHARM REV CODE 250: Performed by: NURSE PRACTITIONER

## 2025-05-18 PROCEDURE — 63600175 PHARM REV CODE 636 W HCPCS: Performed by: NURSE PRACTITIONER

## 2025-05-18 PROCEDURE — 27200966 HC CLOSED SUCTION SYSTEM

## 2025-05-18 PROCEDURE — 84100 ASSAY OF PHOSPHORUS: CPT | Performed by: NURSE PRACTITIONER

## 2025-05-18 PROCEDURE — 20000000 HC ICU ROOM

## 2025-05-18 PROCEDURE — 80048 BASIC METABOLIC PNL TOTAL CA: CPT | Performed by: NURSE PRACTITIONER

## 2025-05-18 PROCEDURE — 94640 AIRWAY INHALATION TREATMENT: CPT

## 2025-05-18 PROCEDURE — 85025 COMPLETE CBC W/AUTO DIFF WBC: CPT | Performed by: NURSE PRACTITIONER

## 2025-05-18 PROCEDURE — 94761 N-INVAS EAR/PLS OXIMETRY MLT: CPT

## 2025-05-18 PROCEDURE — 27100171 HC OXYGEN HIGH FLOW UP TO 24 HOURS

## 2025-05-18 PROCEDURE — 83735 ASSAY OF MAGNESIUM: CPT | Performed by: NURSE PRACTITIONER

## 2025-05-18 PROCEDURE — 99900035 HC TECH TIME PER 15 MIN (STAT)

## 2025-05-18 RX ADMIN — SENNOSIDES AND DOCUSATE SODIUM 1 TABLET: 50; 8.6 TABLET ORAL at 08:05

## 2025-05-18 RX ADMIN — LEVETIRACETAM 2000 MG: 100 SOLUTION ORAL at 08:05

## 2025-05-18 RX ADMIN — BACLOFEN 20 MG: 10 TABLET ORAL at 08:05

## 2025-05-18 RX ADMIN — DIAZEPAM 2 MG: 2 TABLET ORAL at 08:05

## 2025-05-18 RX ADMIN — PHENOBARBITAL 64.8 MG: 32.4 TABLET ORAL at 08:05

## 2025-05-18 RX ADMIN — POLYETHYLENE GLYCOL 3350 17 G: 17 POWDER, FOR SOLUTION ORAL at 08:05

## 2025-05-18 RX ADMIN — PIPERACILLIN SODIUM AND TAZOBACTAM SODIUM 4.5 G: 4; .5 INJECTION, POWDER, FOR SOLUTION INTRAVENOUS at 11:05

## 2025-05-18 RX ADMIN — PIPERACILLIN SODIUM AND TAZOBACTAM SODIUM 4.5 G: 4; .5 INJECTION, POWDER, FOR SOLUTION INTRAVENOUS at 05:05

## 2025-05-18 RX ADMIN — FAMOTIDINE 20 MG: 20 TABLET, FILM COATED ORAL at 08:05

## 2025-05-18 RX ADMIN — CHLORHEXIDINE GLUCONATE 0.12% ORAL RINSE 15 ML: 1.2 LIQUID ORAL at 08:05

## 2025-05-18 RX ADMIN — PIPERACILLIN SODIUM AND TAZOBACTAM SODIUM 4.5 G: 4; .5 INJECTION, POWDER, FOR SOLUTION INTRAVENOUS at 02:05

## 2025-05-18 RX ADMIN — IPRATROPIUM BROMIDE AND ALBUTEROL SULFATE 3 ML: 2.5; .5 SOLUTION RESPIRATORY (INHALATION) at 07:05

## 2025-05-18 RX ADMIN — DIAZEPAM 2 MG: 2 TABLET ORAL at 04:05

## 2025-05-18 RX ADMIN — ENOXAPARIN SODIUM 40 MG: 40 INJECTION SUBCUTANEOUS at 04:05

## 2025-05-18 RX ADMIN — QUETIAPINE FUMARATE 25 MG: 25 TABLET ORAL at 08:05

## 2025-05-19 LAB
ABSOLUTE EOSINOPHIL (OHS): 0.51 K/UL
ABSOLUTE MONOCYTE (OHS): 0.7 K/UL (ref 0.3–1)
ABSOLUTE NEUTROPHIL COUNT (OHS): 5.34 K/UL (ref 1.8–7.7)
ALBUMIN SERPL BCP-MCNC: 2.9 G/DL (ref 3.5–5.2)
ALP SERPL-CCNC: 111 UNIT/L (ref 40–150)
ALT SERPL W/O P-5'-P-CCNC: 46 UNIT/L (ref 10–44)
ANION GAP (OHS): 9 MMOL/L (ref 8–16)
AST SERPL-CCNC: 16 UNIT/L (ref 11–45)
BASOPHILS # BLD AUTO: 0.05 K/UL
BASOPHILS NFR BLD AUTO: 0.6 %
BILIRUB DIRECT SERPL-MCNC: <0.1 MG/DL (ref 0.1–0.3)
BILIRUB SERPL-MCNC: 0.2 MG/DL (ref 0.1–1)
BUN SERPL-MCNC: 15 MG/DL (ref 6–20)
CALCIUM SERPL-MCNC: 8.6 MG/DL (ref 8.7–10.5)
CHLORIDE SERPL-SCNC: 106 MMOL/L (ref 95–110)
CO2 SERPL-SCNC: 25 MMOL/L (ref 23–29)
CREAT SERPL-MCNC: 0.7 MG/DL (ref 0.5–1.4)
ERYTHROCYTE [DISTWIDTH] IN BLOOD BY AUTOMATED COUNT: 15.9 % (ref 11.5–14.5)
GFR SERPLBLD CREATININE-BSD FMLA CKD-EPI: >60 ML/MIN/1.73/M2
GLUCOSE SERPL-MCNC: 130 MG/DL (ref 70–110)
HCT VFR BLD AUTO: 32.7 % (ref 40–54)
HGB BLD-MCNC: 10.7 GM/DL (ref 14–18)
IMM GRANULOCYTES # BLD AUTO: 0.03 K/UL (ref 0–0.04)
IMM GRANULOCYTES NFR BLD AUTO: 0.4 % (ref 0–0.5)
LYMPHOCYTES # BLD AUTO: 1.53 K/UL (ref 1–4.8)
MAGNESIUM SERPL-MCNC: 1.9 MG/DL (ref 1.6–2.6)
MCH RBC QN AUTO: 26.4 PG (ref 27–31)
MCHC RBC AUTO-ENTMCNC: 32.7 G/DL (ref 32–36)
MCV RBC AUTO: 81 FL (ref 82–98)
NUCLEATED RBC (/100WBC) (OHS): 0 /100 WBC
PHOSPHATE SERPL-MCNC: 3.3 MG/DL (ref 2.7–4.5)
PLATELET # BLD AUTO: 237 K/UL (ref 150–450)
PMV BLD AUTO: 12.1 FL (ref 9.2–12.9)
POCT GLUCOSE: 102 MG/DL (ref 70–110)
POCT GLUCOSE: 104 MG/DL (ref 70–110)
POCT GLUCOSE: 104 MG/DL (ref 70–110)
POCT GLUCOSE: 106 MG/DL (ref 70–110)
POCT GLUCOSE: 124 MG/DL (ref 70–110)
POCT GLUCOSE: 149 MG/DL (ref 70–110)
POTASSIUM SERPL-SCNC: 3.9 MMOL/L (ref 3.5–5.1)
PROT SERPL-MCNC: 6.7 GM/DL (ref 6–8.4)
RBC # BLD AUTO: 4.05 M/UL (ref 4.6–6.2)
RELATIVE EOSINOPHIL (OHS): 6.3 %
RELATIVE LYMPHOCYTE (OHS): 18.8 % (ref 18–48)
RELATIVE MONOCYTE (OHS): 8.6 % (ref 4–15)
RELATIVE NEUTROPHIL (OHS): 65.3 % (ref 38–73)
SODIUM SERPL-SCNC: 140 MMOL/L (ref 136–145)
WBC # BLD AUTO: 8.16 K/UL (ref 3.9–12.7)

## 2025-05-19 PROCEDURE — 63600175 PHARM REV CODE 636 W HCPCS: Performed by: NURSE PRACTITIONER

## 2025-05-19 PROCEDURE — 25000003 PHARM REV CODE 250: Performed by: NURSE PRACTITIONER

## 2025-05-19 PROCEDURE — 99232 SBSQ HOSP IP/OBS MODERATE 35: CPT | Mod: ,,, | Performed by: SURGERY

## 2025-05-19 PROCEDURE — 99900026 HC AIRWAY MAINTENANCE (STAT)

## 2025-05-19 PROCEDURE — 25000003 PHARM REV CODE 250: Performed by: INTERNAL MEDICINE

## 2025-05-19 PROCEDURE — 80048 BASIC METABOLIC PNL TOTAL CA: CPT | Performed by: NURSE PRACTITIONER

## 2025-05-19 PROCEDURE — 20000000 HC ICU ROOM

## 2025-05-19 PROCEDURE — 83735 ASSAY OF MAGNESIUM: CPT | Performed by: NURSE PRACTITIONER

## 2025-05-19 PROCEDURE — 94761 N-INVAS EAR/PLS OXIMETRY MLT: CPT

## 2025-05-19 PROCEDURE — 94003 VENT MGMT INPAT SUBQ DAY: CPT

## 2025-05-19 PROCEDURE — 27200966 HC CLOSED SUCTION SYSTEM

## 2025-05-19 PROCEDURE — 27100171 HC OXYGEN HIGH FLOW UP TO 24 HOURS

## 2025-05-19 PROCEDURE — 99900035 HC TECH TIME PER 15 MIN (STAT)

## 2025-05-19 PROCEDURE — 82248 BILIRUBIN DIRECT: CPT | Performed by: INTERNAL MEDICINE

## 2025-05-19 PROCEDURE — 84100 ASSAY OF PHOSPHORUS: CPT | Performed by: NURSE PRACTITIONER

## 2025-05-19 PROCEDURE — 85025 COMPLETE CBC W/AUTO DIFF WBC: CPT | Performed by: NURSE PRACTITIONER

## 2025-05-19 RX ADMIN — LEVETIRACETAM 2000 MG: 100 SOLUTION ORAL at 08:05

## 2025-05-19 RX ADMIN — CHLORHEXIDINE GLUCONATE 0.12% ORAL RINSE 15 ML: 1.2 LIQUID ORAL at 08:05

## 2025-05-19 RX ADMIN — PHENOBARBITAL 64.8 MG: 32.4 TABLET ORAL at 08:05

## 2025-05-19 RX ADMIN — DIAZEPAM 2 MG: 2 TABLET ORAL at 03:05

## 2025-05-19 RX ADMIN — DIAZEPAM 2 MG: 2 TABLET ORAL at 08:05

## 2025-05-19 RX ADMIN — SENNOSIDES AND DOCUSATE SODIUM 1 TABLET: 50; 8.6 TABLET ORAL at 08:05

## 2025-05-19 RX ADMIN — BACLOFEN 20 MG: 10 TABLET ORAL at 08:05

## 2025-05-19 RX ADMIN — FAMOTIDINE 20 MG: 20 TABLET, FILM COATED ORAL at 08:05

## 2025-05-19 RX ADMIN — ENOXAPARIN SODIUM 40 MG: 40 INJECTION SUBCUTANEOUS at 04:05

## 2025-05-19 RX ADMIN — PIPERACILLIN SODIUM AND TAZOBACTAM SODIUM 4.5 G: 4; .5 INJECTION, POWDER, FOR SOLUTION INTRAVENOUS at 10:05

## 2025-05-19 RX ADMIN — QUETIAPINE FUMARATE 25 MG: 25 TABLET ORAL at 08:05

## 2025-05-19 RX ADMIN — POLYETHYLENE GLYCOL 3350 17 G: 17 POWDER, FOR SOLUTION ORAL at 08:05

## 2025-05-19 RX ADMIN — PIPERACILLIN SODIUM AND TAZOBACTAM SODIUM 4.5 G: 4; .5 INJECTION, POWDER, FOR SOLUTION INTRAVENOUS at 03:05

## 2025-05-20 LAB
ABSOLUTE EOSINOPHIL (OHS): 0.52 K/UL
ABSOLUTE MONOCYTE (OHS): 0.69 K/UL (ref 0.3–1)
ABSOLUTE NEUTROPHIL COUNT (OHS): 4.4 K/UL (ref 1.8–7.7)
ANION GAP (OHS): 7 MMOL/L (ref 8–16)
BASOPHILS # BLD AUTO: 0.03 K/UL
BASOPHILS NFR BLD AUTO: 0.4 %
BUN SERPL-MCNC: 14 MG/DL (ref 6–20)
CALCIUM SERPL-MCNC: 8.6 MG/DL (ref 8.7–10.5)
CHLORIDE SERPL-SCNC: 106 MMOL/L (ref 95–110)
CO2 SERPL-SCNC: 28 MMOL/L (ref 23–29)
CREAT SERPL-MCNC: 0.7 MG/DL (ref 0.5–1.4)
ERYTHROCYTE [DISTWIDTH] IN BLOOD BY AUTOMATED COUNT: 15.9 % (ref 11.5–14.5)
GFR SERPLBLD CREATININE-BSD FMLA CKD-EPI: >60 ML/MIN/1.73/M2
GLUCOSE SERPL-MCNC: 120 MG/DL (ref 70–110)
HCT VFR BLD AUTO: 32.8 % (ref 40–54)
HGB BLD-MCNC: 10.5 GM/DL (ref 14–18)
IMM GRANULOCYTES # BLD AUTO: 0.02 K/UL (ref 0–0.04)
IMM GRANULOCYTES NFR BLD AUTO: 0.3 % (ref 0–0.5)
LYMPHOCYTES # BLD AUTO: 1.87 K/UL (ref 1–4.8)
MAGNESIUM SERPL-MCNC: 2 MG/DL (ref 1.6–2.6)
MCH RBC QN AUTO: 26.1 PG (ref 27–31)
MCHC RBC AUTO-ENTMCNC: 32 G/DL (ref 32–36)
MCV RBC AUTO: 82 FL (ref 82–98)
NUCLEATED RBC (/100WBC) (OHS): 0 /100 WBC
PHOSPHATE SERPL-MCNC: 3.3 MG/DL (ref 2.7–4.5)
PLATELET # BLD AUTO: 248 K/UL (ref 150–450)
PMV BLD AUTO: 11.8 FL (ref 9.2–12.9)
POCT GLUCOSE: 101 MG/DL (ref 70–110)
POCT GLUCOSE: 146 MG/DL (ref 70–110)
POCT GLUCOSE: 94 MG/DL (ref 70–110)
POCT GLUCOSE: 96 MG/DL (ref 70–110)
POCT GLUCOSE: 98 MG/DL (ref 70–110)
POTASSIUM SERPL-SCNC: 3.9 MMOL/L (ref 3.5–5.1)
RBC # BLD AUTO: 4.02 M/UL (ref 4.6–6.2)
RELATIVE EOSINOPHIL (OHS): 6.9 %
RELATIVE LYMPHOCYTE (OHS): 24.8 % (ref 18–48)
RELATIVE MONOCYTE (OHS): 9.2 % (ref 4–15)
RELATIVE NEUTROPHIL (OHS): 58.4 % (ref 38–73)
SODIUM SERPL-SCNC: 141 MMOL/L (ref 136–145)
WBC # BLD AUTO: 7.53 K/UL (ref 3.9–12.7)

## 2025-05-20 PROCEDURE — 25000003 PHARM REV CODE 250: Performed by: INTERNAL MEDICINE

## 2025-05-20 PROCEDURE — 99900035 HC TECH TIME PER 15 MIN (STAT)

## 2025-05-20 PROCEDURE — 94003 VENT MGMT INPAT SUBQ DAY: CPT

## 2025-05-20 PROCEDURE — 27200966 HC CLOSED SUCTION SYSTEM

## 2025-05-20 PROCEDURE — 83735 ASSAY OF MAGNESIUM: CPT | Performed by: NURSE PRACTITIONER

## 2025-05-20 PROCEDURE — 20000000 HC ICU ROOM

## 2025-05-20 PROCEDURE — 25000003 PHARM REV CODE 250: Performed by: SPECIALIST

## 2025-05-20 PROCEDURE — 94761 N-INVAS EAR/PLS OXIMETRY MLT: CPT

## 2025-05-20 PROCEDURE — 63600175 PHARM REV CODE 636 W HCPCS: Performed by: NURSE PRACTITIONER

## 2025-05-20 PROCEDURE — 84100 ASSAY OF PHOSPHORUS: CPT | Performed by: NURSE PRACTITIONER

## 2025-05-20 PROCEDURE — 85025 COMPLETE CBC W/AUTO DIFF WBC: CPT | Performed by: NURSE PRACTITIONER

## 2025-05-20 PROCEDURE — 25000003 PHARM REV CODE 250: Performed by: NURSE PRACTITIONER

## 2025-05-20 PROCEDURE — 99900026 HC AIRWAY MAINTENANCE (STAT)

## 2025-05-20 PROCEDURE — 80048 BASIC METABOLIC PNL TOTAL CA: CPT | Performed by: NURSE PRACTITIONER

## 2025-05-20 PROCEDURE — 27100171 HC OXYGEN HIGH FLOW UP TO 24 HOURS

## 2025-05-20 PROCEDURE — 63600175 PHARM REV CODE 636 W HCPCS: Mod: TB

## 2025-05-20 RX ORDER — FAMOTIDINE 40 MG/5ML
20 POWDER, FOR SUSPENSION ORAL EVERY 12 HOURS
Status: DISCONTINUED | OUTPATIENT
Start: 2025-05-20 | End: 2025-05-31 | Stop reason: HOSPADM

## 2025-05-20 RX ORDER — GLYCOPYRROLATE 0.2 MG/ML
0.1 INJECTION INTRAMUSCULAR; INTRAVENOUS ONCE
Status: COMPLETED | OUTPATIENT
Start: 2025-05-20 | End: 2025-05-20

## 2025-05-20 RX ORDER — PHENOBARBITAL 32.4 MG/1
32.4 TABLET ORAL ONCE
Status: COMPLETED | OUTPATIENT
Start: 2025-05-20 | End: 2025-05-20

## 2025-05-20 RX ADMIN — FAMOTIDINE 20 MG: 40 POWDER, FOR SUSPENSION ORAL at 10:05

## 2025-05-20 RX ADMIN — DIAZEPAM 2 MG: 2 TABLET ORAL at 03:05

## 2025-05-20 RX ADMIN — PHENOBARBITAL 32.4 MG: 32.4 TABLET ORAL at 09:05

## 2025-05-20 RX ADMIN — FAMOTIDINE 20 MG: 40 POWDER, FOR SUSPENSION ORAL at 08:05

## 2025-05-20 RX ADMIN — BACLOFEN 20 MG: 10 TABLET ORAL at 09:05

## 2025-05-20 RX ADMIN — DIAZEPAM 2 MG: 2 TABLET ORAL at 08:05

## 2025-05-20 RX ADMIN — LEVETIRACETAM 2000 MG: 100 SOLUTION ORAL at 09:05

## 2025-05-20 RX ADMIN — CHLORHEXIDINE GLUCONATE 0.12% ORAL RINSE 15 ML: 1.2 LIQUID ORAL at 08:05

## 2025-05-20 RX ADMIN — BACLOFEN 20 MG: 10 TABLET ORAL at 08:05

## 2025-05-20 RX ADMIN — SENNOSIDES AND DOCUSATE SODIUM 1 TABLET: 50; 8.6 TABLET ORAL at 08:05

## 2025-05-20 RX ADMIN — GLYCOPYRROLATE 0.1 MG: 0.2 INJECTION INTRAMUSCULAR; INTRAVENOUS at 11:05

## 2025-05-20 RX ADMIN — QUETIAPINE FUMARATE 25 MG: 25 TABLET ORAL at 08:05

## 2025-05-20 RX ADMIN — ENOXAPARIN SODIUM 40 MG: 40 INJECTION SUBCUTANEOUS at 04:05

## 2025-05-20 RX ADMIN — CHLORHEXIDINE GLUCONATE 0.12% ORAL RINSE 15 ML: 1.2 LIQUID ORAL at 09:05

## 2025-05-20 RX ADMIN — DIAZEPAM 2 MG: 2 TABLET ORAL at 09:05

## 2025-05-20 RX ADMIN — SENNOSIDES AND DOCUSATE SODIUM 1 TABLET: 50; 8.6 TABLET ORAL at 09:05

## 2025-05-20 RX ADMIN — POLYETHYLENE GLYCOL 3350 17 G: 17 POWDER, FOR SOLUTION ORAL at 09:05

## 2025-05-20 RX ADMIN — LEVETIRACETAM 2000 MG: 100 SOLUTION ORAL at 08:05

## 2025-05-20 RX ADMIN — PHENOBARBITAL 64.8 MG: 32.4 TABLET ORAL at 08:05

## 2025-05-21 LAB
ABSOLUTE EOSINOPHIL (OHS): 0.49 K/UL
ABSOLUTE MONOCYTE (OHS): 0.74 K/UL (ref 0.3–1)
ABSOLUTE NEUTROPHIL COUNT (OHS): 5.8 K/UL (ref 1.8–7.7)
ANION GAP (OHS): 7 MMOL/L (ref 8–16)
BASOPHILS # BLD AUTO: 0.04 K/UL
BASOPHILS NFR BLD AUTO: 0.4 %
BUN SERPL-MCNC: 17 MG/DL (ref 6–20)
CALCIUM SERPL-MCNC: 9 MG/DL (ref 8.7–10.5)
CHLORIDE SERPL-SCNC: 105 MMOL/L (ref 95–110)
CO2 SERPL-SCNC: 29 MMOL/L (ref 23–29)
CREAT SERPL-MCNC: 0.6 MG/DL (ref 0.5–1.4)
ERYTHROCYTE [DISTWIDTH] IN BLOOD BY AUTOMATED COUNT: 15.7 % (ref 11.5–14.5)
GFR SERPLBLD CREATININE-BSD FMLA CKD-EPI: >60 ML/MIN/1.73/M2
GLUCOSE SERPL-MCNC: 103 MG/DL (ref 70–110)
HCT VFR BLD AUTO: 34.2 % (ref 40–54)
HGB BLD-MCNC: 11 GM/DL (ref 14–18)
IMM GRANULOCYTES # BLD AUTO: 0.04 K/UL (ref 0–0.04)
IMM GRANULOCYTES NFR BLD AUTO: 0.4 % (ref 0–0.5)
LYMPHOCYTES # BLD AUTO: 1.82 K/UL (ref 1–4.8)
MAGNESIUM SERPL-MCNC: 1.8 MG/DL (ref 1.6–2.6)
MCH RBC QN AUTO: 26.3 PG (ref 27–31)
MCHC RBC AUTO-ENTMCNC: 32.2 G/DL (ref 32–36)
MCV RBC AUTO: 82 FL (ref 82–98)
NUCLEATED RBC (/100WBC) (OHS): 0 /100 WBC
PHOSPHATE SERPL-MCNC: 3.7 MG/DL (ref 2.7–4.5)
PLATELET # BLD AUTO: 251 K/UL (ref 150–450)
PMV BLD AUTO: 11.9 FL (ref 9.2–12.9)
POCT GLUCOSE: 105 MG/DL (ref 70–110)
POCT GLUCOSE: 114 MG/DL (ref 70–110)
POCT GLUCOSE: 114 MG/DL (ref 70–110)
POCT GLUCOSE: 87 MG/DL (ref 70–110)
POCT GLUCOSE: 88 MG/DL (ref 70–110)
POTASSIUM SERPL-SCNC: 4.2 MMOL/L (ref 3.5–5.1)
RBC # BLD AUTO: 4.18 M/UL (ref 4.6–6.2)
RELATIVE EOSINOPHIL (OHS): 5.5 %
RELATIVE LYMPHOCYTE (OHS): 20.4 % (ref 18–48)
RELATIVE MONOCYTE (OHS): 8.3 % (ref 4–15)
RELATIVE NEUTROPHIL (OHS): 65 % (ref 38–73)
SODIUM SERPL-SCNC: 141 MMOL/L (ref 136–145)
WBC # BLD AUTO: 8.93 K/UL (ref 3.9–12.7)

## 2025-05-21 PROCEDURE — 27200966 HC CLOSED SUCTION SYSTEM

## 2025-05-21 PROCEDURE — 99900026 HC AIRWAY MAINTENANCE (STAT)

## 2025-05-21 PROCEDURE — 27100171 HC OXYGEN HIGH FLOW UP TO 24 HOURS

## 2025-05-21 PROCEDURE — 63600175 PHARM REV CODE 636 W HCPCS

## 2025-05-21 PROCEDURE — 25000003 PHARM REV CODE 250: Performed by: INTERNAL MEDICINE

## 2025-05-21 PROCEDURE — 20000000 HC ICU ROOM

## 2025-05-21 PROCEDURE — 25000003 PHARM REV CODE 250: Performed by: NURSE PRACTITIONER

## 2025-05-21 PROCEDURE — 84100 ASSAY OF PHOSPHORUS: CPT | Performed by: NURSE PRACTITIONER

## 2025-05-21 PROCEDURE — 94761 N-INVAS EAR/PLS OXIMETRY MLT: CPT

## 2025-05-21 PROCEDURE — 99900035 HC TECH TIME PER 15 MIN (STAT)

## 2025-05-21 PROCEDURE — 80048 BASIC METABOLIC PNL TOTAL CA: CPT | Performed by: NURSE PRACTITIONER

## 2025-05-21 PROCEDURE — 83735 ASSAY OF MAGNESIUM: CPT | Performed by: NURSE PRACTITIONER

## 2025-05-21 PROCEDURE — 94003 VENT MGMT INPAT SUBQ DAY: CPT

## 2025-05-21 PROCEDURE — 63600175 PHARM REV CODE 636 W HCPCS: Performed by: NURSE PRACTITIONER

## 2025-05-21 PROCEDURE — 25000003 PHARM REV CODE 250: Performed by: SPECIALIST

## 2025-05-21 PROCEDURE — 85025 COMPLETE CBC W/AUTO DIFF WBC: CPT | Performed by: NURSE PRACTITIONER

## 2025-05-21 RX ORDER — GLUCAGON 1 MG
1 KIT INJECTION
Status: DISCONTINUED | OUTPATIENT
Start: 2025-05-21 | End: 2025-05-31 | Stop reason: HOSPADM

## 2025-05-21 RX ORDER — FUROSEMIDE 10 MG/ML
20 INJECTION INTRAMUSCULAR; INTRAVENOUS ONCE
Status: COMPLETED | OUTPATIENT
Start: 2025-05-21 | End: 2025-05-21

## 2025-05-21 RX ORDER — INSULIN ASPART 100 [IU]/ML
0-5 INJECTION, SOLUTION INTRAVENOUS; SUBCUTANEOUS EVERY 6 HOURS PRN
Status: DISCONTINUED | OUTPATIENT
Start: 2025-05-21 | End: 2025-05-31 | Stop reason: HOSPADM

## 2025-05-21 RX ADMIN — SENNOSIDES AND DOCUSATE SODIUM 1 TABLET: 50; 8.6 TABLET ORAL at 08:05

## 2025-05-21 RX ADMIN — FAMOTIDINE 20 MG: 40 POWDER, FOR SUSPENSION ORAL at 08:05

## 2025-05-21 RX ADMIN — BACLOFEN 20 MG: 10 TABLET ORAL at 08:05

## 2025-05-21 RX ADMIN — QUETIAPINE FUMARATE 25 MG: 25 TABLET ORAL at 08:05

## 2025-05-21 RX ADMIN — LEVETIRACETAM 2000 MG: 100 SOLUTION ORAL at 08:05

## 2025-05-21 RX ADMIN — PHENOBARBITAL 64.8 MG: 32.4 TABLET ORAL at 08:05

## 2025-05-21 RX ADMIN — FUROSEMIDE 20 MG: 10 INJECTION, SOLUTION INTRAMUSCULAR; INTRAVENOUS at 03:05

## 2025-05-21 RX ADMIN — ENOXAPARIN SODIUM 40 MG: 40 INJECTION SUBCUTANEOUS at 04:05

## 2025-05-21 RX ADMIN — DIAZEPAM 2 MG: 2 TABLET ORAL at 08:05

## 2025-05-21 RX ADMIN — CHLORHEXIDINE GLUCONATE 0.12% ORAL RINSE 15 ML: 1.2 LIQUID ORAL at 08:05

## 2025-05-21 RX ADMIN — DIAZEPAM 2 MG: 2 TABLET ORAL at 02:05

## 2025-05-21 RX ADMIN — POLYETHYLENE GLYCOL 3350 17 G: 17 POWDER, FOR SOLUTION ORAL at 08:05

## 2025-05-22 PROBLEM — Z51.5 ENCOUNTER FOR PALLIATIVE CARE: Status: ACTIVE | Noted: 2025-05-22

## 2025-05-22 LAB
ABSOLUTE EOSINOPHIL (OHS): 0.57 K/UL
ABSOLUTE MONOCYTE (OHS): 0.68 K/UL (ref 0.3–1)
ABSOLUTE NEUTROPHIL COUNT (OHS): 7.38 K/UL (ref 1.8–7.7)
ANION GAP (OHS): 10 MMOL/L (ref 8–16)
BASOPHILS # BLD AUTO: 0.04 K/UL
BASOPHILS NFR BLD AUTO: 0.4 %
BUN SERPL-MCNC: 19 MG/DL (ref 6–20)
CALCIUM SERPL-MCNC: 9.2 MG/DL (ref 8.7–10.5)
CHLORIDE SERPL-SCNC: 101 MMOL/L (ref 95–110)
CO2 SERPL-SCNC: 31 MMOL/L (ref 23–29)
CREAT SERPL-MCNC: 0.7 MG/DL (ref 0.5–1.4)
ERYTHROCYTE [DISTWIDTH] IN BLOOD BY AUTOMATED COUNT: 15.5 % (ref 11.5–14.5)
GFR SERPLBLD CREATININE-BSD FMLA CKD-EPI: >60 ML/MIN/1.73/M2
GLUCOSE SERPL-MCNC: 104 MG/DL (ref 70–110)
HCT VFR BLD AUTO: 36.2 % (ref 40–54)
HGB BLD-MCNC: 11.5 GM/DL (ref 14–18)
IMM GRANULOCYTES # BLD AUTO: 0.04 K/UL (ref 0–0.04)
IMM GRANULOCYTES NFR BLD AUTO: 0.4 % (ref 0–0.5)
LYMPHOCYTES # BLD AUTO: 1.54 K/UL (ref 1–4.8)
MAGNESIUM SERPL-MCNC: 1.9 MG/DL (ref 1.6–2.6)
MCH RBC QN AUTO: 26.1 PG (ref 27–31)
MCHC RBC AUTO-ENTMCNC: 31.8 G/DL (ref 32–36)
MCV RBC AUTO: 82 FL (ref 82–98)
NUCLEATED RBC (/100WBC) (OHS): 0 /100 WBC
PHOSPHATE SERPL-MCNC: 3.7 MG/DL (ref 2.7–4.5)
PLATELET # BLD AUTO: 269 K/UL (ref 150–450)
PMV BLD AUTO: 11.8 FL (ref 9.2–12.9)
POCT GLUCOSE: 100 MG/DL (ref 70–110)
POCT GLUCOSE: 100 MG/DL (ref 70–110)
POCT GLUCOSE: 114 MG/DL (ref 70–110)
POCT GLUCOSE: 93 MG/DL (ref 70–110)
POTASSIUM SERPL-SCNC: 3.8 MMOL/L (ref 3.5–5.1)
RBC # BLD AUTO: 4.41 M/UL (ref 4.6–6.2)
RELATIVE EOSINOPHIL (OHS): 5.6 %
RELATIVE LYMPHOCYTE (OHS): 15 % (ref 18–48)
RELATIVE MONOCYTE (OHS): 6.6 % (ref 4–15)
RELATIVE NEUTROPHIL (OHS): 72 % (ref 38–73)
SODIUM SERPL-SCNC: 142 MMOL/L (ref 136–145)
WBC # BLD AUTO: 10.25 K/UL (ref 3.9–12.7)

## 2025-05-22 PROCEDURE — 25000003 PHARM REV CODE 250: Performed by: INTERNAL MEDICINE

## 2025-05-22 PROCEDURE — 94761 N-INVAS EAR/PLS OXIMETRY MLT: CPT

## 2025-05-22 PROCEDURE — 99223 1ST HOSP IP/OBS HIGH 75: CPT | Mod: 25,,,

## 2025-05-22 PROCEDURE — 80048 BASIC METABOLIC PNL TOTAL CA: CPT | Performed by: NURSE PRACTITIONER

## 2025-05-22 PROCEDURE — 25000003 PHARM REV CODE 250: Performed by: SPECIALIST

## 2025-05-22 PROCEDURE — 27100171 HC OXYGEN HIGH FLOW UP TO 24 HOURS

## 2025-05-22 PROCEDURE — 25000003 PHARM REV CODE 250: Performed by: NURSE PRACTITIONER

## 2025-05-22 PROCEDURE — 27200966 HC CLOSED SUCTION SYSTEM

## 2025-05-22 PROCEDURE — 85025 COMPLETE CBC W/AUTO DIFF WBC: CPT | Performed by: NURSE PRACTITIONER

## 2025-05-22 PROCEDURE — 94003 VENT MGMT INPAT SUBQ DAY: CPT

## 2025-05-22 PROCEDURE — 99900026 HC AIRWAY MAINTENANCE (STAT)

## 2025-05-22 PROCEDURE — 99900035 HC TECH TIME PER 15 MIN (STAT)

## 2025-05-22 PROCEDURE — 83735 ASSAY OF MAGNESIUM: CPT | Performed by: NURSE PRACTITIONER

## 2025-05-22 PROCEDURE — 99497 ADVNCD CARE PLAN 30 MIN: CPT | Mod: 25,,,

## 2025-05-22 PROCEDURE — 20000000 HC ICU ROOM

## 2025-05-22 PROCEDURE — 84100 ASSAY OF PHOSPHORUS: CPT | Performed by: NURSE PRACTITIONER

## 2025-05-22 PROCEDURE — 63600175 PHARM REV CODE 636 W HCPCS: Performed by: NURSE PRACTITIONER

## 2025-05-22 RX ORDER — BISACODYL 10 MG/1
10 SUPPOSITORY RECTAL DAILY PRN
Status: DISCONTINUED | OUTPATIENT
Start: 2025-05-22 | End: 2025-05-31 | Stop reason: HOSPADM

## 2025-05-22 RX ADMIN — PHENOBARBITAL 64.8 MG: 32.4 TABLET ORAL at 08:05

## 2025-05-22 RX ADMIN — POLYETHYLENE GLYCOL 3350 17 G: 17 POWDER, FOR SOLUTION ORAL at 08:05

## 2025-05-22 RX ADMIN — BACLOFEN 20 MG: 10 TABLET ORAL at 08:05

## 2025-05-22 RX ADMIN — FAMOTIDINE 20 MG: 40 POWDER, FOR SUSPENSION ORAL at 08:05

## 2025-05-22 RX ADMIN — SENNOSIDES AND DOCUSATE SODIUM 1 TABLET: 50; 8.6 TABLET ORAL at 08:05

## 2025-05-22 RX ADMIN — ENOXAPARIN SODIUM 40 MG: 40 INJECTION SUBCUTANEOUS at 03:05

## 2025-05-22 RX ADMIN — CHLORHEXIDINE GLUCONATE 0.12% ORAL RINSE 15 ML: 1.2 LIQUID ORAL at 08:05

## 2025-05-22 RX ADMIN — DIAZEPAM 2 MG: 2 TABLET ORAL at 08:05

## 2025-05-22 RX ADMIN — DIAZEPAM 2 MG: 2 TABLET ORAL at 03:05

## 2025-05-22 RX ADMIN — POTASSIUM BICARBONATE 50 MEQ: 978 TABLET, EFFERVESCENT ORAL at 06:05

## 2025-05-22 RX ADMIN — QUETIAPINE FUMARATE 25 MG: 25 TABLET ORAL at 08:05

## 2025-05-22 RX ADMIN — LEVETIRACETAM 2000 MG: 100 SOLUTION ORAL at 08:05

## 2025-05-23 PROBLEM — R00.1 BRADYCARDIA: Status: RESOLVED | Noted: 2025-05-14 | Resolved: 2025-05-23

## 2025-05-23 PROBLEM — R00.0 TACHYCARDIA: Status: RESOLVED | Noted: 2025-04-26 | Resolved: 2025-05-23

## 2025-05-23 LAB
ABSOLUTE EOSINOPHIL (OHS): 0.45 K/UL
ABSOLUTE MONOCYTE (OHS): 0.55 K/UL (ref 0.3–1)
ABSOLUTE NEUTROPHIL COUNT (OHS): 4.76 K/UL (ref 1.8–7.7)
ALBUMIN SERPL BCP-MCNC: 3.2 G/DL (ref 3.5–5.2)
ALP SERPL-CCNC: 132 UNIT/L (ref 40–150)
ALT SERPL W/O P-5'-P-CCNC: 31 UNIT/L (ref 10–44)
ANION GAP (OHS): 9 MMOL/L (ref 8–16)
AST SERPL-CCNC: 18 UNIT/L (ref 11–45)
BASOPHILS # BLD AUTO: 0.04 K/UL
BASOPHILS NFR BLD AUTO: 0.5 %
BILIRUB DIRECT SERPL-MCNC: <0.1 MG/DL (ref 0.1–0.3)
BILIRUB SERPL-MCNC: 0.2 MG/DL (ref 0.1–1)
BUN SERPL-MCNC: 21 MG/DL (ref 6–20)
CALCIUM SERPL-MCNC: 9 MG/DL (ref 8.7–10.5)
CHLORIDE SERPL-SCNC: 105 MMOL/L (ref 95–110)
CO2 SERPL-SCNC: 31 MMOL/L (ref 23–29)
CREAT SERPL-MCNC: 0.6 MG/DL (ref 0.5–1.4)
ERYTHROCYTE [DISTWIDTH] IN BLOOD BY AUTOMATED COUNT: 15.8 % (ref 11.5–14.5)
GFR SERPLBLD CREATININE-BSD FMLA CKD-EPI: >60 ML/MIN/1.73/M2
GLUCOSE SERPL-MCNC: 107 MG/DL (ref 70–110)
HCT VFR BLD AUTO: 36.6 % (ref 40–54)
HGB BLD-MCNC: 11.7 GM/DL (ref 14–18)
IMM GRANULOCYTES # BLD AUTO: 0.01 K/UL (ref 0–0.04)
IMM GRANULOCYTES NFR BLD AUTO: 0.1 % (ref 0–0.5)
LYMPHOCYTES # BLD AUTO: 1.6 K/UL (ref 1–4.8)
MAGNESIUM SERPL-MCNC: 2 MG/DL (ref 1.6–2.6)
MCH RBC QN AUTO: 26.2 PG (ref 27–31)
MCHC RBC AUTO-ENTMCNC: 32 G/DL (ref 32–36)
MCV RBC AUTO: 82 FL (ref 82–98)
NUCLEATED RBC (/100WBC) (OHS): 0 /100 WBC
PHOSPHATE SERPL-MCNC: 3.9 MG/DL (ref 2.7–4.5)
PLATELET # BLD AUTO: 253 K/UL (ref 150–450)
PMV BLD AUTO: 11.6 FL (ref 9.2–12.9)
POCT GLUCOSE: 106 MG/DL (ref 70–110)
POCT GLUCOSE: 115 MG/DL (ref 70–110)
POCT GLUCOSE: 84 MG/DL (ref 70–110)
POCT GLUCOSE: 99 MG/DL (ref 70–110)
POTASSIUM SERPL-SCNC: 4.1 MMOL/L (ref 3.5–5.1)
PROT SERPL-MCNC: 7.5 GM/DL (ref 6–8.4)
RBC # BLD AUTO: 4.46 M/UL (ref 4.6–6.2)
RELATIVE EOSINOPHIL (OHS): 6.1 %
RELATIVE LYMPHOCYTE (OHS): 21.6 % (ref 18–48)
RELATIVE MONOCYTE (OHS): 7.4 % (ref 4–15)
RELATIVE NEUTROPHIL (OHS): 64.3 % (ref 38–73)
SODIUM SERPL-SCNC: 145 MMOL/L (ref 136–145)
WBC # BLD AUTO: 7.41 K/UL (ref 3.9–12.7)

## 2025-05-23 PROCEDURE — 99900026 HC AIRWAY MAINTENANCE (STAT)

## 2025-05-23 PROCEDURE — 25000003 PHARM REV CODE 250: Performed by: INTERNAL MEDICINE

## 2025-05-23 PROCEDURE — 25000003 PHARM REV CODE 250: Performed by: SPECIALIST

## 2025-05-23 PROCEDURE — 85025 COMPLETE CBC W/AUTO DIFF WBC: CPT | Performed by: NURSE PRACTITIONER

## 2025-05-23 PROCEDURE — 27000190 HC CPAP FULL FACE MASK W/VALVE

## 2025-05-23 PROCEDURE — 27000249 HC VAPOTHERM CIRCUIT

## 2025-05-23 PROCEDURE — 27000221 HC OXYGEN, UP TO 24 HOURS

## 2025-05-23 PROCEDURE — 20000000 HC ICU ROOM

## 2025-05-23 PROCEDURE — 84075 ASSAY ALKALINE PHOSPHATASE: CPT | Performed by: INTERNAL MEDICINE

## 2025-05-23 PROCEDURE — 94799 UNLISTED PULMONARY SVC/PX: CPT

## 2025-05-23 PROCEDURE — 83735 ASSAY OF MAGNESIUM: CPT | Performed by: NURSE PRACTITIONER

## 2025-05-23 PROCEDURE — 63600175 PHARM REV CODE 636 W HCPCS

## 2025-05-23 PROCEDURE — 27200966 HC CLOSED SUCTION SYSTEM

## 2025-05-23 PROCEDURE — 99900035 HC TECH TIME PER 15 MIN (STAT)

## 2025-05-23 PROCEDURE — 80069 RENAL FUNCTION PANEL: CPT | Performed by: NURSE PRACTITIONER

## 2025-05-23 PROCEDURE — 84100 ASSAY OF PHOSPHORUS: CPT | Performed by: NURSE PRACTITIONER

## 2025-05-23 PROCEDURE — 94660 CPAP INITIATION&MGMT: CPT

## 2025-05-23 PROCEDURE — 11000001 HC ACUTE MED/SURG PRIVATE ROOM

## 2025-05-23 PROCEDURE — 25000003 PHARM REV CODE 250: Performed by: NURSE PRACTITIONER

## 2025-05-23 PROCEDURE — 94761 N-INVAS EAR/PLS OXIMETRY MLT: CPT

## 2025-05-23 PROCEDURE — 27100171 HC OXYGEN HIGH FLOW UP TO 24 HOURS

## 2025-05-23 PROCEDURE — 63600175 PHARM REV CODE 636 W HCPCS: Performed by: NURSE PRACTITIONER

## 2025-05-23 RX ADMIN — BACLOFEN 20 MG: 10 TABLET ORAL at 08:05

## 2025-05-23 RX ADMIN — DIAZEPAM 2 MG: 2 TABLET ORAL at 08:05

## 2025-05-23 RX ADMIN — BACLOFEN 20 MG: 10 TABLET ORAL at 09:05

## 2025-05-23 RX ADMIN — SENNOSIDES AND DOCUSATE SODIUM 1 TABLET: 50; 8.6 TABLET ORAL at 09:05

## 2025-05-23 RX ADMIN — LEVETIRACETAM 2000 MG: 100 SOLUTION ORAL at 08:05

## 2025-05-23 RX ADMIN — DIAZEPAM 2 MG: 2 TABLET ORAL at 09:05

## 2025-05-23 RX ADMIN — DIAZEPAM 2 MG: 2 TABLET ORAL at 03:05

## 2025-05-23 RX ADMIN — ENOXAPARIN SODIUM 40 MG: 40 INJECTION SUBCUTANEOUS at 06:05

## 2025-05-23 RX ADMIN — QUETIAPINE FUMARATE 25 MG: 25 TABLET ORAL at 08:05

## 2025-05-23 RX ADMIN — PHENOBARBITAL 64.8 MG: 32.4 TABLET ORAL at 09:05

## 2025-05-23 RX ADMIN — FAMOTIDINE 20 MG: 40 POWDER, FOR SUSPENSION ORAL at 08:05

## 2025-05-23 RX ADMIN — POLYETHYLENE GLYCOL 3350 17 G: 17 POWDER, FOR SOLUTION ORAL at 09:05

## 2025-05-23 RX ADMIN — SENNOSIDES AND DOCUSATE SODIUM 1 TABLET: 50; 8.6 TABLET ORAL at 08:05

## 2025-05-23 RX ADMIN — LEVETIRACETAM 2000 MG: 100 SOLUTION ORAL at 09:05

## 2025-05-23 RX ADMIN — PHENOBARBITAL 64.8 MG: 32.4 TABLET ORAL at 08:05

## 2025-05-23 RX ADMIN — SODIUM CHLORIDE, POTASSIUM CHLORIDE, SODIUM LACTATE AND CALCIUM CHLORIDE 500 ML: 600; 310; 30; 20 INJECTION, SOLUTION INTRAVENOUS at 01:05

## 2025-05-23 RX ADMIN — FAMOTIDINE 20 MG: 40 POWDER, FOR SUSPENSION ORAL at 09:05

## 2025-05-24 PROBLEM — G47.33 OBSTRUCTIVE SLEEP APNEA: Status: ACTIVE | Noted: 2025-05-24

## 2025-05-24 LAB
ABSOLUTE EOSINOPHIL (OHS): 0.66 K/UL
ABSOLUTE MONOCYTE (OHS): 0.47 K/UL (ref 0.3–1)
ABSOLUTE NEUTROPHIL COUNT (OHS): 4.69 K/UL (ref 1.8–7.7)
ANION GAP (OHS): 8 MMOL/L (ref 8–16)
BASOPHILS # BLD AUTO: 0.03 K/UL
BASOPHILS NFR BLD AUTO: 0.4 %
BUN SERPL-MCNC: 20 MG/DL (ref 6–20)
CALCIUM SERPL-MCNC: 9.2 MG/DL (ref 8.7–10.5)
CHLORIDE SERPL-SCNC: 101 MMOL/L (ref 95–110)
CO2 SERPL-SCNC: 31 MMOL/L (ref 23–29)
CREAT SERPL-MCNC: 0.6 MG/DL (ref 0.5–1.4)
ERYTHROCYTE [DISTWIDTH] IN BLOOD BY AUTOMATED COUNT: 15 % (ref 11.5–14.5)
GFR SERPLBLD CREATININE-BSD FMLA CKD-EPI: >60 ML/MIN/1.73/M2
GLUCOSE SERPL-MCNC: 104 MG/DL (ref 70–110)
HCT VFR BLD AUTO: 36.9 % (ref 40–54)
HGB BLD-MCNC: 11.5 GM/DL (ref 14–18)
IMM GRANULOCYTES # BLD AUTO: 0.03 K/UL (ref 0–0.04)
IMM GRANULOCYTES NFR BLD AUTO: 0.4 % (ref 0–0.5)
LYMPHOCYTES # BLD AUTO: 1.95 K/UL (ref 1–4.8)
MAGNESIUM SERPL-MCNC: 1.9 MG/DL (ref 1.6–2.6)
MCH RBC QN AUTO: 25.8 PG (ref 27–31)
MCHC RBC AUTO-ENTMCNC: 31.2 G/DL (ref 32–36)
MCV RBC AUTO: 83 FL (ref 82–98)
NUCLEATED RBC (/100WBC) (OHS): 0 /100 WBC
PHOSPHATE SERPL-MCNC: 3.1 MG/DL (ref 2.7–4.5)
PLATELET # BLD AUTO: 244 K/UL (ref 150–450)
PMV BLD AUTO: 11.4 FL (ref 9.2–12.9)
POCT GLUCOSE: 86 MG/DL (ref 70–110)
POCT GLUCOSE: 87 MG/DL (ref 70–110)
POTASSIUM SERPL-SCNC: 4.1 MMOL/L (ref 3.5–5.1)
RBC # BLD AUTO: 4.46 M/UL (ref 4.6–6.2)
RELATIVE EOSINOPHIL (OHS): 8.4 %
RELATIVE LYMPHOCYTE (OHS): 24.9 % (ref 18–48)
RELATIVE MONOCYTE (OHS): 6 % (ref 4–15)
RELATIVE NEUTROPHIL (OHS): 59.9 % (ref 38–73)
SODIUM SERPL-SCNC: 140 MMOL/L (ref 136–145)
WBC # BLD AUTO: 7.83 K/UL (ref 3.9–12.7)

## 2025-05-24 PROCEDURE — 94761 N-INVAS EAR/PLS OXIMETRY MLT: CPT

## 2025-05-24 PROCEDURE — 20000000 HC ICU ROOM

## 2025-05-24 PROCEDURE — 80048 BASIC METABOLIC PNL TOTAL CA: CPT | Performed by: NURSE PRACTITIONER

## 2025-05-24 PROCEDURE — 25000003 PHARM REV CODE 250: Performed by: SPECIALIST

## 2025-05-24 PROCEDURE — 63600175 PHARM REV CODE 636 W HCPCS

## 2025-05-24 PROCEDURE — 84100 ASSAY OF PHOSPHORUS: CPT | Performed by: NURSE PRACTITIONER

## 2025-05-24 PROCEDURE — 63600175 PHARM REV CODE 636 W HCPCS: Performed by: NURSE PRACTITIONER

## 2025-05-24 PROCEDURE — 25000003 PHARM REV CODE 250: Performed by: NURSE PRACTITIONER

## 2025-05-24 PROCEDURE — 11000001 HC ACUTE MED/SURG PRIVATE ROOM

## 2025-05-24 PROCEDURE — 27000249 HC VAPOTHERM CIRCUIT

## 2025-05-24 PROCEDURE — 85025 COMPLETE CBC W/AUTO DIFF WBC: CPT | Performed by: NURSE PRACTITIONER

## 2025-05-24 PROCEDURE — 27100171 HC OXYGEN HIGH FLOW UP TO 24 HOURS

## 2025-05-24 PROCEDURE — 99900035 HC TECH TIME PER 15 MIN (STAT)

## 2025-05-24 PROCEDURE — 27200966 HC CLOSED SUCTION SYSTEM

## 2025-05-24 PROCEDURE — 5A09357 ASSISTANCE WITH RESPIRATORY VENTILATION, LESS THAN 24 CONSECUTIVE HOURS, CONTINUOUS POSITIVE AIRWAY PRESSURE: ICD-10-PCS | Performed by: INTERNAL MEDICINE

## 2025-05-24 PROCEDURE — 99900026 HC AIRWAY MAINTENANCE (STAT)

## 2025-05-24 PROCEDURE — 94799 UNLISTED PULMONARY SVC/PX: CPT

## 2025-05-24 PROCEDURE — 83735 ASSAY OF MAGNESIUM: CPT | Performed by: NURSE PRACTITIONER

## 2025-05-24 PROCEDURE — 25000003 PHARM REV CODE 250: Performed by: INTERNAL MEDICINE

## 2025-05-24 RX ORDER — LORAZEPAM 2 MG/ML
INJECTION INTRAMUSCULAR
Status: COMPLETED
Start: 2025-05-24 | End: 2025-05-24

## 2025-05-24 RX ORDER — LORAZEPAM 2 MG/ML
1 INJECTION INTRAMUSCULAR ONCE
Status: COMPLETED | OUTPATIENT
Start: 2025-05-25 | End: 2025-05-24

## 2025-05-24 RX ADMIN — ENOXAPARIN SODIUM 40 MG: 40 INJECTION SUBCUTANEOUS at 04:05

## 2025-05-24 RX ADMIN — LORAZEPAM 1 MG: 2 INJECTION INTRAMUSCULAR at 11:05

## 2025-05-24 RX ADMIN — SENNOSIDES AND DOCUSATE SODIUM 1 TABLET: 50; 8.6 TABLET ORAL at 08:05

## 2025-05-24 RX ADMIN — POLYETHYLENE GLYCOL 3350 17 G: 17 POWDER, FOR SOLUTION ORAL at 10:05

## 2025-05-24 RX ADMIN — SENNOSIDES AND DOCUSATE SODIUM 1 TABLET: 50; 8.6 TABLET ORAL at 10:05

## 2025-05-24 RX ADMIN — FAMOTIDINE 20 MG: 40 POWDER, FOR SUSPENSION ORAL at 10:05

## 2025-05-24 RX ADMIN — LEVETIRACETAM 2000 MG: 100 SOLUTION ORAL at 08:05

## 2025-05-24 RX ADMIN — DIAZEPAM 2 MG: 2 TABLET ORAL at 03:05

## 2025-05-24 RX ADMIN — PHENOBARBITAL 64.8 MG: 32.4 TABLET ORAL at 10:05

## 2025-05-24 RX ADMIN — DIAZEPAM 2 MG: 2 TABLET ORAL at 10:05

## 2025-05-24 RX ADMIN — BACLOFEN 20 MG: 10 TABLET ORAL at 08:05

## 2025-05-24 RX ADMIN — BACLOFEN 20 MG: 10 TABLET ORAL at 10:05

## 2025-05-24 RX ADMIN — DIAZEPAM 2 MG: 2 TABLET ORAL at 08:05

## 2025-05-24 RX ADMIN — FAMOTIDINE 20 MG: 40 POWDER, FOR SUSPENSION ORAL at 08:05

## 2025-05-24 RX ADMIN — LORAZEPAM 1 MG: 2 INJECTION INTRAMUSCULAR; INTRAVENOUS at 11:05

## 2025-05-24 RX ADMIN — PHENOBARBITAL 64.8 MG: 32.4 TABLET ORAL at 08:05

## 2025-05-24 RX ADMIN — QUETIAPINE FUMARATE 25 MG: 25 TABLET ORAL at 08:05

## 2025-05-24 RX ADMIN — LEVETIRACETAM 2000 MG: 100 SOLUTION ORAL at 10:05

## 2025-05-25 PROBLEM — Z99.11 ON MECHANICALLY ASSISTED VENTILATION: Status: ACTIVE | Noted: 2025-05-25

## 2025-05-25 LAB
ABSOLUTE EOSINOPHIL (OHS): 0.14 K/UL
ABSOLUTE MONOCYTE (OHS): 0.47 K/UL (ref 0.3–1)
ABSOLUTE NEUTROPHIL COUNT (OHS): 10.08 K/UL (ref 1.8–7.7)
ALBUMIN SERPL BCP-MCNC: 3.4 G/DL (ref 3.5–5.2)
ALLENS TEST: ABNORMAL
ALP SERPL-CCNC: 141 UNIT/L (ref 40–150)
ALT SERPL W/O P-5'-P-CCNC: 36 UNIT/L (ref 10–44)
ANION GAP (OHS): 10 MMOL/L (ref 8–16)
AST SERPL-CCNC: 22 UNIT/L (ref 11–45)
BASOPHILS # BLD AUTO: 0.03 K/UL
BASOPHILS NFR BLD AUTO: 0.3 %
BILIRUB DIRECT SERPL-MCNC: 0.1 MG/DL (ref 0.1–0.3)
BILIRUB SERPL-MCNC: 0.2 MG/DL (ref 0.1–1)
BUN SERPL-MCNC: 17 MG/DL (ref 6–20)
CALCIUM SERPL-MCNC: 9.2 MG/DL (ref 8.7–10.5)
CHLORIDE SERPL-SCNC: 100 MMOL/L (ref 95–110)
CO2 SERPL-SCNC: 27 MMOL/L (ref 23–29)
CREAT SERPL-MCNC: 0.7 MG/DL (ref 0.5–1.4)
DELSYS: ABNORMAL
ERYTHROCYTE [DISTWIDTH] IN BLOOD BY AUTOMATED COUNT: 15 % (ref 11.5–14.5)
ERYTHROCYTE [SEDIMENTATION RATE] IN BLOOD BY WESTERGREN METHOD: 12 MM/H
ERYTHROCYTE [SEDIMENTATION RATE] IN BLOOD BY WESTERGREN METHOD: 18 MM/H
FIO2: 30
FIO2: 40
FIO2: 40
FLOW: 30
GFR SERPLBLD CREATININE-BSD FMLA CKD-EPI: >60 ML/MIN/1.73/M2
GLUCOSE SERPL-MCNC: 121 MG/DL (ref 70–110)
HCO3 UR-SCNC: 30.1 MMOL/L (ref 24–28)
HCO3 UR-SCNC: 30.9 MMOL/L (ref 24–28)
HCO3 UR-SCNC: 33.1 MMOL/L (ref 24–28)
HCT VFR BLD AUTO: 38.4 % (ref 40–54)
HGB BLD-MCNC: 12 GM/DL (ref 14–18)
HOLD SPECIMEN: NORMAL
IMM GRANULOCYTES # BLD AUTO: 0.04 K/UL (ref 0–0.04)
IMM GRANULOCYTES NFR BLD AUTO: 0.3 % (ref 0–0.5)
LACTATE SERPL-SCNC: 1.3 MMOL/L (ref 0.5–2.2)
LYMPHOCYTES # BLD AUTO: 0.87 K/UL (ref 1–4.8)
MAGNESIUM SERPL-MCNC: 1.8 MG/DL (ref 1.6–2.6)
MCH RBC QN AUTO: 25.1 PG (ref 27–31)
MCHC RBC AUTO-ENTMCNC: 31.3 G/DL (ref 32–36)
MCV RBC AUTO: 80 FL (ref 82–98)
MODE: ABNORMAL
NUCLEATED RBC (/100WBC) (OHS): 0 /100 WBC
PCO2 BLDA: 38.2 MMHG (ref 35–45)
PCO2 BLDA: 49.1 MMHG (ref 35–45)
PCO2 BLDA: 52.9 MMHG (ref 35–45)
PEEP: 5
PEEP: 5
PH SMN: 7.37 [PH] (ref 7.35–7.45)
PH SMN: 7.44 [PH] (ref 7.35–7.45)
PH SMN: 7.5 [PH] (ref 7.35–7.45)
PHENOBARB SERPL-MCNC: 28.1 UG/ML (ref 15–40)
PHOSPHATE SERPL-MCNC: 2.1 MG/DL (ref 2.7–4.5)
PLATELET # BLD AUTO: 265 K/UL (ref 150–450)
PMV BLD AUTO: 11.4 FL (ref 9.2–12.9)
PO2 BLDA: 102 MMHG (ref 80–100)
PO2 BLDA: 131 MMHG (ref 80–100)
PO2 BLDA: 75 MMHG (ref 80–100)
POC BE: 6 MMOL/L (ref -2–2)
POC BE: 7 MMOL/L (ref -2–2)
POC BE: 9 MMOL/L (ref -2–2)
POC SATURATED O2: 94 % (ref 95–100)
POC SATURATED O2: 98 % (ref 95–100)
POC SATURATED O2: 99 % (ref 95–100)
POCT GLUCOSE: 108 MG/DL (ref 70–110)
POCT GLUCOSE: 109 MG/DL (ref 70–110)
POCT GLUCOSE: 119 MG/DL (ref 70–110)
POTASSIUM SERPL-SCNC: 3.7 MMOL/L (ref 3.5–5.1)
PROCALCITONIN SERPL-MCNC: 0.15 NG/ML
PROT SERPL-MCNC: 7.7 GM/DL (ref 6–8.4)
RBC # BLD AUTO: 4.78 M/UL (ref 4.6–6.2)
RELATIVE EOSINOPHIL (OHS): 1.2 %
RELATIVE LYMPHOCYTE (OHS): 7.5 % (ref 18–48)
RELATIVE MONOCYTE (OHS): 4 % (ref 4–15)
RELATIVE NEUTROPHIL (OHS): 86.7 % (ref 38–73)
SAMPLE: ABNORMAL
SITE: ABNORMAL
SODIUM SERPL-SCNC: 137 MMOL/L (ref 136–145)
VT: 400
VT: 400
WBC # BLD AUTO: 11.63 K/UL (ref 3.9–12.7)

## 2025-05-25 PROCEDURE — 83735 ASSAY OF MAGNESIUM: CPT | Performed by: NURSE PRACTITIONER

## 2025-05-25 PROCEDURE — 25000003 PHARM REV CODE 250

## 2025-05-25 PROCEDURE — 99900035 HC TECH TIME PER 15 MIN (STAT)

## 2025-05-25 PROCEDURE — 85025 COMPLETE CBC W/AUTO DIFF WBC: CPT | Performed by: NURSE PRACTITIONER

## 2025-05-25 PROCEDURE — 20000000 HC ICU ROOM

## 2025-05-25 PROCEDURE — 36600 WITHDRAWAL OF ARTERIAL BLOOD: CPT

## 2025-05-25 PROCEDURE — 83605 ASSAY OF LACTIC ACID: CPT

## 2025-05-25 PROCEDURE — 82803 BLOOD GASES ANY COMBINATION: CPT

## 2025-05-25 PROCEDURE — 80177 DRUG SCRN QUAN LEVETIRACETAM: CPT

## 2025-05-25 PROCEDURE — 84145 PROCALCITONIN (PCT): CPT

## 2025-05-25 PROCEDURE — 27100171 HC OXYGEN HIGH FLOW UP TO 24 HOURS

## 2025-05-25 PROCEDURE — 99900026 HC AIRWAY MAINTENANCE (STAT)

## 2025-05-25 PROCEDURE — 31720 CLEARANCE OF AIRWAYS: CPT

## 2025-05-25 PROCEDURE — 25000003 PHARM REV CODE 250: Performed by: INTERNAL MEDICINE

## 2025-05-25 PROCEDURE — 25000003 PHARM REV CODE 250: Performed by: SPECIALIST

## 2025-05-25 PROCEDURE — 63600175 PHARM REV CODE 636 W HCPCS

## 2025-05-25 PROCEDURE — 5A1945Z RESPIRATORY VENTILATION, 24-96 CONSECUTIVE HOURS: ICD-10-PCS | Performed by: INTERNAL MEDICINE

## 2025-05-25 PROCEDURE — 0BH18EZ INSERTION OF ENDOTRACHEAL AIRWAY INTO TRACHEA, VIA NATURAL OR ARTIFICIAL OPENING ENDOSCOPIC: ICD-10-PCS | Performed by: INTERNAL MEDICINE

## 2025-05-25 PROCEDURE — 94002 VENT MGMT INPAT INIT DAY: CPT

## 2025-05-25 PROCEDURE — 63600175 PHARM REV CODE 636 W HCPCS: Performed by: NURSE PRACTITIONER

## 2025-05-25 PROCEDURE — 25000242 PHARM REV CODE 250 ALT 637 W/ HCPCS

## 2025-05-25 PROCEDURE — 82248 BILIRUBIN DIRECT: CPT | Performed by: INTERNAL MEDICINE

## 2025-05-25 PROCEDURE — 27200966 HC CLOSED SUCTION SYSTEM

## 2025-05-25 PROCEDURE — 80184 ASSAY OF PHENOBARBITAL: CPT

## 2025-05-25 PROCEDURE — 80048 BASIC METABOLIC PNL TOTAL CA: CPT | Performed by: NURSE PRACTITIONER

## 2025-05-25 PROCEDURE — 25000003 PHARM REV CODE 250: Performed by: NURSE PRACTITIONER

## 2025-05-25 PROCEDURE — 84100 ASSAY OF PHOSPHORUS: CPT | Performed by: NURSE PRACTITIONER

## 2025-05-25 PROCEDURE — 94640 AIRWAY INHALATION TREATMENT: CPT

## 2025-05-25 PROCEDURE — 94761 N-INVAS EAR/PLS OXIMETRY MLT: CPT

## 2025-05-25 RX ORDER — PROPOFOL 10 MG/ML
0-50 INJECTION, EMULSION INTRAVENOUS CONTINUOUS
Status: DISCONTINUED | OUTPATIENT
Start: 2025-05-25 | End: 2025-05-27

## 2025-05-25 RX ORDER — ETOMIDATE 2 MG/ML
20 INJECTION INTRAVENOUS ONCE
Status: COMPLETED | OUTPATIENT
Start: 2025-05-25 | End: 2025-05-25

## 2025-05-25 RX ORDER — SODIUM CHLORIDE FOR INHALATION 3 %
4 VIAL, NEBULIZER (ML) INHALATION ONCE
Status: COMPLETED | OUTPATIENT
Start: 2025-05-25 | End: 2025-05-25

## 2025-05-25 RX ORDER — ROCURONIUM BROMIDE 10 MG/ML
INJECTION, SOLUTION INTRAVENOUS
Status: COMPLETED
Start: 2025-05-25 | End: 2025-05-25

## 2025-05-25 RX ORDER — ROCURONIUM BROMIDE 10 MG/ML
50 INJECTION, SOLUTION INTRAVENOUS ONCE
Status: COMPLETED | OUTPATIENT
Start: 2025-05-25 | End: 2025-05-25

## 2025-05-25 RX ORDER — IPRATROPIUM BROMIDE AND ALBUTEROL SULFATE 2.5; .5 MG/3ML; MG/3ML
3 SOLUTION RESPIRATORY (INHALATION) ONCE
Status: COMPLETED | OUTPATIENT
Start: 2025-05-25 | End: 2025-05-25

## 2025-05-25 RX ORDER — SUCCINYLCHOLINE CHLORIDE 20 MG/ML
INJECTION INTRAMUSCULAR; INTRAVENOUS
Status: COMPLETED
Start: 2025-05-25 | End: 2025-05-25

## 2025-05-25 RX ORDER — PROPOFOL 10 MG/ML
INJECTION, EMULSION INTRAVENOUS
Status: COMPLETED
Start: 2025-05-25 | End: 2025-05-25

## 2025-05-25 RX ORDER — MORPHINE SULFATE 4 MG/ML
2 INJECTION, SOLUTION INTRAMUSCULAR; INTRAVENOUS ONCE
Status: COMPLETED | OUTPATIENT
Start: 2025-05-25 | End: 2025-05-25

## 2025-05-25 RX ORDER — CHLORHEXIDINE GLUCONATE ORAL RINSE 1.2 MG/ML
15 SOLUTION DENTAL 2 TIMES DAILY
Status: DISCONTINUED | OUTPATIENT
Start: 2025-05-25 | End: 2025-05-31 | Stop reason: HOSPADM

## 2025-05-25 RX ORDER — ETOMIDATE 2 MG/ML
INJECTION INTRAVENOUS
Status: COMPLETED
Start: 2025-05-25 | End: 2025-05-25

## 2025-05-25 RX ADMIN — BACLOFEN 20 MG: 10 TABLET ORAL at 08:05

## 2025-05-25 RX ADMIN — Medication 800 MG: at 04:05

## 2025-05-25 RX ADMIN — Medication 2 PACKET: at 04:05

## 2025-05-25 RX ADMIN — SODIUM CHLORIDE, POTASSIUM CHLORIDE, SODIUM LACTATE AND CALCIUM CHLORIDE 1000 ML: 600; 310; 30; 20 INJECTION, SOLUTION INTRAVENOUS at 06:05

## 2025-05-25 RX ADMIN — Medication 2 PACKET: at 08:05

## 2025-05-25 RX ADMIN — QUETIAPINE FUMARATE 25 MG: 25 TABLET ORAL at 10:05

## 2025-05-25 RX ADMIN — PROPOFOL 40 MCG/KG/MIN: 10 INJECTION, EMULSION INTRAVENOUS at 06:05

## 2025-05-25 RX ADMIN — DIAZEPAM 2 MG: 2 TABLET ORAL at 08:05

## 2025-05-25 RX ADMIN — ETOMIDATE 20 MG: 2 INJECTION INTRAVENOUS at 12:05

## 2025-05-25 RX ADMIN — MORPHINE SULFATE 2 MG: 4 INJECTION INTRAVENOUS at 12:05

## 2025-05-25 RX ADMIN — SODIUM CHLORIDE 30 MG/ML INHALATION SOLUTION 4 ML: 30 SOLUTION INHALANT at 01:05

## 2025-05-25 RX ADMIN — SENNOSIDES AND DOCUSATE SODIUM 1 TABLET: 50; 8.6 TABLET ORAL at 09:05

## 2025-05-25 RX ADMIN — SENNOSIDES AND DOCUSATE SODIUM 1 TABLET: 50; 8.6 TABLET ORAL at 10:05

## 2025-05-25 RX ADMIN — LEVETIRACETAM 2000 MG: 100 SOLUTION ORAL at 08:05

## 2025-05-25 RX ADMIN — PHENOBARBITAL 64.8 MG: 32.4 TABLET ORAL at 08:05

## 2025-05-25 RX ADMIN — CHLORHEXIDINE GLUCONATE 0.12% ORAL RINSE 15 ML: 1.2 LIQUID ORAL at 10:05

## 2025-05-25 RX ADMIN — IPRATROPIUM BROMIDE AND ALBUTEROL SULFATE 3 ML: 2.5; .5 SOLUTION RESPIRATORY (INHALATION) at 01:05

## 2025-05-25 RX ADMIN — LEVETIRACETAM 2000 MG: 100 SOLUTION ORAL at 10:05

## 2025-05-25 RX ADMIN — BACLOFEN 20 MG: 10 TABLET ORAL at 10:05

## 2025-05-25 RX ADMIN — ROCURONIUM BROMIDE 50 MG: 10 INJECTION, SOLUTION INTRAVENOUS at 12:05

## 2025-05-25 RX ADMIN — DIAZEPAM 2 MG: 2 TABLET ORAL at 10:05

## 2025-05-25 RX ADMIN — DIAZEPAM 2 MG: 2 TABLET ORAL at 04:05

## 2025-05-25 RX ADMIN — ENOXAPARIN SODIUM 40 MG: 40 INJECTION SUBCUTANEOUS at 04:05

## 2025-05-25 RX ADMIN — PROPOFOL 30 MCG/KG/MIN: 10 INJECTION, EMULSION INTRAVENOUS at 11:05

## 2025-05-25 RX ADMIN — FAMOTIDINE 20 MG: 40 POWDER, FOR SUSPENSION ORAL at 08:05

## 2025-05-25 RX ADMIN — POLYETHYLENE GLYCOL 3350 17 G: 17 POWDER, FOR SOLUTION ORAL at 08:05

## 2025-05-25 RX ADMIN — FAMOTIDINE 20 MG: 40 POWDER, FOR SUSPENSION ORAL at 10:05

## 2025-05-25 RX ADMIN — Medication 800 MG: at 08:05

## 2025-05-25 RX ADMIN — PROPOFOL 40 MCG/KG/MIN: 10 INJECTION, EMULSION INTRAVENOUS at 03:05

## 2025-05-25 RX ADMIN — CHLORHEXIDINE GLUCONATE 0.12% ORAL RINSE 15 ML: 1.2 LIQUID ORAL at 09:05

## 2025-05-25 RX ADMIN — PROPOFOL 10 MCG/KG/MIN: 10 INJECTION, EMULSION INTRAVENOUS at 01:05

## 2025-05-25 RX ADMIN — PHENOBARBITAL 64.8 MG: 32.4 TABLET ORAL at 10:05

## 2025-05-26 LAB
ABSOLUTE EOSINOPHIL (OHS): 0.28 K/UL
ABSOLUTE MONOCYTE (OHS): 0.47 K/UL (ref 0.3–1)
ABSOLUTE NEUTROPHIL COUNT (OHS): 3 K/UL (ref 1.8–7.7)
ALBUMIN SERPL BCP-MCNC: 2.9 G/DL (ref 3.5–5.2)
ALP SERPL-CCNC: 128 UNIT/L (ref 40–150)
ALT SERPL W/O P-5'-P-CCNC: 40 UNIT/L (ref 10–44)
ANION GAP (OHS): 8 MMOL/L (ref 8–16)
AST SERPL-CCNC: 24 UNIT/L (ref 11–45)
BASOPHILS # BLD AUTO: 0.03 K/UL
BASOPHILS NFR BLD AUTO: 0.6 %
BILIRUB SERPL-MCNC: 0.2 MG/DL (ref 0.1–1)
BUN SERPL-MCNC: 16 MG/DL (ref 6–20)
CALCIUM SERPL-MCNC: 8.4 MG/DL (ref 8.7–10.5)
CHLORIDE SERPL-SCNC: 102 MMOL/L (ref 95–110)
CO2 SERPL-SCNC: 29 MMOL/L (ref 23–29)
CREAT SERPL-MCNC: 0.7 MG/DL (ref 0.5–1.4)
ERYTHROCYTE [DISTWIDTH] IN BLOOD BY AUTOMATED COUNT: 15.4 % (ref 11.5–14.5)
GFR SERPLBLD CREATININE-BSD FMLA CKD-EPI: >60 ML/MIN/1.73/M2
GLUCOSE SERPL-MCNC: 96 MG/DL (ref 70–110)
HCT VFR BLD AUTO: 32.2 % (ref 40–54)
HGB BLD-MCNC: 10.4 GM/DL (ref 14–18)
IMM GRANULOCYTES # BLD AUTO: 0.02 K/UL (ref 0–0.04)
IMM GRANULOCYTES NFR BLD AUTO: 0.4 % (ref 0–0.5)
LYMPHOCYTES # BLD AUTO: 1.35 K/UL (ref 1–4.8)
MAGNESIUM SERPL-MCNC: 1.9 MG/DL (ref 1.6–2.6)
MCH RBC QN AUTO: 26.3 PG (ref 27–31)
MCHC RBC AUTO-ENTMCNC: 32.3 G/DL (ref 32–36)
MCV RBC AUTO: 82 FL (ref 82–98)
NUCLEATED RBC (/100WBC) (OHS): 0 /100 WBC
PHOSPHATE SERPL-MCNC: 3.4 MG/DL (ref 2.7–4.5)
PLATELET # BLD AUTO: 193 K/UL (ref 150–450)
PMV BLD AUTO: 12 FL (ref 9.2–12.9)
POCT GLUCOSE: 101 MG/DL (ref 70–110)
POCT GLUCOSE: 102 MG/DL (ref 70–110)
POCT GLUCOSE: 132 MG/DL (ref 70–110)
POCT GLUCOSE: 58 MG/DL (ref 70–110)
POTASSIUM SERPL-SCNC: 3.8 MMOL/L (ref 3.5–5.1)
PROT SERPL-MCNC: 6.6 GM/DL (ref 6–8.4)
RBC # BLD AUTO: 3.95 M/UL (ref 4.6–6.2)
RELATIVE EOSINOPHIL (OHS): 5.4 %
RELATIVE LYMPHOCYTE (OHS): 26.2 % (ref 18–48)
RELATIVE MONOCYTE (OHS): 9.1 % (ref 4–15)
RELATIVE NEUTROPHIL (OHS): 58.3 % (ref 38–73)
SODIUM SERPL-SCNC: 139 MMOL/L (ref 136–145)
WBC # BLD AUTO: 5.15 K/UL (ref 3.9–12.7)

## 2025-05-26 PROCEDURE — 20000000 HC ICU ROOM

## 2025-05-26 PROCEDURE — 99232 SBSQ HOSP IP/OBS MODERATE 35: CPT | Mod: ,,, | Performed by: SURGERY

## 2025-05-26 PROCEDURE — 63600175 PHARM REV CODE 636 W HCPCS: Performed by: NURSE PRACTITIONER

## 2025-05-26 PROCEDURE — 25000003 PHARM REV CODE 250: Performed by: INTERNAL MEDICINE

## 2025-05-26 PROCEDURE — 63600175 PHARM REV CODE 636 W HCPCS

## 2025-05-26 PROCEDURE — 80053 COMPREHEN METABOLIC PANEL: CPT

## 2025-05-26 PROCEDURE — 94003 VENT MGMT INPAT SUBQ DAY: CPT

## 2025-05-26 PROCEDURE — 85025 COMPLETE CBC W/AUTO DIFF WBC: CPT | Performed by: NURSE PRACTITIONER

## 2025-05-26 PROCEDURE — 83735 ASSAY OF MAGNESIUM: CPT | Performed by: NURSE PRACTITIONER

## 2025-05-26 PROCEDURE — 99900026 HC AIRWAY MAINTENANCE (STAT)

## 2025-05-26 PROCEDURE — 84100 ASSAY OF PHOSPHORUS: CPT | Performed by: NURSE PRACTITIONER

## 2025-05-26 PROCEDURE — 25000003 PHARM REV CODE 250: Performed by: NURSE PRACTITIONER

## 2025-05-26 PROCEDURE — 99900035 HC TECH TIME PER 15 MIN (STAT)

## 2025-05-26 PROCEDURE — 94761 N-INVAS EAR/PLS OXIMETRY MLT: CPT

## 2025-05-26 PROCEDURE — 25000003 PHARM REV CODE 250: Performed by: SPECIALIST

## 2025-05-26 PROCEDURE — 27100171 HC OXYGEN HIGH FLOW UP TO 24 HOURS

## 2025-05-26 PROCEDURE — 25000003 PHARM REV CODE 250

## 2025-05-26 RX ADMIN — ENOXAPARIN SODIUM 40 MG: 40 INJECTION SUBCUTANEOUS at 03:05

## 2025-05-26 RX ADMIN — LEVETIRACETAM 2000 MG: 100 SOLUTION ORAL at 09:05

## 2025-05-26 RX ADMIN — DIAZEPAM 2 MG: 2 TABLET ORAL at 03:05

## 2025-05-26 RX ADMIN — BACLOFEN 20 MG: 10 TABLET ORAL at 09:05

## 2025-05-26 RX ADMIN — FAMOTIDINE 20 MG: 40 POWDER, FOR SUSPENSION ORAL at 09:05

## 2025-05-26 RX ADMIN — DIAZEPAM 2 MG: 2 TABLET ORAL at 09:05

## 2025-05-26 RX ADMIN — CHLORHEXIDINE GLUCONATE 0.12% ORAL RINSE 15 ML: 1.2 LIQUID ORAL at 09:05

## 2025-05-26 RX ADMIN — PHENOBARBITAL 64.8 MG: 32.4 TABLET ORAL at 09:05

## 2025-05-26 RX ADMIN — SODIUM CHLORIDE, POTASSIUM CHLORIDE, SODIUM LACTATE AND CALCIUM CHLORIDE 500 ML: 600; 310; 30; 20 INJECTION, SOLUTION INTRAVENOUS at 02:05

## 2025-05-26 RX ADMIN — QUETIAPINE FUMARATE 25 MG: 25 TABLET ORAL at 09:05

## 2025-05-26 RX ADMIN — SENNOSIDES AND DOCUSATE SODIUM 1 TABLET: 50; 8.6 TABLET ORAL at 09:05

## 2025-05-26 RX ADMIN — DEXTROSE MONOHYDRATE 12.5 G: 25 INJECTION, SOLUTION INTRAVENOUS at 06:05

## 2025-05-27 ENCOUNTER — ANESTHESIA EVENT (OUTPATIENT)
Dept: SURGERY | Facility: HOSPITAL | Age: 25
End: 2025-05-27
Payer: COMMERCIAL

## 2025-05-27 ENCOUNTER — ANESTHESIA (OUTPATIENT)
Dept: SURGERY | Facility: HOSPITAL | Age: 25
End: 2025-05-27
Payer: COMMERCIAL

## 2025-05-27 LAB
ABSOLUTE EOSINOPHIL (OHS): 0.04 K/UL
ABSOLUTE MONOCYTE (OHS): 0.53 K/UL (ref 0.3–1)
ABSOLUTE NEUTROPHIL COUNT (OHS): 3.46 K/UL (ref 1.8–7.7)
ALBUMIN SERPL BCP-MCNC: 2.9 G/DL (ref 3.5–5.2)
ALP SERPL-CCNC: 137 UNIT/L (ref 40–150)
ALT SERPL W/O P-5'-P-CCNC: 41 UNIT/L (ref 10–44)
ANION GAP (OHS): 9 MMOL/L (ref 8–16)
AST SERPL-CCNC: 29 UNIT/L (ref 11–45)
BASOPHILS # BLD AUTO: 0.02 K/UL
BASOPHILS NFR BLD AUTO: 0.4 %
BILIRUB SERPL-MCNC: 0.2 MG/DL (ref 0.1–1)
BUN SERPL-MCNC: 11 MG/DL (ref 6–20)
CALCIUM SERPL-MCNC: 8.5 MG/DL (ref 8.7–10.5)
CHLORIDE SERPL-SCNC: 101 MMOL/L (ref 95–110)
CO2 SERPL-SCNC: 27 MMOL/L (ref 23–29)
CREAT SERPL-MCNC: 0.6 MG/DL (ref 0.5–1.4)
ERYTHROCYTE [DISTWIDTH] IN BLOOD BY AUTOMATED COUNT: 15.1 % (ref 11.5–14.5)
GFR SERPLBLD CREATININE-BSD FMLA CKD-EPI: >60 ML/MIN/1.73/M2
GLUCOSE SERPL-MCNC: 85 MG/DL (ref 70–110)
HCT VFR BLD AUTO: 31 % (ref 40–54)
HGB BLD-MCNC: 10 GM/DL (ref 14–18)
IMM GRANULOCYTES # BLD AUTO: 0.01 K/UL (ref 0–0.04)
IMM GRANULOCYTES NFR BLD AUTO: 0.2 % (ref 0–0.5)
LYMPHOCYTES # BLD AUTO: 0.76 K/UL (ref 1–4.8)
MAGNESIUM SERPL-MCNC: 1.6 MG/DL (ref 1.6–2.6)
MCH RBC QN AUTO: 26 PG (ref 27–31)
MCHC RBC AUTO-ENTMCNC: 32.3 G/DL (ref 32–36)
MCV RBC AUTO: 81 FL (ref 82–98)
NUCLEATED RBC (/100WBC) (OHS): 0 /100 WBC
PHOSPHATE SERPL-MCNC: 2.6 MG/DL (ref 2.7–4.5)
PLATELET # BLD AUTO: 163 K/UL (ref 150–450)
PMV BLD AUTO: 11.4 FL (ref 9.2–12.9)
POCT GLUCOSE: 100 MG/DL (ref 70–110)
POCT GLUCOSE: 119 MG/DL (ref 70–110)
POCT GLUCOSE: 80 MG/DL (ref 70–110)
POCT GLUCOSE: 85 MG/DL (ref 70–110)
POCT GLUCOSE: 93 MG/DL (ref 70–110)
POCT GLUCOSE: 97 MG/DL (ref 70–110)
POTASSIUM SERPL-SCNC: 3.8 MMOL/L (ref 3.5–5.1)
PROT SERPL-MCNC: 6.7 GM/DL (ref 6–8.4)
RBC # BLD AUTO: 3.85 M/UL (ref 4.6–6.2)
RELATIVE EOSINOPHIL (OHS): 0.8 %
RELATIVE LYMPHOCYTE (OHS): 15.8 % (ref 18–48)
RELATIVE MONOCYTE (OHS): 11 % (ref 4–15)
RELATIVE NEUTROPHIL (OHS): 71.8 % (ref 38–73)
SODIUM SERPL-SCNC: 137 MMOL/L (ref 136–145)
WBC # BLD AUTO: 4.82 K/UL (ref 3.9–12.7)

## 2025-05-27 PROCEDURE — 25000003 PHARM REV CODE 250

## 2025-05-27 PROCEDURE — 36000706: Performed by: SURGERY

## 2025-05-27 PROCEDURE — 63600175 PHARM REV CODE 636 W HCPCS

## 2025-05-27 PROCEDURE — 85025 COMPLETE CBC W/AUTO DIFF WBC: CPT | Performed by: NURSE PRACTITIONER

## 2025-05-27 PROCEDURE — 27201423 OPTIME MED/SURG SUP & DEVICES STERILE SUPPLY: Performed by: SURGERY

## 2025-05-27 PROCEDURE — 25000003 PHARM REV CODE 250: Performed by: NURSE PRACTITIONER

## 2025-05-27 PROCEDURE — 63600175 PHARM REV CODE 636 W HCPCS: Performed by: SURGERY

## 2025-05-27 PROCEDURE — 25000003 PHARM REV CODE 250: Performed by: SURGERY

## 2025-05-27 PROCEDURE — 0B110F4 BYPASS TRACHEA TO CUTANEOUS WITH TRACHEOSTOMY DEVICE, OPEN APPROACH: ICD-10-PCS | Performed by: SURGERY

## 2025-05-27 PROCEDURE — 80053 COMPREHEN METABOLIC PANEL: CPT

## 2025-05-27 PROCEDURE — 99900026 HC AIRWAY MAINTENANCE (STAT)

## 2025-05-27 PROCEDURE — 63600175 PHARM REV CODE 636 W HCPCS: Performed by: NURSE PRACTITIONER

## 2025-05-27 PROCEDURE — 99232 SBSQ HOSP IP/OBS MODERATE 35: CPT | Mod: 25,,, | Performed by: SURGERY

## 2025-05-27 PROCEDURE — 36000707: Performed by: SURGERY

## 2025-05-27 PROCEDURE — 94761 N-INVAS EAR/PLS OXIMETRY MLT: CPT

## 2025-05-27 PROCEDURE — 25000003 PHARM REV CODE 250: Performed by: INTERNAL MEDICINE

## 2025-05-27 PROCEDURE — 84100 ASSAY OF PHOSPHORUS: CPT | Performed by: NURSE PRACTITIONER

## 2025-05-27 PROCEDURE — 94003 VENT MGMT INPAT SUBQ DAY: CPT

## 2025-05-27 PROCEDURE — 37000009 HC ANESTHESIA EA ADD 15 MINS: Performed by: SURGERY

## 2025-05-27 PROCEDURE — 27100171 HC OXYGEN HIGH FLOW UP TO 24 HOURS

## 2025-05-27 PROCEDURE — 99900035 HC TECH TIME PER 15 MIN (STAT)

## 2025-05-27 PROCEDURE — 31600 PLANNED TRACHEOSTOMY: CPT | Mod: ,,, | Performed by: SURGERY

## 2025-05-27 PROCEDURE — 83735 ASSAY OF MAGNESIUM: CPT | Performed by: NURSE PRACTITIONER

## 2025-05-27 PROCEDURE — 25000003 PHARM REV CODE 250: Performed by: SPECIALIST

## 2025-05-27 PROCEDURE — 94760 N-INVAS EAR/PLS OXIMETRY 1: CPT

## 2025-05-27 PROCEDURE — 37000008 HC ANESTHESIA 1ST 15 MINUTES: Performed by: SURGERY

## 2025-05-27 PROCEDURE — 20000000 HC ICU ROOM

## 2025-05-27 RX ORDER — MIDAZOLAM HYDROCHLORIDE 1 MG/ML
INJECTION INTRAMUSCULAR; INTRAVENOUS
Status: DISCONTINUED | OUTPATIENT
Start: 2025-05-27 | End: 2025-05-27

## 2025-05-27 RX ORDER — MAGNESIUM SULFATE HEPTAHYDRATE 40 MG/ML
4 INJECTION, SOLUTION INTRAVENOUS
Status: DISCONTINUED | OUTPATIENT
Start: 2025-05-27 | End: 2025-05-31 | Stop reason: HOSPADM

## 2025-05-27 RX ORDER — FENTANYL CITRATE 50 UG/ML
50 INJECTION, SOLUTION INTRAMUSCULAR; INTRAVENOUS
Refills: 0 | Status: DISCONTINUED | OUTPATIENT
Start: 2025-05-27 | End: 2025-05-29

## 2025-05-27 RX ORDER — MAGNESIUM SULFATE HEPTAHYDRATE 40 MG/ML
2 INJECTION, SOLUTION INTRAVENOUS
Status: DISCONTINUED | OUTPATIENT
Start: 2025-05-27 | End: 2025-05-31 | Stop reason: HOSPADM

## 2025-05-27 RX ORDER — ROCURONIUM BROMIDE 10 MG/ML
INJECTION, SOLUTION INTRAVENOUS
Status: DISCONTINUED | OUTPATIENT
Start: 2025-05-27 | End: 2025-05-27

## 2025-05-27 RX ORDER — MAGNESIUM SULFATE HEPTAHYDRATE 40 MG/ML
2 INJECTION, SOLUTION INTRAVENOUS ONCE
Status: COMPLETED | OUTPATIENT
Start: 2025-05-27 | End: 2025-05-27

## 2025-05-27 RX ORDER — ACETAMINOPHEN 650 MG/20.3ML
650 LIQUID ORAL EVERY 6 HOURS PRN
Status: DISCONTINUED | OUTPATIENT
Start: 2025-05-27 | End: 2025-05-31 | Stop reason: HOSPADM

## 2025-05-27 RX ADMIN — FAMOTIDINE 20 MG: 40 POWDER, FOR SUSPENSION ORAL at 08:05

## 2025-05-27 RX ADMIN — LEVETIRACETAM 2000 MG: 100 SOLUTION ORAL at 08:05

## 2025-05-27 RX ADMIN — CHLORHEXIDINE GLUCONATE 0.12% ORAL RINSE 15 ML: 1.2 LIQUID ORAL at 10:05

## 2025-05-27 RX ADMIN — FENTANYL CITRATE 50 MCG: 50 INJECTION INTRAMUSCULAR; INTRAVENOUS at 05:05

## 2025-05-27 RX ADMIN — BACLOFEN 20 MG: 10 TABLET ORAL at 08:05

## 2025-05-27 RX ADMIN — PIPERACILLIN SODIUM AND TAZOBACTAM SODIUM 4.5 G: 4; .5 INJECTION, POWDER, FOR SOLUTION INTRAVENOUS at 11:05

## 2025-05-27 RX ADMIN — Medication 2 PACKET: at 08:05

## 2025-05-27 RX ADMIN — PHENOBARBITAL 64.8 MG: 32.4 TABLET ORAL at 11:05

## 2025-05-27 RX ADMIN — PHENOBARBITAL 64.8 MG: 32.4 TABLET ORAL at 08:05

## 2025-05-27 RX ADMIN — BACLOFEN 20 MG: 10 TABLET ORAL at 11:05

## 2025-05-27 RX ADMIN — PIPERACILLIN SODIUM AND TAZOBACTAM SODIUM 4.5 G: 4; .5 INJECTION, POWDER, FOR SOLUTION INTRAVENOUS at 06:05

## 2025-05-27 RX ADMIN — SENNOSIDES AND DOCUSATE SODIUM 1 TABLET: 50; 8.6 TABLET ORAL at 08:05

## 2025-05-27 RX ADMIN — SODIUM CHLORIDE, POTASSIUM CHLORIDE, SODIUM LACTATE AND CALCIUM CHLORIDE: 600; 310; 30; 20 INJECTION, SOLUTION INTRAVENOUS at 04:05

## 2025-05-27 RX ADMIN — SUGAMMADEX 20 MG: 100 INJECTION, SOLUTION INTRAVENOUS at 05:05

## 2025-05-27 RX ADMIN — ENOXAPARIN SODIUM 40 MG: 40 INJECTION SUBCUTANEOUS at 05:05

## 2025-05-27 RX ADMIN — MIDAZOLAM HYDROCHLORIDE 1 MG: 1 INJECTION, SOLUTION INTRAMUSCULAR; INTRAVENOUS at 04:05

## 2025-05-27 RX ADMIN — ACETAMINOPHEN 650 MG: 650 SOLUTION ORAL at 05:05

## 2025-05-27 RX ADMIN — FAMOTIDINE 20 MG: 40 POWDER, FOR SUSPENSION ORAL at 10:05

## 2025-05-27 RX ADMIN — QUETIAPINE FUMARATE 25 MG: 25 TABLET ORAL at 08:05

## 2025-05-27 RX ADMIN — LEVETIRACETAM 2000 MG: 100 SOLUTION ORAL at 10:05

## 2025-05-27 RX ADMIN — CHLORHEXIDINE GLUCONATE 0.12% ORAL RINSE 15 ML: 1.2 LIQUID ORAL at 08:05

## 2025-05-27 RX ADMIN — ROCURONIUM BROMIDE 30 MG: 10 INJECTION, SOLUTION INTRAVENOUS at 04:05

## 2025-05-27 RX ADMIN — DIAZEPAM 2 MG: 2 TABLET ORAL at 11:05

## 2025-05-27 RX ADMIN — MAGNESIUM SULFATE HEPTAHYDRATE 2 G: 40 INJECTION, SOLUTION INTRAVENOUS at 11:05

## 2025-05-27 RX ADMIN — FENTANYL CITRATE 50 MCG: 50 INJECTION INTRAMUSCULAR; INTRAVENOUS at 11:05

## 2025-05-27 RX ADMIN — SENNOSIDES AND DOCUSATE SODIUM 1 TABLET: 50; 8.6 TABLET ORAL at 10:05

## 2025-05-27 RX ADMIN — DIAZEPAM 2 MG: 2 TABLET ORAL at 08:05

## 2025-05-27 NOTE — ANESTHESIA PREPROCEDURE EVALUATION
2025  Ronny Ramey is a 24 y.o., male.    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 OR;  Service: Colon and Rectal;  Laterality: N/A;    BACLOFEN PUMP IMPLANTATION N/A 2020    Procedure: INSERTION, INTRATHECAL BACLOFEN PUMP;  Surgeon: Robbi Cain MD;  Location: [facility] OR [address] [place] [address];  Service: Neurosurgery;  Laterality: N/A;  toronto I, asa 1, lateral left down, regular bed reversed, christine, [employer] rep, co surgeon DR Miller    BACLOFEN PUMP IMPLANTATION  2020    CIRCUMCISION      EXCISION, SMALL INTESTINE N/A 10/18/2024    Procedure: EXCISION, SMALL INTESTINE;  Surgeon: Anna Marie Mo MD;  Location: [facility] OR;  Service: Colon and Rectal;  Laterality: N/A;    EYE SURGERY      as a     GASTROSTOMY TUBE PLACEMENT      HERNIA REPAIR      HIP SURGERY      INJECTION OF ANESTHETIC AGENT INTO TISSUE PLANE DEFINED BY TRANSVERSUS ABDOMINIS MUSCLE N/A 10/18/2024    Procedure: BLOCK, TRANSVERSUS ABDOMINIS PLANE;  Surgeon: Anna Marie Mo MD;  Location: [facility] OR;  Service: Colon and Rectal;  Laterality: N/A;    LAPAROTOMY, EXPLORATORY N/A 10/18/2024    Procedure: LAPAROTOMY, EXPLORATORY;  Surgeon: Anna Marie Mo MD;   Location: [facility] OR;  Service: Colon and Rectal;  Laterality: N/A;  Lysis of adhesions    LAPAROTOMY, EXPLORATORY N/A 10/21/2024    Procedure: LAPAROTOMY, EXPLORATORY, OPEN GASTROSTOMY TUBE;  Surgeon: Anna Marie Mo MD;  Location: [facility] OR;  Service: Colon and Rectal;  Laterality: N/A;    LUMBAR PUNCTURE WITH INJECTION OF BACLOFEN N/A 3/11/2020    Procedure: LUMBAR PUNCTURE, WITH BACLOFEN INJECTION;  Surgeon: Cristiane Sky MD;  [facility];  Service: Neurosurgery;  Laterality: N/A;  toronto I, asa 1, lateral left down, regular bed reversed , christine , [employer] co surgeon DR Karlin NISSEN FUNDOPLICATION      PARTIAL GASTRECTOMY N/A 10/18/2024    Procedure: GASTRECTOMY, PARTIAL;  Surgeon: Anna Marie Mo MD;  Location: [facility] OR;  Service: Colon and Rectal;  Laterality: N/A;    REMOVAL OF BACLOFEN PUMP Right 10/15/2020    Procedure: REMOVAL, BACLOFEN PUMP;  Surgeon: Marcy Macedo MD;  [facility];  Service: Neurosurgery;  Laterality: Right;    TONSILLECTOMY      TYMPANOSTOMY TUBE PLACEMENT         Pre-op Assessment    I have reviewed the Patient Summary Reports.    I have reviewed the NPO Status.   I have reviewed the Medications.     Review of Systems  Anesthesia Hx:  No problems with previous Anesthesia              Personal Hx of Anesthesia complications          Slow To Awaken/Delayed Emergence and mild, somewhat sensitive to sedation / narcotics          Social:  Non-Smoker       Hematology/Oncology:       -- Anemia:                                  Cardiovascular:  Cardiovascular Normal                                              Pulmonary:  Pulmonary Normal        Intubated    Acute on chronic respiratory failure with hypoxia and hypercapnia               Renal/:  Renal/ Normal                 Hepatic/GI:  Hepatic/GI Normal                    Neurological:       Seizures    Cerebral palsy                            Endocrine:  Endocrine Normal                Physical  Exam  General: Non verbal  Airway:  Mallampati: unable to assess   Mouth Opening: Normal  TM Distance: Normal  Neck ROM: Normal ROM  Pre-Existing Airway: Oral Endotracheal tube    Dental:  Intact        Anesthesia Plan  Type of Anesthesia, risks & benefits discussed:    Anesthesia Type: Gen ETT  Intra-op Monitoring Plan: Standard ASA Monitors  Post Op Pain Control Plan: multimodal analgesia  Induction:  Inhalation  Airway Plan: , Post-Induction  Informed Consent: Informed consent signed with the Patient representative and all parties understand the risks and agree with anesthesia plan.  All questions answered.   ASA Score: 3    Ready For Surgery From Anesthesia Perspective.     .      Chemistry        Component Value Date/Time     05/27/2025 0434     12/11/2024 0435    K 3.8 05/27/2025 0434    K 3.7 12/11/2024 0435     05/27/2025 0434     12/11/2024 0435    CO2 27 05/27/2025 0434    CO2 30 (H) 12/11/2024 0435    BUN 11 05/27/2025 0434    CREATININE 0.6 05/27/2025 0434    GLU 85 05/27/2025 0434     (H) 12/11/2024 0435        Component Value Date/Time    CALCIUM 8.5 (L) 05/27/2025 0434    CALCIUM 9.2 12/11/2024 0435    ALKPHOS 137 05/27/2025 0434    ALKPHOS 170 (H) 12/11/2024 0435    AST 29 05/27/2025 0434    AST 61 (H) 12/11/2024 0435    ALT 41 05/27/2025 0434     (H) 12/11/2024 0435    BILITOT 0.2 05/27/2025 0434    BILITOT 0.2 12/11/2024 0435    ESTGFRAFRICA 128 05/17/2023 0400    ESTGFRAFRICA >60.0 11/02/2020 1045    EGFRNONAA >60.0 11/02/2020 1045        Lab Results   Component Value Date    WBC 4.82 05/27/2025    HGB 10.0 (L) 05/27/2025    HCT 31.0 (L) 05/27/2025    MCV 81 (L) 05/27/2025     05/27/2025     Results for orders placed during the hospital encounter of 05/11/25    Echo Saline Bubble? Yes    Interpretation Summary    Left Ventricle: The left ventricle is normal in size. Mildly increased wall thickness. There is concentric remodeling. There is normal  systolic function with a visually estimated ejection fraction of 60 - 65%. There is normal diastolic function.    Right Ventricle: The right ventricle is normal in size Wall thickness is normal. Systolic function is borderline low.    Left Atrium: Agitated saline study of the atrial septum is negative, suggesting absence of intracardiac shunt at the atrial level.    Mitral Valve: There is mild regurgitation.    Tricuspid Valve: There is mild regurgitation.    Pulmonary Artery: The estimated pulmonary artery systolic pressure is 26 mmHg.    IVC/SVC: Normal venous pressure at 3 mmHg.

## 2025-05-28 LAB
ABSOLUTE EOSINOPHIL (OHS): 0.01 K/UL
ABSOLUTE MONOCYTE (OHS): 0.6 K/UL (ref 0.3–1)
ABSOLUTE NEUTROPHIL COUNT (OHS): 4.52 K/UL (ref 1.8–7.7)
ALBUMIN SERPL BCP-MCNC: 3.2 G/DL (ref 3.5–5.2)
ALP SERPL-CCNC: 159 UNIT/L (ref 40–150)
ALT SERPL W/O P-5'-P-CCNC: 43 UNIT/L (ref 10–44)
ANION GAP (OHS): 10 MMOL/L (ref 8–16)
AST SERPL-CCNC: 33 UNIT/L (ref 11–45)
BASOPHILS # BLD AUTO: 0.03 K/UL
BASOPHILS NFR BLD AUTO: 0.5 %
BILIRUB SERPL-MCNC: 0.4 MG/DL (ref 0.1–1)
BUN SERPL-MCNC: 8 MG/DL (ref 6–20)
CALCIUM SERPL-MCNC: 8.5 MG/DL (ref 8.7–10.5)
CHLORIDE SERPL-SCNC: 99 MMOL/L (ref 95–110)
CO2 SERPL-SCNC: 26 MMOL/L (ref 23–29)
CREAT SERPL-MCNC: 0.7 MG/DL (ref 0.5–1.4)
ERYTHROCYTE [DISTWIDTH] IN BLOOD BY AUTOMATED COUNT: 14.9 % (ref 11.5–14.5)
GFR SERPLBLD CREATININE-BSD FMLA CKD-EPI: >60 ML/MIN/1.73/M2
GLUCOSE SERPL-MCNC: 100 MG/DL (ref 70–110)
HCT VFR BLD AUTO: 33.4 % (ref 40–54)
HGB BLD-MCNC: 10.8 GM/DL (ref 14–18)
IMM GRANULOCYTES # BLD AUTO: 0.01 K/UL (ref 0–0.04)
IMM GRANULOCYTES NFR BLD AUTO: 0.2 % (ref 0–0.5)
LYMPHOCYTES # BLD AUTO: 0.54 K/UL (ref 1–4.8)
MAGNESIUM SERPL-MCNC: 1.8 MG/DL (ref 1.6–2.6)
MCH RBC QN AUTO: 26.1 PG (ref 27–31)
MCHC RBC AUTO-ENTMCNC: 32.3 G/DL (ref 32–36)
MCV RBC AUTO: 81 FL (ref 82–98)
NUCLEATED RBC (/100WBC) (OHS): 0 /100 WBC
PHOSPHATE SERPL-MCNC: 2.7 MG/DL (ref 2.7–4.5)
PLATELET # BLD AUTO: 175 K/UL (ref 150–450)
PMV BLD AUTO: 12.2 FL (ref 9.2–12.9)
POCT GLUCOSE: 106 MG/DL (ref 70–110)
POCT GLUCOSE: 115 MG/DL (ref 70–110)
POCT GLUCOSE: 137 MG/DL (ref 70–110)
POCT GLUCOSE: 143 MG/DL (ref 70–110)
POTASSIUM SERPL-SCNC: 3.6 MMOL/L (ref 3.5–5.1)
PROCALCITONIN SERPL-MCNC: 0.74 NG/ML
PROT SERPL-MCNC: 7.4 GM/DL (ref 6–8.4)
RBC # BLD AUTO: 4.14 M/UL (ref 4.6–6.2)
RELATIVE EOSINOPHIL (OHS): 0.2 %
RELATIVE LYMPHOCYTE (OHS): 9.5 % (ref 18–48)
RELATIVE MONOCYTE (OHS): 10.5 % (ref 4–15)
RELATIVE NEUTROPHIL (OHS): 79.1 % (ref 38–73)
SODIUM SERPL-SCNC: 135 MMOL/L (ref 136–145)
WBC # BLD AUTO: 5.71 K/UL (ref 3.9–12.7)

## 2025-05-28 PROCEDURE — 99900026 HC AIRWAY MAINTENANCE (STAT)

## 2025-05-28 PROCEDURE — 99900035 HC TECH TIME PER 15 MIN (STAT)

## 2025-05-28 PROCEDURE — 84100 ASSAY OF PHOSPHORUS: CPT | Performed by: SURGERY

## 2025-05-28 PROCEDURE — 94760 N-INVAS EAR/PLS OXIMETRY 1: CPT

## 2025-05-28 PROCEDURE — 94003 VENT MGMT INPAT SUBQ DAY: CPT

## 2025-05-28 PROCEDURE — 25000003 PHARM REV CODE 250: Performed by: SURGERY

## 2025-05-28 PROCEDURE — 36410 VNPNXR 3YR/> PHY/QHP DX/THER: CPT

## 2025-05-28 PROCEDURE — 25000003 PHARM REV CODE 250: Performed by: NURSE PRACTITIONER

## 2025-05-28 PROCEDURE — 63600175 PHARM REV CODE 636 W HCPCS: Performed by: SURGERY

## 2025-05-28 PROCEDURE — 63600175 PHARM REV CODE 636 W HCPCS: Performed by: INTERNAL MEDICINE

## 2025-05-28 PROCEDURE — 85025 COMPLETE CBC W/AUTO DIFF WBC: CPT | Performed by: SURGERY

## 2025-05-28 PROCEDURE — 83735 ASSAY OF MAGNESIUM: CPT | Performed by: SURGERY

## 2025-05-28 PROCEDURE — 20000000 HC ICU ROOM

## 2025-05-28 PROCEDURE — 63600175 PHARM REV CODE 636 W HCPCS: Performed by: SPECIALIST

## 2025-05-28 PROCEDURE — 99024 POSTOP FOLLOW-UP VISIT: CPT | Mod: ,,,

## 2025-05-28 PROCEDURE — 25000003 PHARM REV CODE 250: Performed by: SPECIALIST

## 2025-05-28 PROCEDURE — 87186 SC STD MICRODIL/AGAR DIL: CPT | Performed by: NURSE PRACTITIONER

## 2025-05-28 PROCEDURE — 84145 PROCALCITONIN (PCT): CPT | Performed by: NURSE PRACTITIONER

## 2025-05-28 PROCEDURE — 27100171 HC OXYGEN HIGH FLOW UP TO 24 HOURS

## 2025-05-28 PROCEDURE — 80053 COMPREHEN METABOLIC PANEL: CPT | Performed by: SURGERY

## 2025-05-28 PROCEDURE — 94761 N-INVAS EAR/PLS OXIMETRY MLT: CPT

## 2025-05-28 PROCEDURE — 25000003 PHARM REV CODE 250: Performed by: INTERNAL MEDICINE

## 2025-05-28 PROCEDURE — C1751 CATH, INF, PER/CENT/MIDLINE: HCPCS

## 2025-05-28 PROCEDURE — 63600175 PHARM REV CODE 636 W HCPCS: Performed by: NURSE PRACTITIONER

## 2025-05-28 RX ORDER — OXYCODONE HYDROCHLORIDE 5 MG/1
10 TABLET ORAL EVERY 6 HOURS
Refills: 0 | Status: DISCONTINUED | OUTPATIENT
Start: 2025-05-28 | End: 2025-05-29

## 2025-05-28 RX ORDER — SIMETHICONE 80 MG
1 TABLET,CHEWABLE ORAL 3 TIMES DAILY PRN
Status: DISCONTINUED | OUTPATIENT
Start: 2025-05-28 | End: 2025-05-31 | Stop reason: HOSPADM

## 2025-05-28 RX ADMIN — DIAZEPAM 2 MG: 2 TABLET ORAL at 08:05

## 2025-05-28 RX ADMIN — MAGNESIUM SULFATE HEPTAHYDRATE 2 G: 40 INJECTION, SOLUTION INTRAVENOUS at 04:05

## 2025-05-28 RX ADMIN — PIPERACILLIN SODIUM AND TAZOBACTAM SODIUM 4.5 G: 4; .5 INJECTION, POWDER, FOR SOLUTION INTRAVENOUS at 08:05

## 2025-05-28 RX ADMIN — FAMOTIDINE 20 MG: 40 POWDER, FOR SUSPENSION ORAL at 09:05

## 2025-05-28 RX ADMIN — OXYCODONE HYDROCHLORIDE 10 MG: 5 TABLET ORAL at 05:05

## 2025-05-28 RX ADMIN — DIAZEPAM 2 MG: 2 TABLET ORAL at 03:05

## 2025-05-28 RX ADMIN — LEVETIRACETAM 2000 MG: 100 SOLUTION ORAL at 08:05

## 2025-05-28 RX ADMIN — VANCOMYCIN HYDROCHLORIDE 1250 MG: 1.25 INJECTION, POWDER, LYOPHILIZED, FOR SOLUTION INTRAVENOUS at 10:05

## 2025-05-28 RX ADMIN — FENTANYL CITRATE 50 MCG: 50 INJECTION INTRAMUSCULAR; INTRAVENOUS at 06:05

## 2025-05-28 RX ADMIN — PIPERACILLIN SODIUM AND TAZOBACTAM SODIUM 4.5 G: 4; .5 INJECTION, POWDER, FOR SOLUTION INTRAVENOUS at 03:05

## 2025-05-28 RX ADMIN — PHENOBARBITAL 64.8 MG: 32.4 TABLET ORAL at 09:05

## 2025-05-28 RX ADMIN — OXYCODONE HYDROCHLORIDE 10 MG: 5 TABLET ORAL at 12:05

## 2025-05-28 RX ADMIN — Medication 2 PACKET: at 07:05

## 2025-05-28 RX ADMIN — BACLOFEN 20 MG: 10 TABLET ORAL at 08:05

## 2025-05-28 RX ADMIN — SENNOSIDES AND DOCUSATE SODIUM 1 TABLET: 50; 8.6 TABLET ORAL at 09:05

## 2025-05-28 RX ADMIN — CHLORHEXIDINE GLUCONATE 0.12% ORAL RINSE 15 ML: 1.2 LIQUID ORAL at 08:05

## 2025-05-28 RX ADMIN — ACETAMINOPHEN 650 MG: 650 SOLUTION ORAL at 03:05

## 2025-05-28 RX ADMIN — BACLOFEN 20 MG: 10 TABLET ORAL at 09:05

## 2025-05-28 RX ADMIN — VANCOMYCIN HYDROCHLORIDE 1000 MG: 1 INJECTION, POWDER, LYOPHILIZED, FOR SOLUTION INTRAVENOUS at 09:05

## 2025-05-28 RX ADMIN — QUETIAPINE FUMARATE 25 MG: 25 TABLET ORAL at 09:05

## 2025-05-28 RX ADMIN — FAMOTIDINE 20 MG: 40 POWDER, FOR SUSPENSION ORAL at 08:05

## 2025-05-28 RX ADMIN — CHLORHEXIDINE GLUCONATE 0.12% ORAL RINSE 15 ML: 1.2 LIQUID ORAL at 09:05

## 2025-05-28 RX ADMIN — Medication 2 PACKET: at 04:05

## 2025-05-28 RX ADMIN — SIMETHICONE 80 MG: 80 TABLET, CHEWABLE ORAL at 03:05

## 2025-05-28 RX ADMIN — PHENOBARBITAL 64.8 MG: 32.4 TABLET ORAL at 08:05

## 2025-05-28 RX ADMIN — FENTANYL CITRATE 50 MCG: 50 INJECTION INTRAMUSCULAR; INTRAVENOUS at 09:05

## 2025-05-28 RX ADMIN — POTASSIUM BICARBONATE 50 MEQ: 978 TABLET, EFFERVESCENT ORAL at 04:05

## 2025-05-28 RX ADMIN — OXYCODONE HYDROCHLORIDE 10 MG: 5 TABLET ORAL at 07:05

## 2025-05-28 RX ADMIN — DIAZEPAM 2 MG: 2 TABLET ORAL at 09:05

## 2025-05-28 NOTE — ANESTHESIA POSTPROCEDURE EVALUATION
Anesthesia Post Evaluation    Patient: Ronny Ramey    Procedure(s) Performed: Procedure(s) (LRB):  INSERTION, TRACHEOSTOMY TUBE (N/A)    Final Anesthesia Type: general      Patient location during evaluation: ICU  Patient participation: No - Unable to Participate, Intubation  Level of consciousness: sedated  Post-procedure vital signs: reviewed and stable  Pain management: adequate  Airway patency: patent    PONV status at discharge: No PONV  Anesthetic complications: no      Cardiovascular status: blood pressure returned to baseline  Respiratory status: ventilator and Tracheostomy  Hydration status: euvolemic  Follow-up not needed.              Vitals Value Taken Time   /56 05/28/25 04:02   Temp 37.7 °C (99.86 °F) 05/28/25 06:56   Pulse 75 05/28/25 06:56   Resp 12 05/28/25 06:56   SpO2 100 % 05/28/25 06:56   Vitals shown include unfiled device data.      No case tracking events are documented in the log.      Pain/Chary Score: Pain Rating Prior to Med Admin: 6 (5/28/2025  6:11 AM)  Pain Rating Post Med Admin: 0 (5/27/2025  6:20 PM)

## 2025-05-29 LAB
ABSOLUTE EOSINOPHIL (OHS): 0.01 K/UL
ABSOLUTE MONOCYTE (OHS): 1.12 K/UL (ref 0.3–1)
ABSOLUTE NEUTROPHIL COUNT (OHS): 3.65 K/UL (ref 1.8–7.7)
ALBUMIN SERPL BCP-MCNC: 3.2 G/DL (ref 3.5–5.2)
ALP SERPL-CCNC: 155 UNIT/L (ref 40–150)
ALT SERPL W/O P-5'-P-CCNC: 50 UNIT/L (ref 10–44)
ANION GAP (OHS): 15 MMOL/L (ref 8–16)
AST SERPL-CCNC: 34 UNIT/L (ref 11–45)
BASOPHILS # BLD AUTO: 0.01 K/UL
BASOPHILS NFR BLD AUTO: 0.2 %
BILIRUB SERPL-MCNC: 0.4 MG/DL (ref 0.1–1)
BUN SERPL-MCNC: 7 MG/DL (ref 6–20)
CALCIUM SERPL-MCNC: 8.7 MG/DL (ref 8.7–10.5)
CHLORIDE SERPL-SCNC: 98 MMOL/L (ref 95–110)
CO2 SERPL-SCNC: 27 MMOL/L (ref 23–29)
CREAT SERPL-MCNC: 0.7 MG/DL (ref 0.5–1.4)
ERYTHROCYTE [DISTWIDTH] IN BLOOD BY AUTOMATED COUNT: 14.5 % (ref 11.5–14.5)
GFR SERPLBLD CREATININE-BSD FMLA CKD-EPI: >60 ML/MIN/1.73/M2
GLUCOSE SERPL-MCNC: 110 MG/DL (ref 70–110)
HCT VFR BLD AUTO: 34.6 % (ref 40–54)
HGB BLD-MCNC: 11.2 GM/DL (ref 14–18)
IMM GRANULOCYTES # BLD AUTO: 0.01 K/UL (ref 0–0.04)
IMM GRANULOCYTES NFR BLD AUTO: 0.2 % (ref 0–0.5)
LYMPHOCYTES # BLD AUTO: 0.81 K/UL (ref 1–4.8)
MAGNESIUM SERPL-MCNC: 2.2 MG/DL (ref 1.6–2.6)
MCH RBC QN AUTO: 25.9 PG (ref 27–31)
MCHC RBC AUTO-ENTMCNC: 32.4 G/DL (ref 32–36)
MCV RBC AUTO: 80 FL (ref 82–98)
NUCLEATED RBC (/100WBC) (OHS): 0 /100 WBC
PHOSPHATE SERPL-MCNC: 3.5 MG/DL (ref 2.7–4.5)
PLATELET # BLD AUTO: 208 K/UL (ref 150–450)
PMV BLD AUTO: 11.9 FL (ref 9.2–12.9)
POCT GLUCOSE: 106 MG/DL (ref 70–110)
POCT GLUCOSE: 107 MG/DL (ref 70–110)
POCT GLUCOSE: 114 MG/DL (ref 70–110)
POCT GLUCOSE: 122 MG/DL (ref 70–110)
POTASSIUM SERPL-SCNC: 3.3 MMOL/L (ref 3.5–5.1)
PROT SERPL-MCNC: 7.6 GM/DL (ref 6–8.4)
RBC # BLD AUTO: 4.33 M/UL (ref 4.6–6.2)
RELATIVE EOSINOPHIL (OHS): 0.2 %
RELATIVE LYMPHOCYTE (OHS): 14.4 % (ref 18–48)
RELATIVE MONOCYTE (OHS): 20 % (ref 4–15)
RELATIVE NEUTROPHIL (OHS): 65 % (ref 38–73)
SODIUM SERPL-SCNC: 140 MMOL/L (ref 136–145)
W LEVETIRACETAM: 43.8 UG/ML
WBC # BLD AUTO: 5.61 K/UL (ref 3.9–12.7)

## 2025-05-29 PROCEDURE — 99900035 HC TECH TIME PER 15 MIN (STAT)

## 2025-05-29 PROCEDURE — 94761 N-INVAS EAR/PLS OXIMETRY MLT: CPT

## 2025-05-29 PROCEDURE — 63600175 PHARM REV CODE 636 W HCPCS: Performed by: INTERNAL MEDICINE

## 2025-05-29 PROCEDURE — 25500020 PHARM REV CODE 255: Performed by: INTERNAL MEDICINE

## 2025-05-29 PROCEDURE — 25000003 PHARM REV CODE 250: Performed by: SURGERY

## 2025-05-29 PROCEDURE — 63600175 PHARM REV CODE 636 W HCPCS: Performed by: SURGERY

## 2025-05-29 PROCEDURE — 85025 COMPLETE CBC W/AUTO DIFF WBC: CPT | Performed by: SURGERY

## 2025-05-29 PROCEDURE — 20000000 HC ICU ROOM

## 2025-05-29 PROCEDURE — 80053 COMPREHEN METABOLIC PANEL: CPT | Performed by: SURGERY

## 2025-05-29 PROCEDURE — 94003 VENT MGMT INPAT SUBQ DAY: CPT

## 2025-05-29 PROCEDURE — 94760 N-INVAS EAR/PLS OXIMETRY 1: CPT

## 2025-05-29 PROCEDURE — 99900026 HC AIRWAY MAINTENANCE (STAT)

## 2025-05-29 PROCEDURE — 80202 ASSAY OF VANCOMYCIN: CPT | Performed by: INTERNAL MEDICINE

## 2025-05-29 PROCEDURE — 83735 ASSAY OF MAGNESIUM: CPT | Performed by: SURGERY

## 2025-05-29 PROCEDURE — 27100171 HC OXYGEN HIGH FLOW UP TO 24 HOURS

## 2025-05-29 PROCEDURE — 84100 ASSAY OF PHOSPHORUS: CPT | Performed by: SURGERY

## 2025-05-29 PROCEDURE — 25000003 PHARM REV CODE 250: Performed by: NURSE PRACTITIONER

## 2025-05-29 PROCEDURE — 25000003 PHARM REV CODE 250: Performed by: INTERNAL MEDICINE

## 2025-05-29 RX ORDER — OXYCODONE HYDROCHLORIDE 5 MG/1
10 TABLET ORAL EVERY 6 HOURS PRN
Refills: 0 | Status: DISCONTINUED | OUTPATIENT
Start: 2025-05-29 | End: 2025-05-31 | Stop reason: HOSPADM

## 2025-05-29 RX ORDER — FENTANYL CITRATE 50 UG/ML
50 INJECTION, SOLUTION INTRAMUSCULAR; INTRAVENOUS
Refills: 0 | Status: DISCONTINUED | OUTPATIENT
Start: 2025-05-29 | End: 2025-05-31 | Stop reason: HOSPADM

## 2025-05-29 RX ADMIN — PIPERACILLIN SODIUM AND TAZOBACTAM SODIUM 4.5 G: 4; .5 INJECTION, POWDER, FOR SOLUTION INTRAVENOUS at 03:05

## 2025-05-29 RX ADMIN — SENNOSIDES AND DOCUSATE SODIUM 1 TABLET: 50; 8.6 TABLET ORAL at 08:05

## 2025-05-29 RX ADMIN — FAMOTIDINE 20 MG: 40 POWDER, FOR SUSPENSION ORAL at 09:05

## 2025-05-29 RX ADMIN — FENTANYL CITRATE 50 MCG: 50 INJECTION INTRAMUSCULAR; INTRAVENOUS at 03:05

## 2025-05-29 RX ADMIN — LEVETIRACETAM 2000 MG: 100 SOLUTION ORAL at 08:05

## 2025-05-29 RX ADMIN — FAMOTIDINE 20 MG: 40 POWDER, FOR SUSPENSION ORAL at 08:05

## 2025-05-29 RX ADMIN — POTASSIUM BICARBONATE 35 MEQ: 391 TABLET, EFFERVESCENT ORAL at 05:05

## 2025-05-29 RX ADMIN — OXYCODONE HYDROCHLORIDE 10 MG: 5 TABLET ORAL at 05:05

## 2025-05-29 RX ADMIN — PIPERACILLIN SODIUM AND TAZOBACTAM SODIUM 4.5 G: 4; .5 INJECTION, POWDER, FOR SOLUTION INTRAVENOUS at 07:05

## 2025-05-29 RX ADMIN — QUETIAPINE FUMARATE 25 MG: 25 TABLET ORAL at 09:05

## 2025-05-29 RX ADMIN — PIPERACILLIN SODIUM AND TAZOBACTAM SODIUM 4.5 G: 4; .5 INJECTION, POWDER, FOR SOLUTION INTRAVENOUS at 12:05

## 2025-05-29 RX ADMIN — DIAZEPAM 2 MG: 2 TABLET ORAL at 09:05

## 2025-05-29 RX ADMIN — PHENOBARBITAL 64.8 MG: 32.4 TABLET ORAL at 08:05

## 2025-05-29 RX ADMIN — IOHEXOL 100 ML: 350 INJECTION, SOLUTION INTRAVENOUS at 10:05

## 2025-05-29 RX ADMIN — SIMETHICONE 80 MG: 80 TABLET, CHEWABLE ORAL at 05:05

## 2025-05-29 RX ADMIN — VANCOMYCIN HYDROCHLORIDE 1000 MG: 1 INJECTION, POWDER, LYOPHILIZED, FOR SOLUTION INTRAVENOUS at 11:05

## 2025-05-29 RX ADMIN — POLYETHYLENE GLYCOL 3350 17 G: 17 POWDER, FOR SOLUTION ORAL at 09:05

## 2025-05-29 RX ADMIN — OXYCODONE HYDROCHLORIDE 10 MG: 5 TABLET ORAL at 12:05

## 2025-05-29 RX ADMIN — CHLORHEXIDINE GLUCONATE 0.12% ORAL RINSE 15 ML: 1.2 LIQUID ORAL at 09:05

## 2025-05-29 RX ADMIN — SENNOSIDES AND DOCUSATE SODIUM 1 TABLET: 50; 8.6 TABLET ORAL at 09:05

## 2025-05-29 RX ADMIN — DIAZEPAM 2 MG: 2 TABLET ORAL at 08:05

## 2025-05-29 RX ADMIN — BACLOFEN 20 MG: 10 TABLET ORAL at 08:05

## 2025-05-29 RX ADMIN — LEVETIRACETAM 2000 MG: 100 SOLUTION ORAL at 09:05

## 2025-05-29 RX ADMIN — POTASSIUM BICARBONATE 35 MEQ: 391 TABLET, EFFERVESCENT ORAL at 08:05

## 2025-05-29 RX ADMIN — BACLOFEN 20 MG: 10 TABLET ORAL at 09:05

## 2025-05-29 RX ADMIN — DIAZEPAM 2 MG: 2 TABLET ORAL at 03:05

## 2025-05-29 RX ADMIN — ENOXAPARIN SODIUM 40 MG: 40 INJECTION SUBCUTANEOUS at 04:05

## 2025-05-29 RX ADMIN — PHENOBARBITAL 64.8 MG: 32.4 TABLET ORAL at 09:05

## 2025-05-30 LAB
ABSOLUTE EOSINOPHIL (OHS): 0.42 K/UL
ABSOLUTE MONOCYTE (OHS): 0.77 K/UL (ref 0.3–1)
ABSOLUTE NEUTROPHIL COUNT (OHS): 1.89 K/UL (ref 1.8–7.7)
ALBUMIN SERPL BCP-MCNC: 2.9 G/DL (ref 3.5–5.2)
ALP SERPL-CCNC: 123 UNIT/L (ref 40–150)
ALT SERPL W/O P-5'-P-CCNC: 38 UNIT/L (ref 10–44)
ANION GAP (OHS): 11 MMOL/L (ref 8–16)
AST SERPL-CCNC: 23 UNIT/L (ref 11–45)
BASOPHILS # BLD AUTO: 0.01 K/UL
BASOPHILS NFR BLD AUTO: 0.2 %
BILIRUB SERPL-MCNC: 0.3 MG/DL (ref 0.1–1)
BUN SERPL-MCNC: 8 MG/DL (ref 6–20)
CALCIUM SERPL-MCNC: 8.7 MG/DL (ref 8.7–10.5)
CHLORIDE SERPL-SCNC: 101 MMOL/L (ref 95–110)
CO2 SERPL-SCNC: 30 MMOL/L (ref 23–29)
CREAT SERPL-MCNC: 0.9 MG/DL (ref 0.5–1.4)
ERYTHROCYTE [DISTWIDTH] IN BLOOD BY AUTOMATED COUNT: 15 % (ref 11.5–14.5)
GFR SERPLBLD CREATININE-BSD FMLA CKD-EPI: >60 ML/MIN/1.73/M2
GLUCOSE SERPL-MCNC: 93 MG/DL (ref 70–110)
HCT VFR BLD AUTO: 31.3 % (ref 40–54)
HGB BLD-MCNC: 10 GM/DL (ref 14–18)
IMM GRANULOCYTES # BLD AUTO: 0.01 K/UL (ref 0–0.04)
IMM GRANULOCYTES NFR BLD AUTO: 0.2 % (ref 0–0.5)
LYMPHOCYTES # BLD AUTO: 1.16 K/UL (ref 1–4.8)
MAGNESIUM SERPL-MCNC: 2.1 MG/DL (ref 1.6–2.6)
MCH RBC QN AUTO: 25.7 PG (ref 27–31)
MCHC RBC AUTO-ENTMCNC: 31.9 G/DL (ref 32–36)
MCV RBC AUTO: 81 FL (ref 82–98)
NUCLEATED RBC (/100WBC) (OHS): 0 /100 WBC
PHOSPHATE SERPL-MCNC: 3 MG/DL (ref 2.7–4.5)
PLATELET # BLD AUTO: 215 K/UL (ref 150–450)
PMV BLD AUTO: 11.8 FL (ref 9.2–12.9)
POCT GLUCOSE: 103 MG/DL (ref 70–110)
POCT GLUCOSE: 124 MG/DL (ref 70–110)
POCT GLUCOSE: 83 MG/DL (ref 70–110)
POCT GLUCOSE: 93 MG/DL (ref 70–110)
POCT GLUCOSE: 98 MG/DL (ref 70–110)
POTASSIUM SERPL-SCNC: 3.4 MMOL/L (ref 3.5–5.1)
PROT SERPL-MCNC: 6.9 GM/DL (ref 6–8.4)
RBC # BLD AUTO: 3.89 M/UL (ref 4.6–6.2)
RELATIVE EOSINOPHIL (OHS): 9.9 %
RELATIVE LYMPHOCYTE (OHS): 27.2 % (ref 18–48)
RELATIVE MONOCYTE (OHS): 18.1 % (ref 4–15)
RELATIVE NEUTROPHIL (OHS): 44.4 % (ref 38–73)
SODIUM SERPL-SCNC: 142 MMOL/L (ref 136–145)
VANCOMYCIN TROUGH SERPL-MCNC: 23.4 UG/ML (ref 10–22)
WBC # BLD AUTO: 4.26 K/UL (ref 3.9–12.7)

## 2025-05-30 PROCEDURE — 27100171 HC OXYGEN HIGH FLOW UP TO 24 HOURS

## 2025-05-30 PROCEDURE — 94003 VENT MGMT INPAT SUBQ DAY: CPT

## 2025-05-30 PROCEDURE — 20000000 HC ICU ROOM

## 2025-05-30 PROCEDURE — 94761 N-INVAS EAR/PLS OXIMETRY MLT: CPT

## 2025-05-30 PROCEDURE — 25000003 PHARM REV CODE 250: Performed by: SURGERY

## 2025-05-30 PROCEDURE — 84100 ASSAY OF PHOSPHORUS: CPT | Performed by: SURGERY

## 2025-05-30 PROCEDURE — 83735 ASSAY OF MAGNESIUM: CPT | Performed by: SURGERY

## 2025-05-30 PROCEDURE — 63600175 PHARM REV CODE 636 W HCPCS: Performed by: SURGERY

## 2025-05-30 PROCEDURE — 25000003 PHARM REV CODE 250: Performed by: NURSE PRACTITIONER

## 2025-05-30 PROCEDURE — 80053 COMPREHEN METABOLIC PANEL: CPT | Performed by: SURGERY

## 2025-05-30 PROCEDURE — 99900026 HC AIRWAY MAINTENANCE (STAT)

## 2025-05-30 PROCEDURE — 99900035 HC TECH TIME PER 15 MIN (STAT)

## 2025-05-30 PROCEDURE — 85025 COMPLETE CBC W/AUTO DIFF WBC: CPT | Performed by: SURGERY

## 2025-05-30 RX ADMIN — BACLOFEN 20 MG: 10 TABLET ORAL at 09:05

## 2025-05-30 RX ADMIN — PIPERACILLIN SODIUM AND TAZOBACTAM SODIUM 4.5 G: 4; .5 INJECTION, POWDER, FOR SOLUTION INTRAVENOUS at 12:05

## 2025-05-30 RX ADMIN — PHENOBARBITAL 64.8 MG: 32.4 TABLET ORAL at 08:05

## 2025-05-30 RX ADMIN — CHLORHEXIDINE GLUCONATE 0.12% ORAL RINSE 15 ML: 1.2 LIQUID ORAL at 09:05

## 2025-05-30 RX ADMIN — LEVETIRACETAM 2000 MG: 100 SOLUTION ORAL at 09:05

## 2025-05-30 RX ADMIN — BACLOFEN 20 MG: 10 TABLET ORAL at 08:05

## 2025-05-30 RX ADMIN — CHLORHEXIDINE GLUCONATE 0.12% ORAL RINSE 15 ML: 1.2 LIQUID ORAL at 08:05

## 2025-05-30 RX ADMIN — SENNOSIDES AND DOCUSATE SODIUM 1 TABLET: 50; 8.6 TABLET ORAL at 08:05

## 2025-05-30 RX ADMIN — PIPERACILLIN SODIUM AND TAZOBACTAM SODIUM 4.5 G: 4; .5 INJECTION, POWDER, FOR SOLUTION INTRAVENOUS at 03:05

## 2025-05-30 RX ADMIN — FAMOTIDINE 20 MG: 40 POWDER, FOR SUSPENSION ORAL at 09:05

## 2025-05-30 RX ADMIN — FAMOTIDINE 20 MG: 40 POWDER, FOR SUSPENSION ORAL at 08:05

## 2025-05-30 RX ADMIN — POTASSIUM BICARBONATE 35 MEQ: 391 TABLET, EFFERVESCENT ORAL at 11:05

## 2025-05-30 RX ADMIN — SIMETHICONE 80 MG: 80 TABLET, CHEWABLE ORAL at 12:05

## 2025-05-30 RX ADMIN — PHENOBARBITAL 64.8 MG: 32.4 TABLET ORAL at 09:05

## 2025-05-30 RX ADMIN — PIPERACILLIN SODIUM AND TAZOBACTAM SODIUM 4.5 G: 4; .5 INJECTION, POWDER, FOR SOLUTION INTRAVENOUS at 08:05

## 2025-05-30 RX ADMIN — QUETIAPINE FUMARATE 25 MG: 25 TABLET ORAL at 09:05

## 2025-05-30 RX ADMIN — POLYETHYLENE GLYCOL 3350 17 G: 17 POWDER, FOR SOLUTION ORAL at 08:05

## 2025-05-30 RX ADMIN — SIMETHICONE 80 MG: 80 TABLET, CHEWABLE ORAL at 09:05

## 2025-05-30 RX ADMIN — LEVETIRACETAM 2000 MG: 100 SOLUTION ORAL at 08:05

## 2025-05-30 RX ADMIN — DIAZEPAM 2 MG: 2 TABLET ORAL at 08:05

## 2025-05-30 RX ADMIN — POTASSIUM BICARBONATE 35 MEQ: 391 TABLET, EFFERVESCENT ORAL at 09:05

## 2025-05-30 RX ADMIN — DIAZEPAM 2 MG: 2 TABLET ORAL at 09:05

## 2025-05-30 RX ADMIN — ENOXAPARIN SODIUM 40 MG: 40 INJECTION SUBCUTANEOUS at 05:05

## 2025-05-30 RX ADMIN — DIAZEPAM 2 MG: 2 TABLET ORAL at 03:05

## 2025-05-31 VITALS
OXYGEN SATURATION: 100 % | TEMPERATURE: 98 F | RESPIRATION RATE: 12 BRPM | SYSTOLIC BLOOD PRESSURE: 104 MMHG | HEIGHT: 64 IN | WEIGHT: 120.13 LBS | BODY MASS INDEX: 20.51 KG/M2 | DIASTOLIC BLOOD PRESSURE: 56 MMHG | HEART RATE: 50 BPM

## 2025-05-31 PROBLEM — Z51.5 ENCOUNTER FOR PALLIATIVE CARE: Status: RESOLVED | Noted: 2025-05-22 | Resolved: 2025-05-31

## 2025-05-31 LAB
ABSOLUTE EOSINOPHIL (OHS): 0.48 K/UL
ABSOLUTE MONOCYTE (OHS): 0.5 K/UL (ref 0.3–1)
ABSOLUTE NEUTROPHIL COUNT (OHS): 2.54 K/UL (ref 1.8–7.7)
ALBUMIN SERPL BCP-MCNC: 2.8 G/DL (ref 3.5–5.2)
ALP SERPL-CCNC: 119 UNIT/L (ref 40–150)
ALT SERPL W/O P-5'-P-CCNC: 29 UNIT/L (ref 10–44)
ANION GAP (OHS): 10 MMOL/L (ref 8–16)
AST SERPL-CCNC: 16 UNIT/L (ref 11–45)
BASOPHILS # BLD AUTO: 0.02 K/UL
BASOPHILS NFR BLD AUTO: 0.4 %
BILIRUB SERPL-MCNC: 0.2 MG/DL (ref 0.1–1)
BUN SERPL-MCNC: 9 MG/DL (ref 6–20)
CALCIUM SERPL-MCNC: 8.6 MG/DL (ref 8.7–10.5)
CHLORIDE SERPL-SCNC: 103 MMOL/L (ref 95–110)
CO2 SERPL-SCNC: 29 MMOL/L (ref 23–29)
CREAT SERPL-MCNC: 0.8 MG/DL (ref 0.5–1.4)
ERYTHROCYTE [DISTWIDTH] IN BLOOD BY AUTOMATED COUNT: 15 % (ref 11.5–14.5)
GFR SERPLBLD CREATININE-BSD FMLA CKD-EPI: >60 ML/MIN/1.73/M2
GLUCOSE SERPL-MCNC: 109 MG/DL (ref 70–110)
HCT VFR BLD AUTO: 29.8 % (ref 40–54)
HGB BLD-MCNC: 9.7 GM/DL (ref 14–18)
IMM GRANULOCYTES # BLD AUTO: 0.01 K/UL (ref 0–0.04)
IMM GRANULOCYTES NFR BLD AUTO: 0.2 % (ref 0–0.5)
LYMPHOCYTES # BLD AUTO: 1.29 K/UL (ref 1–4.8)
MAGNESIUM SERPL-MCNC: 1.9 MG/DL (ref 1.6–2.6)
MCH RBC QN AUTO: 26.1 PG (ref 27–31)
MCHC RBC AUTO-ENTMCNC: 32.6 G/DL (ref 32–36)
MCV RBC AUTO: 80 FL (ref 82–98)
NUCLEATED RBC (/100WBC) (OHS): 0 /100 WBC
PHOSPHATE SERPL-MCNC: 3.4 MG/DL (ref 2.7–4.5)
PLATELET # BLD AUTO: 247 K/UL (ref 150–450)
PMV BLD AUTO: 11.3 FL (ref 9.2–12.9)
POCT GLUCOSE: 110 MG/DL (ref 70–110)
POTASSIUM SERPL-SCNC: 3.5 MMOL/L (ref 3.5–5.1)
PROT SERPL-MCNC: 6.8 GM/DL (ref 6–8.4)
RBC # BLD AUTO: 3.71 M/UL (ref 4.6–6.2)
RELATIVE EOSINOPHIL (OHS): 9.9 %
RELATIVE LYMPHOCYTE (OHS): 26.7 % (ref 18–48)
RELATIVE MONOCYTE (OHS): 10.3 % (ref 4–15)
RELATIVE NEUTROPHIL (OHS): 52.5 % (ref 38–73)
SODIUM SERPL-SCNC: 142 MMOL/L (ref 136–145)
WBC # BLD AUTO: 4.84 K/UL (ref 3.9–12.7)

## 2025-05-31 PROCEDURE — 99900035 HC TECH TIME PER 15 MIN (STAT)

## 2025-05-31 PROCEDURE — 27100171 HC OXYGEN HIGH FLOW UP TO 24 HOURS

## 2025-05-31 PROCEDURE — 25000003 PHARM REV CODE 250: Performed by: SURGERY

## 2025-05-31 PROCEDURE — 80053 COMPREHEN METABOLIC PANEL: CPT | Performed by: SURGERY

## 2025-05-31 PROCEDURE — 63600175 PHARM REV CODE 636 W HCPCS: Performed by: SURGERY

## 2025-05-31 PROCEDURE — 94761 N-INVAS EAR/PLS OXIMETRY MLT: CPT

## 2025-05-31 PROCEDURE — 99900026 HC AIRWAY MAINTENANCE (STAT)

## 2025-05-31 PROCEDURE — 85025 COMPLETE CBC W/AUTO DIFF WBC: CPT | Performed by: SURGERY

## 2025-05-31 PROCEDURE — 83735 ASSAY OF MAGNESIUM: CPT | Performed by: SURGERY

## 2025-05-31 PROCEDURE — 84100 ASSAY OF PHOSPHORUS: CPT | Performed by: SURGERY

## 2025-05-31 PROCEDURE — 94003 VENT MGMT INPAT SUBQ DAY: CPT

## 2025-05-31 RX ORDER — AMOXICILLIN 250 MG
1 CAPSULE ORAL 2 TIMES DAILY
Start: 2025-05-31

## 2025-05-31 RX ORDER — POLYETHYLENE GLYCOL 3350 17 G/17G
17 POWDER, FOR SOLUTION ORAL DAILY
Start: 2025-05-31

## 2025-05-31 RX ORDER — OXYCODONE HYDROCHLORIDE 10 MG/1
10 TABLET ORAL EVERY 6 HOURS PRN
Start: 2025-05-31

## 2025-05-31 RX ORDER — FAMOTIDINE 40 MG/5ML
20 POWDER, FOR SUSPENSION ORAL EVERY 12 HOURS
Start: 2025-05-31 | End: 2026-05-31

## 2025-05-31 RX ORDER — QUETIAPINE FUMARATE 25 MG/1
25 TABLET, FILM COATED ORAL NIGHTLY
Start: 2025-05-31 | End: 2026-05-31

## 2025-05-31 RX ADMIN — DIAZEPAM 2 MG: 2 TABLET ORAL at 08:05

## 2025-05-31 RX ADMIN — LEVETIRACETAM 2000 MG: 100 SOLUTION ORAL at 08:05

## 2025-05-31 RX ADMIN — CHLORHEXIDINE GLUCONATE 0.12% ORAL RINSE 15 ML: 1.2 LIQUID ORAL at 08:05

## 2025-05-31 RX ADMIN — PIPERACILLIN SODIUM AND TAZOBACTAM SODIUM 4.5 G: 4; .5 INJECTION, POWDER, FOR SOLUTION INTRAVENOUS at 08:05

## 2025-05-31 RX ADMIN — FAMOTIDINE 20 MG: 40 POWDER, FOR SUSPENSION ORAL at 08:05

## 2025-05-31 RX ADMIN — PIPERACILLIN SODIUM AND TAZOBACTAM SODIUM 4.5 G: 4; .5 INJECTION, POWDER, FOR SOLUTION INTRAVENOUS at 12:05

## 2025-05-31 RX ADMIN — PHENOBARBITAL 64.8 MG: 32.4 TABLET ORAL at 08:05

## 2025-05-31 RX ADMIN — BACLOFEN 20 MG: 10 TABLET ORAL at 08:05

## 2025-06-04 LAB
BACTERIA SPT CULT: ABNORMAL
GRAM STN SPEC: ABNORMAL

## 2025-08-26 ENCOUNTER — TELEPHONE (OUTPATIENT)
Facility: CLINIC | Age: 25
End: 2025-08-26
Payer: COMMERCIAL

## 2025-08-26 ENCOUNTER — PATIENT MESSAGE (OUTPATIENT)
Facility: CLINIC | Age: 25
End: 2025-08-26
Payer: COMMERCIAL

## 2025-08-27 ENCOUNTER — OFFICE VISIT (OUTPATIENT)
Dept: NEUROLOGY | Facility: CLINIC | Age: 25
End: 2025-08-27
Payer: COMMERCIAL

## 2025-08-27 DIAGNOSIS — G40.919 INTRACTABLE EPILEPSY WITHOUT STATUS EPILEPTICUS, UNSPECIFIED EPILEPSY TYPE: Primary | ICD-10-CM

## 2025-08-27 DIAGNOSIS — G40.909 INTELLECTUAL DISABILITY WITH EPILEPSY: ICD-10-CM

## 2025-08-27 DIAGNOSIS — G80.0 SPASTIC QUADRIPLEGIC CEREBRAL PALSY: ICD-10-CM

## 2025-08-27 DIAGNOSIS — F79 INTELLECTUAL DISABILITY WITH EPILEPSY: ICD-10-CM

## 2025-08-27 PROCEDURE — 99999 PR PBB SHADOW E&M-EST. PATIENT-LVL III: CPT | Mod: PBBFAC,,, | Performed by: PSYCHIATRY & NEUROLOGY

## 2025-08-27 RX ORDER — HUMAN INSULIN 100 [IU]/ML
INJECTION, SOLUTION SUBCUTANEOUS
COMMUNITY
Start: 2025-08-25

## 2025-08-27 RX ORDER — MUPIROCIN 20 MG/G
OINTMENT TOPICAL
COMMUNITY
Start: 2025-07-09

## 2025-08-27 RX ORDER — CLOBAZAM 2.5 MG/ML
20 SUSPENSION ORAL 2 TIMES DAILY
Qty: 1440 ML | Refills: 1 | Status: SHIPPED | OUTPATIENT
Start: 2025-08-27

## 2025-08-27 RX ORDER — PHENOBARBITAL 64.8 MG/1
64.8 TABLET ORAL 2 TIMES DAILY
COMMUNITY
Start: 2025-08-08

## 2025-08-28 ENCOUNTER — TELEPHONE (OUTPATIENT)
Dept: NEUROLOGY | Facility: CLINIC | Age: 25
End: 2025-08-28
Payer: COMMERCIAL

## 2025-08-29 ENCOUNTER — TELEPHONE (OUTPATIENT)
Dept: NEUROLOGY | Facility: CLINIC | Age: 25
End: 2025-08-29
Payer: COMMERCIAL

## (undated) DEVICE — DIFFUSER

## (undated) DEVICE — DRESSING MEPORE ADH 3.5X12

## (undated) DEVICE — SUT 2-0 VICRYL / CT-1

## (undated) DEVICE — GLOVE SENSICARE PI GRN 8

## (undated) DEVICE — STAPLER ECHELON FLEX 60MM 44CM

## (undated) DEVICE — BANDAGE ADHESIVE

## (undated) DEVICE — STAPLER SKIN PROXIMATE WIDE

## (undated) DEVICE — CARTRIDGE OIL

## (undated) DEVICE — ELECTRODE REM PLYHSV RETURN 9

## (undated) DEVICE — CORD BIPOLAR 12 FOOT

## (undated) DEVICE — SOL NORMAL USPCA 0.9%

## (undated) DEVICE — ELECTRODE BLADE INSULATED 1 IN

## (undated) DEVICE — SUT 2/0 30IN SILK BLK BRAI

## (undated) DEVICE — NDL STIMUPLEX 21GAX4INCH 25/CT

## (undated) DEVICE — DRAPE STERI INSTRUMENT 1018

## (undated) DEVICE — TRACH TUBE CUFF FLEX DISP 7.5

## (undated) DEVICE — CUTTER PROXIMATE BLUE 75MM

## (undated) DEVICE — NDL HYPO REG 25G X 1 1/2

## (undated) DEVICE — NDL SPINAL 18GX3.5 SPINOCAN

## (undated) DEVICE — SEE MEDLINE ITEM 157131

## (undated) DEVICE — TUBE MIC GASTROSTOMY ADULT 20F

## (undated) DEVICE — GAUZE SPONGE 4X4 12PLY

## (undated) DEVICE — DRAPE THYROID SOFT STERILE

## (undated) DEVICE — COVER LIGHT HANDLE 80/CA

## (undated) DEVICE — DRESSING MEPORE 3.6 X 10

## (undated) DEVICE — GOWN POLY REINF BRTH SLV XL

## (undated) DEVICE — BINDER ABDOMINAL 9 30-45

## (undated) DEVICE — DRAPE LAP T SHT W/ INSTR PAD

## (undated) DEVICE — GLOVE SENSICARE PI ALOE 6.5

## (undated) DEVICE — ELECTRODE BLD EXT 6.50 ST DISP

## (undated) DEVICE — DRAPE LAP TIBURON 77X122IN

## (undated) DEVICE — APPLICATOR CHLORAPREP ORN 26ML

## (undated) DEVICE — ELECTRODE BLADE W/SLEEVE 2.75

## (undated) DEVICE — RELOAD ECHELON FLEX WHT 60MM

## (undated) DEVICE — DRESSING TRANS 4X4 TEGADERM

## (undated) DEVICE — FILTER STRAW

## (undated) DEVICE — ADHESIVE MASTISOL VIAL 48/BX

## (undated) DEVICE — TAPE MEDIPORE 4IN X 2YDS

## (undated) DEVICE — BAG POSTOP W/ACCESS CUT TO FIT

## (undated) DEVICE — PACK SPY-PHI DRUG DRAPE

## (undated) DEVICE — SOL IRR NACL .9% 3000ML

## (undated) DEVICE — SUT MONOCRYL 3-0 PS-2 UND

## (undated) DEVICE — SUT MCRYL PLUS 4-0 PS2 27IN

## (undated) DEVICE — SUT MONOCYRL 4-0 PS2 UND

## (undated) DEVICE — MANIFOLD 4 PORT

## (undated) DEVICE — SEE MEDLINE ITEM 156905

## (undated) DEVICE — CHLORAPREP 10.5 ML APPLICATOR

## (undated) DEVICE — GLOVE SIGNATURE ESSNTL LTX 7

## (undated) DEVICE — GLOVE SIGNATURE ESSNTL LTX 6.5

## (undated) DEVICE — EVACUATOR WOUND BULB 100CC

## (undated) DEVICE — SYR 10CC LUER LOCK

## (undated) DEVICE — DRAPE ABDOMINAL TIBURON 14X11

## (undated) DEVICE — SPONGE COTTON TRAY 4X4IN

## (undated) DEVICE — DRESSING LEUKOPLAST FLEX 1X3IN

## (undated) DEVICE — PACK BASIC SETUP SC BR

## (undated) DEVICE — CANISTER VACCUUM DRSNG KCI

## (undated) DEVICE — SUT 4/0 18IN NUROLON BLK B

## (undated) DEVICE — TRAY FOLEY 16FR INFECTION CONT

## (undated) DEVICE — DRESSING TELFA N ADH 3X8

## (undated) DEVICE — DRAPE C-ARMOR EQUIPMENT COVER

## (undated) DEVICE — ADHESIVE DERMABOND ADVANCED

## (undated) DEVICE — CLOSURE SKIN STERI STRIP 1/2X4

## (undated) DEVICE — SEE MEDLINE ITEM 154981

## (undated) DEVICE — SEE ITEM #152308

## (undated) DEVICE — Device

## (undated) DEVICE — TRAY BLUE RHINO G2 7.5 8.5 9.0

## (undated) DEVICE — DRAPE C ARM 42 X 120 10/BX

## (undated) DEVICE — TRAY LUMBAR PUNCTURE ADULT

## (undated) DEVICE — KIT SURGIFLO HEMOSTATIC MATRIX

## (undated) DEVICE — TUBE FRAZIER 5MM 2FT SOFT TIP

## (undated) DEVICE — SYR ONLY LUER LOCK 20CC

## (undated) DEVICE — SHEET XLGE DRAPE

## (undated) DEVICE — BAG DRAIN ANTI REFLUX 4000ML

## (undated) DEVICE — SEALER LIGASURE IMPACT 18CM

## (undated) DEVICE — SUT VICRYL PLUS 3-0 SH 18IN

## (undated) DEVICE — GOWN SURGICAL X-LARGE

## (undated) DEVICE — SEE MEDLINE ITEM 157150

## (undated) DEVICE — TAPE SILK 3IN

## (undated) DEVICE — SEE MEDLINE ITEM 152622

## (undated) DEVICE — DURAPREP SURG SCRUB 26ML

## (undated) DEVICE — GLOVE SENSICARE PI GRN 7

## (undated) DEVICE — DRAPE STERI-DRAPE 1000 17X11IN

## (undated) DEVICE — SUT VICRYL PLUS 2-0

## (undated) DEVICE — NDL NERVE BLOCK 22G X 2

## (undated) DEVICE — BLADE ELECTRO EDGE INSULATED

## (undated) DEVICE — SPONGE SURGIFOAM 100 8.5X12X10

## (undated) DEVICE — SUT 1 36IN PDS II VIO MONO

## (undated) DEVICE — SUT VICRYL 0 SH

## (undated) DEVICE — DRAIN CHANNEL ROUND 10FR

## (undated) DEVICE — TUBING MEDI-VAC 20FT .25IN

## (undated) DEVICE — TOWEL OR DISP STRL BLUE 4/PK

## (undated) DEVICE — GLOVE SIGNATURE ESSNTL LTX 8

## (undated) DEVICE — GLOVE SENSICARE PI MICRO 7

## (undated) DEVICE — SUT SILK 2-0 STRANDS 30IN

## (undated) DEVICE — NDL ECLIPSE SAFETY 18GX1-1/2IN

## (undated) DEVICE — SUT VICRYL BR 1 GEN 27 CT-1

## (undated) DEVICE — MARKER SKIN STND TIP BLUE BARR

## (undated) DEVICE — DRAPE INCISE IOBAN 2 23X17IN

## (undated) DEVICE — PACK SURG LITHOTOMY 3 SIRUS

## (undated) DEVICE — DRESSING ABTHERA SENSA TRAC

## (undated) DEVICE — NDL ECLIPSE SAFETY 23G 1.5IN

## (undated) DEVICE — CATH PASSER FOR MORPHINE PUMP

## (undated) DEVICE — SYR 3CC LUER LOC

## (undated) DEVICE — SYR 30CC LUER LOCK

## (undated) DEVICE — RELOAD PROXIMATE CUT BLUE 75MM

## (undated) DEVICE — SUT SILK 2-0 SH 18IN BLACK

## (undated) DEVICE — SKIN MARKER DEVON 160

## (undated) DEVICE — SUT PDS II O CTX MONO 60

## (undated) DEVICE — DRAPE UINDERBUT GRAD PCH

## (undated) DEVICE — SPONGE LAP 18X18 PREWASHED

## (undated) DEVICE — SUT CHROMIC 3-0 SH 27IN GUT

## (undated) DEVICE — TRAY CATH 1-LYR URIMTR 16FR